# Patient Record
Sex: MALE | Race: BLACK OR AFRICAN AMERICAN | NOT HISPANIC OR LATINO | Employment: UNEMPLOYED | URBAN - METROPOLITAN AREA
[De-identification: names, ages, dates, MRNs, and addresses within clinical notes are randomized per-mention and may not be internally consistent; named-entity substitution may affect disease eponyms.]

---

## 2021-05-28 ENCOUNTER — TRANSFERRED RECORDS (OUTPATIENT)
Dept: HEALTH INFORMATION MANAGEMENT | Facility: CLINIC | Age: 56
End: 2021-05-28

## 2021-06-07 ENCOUNTER — TRANSFERRED RECORDS (OUTPATIENT)
Dept: HEALTH INFORMATION MANAGEMENT | Facility: CLINIC | Age: 56
End: 2021-06-07

## 2021-07-07 ENCOUNTER — TRANSFERRED RECORDS (OUTPATIENT)
Dept: HEALTH INFORMATION MANAGEMENT | Facility: CLINIC | Age: 56
End: 2021-07-07

## 2023-02-11 ENCOUNTER — APPOINTMENT (OUTPATIENT)
Dept: GENERAL RADIOLOGY | Facility: CLINIC | Age: 58
End: 2023-02-11
Attending: STUDENT IN AN ORGANIZED HEALTH CARE EDUCATION/TRAINING PROGRAM
Payer: COMMERCIAL

## 2023-02-11 ENCOUNTER — HOSPITAL ENCOUNTER (EMERGENCY)
Facility: CLINIC | Age: 58
Discharge: HOME | End: 2023-02-11
Attending: EMERGENCY MEDICINE | Admitting: EMERGENCY MEDICINE
Payer: COMMERCIAL

## 2023-02-11 VITALS
OXYGEN SATURATION: 98 % | RESPIRATION RATE: 16 BRPM | HEART RATE: 65 BPM | DIASTOLIC BLOOD PRESSURE: 109 MMHG | TEMPERATURE: 98 F | HEIGHT: 66 IN | SYSTOLIC BLOOD PRESSURE: 138 MMHG | BODY MASS INDEX: 27.32 KG/M2 | WEIGHT: 170 LBS

## 2023-02-11 DIAGNOSIS — S81.801D MULTIPLE OPEN WOUNDS OF RIGHT LOWER EXTREMITY, SUBSEQUENT ENCOUNTER: ICD-10-CM

## 2023-02-11 DIAGNOSIS — L97.919 ULCER OF RIGHT LOWER EXTREMITY, UNSPECIFIED ULCER STAGE (H): ICD-10-CM

## 2023-02-11 DIAGNOSIS — E87.6 HYPOKALEMIA: ICD-10-CM

## 2023-02-11 DIAGNOSIS — D64.9 NORMOCYTIC ANEMIA: ICD-10-CM

## 2023-02-11 DIAGNOSIS — R60.0 BILATERAL LOWER EXTREMITY EDEMA: ICD-10-CM

## 2023-02-11 DIAGNOSIS — D72.820 LYMPHOCYTOSIS: ICD-10-CM

## 2023-02-11 DIAGNOSIS — S90.822A BLISTER OF LEFT FOOT WITHOUT INFECTION, INITIAL ENCOUNTER: ICD-10-CM

## 2023-02-11 DIAGNOSIS — L08.9 LOCAL INFECTION OF SKIN AND SUBCUTANEOUS TISSUE: ICD-10-CM

## 2023-02-11 LAB
ALBUMIN SERPL BCG-MCNC: 3.2 G/DL (ref 3.5–5.2)
ALP SERPL-CCNC: 204 U/L (ref 40–129)
ALT SERPL W P-5'-P-CCNC: 72 U/L (ref 10–50)
ANION GAP SERPL CALCULATED.3IONS-SCNC: 13 MMOL/L (ref 7–15)
AST SERPL W P-5'-P-CCNC: 48 U/L (ref 10–50)
ATRIAL RATE - MUSE: 100 BPM
BASOPHILS # BLD AUTO: 0 10E3/UL (ref 0–0.2)
BASOPHILS NFR BLD AUTO: 0 %
BILIRUB SERPL-MCNC: 0.6 MG/DL
BUN SERPL-MCNC: 10.1 MG/DL (ref 6–20)
CALCIUM SERPL-MCNC: 8.6 MG/DL (ref 8.6–10)
CHLORIDE SERPL-SCNC: 93 MMOL/L (ref 98–107)
CREAT SERPL-MCNC: 0.81 MG/DL (ref 0.67–1.17)
CRP SERPL-MCNC: 31.75 MG/L
DEPRECATED HCO3 PLAS-SCNC: 33 MMOL/L (ref 22–29)
DIASTOLIC BLOOD PRESSURE - MUSE: NORMAL MMHG
EOSINOPHIL # BLD AUTO: 0 10E3/UL (ref 0–0.7)
EOSINOPHIL NFR BLD AUTO: 0 %
ERYTHROCYTE [DISTWIDTH] IN BLOOD BY AUTOMATED COUNT: 21.7 % (ref 10–15)
GFR SERPL CREATININE-BSD FRML MDRD: >90 ML/MIN/1.73M2
GLUCOSE SERPL-MCNC: 215 MG/DL (ref 70–99)
HCT VFR BLD AUTO: 29.4 % (ref 40–53)
HGB BLD-MCNC: 9.2 G/DL (ref 13.3–17.7)
IMM GRANULOCYTES # BLD: 0.3 10E3/UL
IMM GRANULOCYTES NFR BLD: 2 %
INTERPRETATION ECG - MUSE: NORMAL
LYMPHOCYTES # BLD AUTO: 0.7 10E3/UL (ref 0.8–5.3)
LYMPHOCYTES NFR BLD AUTO: 5 %
MCH RBC QN AUTO: 29.9 PG (ref 26.5–33)
MCHC RBC AUTO-ENTMCNC: 31.3 G/DL (ref 31.5–36.5)
MCV RBC AUTO: 96 FL (ref 78–100)
MONOCYTES # BLD AUTO: 0.6 10E3/UL (ref 0–1.3)
MONOCYTES NFR BLD AUTO: 4 %
NEUTROPHILS # BLD AUTO: 13 10E3/UL (ref 1.6–8.3)
NEUTROPHILS NFR BLD AUTO: 89 %
NRBC # BLD AUTO: 0.4 10E3/UL
NRBC BLD AUTO-RTO: 3 /100
NT-PROBNP SERPL-MCNC: 3775 PG/ML (ref 0–900)
P AXIS - MUSE: 30 DEGREES
PLATELET # BLD AUTO: 479 10E3/UL (ref 150–450)
POTASSIUM SERPL-SCNC: 3.1 MMOL/L (ref 3.4–5.3)
PR INTERVAL - MUSE: 146 MS
PROCALCITONIN SERPL IA-MCNC: 0.13 NG/ML
PROT SERPL-MCNC: 6.4 G/DL (ref 6.4–8.3)
QRS DURATION - MUSE: 150 MS
QT - MUSE: 444 MS
QTC - MUSE: 572 MS
R AXIS - MUSE: -26 DEGREES
RBC # BLD AUTO: 3.08 10E6/UL (ref 4.4–5.9)
SODIUM SERPL-SCNC: 139 MMOL/L (ref 136–145)
SYSTOLIC BLOOD PRESSURE - MUSE: NORMAL MMHG
T AXIS - MUSE: -49 DEGREES
VENTRICULAR RATE- MUSE: 100 BPM
WBC # BLD AUTO: 14.7 10E3/UL (ref 4–11)

## 2023-02-11 PROCEDURE — 73610 X-RAY EXAM OF ANKLE: CPT | Mod: RT

## 2023-02-11 PROCEDURE — 80053 COMPREHEN METABOLIC PANEL: CPT | Performed by: STUDENT IN AN ORGANIZED HEALTH CARE EDUCATION/TRAINING PROGRAM

## 2023-02-11 PROCEDURE — 73630 X-RAY EXAM OF FOOT: CPT | Mod: RT

## 2023-02-11 PROCEDURE — 250N000013 HC RX MED GY IP 250 OP 250 PS 637: Performed by: STUDENT IN AN ORGANIZED HEALTH CARE EDUCATION/TRAINING PROGRAM

## 2023-02-11 PROCEDURE — 36415 COLL VENOUS BLD VENIPUNCTURE: CPT | Performed by: STUDENT IN AN ORGANIZED HEALTH CARE EDUCATION/TRAINING PROGRAM

## 2023-02-11 PROCEDURE — 84145 PROCALCITONIN (PCT): CPT | Performed by: STUDENT IN AN ORGANIZED HEALTH CARE EDUCATION/TRAINING PROGRAM

## 2023-02-11 PROCEDURE — 86140 C-REACTIVE PROTEIN: CPT | Performed by: STUDENT IN AN ORGANIZED HEALTH CARE EDUCATION/TRAINING PROGRAM

## 2023-02-11 PROCEDURE — 83880 ASSAY OF NATRIURETIC PEPTIDE: CPT | Performed by: STUDENT IN AN ORGANIZED HEALTH CARE EDUCATION/TRAINING PROGRAM

## 2023-02-11 PROCEDURE — 93005 ELECTROCARDIOGRAM TRACING: CPT

## 2023-02-11 PROCEDURE — 85014 HEMATOCRIT: CPT | Performed by: STUDENT IN AN ORGANIZED HEALTH CARE EDUCATION/TRAINING PROGRAM

## 2023-02-11 PROCEDURE — 99285 EMERGENCY DEPT VISIT HI MDM: CPT

## 2023-02-11 RX ORDER — OXYCODONE AND ACETAMINOPHEN 5; 325 MG/1; MG/1
1 TABLET ORAL ONCE
Status: COMPLETED | OUTPATIENT
Start: 2023-02-11 | End: 2023-02-11

## 2023-02-11 RX ORDER — SPIRONOLACTONE 100 MG/1
100 TABLET, FILM COATED ORAL DAILY
Qty: 30 TABLET | Refills: 0 | Status: ON HOLD | OUTPATIENT
Start: 2023-02-11 | End: 2023-05-01

## 2023-02-11 RX ORDER — LEVOTHYROXINE SODIUM 100 UG/1
100 TABLET ORAL DAILY
Qty: 30 TABLET | Refills: 0 | Status: SHIPPED | OUTPATIENT
Start: 2023-02-11

## 2023-02-11 RX ORDER — IBUPROFEN 600 MG/1
600 TABLET, FILM COATED ORAL ONCE
Status: COMPLETED | OUTPATIENT
Start: 2023-02-11 | End: 2023-02-11

## 2023-02-11 RX ORDER — OXYCODONE HYDROCHLORIDE 5 MG/1
5 TABLET ORAL EVERY 6 HOURS PRN
Qty: 12 TABLET | Refills: 0 | Status: SHIPPED | OUTPATIENT
Start: 2023-02-11 | End: 2023-02-11

## 2023-02-11 RX ORDER — FUROSEMIDE 20 MG
20 TABLET ORAL DAILY
Qty: 7 TABLET | Refills: 0 | Status: ON HOLD | OUTPATIENT
Start: 2023-02-11 | End: 2023-04-04

## 2023-02-11 RX ORDER — CEFDINIR 300 MG/1
300 CAPSULE ORAL 2 TIMES DAILY
Qty: 28 CAPSULE | Refills: 0 | Status: SHIPPED | OUTPATIENT
Start: 2023-02-11 | End: 2023-02-25

## 2023-02-11 RX ORDER — OXYCODONE HYDROCHLORIDE 5 MG/1
5 TABLET ORAL EVERY 6 HOURS PRN
Qty: 12 TABLET | Refills: 0 | Status: ON HOLD | OUTPATIENT
Start: 2023-02-11 | End: 2023-05-01

## 2023-02-11 RX ADMIN — OXYCODONE HYDROCHLORIDE AND ACETAMINOPHEN 1 TABLET: 5; 325 TABLET ORAL at 08:15

## 2023-02-11 RX ADMIN — IBUPROFEN 600 MG: 600 TABLET ORAL at 08:54

## 2023-02-11 ASSESSMENT — ENCOUNTER SYMPTOMS
VOMITING: 0
FEVER: 0
CHILLS: 0
WOUND: 1
NAUSEA: 0
ABDOMINAL PAIN: 0

## 2023-02-11 ASSESSMENT — ACTIVITIES OF DAILY LIVING (ADL)
ADLS_ACUITY_SCORE: 35
ADLS_ACUITY_SCORE: 35

## 2023-02-11 NOTE — ED NOTES
Bed: ED26  Expected date: 2/11/23  Expected time: 7:23 AM  Means of arrival: Ambulance  Comments:  446 57m leg infection ETA 1932

## 2023-02-11 NOTE — ED PROVIDER NOTES
"ED ATTENDING PHYSICIAN NOTE:   I evaluated this patient in conjunction with MARY KAY Mills. I have participated in the care of the patient and personally performed key elements of the history, exam, and medical decision making.      HPI:   Gustavo Faria is a 57 year old male with history of ACTH secreting macroadenoma who presents for evaluation of right leg pain and multiple chronic wounds to the right lower extremity. Patient originally sustained a puncture wound from a car door in August 2022, which he had surgically debrided. Has since developed multiple ulcerated wounds to his right lower leg. He has history of pituitary adenoma s/p multiple resection procedures (upcoming procedure in March 2023) and steroid-induced hyperglycemia (on Metformin and sitagliptin) with resulting neuropathy and constant edema in his bilateral lower legs since last month. Last night he reports a significant increase in pain to his legs, took ibuprofen with minimal relief. With worsening pain today (no oral medications) rated a 9/10 in severity, patient grew concerned and presented to ED today. No fever, chills, nausea, vomiting, or abdominal pain.     EXAM:   BP (!) 138/109   Pulse 65   Temp 98  F (36.7  C) (Oral)   Resp 16   Ht 1.676 m (5' 6\")   Wt 77.1 kg (170 lb)   SpO2 98%   BMI 27.44 kg/m    General: Alert, appears well-developed and well-nourished. Cooperative.     In mild distress  HEENT:  Head:  Atraumatic  Ears:  External ears are normal  Mouth/Throat:  Oropharynx is without erythema or exudate and mucous membranes are moist.   Eyes:   Conjunctivae normal and EOM are normal. No scleral icterus.  CV:  Normal rate, irregular rhythm, normal heart sounds and radial pulses are 2+ and symmetric.  No murmur.  Resp:  Breath sounds are clear bilaterally    Non-labored, no retractions or accessory muscle use  GI:  Abdomen is soft, no distension, no tenderness. No rebound or guarding.  No CVA tenderness " bilaterally  MS:  Normal range of motion. 2+ pitting BLE.    Back atraumatic.    No midline cervical, thoracic, or lumbar tenderness  Skin:  Warm and dry. There are wound to bilateral lower extremities per pictures below.  Neuro: Alert. Normal strength.  GCS: 15  Psych:  Normal mood and affect.            Labs Ordered and Resulted from Time of ED Arrival to Time of ED Departure   CRP INFLAMMATION - Abnormal       Result Value    CRP Inflammation 31.75 (*)    PROCALCITONIN - Abnormal    Procalcitonin 0.13 (*)    COMPREHENSIVE METABOLIC PANEL - Abnormal    Sodium 139      Potassium 3.1 (*)     Chloride 93 (*)     Carbon Dioxide (CO2) 33 (*)     Anion Gap 13      Urea Nitrogen 10.1      Creatinine 0.81      Calcium 8.6      Glucose 215 (*)     Alkaline Phosphatase 204 (*)     AST 48      ALT 72 (*)     Protein Total 6.4      Albumin 3.2 (*)     Bilirubin Total 0.6      GFR Estimate >90     NT PROBNP INPATIENT - Abnormal    N terminal Pro BNP Inpatient 3,775 (*)    CBC WITH PLATELETS AND DIFFERENTIAL - Abnormal    WBC Count 14.7 (*)     RBC Count 3.08 (*)     Hemoglobin 9.2 (*)     Hematocrit 29.4 (*)     MCV 96      MCH 29.9      MCHC 31.3 (*)     RDW 21.7 (*)     Platelet Count 479 (*)     % Neutrophils 89      % Lymphocytes 5      % Monocytes 4      % Eosinophils 0      % Basophils 0      % Immature Granulocytes 2      NRBCs per 100 WBC 3 (*)     Absolute Neutrophils 13.0 (*)     Absolute Lymphocytes 0.7 (*)     Absolute Monocytes 0.6      Absolute Eosinophils 0.0      Absolute Basophils 0.0      Absolute Immature Granulocytes 0.3      Absolute NRBCs 0.4       Foot  XR, G/E 3 views, right   Final Result   IMPRESSION: No radiographic evidence of osteomyelitis. There is no soft tissue gas. Soft tissue swelling along the lateral aspect of the forefoot. No acute fracture.      Ankle XR, G/E 3 views, right   Final Result   IMPRESSION: Marked soft tissue swelling in the lower calf, ankle, and foot. Normal joint alignment.  No erosive change or periostitis identified. No fracture or bone lesion otherwise. Mild degenerative changes in the hindfoot.        MEDICAL DECISION MAKING/ASSESSMENT AND PLAN:   Patient is a 57-year-old male with a complex past medical history pertinent for hypothyroidism, hypertension, and diabetes who presents with chronic wounds to right lower extremity with worsening pain as well as lower extremity swelling bilaterally.  Patient had a broad work-up pursued.  Patient may have a component of acute on chronic cellulitis or infection to the wounds of his right lower extremity.  Ultimately he is having increasing pain to the most distal wound of the right shin.  He did recently have an admission at outside hospital in his home state of [place].  He completed the majority of the antibiotics he was prescribed but did stop the antibiotics as several days before the completion of that oral course.  It was a oral cephalosporin medication with which she was sent home from his prior admission.  Today patient has worsening pain that was controlled with oral pain medications.  Laboratory findings were pertinent for inflammatory marker elevations mild procalcitonin elevation 0.13 and a leukocytosis of 14.7.  Patient is not having fevers or abnormal vital signs.  Given the lack of systemic findings lower concern for sepsis requiring inpatient admission and IV antibiotics or blood cultures at this time.  There may be a component of acute on chronic cellulitis to the right lower extremity and so we could trial oral antibiotics as the patient is out of town and hoping to return back to [place] within the next week and a half.  Given his lower extremity swelling we did evaluate for heart failure versus liver failure versus kidney failure.  I do suspect that he may have some underlying heart failure and given the BNP elevation and bilateral lower extremity swelling we will trial some oral diuretics over the next several days to  see if this improves the swelling in his lower extremities.  He is not having active chest pain or shortness of breath.  He is not hypoxic.  I do not feel that the patient requires admission to the hospital although given the multiple described acute issues above we did offer admission to the hospital for further evaluation which would include echocardiogram, IV antibiotics, and IV diuretics.  The patient does appear to have appropriate medical decision-making capacity and after being offered admission he would much prefer to be discharged home on oral medications particularly as his birthday is tomorrow and that is why he presents to [place] to celebrate with his family and sister.  I think it is reasonable to trial a course of oral antibiotics, oral diuretics, and pain management in the outpatient setting with plans for close outpatient follow-up with his primary care team upon his return to [place].  He may need more current echocardiogram and/or repeat evaluation with his wound care providers upon return to [place].  Certainly he was encouraged to return back to the emergency department should his symptoms worsen within the next 10 days while he is still here in [facility].  After all questions answered return precautions understood, patient discharged home.     DIAGNOSIS:     ICD-10-CM    1. Ulcer of right lower extremity, unspecified ulcer stage (H)  L97.919 STERILE WATER/SALINE, 500 ML      2. Local infection of skin and subcutaneous tissue  L08.9       3. Lymphocytosis  D72.820       4. Hypokalemia  E87.6       5. Normocytic anemia  D64.9       6. Bilateral lower extremity edema  R60.0       7. Blister of left foot without infection, initial encounter  S90.822A       8. Multiple open wounds of right lower extremity, subsequent encounter  S81.801D STERILE WATER/SALINE, 500 ML        DISPOSITION:   Patient discharged.     2/11/2023  [facility] EMERGENCY DEPT       Mart Villa  MD  02/11/23 1519

## 2023-02-11 NOTE — DISCHARGE INSTRUCTIONS
Your exam and lab work today show a potential acute on chronic infection of the wounds of your leg. It is important that you take the full course of antibiotics we prescribed. Additionally, take Lasix daily for one week for fluid retention. Take this in the morning so you are not up urinating over night. It is important that you take your oral potassium pills, since Lasix can continue to decrease your potassium. Oxycodone to be used for severe pain. You can continue Ibuprofen and Tylenol every 6-8 hours for pain.   Lastly, we prescribed 30 days worth of some of your home medications that you do not have with you here: Sitagliptin, Levothyroxine, and Spironolactone. Sterile saline ordered as requested for wound care.  New medications:   Cefdinir/Omnicef BID x14 days  Furosemide/lasix Daily x7 days   OxyCODONE as needed for severe pain  Return to the emergency department if you develop fevers or chills, severe pain, or worsening of your wounds.  Follow up with your doctor in [place] for continued follow up of wound management, your diabetes, your pituitary adenoma, lab work, and general medical care.

## 2023-02-11 NOTE — ED TRIAGE NOTES
Pt here from [place];  Had previous puncture wound from August which got infected.  Pt then had to get it surgically cleaned; pt continues to have issues and pain with R leg.  Per EMS, pt was prescribed abx but did not take them.

## 2023-02-11 NOTE — ED PROVIDER NOTES
History     Chief Complaint:  Leg Pain     PIERRE Chen is a 57-year-old male with a history of ACTH secreting macroadenoma who presents to the emergency department for evaluation of right leg pain and multiple wounds to RLE.  Patient is in town visiting his sister from [place]. Patient reported he originally had a puncture wound from a car door in August 2022, and had it surgically debrided.  He is not exactly sure of when this procedure happened.    Patient reports significant increase in pain last night when the pain woke him from his sleep.  He took ibuprofen yesterday with minimal relief.  No pain medication this morning.  Patient reports the lowest of the 3 ulcerated wounds is what's causing him the most pain.  Rates the pain at a 9 out of 10.  Denies fevers, chills, nausea, vomiting, abdominal pain.  Of note, patient has history of known pituitary adenoma.  He has had multiple procedures for resection; reports another procedure is suppsoed to be mid march.  Patient has diagnosis of steroid-induced hyperglycemia, which has caused him to have reduced sensation in his lower extremities.  He reports constant swelling in his lower legs since his hospital discharge in January. He is currently on metformin and Sitagliptin with a continuous glucose monitor.    Independent Historian:   None - Patient Only and Sibling - They report Multiple components of the history above.    Review of External Notes: I reviewed the patient's discharge summary from [facility] in [place]. Upon chart review in care everywhere, 12/ 23 he was incidentally noted to have a right leg abscess I&D was performed 12/25.  He was discharged home on Omnicef at that time.  He was then readmitted to the hospital on 01/01/23, and discharged 3 days later.  He was again discharged on 14 days of Omnicef, but the patient reports he did not complete this course of antibiotics.    ROS:  Review of Systems   Constitutional: Negative for  "chills and fever.   Gastrointestinal: Negative for abdominal pain, nausea and vomiting.   Skin: Positive for wound.       Allergies:  No Known Drug Allergies     Medications:    Levothyroxine  Metformin  Sitagliptin  Sildenafil  Testosterone  Spironolactone  Oxychlorosene  Carvedilol  Potassium chloride  Nystatin  Hydralazine  Haloperidol  Ergocalciferol           Past Medical History:    Hypothyroidism  Sepsis  Abscess of right lower leg  Metabolic alkalosis  Pituitary adenoma  Metabolic encephalopathy  Hypokalemia  Altered mental status  Hypertension  Diabetes mellitus    Past Surgical History:    Joint repair, left, sepsis in knee  Brain surgery  I&D abscess leg, right    Family History:    Myocardial infarction  Stroke    Social History:  The patient presented alone.  The patient arrived via EMS.  From [place].    Physical Exam     Patient Vitals for the past 24 hrs:   BP Temp Temp src Pulse Resp SpO2 Height Weight   02/11/23 1036 (!) 138/109 -- -- 65 -- 98 % -- --   02/11/23 1021 -- -- -- -- -- 97 % -- --   02/11/23 0737 (!) 158/115 98  F (36.7  C) Oral 88 16 100 % 1.676 m (5' 6\") 77.1 kg (170 lb)      Physical Exam  Vital signs and nursing notes reviewed.    General:  Alert and oriented, no acute distress. Resting on bed with sister at bedside.   Skin: Skin is warm and dry. No rashes, lesions, or erythema. No diaphoresis.  There are 3 wounds to anterior right lower extremity per pictures below. No warmth or significant erythema to area.  HEENT:   Head: Normocephalic, atraumatic. Facial features symmetric.   Eyes: Conjunctiva pink, sclera white. EOMs grossly intact.   Ears: Auricles without lesion, erythema, or edema.   Nose: Symmetric with no discharge.  Mouth and throat: Lips are moist with no lesions or edema, Buccal and oropharyngeal mucosa is pink and moist without lesions or exudate. Uvula is midline.  Neck: Normal range of motion. Neck supple with no lymphadenopathy. No tracheal deviation.   CV:  Normal " heart rate with irregular rhythm. No murmurs or extra heart sounds. 2+ radial and tibialis posterior pulses bilaterally. 2+ pitting edema of bilateral lower extremities from knee down.  Pulm/Chest: Chest wall expansion symmetric with no increased effort of breathing. Lungs clear and equal to auscultation bilaterally.   Abd: Bowel sounds present and physiologic. Abdomen is soft and nontender to palpation in all 4 quadrants with no guarding or rebound. No masses, hernias, or organomegaly.   M/S: Moves all extremities spontaneously.  Psych: Normal mood and affect. Behavior is normal.               Emergency Department Course   ECG  ECG taken at 0818, ECG read at 0824  Sinus rhythm with occasional premature ventricular complexes and premature atrial complexes.  Right bundle branch block.  Minimal voltage criteria for LVH, may be normal variant (R in aVL).  T wave abnormality, consider inferolateral ischemia.  Abnormal ECG.  No old ECG.  Rate 100 bpm. FL interval 146 ms. QRS duration 150 ms. QT/QTc 444/572 ms. P-R-T axes 30 -26 -49.     Imaging:  Ankle XR, G/E 3 views, right   Final Result   IMPRESSION: Marked soft tissue swelling in the lower calf, ankle, and foot. Normal joint alignment. No erosive change or periostitis identified. No fracture or bone lesion otherwise. Mild degenerative changes in the hindfoot.      Foot  XR, G/E 3 views, right    (Results Pending)      Report per radiology    Laboratory:  Labs Ordered and Resulted from Time of ED Arrival to Time of ED Departure   CRP INFLAMMATION - Abnormal       Result Value    CRP Inflammation 31.75 (*)    PROCALCITONIN - Abnormal    Procalcitonin 0.13 (*)    COMPREHENSIVE METABOLIC PANEL - Abnormal    Sodium 139      Potassium 3.1 (*)     Chloride 93 (*)     Carbon Dioxide (CO2) 33 (*)     Anion Gap 13      Urea Nitrogen 10.1      Creatinine 0.81      Calcium 8.6      Glucose 215 (*)     Alkaline Phosphatase 204 (*)     AST 48      ALT 72 (*)     Protein Total 6.4       Albumin 3.2 (*)     Bilirubin Total 0.6      GFR Estimate >90     NT PROBNP INPATIENT - Abnormal    N terminal Pro BNP Inpatient 3,775 (*)    CBC WITH PLATELETS AND DIFFERENTIAL - Abnormal    WBC Count 14.7 (*)     RBC Count 3.08 (*)     Hemoglobin 9.2 (*)     Hematocrit 29.4 (*)     MCV 96      MCH 29.9      MCHC 31.3 (*)     RDW 21.7 (*)     Platelet Count 479 (*)     % Neutrophils 89      % Lymphocytes 5      % Monocytes 4      % Eosinophils 0      % Basophils 0      % Immature Granulocytes 2      NRBCs per 100 WBC 3 (*)     Absolute Neutrophils 13.0 (*)     Absolute Lymphocytes 0.7 (*)     Absolute Monocytes 0.6      Absolute Eosinophils 0.0      Absolute Basophils 0.0      Absolute Immature Granulocytes 0.3      Absolute NRBCs 0.4     ERYTHROCYTE SEDIMENTATION RATE AUTO      Procedures   None    Emergency Department Course & Assessments:     Interventions:  Medications   oxyCODONE-acetaminophen (PERCOCET) 5-325 MG per tablet 1 tablet (1 tablet Oral Given 2/11/23 0815)   ibuprofen (ADVIL/MOTRIN) tablet 600 mg (600 mg Oral Given 2/11/23 0854)      Independent Interpretation (X-rays, CTs, rhythm strip):  I reviewed the patient's foot and ankle x-rays and appreciated no periostitis, fracture, bone lesion.    Consultations/Discussion of Management or Tests:    ED Course as of 02/11/23 1113   Sat Feb 11, 2023   0850 I reassessed the patient. He was still having pain after Percocet. Will order Ibuprofen.     Social Determinants of Health affecting care:   None and Healthcare Access/Compliance    Assessments:  0740 I obtained history and examined the patient as noted above.    Disposition:  The patient was discharged to home.     Impression & Plan      Medical Decision Making:  Gustavo Faria is a 57 year old male who presents for evaluation of pain related to his right lower extremity wounds.  Findings and work-up are consistent with acute on chronic wound infection. Mildly elevated blood pressure here, but  patient reports he did not take his blood pressure medication this morning.  Otherwise patient is afebrile and vital signs are stable.  On exam, wounds to right lower extremity show minimal discharge versus granulation tissue.  Wounds appear chronic. Also noted bilateral lower extremity pitting edema, of which the patient reports has been ongoing since his hospital stay approximately one month ago. Patient was seen in the hospital in [place] approximately 1 week ago and I compared lab work with that visit.  Patient is still hypokalemic. CRP and WBC are elevated consistent with potential acute on chronic wound infection. Minimally elevated pro-calcitonin with low suspicion for systemic infection. Hemoglobin is low, but improved from previous and patient reports this is chronic for him. Also noted elevated alk phos, stable from previous.  Elevated BNP consistent with some potential heart failure. X-ray of the patient's foot and ankle fortunately showed no erosive change or periostitis.  Therefore, low suspicion for osteomyelitis.  Patient is not hypoxic, tachycardic, hypotensive, or febrile without evidence of systemic infection.  Patient's pain is well controlled with oral medications here in the department. Had conversation with patient and sister at bedside for treatment plan. He was offered admission for IV antibiotics, pain control, and wound consultation, although patient was adamant on going home. Patient is in town visiting his sister for his birthday, and through shared decision making, it is decided that patient will discharge home with oral antibiotics and pain medication.  Reviewed risks and benefits. I do feel the patient is safe to go home at this time with close follow-up with his home doctor. We will discharge him on oral cefdinir, furosemide, and pain medication. I emphasized the importance of oral potassium as he is hypokalemic and Lasix will continue to deplete his potassium. Patient reassured me  he has these at home and will take them. Additionally, patient is out of multiple home meds and given he is on vacation visiting his sister, we will prescribe 1 months worth of these medications.  He was instructed to return to the emergency department should he develop fevers, chills, severepain or discharge, or other concerning symptoms.  Also instructed to follow-up with primary care regarding anemia, alk phos, heart failure, wound management, and further treatment of his pituitary macroadenoma.  Patient and sister were agreeable to plan, and had their questions answered.   I staffed this patient with Dr. Villa who is agreeable to the assessment and plan above.    Diagnosis:    ICD-10-CM    1. Ulcer of right lower extremity, unspecified ulcer stage (H)  L97.919 STERILE WATER/SALINE, 500 ML      2. Local infection of skin and subcutaneous tissue  L08.9       3. Lymphocytosis  D72.820       4. Hypokalemia  E87.6       5. Normocytic anemia  D64.9       6. Bilateral lower extremity edema  R60.0       7. Blister of left foot without infection, initial encounter  S90.822A       8. Multiple open wounds of right lower extremity, subsequent encounter  S81.801D STERILE WATER/SALINE, 500 ML         Discharge Medications:  New Prescriptions    CEFDINIR (OMNICEF) 300 MG CAPSULE    Take 1 capsule (300 mg) by mouth 2 times daily for 14 days    FUROSEMIDE (LASIX) 20 MG TABLET    Take 1 tablet (20 mg) by mouth daily for 7 days    LEVOTHYROXINE (SYNTHROID/LEVOTHROID) 100 MCG TABLET    Take 1 tablet (100 mcg) by mouth daily    OXYCODONE (ROXICODONE) 5 MG TABLET    Take 1 tablet (5 mg) by mouth every 6 hours as needed for pain    SITAGLIPTIN (JANUVIA) 50 MG TABLET    Take 2 tablets (100 mg) by mouth daily for 30 days    SPIRONOLACTONE (ALDACTONE) 100 MG TABLET    Take 1 tablet (100 mg) by mouth daily for 30 days      Scribe Disclosure:  Christal STANTON, am serving as a scribe at 7:49 AM on 2/11/2023 to document services personally  performed by Shauna Feldman PA-C based on my observations and the provider's statements to me.   2/11/2023   MERLIN Lopez Victoria J, PA-C  02/11/23 1515

## 2023-02-16 ENCOUNTER — APPOINTMENT (OUTPATIENT)
Dept: ULTRASOUND IMAGING | Facility: CLINIC | Age: 58
DRG: 602 | End: 2023-02-16
Attending: EMERGENCY MEDICINE
Payer: COMMERCIAL

## 2023-02-16 ENCOUNTER — APPOINTMENT (OUTPATIENT)
Dept: GENERAL RADIOLOGY | Facility: CLINIC | Age: 58
DRG: 602 | End: 2023-02-16
Attending: INTERNAL MEDICINE
Payer: COMMERCIAL

## 2023-02-16 ENCOUNTER — APPOINTMENT (OUTPATIENT)
Dept: GENERAL RADIOLOGY | Facility: CLINIC | Age: 58
DRG: 602 | End: 2023-02-16
Attending: EMERGENCY MEDICINE
Payer: COMMERCIAL

## 2023-02-16 ENCOUNTER — APPOINTMENT (OUTPATIENT)
Dept: ULTRASOUND IMAGING | Facility: CLINIC | Age: 58
DRG: 602 | End: 2023-02-16
Payer: COMMERCIAL

## 2023-02-16 ENCOUNTER — HOSPITAL ENCOUNTER (INPATIENT)
Facility: CLINIC | Age: 58
LOS: 2 days | Discharge: LEFT BEFORE DISCHARGE | DRG: 602 | End: 2023-02-18
Attending: EMERGENCY MEDICINE | Admitting: INTERNAL MEDICINE
Payer: COMMERCIAL

## 2023-02-16 DIAGNOSIS — R79.89 ELEVATED TROPONIN: Primary | ICD-10-CM

## 2023-02-16 DIAGNOSIS — Z91.148 POOR COMPLIANCE WITH MEDICATION: ICD-10-CM

## 2023-02-16 DIAGNOSIS — L03.115 CELLULITIS OF RIGHT LOWER LEG: ICD-10-CM

## 2023-02-16 DIAGNOSIS — R60.0 BILATERAL LEG EDEMA: ICD-10-CM

## 2023-02-16 DIAGNOSIS — I50.9 ACUTE ON CHRONIC CONGESTIVE HEART FAILURE, UNSPECIFIED HEART FAILURE TYPE (H): ICD-10-CM

## 2023-02-16 LAB
ALBUMIN SERPL BCG-MCNC: 3.7 G/DL (ref 3.5–5.2)
ALP SERPL-CCNC: 223 U/L (ref 40–129)
ALT SERPL W P-5'-P-CCNC: 81 U/L (ref 10–50)
ANION GAP SERPL CALCULATED.3IONS-SCNC: 10 MMOL/L (ref 7–15)
AST SERPL W P-5'-P-CCNC: 26 U/L (ref 10–50)
ATRIAL RATE - MUSE: 97 BPM
BASOPHILS # BLD AUTO: 0 10E3/UL (ref 0–0.2)
BASOPHILS NFR BLD AUTO: 0 %
BILIRUB SERPL-MCNC: 0.3 MG/DL
BUN SERPL-MCNC: 19.2 MG/DL (ref 6–20)
CALCIUM SERPL-MCNC: 10.6 MG/DL (ref 8.6–10)
CHLORIDE SERPL-SCNC: 96 MMOL/L (ref 98–107)
CREAT SERPL-MCNC: 0.87 MG/DL (ref 0.67–1.17)
CRP SERPL-MCNC: 8.89 MG/L
DEPRECATED HCO3 PLAS-SCNC: 37 MMOL/L (ref 22–29)
DIASTOLIC BLOOD PRESSURE - MUSE: NORMAL MMHG
EOSINOPHIL # BLD AUTO: 0 10E3/UL (ref 0–0.7)
EOSINOPHIL NFR BLD AUTO: 0 %
ERYTHROCYTE [DISTWIDTH] IN BLOOD BY AUTOMATED COUNT: 21.2 % (ref 10–15)
ERYTHROCYTE [SEDIMENTATION RATE] IN BLOOD BY WESTERGREN METHOD: 33 MM/HR (ref 0–20)
GFR SERPL CREATININE-BSD FRML MDRD: >90 ML/MIN/1.73M2
GLUCOSE BLDC GLUCOMTR-MCNC: 229 MG/DL (ref 70–99)
GLUCOSE BLDC GLUCOMTR-MCNC: 280 MG/DL (ref 70–99)
GLUCOSE BLDC GLUCOMTR-MCNC: 391 MG/DL (ref 70–99)
GLUCOSE SERPL-MCNC: 355 MG/DL (ref 70–99)
HBA1C MFR BLD: 7.6 %
HCT VFR BLD AUTO: 30.1 % (ref 40–53)
HGB BLD-MCNC: 9.2 G/DL (ref 13.3–17.7)
IMM GRANULOCYTES # BLD: 0.6 10E3/UL
IMM GRANULOCYTES NFR BLD: 4 %
INTERPRETATION ECG - MUSE: NORMAL
LYMPHOCYTES # BLD AUTO: 0.9 10E3/UL (ref 0.8–5.3)
LYMPHOCYTES NFR BLD AUTO: 6 %
MAGNESIUM SERPL-MCNC: 1.9 MG/DL (ref 1.7–2.3)
MCH RBC QN AUTO: 30.1 PG (ref 26.5–33)
MCHC RBC AUTO-ENTMCNC: 30.6 G/DL (ref 31.5–36.5)
MCV RBC AUTO: 98 FL (ref 78–100)
MONOCYTES # BLD AUTO: 0.6 10E3/UL (ref 0–1.3)
MONOCYTES NFR BLD AUTO: 4 %
NEUTROPHILS # BLD AUTO: 13.3 10E3/UL (ref 1.6–8.3)
NEUTROPHILS NFR BLD AUTO: 86 %
NRBC # BLD AUTO: 0.4 10E3/UL
NRBC BLD AUTO-RTO: 3 /100
NT-PROBNP SERPL-MCNC: 3300 PG/ML (ref 0–900)
P AXIS - MUSE: 27 DEGREES
PLATELET # BLD AUTO: 449 10E3/UL (ref 150–450)
POTASSIUM SERPL-SCNC: 3.1 MMOL/L (ref 3.4–5.3)
POTASSIUM SERPL-SCNC: 3.2 MMOL/L (ref 3.4–5.3)
PR INTERVAL - MUSE: 132 MS
PROCALCITONIN SERPL IA-MCNC: 0.23 NG/ML
PROT SERPL-MCNC: 6.7 G/DL (ref 6.4–8.3)
QRS DURATION - MUSE: 144 MS
QT - MUSE: 424 MS
QTC - MUSE: 538 MS
R AXIS - MUSE: -28 DEGREES
RBC # BLD AUTO: 3.06 10E6/UL (ref 4.4–5.9)
SODIUM SERPL-SCNC: 143 MMOL/L (ref 136–145)
SYSTOLIC BLOOD PRESSURE - MUSE: NORMAL MMHG
T AXIS - MUSE: 37 DEGREES
TROPONIN T SERPL HS-MCNC: 117 NG/L
TROPONIN T SERPL HS-MCNC: 97 NG/L
VENTRICULAR RATE- MUSE: 97 BPM
WBC # BLD AUTO: 15.3 10E3/UL (ref 4–11)

## 2023-02-16 PROCEDURE — 36415 COLL VENOUS BLD VENIPUNCTURE: CPT | Performed by: EMERGENCY MEDICINE

## 2023-02-16 PROCEDURE — 84484 ASSAY OF TROPONIN QUANT: CPT | Performed by: EMERGENCY MEDICINE

## 2023-02-16 PROCEDURE — 250N000013 HC RX MED GY IP 250 OP 250 PS 637: Performed by: INTERNAL MEDICINE

## 2023-02-16 PROCEDURE — 36415 COLL VENOUS BLD VENIPUNCTURE: CPT | Performed by: INTERNAL MEDICINE

## 2023-02-16 PROCEDURE — 99285 EMERGENCY DEPT VISIT HI MDM: CPT | Mod: 25

## 2023-02-16 PROCEDURE — 250N000013 HC RX MED GY IP 250 OP 250 PS 637: Performed by: EMERGENCY MEDICINE

## 2023-02-16 PROCEDURE — 99223 1ST HOSP IP/OBS HIGH 75: CPT | Performed by: SPECIALIST

## 2023-02-16 PROCEDURE — 93926 LOWER EXTREMITY STUDY: CPT | Mod: RT

## 2023-02-16 PROCEDURE — 96375 TX/PRO/DX INJ NEW DRUG ADDON: CPT

## 2023-02-16 PROCEDURE — 250N000011 HC RX IP 250 OP 636: Performed by: EMERGENCY MEDICINE

## 2023-02-16 PROCEDURE — 83735 ASSAY OF MAGNESIUM: CPT | Performed by: INTERNAL MEDICINE

## 2023-02-16 PROCEDURE — 82962 GLUCOSE BLOOD TEST: CPT

## 2023-02-16 PROCEDURE — 93922 UPR/L XTREMITY ART 2 LEVELS: CPT

## 2023-02-16 PROCEDURE — 73590 X-RAY EXAM OF LOWER LEG: CPT | Mod: RT

## 2023-02-16 PROCEDURE — 93970 EXTREMITY STUDY: CPT

## 2023-02-16 PROCEDURE — 87040 BLOOD CULTURE FOR BACTERIA: CPT | Performed by: EMERGENCY MEDICINE

## 2023-02-16 PROCEDURE — 250N000012 HC RX MED GY IP 250 OP 636 PS 637: Performed by: INTERNAL MEDICINE

## 2023-02-16 PROCEDURE — 250N000011 HC RX IP 250 OP 636: Performed by: SPECIALIST

## 2023-02-16 PROCEDURE — 84132 ASSAY OF SERUM POTASSIUM: CPT | Performed by: INTERNAL MEDICINE

## 2023-02-16 PROCEDURE — 71046 X-RAY EXAM CHEST 2 VIEWS: CPT

## 2023-02-16 PROCEDURE — 83880 ASSAY OF NATRIURETIC PEPTIDE: CPT | Performed by: EMERGENCY MEDICINE

## 2023-02-16 PROCEDURE — 99222 1ST HOSP IP/OBS MODERATE 55: CPT | Mod: FS | Performed by: NURSE PRACTITIONER

## 2023-02-16 PROCEDURE — 96365 THER/PROPH/DIAG IV INF INIT: CPT

## 2023-02-16 PROCEDURE — 258N000003 HC RX IP 258 OP 636: Performed by: EMERGENCY MEDICINE

## 2023-02-16 PROCEDURE — 120N000001 HC R&B MED SURG/OB

## 2023-02-16 PROCEDURE — 96366 THER/PROPH/DIAG IV INF ADDON: CPT

## 2023-02-16 PROCEDURE — 84484 ASSAY OF TROPONIN QUANT: CPT | Performed by: INTERNAL MEDICINE

## 2023-02-16 PROCEDURE — 93005 ELECTROCARDIOGRAM TRACING: CPT

## 2023-02-16 PROCEDURE — 84145 PROCALCITONIN (PCT): CPT | Performed by: EMERGENCY MEDICINE

## 2023-02-16 PROCEDURE — 250N000011 HC RX IP 250 OP 636: Performed by: INTERNAL MEDICINE

## 2023-02-16 PROCEDURE — 83036 HEMOGLOBIN GLYCOSYLATED A1C: CPT | Performed by: INTERNAL MEDICINE

## 2023-02-16 PROCEDURE — 85652 RBC SED RATE AUTOMATED: CPT | Performed by: EMERGENCY MEDICINE

## 2023-02-16 PROCEDURE — 85025 COMPLETE CBC W/AUTO DIFF WBC: CPT | Performed by: EMERGENCY MEDICINE

## 2023-02-16 PROCEDURE — 99223 1ST HOSP IP/OBS HIGH 75: CPT | Mod: AI | Performed by: INTERNAL MEDICINE

## 2023-02-16 PROCEDURE — 80053 COMPREHEN METABOLIC PANEL: CPT | Performed by: EMERGENCY MEDICINE

## 2023-02-16 PROCEDURE — 87070 CULTURE OTHR SPECIMN AEROBIC: CPT | Performed by: INTERNAL MEDICINE

## 2023-02-16 PROCEDURE — 86140 C-REACTIVE PROTEIN: CPT | Performed by: EMERGENCY MEDICINE

## 2023-02-16 RX ORDER — LISINOPRIL/HYDROCHLOROTHIAZIDE 10-12.5 MG
1 TABLET ORAL DAILY
Status: ON HOLD | COMMUNITY
End: 2023-04-04

## 2023-02-16 RX ORDER — FUROSEMIDE 10 MG/ML
40 INJECTION INTRAMUSCULAR; INTRAVENOUS ONCE
Status: COMPLETED | OUTPATIENT
Start: 2023-02-16 | End: 2023-02-16

## 2023-02-16 RX ORDER — MEROPENEM 1 G/1
1 INJECTION, POWDER, FOR SOLUTION INTRAVENOUS EVERY 8 HOURS
Status: DISCONTINUED | OUTPATIENT
Start: 2023-02-16 | End: 2023-02-18

## 2023-02-16 RX ORDER — ACETAMINOPHEN 650 MG/1
650 SUPPOSITORY RECTAL EVERY 6 HOURS PRN
Status: DISCONTINUED | OUTPATIENT
Start: 2023-02-16 | End: 2023-02-18 | Stop reason: HOSPADM

## 2023-02-16 RX ORDER — HALOPERIDOL 1 MG/1
2 TABLET ORAL 3 TIMES DAILY
Status: DISCONTINUED | OUTPATIENT
Start: 2023-02-16 | End: 2023-02-17

## 2023-02-16 RX ORDER — NICOTINE POLACRILEX 4 MG
15-30 LOZENGE BUCCAL
Status: DISCONTINUED | OUTPATIENT
Start: 2023-02-16 | End: 2023-02-18 | Stop reason: HOSPADM

## 2023-02-16 RX ORDER — AMOXICILLIN 250 MG
2 CAPSULE ORAL 2 TIMES DAILY PRN
Status: DISCONTINUED | OUTPATIENT
Start: 2023-02-16 | End: 2023-02-18 | Stop reason: HOSPADM

## 2023-02-16 RX ORDER — HYDRALAZINE HYDROCHLORIDE 25 MG/1
25 TABLET, FILM COATED ORAL 3 TIMES DAILY
Status: ON HOLD | COMMUNITY
End: 2023-05-01

## 2023-02-16 RX ORDER — PIPERACILLIN SODIUM, TAZOBACTAM SODIUM 4; .5 G/20ML; G/20ML
4.5 INJECTION, POWDER, LYOPHILIZED, FOR SOLUTION INTRAVENOUS ONCE
Status: COMPLETED | OUTPATIENT
Start: 2023-02-16 | End: 2023-02-16

## 2023-02-16 RX ORDER — ASPIRIN 81 MG/1
81 TABLET ORAL DAILY
Status: DISCONTINUED | OUTPATIENT
Start: 2023-02-17 | End: 2023-02-18 | Stop reason: HOSPADM

## 2023-02-16 RX ORDER — SPIRONOLACTONE 25 MG/1
100 TABLET ORAL DAILY
Status: DISCONTINUED | OUTPATIENT
Start: 2023-02-16 | End: 2023-02-18 | Stop reason: HOSPADM

## 2023-02-16 RX ORDER — ONDANSETRON 2 MG/ML
4 INJECTION INTRAMUSCULAR; INTRAVENOUS EVERY 6 HOURS PRN
Status: DISCONTINUED | OUTPATIENT
Start: 2023-02-16 | End: 2023-02-16

## 2023-02-16 RX ORDER — AMOXICILLIN 250 MG
1 CAPSULE ORAL 2 TIMES DAILY PRN
Status: DISCONTINUED | OUTPATIENT
Start: 2023-02-16 | End: 2023-02-18 | Stop reason: HOSPADM

## 2023-02-16 RX ORDER — HYDRALAZINE HYDROCHLORIDE 25 MG/1
25 TABLET, FILM COATED ORAL 3 TIMES DAILY
Status: DISCONTINUED | OUTPATIENT
Start: 2023-02-16 | End: 2023-02-18 | Stop reason: HOSPADM

## 2023-02-16 RX ORDER — ENOXAPARIN SODIUM 100 MG/ML
40 INJECTION SUBCUTANEOUS EVERY 24 HOURS
Status: DISCONTINUED | OUTPATIENT
Start: 2023-02-16 | End: 2023-02-18 | Stop reason: HOSPADM

## 2023-02-16 RX ORDER — TESTOSTERONE 20.25 MG/1.25G
20.25 GEL TOPICAL DAILY
Status: ON HOLD | COMMUNITY
End: 2023-05-01

## 2023-02-16 RX ORDER — ONDANSETRON 4 MG/1
4 TABLET, ORALLY DISINTEGRATING ORAL EVERY 6 HOURS PRN
Status: DISCONTINUED | OUTPATIENT
Start: 2023-02-16 | End: 2023-02-16

## 2023-02-16 RX ORDER — POTASSIUM CHLORIDE 1.5 G/1.58G
20 POWDER, FOR SOLUTION ORAL DAILY
Status: DISCONTINUED | OUTPATIENT
Start: 2023-02-16 | End: 2023-02-18 | Stop reason: HOSPADM

## 2023-02-16 RX ORDER — PIPERACILLIN SODIUM, TAZOBACTAM SODIUM 3; .375 G/15ML; G/15ML
3.38 INJECTION, POWDER, LYOPHILIZED, FOR SOLUTION INTRAVENOUS EVERY 6 HOURS
Status: DISCONTINUED | OUTPATIENT
Start: 2023-02-16 | End: 2023-02-16

## 2023-02-16 RX ORDER — LISINOPRIL 10 MG/1
10 TABLET ORAL DAILY
Status: DISCONTINUED | OUTPATIENT
Start: 2023-02-16 | End: 2023-02-18 | Stop reason: HOSPADM

## 2023-02-16 RX ORDER — BISACODYL 10 MG
10 SUPPOSITORY, RECTAL RECTAL DAILY PRN
Status: DISCONTINUED | OUTPATIENT
Start: 2023-02-16 | End: 2023-02-18 | Stop reason: HOSPADM

## 2023-02-16 RX ORDER — DEXTROSE MONOHYDRATE 25 G/50ML
25-50 INJECTION, SOLUTION INTRAVENOUS
Status: DISCONTINUED | OUTPATIENT
Start: 2023-02-16 | End: 2023-02-18 | Stop reason: HOSPADM

## 2023-02-16 RX ORDER — ACETAMINOPHEN 325 MG/1
650 TABLET ORAL EVERY 6 HOURS PRN
Status: DISCONTINUED | OUTPATIENT
Start: 2023-02-16 | End: 2023-02-18 | Stop reason: HOSPADM

## 2023-02-16 RX ORDER — ASPIRIN 325 MG
325 TABLET ORAL ONCE
Status: COMPLETED | OUTPATIENT
Start: 2023-02-16 | End: 2023-02-16

## 2023-02-16 RX ORDER — HALOPERIDOL 2 MG/1
2 TABLET ORAL 3 TIMES DAILY
Status: ON HOLD | COMMUNITY
End: 2023-05-01

## 2023-02-16 RX ORDER — CARVEDILOL 12.5 MG/1
12.5 TABLET ORAL 2 TIMES DAILY WITH MEALS
Status: DISCONTINUED | OUTPATIENT
Start: 2023-02-16 | End: 2023-02-18 | Stop reason: HOSPADM

## 2023-02-16 RX ORDER — POTASSIUM CHLORIDE 1500 MG/1
40 TABLET, EXTENDED RELEASE ORAL ONCE
Status: COMPLETED | OUTPATIENT
Start: 2023-02-16 | End: 2023-02-16

## 2023-02-16 RX ORDER — LIDOCAINE 40 MG/G
CREAM TOPICAL
Status: DISCONTINUED | OUTPATIENT
Start: 2023-02-16 | End: 2023-02-18 | Stop reason: HOSPADM

## 2023-02-16 RX ORDER — FUROSEMIDE 10 MG/ML
20 INJECTION INTRAMUSCULAR; INTRAVENOUS
Status: DISCONTINUED | OUTPATIENT
Start: 2023-02-16 | End: 2023-02-18 | Stop reason: HOSPADM

## 2023-02-16 RX ORDER — PIPERACILLIN SODIUM, TAZOBACTAM SODIUM 3; .375 G/15ML; G/15ML
3.38 INJECTION, POWDER, LYOPHILIZED, FOR SOLUTION INTRAVENOUS EVERY 8 HOURS
Status: DISCONTINUED | OUTPATIENT
Start: 2023-02-16 | End: 2023-02-16

## 2023-02-16 RX ORDER — CARVEDILOL 25 MG/1
25 TABLET ORAL 2 TIMES DAILY WITH MEALS
Status: ON HOLD | COMMUNITY
End: 2023-05-01

## 2023-02-16 RX ORDER — LEVOTHYROXINE SODIUM 100 UG/1
100 TABLET ORAL DAILY
Status: DISCONTINUED | OUTPATIENT
Start: 2023-02-16 | End: 2023-02-18 | Stop reason: HOSPADM

## 2023-02-16 RX ADMIN — PIPERACILLIN AND TAZOBACTAM 4.5 G: 4; .5 INJECTION, POWDER, FOR SOLUTION INTRAVENOUS at 10:30

## 2023-02-16 RX ADMIN — ASPIRIN 325 MG ORAL TABLET 325 MG: 325 PILL ORAL at 10:29

## 2023-02-16 RX ADMIN — FUROSEMIDE 20 MG: 10 INJECTION, SOLUTION INTRAMUSCULAR; INTRAVENOUS at 15:48

## 2023-02-16 RX ADMIN — HYDRALAZINE HYDROCHLORIDE 25 MG: 25 TABLET, FILM COATED ORAL at 14:19

## 2023-02-16 RX ADMIN — CARVEDILOL 12.5 MG: 12.5 TABLET, FILM COATED ORAL at 23:13

## 2023-02-16 RX ADMIN — POTASSIUM CHLORIDE 40 MEQ: 1500 TABLET, EXTENDED RELEASE ORAL at 14:47

## 2023-02-16 RX ADMIN — HALOPERIDOL 2 MG: 1 TABLET ORAL at 20:14

## 2023-02-16 RX ADMIN — POTASSIUM CHLORIDE 20 MEQ: 1.5 POWDER, FOR SOLUTION ORAL at 12:04

## 2023-02-16 RX ADMIN — MEROPENEM 1 G: 1 INJECTION, POWDER, FOR SOLUTION INTRAVENOUS at 19:40

## 2023-02-16 RX ADMIN — HALOPERIDOL 2 MG: 1 TABLET ORAL at 14:19

## 2023-02-16 RX ADMIN — PIPERACILLIN AND TAZOBACTAM 3.38 G: 3; .375 INJECTION, POWDER, FOR SOLUTION INTRAVENOUS at 16:16

## 2023-02-16 RX ADMIN — FUROSEMIDE 40 MG: 10 INJECTION, SOLUTION INTRAMUSCULAR; INTRAVENOUS at 10:29

## 2023-02-16 RX ADMIN — INSULIN GLARGINE 10 UNITS: 100 INJECTION, SOLUTION SUBCUTANEOUS at 14:21

## 2023-02-16 RX ADMIN — POTASSIUM CHLORIDE 40 MEQ: 1500 TABLET, EXTENDED RELEASE ORAL at 20:42

## 2023-02-16 RX ADMIN — ACETAMINOPHEN 650 MG: 325 TABLET, FILM COATED ORAL at 15:47

## 2023-02-16 RX ADMIN — ENOXAPARIN SODIUM 40 MG: 40 INJECTION SUBCUTANEOUS at 16:15

## 2023-02-16 RX ADMIN — VANCOMYCIN HYDROCHLORIDE 2000 MG: 10 INJECTION, POWDER, LYOPHILIZED, FOR SOLUTION INTRAVENOUS at 16:45

## 2023-02-16 RX ADMIN — INSULIN ASPART 3 UNITS: 100 INJECTION, SOLUTION INTRAVENOUS; SUBCUTANEOUS at 14:21

## 2023-02-16 RX ADMIN — INSULIN ASPART 5 UNITS: 100 INJECTION, SOLUTION INTRAVENOUS; SUBCUTANEOUS at 17:20

## 2023-02-16 RX ADMIN — CARVEDILOL 12.5 MG: 12.5 TABLET, FILM COATED ORAL at 12:36

## 2023-02-16 RX ADMIN — LISINOPRIL 10 MG: 10 TABLET ORAL at 12:04

## 2023-02-16 RX ADMIN — HYDRALAZINE HYDROCHLORIDE 25 MG: 25 TABLET, FILM COATED ORAL at 20:13

## 2023-02-16 RX ADMIN — SPIRONOLACTONE 100 MG: 25 TABLET ORAL at 12:36

## 2023-02-16 ASSESSMENT — ACTIVITIES OF DAILY LIVING (ADL)
ADLS_ACUITY_SCORE: 35
ADLS_ACUITY_SCORE: 24
DRESSING/BATHING_DIFFICULTY: NO
BATHING: 0-->INDEPENDENT
NUMBER_OF_TIMES_PATIENT_HAS_FALLEN_WITHIN_LAST_SIX_MONTHS: 2
WEAR_GLASSES_OR_BLIND: NO
ADLS_ACUITY_SCORE: 35
CHANGE_IN_FUNCTIONAL_STATUS_SINCE_ONSET_OF_CURRENT_ILLNESS/INJURY: NO
TOILETING_ISSUES: NO
DIFFICULTY_EATING/SWALLOWING: NO
DRESS: 0-->ASSISTANCE NEEDED (DEVELOPMENTALLY APPROPRIATE)
ADLS_ACUITY_SCORE: 35
ADLS_ACUITY_SCORE: 35
TRANSFERRING: 0-->INDEPENDENT
CONCENTRATING,_REMEMBERING_OR_MAKING_DECISIONS_DIFFICULTY: NO
FALL_HISTORY_WITHIN_LAST_SIX_MONTHS: YES
ADLS_ACUITY_SCORE: 35
WALKING_OR_CLIMBING_STAIRS_DIFFICULTY: YES
TRANSFERRING: 0-->INDEPENDENT
ADLS_ACUITY_SCORE: 35
ADLS_ACUITY_SCORE: 35
DOING_ERRANDS_INDEPENDENTLY_DIFFICULTY: NO
WALKING_OR_CLIMBING_STAIRS: OTHER (SEE COMMENTS)

## 2023-02-16 ASSESSMENT — ENCOUNTER SYMPTOMS: WOUND: 1

## 2023-02-16 NOTE — ED NOTES
"[facility]  ED Nurse Handoff Report    ED Chief complaint: Wound Check      ED Diagnosis:   Final diagnoses:   Cellulitis of right lower leg   Bilateral leg edema   Poor compliance with medication   Acute on chronic congestive heart failure, unspecified heart failure type (H)       Code Status: Refer to hospitalist order    Allergies: No Known Allergies    Patient Story: Patient brought in to ED via EMS for R leg wounds with pain. Patient has 4 wounds on R leg from puncture in august, one of the wounds opened last night this with increased drainage and pain prompted patient to call EMS.   Focused Assessment:  Patient hypertensive otherwise vitally stable on RA, A&Ox4, denying SOB and chest pain. Reporting lower back and RLE pain, also reports some baseline numbness in bilateral toes.    Treatments and/or interventions provided: US BLE, X-ray, lasix, aspirin, antibiotics, blood cultures, labs, PIV  Patient's response to treatments and/or interventions: Patient remains stable at this time.    To be done/followed up on inpatient unit:  Continued wound evaluation and care.    Does this patient have any cognitive concerns?: None    Activity level - Baseline/Home:  Independent  Activity Level - Current:   Unknown    Patient's Preferred language: English   Needed?: No    Isolation: None  Infection: Not Applicable  Patient tested for COVID 19 prior to admission: NO  Bariatric?: No    Vital Signs:   Vitals:    02/16/23 0836   BP: (!) 152/110   Pulse: 87   Resp: 16   Temp: 97.5  F (36.4  C)   TempSrc: Temporal   SpO2: 94%   Weight: 77.1 kg (170 lb)   Height: 1.676 m (5' 6\")       Cardiac Rhythm:     Was the PSS-3 completed:   Yes  What interventions are required if any?               Family Comments: None  OBS brochure/video discussed/provided to patient/family: N/A              Name of person given brochure if not patient: NA              Relationship to patient: NA    For the majority of the " shift this patient's behavior was Green.   Behavioral interventions performed were None.    ED NURSE PHONE NUMBER: [phone]

## 2023-02-16 NOTE — CONSULTS
VASCULAR SURGERY INPATIENT CONSULTATION / Initial In-Patient visit    VASCULAR SURGEON: Dr Goodson     LOCATION: [facility]    Gustavo Faria  Medical Record #:  [id number]  YOB: 1965  Age:  58 year old     Date of Service: 2/16/2023    PRIMARY CARE PROVIDER: No Ref-Primary, Physician    Reason for consultation:  Right foot/leg wounds    IMPRESSION: Patient with significant edema and evidence of soft tissue infection with open wounds of distal right leg.  No evidence of arterial insufficiency with palpable strong pulses bilaterally and triphasic waveforms with normal ABIs of 0.9 and toe pressures over 130.  Concern for deep tissue infection with purulent drainage however unlikely due to arterial disease.  Suspect continued wounds and infection a combination of poorly controlled diabetes and venous insufficiency with edema bilaterally.    RECOMMENDATION: No intervention from vascular surgery, no evidence of arterial disease on exam or work-up.  Recommend continued broad-spectrum antibiotics, wound care consult, podiatry consult, and elevation of bilateral lower extremities when able.  Also would recommend CT of right lower extremity to rule out drainable or deeper infection, could also consider ultrasound.  Vascular surgery will sign off, please reach out to team with any further questions or change in exam.    HPI:  Gustavo Faria is a 58 year old male who was seen today in consultation for right leg wounds.  Patient states he has had chronic edema for quite some time.  Earlier this year he sustained injury on his right anterior mid calf region after banging his leg into a door.  This wound did not heal and he was taken to the OR for debridement and drainage.  He reports that at this time the wound was very infected.  The subsequent I&D wounds have also failed to heal and he states the skin breakdown has become worse over time.  All of these interventions were performed in [place]  and he is currently visiting [place].  He has attempted compression socks which do alleviate some edema in his feet and ankles but otherwise denies any wounds.  Denies claudication or rest pain.  Admits to smoking medical marijuana but denies smoking.  Denies antiplatelet or anticoagulation use and notes his blood sugar usually is measured at 300 and only recently started on insulin.    PHH:  No past medical history on file.    No past surgical history on file.     ALLERGIES:   No Known Allergies     MEDS:    Current Facility-Administered Medications:      acetaminophen (TYLENOL) tablet 650 mg, 650 mg, Oral, Q6H PRN, 650 mg at 02/16/23 1547 **OR** acetaminophen (TYLENOL) Suppository 650 mg, 650 mg, Rectal, Q6H PRN, Willy Sam, DO     [START ON 2/17/2023] aspirin EC tablet 81 mg, 81 mg, Oral, Daily, Willy Sam P, DO     bisacodyl (DULCOLAX) suppository 10 mg, 10 mg, Rectal, Daily PRN, Willy Sam P, DO     carvedilol (COREG) tablet 12.5 mg, 12.5 mg, Oral, BID w/meals, Willy Sma P, DO, 12.5 mg at 02/16/23 1236     glucose gel 15-30 g, 15-30 g, Oral, Q15 Min PRN **OR** dextrose 50 % injection 25-50 mL, 25-50 mL, Intravenous, Q15 Min PRN **OR** glucagon injection 1 mg, 1 mg, Subcutaneous, Q15 Min PRN, Willy Sam P, DO     enoxaparin ANTICOAGULANT (LOVENOX) injection 40 mg, 40 mg, Subcutaneous, Q24H, Willy Sam P, DO     furosemide (LASIX) injection 20 mg, 20 mg, Intravenous, BID, Willy Sam P, DO, 20 mg at 02/16/23 1548     haloperidol (HALDOL) tablet 2 mg, 2 mg, Oral, TID, Willy Sam P, DO, 2 mg at 02/16/23 1419     hydrALAZINE (APRESOLINE) tablet 25 mg, 25 mg, Oral, TID, Abner Samn P, DO, 25 mg at 02/16/23 1419     [START ON 2/17/2023] insulin aspart (NovoLOG) injection (RAPID ACTING), , Subcutaneous, Daily with breakfast, Willy Sam,      insulin aspart (NovoLOG) injection (RAPID ACTING), 1-7 Units, Subcutaneous, TID AC, Willy Sam DO, 3 Units at 02/16/23 1421     insulin  aspart (NovoLOG) injection (RAPID ACTING), 1-5 Units, Subcutaneous, At Bedtime, Abner Samn P, DO     insulin glargine (LANTUS PEN) injection 10 Units, 10 Units, Subcutaneous, QAM AC, Abner Samn P, DO, 10 Units at 02/16/23 1421     levothyroxine (SYNTHROID/LEVOTHROID) tablet 100 mcg, 100 mcg, Oral, Daily, Abner Samn P, DO     lidocaine (LMX4) cream, , Topical, Q1H PRN, Willy Sam P, DO     lidocaine 1 % 0.1-1 mL, 0.1-1 mL, Other, Q1H PRN, Abner Samn P, DO     lisinopril (ZESTRIL) tablet 10 mg, 10 mg, Oral, Daily, Willy Sam P, DO, 10 mg at 02/16/23 1204     melatonin tablet 1 mg, 1 mg, Oral, At Bedtime PRN, Abner Samn P, DO     piperacillin-tazobactam (ZOSYN) 3.375 g vial to attach to  mL bag, 3.375 g, Intravenous, Q8H, Abner Samn P, DO     potassium chloride (KLOR-CON) Packet 20 mEq, 20 mEq, Oral, Daily, Willy Sam P, DO, 20 mEq at 02/16/23 1204     senna-docusate (SENOKOT-S/PERICOLACE) 8.6-50 MG per tablet 1 tablet, 1 tablet, Oral, BID PRN **OR** senna-docusate (SENOKOT-S/PERICOLACE) 8.6-50 MG per tablet 2 tablet, 2 tablet, Oral, BID PRN, Cecy Willy P, DO     sodium chloride (PF) 0.9% PF flush 3 mL, 3 mL, Intracatheter, Q8H, Abner Samn P, DO, 3 mL at 02/16/23 1236     sodium chloride (PF) 0.9% PF flush 3 mL, 3 mL, Intracatheter, q1 min prn, Cecy, Willy P, DO     spironolactone (ALDACTONE) tablet 100 mg, 100 mg, Oral, Daily, Abner Samn P, DO, 100 mg at 02/16/23 1236    Current Outpatient Medications:      carvedilol (COREG) 12.5 MG tablet, Take 12.5 mg by mouth 2 times daily (with meals), Disp: , Rfl:      cefdinir (OMNICEF) 300 MG capsule, Take 1 capsule (300 mg) by mouth 2 times daily for 14 days (Patient taking differently: Take 300 mg by mouth 2 times daily 2/11/23 - 2/24/23), Disp: 28 capsule, Rfl: 0     furosemide (LASIX) 20 MG tablet, Take 1 tablet (20 mg) by mouth daily for 7 days, Disp: 7 tablet, Rfl: 0     haloperidol (HALDOL) 2 MG tablet, Take 2 mg by mouth 3  "times daily, Disp: , Rfl:      hydrALAZINE (APRESOLINE) 25 MG tablet, Take 25 mg by mouth 3 times daily, Disp: , Rfl:      levothyroxine (SYNTHROID/LEVOTHROID) 100 MCG tablet, Take 1 tablet (100 mcg) by mouth daily, Disp: 30 tablet, Rfl: 0     lisinopril-hydrochlorothiazide (ZESTORETIC) 10-12.5 MG tablet, Take 1 tablet by mouth daily, Disp: , Rfl:      metFORMIN (GLUCOPHAGE) 500 MG tablet, Take 500 mg by mouth 2 times daily (with meals), Disp: , Rfl:      oxyCODONE (ROXICODONE) 5 MG tablet, Take 1 tablet (5 mg) by mouth every 6 hours as needed for pain, Disp: 12 tablet, Rfl: 0     POTASSIUM CHLORIDE PO, Take 20 mEq by mouth daily, Disp: , Rfl:      sitagliptin (JANUVIA) 50 MG tablet, Take 2 tablets (100 mg) by mouth daily for 30 days, Disp: 60 tablet, Rfl: 0     spironolactone (ALDACTONE) 100 MG tablet, Take 1 tablet (100 mg) by mouth daily for 30 days, Disp: 30 tablet, Rfl: 0     testosterone (ANDROGEL) 20.25 MG/1.25GM (1.62%) topical gel, Place 20.25 mg onto the skin daily, Disp: , Rfl:      SOCIAL HABITS:    Tobacco Use      Smoking status: Not on file      Smokeless tobacco: Not on file     Social History    Substance and Sexual Activity      Alcohol use: Not on file       History   Drug Use Not on file        FAMILY HISTORY:  No family history on file.    REVIEW OF SYSTEMS:    A 12 point ROS was reviewed and except for what is listed in the HPI above, all others are negative    PE:    Vital signs:  Temp: 97.5  F (36.4  C) Temp src: Temporal BP: (!) 146/112 Pulse: 87   Resp: 20 SpO2: 97 % O2 Device: None (Room air)   Height: 167.6 cm (5' 6\") Weight: 77.1 kg (170 lb)  Estimated body mass index is 27.44 kg/m  as calculated from the following:    Height as of this encounter: 1.676 m (5' 6\").    Weight as of this encounter: 77.1 kg (170 lb).       Wt Readings from Last 1 Encounters:   02/16/23 77.1 kg (170 lb)     Body mass index is 27.44 kg/m .    EXAM:  GENERAL: This is a well-developed 58 year old male who " appears his stated age  CARDIAC:  RRR  CHEST/LUNG:  normal work of breathing on RA  GASTROINTESINAL (ABDOMEN):soft, nontender   MUSCULOSKELETAL: Grossly normal strength bilaterally  HEME/LYMPH: Npitting edema bilaterally  NEUROLOGIC: Focally intact, Alert and oriented x 3.   PSYCH: appropriate affect  INTEGUMENT: open wounds with purulent drainage on distal right leg  VASCULAR: 2+ DP and PT bilaterally        DIAGNOSTIC STUDIES:     Images:  XR Chest 2 Views    Result Date: 2/16/2023  CHEST TWO VIEWS February 16, 2023 12:20 PM HISTORY: Shortness of breath. COMPARISON: None.     IMPRESSION: No acute cardiopulmonary disease. FRANCO ESQUIVEL MD   SYSTEM ID:  [id number]    US Lower Extremity Venous Duplex Bilateral    Result Date: 2/16/2023  US LOWER EXTREMITY VENOUS DUPLEX BILATERAL PROCEDURE DATE: 2/16/2023 11:43 AM HISTORY:  Bilateral leg swelling, eval for dvt COMPARISON: None. TECHNIQUE: Grayscale, color-flow, and spectral waveform analysis were performed of the deep veins of the bilateral lower extremities FINDINGS: The right common femoral vein, femoral vein, popliteal vein and tibial veins demonstrate normal compressibility, spectral waveform, color flow and augmentation. Right knee effusion is noted. The left common femoral vein, femoral vein, popliteal vein and tibial veins demonstrate normal compressibility, spectral waveform, color flow and augmentation.     IMPRESSION: 1. No evidence of deep venous thrombosis in the right lower extremity. 2. No evidence of deep venous thrombosis in the left lower extremity. 3. Right knee effusion noted. RAIZA LARSON MD   SYSTEM ID:  [id number]    US HADLEY Doppler No Exercise    Result Date: 2/16/2023  US HADLEY DOPPLER NO EXERCISE, 1-2 LEVELS, BILAT PROCEDURE DATE: 2/16/2023 1:59 PM HISTORY:  Peripheral arterial disease COMPARISON: None. FINDINGS: Pressure measurements in mmHg RIGHT Brachial: 144 Ankle (PT): 189, 1.31 Ankle (DP): 184, 1.28 Digit: 132, 0.92 LEFT Ankle (PT): 167,  1.16 Ankle (DP): 176, 1.22 Digit: 133, 0.92 WAVEFORMS: Multiphasic waveforms bilaterally >?0.7 ????????????????????????????????????????????????????Normal 0.5-0.7 ????????????????????????????????????????????????Mild PAD 0.35-0.5 ??????????????????????????????????????????????Moderate PAD <0.35 &?Toe pressure 40mmHG ?????Moderate to Severe PAD <0.35 &?Toe pressure <30mmHG ???Severe PAD RAIZA LARSON MD   SYSTEM ID:  [id number]    IMPRESSION: 1.  Right leg: Right leg HADLEY is 1.31.  This is normal without evidence of resting arterial insufficiency. 2.  Left leg: Left leg HADLEY is 1.22.  This is normal without evidence of resting arterial insufficiency. HADLEY CRITERIA: >1.4 NC 0.95-1.40 Normal 0.90-0.95 Mild 0.50-0.89 Moderate 0.20-0.49 Severe <0.20 Critical TOE BRACHIAL DIAGNOSTIC CRITERIA    Foot  XR, G/E 3 views, right    Result Date: 2/11/2023  EXAM: XR FOOT RIGHT G/E 3 VIEWS LOCATION: [facility] DATE/TIME: 2/11/2023 8:35 AM INDICATION: infected wounds, ongoing over a month, swelling COMPARISON: None.     IMPRESSION: No radiographic evidence of osteomyelitis. There is no soft tissue gas. Soft tissue swelling along the lateral aspect of the forefoot. No acute fracture.    XR Tibia and Fibula Right 2 Views    Result Date: 2/16/2023  XR TIBIA AND FIBULA RIGHT 2 VIEWS 2/16/2023 9:20 AM HISTORY: Worsening wounds to right lower extremity, concern for cellulitis, osteomyelitis. COMPARISON: None.     IMPRESSION: The right tibia and fibula are negative for fracture. No radiographic evidence for osteomyelitis. There is some soft tissue irregularity along the lateral aspect of the lower leg which may represent surface ulceration or wound. No foreign bodies are identified. BUDDY LEES MD   SYSTEM ID:  [id number]    Ankle XR, G/E 3 views, right    Result Date: 2/11/2023  EXAM: XR ANKLE RIGHT G/E 3 VIEWS LOCATION: [facility] DATE/TIME: 2/11/2023 8:35 AM INDICATION: Right foot and  ankle pain, swelling, and puncture wounds. COMPARISON: None.     IMPRESSION: Marked soft tissue swelling in the lower calf, ankle, and foot. Normal joint alignment. No erosive change or periostitis identified. No fracture or bone lesion otherwise. Mild degenerative changes in the hindfoot.    US Lower Extremity Arterial Duplex Right    Result Date: 2/16/2023  US LOWER EXTREMITY ARTERIAL DUPLEX RIGHT PROCEDURE DATE: 2/16/2023 2:05 PM HISTORY:  Right leg pain and ulcers. Assess for PAD/stenosis COMPARISON: None TECHNIQUE:  Duplex imaging is performed utilizing gray-scale, two-dimensional images and color-flow imaging. Doppler waveform analysis and spectral Doppler imaging is also performed. DUPLEX FINDINGS: RIGHT: Arterial duplex demonstrates mild scattered atheromatous plaque. Brisk and triphasic velocities throughout the femoral popliteal segment without elevated peak systolic velocity or significant shift. Brisk distal ARIANNE triphasic waveform. Monophasic distal PTA and peroneal waveforms without elevated velocity. Brisk triphasic DP waveform.     IMPRESSION: 1.  No significant stenosis or occlusion on right lower extremity arterial duplex. Right peroneal and PTA waveforms are monophasic but remained brisk. RAIZA LARSON MD   SYSTEM ID:  [id number]          LABS:      Sodium   Date Value Ref Range Status   02/16/2023 143 136 - 145 mmol/L Final   02/11/2023 139 136 - 145 mmol/L Final     Urea Nitrogen   Date Value Ref Range Status   02/16/2023 19.2 6.0 - 20.0 mg/dL Final   02/11/2023 10.1 6.0 - 20.0 mg/dL Final     Hemoglobin   Date Value Ref Range Status   02/16/2023 9.2 (L) 13.3 - 17.7 g/dL Final   02/11/2023 9.2 (L) 13.3 - 17.7 g/dL Final     Platelet Count   Date Value Ref Range Status   02/16/2023 449 150 - 450 10e3/uL Final   02/11/2023 479 (H) 150 - 450 10e3/uL Final       Total time spent 20  minutes face to face with patient with more than 50% time spent in counseling and coordination of care.    Jennifer  DO Tiana  [facility] Vascular Surgery

## 2023-02-16 NOTE — PHARMACY-ADMISSION MEDICATION HISTORY
Pharmacy Medication History  Admission medication history interview status for the 2/16/2023  admission is complete. See EPIC admission navigator for prior to admission medications     Location of Interview: Patient room  Medication history sources: Patient, Surescripts, Patient's home med list and Care Everywhere    Significant changes made to the medication list:  Added:  - Carvedilol  - Haloperidol  - Lisinopril-hydrochlorothiazide  - testosterone gel  - Hydralazine  - Potassium  - Metformin    In the past week, patient estimated taking medication this percent of the time: greater than 90%    Medication Affordability: Did not assess       Additional medication history information:   Pt reports that he has been out of lisinopril-hydrochlorothiazide, spironolactone, sitagliptan for about 2 weeks.     Medication reconciliation completed by provider prior to medication history? No    Time spent in this activity: 20 minutes    Prior to Admission medications    Medication Sig Last Dose Taking? Auth Provider Long Term End Date   carvedilol (COREG) 12.5 MG tablet Take 12.5 mg by mouth 2 times daily (with meals) 2/15/2023 Yes Unknown, Entered By History Yes    cefdinir (OMNICEF) 300 MG capsule Take 1 capsule (300 mg) by mouth 2 times daily for 14 days  Patient taking differently: Take 300 mg by mouth 2 times daily 2/11/23 - 2/24/23 2/16/2023 at x1 Yes Shauna Feldman PA-C  2/25/23   furosemide (LASIX) 20 MG tablet Take 1 tablet (20 mg) by mouth daily for 7 days 2/15/2023 Yes Shauna Feldman PA-C Yes 2/18/23   haloperidol (HALDOL) 2 MG tablet Take 2 mg by mouth 3 times daily 2/15/2023 Yes Unknown, Entered By History Yes    hydrALAZINE (APRESOLINE) 25 MG tablet Take 25 mg by mouth 3 times daily 2/15/2023 Yes Unknown, Entered By History Yes    levothyroxine (SYNTHROID/LEVOTHROID) 100 MCG tablet Take 1 tablet (100 mcg) by mouth daily 2/16/2023 at am Yes Shauna Feldman PA-C Yes    lisinopril-hydrochlorothiazide  (ZESTORETIC) 10-12.5 MG tablet Take 1 tablet by mouth daily 2/15/2023 Yes Unknown, Entered By History Yes    metFORMIN (GLUCOPHAGE) 500 MG tablet Take 500 mg by mouth 2 times daily (with meals) 2/15/2023 Yes Unknown, Entered By History Yes    oxyCODONE (ROXICODONE) 5 MG tablet Take 1 tablet (5 mg) by mouth every 6 hours as needed for pain 2/15/2023 at pm Yes Adal Rodriguez MD     POTASSIUM CHLORIDE PO Take 20 mEq by mouth daily 2/15/2023 Yes Unknown, Entered By History     sitagliptin (JANUVIA) 50 MG tablet Take 2 tablets (100 mg) by mouth daily for 30 days Past Month Yes Shauna Feldman PA-C Yes 3/13/23   spironolactone (ALDACTONE) 100 MG tablet Take 1 tablet (100 mg) by mouth daily for 30 days Past Month Yes Shauna Feldman PA-C Yes 3/13/23   testosterone (ANDROGEL) 20.25 MG/1.25GM (1.62%) topical gel Place 20.25 mg onto the skin daily 2/15/2023 Yes Unknown, Entered By History Yes        The information provided in this note is only as accurate as the sources available at the time of update(s)

## 2023-02-16 NOTE — ED NOTES
Bed: ED01  Expected date:   Expected time:   Means of arrival:   Comments:  [facility] – [place] - 426 - 58 M leg wound eta 0808

## 2023-02-16 NOTE — CONSULTS
[facility]    Cardiology Consultation     Date of Admission:  2/16/2023    Assessment & Plan   Gustavo Faria is a 58 year old male who was admitted on 2/16/2023.    1. Elevated troponin - 117 -> 97, EKG with no acute ischemic changes, PVCs on telemetry, no anginal symptoms, no prior CAD history, no family history, no prior cardiac workup that I can find in care everywhere  2. Uncontrolled hypertension - blood pressures 150-160s/110s on admission, did not take antihypertensive agents today. Resumed on admission  3. PVCs - noted on telemetry   4. Elevated NTproBNP - no hypoxia, minimal shortness of breath, recently started on furosemide during ED visit on 2/11, notes he has intermittently been on in the past for his leg swelling.    - PTA furosemide 20mg daily   5. Acute on chronic leg wounds - per hospital medicine   6. Marijuana use   7. Hypokalemia     Patient and sister see at bedside. Recommend resuming home antihypertensive agents, echocardiogram and nuclear stress test for further evaluation.     RECOMMENDATIONS  1. Echocardiogram pending   2. Nuclear stress tomorrow   3. Continue carvedilol 12.5mg BID  4. Continue furosemide 20mg BID  5. Continue hydralazine 25mg TID  5. Continue lisinopril 10mg daily  6. Continue spironolactone 100mg daily   7. Further titrate antihypertensive agents as needed to achieve BP control      High complexity     ARABELLA Nino CNP    Primary Care Physician   Physician No Ref-Primary    Reason for Consult   Reason for consult: I was asked by Dr. Sam to evaluate this patient for elevated troponin.    History of Present Illness   Gustavo Faria is a 58 year old male who presents with worsening leg wounds with accompanying shortness of breath/fatigue.     He has a past medical history of DM, hypothyroidism, hypertension and pituitary tumor/cushings.     He was seen recently in the ED on 2/11/23 for leg wounds, workup was consistent with acute on  "chronic wound infection. He was offered admission, IV antibiotics, pain control and wound consultation but declined. He was discharged on oral antibiotics and pain medications.     He is in town visiting his sister, he is from [place].     He has had multiple hospitalizations this year in [place] 2 in January for sepsis with right lower extremity cellulitis. Most recently on 2/2 for intractable low back pain, a CT scan of the lumbar spine that showed L2 fracture with no other significant stenosis at L4-L5, patient left AMA.     He arrives back to the emergency room today with complaints of worsening right leg wounds, fatigue and shortness of breath.     Upon arrival he is hypertensive, 150-160s/110s. Labs showed stable renal function, creatinine 0.87. Hypokalemic, potassium 3.2. Elevated Alk phos and ALT. CRP 8.89. Procalcitonin 0.23. Glucose 355. HgbA1c 7.6. NTproBNP 3,300. Troponin 117 ->  97. WBCs 15.3. Hemoglobin 9.3. EKG showed sinus rhythm, with RBBB and PACs. No acute ischemic changes. CXR unremarkable.     Patient seen in the ED, his sister at bedside. Denies any history of chest pain, chest tightness, arm pain. Notes an occasional chest \"twinge\" which lasts 1 second. Reports some vague shortness of breath, not with exertion. Denies orthopnea or PND. Chronic LE edema, notes currently a lot better than it was. Denies abdominal bloating. Denies lightheadedness, dizziness or other presyncopal symptoms. Denies palpitations or tachycardia. Has been taking his medications since arriving in [place] except his jardiance.     Denies alcohol use. Denies tobacco use. Using marijuana. Denies drug use.     Past Medical History   DM2 (diabetes mellitus)  Hypertension  History of pituitary tumor/Cushing's  Lymphedema  Chronic leg wounds  Hospitalization recently with altered mentation/agitation improved on haldol  Bacteremia December 2022    Past Surgical History   BRAIN SURGERY   HX JOINT REPAIR Left 2015   sepsis knee   I&D " ABSCESS LEG Right 12/25/2022   INCISION AND DRAINAGE LOWER EXTREMITY RIGHT LOWER LEG ABSCESS performed by Horacio Chavarria MD at [facility]    Prior to Admission Medications   Prior to Admission Medications   Prescriptions Last Dose Informant Patient Reported? Taking?   POTASSIUM CHLORIDE PO 2/15/2023 Self Yes Yes   Sig: Take 20 mEq by mouth daily   carvedilol (COREG) 12.5 MG tablet 2/15/2023 Self Yes Yes   Sig: Take 12.5 mg by mouth 2 times daily (with meals)   cefdinir (OMNICEF) 300 MG capsule 2/16/2023 at x1 Self No Yes   Sig: Take 1 capsule (300 mg) by mouth 2 times daily for 14 days   Patient taking differently: Take 300 mg by mouth 2 times daily 2/11/23 - 2/24/23   furosemide (LASIX) 20 MG tablet 2/15/2023 Self No Yes   Sig: Take 1 tablet (20 mg) by mouth daily for 7 days   haloperidol (HALDOL) 2 MG tablet 2/15/2023 Self Yes Yes   Sig: Take 2 mg by mouth 3 times daily   hydrALAZINE (APRESOLINE) 25 MG tablet 2/15/2023 Self Yes Yes   Sig: Take 25 mg by mouth 3 times daily   levothyroxine (SYNTHROID/LEVOTHROID) 100 MCG tablet 2/16/2023 at am Self No Yes   Sig: Take 1 tablet (100 mcg) by mouth daily   lisinopril-hydrochlorothiazide (ZESTORETIC) 10-12.5 MG tablet 2/15/2023 Self Yes Yes   Sig: Take 1 tablet by mouth daily   metFORMIN (GLUCOPHAGE) 500 MG tablet 2/15/2023 Self Yes Yes   Sig: Take 500 mg by mouth 2 times daily (with meals)   oxyCODONE (ROXICODONE) 5 MG tablet 2/15/2023 at pm Self No Yes   Sig: Take 1 tablet (5 mg) by mouth every 6 hours as needed for pain   sitagliptin (JANUVIA) 50 MG tablet Past Month Self No Yes   Sig: Take 2 tablets (100 mg) by mouth daily for 30 days   spironolactone (ALDACTONE) 100 MG tablet Past Month Self No Yes   Sig: Take 1 tablet (100 mg) by mouth daily for 30 days   testosterone (ANDROGEL) 20.25 MG/1.25GM (1.62%) topical gel 2/15/2023 Self Yes Yes   Sig: Place 20.25 mg onto the skin daily      Facility-Administered Medications: None     Current Facility-Administered  "Medications   Medication Dose Route Frequency     [START ON 2023] aspirin  81 mg Oral Daily     carvedilol  12.5 mg Oral BID w/meals     enoxaparin ANTICOAGULANT  40 mg Subcutaneous Q24H     furosemide  20 mg Intravenous BID     haloperidol  2 mg Oral TID     hydrALAZINE  25 mg Oral TID     [START ON 2023] insulin aspart   Subcutaneous Daily with breakfast     insulin aspart  1-7 Units Subcutaneous TID AC     insulin aspart  1-5 Units Subcutaneous At Bedtime     insulin glargine  10 Units Subcutaneous QAM AC     levothyroxine  100 mcg Oral Daily     lisinopril  10 mg Oral Daily     piperacillin-tazobactam  3.375 g Intravenous Q8H     potassium chloride  20 mEq Oral Daily     sodium chloride (PF)  3 mL Intracatheter Q8H     spironolactone  100 mg Oral Daily     Current Facility-Administered Medications   Medication Last Rate     Allergies   No Known Allergies    Social History      From [place], in town visiting his sister.     Family History     Mother - CVA, hypertension  Father - , unknown cause  Sister - hypertension       Review of Systems   A comprehensive review of system was performed and is negative other than that noted in the HPI or here.     Physical Exam   Vital Signs with Ranges  Temp:  [97.5  F (36.4  C)] 97.5  F (36.4  C)  Pulse:  [87] 87  Resp:  [16] 16  BP: (152-163)/(110-114) 163/114  SpO2:  [94 %-98 %] 98 %  Wt Readings from Last 4 Encounters:   23 77.1 kg (170 lb)   23 77.1 kg (170 lb)     No intake/output data recorded.      Vitals: BP (!) 163/114   Pulse 87   Temp 97.5  F (36.4  C) (Temporal)   Resp 16   Ht 1.676 m (5' 6\")   Wt 77.1 kg (170 lb)   SpO2 98%   BMI 27.44 kg/m      Physical Exam:   General - Alert and oriented to time place and person in no acute distress  Eyes - No scleral icterus  HEENT - Neck supple, moist mucous membranes  Cardiovascular - reulgar   Extremities - +2 bilateral edema, right LE with multiple small wounds, right LE warm to " touch.   Respiratory - clear bilaterally with no wheezing/rhonchi   Skin - No pallor or cyanosis  Gastrointestinal - Non tender and non distended without rebound or guarding  Psych - Appropriate affect   Neurological - No gross motor neurological focal deficits      Recent Labs   Lab 02/16/23  0854 02/11/23  0816   WBC 15.3* 14.7*   HGB 9.2* 9.2*   MCV 98 96    479*    139   POTASSIUM 3.2* 3.1*   CHLORIDE 96* 93*   CO2 37* 33*   BUN 19.2 10.1   CR 0.87 0.81   GFRESTIMATED >90 >90   ANIONGAP 10 13   KORIN 10.6* 8.6   * 215*   ALBUMIN 3.7 3.2*   PROTTOTAL 6.7 6.4   BILITOTAL 0.3 0.6   ALKPHOS 223* 204*   ALT 81* 72*   AST 26 48       Recent Labs   Lab 02/16/23  0854 02/11/23  0816   WBC 15.3* 14.7*   HGB 9.2* 9.2*   HCT 30.1* 29.4*   MCV 98 96    479*     Recent Labs   Lab 02/16/23  0854 02/11/23  0816   NTBNPI 3,300* 3,775*     No results for input(s): DD in the last 168 hours.  Recent Labs   Lab 02/16/23  0854   SED 33*     Recent Labs   Lab 02/16/23  0854 02/11/23  0816    479*     Imaging:  Recent Results (from the past 48 hour(s))   XR Tibia and Fibula Right 2 Views    Narrative    XR TIBIA AND FIBULA RIGHT 2 VIEWS 2/16/2023 9:20 AM     HISTORY: Worsening wounds to right lower extremity, concern for  cellulitis, osteomyelitis.    COMPARISON: None.         Impression    IMPRESSION: The right tibia and fibula are negative for fracture. No  radiographic evidence for osteomyelitis. There is some soft tissue  irregularity along the lateral aspect of the lower leg which may  represent surface ulceration or wound. No foreign bodies are  identified.    BUDDY LEES MD         SYSTEM ID:  [id number]   US Lower Extremity Venous Duplex Bilateral    Narrative    US LOWER EXTREMITY VENOUS DUPLEX BILATERAL  PROCEDURE DATE: 2/16/2023 11:43 AM     HISTORY:  Bilateral leg swelling, eval for dvt    COMPARISON: None.    TECHNIQUE:   Grayscale, color-flow, and spectral waveform analysis were  "performed  of the deep veins of the bilateral lower extremities    FINDINGS:   The right common femoral vein, femoral vein, popliteal vein and tibial  veins demonstrate normal compressibility, spectral waveform, color  flow and augmentation.    Right knee effusion is noted.    The left common femoral vein, femoral vein, popliteal vein and tibial  veins demonstrate normal compressibility, spectral waveform, color  flow and augmentation.      Impression    IMPRESSION:   1. No evidence of deep venous thrombosis in the right lower extremity.  2. No evidence of deep venous thrombosis in the left lower extremity.  3. Right knee effusion noted.    RAIZA LARSON MD         SYSTEM ID:  [id number]   XR Chest 2 Views    Narrative    CHEST TWO VIEWS February 16, 2023 12:20 PM     HISTORY: Shortness of breath.    COMPARISON: None.      Impression    IMPRESSION: No acute cardiopulmonary disease.         Clinically Significant Risk Factors Present on Admission       # Hypokalemia: Lowest K = 3.2 mmol/L in last 2 days, will replace as needed    # Hypercalcemia: Highest Ca = 10.6 mg/dL in last 2 days, will monitor as appropriate         # DMII: A1C = 7.6 % (Ref range: <5.7 %) within past 3 months  # Overweight: Estimated body mass index is 27.44 kg/m  as calculated from the following:    Height as of this encounter: 1.676 m (5' 6\").    Weight as of this encounter: 77.1 kg (170 lb).    Cardiovascular: Cardiac Arrhythmia: Ventricular premature depolarization (PVC)    Fluid & Electrolyte Disorders: Hypokalemia and Fluid overload, unspecified    Limitations of activities/Fatigue (optional): Chronic Fatigue and Other Debilities: Other reduced mobility                  "

## 2023-02-16 NOTE — CONSULTS
[facility]    Infectious Disease Consultation     Date of Admission:  2/16/2023  Date of Consult : 02/16/23    Assessment:  58 YM with diabetes and chronic RLE wounds who has been hospitalized with wound infection with copious purulent drainage which will require surgical debridement.     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 Surgical evaluation for debridement  2. Consider MRI lower tibial for evaluation of abscess/ deep infection  3. Discontinue Zosyn, Vancomycin  4. Treat with Meropenem given prior isolation of Serratia marcescens  ID will continue to follow  Follow clinical status and labs    Cathy Keith MD    Reason for Consult    I was asked to evaluate this patient for evaluation of right leg wpunds    Primary Care Physician   Physician No Ref-Primary    Chief Complaint   Worsening right leg wounds    History is obtained from the patient and medical records    History of Present Illness   Gustavo Faria is a 58 year old male with diabetes, cushing's disease related to pituitary macroadenoma, HTN, hypothyroidism, prolonged Qt syndrome,  hospitalization in December with serratia marcescens bacteremia related to RLE wound/abscess-had an I&D,  who has been admitted with worsening appearance of his wounds, he reports some drainage and increased erythema. Patient receives his care in [place] and is visiting family here in [place]. He has been on an oral antibiotic but does not know which one    He is afebrile, has leukocytosis of 15.3K. Inflammatory markers are improving from prior. US is negative for DVT.  Arterial US is negative for occlusion. ID has been asked to assist with further management    Past Medical History   I have reviewed this patient's medical history and updated it with pertinent information if needed.   No past medical history on file.    Past Surgical History   I have reviewed this patient's surgical history and updated it with pertinent information if needed.  No past surgical history on file.    Prior to Admission Medications   Prior to Admission Medications   Prescriptions Last Dose Informant Patient Reported? Taking?   POTASSIUM CHLORIDE PO 2/15/2023 Self Yes Yes   Sig: Take 20 mEq by mouth daily   carvedilol (COREG) 12.5 MG tablet 2/15/2023 Self Yes Yes   Sig: Take 12.5 mg by mouth 2 times daily (with meals)   cefdinir (OMNICEF) 300 MG capsule 2/16/2023 at x1 Self No Yes   Sig: Take 1 capsule (300 mg) by mouth 2 times daily for 14 days   Patient taking differently: Take 300 mg by mouth 2 times daily 2/11/23 - 2/24/23   furosemide (LASIX) 20 MG tablet 2/15/2023 Self No Yes   Sig: Take 1 tablet (20 mg) by mouth daily for 7 days   haloperidol (HALDOL) 2 MG tablet 2/15/2023 Self Yes Yes   Sig: Take 2 mg by mouth 3 times daily   hydrALAZINE (APRESOLINE) 25 MG tablet 2/15/2023 Self Yes Yes   Sig: Take 25 mg by mouth 3 times daily   levothyroxine (SYNTHROID/LEVOTHROID) 100 MCG tablet 2/16/2023 at am Self No Yes   Sig: Take 1 tablet (100 mcg) by mouth daily   lisinopril-hydrochlorothiazide (ZESTORETIC) 10-12.5 MG tablet 2/15/2023 Self Yes Yes   Sig: Take 1 tablet by mouth daily   metFORMIN (GLUCOPHAGE) 500 MG tablet 2/15/2023 Self Yes Yes   Sig: Take 500 mg by mouth 2 times daily (with meals)   oxyCODONE (ROXICODONE) 5 MG tablet 2/15/2023 at pm Self No Yes   Sig: Take 1 tablet (5 mg) by mouth every 6 hours as needed for pain   sitagliptin (JANUVIA) 50 MG tablet Past Month Self No Yes   Sig: Take 2 tablets (100 mg) by mouth daily for 30 days   spironolactone (ALDACTONE) 100 MG tablet Past Month Self No  Yes   Sig: Take 1 tablet (100 mg) by mouth daily for 30 days   testosterone (ANDROGEL) 20.25 MG/1.25GM (1.62%) topical gel 2/15/2023 Self Yes Yes   Sig: Place 20.25 mg onto the skin daily      Facility-Administered Medications: None     Allergies   No Known Allergies    Immunization History     There is no immunization history on file for this patient.    Social History   I have reviewed this patient's social history and updated it with pertinent information if needed. Gustavo Faria      Family History   I have reviewed this patient's family history and updated it with pertinent information if needed.   No family history on file.    Review of Systems   The 10 point Review of Systems is as per HPI    Physical Exam   Temp: 97.5  F (36.4  C) Temp src: Temporal BP: (!) 146/112 Pulse: 87   Resp: 20 SpO2: 97 % O2 Device: None (Room air)    Vital Signs with Ranges  Temp:  [97.5  F (36.4  C)] 97.5  F (36.4  C)  Pulse:  [87] 87  Resp:  [16-20] 20  BP: (146-163)/(110-114) 146/112  SpO2:  [94 %-98 %] 97 %  170 lbs 0 oz  Body mass index is 27.44 kg/m .    GENERAL APPEARANCE:  awake  EYES: Eyes grossly normal to inspection  NECK: no adenopathy  RESP: lungs clear   CV: regular rates and rhythm  ABDOMEN: soft, nontender  MS: R leg swelling wounds with purulent drainage  SKIN: no rash    Data   All laboratory data reviewed    Component      Latest Ref Rng & Units 2/16/2023   Hemoglobin A1C      <5.7 % 7.6 (H)     Component      Latest Ref Rng & Units 2/16/2023   Troponin T, High Sensitivity      <=22 ng/L 117 (HH)   N-Terminal Pro BNP Inpatient      0 - 900 pg/mL 3,300 (H)   Sed Rate      0 - 20 mm/hr 33 (H)   CRP Inflammation      <5.00 mg/L 8.89 (H)     Component      Latest Ref Rng & Units 2/16/2023   Sodium      136 - 145 mmol/L 143   Potassium      3.4 - 5.3 mmol/L 3.2 (L)   Chloride      98 - 107 mmol/L 96 (L)   Carbon Dioxide (CO2)      22 - 29 mmol/L 37 (H)   Anion Gap      7 - 15 mmol/L 10   Urea Nitrogen      6.0 - 20.0  mg/dL 19.2   Creatinine      0.67 - 1.17 mg/dL 0.87   Calcium      8.6 - 10.0 mg/dL 10.6 (H)   Glucose      70 - 99 mg/dL 355 (H)   Alkaline Phosphatase      40 - 129 U/L 223 (H)   AST      10 - 50 U/L 26   ALT      10 - 50 U/L 81 (H)   Protein Total      6.4 - 8.3 g/dL 6.7   Albumin      3.5 - 5.2 g/dL 3.7   Bilirubin Total      <=1.2 mg/dL 0.3     Component      Latest Ref Rng & Units 2/16/2023   WBC      4.0 - 11.0 10e3/uL 15.3 (H)   RBC Count      4.40 - 5.90 10e6/uL 3.06 (L)   Hemoglobin      13.3 - 17.7 g/dL 9.2 (L)   Hematocrit      40.0 - 53.0 % 30.1 (L)   MCV      78 - 100 fL 98   MCH      26.5 - 33.0 pg 30.1   MCHC      31.5 - 36.5 g/dL 30.6 (L)   RDW      10.0 - 15.0 % 21.2 (H)   Platelet Count      150 - 450 10e3/uL 449   % Neutrophils      % 86   % Lymphocytes      % 6   % Monocytes      % 4   % Eosinophils      % 0   % Basophils      % 0   % Immature Granulocytes      % 4   NRBCs per 100 WBC      <1 /100 3 (H)   Absolute Neutrophils      1.6 - 8.3 10e3/uL 13.3 (H)   Absolute Lymphocytes      0.8 - 5.3 10e3/uL 0.9   Absolute Monocytes      0.0 - 1.3 10e3/uL 0.6   Absolute Eosinophils      0.0 - 0.7 10e3/uL 0.0   Absolute Basophils      0.0 - 0.2 10e3/uL 0.0   Absolute Immature Granulocytes      <=0.4 10e3/uL 0.6 (H)   Absolute NRBCs      10e3/uL 0.4     Microbiology  2/16 blood cx pending  2/16 wound cx pending

## 2023-02-16 NOTE — PROGRESS NOTES
WOC consult noted for RLE, however unable to see pt today d/t staffing and high number new consults.  Chart and photo of leg from 2/11 reviewed briefly.  Noted Vascular Surgery also consulted.  Defer to VS at this time for management.  If need interim wound care recommendations would consider Vashe-moist gauze to wounds BID, cover with ABD and Kerlix.  [facility] will plan to follow-up tomorrow Fri 2/17.

## 2023-02-16 NOTE — PHARMACY-VANCOMYCIN DOSING SERVICE
"Pharmacy Vancomycin Initial Note  Date of Service 2023  Patient's  1965  58 year old, male    Indication: Skin and Soft Tissue Infection    Current estimated CrCl = Estimated Creatinine Clearance: 91.6 mL/min (based on SCr of 0.87 mg/dL).    Creatinine for last 3 days  2023:  8:54 AM Creatinine 0.87 mg/dL    Recent Vancomycin Level(s) for last 3 days  No results found for requested labs within last 72 hours.      Vancomycin IV Administrations (past 72 hours)      No vancomycin orders with administrations in past 72 hours.                Nephrotoxins and other renal medications (From now, onward)    Start     Dose/Rate Route Frequency Ordered Stop    23 2216  piperacillin-tazobactam (ZOSYN) 3.375 g vial to attach to  mL bag        Note to Pharmacy: For [facility], [facility] and [facility]: For Zosyn-naive patients, use the \"Zosyn initial dose + extended infusion\" order panel.    3.375 g  over 30 Minutes Intravenous EVERY 6 HOURS 23 1625      23 1625  vancomycin (VANCOCIN) 2,000 mg in 0.9% NaCl 500 mL intermittent infusion         2,000 mg  over 2 Hours Intravenous ONCE 23 1623      23 1600  furosemide (LASIX) injection 20 mg         20 mg  over 1-3 Minutes Intravenous 2 TIMES DAILY (Diuretics and Nitrates) 23 1256      23 1150  lisinopril (ZESTRIL) tablet 10 mg         10 mg Oral DAILY 23 1147            Contrast Orders - past 72 hours (72h ago, onward)    None          [employer] Prediction of Planned Initial Vancomycin Regimen  Loading dose: 2000 mg x 1  Regimen: 1000 mg IV every 12 hours.  Exposure target: AUC24 (range) 400-600 mg/L.hr   AUC24,ss: 490 mg/L.hr  Probability of AUC24 > 400: 72 %  Ctrough,ss: 15.2 mg/L  Probability of Ctrough,ss > 20: 27 %  Probability of nephrotoxicity (Lodise STANLEY ): 10 %          Plan:  1. Start vancomycin 1000 mg IV q12h.   2. Vancomycin monitoring method: AUC  3. Vancomycin therapeutic monitoring goal: 400-600 " mg*h/L  4. Pharmacy will check vancomycin levels as appropriate in 1-3 Days.    5. Serum creatinine levels will be ordered daily for the first week of therapy and at least twice weekly for subsequent weeks.      Ewelina Smalls RPH

## 2023-02-16 NOTE — ED TRIAGE NOTES
Patient arrived via EMS for wound check on R leg he has 4 open wounds that are draining and causing significant pain. Vitally stable denying Sob and \chest pain, A&Ox4, reporting 7/10 pain in back and R leg.     Triage Assessment     Row Name 02/16/23 0840       Triage Assessment (Adult)    Airway WDL WDL       Respiratory WDL    Respiratory WDL WDL       Cardiac WDL    Cardiac WDL WDL       Peripheral/Neurovascular WDL    Peripheral Neurovascular WDL X;neurovascular assessment lower       LLE Neurovascular Assessment    Temperature LLE warm    Color LLE janel    Sensation LLE numbness present       RLE Neurovascular Assessment    Temperature RLE warm    Color RLE janel    Sensation RLE numbness present       Cognitive/Neuro/Behavioral WDL    Cognitive/Neuro/Behavioral WDL WDL

## 2023-02-16 NOTE — H&P
[facility]    History and Physical - Hospitalist Service       Date of Admission:  2/16/2023    Assessment & Plan      Gustavo Faria is a 58 year old medically complex male (visiting area from [place]) admitted on 2/16/2023. He presents back to the ER with worsening right leg wound discomfort/drainage.      Right leg wounds, concern for infection/worsening cellulitis  Bacteremia hx - December 2022 Serratia bacteremia at OSH, tx with ceftriaxone  Lymphedema:  -  Wound consult  -  Culture discharge  -  BC's pending with bacteremia hx  -  Empiric zosyn as had been on oral cephalosporin and had perceived worsening of wounds.  Of note he was also on ceftriaxone in January in [place] for wound infection.  -  Check venous US for DVT  -  Surgery consult given depth of wounds  -  Consider further vascular workup, will get initial consults first for further recommendations.  -  Lasix IV for at least a couple doses (hold po lasix), resume spironolactone  -  ID consult    Acute on chronic fatigue/Dyspnea  Elevated Troponin:  -  Elevated troponin of unclear significance.  He has multiple cardiac risk factors but denies prior MI/ACS.  He denies chest pain but has had more fatigue/SOB with activity lately.  Continue ASA, dose given on presentation.  Will have him on cardiac monitoring and check follow-up troponin's + echocardiogram.  Given multiple risk factors and elevated trop will also ask cardiology to see him.    Prolonged QT  History of Encephalopathy/Agitation with hospital stay 1/23 in [place]:  -  Patient moved to scheduled haldol in January (outside records in care everywhere reviewed).  Of note at that point seroquel stopped and QT was around 500. QT more recently was near 580 but now better around 530.  I'm not sure haldol is an ideal medication for this gentleman.  I will ask psychiatry to see him for recommendations.  As he has found the haldol helpful and mentation stable now.  His QT  is lower now than recent EKG with prior ED visit will continue cautiously.  I did stop the initial zofran I planned given its QT prolongation risk.  Would avoid adding additional QT prolonging meds.    Pituitary macroadenoma; cushing's disease:  Patient has known history of pituitary macroadenoma, ACTH secreting lesion. Patient has previously been treated with 2 surgical procedures in May and July 2021 at [facility] with neurosurgical team - Dr. Lynn.  In Jan at OSH stay they did not recommend urgent surgical intervention based on the size of the lesion and lack of any hydrocephalus on admission head CT scan.  He is planned for neurosurgery appt at [facility] in [place] March 2023.  He is felt possibly to still need more elective surgery this year.    DM2, exacerbated by Cushing's:  -  Glucoses quite uncontrolled, 200-400 range most the time by his report.  This is likely substantially exacerbating his wound/infectious issues.  I discussed the need for better control.  He had wanted to avoid insulin but given issues is open to it.  I will start some lantus now + novolog carb count with meals + novolog sliding scale insulin.  Hold metformin for now.  Of note his sitagliptin he had forgotten when he visited [place] and insurance wouldn't cover an early refill so he hasn't had for several days.  He also reports even when on it glu frequently 300.   Consider resuming soon but as starting insulin and he has been off this for awhile, will initially hold.    Hypokalemia:  -  Resume PTA scheduled, add RN replacement protocol  -  Resume spironolactone    Hypertension, exacerbated by Cushing's/Pit issues:  -  Resume PTA BP meds.  He has not yet had today so will give now.  Hold hydrochlorothiazide for moment while dosing more lasix IV.    Hypothyroidism:  -  Levothyroxine    Marijuana use:  -  Uses daily medical marijuana (smokes it).  While he I explained he cannot smoke in his room.         Medical  "Decision Making       75 MINUTES SPENT BY ME on the date of service doing chart review, history, exam, documentation & further activities per the note.          Diet: Moderate Consistent Carb (60 g CHO per Meal) Diet    DVT Prophylaxis: Enoxaparin (Lovenox) SQ  Roland Catheter: Not present  Lines: None     Cardiac Monitoring: ACTIVE order. Indication: Chest pain/ ACS rule out (24 hours)  Code Status: Full Code      Clinically Significant Risk Factors Present on Admission        # Hypokalemia: Lowest K = 3.2 mmol/L in last 2 days, will replace as needed    # Hypercalcemia: Highest Ca = 10.6 mg/dL in last 2 days, will monitor as appropriate        # Hypertension: home medication list includes antihypertensive(s)      # Overweight: Estimated body mass index is 27.44 kg/m  as calculated from the following:    Height as of this encounter: 1.676 m (5' 6\").    Weight as of this encounter: 77.1 kg (170 lb).           Disposition Plan      Expected Discharge Date: 02/18/2023                  Willy Sam DO  Hospitalist Service  [facility]  Securely message with pbsi (more info)  Text page via [facility] Paging/Directory   ____________________________________________________________________    Chief Complaint   Fatigue, worsening wounds    History is obtained from the patient    History of Present Illness   Gustavo Faria is a 58 year old male who presented back to the ED after being recently seen regarding his leg wounds.  He has noticed over the past couple days worsening R leg wounds with a new one forming and increasing drainage from a couple of them.  He also has been more tired and somewhat SOB with activity.  He denies chest pain.  He has occasional leg pain.  No fevers.  He has been taking recently prescribed abx.  He has been taking his PTA meds except sitagliptin as he forgot to bring them from [place] and his insurance company wouldn't allow refill yet.  He thinks his LE edema also " might be a little worse since coming to visit the area/family.    Past Medical History    DM2 (diabetes mellitus)  Hypertension  History of pituitary tumor/Cushing's  Lymphedema  Chronic leg wounds  Hospitalization recently with altered mentation/agitation improved on haldol  Bacteremia December 2022    Past Surgical History   BRAIN SURGERY   HX JOINT REPAIR Left 2015   sepsis knee   I&D ABSCESS LEG Right 12/25/2022   INCISION AND DRAINAGE LOWER EXTREMITY RIGHT LOWER LEG ABSCESS performed by Horacio Chavarria MD at [facility] MAIN      Prior to Admission Medications   Prior to Admission Medications   Prescriptions Last Dose Informant Patient Reported? Taking?   POTASSIUM CHLORIDE PO 2/15/2023 Self Yes Yes   Sig: Take 20 mEq by mouth daily   carvedilol (COREG) 12.5 MG tablet 2/15/2023 Self Yes Yes   Sig: Take 12.5 mg by mouth 2 times daily (with meals)   cefdinir (OMNICEF) 300 MG capsule 2/16/2023 at x1 Self No Yes   Sig: Take 1 capsule (300 mg) by mouth 2 times daily for 14 days   Patient taking differently: Take 300 mg by mouth 2 times daily 2/11/23 - 2/24/23   furosemide (LASIX) 20 MG tablet 2/15/2023 Self No Yes   Sig: Take 1 tablet (20 mg) by mouth daily for 7 days   haloperidol (HALDOL) 2 MG tablet 2/15/2023 Self Yes Yes   Sig: Take 2 mg by mouth 3 times daily   hydrALAZINE (APRESOLINE) 25 MG tablet 2/15/2023 Self Yes Yes   Sig: Take 25 mg by mouth 3 times daily   levothyroxine (SYNTHROID/LEVOTHROID) 100 MCG tablet 2/16/2023 at am Self No Yes   Sig: Take 1 tablet (100 mcg) by mouth daily   lisinopril-hydrochlorothiazide (ZESTORETIC) 10-12.5 MG tablet 2/15/2023 Self Yes Yes   Sig: Take 1 tablet by mouth daily   metFORMIN (GLUCOPHAGE) 500 MG tablet 2/15/2023 Self Yes Yes   Sig: Take 500 mg by mouth 2 times daily (with meals)   oxyCODONE (ROXICODONE) 5 MG tablet 2/15/2023 at pm Self No Yes   Sig: Take 1 tablet (5 mg) by mouth every 6 hours as needed for pain   sitagliptin (JANUVIA) 50 MG tablet Past Month Self No  Yes   Sig: Take 2 tablets (100 mg) by mouth daily for 30 days   spironolactone (ALDACTONE) 100 MG tablet Past Month Self No Yes   Sig: Take 1 tablet (100 mg) by mouth daily for 30 days   testosterone (ANDROGEL) 20.25 MG/1.25GM (1.62%) topical gel 2/15/2023 Self Yes Yes   Sig: Place 20.25 mg onto the skin daily      Facility-Administered Medications: None      Allergies   No Known Allergies    SH:  Smokes medical marijuana.  Denies tobacco use.  No longer working.       Physical Exam   Vital Signs: Temp: 97.5  F (36.4  C) Temp src: Temporal BP: (!) 152/110 Pulse: 87   Resp: 16 SpO2: 94 %      Weight: 170 lbs 0 oz    GEN:  Alert, oriented x 3, appears ill but comfortable, no overt distress.  HEENT:  Normocephalic/atraumatic, no scleral icterus, no nasal discharge, mouth moist.  CV:  Regular rate and rhythm, distant.  No loud murmur/rub.  LUNGS:  Clear to auscultation upper with decreased breat sounds bases.  No wheezes/retractions.  Symmetric chest rise on inhalation noted.  ABD:  Active bowel sounds, soft, non-tender/non-distended.  No guarding/rigidity.  EXT:  +2-3 LE edema.  No cyanosis.  SKIN:  Dry to touch, multiple RLE wounds with lowest two having some purulent drainage and more warmth near them.  NEURO:  Symmetric muscle strength, sensation to touch grossly intact.  No new focal deficits appreciated.    Data     I have personally reviewed the following data over the past 24 hrs:    15.3 (H)  \   9.2 (L)   / 449     143 96 (L) 19.2 /  355 (H)   3.2 (L) 37 (H) 0.87 \       ALT: 81 (H) AST: 26 AP: 223 (H) TBILI: 0.3   ALB: 3.7 TOT PROTEIN: 6.7 LIPASE: N/A       Trop: 97 (H) BNP: 3,300 (H)       TSH: N/A T4: N/A A1C: 7.6 (H)       Procal: 0.23 (H) CRP: 8.89 (H) Lactic Acid: N/A         Recent Labs   Lab 02/16/23  0854 02/11/23  0816   WBC 15.3* 14.7*   HGB 9.2* 9.2*   HCT 30.1* 29.4*   MCV 98 96    479*     7-Day Micro Results     Collected Updated Procedure Result Status      02/16/2023 1209 02/16/2023  1214 Wound Aerobic Bacterial Culture Routine [[id number]]   Wound from Leg, Right    In process Component Value   No component results               02/16/2023 0925 02/16/2023 0932 Blood Culture Peripheral Blood [[id number]]   Peripheral Blood    In process Component Value   No component results               02/16/2023 0854 02/16/2023 0900 Blood Culture Peripheral Blood [[id number]]   Peripheral Blood    In process Component Value   No component results                   Recent Labs   Lab 02/16/23  0854 02/11/23  0816    139   POTASSIUM 3.2* 3.1*   CHLORIDE 96* 93*   CO2 37* 33*   ANIONGAP 10 13   * 215*   BUN 19.2 10.1   CR 0.87 0.81   GFRESTIMATED >90 >90   KORIN 10.6* 8.6   MAG 1.9  --    PROTTOTAL 6.7 6.4   ALBUMIN 3.7 3.2*   BILITOTAL 0.3 0.6   ALKPHOS 223* 204*   AST 26 48   ALT 81* 72*     Recent Results (from the past 24 hour(s))   XR Tibia and Fibula Right 2 Views    Narrative    XR TIBIA AND FIBULA RIGHT 2 VIEWS 2/16/2023 9:20 AM     HISTORY: Worsening wounds to right lower extremity, concern for  cellulitis, osteomyelitis.    COMPARISON: None.         Impression    IMPRESSION: The right tibia and fibula are negative for fracture. No  radiographic evidence for osteomyelitis. There is some soft tissue  irregularity along the lateral aspect of the lower leg which may  represent surface ulceration or wound. No foreign bodies are  identified.    BUDDY LEES MD         SYSTEM ID:  [id number]

## 2023-02-16 NOTE — ED PROVIDER NOTES
History   Chief Complaint:  Wound Check     HPI   Gustavo Faria is a 58 year old male with a history of diabetes and HTN who presents with a leg wound. The patient states that he has been getting wounds on his legs for the past few months. He is traveling from [place] and staying with his sister. He plans on being here a few more weeks. He comes in today because on of his four wounds has opened since his last ED visit. The pain in his right leg is still bad and he is still having leg swelling. He did get some of his medications that were prescribed last time he was in the ED but still doesn't have them all because his insurance wont cover them. He is taking the antibiotics that he was prescribed, he is taking a water pill to urinate.    Independent Historian:   None - Patient Only    Review of External Notes: Reviewed patients admission at [facility] for chronic wounds of his right leg, diabetes, metabolic alkalosis, and a pituitary tumor on 12/08/2022.    ROS:  Review of Systems   Cardiovascular: Positive for leg swelling.   Skin: Positive for wound.   All other systems reviewed and are negative.      Allergies:  No Known Allergies     Medications:    cefdinir  furosemide   levothyroxine  oxyCODONE  sitagliptin   spironolactone  Levothyroxine  Metformin  Sitagliptin  Sildenafil  Testosterone  Spironolactone  Oxychlorosene  Carvedilol  Potassium chloride  Nystatin  Hydralazine  Haloperidol  Ergocalciferol     Past Medical History:    Hypothyroidism  Sepsis  Abscess of right lower leg  Metabolic alkalosis  Pituitary adenoma  Metabolic encephalopathy  Hypokalemia  Altered mental status  Hypertension  Diabetes mellitus    Past Surgical History:    Joint repair, left, sepsis in knee  Brain surgery  I&D abscess leg, right     Family History:    Stroke  MI    Social History:  The patient presents to the ED with his sister.  The patient is from [place].    Physical Exam     Patient Vitals for the  "past 24 hrs:   BP Temp Temp src Pulse Resp SpO2 Height Weight   02/16/23 0836 (!) 152/110 97.5  F (36.4  C) Temporal 87 16 94 % 1.676 m (5' 6\") 77.1 kg (170 lb)       Physical Exam  General:          Alert, appears well-developed and well-nourished. Cooperative.                           In mild distress  HEENT:  Head:               Atraumatic  Ears:                External ears are normal  Mouth/Throat:  Oropharynx is without erythema or exudate and mucous membranes are moist.   Eyes:               Conjunctivae normal and EOM are normal. No scleral icterus.  CV:                  Normal rate, irregular rhythm, normal heart sounds and radial pulses are 2+ and symmetric.  No murmur.  Resp:              Breath sounds are clear bilaterally                          Non-labored, no retractions or accessory muscle use  GI:                   Abdomen is soft, no distension, no tenderness. No rebound or guarding.  No CVA tenderness bilaterally  MS:                 Normal range of motion. 3+ pitting BLE.                          Back atraumatic.                          No midline cervical, thoracic, or lumbar tenderness  Skin:               Warm and dry. There are wounds to right lower extremity with a lateral wound to the distal right lower leg with oozing and fluctuance.   Neuro:  Alert. Normal strength.  GCS: 15  Psych:            Normal mood and affect.    Emergency Department Course     ECG results from 02/16/23 signed @ 0846   EKG 12-lead, tracing only     Value    Systolic Blood Pressure     Diastolic Blood Pressure     Ventricular Rate 97    Atrial Rate 97    FL Interval 132    QRS Duration 144        QTc 538    P Axis 27    R AXIS -28    T Axis 37    Interpretation ECG      Sinus rhythm with Premature supraventricular complexes and with occasional Premature ventricular complexes  Right bundle branch block  Minimal voltage criteria for LVH, may be normal variant ( R in aVL )  Abnormal ECG  When compared with ECG " of 11-FEB-2023 08:18, no changes       Imaging:  XR Tibia and Fibula Right 2 Views   Final Result   IMPRESSION: The right tibia and fibula are negative for fracture. No   radiographic evidence for osteomyelitis. There is some soft tissue   irregularity along the lateral aspect of the lower leg which may   represent surface ulceration or wound. No foreign bodies are   identified.      BUDDY LEES MD            SYSTEM ID:  [id number]         Report per radiology    Laboratory:  Labs Ordered and Resulted from Time of ED Arrival to Time of ED Departure   COMPREHENSIVE METABOLIC PANEL - Abnormal       Result Value    Sodium 143      Potassium 3.2 (*)     Chloride 96 (*)     Carbon Dioxide (CO2) 37 (*)     Anion Gap 10      Urea Nitrogen 19.2      Creatinine 0.87      Calcium 10.6 (*)     Glucose 355 (*)     Alkaline Phosphatase 223 (*)     AST 26      ALT 81 (*)     Protein Total 6.7      Albumin 3.7      Bilirubin Total 0.3      GFR Estimate >90     TROPONIN T, HIGH SENSITIVITY - Abnormal    Troponin T, High Sensitivity 117 (*)    NT PROBNP INPATIENT - Abnormal    N terminal Pro BNP Inpatient 3,300 (*)    ERYTHROCYTE SEDIMENTATION RATE AUTO - Abnormal    Erythrocyte Sedimentation Rate 33 (*)    CRP INFLAMMATION - Abnormal    CRP Inflammation 8.89 (*)    PROCALCITONIN - Abnormal    Procalcitonin 0.23 (*)    CBC WITH PLATELETS AND DIFFERENTIAL - Abnormal    WBC Count 15.3 (*)     RBC Count 3.06 (*)     Hemoglobin 9.2 (*)     Hematocrit 30.1 (*)     MCV 98      MCH 30.1      MCHC 30.6 (*)     RDW 21.2 (*)     Platelet Count 449      % Neutrophils 86      % Lymphocytes 6      % Monocytes 4      % Eosinophils 0      % Basophils 0      % Immature Granulocytes 4      NRBCs per 100 WBC 3 (*)     Absolute Neutrophils 13.3 (*)     Absolute Lymphocytes 0.9      Absolute Monocytes 0.6      Absolute Eosinophils 0.0      Absolute Basophils 0.0      Absolute Immature Granulocytes 0.6 (*)     Absolute NRBCs 0.4     BLOOD CULTURE    BLOOD CULTURE      Emergency Department Course & Assessments:    Interventions:  Medications   piperacillin-tazobactam (ZOSYN) 4.5 g vial to attach to  mL bag (has no administration in time range)   furosemide (LASIX) injection 40 mg (has no administration in time range)   aspirin (ASA) tablet 325 mg (325 mg Oral Given 2/16/23 1029)      Independent Interpretation (X-rays, CTs, rhythm strip):  Reviewed xray and there is no evidence of osteomyelitis.    Consultations/Discussion of Management or Tests:  ED Course as of 02/16/23 1032   Thu Feb 16, 2023   1031 Talked to Dr. Sam about the patients findings and possible admittance         Social Determinants of Health affecting care:   Healthcare Access/Compliance and Education/Literacy    Assessments:  0825 Obtained the patients history and performed initial exam    Disposition:  The patient was admitted to the hospital under the care of Dr. Sam.     Impression & Plan    CMS Diagnoses: None    Medical Decision Making:  Patient is a 58-year-old male with a history of hypertension, hyperlipidemia, diabetes, and chronic right lower extremity wounds in the setting of peripheral neuropathy and pituitary tumor who presents with worsening wounds to the right lower extremity.  Patient has copious edema to bilateral lower extremities.  I did see the patient approximately 5 days ago and his swelling has worsened in that timeframe.  We will plan to diurese the patient with IV diuretics today given the 3+ pitting edema to lower extremities.  I suspect the edema is complicating appropriate wound healing of the right lower extremity.  He also appears to have a new wound to the lateral aspect of the right lower extremity concerning for potential abscess.  There is some drainage from this wound.  There also are chronic ulcerating wounds to the anterior shin of the right lower extremity.  I suspect there is poor medication compliance in the outpatient setting as the  patient did not fill all the medications that were prescribed for him 5 days ago.  Additionally patient has no clear timing for return back to his home state of [place].  Ultimately, I think the patient is decompensated from a diabetes perspective as well as an acute on chronic cellulitis presentation to the right lower extremity.  He may need debridement during this hospitalization given new wound to the right lateral lower leg.  Patient was started on Zosyn and vancomycin given ongoing/worsening soft tissue infection.  Additionally, procalcitonin is elevated.  Blood cultures obtained.  Patient's troponin is also elevated highly suspicious for CHF exacerbation.  I have lower concern for ACS as the patient is not having active chest pain or shortness of breath but in the setting of significant lower extremity swelling, elevated BNP, and elevated troponin suspect likely demand ischemia with CHF.  Given this I will not start heparin at this time.  He did receive a full dose aspirin.  Patient will be admitted to the hospitalist service for ongoing diuresis, treatment of chronic wounds and acute on chronic cellulitis.  Spoke with Dr. Sam of the hospitalist service who agreed to admission    Diagnosis:    ICD-10-CM    1. Cellulitis of right lower leg  L03.115       2. Bilateral leg edema  R60.0       3. Poor compliance with medication  Z91.14       4. Acute on chronic congestive heart failure, unspecified heart failure type (H)  I50.9           Scribe Disclosure:  Ernie STANTON, am serving as a scribe at 8:31 AM on 2/16/2023 to document services personally performed by Mart Villa MD based on my observations and the provider's statements to me.     2/16/2023   Mart Villa MD White, Scott, MD  02/16/23 2667

## 2023-02-16 NOTE — ED TRIAGE NOTES
Pt is here from [place], visiting his sister, chronic wounds to Rt LE since august, one of the wounds opened up

## 2023-02-17 ENCOUNTER — APPOINTMENT (OUTPATIENT)
Dept: CARDIOLOGY | Facility: CLINIC | Age: 58
DRG: 602 | End: 2023-02-17
Attending: INTERNAL MEDICINE
Payer: COMMERCIAL

## 2023-02-17 LAB
ANION GAP SERPL CALCULATED.3IONS-SCNC: 10 MMOL/L (ref 7–15)
BUN SERPL-MCNC: 18.5 MG/DL (ref 6–20)
CALCIUM SERPL-MCNC: 9.3 MG/DL (ref 8.6–10)
CHLORIDE SERPL-SCNC: 97 MMOL/L (ref 98–107)
CREAT SERPL-MCNC: 0.84 MG/DL (ref 0.67–1.17)
DEPRECATED HCO3 PLAS-SCNC: 35 MMOL/L (ref 22–29)
ERYTHROCYTE [DISTWIDTH] IN BLOOD BY AUTOMATED COUNT: 21.5 % (ref 10–15)
GFR SERPL CREATININE-BSD FRML MDRD: >90 ML/MIN/1.73M2
GLUCOSE BLDC GLUCOMTR-MCNC: 200 MG/DL (ref 70–99)
GLUCOSE BLDC GLUCOMTR-MCNC: 264 MG/DL (ref 70–99)
GLUCOSE BLDC GLUCOMTR-MCNC: 265 MG/DL (ref 70–99)
GLUCOSE BLDC GLUCOMTR-MCNC: 276 MG/DL (ref 70–99)
GLUCOSE BLDC GLUCOMTR-MCNC: 292 MG/DL (ref 70–99)
GLUCOSE BLDC GLUCOMTR-MCNC: 304 MG/DL (ref 70–99)
GLUCOSE BLDC GLUCOMTR-MCNC: 334 MG/DL (ref 70–99)
GLUCOSE SERPL-MCNC: 259 MG/DL (ref 70–99)
HCT VFR BLD AUTO: 30.1 % (ref 40–53)
HGB BLD-MCNC: 9.2 G/DL (ref 13.3–17.7)
LVEF ECHO: NORMAL
MAGNESIUM SERPL-MCNC: 2 MG/DL (ref 1.7–2.3)
MCH RBC QN AUTO: 29.5 PG (ref 26.5–33)
MCHC RBC AUTO-ENTMCNC: 30.6 G/DL (ref 31.5–36.5)
MCV RBC AUTO: 97 FL (ref 78–100)
PLATELET # BLD AUTO: 360 10E3/UL (ref 150–450)
POTASSIUM SERPL-SCNC: 3.4 MMOL/L (ref 3.4–5.3)
POTASSIUM SERPL-SCNC: 3.5 MMOL/L (ref 3.4–5.3)
RBC # BLD AUTO: 3.12 10E6/UL (ref 4.4–5.9)
SODIUM SERPL-SCNC: 142 MMOL/L (ref 136–145)
WBC # BLD AUTO: 17.4 10E3/UL (ref 4–11)

## 2023-02-17 PROCEDURE — 99232 SBSQ HOSP IP/OBS MODERATE 35: CPT | Mod: 25 | Performed by: NURSE PRACTITIONER

## 2023-02-17 PROCEDURE — 36415 COLL VENOUS BLD VENIPUNCTURE: CPT | Performed by: EMERGENCY MEDICINE

## 2023-02-17 PROCEDURE — F08G5BZ WOUND MANAGEMENT TREATMENT OF INTEGUMENTARY SYSTEM - LOWER BACK / LOWER EXTREMITY USING PHYSICAL AGENTS: ICD-10-PCS | Performed by: HOSPITALIST

## 2023-02-17 PROCEDURE — 99221 1ST HOSP IP/OBS SF/LOW 40: CPT | Performed by: NURSE PRACTITIONER

## 2023-02-17 PROCEDURE — G0463 HOSPITAL OUTPT CLINIC VISIT: HCPCS | Mod: 25

## 2023-02-17 PROCEDURE — 84132 ASSAY OF SERUM POTASSIUM: CPT | Performed by: EMERGENCY MEDICINE

## 2023-02-17 PROCEDURE — 250N000011 HC RX IP 250 OP 636: Performed by: INTERNAL MEDICINE

## 2023-02-17 PROCEDURE — 93005 ELECTROCARDIOGRAM TRACING: CPT

## 2023-02-17 PROCEDURE — 83735 ASSAY OF MAGNESIUM: CPT | Performed by: INTERNAL MEDICINE

## 2023-02-17 PROCEDURE — 93306 TTE W/DOPPLER COMPLETE: CPT | Mod: 26 | Performed by: INTERNAL MEDICINE

## 2023-02-17 PROCEDURE — 250N000013 HC RX MED GY IP 250 OP 250 PS 637: Performed by: INTERNAL MEDICINE

## 2023-02-17 PROCEDURE — 99232 SBSQ HOSP IP/OBS MODERATE 35: CPT | Performed by: HOSPITALIST

## 2023-02-17 PROCEDURE — 85027 COMPLETE CBC AUTOMATED: CPT | Performed by: INTERNAL MEDICINE

## 2023-02-17 PROCEDURE — 82310 ASSAY OF CALCIUM: CPT | Performed by: INTERNAL MEDICINE

## 2023-02-17 PROCEDURE — 120N000001 HC R&B MED SURG/OB

## 2023-02-17 PROCEDURE — 250N000012 HC RX MED GY IP 250 OP 636 PS 637: Performed by: INTERNAL MEDICINE

## 2023-02-17 PROCEDURE — 36415 COLL VENOUS BLD VENIPUNCTURE: CPT | Performed by: INTERNAL MEDICINE

## 2023-02-17 PROCEDURE — 250N000013 HC RX MED GY IP 250 OP 250 PS 637: Performed by: NURSE PRACTITIONER

## 2023-02-17 PROCEDURE — 99233 SBSQ HOSP IP/OBS HIGH 50: CPT | Performed by: SPECIALIST

## 2023-02-17 PROCEDURE — 97602 WOUND(S) CARE NON-SELECTIVE: CPT

## 2023-02-17 PROCEDURE — 250N000011 HC RX IP 250 OP 636: Performed by: SPECIALIST

## 2023-02-17 PROCEDURE — 93306 TTE W/DOPPLER COMPLETE: CPT

## 2023-02-17 PROCEDURE — 250N000013 HC RX MED GY IP 250 OP 250 PS 637: Performed by: HOSPITALIST

## 2023-02-17 RX ORDER — HYDROCODONE BITARTRATE AND ACETAMINOPHEN 5; 325 MG/1; MG/1
1 TABLET ORAL EVERY 6 HOURS PRN
Status: DISCONTINUED | OUTPATIENT
Start: 2023-02-17 | End: 2023-02-17

## 2023-02-17 RX ORDER — NALOXONE HYDROCHLORIDE 0.4 MG/ML
0.2 INJECTION, SOLUTION INTRAMUSCULAR; INTRAVENOUS; SUBCUTANEOUS
Status: DISCONTINUED | OUTPATIENT
Start: 2023-02-17 | End: 2023-02-18 | Stop reason: HOSPADM

## 2023-02-17 RX ORDER — OXYCODONE HYDROCHLORIDE 5 MG/1
5 TABLET ORAL EVERY 6 HOURS PRN
Status: DISCONTINUED | OUTPATIENT
Start: 2023-02-17 | End: 2023-02-18 | Stop reason: HOSPADM

## 2023-02-17 RX ORDER — NALOXONE HYDROCHLORIDE 0.4 MG/ML
0.4 INJECTION, SOLUTION INTRAMUSCULAR; INTRAVENOUS; SUBCUTANEOUS
Status: DISCONTINUED | OUTPATIENT
Start: 2023-02-17 | End: 2023-02-18 | Stop reason: HOSPADM

## 2023-02-17 RX ORDER — HALOPERIDOL 1 MG/1
2 TABLET ORAL 2 TIMES DAILY
Status: DISCONTINUED | OUTPATIENT
Start: 2023-02-17 | End: 2023-02-18 | Stop reason: HOSPADM

## 2023-02-17 RX ORDER — MULTIPLE VITAMINS W/ MINERALS TAB 9MG-400MCG
1 TAB ORAL DAILY
Status: DISCONTINUED | OUTPATIENT
Start: 2023-02-17 | End: 2023-02-18 | Stop reason: HOSPADM

## 2023-02-17 RX ADMIN — HYDRALAZINE HYDROCHLORIDE 25 MG: 25 TABLET, FILM COATED ORAL at 22:08

## 2023-02-17 RX ADMIN — CARVEDILOL 12.5 MG: 12.5 TABLET, FILM COATED ORAL at 08:43

## 2023-02-17 RX ADMIN — HYDRALAZINE HYDROCHLORIDE 25 MG: 25 TABLET, FILM COATED ORAL at 08:43

## 2023-02-17 RX ADMIN — SPIRONOLACTONE 100 MG: 25 TABLET ORAL at 08:42

## 2023-02-17 RX ADMIN — ACETAMINOPHEN 650 MG: 325 TABLET, FILM COATED ORAL at 01:07

## 2023-02-17 RX ADMIN — INSULIN ASPART 2 UNITS: 100 INJECTION, SOLUTION INTRAVENOUS; SUBCUTANEOUS at 18:13

## 2023-02-17 RX ADMIN — MEROPENEM 1 G: 1 INJECTION, POWDER, FOR SOLUTION INTRAVENOUS at 03:04

## 2023-02-17 RX ADMIN — LEVOTHYROXINE SODIUM 100 MCG: 100 TABLET ORAL at 08:43

## 2023-02-17 RX ADMIN — POTASSIUM CHLORIDE 20 MEQ: 1.5 POWDER, FOR SOLUTION ORAL at 08:43

## 2023-02-17 RX ADMIN — MEROPENEM 1 G: 1 INJECTION, POWDER, FOR SOLUTION INTRAVENOUS at 18:17

## 2023-02-17 RX ADMIN — CARVEDILOL 12.5 MG: 12.5 TABLET, FILM COATED ORAL at 18:14

## 2023-02-17 RX ADMIN — HYDROCODONE BITARTRATE AND ACETAMINOPHEN 1 TABLET: 5; 325 TABLET ORAL at 08:45

## 2023-02-17 RX ADMIN — ASPIRIN 81 MG: 81 TABLET, COATED ORAL at 08:43

## 2023-02-17 RX ADMIN — FUROSEMIDE 20 MG: 10 INJECTION, SOLUTION INTRAMUSCULAR; INTRAVENOUS at 16:33

## 2023-02-17 RX ADMIN — HYDRALAZINE HYDROCHLORIDE 25 MG: 25 TABLET, FILM COATED ORAL at 13:52

## 2023-02-17 RX ADMIN — MULTIPLE VITAMINS W/ MINERALS TAB 1 TABLET: TAB at 18:14

## 2023-02-17 RX ADMIN — ENOXAPARIN SODIUM 40 MG: 40 INJECTION SUBCUTANEOUS at 16:33

## 2023-02-17 RX ADMIN — LISINOPRIL 10 MG: 10 TABLET ORAL at 08:43

## 2023-02-17 RX ADMIN — HALOPERIDOL 2 MG: 1 TABLET ORAL at 08:43

## 2023-02-17 RX ADMIN — HYDROCODONE BITARTRATE AND ACETAMINOPHEN 1 TABLET: 5; 325 TABLET ORAL at 20:44

## 2023-02-17 RX ADMIN — MEROPENEM 1 G: 1 INJECTION, POWDER, FOR SOLUTION INTRAVENOUS at 11:22

## 2023-02-17 RX ADMIN — INSULIN ASPART 3 UNITS: 100 INJECTION, SOLUTION INTRAVENOUS; SUBCUTANEOUS at 11:59

## 2023-02-17 RX ADMIN — OXYCODONE HYDROCHLORIDE 5 MG: 5 TABLET ORAL at 22:08

## 2023-02-17 RX ADMIN — HALOPERIDOL 2 MG: 1 TABLET ORAL at 22:08

## 2023-02-17 RX ADMIN — INSULIN ASPART 4 UNITS: 100 INJECTION, SOLUTION INTRAVENOUS; SUBCUTANEOUS at 07:41

## 2023-02-17 RX ADMIN — FUROSEMIDE 20 MG: 10 INJECTION, SOLUTION INTRAMUSCULAR; INTRAVENOUS at 08:43

## 2023-02-17 ASSESSMENT — ACTIVITIES OF DAILY LIVING (ADL)
ADLS_ACUITY_SCORE: 18
ADLS_ACUITY_SCORE: 19
ADLS_ACUITY_SCORE: 18
ADLS_ACUITY_SCORE: 19
ADLS_ACUITY_SCORE: 18
ADLS_ACUITY_SCORE: 19
ADLS_ACUITY_SCORE: 18
ADLS_ACUITY_SCORE: 19
ADLS_ACUITY_SCORE: 19
ADLS_ACUITY_SCORE: 18

## 2023-02-17 NOTE — PROGRESS NOTES
MD Notification    Notified Person: MD    Notified Person Name:Alfred Banda    Notification Date/Time:3/21 @ 0321    Notification InteractionFR * 6592  Pt has new onset of A-fib, denies having fatigue, SOB or chest pain. K =3.5, taken at 230 this morning. Luca Sinha RN [id number]:    Purpose of Notification:Amicom    Orders Received:    Comments:

## 2023-02-17 NOTE — CONSULTS
[facility] Nurse Inpatient Assessment     Consulted for: RLE    Patient History (according to provider note(s):      Gustavo Faria is a 58 year old medically complex male (visiting area from [place]) admitted on 2/16/2023. He presents back to the ER with worsening right leg wound discomfort/drainage.       Right leg wounds, concern for infection/worsening cellulitis  Bacteremia hx - December 2022 Serratia bacteremia at OSH, tx with ceftriaxone  Lymphedema:  -  Wound consult  -  Culture discharge  -  BC's pending with bacteremia hx  -  Empiric zosyn as had been on oral cephalosporin and had perceived worsening of wounds.  Of note he was also on ceftriaxone in January in [place] for wound infection.  -  Check venous US for DVT  -  Surgery consult given depth of wounds  -  Consider further vascular workup, will get initial consults first for further recommendations.  -  Lasix IV for at least a couple doses (hold po lasix), resume spironolactone  -  ID consult    Areas Assessed:      Areas visualized during today's visit: Focused: and Lower extremities     Wound Location:  RLE - 4 wounds         Date of last photo 2/17  Wound History: chronic wounds open has had some surgical interventions      Palpation of the wound bed: boggy      Drainage: moderate     Description of drainage: cloudy and tan   Right lateral lower leg 4  x 2  x  0.5 cm   Tunneling up to 2.5 @ 12 o'clock & again at 6 o'clock to 1 cm red subcutaneous tissue   Right upper shin 2.5  x 1.3  x  0.4 cm 50/50% yellow slough and pink granulation tissue   Right lateral shin 1  x 0.6  x  0.4 cm pink granulation tissue   Right lower distal anterior leg  2.5  x 0.9  x  1.1 cm 50/50% slough and granulation tissue        Undermining N/A  Periwound skin: 4+ pitting edema       Color: normal and consistent with surrounding tissue      Temperature: warm  Odor: none  Pain: facial expression of distress and during dressing change,  stabbing and sharp  Pain intervention prior to dressing change: soaking    Treatment Plan:     RLE wounds - daily   Apply vashe #[id number] moistened kerlix to all wounds and surrounding skin x 15 minutes  Irrigate right lateral wound tunnels at 12 & 6 o'clock with vashe using a 10cc syringe 12 and 6 oclock have tunnels   Fill tunnels at 12 & 6 right lateral leg wound with a strip of aquacel ag (use 2 thin strips) using a qtip  Apply pieces of aquacel ag to wounds  Cover with mepilex light   Elevate    Orders: Written    RECOMMEND PRIMARY TEAM ORDER: vascular already involved, recommend ID if not already involved   Education provided: plan of care, Infection prevention  and Moisture management  Discussed plan of care with: Patient and Nurse  WOC nurse follow-up plan: weekly  Notify WOC if wound(s) deteriorate.  Nursing to notify the Provider(s) and re-consult the WOC Nurse if new skin concern.    DATA:     Current support surface: Standard  Standard gel/foam mattress (IsoFlex, Atmos air, etc)  Containment of urine/stool: Continent of bladder and Continent of bowel  BMI: Body mass index is 27.44 kg/m .   Active diet order: Orders Placed This Encounter      Combination Diet Moderate Consistent Carb (60 g CHO per Meal) Diet; Low Saturated Fat Na <2400mg Diet     Output: I/O last 3 completed shifts:  In: 600 [P.O.:600]  Out: 2750 [Urine:2750]     Labs: Recent Labs   Lab 02/16/23  0854   ALBUMIN 3.7   HGB 9.2*   WBC 15.3*   A1C 7.6*                        Goldie Allison RN BS CWOCN  Pager no longer is use, please contact through [facility] group: WOC Nurse

## 2023-02-17 NOTE — PROGRESS NOTES
A/O x4. VSS. On RA. Tele. PIV SL. WOC consult done. R leg wound care done. Urinal @ bedside. Pain managed with prn Norco x1. , 276 and 200, covered with sliding scale and carb count insulin. Echo test done. Stress test cancelled d/t pt already had coffee. Pt states that his home med (Testosterone) is with pharmacy. Called pharmacy to clarify.

## 2023-02-17 NOTE — PROGRESS NOTES
[facility]  Cardiology Progress Note  Date of Service: 02/17/2023    Assessment & Plan   Gustavo Faria is a 58 year old male admitted on 2/16/2023 with worsening right leg wounds.     Cardiology consulted for elevated troponin.     Assessment:  1. Elevated troponin - 117 -> 97, likely secondary to demand ischemia in setting of uncontrolled hypertension. EKG with no acute ischemic changes, PVCs on telemetry, no anginal symptoms, no prior CAD history, no family history of premature CAD, no prior cardiac workup that I can find in care everywhere  2. Uncontrolled hypertension - blood pressures 150-160s/110s on admission, did not take antihypertensive agents on admission, one documented BP reading this morning controlled.   3. PACs/PVCs - noted on telemetry, no atrial fibrillation on telemetry   4. Elevated NTproBNP - no hypoxia, minimal shortness of breath, recently started on furosemide during ED visit on 2/11, notes he has intermittently been on in the past for his leg swelling.               - PTA furosemide 20mg daily   5. Acute on chronic leg wounds - per hospital medicine   6. Hyperlipidemia  7. Bilateral lower extremity edema - likely secondary to lymphedema  8. DM type 2  - HgbA1c 7.6  9. Pituitary tumor with history of Cushing Syndrome  10. Marijuana use   11. AMELIA - non compliant with home CPAP  12. Hypokalemia     Patient feeling well this morning with no anginal symptoms. Unfortunately he had breakfast and caffeine this morning limiting ability to proceed with nuclear stress test today. Echocardiogram pending for further structural evaluation of heart. Recommend proceeding with nuclear stress test on Monday for further risk stratification. NPO and no caffeine for at minimum 12hrs prior.      Nursing mentioned concerns for atrial fibrillation overnight, telemetry reviewed, no atrial fibrillation noted, heart rhythm sinus rhythm with PACs/PVCs.     Plan:   1. Nuclear stress test on  Monday, NPO and no caffeine for at minimum 12hrs prior  2. Echocardiogram pending  3. Continue carvedilol 12.5mg BID  4. Continue furosemide 20mg BID  5. Continue hydralazine 25mg TID  6. Continue aspirin 81mg   7. Continue lisinopril 10mg daily  8. Continue spironolactone 100mg daily     ARABELLA Nino CNP  [facility]  Pager: [phone]          Interval History   Denies chest pain, denies shortness of breath, denies palpitations.     Physical Exam   Temp: 97.7  F (36.5  C) Temp src: Oral BP: 129/89 Pulse: 90   Resp: 16 SpO2: 99 % O2 Device: None (Room air)    Vitals:    02/16/23 0836   Weight: 77.1 kg (170 lb)     GEN: well nourished, in no acute distress.  HEENT:  Pupils equal, round. Sclerae nonicteric.   NECK: Supple, no masses appreciated.   C/V:  Regular rate and rhythm, no murmur, rub or gallop.   RESP: Respirations are unlabored. Clear to auscultation bilaterally without wheezing, rales, or rhonchi.  GI: Abdomen soft, nontender.  EXTREM: +2-3 bilateral LE edema, right LE with multiple small wounds, right LE warm to touch.  NEURO: Alert and oriented, cooperative.  SKIN: Warm and dry    Medications       aspirin  81 mg Oral Daily     carvedilol  12.5 mg Oral BID w/meals     enoxaparin ANTICOAGULANT  40 mg Subcutaneous Q24H     furosemide  20 mg Intravenous BID     haloperidol  2 mg Oral TID     hydrALAZINE  25 mg Oral TID     insulin aspart   Subcutaneous Daily with breakfast     insulin aspart  1-7 Units Subcutaneous TID AC     insulin aspart  1-5 Units Subcutaneous At Bedtime     [START ON 2/18/2023] insulin glargine  15 Units Subcutaneous QAM AC     levothyroxine  100 mcg Oral Daily     lisinopril  10 mg Oral Daily     meropenem  1 g Intravenous Q8H     potassium chloride  20 mEq Oral Daily     sodium chloride (PF)  3 mL Intracatheter Q8H     spironolactone  100 mg Oral Daily       Data   Last 24 hours labs reviewed     Imaging: CXR - No acute cardiopulmonary disease.    Tele: sinus rhythm with  PACs and PVCs

## 2023-02-17 NOTE — CONSULTS
[facility]  Initial Psychiatric Consult   Consult date: February 17, 2023         Reason for Consult, requesting source:    Placed on haloperidol to replace quetiapine at OSH [facility], QT prolongation, ? Rec options for change  Requesting source: Willy Sam        HPI:   Gustavo Faria is a 58 year old male who was admitted to [facility] on 2/16/23 with worsening right leg wound discomfort/drainage. PMH significant for cushing's disease, pituitary macroadenoma, diabetes mellitus type II, HTN, hypothyroidism. Psychiatry is consulted for review of his scheduled haloperidol in the setting of QTc prolongation.     Per review of the chart, patient was hospitalized in January in [place] with altered mental status. He was initially given quetiapine which was ineffective. He was switched to haloperidol which was scheduled 2 mg TID. This led to improvement in mentation and agitation. His QTc on 1/8 noted to be 502. Patient was ultimately discharged on haloperidol 2 mg TID with instructions to follow-up outpatient. Patient took a trip to visit sister here in [place] and now returns to the hospital for evaluation and treatment of his leg wound.     On interview today, patient recalls that he was placed on haloperidol during his hospitalization in January. He does not have full recollection of the reasons why he was prescribed this medication. He recalls taking quetiapine at some point and required restraints during his stay in a rehab facility. His sister recalls that he did have periods of confusion, agitation, and hallucinations during his hospitalization in January. These symptoms have since resolved. Patient denies any history of mental health treatment. No history of psychosis. He denies feeling down, sad, depressed, or hopeless. Denies any suicidal or homicidal ideation. He hopes to be able to get well enough to return to work at some point. Discuss some of the reasons why  he may have become delirious during his previous hospital stay. He is not currently demonstrating any evidence of delirium. Denies hallucinations. No paranoia or delusions noted. He notes some difficulty getting to sleep, normally uses cannabis to help with this at home. We discuss a plan to begin to taper haloperidol, to which he is agreeable.         Past Psychiatric History:   Pt with no formal psychiatric history. No psychiatric hospitalizations or suicide attempts. Had recently been treated with quetiapine and haloperidol for deliriums symptoms. Remains on haloperidol 2 mg TID at this time.         Substance Use and History:   Pt uses cannabis daily, reports he has a medical card in [place]. No known hx of alcohol abuse or dependence. No known hx of CD tx.         Past Medical History:   PAST MEDICAL HISTORY:   DM II  Cushing's disease  HTN  Hypothyroidism  Pituitary macroadenoma    PAST SURGICAL HISTORY:  BRAIN SURGERY   HX JOINT REPAIR Left 2015   sepsis knee   I&D ABSCESS LEG Right 12/25/2022   INCISION AND DRAINAGE LOWER EXTREMITY RIGHT LOWER LEG ABSCESS performed by Horacio Chavarria MD at [facility]        Family History:   No known fam hx of mental illness or suicide attempts        Social History:   Patient from [place]. He is , has children, youngest is 16. They are also caring for a 1 year old. Patient reports he has not been able to work for 2 years. Previously worked as a  and a cook.          Physical ROS:   The patient endorsed leg pain. The remainder of 10-point review of systems was negative except as noted in HPI.         PTA Medications:     Medications Prior to Admission   Medication Sig Dispense Refill Last Dose     carvedilol (COREG) 12.5 MG tablet Take 12.5 mg by mouth 2 times daily (with meals)   2/15/2023     cefdinir (OMNICEF) 300 MG capsule Take 1 capsule (300 mg) by mouth 2 times daily for 14 days (Patient taking differently: Take 300 mg by mouth 2 times  daily 2/11/23 - 2/24/23) 28 capsule 0 2/16/2023 at x1     furosemide (LASIX) 20 MG tablet Take 1 tablet (20 mg) by mouth daily for 7 days 7 tablet 0 2/15/2023     haloperidol (HALDOL) 2 MG tablet Take 2 mg by mouth 3 times daily   2/15/2023     hydrALAZINE (APRESOLINE) 25 MG tablet Take 25 mg by mouth 3 times daily   2/15/2023     levothyroxine (SYNTHROID/LEVOTHROID) 100 MCG tablet Take 1 tablet (100 mcg) by mouth daily 30 tablet 0 2/16/2023 at am     lisinopril-hydrochlorothiazide (ZESTORETIC) 10-12.5 MG tablet Take 1 tablet by mouth daily   2/15/2023     metFORMIN (GLUCOPHAGE) 500 MG tablet Take 500 mg by mouth 2 times daily (with meals)   2/15/2023     oxyCODONE (ROXICODONE) 5 MG tablet Take 1 tablet (5 mg) by mouth every 6 hours as needed for pain 12 tablet 0 2/15/2023 at pm     POTASSIUM CHLORIDE PO Take 20 mEq by mouth daily   2/15/2023     sitagliptin (JANUVIA) 50 MG tablet Take 2 tablets (100 mg) by mouth daily for 30 days 60 tablet 0 Past Month     spironolactone (ALDACTONE) 100 MG tablet Take 1 tablet (100 mg) by mouth daily for 30 days 30 tablet 0 Past Month     testosterone (ANDROGEL) 20.25 MG/1.25GM (1.62%) topical gel Place 20.25 mg onto the skin daily   2/15/2023          Allergies:   No Known Allergies       Labs:     Recent Results (from the past 48 hour(s))   EKG 12-lead, tracing only    Collection Time: 02/16/23  8:38 AM   Result Value Ref Range    Systolic Blood Pressure  mmHg    Diastolic Blood Pressure  mmHg    Ventricular Rate 97 BPM    Atrial Rate 97 BPM    OK Interval 132 ms    QRS Duration 144 ms     ms    QTc 538 ms    P Axis 27 degrees    R AXIS -28 degrees    T Axis 37 degrees    Interpretation ECG       Sinus rhythm with Premature supraventricular complexes and with occasional Premature ventricular complexes  Right bundle branch block  Minimal voltage criteria for LVH, may be normal variant ( R in aVL )  Abnormal ECG  When compared with ECG of 11-FEB-2023 08:18,  T wave inversion  less evident in Inferior leads  Confirmed by GENERATED REPORT, COMPUTER ([id number]),  Shima Escamilla ([id number]) on 2/16/2023 1:42:20 PM     Comprehensive metabolic panel    Collection Time: 02/16/23  8:54 AM   Result Value Ref Range    Sodium 143 136 - 145 mmol/L    Potassium 3.2 (L) 3.4 - 5.3 mmol/L    Chloride 96 (L) 98 - 107 mmol/L    Carbon Dioxide (CO2) 37 (H) 22 - 29 mmol/L    Anion Gap 10 7 - 15 mmol/L    Urea Nitrogen 19.2 6.0 - 20.0 mg/dL    Creatinine 0.87 0.67 - 1.17 mg/dL    Calcium 10.6 (H) 8.6 - 10.0 mg/dL    Glucose 355 (H) 70 - 99 mg/dL    Alkaline Phosphatase 223 (H) 40 - 129 U/L    AST 26 10 - 50 U/L    ALT 81 (H) 10 - 50 U/L    Protein Total 6.7 6.4 - 8.3 g/dL    Albumin 3.7 3.5 - 5.2 g/dL    Bilirubin Total 0.3 <=1.2 mg/dL    GFR Estimate >90 >60 mL/min/1.73m2   Troponin T, High Sensitivity    Collection Time: 02/16/23  8:54 AM   Result Value Ref Range    Troponin T, High Sensitivity 117 (HH) <=22 ng/L   Nt probnp inpatient (BNP)    Collection Time: 02/16/23  8:54 AM   Result Value Ref Range    N terminal Pro BNP Inpatient 3,300 (H) 0 - 900 pg/mL   Erythrocyte sedimentation rate auto    Collection Time: 02/16/23  8:54 AM   Result Value Ref Range    Erythrocyte Sedimentation Rate 33 (H) 0 - 20 mm/hr   CRP inflammation    Collection Time: 02/16/23  8:54 AM   Result Value Ref Range    CRP Inflammation 8.89 (H) <5.00 mg/L   Procalcitonin    Collection Time: 02/16/23  8:54 AM   Result Value Ref Range    Procalcitonin 0.23 (H) <0.05 ng/mL   Blood Culture Peripheral Blood    Collection Time: 02/16/23  8:54 AM    Specimen: Peripheral Blood   Result Value Ref Range    Culture No growth after 12 hours    CBC with platelets and differential    Collection Time: 02/16/23  8:54 AM   Result Value Ref Range    WBC Count 15.3 (H) 4.0 - 11.0 10e3/uL    RBC Count 3.06 (L) 4.40 - 5.90 10e6/uL    Hemoglobin 9.2 (L) 13.3 - 17.7 g/dL    Hematocrit 30.1 (L) 40.0 - 53.0 %    MCV 98 78 - 100 fL    MCH 30.1 26.5 - 33.0 pg     MCHC 30.6 (L) 31.5 - 36.5 g/dL    RDW 21.2 (H) 10.0 - 15.0 %    Platelet Count 449 150 - 450 10e3/uL    % Neutrophils 86 %    % Lymphocytes 6 %    % Monocytes 4 %    % Eosinophils 0 %    % Basophils 0 %    % Immature Granulocytes 4 %    NRBCs per 100 WBC 3 (H) <1 /100    Absolute Neutrophils 13.3 (H) 1.6 - 8.3 10e3/uL    Absolute Lymphocytes 0.9 0.8 - 5.3 10e3/uL    Absolute Monocytes 0.6 0.0 - 1.3 10e3/uL    Absolute Eosinophils 0.0 0.0 - 0.7 10e3/uL    Absolute Basophils 0.0 0.0 - 0.2 10e3/uL    Absolute Immature Granulocytes 0.6 (H) <=0.4 10e3/uL    Absolute NRBCs 0.4 10e3/uL   Magnesium Lab    Collection Time: 02/16/23  8:54 AM   Result Value Ref Range    Magnesium 1.9 1.7 - 2.3 mg/dL   Hemoglobin A1c    Collection Time: 02/16/23  8:54 AM   Result Value Ref Range    Hemoglobin A1C 7.6 (H) <5.7 %   Blood Culture Peripheral Blood    Collection Time: 02/16/23  9:25 AM    Specimen: Peripheral Blood   Result Value Ref Range    Culture No growth after 12 hours    Troponin T, High Sensitivity    Collection Time: 02/16/23 12:06 PM   Result Value Ref Range    Troponin T, High Sensitivity 97 (H) <=22 ng/L   Glucose by meter    Collection Time: 02/16/23  2:12 PM   Result Value Ref Range    GLUCOSE BY METER POCT 280 (H) 70 - 99 mg/dL   Glucose by meter    Collection Time: 02/16/23  5:19 PM   Result Value Ref Range    GLUCOSE BY METER POCT 391 (H) 70 - 99 mg/dL   Potassium    Collection Time: 02/16/23  7:23 PM   Result Value Ref Range    Potassium 3.1 (L) 3.4 - 5.3 mmol/L   Glucose by meter    Collection Time: 02/16/23 11:47 PM   Result Value Ref Range    GLUCOSE BY METER POCT 229 (H) 70 - 99 mg/dL   Glucose by meter    Collection Time: 02/17/23  2:01 AM   Result Value Ref Range    GLUCOSE BY METER POCT 264 (H) 70 - 99 mg/dL   Potassium    Collection Time: 02/17/23  2:34 AM   Result Value Ref Range    Potassium 3.5 3.4 - 5.3 mmol/L   EKG 12-lead, tracing only    Collection Time: 02/17/23  5:38 AM   Result Value Ref Range     "Systolic Blood Pressure  mmHg    Diastolic Blood Pressure  mmHg    Ventricular Rate 73 BPM    Atrial Rate 73 BPM    MD Interval 126 ms    QRS Duration 144 ms     ms    QTc 517 ms    P Axis 50 degrees    R AXIS -27 degrees    T Axis 16 degrees    Interpretation ECG       Sinus rhythm with Premature atrial complexes  Right bundle branch block  Voltage criteria for left ventricular hypertrophy  Abnormal ECG  When compared with ECG of 16-FEB-2023 08:38,  Premature ventricular complexes are no longer Present     Glucose by meter    Collection Time: 02/17/23  5:55 AM   Result Value Ref Range    GLUCOSE BY METER POCT 334 (H) 70 - 99 mg/dL   Glucose by meter    Collection Time: 02/17/23  7:33 AM   Result Value Ref Range    GLUCOSE BY METER POCT 304 (H) 70 - 99 mg/dL   Glucose by meter    Collection Time: 02/17/23 10:48 AM   Result Value Ref Range    GLUCOSE BY METER POCT 276 (H) 70 - 99 mg/dL   Echocardiogram Complete    Collection Time: 02/17/23 11:56 AM   Result Value Ref Range    LVEF  40-45%           Physical and Psychiatric Examination:     /89 (BP Location: Right arm)   Pulse 90   Temp 97.7  F (36.5  C) (Oral)   Resp 16   Ht 1.676 m (5' 6\")   Wt 77.1 kg (170 lb)   SpO2 99%   BMI 27.44 kg/m    Weight is 170 lbs 0 oz  Body mass index is 27.44 kg/m .    Physical Exam:  I have reviewed the physical exam as documented by by the medical team and agree with findings and assessment and have no additional findings to add at this time.    Mental Status Exam:  Appearance: age-appearing, dressed in hospital gown, adequate hygiene, in no acute distress  Behavior: cooperative and calm  Eye contact: appropriate  Orientation: alert and oriented to time, place and person  Movements: no tics, tremors, or dystonia observed  Mood: \"good\"  Affect: Appropriate/mood-congruent  Speech: Normal rate, rhythm, tone  Language: fluent in English, naming objects appropriately  Memory: no impairment in immediate, recent, or " remote memory  Fund of knowledge: average for development based on word choice  Concentration: attentive  Thought process and content: linear, organized; no delusions or paranoia elicited; denies auditory or visual hallucinations.   Associations: no loosening  Insight: intact  Judgement: intact  Safety: denies suicidal or homicidal ideation           DSM-5 Diagnosis:   #1 Delirium, resolved  #2 QTc prolongation          Assessment:   Gustavo Faria is a 58 year old male who was admitted to [facility] on 2/16/23 with worsening right leg wound discomfort/drainage. PMH significant for cushing's disease, pituitary macroadenoma, diabetes mellitus type II, HTN, hypothyroidism. Psychiatry is consulted for review of his scheduled haloperidol in the setting of QTc prolongation.     Spoke with the cardiology CNP - they are currently not concerned about his QT interval. Correcting for patient's right bundle branch block QTC - (QRS - 100), his true QTc is closer to 473.     It appears he was started on haloperidol 2 mg TID during his hospitalization in January for symptoms of delirium and agitation. These symptoms are currently not present and we would not generally give someone haloperidol chronically for an acute issue. Discussed with patient and sister that we can begin to taper the dose and monitor for any reemergence of his delirium symptoms. Discussed discontinuing the afternoon dose first while monitoring for any changes. After a week or so, he could come off this entirely if he remains stable.           Summary of Recommendations:   1) Will decrease haloperidol to 2 mg BID. After a few days, could remove the morning dose. Monitor for any changes. Discontinue the bedtime dose a few days later if he remains stable.     2) QTc not currently a concern when corrected for RBBB - closer to 473. Discussed with cardiology.     3) Reconsult psychiatry as needed, thank you        Manny Jaramillo, PMHNP-BC, APRN,  CNP  Consult/Liaison Psychiatry  [facility]  Provider can be paged via [facility] Paging/Directory  If I am unavailable, please contact [facility] at [phone] to reach the on-call provider.

## 2023-02-17 NOTE — PROGRESS NOTES
"CLINICAL NUTRITION SERVICES  -  ASSESSMENT NOTE      Recommendations Ordered by Registered Dietitian (RD):   - ordered 10 am strawberry Ensure Enlive  - ordered 2 pm chocolate Glucerna (pending diet adv, currently on caffeine restriction)  - ordered thera-vit-m for wound healing  - ordered BID Raul for wound healing  - RD encouraged protein intake for wound healing. Discussed sources of protein. Discussed CHO counting and heart healthy fats. Encouraged pt to have well balanced meals- protein, fat, leafy green vegetables, with carb source.    Malnutrition:   % Weight Loss:  Difficult to assess  % Intake:  Decreased intake does not meet criteria for malnutrition   Subcutaneous Fat Loss:  None observed  Muscle Loss:  None observed  Fluid Retention:  None noted    Malnutrition Diagnosis: Patient does not meet two of the established criteria necessary for diagnosing malnutrition       REASON FOR ASSESSMENT  Gustavo Faria is a 58 year old male seen by Registered Dietitian for Admission Nutrition Risk Screen for positive (wt loss of 24-33#, yes decreased appetite)      NUTRITION HISTORY  - Information obtained from chart review and discussion with pt and family   - PMH of HTN, HLD, T2DM, Hypothyroidism, cushing's disease, pituitary macroadenoma, and chronic right lower extremity wounds in the setting of peripheral neuropathy   - presented to ED with chronic wounds to Rt LE since august, one of the wounds opened up   - per chart review, pt is here visiting sister. Lives in [place]   - visited with pt this afternoon. Pt reported he has been eating well at home. Has a good appetite typically though per his sister, it is reduced than what it used to be. Likes to have 4 meals/day. Cereal typically makes up 2-3 of those meals. Otherwise likes to eat seafood, pasta, green vegetables, turkey. Has been cutting down on drinking fruit juices and gatorade. Eats small portions. He \"picks\" at his food.     CURRENT NUTRITION " "ORDERS  Diet Order: Moderate Consistent Carbohydrate and Low Saturated Fat/2400 mg Sodium. No caffeine    Current Intake/Tolerance:  - per discussion with pt this afternoon, pt reported his appetite is decreased as he is eating hospital food. RD encouraged protein intake for wound healing. Pt would like to have strawberry or chocolate protein supplement shakes. Also would like orange estuardo. Discussed sources of protein with pt. Encouraged pt to have some protein at each meal. Discussed using skim or 2% milk with cereal as it is lower in saturated fat and provides protein. Pt questioning if he can have bananas- discussed CHO counting briefly. Encouraged pt to be intentional with his carb intake and aim for a consistent intake for each meal. Discussed adding green vegetables, protein and healthy fats to meals to help stabilize BG. Provided handouts   - per nursing flow sheet, % intakes documented   - per health touch, received 2 meals yesterday, has 3 ordered for today      NUTRITION FOCUSED PHYSICAL ASSESSMENT FOR DIAGNOSING MALNUTRITION)  Yes         Observed:    No nutrition-related physical findings observed    Obtained from Chart/Interdisciplinary Team:  Non-healing wound see below    ANTHROPOMETRICS  Height: 5' 6\"  Weight: 170 lbs 0 oz  Body mass index is 27.44 kg/m .  Weight Status:  Overweight BMI 25-29.9  IBW: 64.5 kg  % IBW: 120%  Weight History: difficult to assess recent wt trends without recent wts  02/16/23 : 77.1 kg (170 lb)   02/11/23 : 77.1 kg (170 lb)  01/01/23 : 75.8 kg (167 lb 1.7 oz) - per care everywhere  12/08/22 : 80.6 kg (177 lb 12.8 oz) - per care everywhere  06/05/21 : 87.7 kg (193 lb 5.5 oz) - per care everywhere  05/20/21 : 99.7 kg (219 lb 12.8 oz) - per care everywhere  07/02/20 : 92.1 kg (203 lb 0.7 oz) - per care everywhere      LABS  Labs reviewed: -391  - 2/16: A1c 7.6%    MEDICATIONS  Medications reviewed: lasix, insulin aspart (MSSI), insulin lantus (15 units), " levothyroxine, potassium chloride, spironolactone     PER CHART REVIEW:  Skin: WOC following, last assessed 2/17  Wound Location:  RLE - 4 wounds   Wound History: chronic wounds open has had some surgical interventions     ASSESSED NUTRITION NEEDS PER APPROVED PRACTICE GUIDELINES:  Dosing Weight: 67.7 kg (adjusted, based on 77.1 kg)  Estimated Energy Needs: 4045-7833 kcals (25-30 Kcal/Kg)  Justification: maintenance  Estimated Protein Needs:  grams protein (1.2-1.5 g pro/Kg)  Justification: wound healing and preservation of lean body mass  Estimated Fluid Needs: 1 mL/Kcal  Justification: maintenance OR per provider pending fluid status    MALNUTRITION:  % Weight Loss:  Difficult to assess  % Intake:  Decreased intake does not meet criteria for malnutrition   Subcutaneous Fat Loss:  None observed  Muscle Loss:  None observed  Fluid Retention:  None noted    Malnutrition Diagnosis: Patient does not meet two of the established criteria necessary for diagnosing malnutrition    NUTRITION DIAGNOSIS:  Increased nutrient needs (protein) related to wound healing as evidenced by 4x wounds on BLE, min protein needs of 1.2 g/kg, need for ONS      NUTRITION INTERVENTIONS  Recommendations / Nutrition Prescription  - ordered 10 am strawberry Ensure Enlive  - ordered 2 pm chocolate Glucerna (pending diet adv, currently on caffeine restriction)  - ordered thera-vit-m for wound healing  - ordered BID Raul for wound healing  - RD encouraged protein intake for wound healing. Discussed sources of protein. Discussed CHO counting and heart healthy fats. Encouraged pt to have well balanced meals- protein, fat, leafy green vegetables, with carb source.     See Education Flowsheet  Assessed learning needs, learning preferences, and willingness to learn    Nutrition Education (Content):  a) Provided handout: Plate Method for Diabetes, Living healthy with Diabetes, Tips to increase the protein in your diet, CHO counting for people with  Diabetes, Helping your wounds heal  b) Discussed as above    Nutrition Education (Application):  a) Discussed eating habits and recommended alternative food choices    Patient verbalizes understanding of diet by re-stating 3 changes he plans to make with diet     Anticipate good compliance    Diet Education - refer to Education Flowsheet      Implementation  Nutrition education   Medical Food Supplement  Multivitamin/Mineral      Nutrition Goals  Avg nutritional intake to meet a minimum of 1.2 g PRO/kg daily (per dosing wt 67.7 kg).      MONITORING AND EVALUATION:  Progress towards goals will be monitored and evaluated per protocol and Practice Guidelines      Letitia Young RD, LD

## 2023-02-17 NOTE — PLAN OF CARE
Goal Outcome Evaluation:      Plan of Care Reviewed With: patient, family    Overall Patient Progress: improvingOverall Patient Progress: improving    Outcome Evaluation: encouraged protein intake for wound healing. provided nutrition education    Letitia Young RD, LD

## 2023-02-17 NOTE — PROGRESS NOTES
Nuclear Medicine asked patient about caffeine consumption and he states he just had some coffee. Coffee was on his meal tray @0800 2/17/23 and is a contraindication for the stress medicine Lexiscan. The stress test can not be started today. NPO and no caffeine for a minimum of 12hrs prior to the start of this test.

## 2023-02-17 NOTE — PROGRESS NOTES
RECEIVING UNIT ED HANDOFF REVIEW    ED Nurse Handoff Report was reviewed by: Bharath Castanon RN on February 16, 2023 at 9:56 PM

## 2023-02-17 NOTE — PROGRESS NOTES
MD Notification    Notified Person: MD    Notified Person Name:Alfred Valerio    Notification Date/Time:2/17 @0656    Notification Interaction:Amicom    Purpose of Notification:FR * [id number]  Pt is has constant pain on his legs he rates it 8, received Tylenol but thinks it's not helping, do you want him to get stronger pain med? Thanks   Bharath CH [id number]    Orders Received:    Comments:

## 2023-02-17 NOTE — PROGRESS NOTES
[facility]    Medicine Progress Note - Hospitalist Service    Date of Admission:  2/16/2023    Assessment & Plan      Gustavo Faria is a 58 year old medically complex male (visiting area from [place]) admitted on 2/16/2023. He presents back to the ER with worsening right leg wound discomfort/drainage.      Right leg wounds, concern for infection/worsening cellulitis  Bacteremia hx - December 2022 Serratia bacteremia at OSH, tx with ceftriaxone  Lymphedema:  -  Wound consult  -  Blood and wound cultures are pending,  -  Hx of bacteremia.  -  ID following, Abx pr ID, currently on Merapenem   -  LE venous US  Negative for DVT  -  Surgery consulted- input is pending at th time of this note   - Continue 20 mg Lasix IV  BID,  for at least a couple doses (hold po lasix), resume spironolactone  -  ID following    Acute on chronic fatigue/Dyspnea  Elevated Troponin:  -  Elevated troponin of unclear  Etiology, no  hemodynamics instability to be attributed to the mild troponin leak. No acute ischemic changes on ECG. No chest pain or  Acute SOB. He admitted ongoing BERNAL.  - he has multiple cardiac risk factors but denies prior MI/ACS.    - continue ASA,   - continue cardiac monitoring and   - chemical stress testing  Is pending, ( postponed due to patient having coffee this morning)    Prolonged QT  History of Encephalopathy/Agitation with hospital stay 1/23 in [place]:  -  Patient moved to scheduled haldol in January (outside records in care everywhere reviewed).  Of note at that point seroquel stopped and QT was around 500. QT more recently was near 580 but now better around 530.  Consult psychiatry for recommendations      As he has found the haldol helpful and mentation stable now.   His QT is lower now than recent EKG with prior ED visit will continue cautiously.     Would avoid adding additional QT prolonging meds.    Pituitary macroadenoma; cushing's disease:  Patient has known history of  pituitary macroadenoma, ACTH secreting lesion.   s/p 2 surgical procedures in May and July 2021 at [facility] with neurosurgical team - Dr. Lynn.     In Jan at OSH stay they did not recommend urgent surgical intervention based on the size of the lesion and lack of any hydrocephalus on admission head CT scan.  He is planned for neurosurgery appt at [facility] in [place] March 2023.    He is felt possibly to still need more elective surgery this year.    DM2, exacerbated by Cushing's:  -  Glucoses quite uncontrolled, in lower 300s   likely  Exacerbating by his wound/infectious issues.   - continue Lantus Increase from 10 to 15 units  + sliding scale insulin     Hold metformin for now.  Of note his sitagliptin he had forgotten when he visited [place] and insurance wouldn't cover an early refill so he hasn't had for several days.  He also reports even when on it glu frequently 300.   Consider resuming soon but as starting insulin and he has been off this for awhile, will initially hold.    Hypokalemia:  -  Resume PTA scheduled, add RN replacement protocol  -  Resume spironolactone    Hypertension, exacerbated by Cushing's/Pit issues:  -  Resume PTA BP meds.  He has not yet had today so will give now.  Hold hydrochlorothiazide for moment while dosing more lasix IV.    Hypothyroidism:  -  Levothyroxine    Marijuana use:  -  Uses daily medical marijuana (smokes it).  While he I explained he cannot smoke in his room.         Diet: Combination Diet Moderate Consistent Carb (60 g CHO per Meal) Diet; Low Saturated Fat Na <2400mg Diet    DVT Prophylaxis: Heparin SQ  Roland Catheter: Not present  Lines: None     Cardiac Monitoring: ACTIVE order. Indication: Chest pain/ ACS rule out (24 hours)  Code Status: Full Code      Clinically Significant Risk Factors Present on Admission        # Hypokalemia: Lowest K = 3.1 mmol/L in last 2 days, will replace as needed    # Hypercalcemia: Highest Ca = 10.6 mg/dL in last 2  "days, will monitor as appropriate        # Hypertension: home medication list includes antihypertensive(s)     # DMII: A1C = 7.6 % (Ref range: <5.7 %) within past 3 months    # Overweight: Estimated body mass index is 27.44 kg/m  as calculated from the following:    Height as of this encounter: 1.676 m (5' 6\").    Weight as of this encounter: 77.1 kg (170 lb).           Disposition Plan     Expected Discharge Date: 02/18/2023                  Max Stein MD  Hospitalist Service  [facility]  Securely message with [employer] (more info)  Text page via [facility] Paging/Directory   ______________________________________________________________________    Interval History  he is complaining in intense pan ans tells me that the current pain medications regimen is not enough.        Physical Exam   Vital Signs: Temp: 98.4  F (36.9  C) Temp src: Temporal BP: (!) 146/94 Pulse: 92   Resp: 18 SpO2: 100 % O2 Device: None (Room air)    Weight: 170 lbs 0 oz    General Appearance: Alert in  Mild discomfort from the pain  Respiratory:  CTA no wheezing or rackles  Cardiovascular:  RRR no murmurs  GI: Soft NT/ND + BS   Skin: Rt LE dressed and the dressing is dry  Other:     Medical Decision Making       45 MINUTES SPENT BY ME on the date of service doing chart review, history, exam, documentation & further activities per the note.  ------------------ MEDICAL DECISION MAKING ------------------------------------------------------------------------------------------------------      Data   ------------------------- PAST 24 HR DATA REVIEWED -----------------------------------------------  "

## 2023-02-18 VITALS
HEART RATE: 81 BPM | RESPIRATION RATE: 16 BRPM | SYSTOLIC BLOOD PRESSURE: 132 MMHG | BODY MASS INDEX: 27.49 KG/M2 | TEMPERATURE: 97.7 F | WEIGHT: 171.08 LBS | DIASTOLIC BLOOD PRESSURE: 93 MMHG | HEIGHT: 66 IN | OXYGEN SATURATION: 100 %

## 2023-02-18 LAB
BACTERIA WND CULT: ABNORMAL
ERYTHROCYTE [DISTWIDTH] IN BLOOD BY AUTOMATED COUNT: 20.9 % (ref 10–15)
GLUCOSE BLDC GLUCOMTR-MCNC: 258 MG/DL (ref 70–99)
GLUCOSE BLDC GLUCOMTR-MCNC: 262 MG/DL (ref 70–99)
GLUCOSE BLDC GLUCOMTR-MCNC: 288 MG/DL (ref 70–99)
GLUCOSE BLDC GLUCOMTR-MCNC: 311 MG/DL (ref 70–99)
HCT VFR BLD AUTO: 29.3 % (ref 40–53)
HGB BLD-MCNC: 8.9 G/DL (ref 13.3–17.7)
MAGNESIUM SERPL-MCNC: 2 MG/DL (ref 1.7–2.3)
MCH RBC QN AUTO: 29.5 PG (ref 26.5–33)
MCHC RBC AUTO-ENTMCNC: 30.4 G/DL (ref 31.5–36.5)
MCV RBC AUTO: 97 FL (ref 78–100)
PLATELET # BLD AUTO: 388 10E3/UL (ref 150–450)
POTASSIUM SERPL-SCNC: 2.9 MMOL/L (ref 3.4–5.3)
POTASSIUM SERPL-SCNC: 3.6 MMOL/L (ref 3.4–5.3)
RBC # BLD AUTO: 3.02 10E6/UL (ref 4.4–5.9)
WBC # BLD AUTO: 15.6 10E3/UL (ref 4–11)

## 2023-02-18 PROCEDURE — 99238 HOSP IP/OBS DSCHRG MGMT 30/<: CPT | Performed by: HOSPITALIST

## 2023-02-18 PROCEDURE — 250N000011 HC RX IP 250 OP 636: Performed by: INTERNAL MEDICINE

## 2023-02-18 PROCEDURE — 84132 ASSAY OF SERUM POTASSIUM: CPT | Performed by: SPECIALIST

## 2023-02-18 PROCEDURE — 83735 ASSAY OF MAGNESIUM: CPT | Performed by: SPECIALIST

## 2023-02-18 PROCEDURE — 250N000011 HC RX IP 250 OP 636: Performed by: SPECIALIST

## 2023-02-18 PROCEDURE — 250N000013 HC RX MED GY IP 250 OP 250 PS 637: Performed by: INTERNAL MEDICINE

## 2023-02-18 PROCEDURE — 36415 COLL VENOUS BLD VENIPUNCTURE: CPT | Performed by: SPECIALIST

## 2023-02-18 PROCEDURE — 84132 ASSAY OF SERUM POTASSIUM: CPT | Performed by: HOSPITALIST

## 2023-02-18 PROCEDURE — 85027 COMPLETE CBC AUTOMATED: CPT | Performed by: HOSPITALIST

## 2023-02-18 PROCEDURE — 36415 COLL VENOUS BLD VENIPUNCTURE: CPT | Performed by: HOSPITALIST

## 2023-02-18 PROCEDURE — 250N000013 HC RX MED GY IP 250 OP 250 PS 637: Performed by: HOSPITALIST

## 2023-02-18 PROCEDURE — 250N000013 HC RX MED GY IP 250 OP 250 PS 637: Performed by: NURSE PRACTITIONER

## 2023-02-18 RX ORDER — POTASSIUM CHLORIDE 1500 MG/1
40 TABLET, EXTENDED RELEASE ORAL ONCE
Status: COMPLETED | OUTPATIENT
Start: 2023-02-18 | End: 2023-02-18

## 2023-02-18 RX ORDER — TESTOSTERONE 20.25 MG/1.25G
20.25 GEL TOPICAL DAILY
Status: CANCELLED | OUTPATIENT
Start: 2023-02-18

## 2023-02-18 RX ORDER — POTASSIUM CHLORIDE 1500 MG/1
20 TABLET, EXTENDED RELEASE ORAL ONCE
Status: COMPLETED | OUTPATIENT
Start: 2023-02-18 | End: 2023-02-18

## 2023-02-18 RX ORDER — ACETAMINOPHEN 325 MG/1
650 TABLET ORAL EVERY 6 HOURS PRN
Qty: 30 TABLET | Refills: 0 | Status: ON HOLD | OUTPATIENT
Start: 2023-02-18 | End: 2023-05-01

## 2023-02-18 RX ADMIN — FUROSEMIDE 20 MG: 10 INJECTION, SOLUTION INTRAMUSCULAR; INTRAVENOUS at 15:51

## 2023-02-18 RX ADMIN — LISINOPRIL 10 MG: 10 TABLET ORAL at 08:46

## 2023-02-18 RX ADMIN — Medication 1 MG: at 00:20

## 2023-02-18 RX ADMIN — SPIRONOLACTONE 100 MG: 25 TABLET ORAL at 08:45

## 2023-02-18 RX ADMIN — POTASSIUM CHLORIDE 20 MEQ: 1.5 POWDER, FOR SOLUTION ORAL at 08:45

## 2023-02-18 RX ADMIN — ASPIRIN 81 MG: 81 TABLET, COATED ORAL at 08:46

## 2023-02-18 RX ADMIN — MEROPENEM 1 G: 1 INJECTION, POWDER, FOR SOLUTION INTRAVENOUS at 03:50

## 2023-02-18 RX ADMIN — INSULIN ASPART 4 UNITS: 100 INJECTION, SOLUTION INTRAVENOUS; SUBCUTANEOUS at 11:18

## 2023-02-18 RX ADMIN — OXYCODONE HYDROCHLORIDE 5 MG: 5 TABLET ORAL at 03:46

## 2023-02-18 RX ADMIN — HYDRALAZINE HYDROCHLORIDE 25 MG: 25 TABLET, FILM COATED ORAL at 08:46

## 2023-02-18 RX ADMIN — OXYCODONE HYDROCHLORIDE 5 MG: 5 TABLET ORAL at 12:27

## 2023-02-18 RX ADMIN — INSULIN ASPART 3 UNITS: 100 INJECTION, SOLUTION INTRAVENOUS; SUBCUTANEOUS at 07:42

## 2023-02-18 RX ADMIN — CEFEPIME HYDROCHLORIDE 2 G: 2 INJECTION, POWDER, FOR SOLUTION INTRAVENOUS at 11:11

## 2023-02-18 RX ADMIN — CARVEDILOL 12.5 MG: 12.5 TABLET, FILM COATED ORAL at 08:46

## 2023-02-18 RX ADMIN — POTASSIUM CHLORIDE 40 MEQ: 1500 TABLET, EXTENDED RELEASE ORAL at 08:46

## 2023-02-18 RX ADMIN — LEVOTHYROXINE SODIUM 100 MCG: 100 TABLET ORAL at 08:46

## 2023-02-18 RX ADMIN — HYDRALAZINE HYDROCHLORIDE 25 MG: 25 TABLET, FILM COATED ORAL at 13:15

## 2023-02-18 RX ADMIN — FUROSEMIDE 20 MG: 10 INJECTION, SOLUTION INTRAMUSCULAR; INTRAVENOUS at 08:46

## 2023-02-18 RX ADMIN — HALOPERIDOL 2 MG: 1 TABLET ORAL at 08:46

## 2023-02-18 RX ADMIN — ACETAMINOPHEN 650 MG: 325 TABLET, FILM COATED ORAL at 00:20

## 2023-02-18 RX ADMIN — POTASSIUM CHLORIDE 20 MEQ: 1500 TABLET, EXTENDED RELEASE ORAL at 10:45

## 2023-02-18 ASSESSMENT — ACTIVITIES OF DAILY LIVING (ADL)
ADLS_ACUITY_SCORE: 19
ADLS_ACUITY_SCORE: 20
ADLS_ACUITY_SCORE: 20
ADLS_ACUITY_SCORE: 19
ADLS_ACUITY_SCORE: 20
ADLS_ACUITY_SCORE: 19
ADLS_ACUITY_SCORE: 19
ADLS_ACUITY_SCORE: 20

## 2023-02-18 NOTE — PROVIDER NOTIFICATION
While on cross cover this evening I was paged by RN regarding patient's uncontrolled pain, requesting stronger narcotic. Currently getting Norco 5mg q6hr. Will change to oxycodone 5mg q6hr.    [place] MD, MPH  Internal Medicine

## 2023-02-18 NOTE — PLAN OF CARE
Goal Outcome Evaluation:    Patient discharged against medical advice.  The risks of discharging early were discussed with the patient and the legal document was signed and filed with the patient's paper chart.      Patient received ride from his wife.     All patient belongings and pharmacy medications were sent home with the patient.

## 2023-02-18 NOTE — PROGRESS NOTES
[facility]    Medicine Progress Note - Hospitalist Service    Date of Admission:  2/16/2023    Assessment & Plan      Gustavo Faria is a 58 year old medically complex male (visiting area from [place]) admitted on 2/16/2023. He presents back to the ER with worsening right leg wound discomfort/drainage.      Right leg wounds, concern for infection/worsening cellulitis  Bacteremia hx - December 2022 Serratia bacteremia at OSH, tx with ceftriaxone  Lymphedema:  -  Wound consult  Team is following  -  Blood and wound cultures are pending, no growth todate  -  Hx of bacteremia.  -  ID following, Abx pr ID, currently on Merapenem   -  LE venous US  Negative for DVT  -  - Continue 20 mg Lasix IV  BID,  for at least a couple doses (hold po lasix), resume spironolactone  -  ID following  - Ordered a right lower leg MRI per ID reccs    Acute on chronic fatigue/Dyspnea  Elevated Troponin:  Cardiology evaluated, likely Demand ischemia in setting possibly stable CAD  - TTE showed mild depressed EF, and Inferior and inferolateral WMA, thought to be chorionic per Cardiolog      - continue guideline directed cardiac medication regimen    carvedilol 12.5mg BID. lisinopril 10mg daily, aspirin 81mg, spironolactone 100mg daily,and hydralazine 25mg TID.     - continue furosemide 20mg BID  - continue cardiac monitoring and   - outpatient stress testing     Prolonged QT  History of Encephalopathy/Agitation with hospital stay 1/23 in [place]:  Psych evaluated , thank you  -  decreased haloperidol to 2 mg BID. After a few days, could remove the morning dose.  -  Monitor for any changes.   - Discontinue the bedtime dose a few days later if he remains stable.   -  QTc not currently a concern when corrected for RBBB - closer to 473. Discussed with cardiology.     Pituitary macroadenoma; cushing's disease:  Patient has known history of pituitary macroadenoma, ACTH secreting lesion.   s/p 2 surgical procedures in May  and July 2021 at [facility] with neurosurgical team - Dr. Lynn.  And planing to have another surgery in [place].    He is planned for neurosurgery appt at [facility] in [place] March 2023.     DM2, exacerbated by Cushing's:  -  Glucoses quite uncontrolled, in lower 300s   likely  Exacerbating by his wound/infectious issues.   - continue Lantus Increase from  15 units to 20 units + sliding scale insulin     Hold metformin for now.  Of note his sitagliptin he had forgotten when he visited [place] and insurance wouldn't cover an early refill so he hasn't had for several days.  He also reports even when on it glu frequently 300.   Consider resuming soon but as starting insulin and he has been off this for awhile, will initially hold.    Hypokalemia:  -  Resume PTA scheduled, add RN replacement protocol  -  Resume spironolactone    Hypertension, exacerbated by Cushing's/Pit issues:  -  Resume PTA BP meds.  He has not yet had today so will give now.  Hold hydrochlorothiazide for moment while dosing more lasix IV.    Hypothyroidism:  -  Levothyroxine    Marijuana use:  -  Uses daily medical marijuana (smokes it).  While he I explained he cannot smoke in his room.         Diet: Combination Diet Moderate Consistent Carb (60 g CHO per Meal) Diet; Low Saturated Fat Na <2400mg Diet  Snacks/Supplements Adult: Ensure Enlive; Between Meals  Snacks/Supplements Adult: Glucerna; Between Meals  Snacks/Supplements Adult: Raul; With Meals    DVT Prophylaxis: Heparin SQ  Roland Catheter: Not present  Lines: None     Cardiac Monitoring: ACTIVE order. Indication: Chest pain/ ACS rule out (24 hours)  Code Status: Full Code      Clinically Significant Risk Factors        # Hypokalemia: Lowest K = 2.9 mmol/L in last 2 days, will replace as needed                 # DMII: A1C = 7.6 % (Ref range: <5.7 %) within past 3 months, PRESENT ON ADMISSION  # Overweight: Estimated body mass index is 27.61 kg/m  as calculated from  "the following:    Height as of this encounter: 1.676 m (5' 6\").    Weight as of this encounter: 77.6 kg (171 lb 1.2 oz)., PRESENT ON ADMISSION         Disposition Plan     Expected Discharge Date: 02/18/2023                  Max Stein MD  Hospitalist Service  [facility]  Securely message with [employer] (more info)  Text page via [employer] Paging/Directory   ______________________________________________________________________    Interval History    patient seen and examined. Had  episode of bleeding form the right leg while gong to the bathroom last night. No other issue.   he asked when he is being discontinue-ed so that h could go to his appointment for pituitary adenoma in th first week of March  .     Physical Exam   Vital Signs: Temp: 97.7  F (36.5  C) Temp src: Oral BP: (!) 145/102 Pulse: 94   Resp: 16 SpO2: 100 % O2 Device: None (Room air)    Weight: 171 lbs 1.23 oz    General Appearance: Alert in  Mild discomfort from the pain  Respiratory:  CTA no wheezing or rackles  Cardiovascular:  RRR no murmurs  GI: Soft NT/ND + BS   Skin: Rt LE dressed and the dressing is dry      Medical Decision Making       45 MINUTES SPENT BY ME on the date of service doing chart review, history, exam, documentation & further activities per the note.  ------------------ MEDICAL DECISION MAKING ------------------------------------------------------------------------------------------------------      Data   ------------------------- PAST 24 HR DATA REVIEWED -----------------------------------------------  "

## 2023-02-18 NOTE — PROGRESS NOTES
Alert and oriented X4. VSS on RA. Not OOB this shift. Wound dressing change done this shift. PIV SL with intermittent abx. Pain managed with PRN oxy and tylenol voiding well per urinal, no BM this shift.

## 2023-02-18 NOTE — DISCHARGE SUMMARY
[facility]  Hospitalist Discharge Summary      Date of Admission:  2/16/2023  Date of Discharge:  2/18/2023  Discharging Provider: Max Stein MD  Discharge Service: Hospitalist Service     AGAINST MEDICAL ADVICE     Discharge Diagnoses   Patient Active Problem List   Diagnosis     Bilateral leg edema     Poor compliance with medication     Cellulitis of right lower leg     Acute on chronic congestive heart failure, unspecified heart failure type (H)       Follow-ups Needed After Discharge   Follow-up Appointments     Follow-up and recommended labs and tests       Follow up with primary care provider, Physician No Ref-Primary, within 7   days for hospital follow- up.  No follow up labs or test are needed.         Additional follow-up instructions/to-do's for PCP  For routine cares    Unresulted Labs Ordered in the Past 30 Days of this Admission     Date and Time Order Name Status Description    2/16/2023  8:32 AM Blood Culture Peripheral Blood Preliminary     2/16/2023  8:32 AM Blood Culture Peripheral Blood Preliminary       These results will be followed up by PCP    Discharge Disposition   Discharged against medical advice    Condition at discharge: Stable      Hospital Course   Please refer to the H&P and Infectious disease consultations notes for the details of this presentations, in brief, Gustavo Faria is a 58 year old medically complex male (visiting area from [place]) admitted on 2/16/2023. He presents back to the ER with worsening right leg wound discomfort/drainage.       Right leg wounds, concern for infection/worsening cellulitis  Bacteremia hx - December 2022 Serratia bacteremia at OSH, tx with ceftriaxone  Lymphedema:  -  Wound consult  Team is followed during his stay  -  Blood and wound cultures are pending, no growth todate  -  Hx of bacteremia.  -  Infectious disease team Aidan araujo pr ID, was on Meropenem until he left   -  LE venous US  Negative for  DVT  -  Continue 20 mg Lasix IV  BID, for at least a couple doses (hold po lasix), resume spironolactone  - Ordered a right lower leg MRI per ID reccs     Acute on chronic systolic heart failure  fatigue/Dyspnea  Elevated Troponin:  Cardiology evaluated, likely Demand ischemia in setting possibly stable CAD  - TTE showed mild depressed EF, and Inferior and inferolateral WMA, thought to be chorionic per Cardiolog      - continued on guideline directed cardiac medication regimen    carvedilol 12.5mg BID. lisinopril 10mg daily, aspirin 81mg, spironolactone 100mg daily,and hydralazine 25mg TID.     - continued on furosemide 20mg BID  - continued on cardiac monitoring and   - outpatient stress testing      Prolonged QT  History of Encephalopathy/Agitation with hospital stay 1/23 in [place]:  Psych evaluated , thank you  -  decreased haloperidol to 2 mg BID. After a few days, could remove the morning dose.  -  Monitor for any changes.   - Discontinue the bedtime dose a few days later if he remains stable.   -  QTc not currently a concern when corrected for RBBB - closer to 473. Discussed with cardiology.      Pituitary macroadenoma; cushing's disease:  Patient has known history of pituitary macroadenoma, ACTH secreting lesion.   s/p 2 surgical procedures in May and July 2021 at [facility] with neurosurgical team - Dr. Lynn.  And planing to have another surgery in [place].    He is planned for neurosurgery appt at [facility] in [place] March 2023.      DM2, exacerbated by Cushing's:  -  Glucoses quite uncontrolled, in lower 300s   likely  Exacerbating by his wound/infectious issues.   - continue Lantus Increase from  15 units to 20 units + sliding scale insulin     Held metformin during the stay.  Of note his sitagliptin he had forgotten when he visited [place] and insurance wouldn't cover an early refill so he hasn't had for several days.  He also reports even when on it glu frequently 300.         Hypokalemia: Resolved  -  Resume PTA scheduled, add RN replacement protocol  -  Resume spironolactone     Hypertension, exacerbated by Cushing's/Pit issues:  -  Resume PTA BP meds.  He has not yet had today so will give now.  Hold hydrochlorothiazide for moment while dosing more lasix IV.     Hypothyroidism:  -  Levothyroxine     Marijuana use:  -  Uses daily medical marijuana (smokes it).  While he I explained he cannot smoke in his room.    Consultations This Hospital Stay   PHARMACY TO DOSE VANCO  CARE MANAGEMENT / SOCIAL WORK IP CONSULT  CARDIOLOGY IP CONSULT  WOUND OSTOMY CONTINENCE NURSE  IP CONSULT  SURGERY GENERAL IP CONSULT  VASCULAR SURGERY IP CONSULT  PSYCHIATRY IP CONSULT  INFECTIOUS DISEASES IP CONSULT    Code Status   Full Code    Time Spent on this Encounter   I, Max Stein MD, personally saw the patient today and spent greater than 30 minutes discharging this patient.       Max Stein MD  [facility] ORTHOPEDICS [facility]INE  [address] [address]  Phone: [phone]  Fax: [phone]  ______________________________________________________________________    Physical Exam   Vital Signs: Temp: 97.7  F (36.5  C) Temp src: Oral BP: (!) 132/93 Pulse: 81   Resp: 16 SpO2: 100 % O2 Device: None (Room air)    Weight: 171 lbs 1.23 oz  Please refer to the  Exam from the same day.       Primary Care Physician   Physician No Ref-Primary    Discharge Orders      Follow-Up with Cardiology JUSTINE      Reason for your hospital stay    Right lower leg     Follow-up and recommended labs and tests     Follow up with primary care provider, Physician No Ref-Primary, within 7 days for hospital follow- up.  No follow up labs or test are needed.     Activity    Your activity upon discharge: activity as tolerated     Diet    Follow this diet upon discharge: Orders Placed This Encounter      Snacks/Supplements Adult: Ensure Enlive; Between Meals       Snacks/Supplements Adult: Glucerna; Between Meals      Snacks/Supplements Adult: Raul; With Meals      Combination Diet Moderate Consistent Carb (60 g CHO per Meal) Diet; Low Saturated Fat Na <2400mg Diet     NM Lexiscan stress test (nuc card)       Significant Results and Procedures   Most Recent 3 CBC's:Recent Labs   Lab Test 02/18/23  1108 02/17/23  1251 02/16/23  0854   WBC 15.6* 17.4* 15.3*   HGB 8.9* 9.2* 9.2*   MCV 97 97 98    360 449       Discharge Medications   Current Discharge Medication List      START taking these medications    Details   acetaminophen (TYLENOL) 325 MG tablet Take 2 tablets (650 mg) by mouth every 6 hours as needed for mild pain or other (and adjunct with moderate or severe pain or per patient request)  Qty: 30 tablet, Refills: 0    Associated Diagnoses: Cellulitis of right lower leg      aspirin (ASA) 81 MG EC tablet Take 1 tablet (81 mg) by mouth daily  Qty: 30 tablet, Refills: 1    Associated Diagnoses: Cellulitis of right lower leg         CONTINUE these medications which have NOT CHANGED    Details   carvedilol (COREG) 12.5 MG tablet Take 12.5 mg by mouth 2 times daily (with meals)      cefdinir (OMNICEF) 300 MG capsule Take 1 capsule (300 mg) by mouth 2 times daily for 14 days  Qty: 28 capsule, Refills: 0      furosemide (LASIX) 20 MG tablet Take 1 tablet (20 mg) by mouth daily for 7 days  Qty: 7 tablet, Refills: 0      haloperidol (HALDOL) 2 MG tablet Take 2 mg by mouth 3 times daily      hydrALAZINE (APRESOLINE) 25 MG tablet Take 25 mg by mouth 3 times daily      levothyroxine (SYNTHROID/LEVOTHROID) 100 MCG tablet Take 1 tablet (100 mcg) by mouth daily  Qty: 30 tablet, Refills: 0      lisinopril-hydrochlorothiazide (ZESTORETIC) 10-12.5 MG tablet Take 1 tablet by mouth daily      metFORMIN (GLUCOPHAGE) 500 MG tablet Take 500 mg by mouth 2 times daily (with meals)      oxyCODONE (ROXICODONE) 5 MG tablet Take 1 tablet (5 mg) by mouth every 6 hours as needed for pain  Qty:  12 tablet, Refills: 0      POTASSIUM CHLORIDE PO Take 20 mEq by mouth daily      sitagliptin (JANUVIA) 50 MG tablet Take 2 tablets (100 mg) by mouth daily for 30 days  Qty: 60 tablet, Refills: 0      spironolactone (ALDACTONE) 100 MG tablet Take 1 tablet (100 mg) by mouth daily for 30 days  Qty: 30 tablet, Refills: 0      testosterone (ANDROGEL) 20.25 MG/1.25GM (1.62%) topical gel Place 20.25 mg onto the skin daily           Allergies   No Known Allergies

## 2023-02-18 NOTE — PROVIDER NOTIFICATION
Patient wanting to get Discharge as soon as possible. If he does not get discharge, he is going to leave for AMA. He said he had emergency and needs to go back to home ([place]).   MD notified via page     I have called Dr. Stein through his cell phone and he stated that patient can be discharge w/ AMA.

## 2023-02-18 NOTE — PROGRESS NOTES
A/O x4. VSS. On RA. Tele- SR with PVC. PIV SL. R leg wound dressing changed. Urinal @ bedside with good output. Pain managed with prn Oxy x1.  and 311, covered with sliding scale and carb count insulin. Called MRI several time, but they are too busy to take pt. Pt is aware. Paged hospitalist to put an order for pt Testosterone per pharmcist, awaiting order.

## 2023-02-18 NOTE — PROVIDER NOTIFICATION
MD Notification    Notified Person: MD    Notified Person Name: Sarita Chairez     Notification Date/Time: 2/18/23 @ 1018    Notification Interaction: [employer] messaging    Purpose of Notification: Pharmacist requesting an order for Testosterone topical gel  Orders Received:Awaiting order.     Comments:

## 2023-02-18 NOTE — PROVIDER NOTIFICATION
"Paged on call MD: [id number] R.F  Can patient get a stronger narcotic? pain \"14/10\"  Thank you  Jane CH *[phone]  "

## 2023-02-19 NOTE — PROGRESS NOTES
Patient was discharge around 1630. I have read to the patient loudly the policy regarding his request to leave against medical assistance.  Patient signed AMA with the witness of my colleague and his sister in the room.

## 2023-02-20 LAB
ATRIAL RATE - MUSE: 73 BPM
DIASTOLIC BLOOD PRESSURE - MUSE: NORMAL MMHG
INTERPRETATION ECG - MUSE: NORMAL
P AXIS - MUSE: 50 DEGREES
PR INTERVAL - MUSE: 126 MS
QRS DURATION - MUSE: 144 MS
QT - MUSE: 470 MS
QTC - MUSE: 517 MS
R AXIS - MUSE: -27 DEGREES
SYSTOLIC BLOOD PRESSURE - MUSE: NORMAL MMHG
T AXIS - MUSE: 16 DEGREES
VENTRICULAR RATE- MUSE: 73 BPM

## 2023-02-21 ENCOUNTER — PATIENT OUTREACH (OUTPATIENT)
Dept: CARE COORDINATION | Facility: CLINIC | Age: 58
End: 2023-02-21
Payer: COMMERCIAL

## 2023-02-21 ENCOUNTER — TELEPHONE (OUTPATIENT)
Dept: CARDIOLOGY | Facility: CLINIC | Age: 58
End: 2023-02-21
Payer: COMMERCIAL

## 2023-02-21 LAB
BACTERIA BLD CULT: NO GROWTH
BACTERIA BLD CULT: NO GROWTH

## 2023-02-21 NOTE — PROGRESS NOTES
[facility]    Background: Transitional Care Management program identified per system criteria and reviewed by [facility] team for possible outreach.    Assessment: Upon chart review, [facility] Team member will not proceed with patient outreach related to this episode of Transitional Care Management program due to reason below:    Patient has active communication with a nurse, provider or care team for reason of post-hospital follow up plan.  Outreach call by [facility] team not indicated to minimize duplicative efforts.      [facility] made 1st attempt on 2/20/2023 to reach patient for Hospital Follow-up and left message at that time.      Telephone message from Cardiology today for Post Hospital- Post Discharge Phone Call in system.  RN will close do avoid duplication.     Plan: Transitional Care Management episode addressed appropriately per reason noted above.      Aura Reese RN  [facility] Resource [place], [facility]    *[facility] Resource Team does NOT follow patient ongoing. Referrals are identified based on internal discharge reports and the outreach is to ensure patient has an understanding of their discharge instructions.

## 2023-02-21 NOTE — TELEPHONE ENCOUNTER
Patient was admitted to [facility] on 2/16/23 with worsening right leg wounds. Elevated troponin, HTN-likely secondary to demand ischemia in setting of uncontrolled hypertension.    PMH: uncontrolled hypertension, PAC's/PVC's, chronic leg wounds, HLD, DM2, lymphedema, pituitary tumor with history of Cushing Syndrome, marijuana use AMELIA - non compliant with home CPAP.    Echo showed EF of 45%. Mild to moderate regurgitation of the aortic and mitral valves.    A Lexiscan infusion nuclear stress test was ordered for today but could not be done because the patient had caffeine.    Pt was started on ASA at time of discharge.    Pt is scheduled for a NM Lexiscan on 2/27/23 at 0845 in [place]. Cardiology JUSTINE OV needs to be scheduled.    Writer attempted to call pt for a cardiology post discharge phone call, but his phone was answered by his niece, whom states pt is currently admitted to [facility] in [place]. Will close this encounter. FANTA Macedo RN.

## 2023-03-21 ENCOUNTER — TRANSFERRED RECORDS (OUTPATIENT)
Dept: HEALTH INFORMATION MANAGEMENT | Facility: CLINIC | Age: 58
End: 2023-03-21

## 2023-03-27 NOTE — CARE PLAN
Transfer Type: [facility]  Transfer Triage Note    Date of call: 03/27/23  Time of call: 8:34 AM    Current Patient Location:  [facility]   Current Level of Care: Med Surg    Vitals: BP:159/116 HR: 49-66 O2 Sats: 95% on RA  T - 99  Diagnosis: Pituitary adenoma  Is COVID-19 a concern? No  Reason for requested transfer: Further diagnostic work up, management, and consultation for specialized care   Isolation Needs: None    Outside Records: Available  Additional records may be faxed to [phone].     Transfer accepted: Accepted    Recommendations for Management and Stabilization: Feedback was provided to improve patient care, however transfer was not felt to be warranted at this time.    Additional Comments:     57 yo M recent Serratia bacteremia, HFrEF, prolonged QT, known ACTH secreting pituitary adenoma with resulting cushings disease s/p 2 resections in 2021 in [place] with plans for repeat operation in March 2023 who is requesting transfer to [facility]   Dr. Salazar from [facility] is requesting transfer due to new onset double vision and worsening headache. The patient had been admitted on March 21st. He has known wounds with spontaneous draining and was being treated for a Serratia infection under direction of ID, orthpaedics, and wound care team. He had a repeat CT demonstrating worsening pituitary macroadenoma [facility] for specialized adenoma care with opthmaology, neurosurgery. Patient was unable to tolerate an MRI and was recommended for MRi under general anesthesia and surgical evaluation. Dr. Kenyon from neurosurgery was paged from [facility] and recommended that patient undergo MRI under general anesthesia as well as further discussion with [facility]surgery and [facility] neurosurgery team including Dr. Jeong    [facility] discussed case with Dr. Jeong and agreed with transfer for evaluation by neurosurgery and neuroopthamology. We discussed  patient may not need surgical treatment and could [facility] for [facility] issues if it was determined he did not need intervention.    Plan to accepted to med-surg medicine service with neurosurgery and neuroopthamology consultation. If patient is not requiring urgent surgical treatment then plan to return to the [facility] for continued medical treatment of wounds, bacteremia, and diabetes.     Update from [facility] at 1430 - CN VI deficit and palsy noted on exam by neurology and neurosurgery which is new from admission.  ACTH level drawn and is 409 and was previously in low 200s after previous resection.       Talon Olson MD

## 2023-03-30 ENCOUNTER — TRANSCRIBE ORDERS (OUTPATIENT)
Dept: OTHER | Age: 58
End: 2023-03-30

## 2023-03-30 DIAGNOSIS — D44.3 NEOPLASM OF UNCERTAIN BEHAVIOR OF PITUITARY GLAND (H): Primary | ICD-10-CM

## 2023-03-30 DIAGNOSIS — D35.2 PITUITARY ADENOMA (H): Primary | ICD-10-CM

## 2023-03-30 DIAGNOSIS — D35.2 BENIGN NEOPLASM OF PITUITARY GLAND (H): Primary | ICD-10-CM

## 2023-03-31 ENCOUNTER — PATIENT OUTREACH (OUTPATIENT)
Dept: ONCOLOGY | Facility: CLINIC | Age: 58
End: 2023-03-31
Payer: COMMERCIAL

## 2023-03-31 ENCOUNTER — PRE VISIT (OUTPATIENT)
Dept: RADIATION ONCOLOGY | Facility: CLINIC | Age: 58
End: 2023-03-31
Payer: COMMERCIAL

## 2023-03-31 NOTE — PROGRESS NOTES
New Patient Radiation Oncology Nurse Navigator Note     Referring provider:   [facility]    Referred to (specialty): Radiation Oncology    Date Referral Received:   3/31/23     Evaluation for :   Radiation oncology and multidisciplinary care coordination at the [facility] (potential gamma knife or SRS brain     Recurrent pituitary adenoma (secretory, Cushing's) s/p trassphenoidal debulking 2 2 with bulky residual disease      Clinical History (per Nurse review of records provided):    History of ACTH-secreting macroadenoma s/p transphenoidal surgery at [facility] with Dr. Lynn in May 2021 and July 2021 with central hypothyroidism, steroid-induced hyperglycemia, and central hypogonadism.    [facility] with [facility] cellulitis and soft tissue infection.  Neurosurgery consult due to retro-orbital headaches and diplopia.  CTA head 3/26/23, MRI refused due to claustrophobia.       Transfer to [facility] decided for further eval and multidisciplinary input for neurosurgery, endocrinology, ophthalmology and radiation oncology.    It appears per chart note (care plan) on 3/27, transfer was not warranted at that time.    Order placed by Dr. Cole on 3/30, to discuss case at neuro tumor board.  In basket message sent to Hyacinth Menjivar, Dr. Cole, and Dr. Ashraf's team as patient not on list at this time.        Records Location (Care Everywhere, Media, etc.):   [facility]     Planned discussion for neuro tumor board on Monday, 4/3/23.

## 2023-03-31 NOTE — TELEPHONE ENCOUNTER
RECORDS STATUS - ALL OTHER DIAGNOSIS      Action    Action Taken 3/31/23  [facility] from [facility] received, sent to HIM - emailed to Isabell OLIVAS     Spoke w/ [facility]  Medical Records, they advised that operative/bx reports were for 2021 & 2021. I advised we needed these ASAP. Per them, they require a faxed request w/ cover sheet. I asked how long typical turn around time was, and was advised typical turn around time can take up to 5 business days. I advised we needed these records prior to Tumor Conferences on 4/3 & that the most important information at this exact moment would be the pathology reports & operative reports. [facility] MR advised that I at the moment send a fax specifically for Bx/Operative reports as that was a lower page count than including imaging reports, ED/Admissions as well.  MR then advised if other records were needed to request on Monday to ensure the most vital information stood the best chance of being sent out today, but advised no guarantees that it would. Faxed request for records.     Spoke w/ [facility] Radiology - they advised they could fax imaging report as long as this was stated on request for imaging disc.    [facility] (Radiology) [employer]    Spoke w/ [facility] - no imaging for pt, just outside reads.    Spoke w/ [facility] Medical Records - provided me w/ fax: [phone] to request imaging disc.   [facility] [employer]    Imaging from [facility] previously resolved to PACS  1:14 PM       RECORDS RECEIVED FROM: Marc PEARSON, [facility]   DATE RECEIVED:    NOTES STATUS DETAILS   OFFICE NOTE from referring provider [facility] - Received 3/31    OFFICE NOTE from medical oncologist     DISCHARGE SUMMARY from hospital     DISCHARGE REPORT from the ER     OPERATIVE REPORT     MEDICATION LIST     CLINICAL TRIAL TREATMENTS TO DATE     LABS     PATHOLOGY REPORTS     ANYTHING  RELATED TO DIAGNOSIS Epic/CE 2/28/23   GENONOMIC TESTING     TYPE:     IMAGING (NEED IMAGES & REPORT)     CT SCANS     MRI     MAMMO     ULTRASOUND     PET

## 2023-04-03 ENCOUNTER — MEDICAL CORRESPONDENCE (OUTPATIENT)
Dept: HEALTH INFORMATION MANAGEMENT | Facility: CLINIC | Age: 58
End: 2023-04-03

## 2023-04-03 ENCOUNTER — HOSPITAL ENCOUNTER (INPATIENT)
Facility: CLINIC | Age: 58
LOS: 46 days | Discharge: ANOTHER HOSPITAL | DRG: 614 | End: 2023-05-19
Attending: STUDENT IN AN ORGANIZED HEALTH CARE EDUCATION/TRAINING PROGRAM | Admitting: INTERNAL MEDICINE
Payer: COMMERCIAL

## 2023-04-03 ENCOUNTER — TUMOR CONFERENCE (OUTPATIENT)
Dept: ONCOLOGY | Facility: CLINIC | Age: 58
End: 2023-04-03
Attending: RADIOLOGY
Payer: COMMERCIAL

## 2023-04-03 DIAGNOSIS — Z79.4 TYPE 2 DIABETES MELLITUS WITH OTHER SPECIFIED COMPLICATION, WITH LONG-TERM CURRENT USE OF INSULIN (H): ICD-10-CM

## 2023-04-03 DIAGNOSIS — I50.9 ACUTE ON CHRONIC CONGESTIVE HEART FAILURE, UNSPECIFIED HEART FAILURE TYPE (H): ICD-10-CM

## 2023-04-03 DIAGNOSIS — E11.69 TYPE 2 DIABETES MELLITUS WITH OTHER SPECIFIED COMPLICATION, WITH LONG-TERM CURRENT USE OF INSULIN (H): ICD-10-CM

## 2023-04-03 DIAGNOSIS — D35.2 PITUITARY ADENOMA (H): Primary | ICD-10-CM

## 2023-04-03 DIAGNOSIS — L02.415 ABSCESS OF RIGHT LEG: ICD-10-CM

## 2023-04-03 DIAGNOSIS — G47.00 INSOMNIA, UNSPECIFIED TYPE: ICD-10-CM

## 2023-04-03 DIAGNOSIS — I10 BENIGN ESSENTIAL HYPERTENSION: ICD-10-CM

## 2023-04-03 DIAGNOSIS — H02.401: ICD-10-CM

## 2023-04-03 DIAGNOSIS — Z79.4 TYPE 2 DIABETES MELLITUS WITH OTHER SKIN ULCER, WITH LONG-TERM CURRENT USE OF INSULIN (H): ICD-10-CM

## 2023-04-03 DIAGNOSIS — K02.9 DENTAL CARIES: ICD-10-CM

## 2023-04-03 DIAGNOSIS — D35.2 PITUITARY ADENOMA (H): ICD-10-CM

## 2023-04-03 DIAGNOSIS — K59.00 CONSTIPATION, UNSPECIFIED CONSTIPATION TYPE: ICD-10-CM

## 2023-04-03 DIAGNOSIS — H59.89: ICD-10-CM

## 2023-04-03 DIAGNOSIS — E11.622 TYPE 2 DIABETES MELLITUS WITH OTHER SKIN ULCER, WITH LONG-TERM CURRENT USE OF INSULIN (H): ICD-10-CM

## 2023-04-03 LAB — GLUCOSE BLDC GLUCOMTR-MCNC: 102 MG/DL (ref 70–99)

## 2023-04-03 PROCEDURE — 250N000013 HC RX MED GY IP 250 OP 250 PS 637

## 2023-04-03 PROCEDURE — 250N000012 HC RX MED GY IP 250 OP 636 PS 637

## 2023-04-03 PROCEDURE — 99223 1ST HOSP IP/OBS HIGH 75: CPT | Mod: GC | Performed by: INTERNAL MEDICINE

## 2023-04-03 PROCEDURE — 120N000002 HC R&B MED SURG/OB UMMC

## 2023-04-03 RX ORDER — LISINOPRIL/HYDROCHLOROTHIAZIDE 10-12.5 MG
1 TABLET ORAL DAILY
Status: DISCONTINUED | OUTPATIENT
Start: 2023-04-04 | End: 2023-04-03

## 2023-04-03 RX ORDER — CEFTAZIDIME 2 G/1
2 INJECTION, POWDER, FOR SOLUTION INTRAVENOUS EVERY 8 HOURS
Status: DISCONTINUED | OUTPATIENT
Start: 2023-04-03 | End: 2023-04-04

## 2023-04-03 RX ORDER — NALOXONE HYDROCHLORIDE 0.4 MG/ML
0.2 INJECTION, SOLUTION INTRAMUSCULAR; INTRAVENOUS; SUBCUTANEOUS
Status: DISCONTINUED | OUTPATIENT
Start: 2023-04-03 | End: 2023-04-27

## 2023-04-03 RX ORDER — OXYCODONE HYDROCHLORIDE 5 MG/1
5 TABLET ORAL EVERY 4 HOURS PRN
Status: DISCONTINUED | OUTPATIENT
Start: 2023-04-03 | End: 2023-04-10

## 2023-04-03 RX ORDER — LEVOTHYROXINE SODIUM 100 UG/1
100 TABLET ORAL DAILY
Status: DISCONTINUED | OUTPATIENT
Start: 2023-04-04 | End: 2023-04-03

## 2023-04-03 RX ORDER — NICOTINE POLACRILEX 4 MG
15-30 LOZENGE BUCCAL
Status: DISCONTINUED | OUTPATIENT
Start: 2023-04-03 | End: 2023-04-28

## 2023-04-03 RX ORDER — TESTOSTERONE 20.25 MG/1.25G
20.25 GEL TOPICAL DAILY
Status: DISCONTINUED | OUTPATIENT
Start: 2023-04-04 | End: 2023-04-05

## 2023-04-03 RX ORDER — LIDOCAINE 40 MG/G
CREAM TOPICAL
Status: DISCONTINUED | OUTPATIENT
Start: 2023-04-03 | End: 2023-04-06

## 2023-04-03 RX ORDER — DEXTROSE MONOHYDRATE 25 G/50ML
25-50 INJECTION, SOLUTION INTRAVENOUS
Status: DISCONTINUED | OUTPATIENT
Start: 2023-04-03 | End: 2023-04-28

## 2023-04-03 RX ORDER — HYDRALAZINE HYDROCHLORIDE 25 MG/1
25 TABLET, FILM COATED ORAL 3 TIMES DAILY
Status: DISCONTINUED | OUTPATIENT
Start: 2023-04-04 | End: 2023-04-03

## 2023-04-03 RX ORDER — ACETAMINOPHEN 325 MG/1
650 TABLET ORAL EVERY 4 HOURS PRN
Status: DISCONTINUED | OUTPATIENT
Start: 2023-04-03 | End: 2023-04-27

## 2023-04-03 RX ORDER — CALCIUM CARBONATE 500 MG/1
500 TABLET, CHEWABLE ORAL DAILY PRN
Status: DISCONTINUED | OUTPATIENT
Start: 2023-04-03 | End: 2023-04-27

## 2023-04-03 RX ORDER — HALOPERIDOL 2 MG/1
2 TABLET ORAL 3 TIMES DAILY
Status: DISCONTINUED | OUTPATIENT
Start: 2023-04-04 | End: 2023-04-03

## 2023-04-03 RX ORDER — DEXAMETHASONE 1 MG
2 TABLET ORAL 2 TIMES DAILY
Status: DISCONTINUED | OUTPATIENT
Start: 2023-04-03 | End: 2023-04-05

## 2023-04-03 RX ORDER — POLYETHYLENE GLYCOL 3350 17 G/17G
17 POWDER, FOR SOLUTION ORAL DAILY
Status: DISCONTINUED | OUTPATIENT
Start: 2023-04-04 | End: 2023-04-27

## 2023-04-03 RX ORDER — CARVEDILOL 12.5 MG/1
12.5 TABLET ORAL 2 TIMES DAILY WITH MEALS
Status: DISCONTINUED | OUTPATIENT
Start: 2023-04-04 | End: 2023-04-27

## 2023-04-03 RX ORDER — LACTULOSE 10 G/15ML
20 SOLUTION ORAL DAILY PRN
Status: DISCONTINUED | OUTPATIENT
Start: 2023-04-03 | End: 2023-05-19 | Stop reason: HOSPADM

## 2023-04-03 RX ORDER — POLYETHYLENE GLYCOL 3350 17 G/17G
17 POWDER, FOR SOLUTION ORAL DAILY PRN
Status: DISCONTINUED | OUTPATIENT
Start: 2023-04-03 | End: 2023-04-03

## 2023-04-03 RX ORDER — LISINOPRIL 10 MG/1
10 TABLET ORAL DAILY
Status: DISCONTINUED | OUTPATIENT
Start: 2023-04-04 | End: 2023-04-11

## 2023-04-03 RX ORDER — IBUPROFEN 200 MG
400 TABLET ORAL EVERY 6 HOURS PRN
Status: DISCONTINUED | OUTPATIENT
Start: 2023-04-03 | End: 2023-04-04

## 2023-04-03 RX ORDER — ONDANSETRON 4 MG/1
4 TABLET, ORALLY DISINTEGRATING ORAL EVERY 6 HOURS PRN
Status: DISCONTINUED | OUTPATIENT
Start: 2023-04-03 | End: 2023-04-03

## 2023-04-03 RX ORDER — HYDROMORPHONE HYDROCHLORIDE 1 MG/ML
0.5 INJECTION, SOLUTION INTRAMUSCULAR; INTRAVENOUS; SUBCUTANEOUS DAILY PRN
Status: DISCONTINUED | OUTPATIENT
Start: 2023-04-03 | End: 2023-04-27

## 2023-04-03 RX ORDER — NALOXONE HYDROCHLORIDE 0.4 MG/ML
0.4 INJECTION, SOLUTION INTRAMUSCULAR; INTRAVENOUS; SUBCUTANEOUS
Status: DISCONTINUED | OUTPATIENT
Start: 2023-04-03 | End: 2023-04-27

## 2023-04-03 RX ORDER — ASPIRIN 81 MG/1
81 TABLET ORAL DAILY
Status: DISCONTINUED | OUTPATIENT
Start: 2023-04-04 | End: 2023-05-19 | Stop reason: HOSPADM

## 2023-04-03 RX ADMIN — DEXAMETHASONE 2 MG: 1 TABLET ORAL at 23:29

## 2023-04-03 RX ADMIN — OXYCODONE HYDROCHLORIDE 5 MG: 5 TABLET ORAL at 23:58

## 2023-04-03 RX ADMIN — ACETAMINOPHEN 650 MG: 325 TABLET, FILM COATED ORAL at 23:01

## 2023-04-03 ASSESSMENT — ACTIVITIES OF DAILY LIVING (ADL)
ADLS_ACUITY_SCORE: 49
ADLS_ACUITY_SCORE: 35

## 2023-04-03 NOTE — LETTER
Recipient: [facility]          Sender: Abrahan Anders [phone]              Date: April 14, 2023  Patient Name:  Gustavo Faria      The documents accompanying this notice contain confidential information belonging to the sender.  This information is intended only for the use of the individual or entity named above.  The authorized recipient of this information is prohibited from disclosing this information to any other party and is required to destroy the information after its stated need has been fulfilled, unless otherwise required by state law.    If you are not the intended recipient, you are hereby notified that any disclosure, copy, distribution or action taken in reliance on the contents of these documents is strictly prohibited.  If you have received this document in error, please return it by fax to [phone] with a note on the cover sheet explaining why you are returning it (e.g. not your patient, not your provider, etc.).  Documents may also be returned by mail to Health Information Management, , [facility] [address], [place], [place] [address].

## 2023-04-03 NOTE — LETTER
Recipient: [facility]          Sender: Abrahan Anders [phone]  Please call with any questions or when finished reviewing the referral. Thank you!              Date: April 17, 2023  Patient Name:  Gustavo Faria      The documents accompanying this notice contain confidential information belonging to the sender.  This information is intended only for the use of the individual or entity named above.  The authorized recipient of this information is prohibited from disclosing this information to any other party and is required to destroy the information after its stated need has been fulfilled, unless otherwise required by state law.    If you are not the intended recipient, you are hereby notified that any disclosure, copy, distribution or action taken in reliance on the contents of these documents is strictly prohibited.  If you have received this document in error, please return it by fax to [phone] with a note on the cover sheet explaining why you are returning it (e.g. not your patient, not your provider, etc.).  Documents may also be returned by mail to Health Information Management, , [address], [place], [place] [address].

## 2023-04-03 NOTE — TELEPHONE ENCOUNTER
Records received    April 3, 2023 10:55 AM  [facility]   Facility  [facility] Radiology    Outcome Received 4 imaging discs, will upload to PACS this afternoon

## 2023-04-03 NOTE — LETTER
[facility] [address] [address]  [phone]    FACSIMILE TRANSMITTAL SHEET    TO: Yee  FAX NUMBER: [phone]  PHONE NUMBER:[phone]     FROM: Gage  PHONE: [phone]  DATE: 05/18/23    IF YOU DID NOT RECEIVE THE CORRECT NUMBER OF PAGES OR THE FAX DID NOT COME THROUGH CLEARLY, PLEASE CALL THE SENDER     CONFIDENTIALITY STATEMENT: Confidential information that may accompany this transmission contains protected health information under state and federal law and is legally privileged. This information is intended only for the use of the individual or entity named above and may be used only for carrying out treatment, payment or other healthcare operations. The recipient or person responsible for delivering this information is prohibited by law from disclosing this information without proper authorization to any other party, unless required to do so by law or regulation. If you are not the intended recipient, you are hereby notified that any review, dissemination, distribution, or copying of this message is strictly prohibited. If you have received this communication in error, please destroy the materials and contact us immediately by calling the number listed above. No response indicates that the information was received by the appropriate authorized party

## 2023-04-03 NOTE — LETTER
Recipient: Britta Valle          Sender: Abrahan Anders [phone]  Please call with any questions or when finished reviewing the referral. Thank you!              Date: April 17, 2023  Patient Name:  Gustavo Faria      The documents accompanying this notice contain confidential information belonging to the sender.  This information is intended only for the use of the individual or entity named above.  The authorized recipient of this information is prohibited from disclosing this information to any other party and is required to destroy the information after its stated need has been fulfilled, unless otherwise required by state law.    If you are not the intended recipient, you are hereby notified that any disclosure, copy, distribution or action taken in reliance on the contents of these documents is strictly prohibited.  If you have received this document in error, please return it by fax to [phone] with a note on the cover sheet explaining why you are returning it (e.g. not your patient, not your provider, etc.).  Documents may also be returned by mail to [employer], , [facility] [address], [place], [place] [address].

## 2023-04-04 ENCOUNTER — APPOINTMENT (OUTPATIENT)
Dept: GENERAL RADIOLOGY | Facility: CLINIC | Age: 58
DRG: 614 | End: 2023-04-04
Attending: STUDENT IN AN ORGANIZED HEALTH CARE EDUCATION/TRAINING PROGRAM
Payer: COMMERCIAL

## 2023-04-04 ENCOUNTER — APPOINTMENT (OUTPATIENT)
Dept: NEUROLOGY | Facility: CLINIC | Age: 58
DRG: 614 | End: 2023-04-04
Attending: STUDENT IN AN ORGANIZED HEALTH CARE EDUCATION/TRAINING PROGRAM
Payer: COMMERCIAL

## 2023-04-04 ENCOUNTER — APPOINTMENT (OUTPATIENT)
Dept: PHYSICAL THERAPY | Facility: CLINIC | Age: 58
DRG: 614 | End: 2023-04-04
Attending: STUDENT IN AN ORGANIZED HEALTH CARE EDUCATION/TRAINING PROGRAM
Payer: COMMERCIAL

## 2023-04-04 LAB
ALBUMIN SERPL BCG-MCNC: 3.1 G/DL (ref 3.5–5.2)
ALBUMIN UR-MCNC: 50 MG/DL
ALP SERPL-CCNC: 345 U/L (ref 40–129)
ALT SERPL W P-5'-P-CCNC: 89 U/L (ref 10–50)
ANION GAP SERPL CALCULATED.3IONS-SCNC: 14 MMOL/L (ref 7–15)
APPEARANCE UR: ABNORMAL
AST SERPL W P-5'-P-CCNC: 64 U/L (ref 10–50)
BASOPHILS # BLD AUTO: 0 10E3/UL (ref 0–0.2)
BASOPHILS NFR BLD AUTO: 0 %
BILIRUB DIRECT SERPL-MCNC: <0.2 MG/DL (ref 0–0.3)
BILIRUB SERPL-MCNC: 0.4 MG/DL
BILIRUB UR QL STRIP: NEGATIVE
BUN SERPL-MCNC: 15.7 MG/DL (ref 6–20)
CALCIUM SERPL-MCNC: 9.1 MG/DL (ref 8.6–10)
CHLORIDE SERPL-SCNC: 110 MMOL/L (ref 98–107)
COLOR UR AUTO: ABNORMAL
CREAT SERPL-MCNC: 0.83 MG/DL (ref 0.67–1.17)
CRP SERPL-MCNC: <3 MG/L
DEPRECATED HCO3 PLAS-SCNC: 19 MMOL/L (ref 22–29)
EOSINOPHIL # BLD AUTO: 0 10E3/UL (ref 0–0.7)
EOSINOPHIL NFR BLD AUTO: 0 %
ERYTHROCYTE [DISTWIDTH] IN BLOOD BY AUTOMATED COUNT: 22 % (ref 10–15)
GFR SERPL CREATININE-BSD FRML MDRD: >90 ML/MIN/1.73M2
GLUCOSE BLDC GLUCOMTR-MCNC: 100 MG/DL (ref 70–99)
GLUCOSE BLDC GLUCOMTR-MCNC: 109 MG/DL (ref 70–99)
GLUCOSE BLDC GLUCOMTR-MCNC: 112 MG/DL (ref 70–99)
GLUCOSE BLDC GLUCOMTR-MCNC: 115 MG/DL (ref 70–99)
GLUCOSE BLDC GLUCOMTR-MCNC: 128 MG/DL (ref 70–99)
GLUCOSE SERPL-MCNC: 105 MG/DL (ref 70–99)
GLUCOSE UR STRIP-MCNC: >=1000 MG/DL
HAV IGM SERPL QL IA: NONREACTIVE
HBV CORE IGM SERPL QL IA: NONREACTIVE
HBV SURFACE AG SERPL QL IA: NONREACTIVE
HCT VFR BLD AUTO: 30.1 % (ref 40–53)
HCV AB SERPL QL IA: NONREACTIVE
HGB BLD-MCNC: 8.9 G/DL (ref 13.3–17.7)
HGB UR QL STRIP: ABNORMAL
IMM GRANULOCYTES # BLD: 0.3 10E3/UL
IMM GRANULOCYTES NFR BLD: 2 %
INR PPP: 1.02 (ref 0.85–1.15)
KETONES UR STRIP-MCNC: ABNORMAL MG/DL
LACTATE SERPL-SCNC: 1.5 MMOL/L (ref 0.7–2)
LEUKOCYTE ESTERASE UR QL STRIP: NEGATIVE
LYMPHOCYTES # BLD AUTO: 0.4 10E3/UL (ref 0.8–5.3)
LYMPHOCYTES NFR BLD AUTO: 2 %
MCH RBC QN AUTO: 25.6 PG (ref 26.5–33)
MCHC RBC AUTO-ENTMCNC: 29.6 G/DL (ref 31.5–36.5)
MCV RBC AUTO: 87 FL (ref 78–100)
MONOCYTES # BLD AUTO: 0.2 10E3/UL (ref 0–1.3)
MONOCYTES NFR BLD AUTO: 1 %
MRSA DNA SPEC QL NAA+PROBE: NEGATIVE
MUCOUS THREADS #/AREA URNS LPF: PRESENT /LPF
NEUTROPHILS # BLD AUTO: 15.1 10E3/UL (ref 1.6–8.3)
NEUTROPHILS NFR BLD AUTO: 95 %
NITRATE UR QL: NEGATIVE
NRBC # BLD AUTO: 0.1 10E3/UL
NRBC BLD AUTO-RTO: 1 /100
PH UR STRIP: 5 [PH] (ref 5–7)
PLATELET # BLD AUTO: 314 10E3/UL (ref 150–450)
POTASSIUM SERPL-SCNC: 4.3 MMOL/L (ref 3.4–5.3)
PROT SERPL-MCNC: 5.1 G/DL (ref 6.4–8.3)
RBC # BLD AUTO: 3.48 10E6/UL (ref 4.4–5.9)
RBC URINE: 2 /HPF
SA TARGET DNA: NEGATIVE
SODIUM SERPL-SCNC: 143 MMOL/L (ref 136–145)
SP GR UR STRIP: 1.01 (ref 1–1.03)
SQUAMOUS EPITHELIAL: <1 /HPF
UROBILINOGEN UR STRIP-MCNC: NORMAL MG/DL
WBC # BLD AUTO: 16 10E3/UL (ref 4–11)
WBC URINE: 2 /HPF

## 2023-04-04 PROCEDURE — 87641 MR-STAPH DNA AMP PROBE: CPT | Performed by: INTERNAL MEDICINE

## 2023-04-04 PROCEDURE — 87040 BLOOD CULTURE FOR BACTERIA: CPT

## 2023-04-04 PROCEDURE — 71045 X-RAY EXAM CHEST 1 VIEW: CPT | Mod: 26 | Performed by: RADIOLOGY

## 2023-04-04 PROCEDURE — 99207 PR SERVICE NOT STAFFED W/SUPERV PROV: CPT | Performed by: OPHTHALMOLOGY

## 2023-04-04 PROCEDURE — 95718 EEG PHYS/QHP 2-12 HR W/VEEG: CPT | Performed by: PSYCHIATRY & NEUROLOGY

## 2023-04-04 PROCEDURE — 250N000013 HC RX MED GY IP 250 OP 250 PS 637

## 2023-04-04 PROCEDURE — 86140 C-REACTIVE PROTEIN: CPT | Performed by: INTERNAL MEDICINE

## 2023-04-04 PROCEDURE — 80074 ACUTE HEPATITIS PANEL: CPT

## 2023-04-04 PROCEDURE — 36415 COLL VENOUS BLD VENIPUNCTURE: CPT

## 2023-04-04 PROCEDURE — 97530 THERAPEUTIC ACTIVITIES: CPT | Mod: GP

## 2023-04-04 PROCEDURE — 250N000011 HC RX IP 250 OP 636

## 2023-04-04 PROCEDURE — 99222 1ST HOSP IP/OBS MODERATE 55: CPT | Mod: GC | Performed by: NEUROLOGICAL SURGERY

## 2023-04-04 PROCEDURE — 250N000012 HC RX MED GY IP 250 OP 636 PS 637

## 2023-04-04 PROCEDURE — 83605 ASSAY OF LACTIC ACID: CPT

## 2023-04-04 PROCEDURE — 97163 PT EVAL HIGH COMPLEX 45 MIN: CPT | Mod: GP

## 2023-04-04 PROCEDURE — 82248 BILIRUBIN DIRECT: CPT

## 2023-04-04 PROCEDURE — 80053 COMPREHEN METABOLIC PANEL: CPT

## 2023-04-04 PROCEDURE — 250N000011 HC RX IP 250 OP 636: Performed by: INTERNAL MEDICINE

## 2023-04-04 PROCEDURE — 71045 X-RAY EXAM CHEST 1 VIEW: CPT

## 2023-04-04 PROCEDURE — 99233 SBSQ HOSP IP/OBS HIGH 50: CPT | Mod: GC | Performed by: INTERNAL MEDICINE

## 2023-04-04 PROCEDURE — 120N000002 HC R&B MED SURG/OB UMMC

## 2023-04-04 PROCEDURE — G0463 HOSPITAL OUTPT CLINIC VISIT: HCPCS

## 2023-04-04 PROCEDURE — 99222 1ST HOSP IP/OBS MODERATE 55: CPT | Mod: GC | Performed by: STUDENT IN AN ORGANIZED HEALTH CARE EDUCATION/TRAINING PROGRAM

## 2023-04-04 PROCEDURE — 95714 VEEG EA 12-26 HR UNMNTR: CPT

## 2023-04-04 PROCEDURE — 85610 PROTHROMBIN TIME: CPT

## 2023-04-04 PROCEDURE — 81001 URINALYSIS AUTO W/SCOPE: CPT

## 2023-04-04 PROCEDURE — 85025 COMPLETE CBC W/AUTO DIFF WBC: CPT

## 2023-04-04 PROCEDURE — 99207 EEG VIDEO 12-26 HR UNMONITORED: CPT | Performed by: PSYCHIATRY & NEUROLOGY

## 2023-04-04 RX ORDER — HALOPERIDOL 2 MG/1
2 TABLET ORAL
Status: DISCONTINUED | OUTPATIENT
Start: 2023-04-04 | End: 2023-04-10

## 2023-04-04 RX ORDER — SILDENAFIL CITRATE 20 MG/1
20 TABLET ORAL DAILY PRN
Status: ON HOLD | COMMUNITY
End: 2023-05-01

## 2023-04-04 RX ORDER — SPIRONOLACTONE 100 MG/1
100 TABLET, FILM COATED ORAL DAILY
Status: ON HOLD | COMMUNITY
End: 2023-05-01

## 2023-04-04 RX ORDER — MICONAZOLE NITRATE 20 MG/G
CREAM TOPICAL 2 TIMES DAILY
Status: DISCONTINUED | OUTPATIENT
Start: 2023-04-04 | End: 2023-05-13

## 2023-04-04 RX ORDER — VANCOMYCIN HYDROCHLORIDE 1 G/200ML
1000 INJECTION, SOLUTION INTRAVENOUS EVERY 12 HOURS
Status: DISCONTINUED | OUTPATIENT
Start: 2023-04-04 | End: 2023-04-05

## 2023-04-04 RX ORDER — TESTOSTERONE GEL, 1% 10 MG/G
25 GEL TRANSDERMAL DAILY
Status: DISCONTINUED | OUTPATIENT
Start: 2023-04-04 | End: 2023-04-04

## 2023-04-04 RX ORDER — LISINOPRIL 10 MG/1
10 TABLET ORAL DAILY
Status: ON HOLD | COMMUNITY
End: 2023-05-01

## 2023-04-04 RX ORDER — POTASSIUM CHLORIDE 1500 MG/1
20 TABLET, EXTENDED RELEASE ORAL DAILY
Status: ON HOLD | COMMUNITY
End: 2023-05-01

## 2023-04-04 RX ORDER — DEXAMETHASONE SODIUM PHOSPHATE 4 MG/ML
2 INJECTION, SOLUTION INTRA-ARTICULAR; INTRALESIONAL; INTRAMUSCULAR; INTRAVENOUS; SOFT TISSUE 2 TIMES DAILY
Status: DISCONTINUED | OUTPATIENT
Start: 2023-04-04 | End: 2023-04-06

## 2023-04-04 RX ORDER — MULTIPLE VITAMINS W/ MINERALS TAB 9MG-400MCG
1 TAB ORAL DAILY
Status: DISCONTINUED | OUTPATIENT
Start: 2023-04-04 | End: 2023-05-19 | Stop reason: HOSPADM

## 2023-04-04 RX ORDER — ERGOCALCIFEROL 1.25 MG/1
50000 CAPSULE, LIQUID FILLED ORAL
Status: ON HOLD | COMMUNITY
End: 2023-05-01

## 2023-04-04 RX ORDER — FUROSEMIDE 40 MG
40 TABLET ORAL DAILY
COMMUNITY

## 2023-04-04 RX ORDER — CEFTRIAXONE 2 G/1
2 INJECTION, POWDER, FOR SOLUTION INTRAMUSCULAR; INTRAVENOUS EVERY 24 HOURS
Status: DISCONTINUED | OUTPATIENT
Start: 2023-04-04 | End: 2023-04-07

## 2023-04-04 RX ORDER — SULFAMETHOXAZOLE/TRIMETHOPRIM 800-160 MG
1 TABLET ORAL 2 TIMES DAILY
Status: ON HOLD | COMMUNITY
Start: 2023-03-26 | End: 2023-05-01

## 2023-04-04 RX ADMIN — OXYCODONE HYDROCHLORIDE 5 MG: 5 TABLET ORAL at 08:41

## 2023-04-04 RX ADMIN — CEFTAZIDIME 2 G: 2 INJECTION, POWDER, FOR SOLUTION INTRAVENOUS at 08:26

## 2023-04-04 RX ADMIN — MICONAZOLE NITRATE: 20 CREAM TOPICAL at 21:56

## 2023-04-04 RX ADMIN — CARVEDILOL 12.5 MG: 12.5 TABLET, FILM COATED ORAL at 08:42

## 2023-04-04 RX ADMIN — LISINOPRIL 10 MG: 10 TABLET ORAL at 08:41

## 2023-04-04 RX ADMIN — SITAGLIPTIN 50 MG: 50 TABLET, FILM COATED ORAL at 08:43

## 2023-04-04 RX ADMIN — HYDROMORPHONE HYDROCHLORIDE 0.5 MG: 1 INJECTION, SOLUTION INTRAMUSCULAR; INTRAVENOUS; SUBCUTANEOUS at 10:45

## 2023-04-04 RX ADMIN — DEXAMETHASONE SODIUM PHOSPHATE 2 MG: 4 INJECTION, SOLUTION INTRA-ARTICULAR; INTRALESIONAL; INTRAMUSCULAR; INTRAVENOUS; SOFT TISSUE at 22:00

## 2023-04-04 RX ADMIN — MICONAZOLE NITRATE: 20 POWDER TOPICAL at 21:55

## 2023-04-04 RX ADMIN — ASPIRIN 81 MG: 81 TABLET ORAL at 08:42

## 2023-04-04 RX ADMIN — CEFTAZIDIME 2 G: 2 INJECTION, POWDER, FOR SOLUTION INTRAVENOUS at 00:00

## 2023-04-04 RX ADMIN — VANCOMYCIN HYDROCHLORIDE 1000 MG: 1 INJECTION, SOLUTION INTRAVENOUS at 12:23

## 2023-04-04 RX ADMIN — CEFTRIAXONE SODIUM 2 G: 2 INJECTION, POWDER, FOR SOLUTION INTRAMUSCULAR; INTRAVENOUS at 15:14

## 2023-04-04 RX ADMIN — DEXAMETHASONE 2 MG: 1 TABLET ORAL at 08:41

## 2023-04-04 RX ADMIN — LEVOTHYROXINE SODIUM 125 MCG: 25 TABLET ORAL at 08:31

## 2023-04-04 RX ADMIN — VANCOMYCIN HYDROCHLORIDE 1000 MG: 1 INJECTION, SOLUTION INTRAVENOUS at 22:04

## 2023-04-04 RX ADMIN — POLYETHYLENE GLYCOL 3350 17 G: 17 POWDER, FOR SOLUTION ORAL at 08:35

## 2023-04-04 ASSESSMENT — ACTIVITIES OF DAILY LIVING (ADL)
ADLS_ACUITY_SCORE: 47
ADLS_ACUITY_SCORE: 49
ADLS_ACUITY_SCORE: 47
ADLS_ACUITY_SCORE: 47
ADLS_ACUITY_SCORE: 49
ADLS_ACUITY_SCORE: 47
ADLS_ACUITY_SCORE: 47
ADLS_ACUITY_SCORE: 49
ADLS_ACUITY_SCORE: 47
ADLS_ACUITY_SCORE: 49

## 2023-04-04 NOTE — PROGRESS NOTES
[facility]  [facility] RN skin assessment completed. Yes with Trevor, RN  Significant findings include: Wound to top of L foot, RLE wounds- covered.  Breakdown/open areas in groin folds, julian area, and sacrum/coccyx area.   [facility] Nurse Consult Ordered? Team paged to order consult.  Low air loss mattress also ordered.

## 2023-04-04 NOTE — CONSULTS
"  [facility]  Neurology Consultation    Patient Name:  Gustavo Faria  MRN:  [id number]    :  1965  Date of Service:  2023  Primary care provider:  No Ref-Primary, Physician      Neurology consultation service was asked to see Gustavo Faria by Dr. Chris Burnett to evaluate Encephalopathy.    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 [facility] and LE wounds. On 3/25 he was noted to have new onset diplopia and severe headache. Code stroke was called and CT head showed expanding pituitary adenoma. MRI on 3/28 showed similar findings. [facility] and started on dexamethasone 2mg BID prior to transfer to [facility]. History obtained from sister and wife (seperated) as unable to get consistent history from patient. Sister reports pt was able to converse yesterday. He was tired and went to sleep, but was able to respond to her questions appropriately around 4-5pm on 4/3. Later that night he started having vocal repetition and was not responding to questions appropriately. Per nursing notes and our evaluation of patient, he intermittently responds to questions and commands. During encounter he continued to repeat \"Help me... I am hurting... That is yellow\", but did respond to questions intermittently. He c/o severe headache and auditory hallucinations, though was not able " "to elaborate. Denied visual hallucinations at this time.     Pt has had significant decline in cognitive and motor function in last 2 years. He previously lived in [place] for last 16 years. He moved to [place] after  from his wife and has been living with his sister for last 2 weeks. 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 He was able to walk around without assistance. Around November 2021 his vision started going in and out. He was evaluated at the time and found to be hypertensive per wife. Overtime he started having more difficulty with memory, started dropping objects and worsening motor function. Wife reports he occasionally will have episodes of paranoia and times when \"he acts like a 3 year old.\" During these episodes he plays with his clothes and is agitated. These episodes last 2-3 days, which are then followed by 2-3 days of clarity and normal mentation. He often does not remember what happened during the episodes. Denies trigger. His sister notes that he moved to [place] and was wheelchair bound and needed assistance with sitting up and changing clothes. He was able to eat food that was brought to him and use upper extremities. He was incontinent of bowel and bladder.    Pt has had multiple admissions since 12/2022. He was hospitalized from 12/9-12/28 and 1/1-1/4 for sepsis 2/2 RLE cellulitis. Most recently 1/8-1/12 for altered mental status thought to be d/t delirium. He was evaluated by psychiatry and started on haldol 2mg TID and discharged. Wife states he was at his baseline mentation for 3 days and then returned back to his delirious state. EEG done on 12/21/2022 showed mild continuous intermittent background slowing, but no findings to suggest seizures. Pt was told to follow up with neurosurgery at [facility] on 3/8/2023, but he did not go to the appointment. He told his sister that he wanted " "all his care in [place]. Past psychiatric history only notable for MDD and MATHEUS. Pt reported on visit to ED on 2/2 that he did not take his antibiotics after discharge from hospital on 1/12. On presentation to ED at [facility] on 2/2/2023 for acute on chronic low back pain. Pt had CT scan of lumbar spine that showed \"no evidence of acute osseous injury. Chronic appearing L2 burst fracture with less than 50% loss of vertebral height without any focal spinal canal stenosis noted. Patient does have mild spinal canal stenosis with moderate bilateral neuroforaminal stenosis at L4-L5 level. Moderate bilateral neuroforaminal stenosis at L5-S1 level as well.\"      ROS  A comprehensive ROS was performed and pertinent findings were included in HPI.     PMH  ACTH-secreting macroadenoma c/b central hypothyroidism, steroid-induced hyperglycemia, and central hypogonadism,  s/p transphenoidal surgery 5/2021 and 7/2021  ongoing serratia RLE cellulitis c/b recent serratia bacteremia  HFrEF  T2DM  HTN  L2 compression fracture: has not had surgical intervention    Medications   I have personally reviewed the patient's medication list.     Allergies  I have personally reviewed the patient's allergy list.     Social History  Unemployed, lives with sister, needs assistance at home     Family History    Mother - CVA, heart attack age 60      Physical Examination   Vitals: BP (!) 149/87 (BP Location: Right arm)   Pulse 77   Temp 98.1  F (36.7  C) (Axillary)   Resp 18   Wt 68.7 kg (151 lb 7.3 oz)   SpO2 100%   BMI 24.45 kg/m    General: Lying in bed, NAD  Head: NC/AT  Eyes: no icterus, op pink and moist  Cardiac: RRR. Extremities warm, no edema.   Respiratory: non-labored on RA  GI: S/NT/ND  Skin: No rash or lesion on exposed skin  Psych: Mood pleasant, affect congruent  Neuro:  Mental status: Intermittently responding to questions and following commands, A&Ox0, Repeating same phrases over and over. "   Cranial nerves:  PERRL, lateral gaze intact, did not follow other EOM commands, facial sensation intact, face symmetric, shoulder shrug -unable to assess, tongue midline  Motor: Normal bulk. No rigidity. Does not move extremities spontaneously. 5/5 strength bilaterally hand . Did not respond to other strength assessment/  Reflexes: Normo-reflexic and symmetric biceps, brachioradialis, triceps, patellae, and achilles. Negative Baltazar, no clonus, toes down-going.  Sensory: Intact light touch on b/l UE and LE.   Coordination: FNF, HS, and Rapid alternating movements unable to assess.   Gait: bed bound    Investigations   I have personally reviewed pertinent labs, tests, and radiological imaging. Discussion of notable findings is included under Impression.     Was patient transferred from outside hospital?   Yes - I have personally reviewed pertinent notes, labs, and images (if available) from outside hospital.    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 Neurology was consulted to evaluate encephalopathy. Differential includes acute metabolic encephalopathy, seizures, delirium, enlarging tumor, endocrine, or worsening of baseline psychiatric disorder. History, exam and imaging inconsistent with acute stroke. Pt with ongoing waxing and waning mentation that has been worsening since 12/2022 in setting of sepsis 2/2 RLE cellulitis. Findings most consistent with acute metabolic encephalopathy 2/2 infection, multiple endocrine abnormalities, delirium, and enlarging pituitary  adenoma. Will order overnight EEG to rule out seizures though less likely. Recommend Neurosurgery and endocrinology evaluation. Agree with ongoing treatment of infection.     Recommendations  -Overnight EEG consult placed   -Recommend consulting neurosurgery and endocrinology   -Agree with ongoing RLE cellulitis treatment   -ACTH level  -continue dexamethasone 2mg BID       Thank you for involving Neurology in the care of Gustavo Faria.  Please do not hesitate to call with questions/concerns (consult pager [phone]).      Patient was seen and discussed with Dr. Llanes.    Leigh Jackson MD  Internal Medicine, PGY-1

## 2023-04-04 NOTE — PROGRESS NOTES
CLINICAL NUTRITION SERVICES - ASSESSMENT NOTE     Nutrition Prescription    RECOMMENDATIONS FOR MDs/PROVIDERS TO ORDER:  - Obtain daily scaled weights  - Recommend pt on average meets at least 75% minimum assessed needs (1285 kcal, 77 g pro daily) via kcal cts to avoid additional nutrition interventions such as additional scheduled snacks/supplements vs EN support.     Malnutrition Status:    Severe malnutrition in the context of acute on chronic disease    Recommendations already ordered by Registered Dietitian (RD):  - Ensure Max BID at 10 and HS, chocolate flavor  - Calorie Count 4/5-4/7  - Ordered thera-vit-m for wound healing    Future/Additional Recommendations:  - Encourage PO intake and monitor ONS acceptance  - Continue to monitor need for advanced nutrition support       REASON FOR ASSESSMENT  Gustavo Faria is a/an 58 year old male assessed by the dietitian for Provider Order - concern for malnutrition.    Chart reviewed. 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 showed a large mass invading the cavernous sinuses and impinging on the cranial nerves. He is transferred to [facility] for this pituitary adenoma with plans for possible surgical intervention. Patient is currently A&O x 1 - disoriented to place, time and situation.     NUTRITION HISTORY  Per care everywhere, patient has a history of poor oral intake d/t altered mental status and forgetfulness. Patient and his sister Betty present in room. Obtained all information from sister. She stated PTA, patient eating bites of food. Unable to provide timeframe. She believes this is because patient often complains of a lack in taste and only enjoys foods that are very salty or spicy. She  "noted patient has received oral nutrition supplements in the past, Glucerna and Ensure Enlive. She stated patient did not drink Glucerna, recommended Ensure Max instead of Ensure Enlive to optimize protein intake and she agreed. States patient likes chocolate flavor.    CURRENT NUTRITION ORDERS  Diet: Regular  Intake/Tolerance: no documentation - Breakfast tray in room untouched    LABS  Labs reviewed  Alk phos 345 (H), ALT 89 (H), AST 64 (H)    MEDICATIONS  Medications reviewed  Januvia daily    ANTHROPOMETRICS  Height: 167.6 cm (5' 6\")  Most Recent Weight: 68.7 kg (151 lb 7.3 oz)  IBW: 64.5 kg   BMI: Normal BMI  Weight History: 68.7 kg (4/4/23), 71.2 kg (2/24/23), 75.7 kg (1/8/23), 80.6 kg (12/26/22), 84.4 kg (11/28/22). 18.6% weight loss x 5 months.    Dosing Weight: 68.7 kg (actual body weight)    ASSESSED NUTRITION NEEDS  Estimated Energy Needs: 4974-7601 kcals/day (25 - 30 kcals/kg)  Justification: Maintenance  Estimated Protein Needs: 103+ grams protein/day (1.5+ grams of pro/kg)  Justification: Increased needs  Estimated Fluid Needs: (1 mL/kcal)   Justification: Maintenance    PHYSICAL FINDINGS  See malnutrition section below.    GI: last BM 4/2, on bowel regimen. Per pt sister, denies N/V.    Skin: WOCN consulted. RLE wound covered in abd/kerlix. L foot ulcer, mepilex in place. Sacral wound. Raw, reddened groin    MALNUTRITION  % Intake: </= 50% for >/= 5 days (severe)  % Weight Loss: > 7.5% in 3 months (severe)  Subcutaneous Fat Loss: Unable to assess - not appropriate  Muscle Loss: Unable to assess - not appropriate  Fluid Accumulation/Edema: Does not meet criteria (+1 per nursing)  Malnutrition Diagnosis: Severe malnutrition in the context of acute on chronic disease    NUTRITION DIAGNOSIS  Inadequate oral intake related to altered mental status and taste as evidenced by severe weight loss and patient meeting < 50% of nutrient needs > 5 days.    INTERVENTIONS  Implementation  Nutrition Education: " Discussed ONS to maximize po intake adequacy and increased need for protein for wound healing. Noted patient received CHO counting and heart healthy diet education on 2/17/23.  Medical food supplement therapy     Goals  Patient to consume % of nutritionally adequate meal trays TID, or the equivalent with supplements/snacks.  Weight to remain > 68 kg     Monitoring/Evaluation  Progress toward goals will be monitored and evaluated per protocol.    Fadia Hines Dietitian Eligible  7C/5A (beds 5203 - 0452) RD pager: [phone]  Weekend/Holiday RD pager: [phone]

## 2023-04-04 NOTE — CONSULTS
[facility]  WO Nurse Inpatient Assessment     Consulted for: Buttocks, sacrum, bilateral groin, Right Leg    Patient History (according to provider note(s):      Gustavo Faria is a 58 year old male with recent Serratia bacteremia, HFrEF, prolonged QT, lower extremity wounds, DMII, hypothyroidism, low testosterone and known ACTH secreting pituitary adenoma w/resulting Cushing's disease s/p 2 partial resections in 2021 who [facility] wounds. [facility], he developed new onset double vision and severe headache which prompted imaging that showed a large mass invading the cavernous sinuses and impinging on the cranial nerves. He is transferred to [facility] for this pituitary adenoma with plans for possible surgical intervention.     Areas Assessed:      Areas visualized during today's visit: Sacrum/coccyx and Lower extremities     Wound location: Buttocks, Sacrum and Groin          Last photo: 4/4/23  Wound due to: Incontinence Associated Dermatitis (IAD)  Wound history/plan of care: incontinent of stool and urine  Wound base: Groin 100 % blanchable  and epidermis, buttocks mostly intact with scattered open areas 100 % dermis     Palpation of the wound bed: normal      Drainage: scant     Description of drainage: serous     Measurements (length x width x depth, in cm): scattered areas none measureing larger than 0.3  x 0.3  x  0.1 cm      Tunneling: N/A     Undermining: N/A  Periwound skin: Intact      Color:   groin has areas of hypopigmentation, buttock has areas of hyperpigmenting.       Temperature: normal   Odor: none  Pain: mild and moderate, burning  Pain interventions prior to dressing change: slow and gentle cares   Treatment goal: Heal  and Protection  STATUS: initial assessment  Supplies ordered: at bedside and supplies stored on unit     Wound location: BLE    Left foot    Right  lateral leg    Right anterior leg    Last photo: 4/4/23  Wound due to: Arterial Ulcer and Unknown Etiology  Wound history/plan of care: Presented to the hospital earlier this year for lower leg wound infection.  Wound base: Right leg wounds 5 % granulation tissue, 95 % non-granular tissue Left foot 100 % yellow fibrin     Palpation of the wound bed: normal      Drainage: moderate     Description of drainage: cloudy     Measurements (length x width x depth, in cm):Right anterior leg 3.5  x 2  x  0.3 cm Right Lateral 7 x 3.5 x 0.3 and 2 x 0.4 x 0.3 Left foot 2 x 2 x 0.3     Tunneling: N/A     Undermining: N/A  Periwound skin: Intact and Dry/scaly      Color: normal and consistent with surrounding tissue      Temperature: normal   Odor: mild  Pain: Irritable or crying out at intervals, sharp  Pain interventions prior to dressing change: IV dilaudid  Treatment goal: Heal  and Infection control/prevention  STATUS: initial assessment  Supplies ordered: ordered aquacel    Treatment Plan:     Buttock and perineal area wound(s): BID and PRN    Cleanse the area with Merary cleanse and protect, very gently with soft cloth.    Apply ostomy powder (#8439) on all open and denuded skin.    Apply thin layer of critic aid paste on top of it.    With repeat application, do not scrub the paste, only remove soiled paste and reapply.    If complete removal of paste is necessary use baby oil/mineral oil (#775445) and soft wash cloth.    Ensure pt has Jono-cushion (#857440) while sitting up in the chair.    Use only one Covidien pad in between mattress and pt. No brief while in bed.    Bilateral leg wounds Change dressing every other day.  Cleanse wound bed with Microklenz and pat dry.  Cut a piece of Aquacel (#681912) to fit into the wound bed and place it. Ensure to cover entire wound bed.  Cover with Mepilex border.    Orders: Written    RECOMMEND PRIMARY TEAM ORDER: Merary antifungal to Groin  Education provided: plan of care and Infection  prevention   Discussed plan of care with: Nurse  [facility] nurse follow-up plan: weekly  Notify WO if wound(s) deteriorate.  Nursing to notify the Provider(s) and re-consult the [facility] Nurse if new skin concern.    DATA:     Current support surface: Standard  Standard gel/foam mattress (IsoFlex, Atmos air, etc)  Containment of urine/stool: Incontinence Protocol, Incontinent pad in bed and Primofit external catheter  BMI: Body mass index is 24.45 kg/m .   Active diet order: Orders Placed This Encounter      Combination Diet Regular Diet Adult     Output: No intake/output data recorded.     Labs: Recent Labs   Lab 04/04/23  0010   ALBUMIN 3.1*   HGB 8.9*   INR 1.02   WBC 16.0*     Pressure injury risk assessment:   Sensory Perception: 3-->slightly limited  Moisture: 2-->very moist  Activity: 2-->chairfast  Mobility: 2-->very limited  Nutrition: 2-->probably inadequate  Friction and Shear: 1-->problem  Manan Score: 12    Veronica Gunter RN CWOCN  Pager no longer is use, please contact through [place] [facility] group: [facility] Nurse  Dept. Office Number: [phone]

## 2023-04-04 NOTE — PROGRESS NOTES
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 Who presented as a transfer from the [facility]. Originally admitted for wound care but then developed encephalopathy with headache, and inability to move his eyes. Repeat MRI showing enlarging soft tissue mass invading bilateral cavernous sinuses; from possible recurrent adenoma.     # Pituitary adenoma, recurrence with necrosis  # Compression of cavernous sinus  -Per note review, patient had normal mentation while at [facility], then subsequently developed rapid encephalopathy.   -Patient unable to participate in exam. Encephalopathic and perseverating.   -Bilateral ophthalmoplegia. Unable to track finger or face.   -Personal review of images, agree with compression of cavernous sinus with likely involvement of many cranial nerves, however unable to elucidate true deficits until encephalopathy resolves.   -Bilateral optic discs with diffuse pallor; likely secondary to prior optic chiasm/tract injury from previous macroadenoma    PLAN:  -Recommend adding MRI orbit w/ & w/o contrast to current MRI brain    -Deficits from enlarging recurrent adenoma with necrosis and compression of cavernous sinus. Would like additional imaging to rule out superimposed inflammatory process, hemorrhage; less likely infectious process.  -Defer to neurosurgery regarding continuing dexamethasone.   -Ophthalmology will continue to follow and review MRI orbit results. Appreciate primary team notifying resident on call when encephalopathy improves and patient able to participate in bedside/clinic exam.      It is our pleasure to participate in this patient's care and treatment. Please contact us with any further questions or  "concerns.    Discussed with Dr. Kate, PGY-5, neuro-ophthalmology fellow who agreed with this assessment and plan.     Thank you for entrusting us with your care  Alberto Carl MD, PGY2  Ophthalmology Resident  [facility]  Pager: [phone]    HISTORY OF PRESENTING ILLNESS:     Gustavo Faria is a 58 year old male with recent Serratia bacteremia, HFrEF, prolonged QT, lower extremity wounds, DMII, hypothyroidism, low testosterone and known ACTH secreting pituitary adenoma with resulting Cushing's disease s/p 2 partial resections in 2021.      He was hospitalized at the [facility] for not being able to care for is lower extremity wounds. While he was hospitalized, he developed new onset double vision and severe headache and was found to have large mass invading the cavernous sinuses. He was transferred to [facility] for evaluation by neurosurgery service and ophthalmology service. Plans are to surgically treat pituitary adenoma by neurosurgery this admission.    [facility] as a [facility]izing measure before transferring to the [facility]. Currently on 2mg dexamethasone BID.     Of note, patient has acute metabolic encephalopathy. Patient is perseverated on the phrase \"it hurts\". He will answer questions but ends every sentence with \"it hurts\" and won't open his eyes.      10+ review of systems were otherwise negative except for that which has been stated above.      OCULAR/MEDICAL/SURGICAL HISTORIES:     Past Ocular History:   Unable to attain. Patient encephalopathic.    Eye Drops:   None at this time    Pertinent Systemic Medications:   Dexamethasone, 2mg BID    Past Medical History:  No past medical history on file.  Type 2 diabetes mellitus, A1c 7.6%  Chronic microcytic anemia  HFrEF  HTN  RLE wound and Left foot wound; wound culture growing serratia marcescens.    Past Surgical History:   No past surgical history on file.    Family " History:  Unable to attain. Patient encephalopathic.    Social History:  Unable to attain. Patient encephalopathic.    EXAMINATION:     Base Eye Exam     Visual Acuity    Poor participation. Patient encephalopathic. Able to tell examiner the number of fingers when held in front of his face with significant encouragement.            Tonometry (Tonopen, 6:28 PM)       Right Left    Pressure 30 22   Squeezing and resisting exam           Pupils       Dark Light Shape React APD    Right 4 3 Round Brisk None    Left 4 3 Round Brisk None          Visual Fields    Unable to attain. Patient encephalopathic.           Extraocular Movement       Right Left     -- -.35 --   -3  --   -- -3.5 --    -- -4 --   -3  -4   -- -3.5 --             Dilation     Both eyes: 1.0% Mydriacyl, 2.5% Carter Synephrine @ 5:00PM            Slit Lamp and Fundus Exam     Slit Lamp Exam       Right Left    Lids/Lashes Normal Normal    Conjunctiva/Sclera White and quiet White and quiet    Cornea 360 arcus 360 arcus    Anterior Chamber Deep and quiet Deep and quiet    Iris Round and reactive Round and reactive    Lens Clear Clear    Anterior Vitreous Normal Normal          Fundus Exam       Right Left    Disc diffuse pallor diffuse pallor    C/D Ratio 0.7 0.7    Macula Normal Normal    Vessels Normal Normal    Periphery Normal Normal    Poor participation with exam                Labs/Studies/Imaging Performed  MRI Brain 3/28/23:  Predominantly T1 Isib intense, T2 isointense, heterogeneously enhancing soft tissue occupies the majority of the postsurgical sella, invades the bilateral cavernous sinuses, extends to the adjacent portions of bilateral Meckel's cave's, invades the superior portion of the clivus, erodes the dorsum sella, and projects focally into the posterior portion of the postsurgical right sphenoid sinus.  The soft tissue measures approximately 2.7 cm craniocaudal by 2.6 cm anterior-posterior by 4.1 cm transverse.  This is consistent with  residual or recurrent adenoma in this patient with a reported history of prior surgical debulking of known pituitary adenoma.  Although there is circumferential encasement of the bilateral cavernous internal carotid arteries, the corresponding arterial flow voids are preserved, without appreciable narrowing.  There is slight rightward deviation of the pituitary infundibulum.  Residual pituitary parenchyma is not well delineated, although a small component is likely present along the right superior paramedian margin of the presumed adenoma.  There is no mass effect on the optic chiasm, which demonstrates normal T2 weighted signal intensity.  An osseous defect in the right anterior floor of the sella, in keeping with the patient's previous transplant surgery, has demonstrated.  Mild focal FLAIR hyperintensities present in the posterior subcortical white matter of the right inferior frontal gyrus.  These imaging findings are most suggestive of localized benign vascular enhancement such as an incidental capillary damage ectasia.  No intracranial mass or abnormal enhancement is identified elsewhere.  MRI brain w/ and w/o contrast, 3/28/23:         Alberto Carl MD  Resident Physician, PGY2  Department of Ophthalmology  04/04/23 3:11 PM

## 2023-04-04 NOTE — CONSULTS
Brief Ophthalmology Note    Gustavo Faria is a 58 year old male with recent Serratia bacteremia, HFrEF, prolonged QT, lower extremity wounds, DMII, hypothyroidism, low testosterone and known ACTH secreting pituitary adenoma w/resulting Cushing's disease s/p 2 partial resections in 2021.     While hospitalized at the [facility] he developed new onset double vision and severe headache and was found to have large mass invading the cavernous sinuses. He was transferred to [facility] for evaluation by neurosurgery service and ophthalmology service.     PLAN:  -Appointment has been made in the eye clinic, [facility] building 9th floor, 8:30am, 4/5/23, to see Dr. Carl for complete eye exam with OCT RNFL and Visual field imaging. Formal imaging needed in clinic to completely elucidate the degree vision deficit. Consult will be completed at that time.  -Appreciate assistance of nursing staff arranging transport.    Alberto Carl MD  PGY-2 Ophthalmology Resident

## 2023-04-04 NOTE — PHARMACY-VANCOMYCIN DOSING SERVICE
Pharmacy Vancomycin Initial Note  Date of Service 2023  Patient's  1965  58 year old, male    Indication: Skin and Soft Tissue Infection    Current estimated CrCl = Estimated Creatinine Clearance: 95.4 mL/min (based on SCr of 0.83 mg/dL).    Creatinine for last 3 days  2023: 12:10 AM Creatinine 0.83 mg/dL    Recent Vancomycin Level(s) for last 3 days  No results found for requested labs within last 3 days.      Vancomycin IV Administrations (past 72 hours)                   vancomycin (VANCOCIN) 1000 mg in dextrose 5% 200 mL PREMIX (mg) 1,000 mg New Bag 23 1223                Nephrotoxins and other renal medications (From now, onward)    Start     Dose/Rate Route Frequency Ordered Stop    23 1030  vancomycin (VANCOCIN) 1000 mg in dextrose 5% 200 mL PREMIX         1,000 mg  200 mL/hr over 1 Hours Intravenous EVERY 12 HOURS 23 1029      23 0800  [Held by provider]  empagliflozin (JARDIANCE) tablet 10 mg        (Held by provider since 2023 at 0818 by Hand, MD Linda.Hold Reason: OtherHold Comments: Pending possible surgery in upcoming days)    10 mg Oral DAILY 23 2308      23 0800  lisinopril (ZESTRIL) tablet 10 mg         10 mg Oral DAILY 23 2313            Contrast Orders - past 72 hours (72h ago, onward)    None        InsightRX Prediction of Planned Initial Vancomycin Regimen  Regimen: 1000 mg IV every 12 hours.  Start time: 00:23 on 2023  Exposure target: AUC24 (range)400-600 mg/L.hr   AUC24,ss: 524 mg/L.hr  Probability of AUC24 > 400: 77 %  Ctrough,ss: 16.1 mg/L  Probability of Ctrough,ss > 20: 32 %  Probability of nephrotoxicity (Lodise STANLEY ): 11 %          Plan:  1. Start vancomycin  1000 mg IV q12h.   2. Vancomycin monitoring method: AUC  3. Vancomycin therapeutic monitoring goal: 400-600 mg*h/L  4. Pharmacy will check vancomycin levels as appropriate in 1-3 Days.    5. Serum creatinine levels will be ordered daily for the first  week of therapy and at least twice weekly for subsequent weeks.      Aura Jade, PharmD Candidate  [facility]ie Herrera RP

## 2023-04-04 NOTE — H&P
[facility]    History and Physical - Medicine Service, MAROON TEAM        Date of Admission:  4/3/2023    Assessment & Plan      Gustavo Faria is a 58 year old male with recent Serratia bacteremia, HFrEF, prolonged QT, lower extremity wounds, DMII, hypothyroidism, low testosterone and known ACTH secreting pituitary adenoma w/resulting Cushing's disease s/p 2 partial resections in 2021 who [facility]. [facility], he developed new onset double vision and severe headache which prompted imaging that showed a large mass invading the cavernous sinuses and impinging on the cranial nerves. He is transferred to [facility] for this pituitary adenoma with plans for possible surgical intervention.     # ACTH secreting pituitary adenoma c/b type II DM, hypothyroidism and low testosterone s/p two partial resections in 2021   # Anisocoria w/left pupil (L is larger than R)  Patient noted double vision and severe headache beginning the evening of 3/25. CT imaging on 3/25 revealed a large sellar/suprasellar mass extending into the cavernous sinuses with no evidence of acute stroke. On 3/28, he underwent an MRI which confirmed the CT findings of a large mass invading the cavernous sinuses and impinging on the cranial nerves. [facility] recommended starting patient on dexamethasone as a temporizing measure and then transferring to [facility] for surgical consideration.   - Neurosurgery consult, appreciate recs   - Ophthalmology consult, appreciate recs   - Continue dexamethasone 2 mg BID     # Buttocks, sacrum and bilateral groin wounds   # RLE wound from puncture injury   # L dorsal foot wound from presumed trauma   # Soft tissue infection/abscess    # Hx serratia bacteremia in December 2022   For patient's lower extremity wounds and hx of Serratia bacteremia in December, he was initially  started on [facility]efazolin at the [facility]. His antibiotics were broadened to Vanc and Zosyn and ultimately he was transitioned to ceftazidime on 3/23. BCx negative and wound cx grew serratia marcescens at the [facility]. He has been on ceftazidime since. MRI of the right tib/fib on 3/23 was negative for osteomyelitis but did show two fluid collections draining sponateneously. Ortho was [facility] at the [facility] and determined no intervention was needed as wounds were draining on their own.  - Continue ceftazidime (3/23 - current). Consider treating for total of 14 days, ending on 4/5/23.   - WOC consult  - PT/OT   - Pain: tylenol PRN, dilaudid 0.5 mg PRN for dressing changes and oxy 5 mg PRN     # Leukocytosis   On admission, leukocytosis of 16.0. Patient's WBC count at the [facility] was 14.8. Leukocytosis is likely in the setting of taking dexamethasone daily BID. He's afebrile and being treated currently for soft tissue infection with ceftazadime. Low suspicion for other infection at this time, but patient is a poor historian. Blood cx negative at the [facility] but unsure when these were collected.   - CTM    - UA ordered   - BCx ordered       # HFrEF   # HTN   # Prolonged QTc   Latest TTE on 2/17 shows EF of 40%. Coronary angiogram on 2/27 shows normal coronaries. No concern for heart failure at this time.  - Continue PTA coreg  - Continue PTA lisinopril  - Continue PTA empagliflozin   - Continue PTA aspirin      # Hypokalemia, resolved   Has required potassium replacement at the [facility].     # Type II DM, exacerbated by Cushing's   A1c of 7.6% on 2/2023. Patient was [facility] the [facility].    - Continue PTA lantus 12 units   - Continue PTA metformin   - Continue PTA sitagliptin  - Continue PTA emagliflozin      # Central Hypothyroidism  - Continue PTA levothyroxine    # Hypogonadism   - Continue PTA androgel     # Chronic microcytic anemia   Hgb of 8.4 on discharge at the [facility]. Peripheral smear on 3/30 showed poikilocytosis with  "occasional red blood cell fragments but no other evidence of hemolysis.  Haptoglobin was normal.  Ferritin was 91, transferrin saturation was low, B12 was 495 and folate was 8.7. Likely combination of iron deficiency as well as inflammation/chronic disease.   - CTM     # Liver enzyme abnormalities  Elevated AST, ALT and alkaline phosphatase on admission. Patient's abdomen is non-tender on exam. Appears these were previously elevated at the [facility] as well.   - Hepatitis labs pending     # Behavioral issues   # Perseveration   Patient has repeatedly threatened to leave AMA at the [facility] and had a 72-hour hold placed temporarily which was quickly removed. He was evaluated by mental health at the [facility] who deemed patient had capacity. Per chart review, it does mention psychosis diagnosis in 2021, but [place] so no further information. On exam, patient is perseverated on the phrase \"it hurts\". He will answer questions but ends every sentence with \"it hurts\" and won't open his eyes. Patient's PTA medications show haldol, but per [facility] records, patient was not taking this at the [facility] hospital.    - CTM   - Could consider psych consult in the AM     # Marijuana use   Uses daily medical marijuana at home.      # Concern for malnutrition   - Nutrition consult      Diet: Regular diet   DVT Prophylaxis: Pneumatic Compression Devices  Roland Catheter: Not present  Fluids: None   Lines: None     Cardiac Monitoring: None  Code Status: Full     Clinically Significant Risk Factors Present on Admission                  # Hypertension: home medication list includes antihypertensive(s)     # DMII: A1C = 7.6 % (Ref range: <5.7 %) within past 6 months         Disposition Plan   Pending surgical evaluation for ACTH-secreting pituitary adenoma     Imaging results reviewed over the past 24 hrs:   No results found for this or any previous visit (from the past 24 hour(s)).     The patient's care was discussed with " the Attending Physician, Dr. Quinteros.    Marilynn Bronson MD  Medicine Service, [facility]  Securely message with stickK (more info)  Text page via [facility] Paging/Directory   See signed in provider for up to date coverage information  ______________________________________________________________________    Chief Complaint   Transfer for worsening pituitary adenoma     History is obtained from the patient    History of Present Illness   Gustavo Faria is a 58 year old male with recent Serratia bacteremia and soft tissue infection, HFrEF, prolonged QT, bilateral leg wounds and known ACTH secreting pituitary adenoma w/resulting Cushing's disease s/p 2 partial resections in 2021 who is transferred to [facility] for worsening pituitary adenoma with plans for possible surgical intervention.     Per chart review:   In Feb 2023, patient presented to [facility] for CHF exacerbation and lower extremity wounds. Subsequently transferred to [facility] for further cardiac work-up.  At [facility], lower extremity wound/infection (cellulitis versus abscess) treated with vancomycin and cefepime and was discharged on 5 days of cefdinir. Patient was unable to fill this prescription and family attempted wound care at home but his right lower extremity became progressively worse and [facility].      Patient noted double vision and severe headache beginning the evening of 3/25.  Stroke code was called and imaging revealed a large sellar/suprasellar mass extending into the cavernous sinuses with no evidence of acute stroke.  On 3/28 he underwent an MRI which confirmed the CT findings of a large mass invading the cavernous sinuses and impinging on the cranial nerves.    Neurology and neurosurgery were consulted and noticed that patient appeared to be having more dilation of the left pupil compared to the right. Also had impaired lateral movement of the left eye and diplopia raising  concern for cavernous sinus invasion of the pituitary adenoma and abducens nerve palsy.  Neurosurgery recommended starting patient on 2 mg of dexamethasone twice daily as a temporizing measure.  Neurosurgery then recommended transfer to [facility].     For patient's lower extremity wounds and hx of Serratia bacteremia in December, he was initially started [facility]olin [facility] the [facility]. Wound care, infectious disease and orthopedic surgery were consulted.  His antibiotics were broadened to Vanco and Zosyn and ultimately he was transitioned to ceftazidime on March 23.  He has been on ceftazidime since. MRI of the right lower extremity was noted to have fluid collections, both of which appear to be draining spontaneously from current wounds.  Therefore, orthopedic surgery recommended against further incisions as they were draining on their own.    Patient is not having any fevers or chills currently. No shortness of breath or chest pain. No abdominal pain. He is endorsing left lower extremity pain and requesting tylenol. No pain with urination and patient is having normal bowel movements he states.     Past Medical History    No past medical history on file.    Past Surgical History   No past surgical history on file.    Prior to Admission Medications   Prior to Admission Medications   Prescriptions Last Dose Informant Patient Reported? Taking?   POTASSIUM CHLORIDE PO  Self Yes No   Sig: Take 20 mEq by mouth daily   acetaminophen (TYLENOL) 325 MG tablet   No No   Sig: Take 2 tablets (650 mg) by mouth every 6 hours as needed for mild pain or other (and adjunct with moderate or severe pain or per patient request)   aspirin (ASA) 81 MG EC tablet   No No   Sig: Take 1 tablet (81 mg) by mouth daily   carvedilol (COREG) 12.5 MG tablet  Self Yes No   Sig: Take 12.5 mg by mouth 2 times daily (with meals)   furosemide (LASIX) 20 MG tablet  Self No No   Sig: Take 1 tablet (20 mg) by mouth daily for 7 days   haloperidol (HALDOL) 2 MG tablet   "Self Yes No   Sig: Take 2 mg by mouth 3 times daily   hydrALAZINE (APRESOLINE) 25 MG tablet  Self Yes No   Sig: Take 25 mg by mouth 3 times daily   levothyroxine (SYNTHROID/LEVOTHROID) 100 MCG tablet  Self No No   Sig: Take 1 tablet (100 mcg) by mouth daily   lisinopril-hydrochlorothiazide (ZESTORETIC) 10-12.5 MG tablet  Self Yes No   Sig: Take 1 tablet by mouth daily   metFORMIN (GLUCOPHAGE) 500 MG tablet  Self Yes No   Sig: Take 500 mg by mouth 2 times daily (with meals)   oxyCODONE (ROXICODONE) 5 MG tablet  Self No No   Sig: Take 1 tablet (5 mg) by mouth every 6 hours as needed for pain   sitagliptin (JANUVIA) 50 MG tablet  Self No No   Sig: Take 2 tablets (100 mg) by mouth daily for 30 days   spironolactone (ALDACTONE) 100 MG tablet  Self No No   Sig: Take 1 tablet (100 mg) by mouth daily for 30 days   testosterone (ANDROGEL) 20.25 MG/1.25GM (1.62%) topical gel  Self Yes No   Sig: Place 20.25 mg onto the skin daily      Facility-Administered Medications: None      Physical Exam   Vital Signs: Temp: 96.9  F (36.1  C) Temp src: Oral BP: (!) 138/93 Pulse: 59   Resp: 18 SpO2: 100 % O2 Device: None (Room air)    Weight: 0 lbs 0 oz    Exam:  Constitutional: loudly repeating \"it hurts\" throughout the whole history taking   Head: normocephalic, atraumatic   ENT: patient refusing to open his eyes, unable to assess   Cardiovascular: RRR, no m/r/g, trace LE edema   Respiratory: CTAB, breathing comfortably on room air, no wheezes   Gastrointestinal: soft, non-tender, non-distended, normal bowel sounds  Musculoskeletal: no gross deformities, normal muscle tone   Skin: warm and dry, chronic venous stasis changes bilateral LE, right lower extremity is wrapped, L dorsal foot wound   Neurologic: alert and oriented x3, wiggling his toes and moving all extremities  Psychiatric: repeating \"it hurts\" after every answer to the question, refusing to make eye contact or open eyes     Data   Imaging results reviewed over the past 24 " hrs:   No results found for this or any previous visit (from the past 24 hour(s)).

## 2023-04-04 NOTE — CONSULTS
Care Management Initial Consult    General Information  Assessment completed with: Family, VM-chart review, Patient's sister Pilar Rhodes  Type of CM/SW Visit: Initial Assessment    Primary Care Provider verified and updated as needed: Yes (Patient recently moved here from [place] and his sister is asissting with getting patient a PCP. Unsure [facility] or [facility])   Readmission within the last 30 days: no previous admission in last 30 days      Reason for Consult: discharge planning  Advance Care Planning: Advance Care Planning Reviewed: no concerns identified          Communication Assessment  Patient's communication style: spoken language (English or Bilingual)    Hearing Difficulty or Deaf: no   Wear Glasses or Blind: yes (per pt's sister)    Cognitive  Cognitive/Neuro/Behavioral: .WDL except  Level of Consciousness: alert  Arousal Level: arouses to voice  Orientation: other (see comments) (PARTHA- refuses/unable to answer)  Mood/Behavior: restless  Best Language: 1 - Mild to moderate  Speech: illogical, inappropriate words    Living Environment:   People in home: sibling(s)     Current living Arrangements: house      Able to return to prior arrangements: yes       Family/Social Support:  Care provided by: self, other (see comments) (Sister)  Provides care for: no one  Marital Status: Single  Sibling(s)          Description of Support System: Supportive, Involved    Support Assessment: Adequate family and caregiver support    Current Resources:   Patient receiving home care services: No     Community Resources: None  Equipment currently used at home: walker, rolling, commode chair  Supplies currently used at home: None (Patient's sister reported she feels he should be using a walker)    Employment/Financial:  Employment Status: unemployed, , previous service     Employment/ Comments: Patient previously served in the [employer] ([facility]  Financial Concerns: No concerns reported at this time            Lifestyle & Psychosocial Needs:  Social Determinants of Health     Tobacco Use: Not on file   Alcohol Use: Not on file   Financial Resource Strain: Not on file   Food Insecurity: Not on file   Transportation Needs: Not on file   Physical Activity: Not on file   Stress: Not on file   Social Connections: Not on file   Intimate Partner Violence: Not on file   Depression: Not on file   Housing Stability: Not on file       Functional Status:  Prior to admission patient needed assistance:  Patient was reportedly independent prior to admission              Mental Health Status:  Mental Health Status: Past Concern (Patient's sister reported he expereinces anxiety and anxiety surrounding receiving cares in hospital setting)       Chemical Dependency Status:  Chemical Dependency Status: No Current Concerns             Values/Beliefs:  Spiritual, Cultural Beliefs, Mu-ism Practices, Values that affect care: Not discussed at this time                 Additional Information:  Gustavo Faria is a 58 year old male with recent Serratia bacteremia, HFrEF, prolonged QT, lower extremity wounds, DMII, hypothyroidism, low testosterone and known ACTH secreting pituitary adenoma w/resulting Cushing's disease s/p 2 partial resections in 2021 who [facility]. [facility], he developed new onset double vision and severe headache which prompted imaging that showed a large mass invading the cavernous sinuses and impinging on the cranial nerves. He is transferred to [facility] for this pituitary adenoma with plans for possible surgical intervention.     DARLING met with the patient and the patient's sister Pilar at bedside to complete CMA. CMA was completed with the patient's sister due to the patient repeating \"Help me. Help me. Please Help me. No I don's need tylenol.\" Patient's sister Pilar " reported that the patient recently moved to [place] from [place]. The patient does not have a primary care provider yet. The patient's sister Pilar reported she is trying to get him a primary care physician. DARLING asked Pilar if she would like assistance getting the patient a primary care physician, she reported she was unsure she did not know if the patient wants primary care physician through the [facility]. The patient was previously in the [employer]. The patient currently lives in a house with his sister Pilar. Pilar reported the patient was previously independent, however she stated he will likely need more assistance once he discharged. Pilar reported she does not have concerns about the patient discharging home and her caring for him, however she stated if the patient were to discharge home she would like to receive resources about home health care, PCA, and home wound care. Pilar feels the patient should be using a walker, however she reported he currently does not use one. Pilar reported for transportation they will likely have to use an agency since she does not have a car. DARLING reported to Pilar that as of right now PT/OT is recommending the patient go to a TCU prior to going home. SW explained to Pilar what the purpose of a TCU was. Pilar stated understanding. SW provided Pilar with a list of TCU options near their home in [place]. SW reported they will give Pilar the evening to look it over and SW will follow up with her tomorrow 4/5. Pilar denied having any questions or concerns at this time.    DARLING will continue to follow for discharge needs, resources, and placement.    SMAIRA Morrison  [address]   Office: [phone]  Pager: [phone]  [email or web address]

## 2023-04-04 NOTE — PROGRESS NOTES
[facility]    Progress Note - Medicine Service, MAROON TEAM 2       Date of Admission:  4/3/2023    Assessment & Plan   Gustavo Faria is a 58 year old male with recent Serratia bacteremia, HFrEF, prolonged QT, lower extremity wounds, DMII, hypothyroidism, low testosterone and known ACTH secreting pituitary adenoma w/resulting Cushing's disease s/p 2 partial resections in 2021 who [facility]. [facility], he developed new onset double vision and severe headache which prompted imaging that showed a large mass invading the cavernous sinuses and impinging on the cranial nerves. He is transferred to [facility] for this pituitary adenoma with plans for possible surgical intervention.     Updates today:  - Patient is quite encephalopathic this morning, minimally following directions, word repetition  - Pending Neurosurg evaluation  - Continue steroids, likely switch to IV given not taking much by mouth  - Infectious work up as stated below, as patient appears more encephalopathic  - Ceftriaxone & Vanc for empiric coverage  - Neurology consult to assist for evaluation for seizures in the setting of brain mass  - C consult     # ACTH secreting pituitary adenoma c/b type II DM, hypothyroidism and low testosterone s/p two partial resections in 2021   # Anisocoria w/left pupil (L is larger than R)  Patient noted double vision and severe headache beginning the evening of 3/25. CT imaging on 3/25 revealed a large sellar/suprasellar mass extending into the cavernous sinuses with no evidence of acute stroke. On 3/28, he underwent an MRI which confirmed the CT findings of a large mass invading the cavernous sinuses and impinging on the cranial nerves. [facility] recommended starting patient on dexamethasone as a temporizing measure and then transferring to [facility] for surgical  consideration.   - Pending Neurosurg evaluation  - Ophthalmology consult, appreciate recs   - Continue dexamethasone 2 mg BID    # Acute metabolic encephalopathy  # Leukocytosis   Disoriented, intermittently following directions. Repetitive words - perseverating on particular phrases  Patient's sister at bedside noted this has happened in the past a few times associated with electrolyte abnormalities. Suspect multifactorial - vulnerable brain (multiple brain surgeries), enlarging pituitary mass?, possible infectious etiology (buttock wounds/soft tissue infection), vs seizures.   - Neurology consult to evaluate for seizures  - Infectious work up: CXR, blood cultures, lactate, UA  - Empiric ceftriaxone and vancomycin  - MRSA nares     # Buttocks, sacrum and bilateral groin wounds   # RLE wound from puncture injury   # L dorsal foot wound from presumed trauma   # Soft tissue infection/abscess    # Hx serratia bacteremia in December 2022   For patient's lower extremity wounds and hx of Serratia bacteremia in December, he was initially started on [facility]azolin at the [facility]. His antibiotics were broadened to Vanc and Zosyn and ultimately he was transitioned to ceftazidime on 3/23. BCx negative and wound cx grew serratia marcescens at the [facility]. He has been on ceftazidime since. MRI of the right tib/fib on 3/23 was negative for osteomyelitis but did show two fluid collections draining sponateneously. Ortho was consulted at the [facility] and determined no intervention was needed as wounds were draining on their own.  - Stop ceftazidime (3/23 - 4/5)  - Empiric Abx as stated above  - WOC consult  - PT/OT when able to follow commands  - Pain: tylenol PRN, dilaudid 0.5 mg PRN for dressing changes and oxy 5 mg PRN      # HFrEF   # HTN   # Prolonged QTc   Latest TTE on 2/17 shows EF of 40%. Coronary angiogram on 2/27 shows normal coronaries. No concern for heart failure at this time.  - Continue PTA coreg  - Continue PTA lisinopril  - Continue  "PTA empagliflozin   - Continue PTA aspirin       # Hypokalemia, resolved   Has required [facility] replacement at the [facility].      # Type II DM, exacerbated by Cushing's   A1c of 7.6% on 2/2023. [facility] the [facility].    - Continue PTA lantus 12 units   - Hold PTA metformin   - Continue PTA sitagliptin  - Hold PTA emagliflozin    - Can start sliding scale insulin if becomes elevated, especially with steroids     # Central Hypothyroidism  - Continue PTA levothyroxine     # Hypogonadism   - Continue PTA androgel (will need to bring in home medication)     # Chronic microcytic anemia   Hgb of 8.4 on [facility] at the [facility]. Peripheral smear on 3/30 showed poikilocytosis with occasional red blood cell fragments but no other evidence of hemolysis.  Haptoglobin was normal.  Ferritin was 91, transferrin saturation was low, B12 was 495 and folate was 8.7. Likely combination of iron deficiency as well as inflammation/chronic disease.   - CTM       # Liver enzyme abnormalities  Elevated AST, ALT and alkaline phosphatase on admission. Patient's abdomen is non-tender on exam. Appears these were previously elevated at the [facility] as well.   - Hepatitis labs pending      # Behavioral issues   # Perseveration   Patient has repeatedly threatened to leave AMA at the [facility] and had a 72-hour hold placed temporarily which was quickly removed. He was evaluated by mental health at the [facility] who deemed patient had capacity. Per chart review, it does mention psychosis diagnosis in 2021, [place] so no further information. On exam, patient is perseverated on the phrase \"it hurts\". He will answer questions but ends every sentence with \"it hurts\" and won't open his eyes. Patient's PTA medications show haldol, but per [facility] records, patient was not taking this at the [facility] hospital.    - CTM   - Currently does not have capacity given encephalopathy (see above)     # Marijuana use   Uses " "daily medical marijuana at home.       # Concern for malnutrition   - Nutrition consult     Clinically Significant Risk Factors Present on Admission              # Hypoalbuminemia: Lowest albumin = 3.1 g/dL at 4/4/2023 12:10 AM, will monitor as appropriate     # Hypertension: home medication list includes antihypertensive(s)     # DMII: A1C = 7.6 % (Ref range: <5.7 %) within past 6 months     # Severe Malnutrition: based on nutrition assessment          1. Medically ready for discharge?    [] Yes   [x] No, estimated date: unclear, pending surgcial      The patient's care was discussed with the Attending Physician, Dr. Chris Burnett.    Linda Azul MD  Medicine Service, [address]  Securely message with [employer] (more info)  Text page via [employer] Paging/Directory   See signed in provider for up to date coverage information  ______________________________________________________________________    Interval History   Admitted overnight (see H&P for more detailed history)  When I spoke with the patient this morning, he was repeating multiple phrases, such as \"help me\", over and over. Occasionally will follow commands, but then goes back to repeating phrases. Voice normal. Moving extremities organically. Denies pain.     Spoke with sister at bedside who says this is far from his baseline. He has had episodes in which he repeats in the past, which she says was associated with low potassium levels.    Physical Exam   Vital Signs: Temp: 97.5  F (36.4  C) Temp src: Oral BP: (!) 160/102 Pulse: 85   Resp: 18 SpO2: 100 % O2 Device: None (Room air)    Weight: 151 lbs 7.3 oz    General Appearance: Eyes closed, repeating same phrases over and over, occasionally following commands, denies pain  Eyes: Unable to complete eye exam, pupils briefly visualized, no nystagmus  HEENT: Normal sclera, able to move neck FROM  Respiratory: Breathing comfortably on RA, " CTAB  Cardiovascular: RRR, no murmurs  GI: BS+. Soft, nontender, nondistended. No guarding or rebound tenderness.   Skin: No rash. No ecchymoses or petechiae.  Musculoskeletal: No muscle wasting  Psychiatric: Perseverating/repeting words      Data     I have personally reviewed the following data over the past 24 hrs:    16.0 (H)  \   8.9 (L)   / 314     143 110 (H) 15.7 /  100 (H)   4.3 19 (L) 0.83 \       ALT: 89 (H) AST: 64 (H) AP: 345 (H) TBILI: 0.4   ALB: 3.1 (L) TOT PROTEIN: 5.1 (L) LIPASE: N/A       Procal: N/A CRP: N/A Lactic Acid: 1.5       INR:  1.02 PTT:  N/A   D-dimer:  N/A Fibrinogen:  N/A

## 2023-04-04 NOTE — PHARMACY-ADMISSION MEDICATION HISTORY
Pharmacy Intern Admission Medication History    Admission medication history is complete. The information provided in this note is only as accurate as the sources available at the time of the update.    Medication reconciliation/reorder completed by provider prior to medication history? Yes    Information Source(s): [facility] via N/A    Pertinent Information: Reviewed dispense report, VA medication summary, and Care Everywhere. Unable to confirm last doses taken since unable to speak with patient. Not listed on [facility] Summary: Aspirin, last filled 2/18/23 for 30 d.s. and  Haloperidol, last filled 1/12/23 for a 30 d.s., Oxycodone, last filled 2/11/23 for a 3 d.s., unable to confirm if still taking these medications. These two medications were listed in Care Everywhere (Marc, last encounter 2/24/23) but did not have a date associated with them.    Changes made to PTA medication list:    Added: per Dispense report, Care Everywhere, [facility] summary  o Furosemide 40 mg tablet  o Sitagliptin 100 mg tablet  o Spironolactone 100 mg tablet  o Sildenafil 20 mg tablet  o Empagliflozin 25 mg tablet  o Ergocalciferol 1,250 mcg (50,000 units) capsule  o Lisinopril 10 mg tablet  o Bactrim 800/160 mg tablet    Deleted: per Dispense report, Care Everywhere, [facility] summary  o Lisinopril-Hydrochlorothiazide 10-12.5 mg tablet: Take 1 tablet by mouth daily    Changed: per Dispense report, Care Everywhere, [facility] summary  o Acetaminophen (TYLENOL) 325 MG tablet: Take 2 tablets (650 mg) by mouth every 6 hours as needed for mild pain or other (and adjunct with moderate or severe pain or per patient request)--> Take 650 mg by mouth every 8 hours as needed for mild pain or other (and adjunct with moderate or severe pain or per patient request))  o Carvedilol 12.5 mg tablet: Take 12.5 mg by mouth 2 times daily with meals --> Carvedilol 25 mg tablet: Take 25 mg by mouth 2 times daily with meals  o Potassium chloride PO -->  Potassium chloride ER (K-TAB) 20 MEQ CR tablet: [same directions]    Medication Affordability: not assessed       Allergies reviewed with patient and updates made in EHR: unable to assess    Medication History Completed By: Marilee Soto 4/4/2023 6:43 PM    PTA Med List   Medication Sig Last Dose     acetaminophen (TYLENOL) 325 MG tablet Take 2 tablets (650 mg) by mouth every 6 hours as needed for mild pain or other (and adjunct with moderate or severe pain or per patient request) (Patient taking differently: Take 650 mg by mouth every 8 hours as needed for mild pain or other (and adjunct with moderate or severe pain or per patient request)) Unknown     aspirin (ASA) 81 MG EC tablet Take 1 tablet (81 mg) by mouth daily Unknown     carvedilol (COREG) 25 MG tablet Take 25 mg by mouth 2 times daily (with meals) Unknown     empagliflozin (JARDIANCE) 25 MG TABS tablet Take 12.5 mg by mouth daily Unknown     furosemide (LASIX) 40 MG tablet Take 40 mg by mouth daily Unknown     haloperidol (HALDOL) 2 MG tablet Take 2 mg by mouth 3 times daily Unknown     hydrALAZINE (APRESOLINE) 25 MG tablet Take 25 mg by mouth 3 times daily Unknown     levothyroxine (SYNTHROID/LEVOTHROID) 100 MCG tablet Take 1 tablet (100 mcg) by mouth daily Unknown     lisinopril (ZESTRIL) 10 MG tablet Take 10 mg by mouth daily Unknown     metFORMIN (GLUCOPHAGE) 500 MG tablet Take 500 mg by mouth 2 times daily (with meals) Unknown     oxyCODONE (ROXICODONE) 5 MG tablet Take 1 tablet (5 mg) by mouth every 6 hours as needed for pain Unknown     potassium chloride ER (K-TAB) 20 MEQ CR tablet Take 20 mEq by mouth daily Unknown     sildenafil (REVATIO) 20 MG tablet Take 20 mg by mouth daily as needed Unknown     sitagliptin (JANUVIA) 100 MG tablet Take 100 mg by mouth daily Unknown     spironolactone (ALDACTONE) 100 MG tablet Take 100 mg by mouth daily Unknown     sulfamethoxazole-trimethoprim (BACTRIM DS) 800-160 MG tablet Take 1 tablet by mouth 2 times  daily Unknown     testosterone (ANDROGEL) 20.25 MG/1.25GM (1.62%) topical gel Place 20.25 mg onto the skin daily Unknown     vitamin D2 (ERGOCALCIFEROL) 67673 units (1250 mcg) capsule Take 50,000 Units by mouth 1 capsule by mouth on Monday and Friday Unknown

## 2023-04-04 NOTE — PROGRESS NOTES
"   04/04/23 1004   Appointment Info   Signing Clinician's Name / Credentials (PT) Jamison Vital, SPT   Student Supervision Direct supervision provided;Direct Patient Contact Provided   Living Environment   People in Home sibling(s)   Current Living Arrangements condominium   Home Accessibility stairs to enter home   Number of Stairs, Main Entrance greater than 10 stairs   Stair Railings, Main Entrance railing on right side (ascending)   Transportation Anticipated agency   Living Environment Comments Pt poor historian, not oriented. Sister provides information. Per sister, pt recently moved in with her approximately 2 weeks ago.   Self-Care   Usual Activity Tolerance fair   Current Activity Tolerance poor   Regular Exercise No   Equipment Currently Used at Home none   Fall history within last six months no  (No reports of falls)   Activity/Exercise/Self-Care Comment Per sister, in February pt was mostly IND, however over the last few weeks has been dependent. Sister reports signficant assist to sit up and remain seated, unable to stand. Most recently dependent for ADLs.   General Information   Onset of Illness/Injury or Date of Surgery 04/03/23   Referring Physician Miguel Salazar MD   Patient/Family Therapy Goals Statement (PT) Pt unable to state goals at this time.   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 [facility], he developed new onset double vision and severe headache which prompted imaging that showed a large mass invading the cavernous sinuses and impinging on the cranial nerves. He is transferred to [facility] " "for this pituitary adenoma with plans for possible surgical intervention\".   Existing Precautions/Restrictions fall   Cognition   Affect/Mental Status (Cognition) confused;unable/difficult to assess   Orientation Status (Cognition) disoriented to;person;place;situation;time   Follows Commands (Cognition) unable/difficult to assess   Pain Assessment   Patient Currently in Pain Yes, see Vital Sign flowsheet  (Unable to qualify/quantify current pain.)   Integumentary/Edema   Integumentary/Edema Comments R LE bandage, sacral wound. B LE edema.   Posture    Posture Comments Unable to assess posture due to command following.   Range of Motion (ROM)   ROM Comment Unable to assess AROM due to command following. PROM within expected limits.   Strength (Manual Muscle Testing)   Strength Comments Unable to assess due to command and cog follow. Demonstrates 0/5   Bed Mobility   Comment, (Bed Mobility) Dependent rolling.   Transfers   Comment, (Transfers) Dependent lift transfer.   Gait/Stairs (Locomotion)   Comment, (Gait/Stairs) Dependent.   Balance   Balance Comments Unable to assess due to command follow and cognition status. Anticipates Max Ax2 to total assist to edge of bed.   Sensory Examination   Sensory Perception Comments Unable assess due to pt command following and cognition.   Coordination   Coordination Comments Unable to assess due to pt command following and cognition.   Muscle Tone   Muscle Tone Comments Flacid muscle tone at rest.   Clinical Impression   Criteria for Skilled Therapeutic Intervention Yes, treatment indicated   PT Diagnosis (PT) Impaired mobility.   Influenced by the following impairments Cognition and command following, pt reports of pain. Anticipated weakness, balance deficits, activity tolerance.   Functional limitations due to impairments Seated EOB, standing, gait, stairs.   Clinical Presentation (PT Evaluation Complexity) Evolving/Changing   Clinical Presentation Rationale Medical status " and clinical reasoning.   Clinical Decision Making (Complexity) high complexity   Planned Therapy Interventions (PT) balance training;bed mobility training;gait training;home exercise program;ROM (range of motion);stair training;strengthening;home program guidelines;risk factor education;progressive activity/exercise   Anticipated Equipment Needs at Discharge (PT) walker, standard   Risk & Benefits of therapy have been explained evaluation/treatment results reviewed;care plan/treatment goals reviewed;risks/benefits reviewed;current/potential barriers reviewed;participants voiced agreement with care plan;participants included;patient;sibling   Clinical Impression Comments Pt presents dependent for all mobility tasks at this date secondary to cognition and command following. Pt unable to make needs known during PT. Per sister pts baseline is dependent for ADLs and mobility tasks.   PT Total Evaluation Time   PT Damián High Complexity Minutes (16874) 8   Physical Therapy Goals   PT Frequency 4x/week   PT Predicted Duration/Target Date for Goal Attainment 04/21/23   PT Goals Bed Mobility;Transfers;Gait   PT: Bed Mobility Minimal assist;Supine to/from sit   PT: Transfers Moderate assist;Sit to/from stand   PT: Gait Moderate assist;Standard walker;50 feet   Interventions   Interventions Quick Adds Therapeutic Activity   Therapeutic Activity   Therapeutic Activities: dynamic activities to improve functional performance Minutes (68889) 12   Treatment Detail/Skilled Intervention Dependent roll and lift performed to assist with wound offloading. Pt requires Max Ax2 to roll, reposition sheet placed and pt lifted from bed in supine position. Air matress placed with nursing assistant. Pt requires total assist to lift extremities and allow for placement of pillows to assist with offloading and wound prevention. 2 Pillows paced under B LEs, 1 pillow under B UEs and pillows placed on pts R side to assist with sacral wound  offloading. Pt requires Max A for all activities with impaired ability to follow commands.   PT Discharge Planning   PT Plan Bed mobility, supine strengthening exercises as able due to pt congition.   PT Discharge Recommendation (DC Rec) Long term care facility;Transitional Care Facility   PT Rationale for DC Rec Pt is completely dependent for all mobility tasks at this time. Pt is significantly below baseline level. Impaired ability to follow commands and answer questions. PT will require improvement in medical status and significant therapy to reach previous level of function, and may require needs greater than available in current living situation. With improvement in cognition may benefit from ongoing therapy in TCU setting to return to previous level of function.   PT Brief overview of current status Dependent assist for all mobility tasks. Lift for transfers. Max A for bed mobility rolling.   Total Session Time   Timed Code Treatment Minutes 12   Total Session Time (sum of timed and untimed services) 20

## 2023-04-04 NOTE — CONSULTS
[facility]       NEUROSURGERY CONSULTATION NOTE    This consultation was requested by Dr. Burnett from the Medicine service.    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 He was admitted due to deconditioning, generalized weakness and open wounds in BLE. He was found to have soft tissue abscess and multiple open wounds and started on antibiotic treatment.   During his hospital stay on 3/25, he developed sudden severe headaches and double vision, for which he ultimately got an MRI brain on 3/28, which demonstrated enlargement of known pituitary adenoma, with necrosis extending laterally beyond the left cavernous sinus, concerning for compression of cranial nerves at cavernous sinus. 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   He was transferred to [facility] yesterday for multidisciplinary assessment.   He is currently encephalopathic and cannot describe symptoms.  Per discussion with patient's sister, prior to him developing symptoms during [facility], his mentation was normal, and he endorsed mild blurry vision in short distances, but was able to read on his phone and did not endorse issues with peripheral vision or double vision. He was able to have meaningful conversations yesterday with her [facility], prior to [facility].       PAST MEDICAL HISTORY: No past medical history on file.    PAST SURGICAL HISTORY: No past surgical history on file.    FAMILY HISTORY: No family history on file.    SOCIAL HISTORY:   Social  History     Tobacco Use     Smoking status: Not on file     Smokeless tobacco: Not on file   Substance Use Topics     Alcohol use: Not on file       MEDICATIONS:  Medications Prior to Admission   Medication Sig Dispense Refill Last Dose     acetaminophen (TYLENOL) 325 MG tablet Take 2 tablets (650 mg) by mouth every 6 hours as needed for mild pain or other (and adjunct with moderate or severe pain or per patient request) 30 tablet 0      aspirin (ASA) 81 MG EC tablet Take 1 tablet (81 mg) by mouth daily 30 tablet 1      carvedilol (COREG) 12.5 MG tablet Take 12.5 mg by mouth 2 times daily (with meals)        furosemide (LASIX) 20 MG tablet Take 1 tablet (20 mg) by mouth daily for 7 days 7 tablet 0      haloperidol (HALDOL) 2 MG tablet Take 2 mg by mouth 3 times daily        hydrALAZINE (APRESOLINE) 25 MG tablet Take 25 mg by mouth 3 times daily        levothyroxine (SYNTHROID/LEVOTHROID) 100 MCG tablet Take 1 tablet (100 mcg) by mouth daily 30 tablet 0      lisinopril-hydrochlorothiazide (ZESTORETIC) 10-12.5 MG tablet Take 1 tablet by mouth daily        metFORMIN (GLUCOPHAGE) 500 MG tablet Take 500 mg by mouth 2 times daily (with meals)        oxyCODONE (ROXICODONE) 5 MG tablet Take 1 tablet (5 mg) by mouth every 6 hours as needed for pain 12 tablet 0      POTASSIUM CHLORIDE PO Take 20 mEq by mouth daily        sitagliptin (JANUVIA) 50 MG tablet Take 2 tablets (100 mg) by mouth daily for 30 days 60 tablet 0      spironolactone (ALDACTONE) 100 MG tablet Take 1 tablet (100 mg) by mouth daily for 30 days 30 tablet 0      testosterone (ANDROGEL) 20.25 MG/1.25GM (1.62%) topical gel Place 20.25 mg onto the skin daily          Allergies:  No Known Allergies    ROS: Unable to obtain    Physical exam:   Blood pressure (!) 149/87, pulse 77, temperature 98.1  F (36.7  C), temperature source Axillary, resp. rate 18, weight 68.7 kg (151 lb 7.3 oz), SpO2 100 %.  CV: BP and HR as noted above  PULM: breathing comfortably on room  air  ABD: soft, non-distended   NEUROLOGIC:  -- Opens eyes to voice, following commands, oriented x2 (name, city)  -- Able to name objects  -- Speech limited, perseverating speech, minimal spontaneous speech  Cranial Nerves:  -- visual fields full to confrontation although with limited participation, PERRL anisocoric L>R and sluggish, left CN III paresis and CN VI palsy, restricted ROM in right EOM with no apparent asymmetry   -- face symmetrical, tongue midline  -- sensory V1-V3 intact bilaterally  -- palate elevates symmetrically, uvula midline  -- hearing grossly intact bilat    Motor:  Limited participation - antigravity x4 extremities    Sensory:  intact to LT x 4 extremities     Reflexes:     Bi Tri BR Omi Pat Ach Bab    C5-6 C7-8 C6 UMN L2-4 S1 UMN   R 2+ 2+ 2+ Norm 2+ 2+ Norm   L 2+ 2+ 2+ Norm 2+ 2+ Norm     Gait: Deferred.       LABS:  Last Comprehensive Metabolic Panel:  Sodium   Date Value Ref Range Status   04/04/2023 143 136 - 145 mmol/L Final     Potassium   Date Value Ref Range Status   04/04/2023 4.3 3.4 - 5.3 mmol/L Final     Chloride   Date Value Ref Range Status   04/04/2023 110 (H) 98 - 107 mmol/L Final     Carbon Dioxide (CO2)   Date Value Ref Range Status   04/04/2023 19 (L) 22 - 29 mmol/L Final     Anion Gap   Date Value Ref Range Status   04/04/2023 14 7 - 15 mmol/L Final     GLUCOSE BY METER POCT   Date Value Ref Range Status   04/04/2023 100 (H) 70 - 99 mg/dL Final     Urea Nitrogen   Date Value Ref Range Status   04/04/2023 15.7 6.0 - 20.0 mg/dL Final     Creatinine   Date Value Ref Range Status   04/04/2023 0.83 0.67 - 1.17 mg/dL Final     GFR Estimate   Date Value Ref Range Status   04/04/2023 >90 >60 mL/min/1.73m2 Final     Comment:     eGFR calculated using 2021 CKD-EPI equation.     Calcium   Date Value Ref Range Status   04/04/2023 9.1 8.6 - 10.0 mg/dL Final     Lab Results   Component Value Date    WBC 16.0 04/04/2023     Lab Results   Component Value Date    RBC 3.48 04/04/2023      Lab Results   Component Value Date    HGB 8.9 04/04/2023     Lab Results   Component Value Date    HCT 30.1 04/04/2023     Lab Results   Component Value Date    MCV 87 04/04/2023     Lab Results   Component Value Date    MCH 25.6 04/04/2023     Lab Results   Component Value Date    MCHC 29.6 04/04/2023     Lab Results   Component Value Date    RDW 22.0 04/04/2023     Lab Results   Component Value Date     04/04/2023           IMAGING:  MRI brain on 3/28: demonstrates enlargement of known pituitary adenoma, with necrosis extending laterally beyond the left cavernous sinus, concerning for compression of cranial nerves at cavernous sinus. 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 When compared to prior MRI on 5/2021, there is increase in sellar component of adenoma, laterally beyond cavernous carotid artery, with smaller suprasellar component.     ASSESSMENT:Gustavo Faria is a 58 year old male with a PMH of DMII, HFrEF, BLE open wounds, history of serratia bacteremia in December 2022,  pituitary ACTH secreting macroadenoma s/p EEA for resection on 5/2021 and 7/2021 in [place], with baseline right CN 3 palsy, who was admitted at the [facility] on 3/23. He developed sudden headache and vision changes on 3/25, with MRI brain on 3/28, which demonstrated enlargement of known pituitary adenoma, with necrosis extending laterally beyond the left cavernous sinus, concerning for compression of cranial nerves at cavernous sinus. 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     Given that he is past the acute period of apoplexy, there is no indication for emergent decompression. He would benefit for redo EEA for resection of adenoma given its symptomatic nature.  However, he would need to be medically optimized from the metabolic and infectious standpoint prior to surgery. He would also benefit from a multidisciplinary approach with recommendations from ophthalmology, endocrinology and radiation oncology regarding options for treatment.     RECOMMENDATIONS:  No emergent neurosurgical intervention indicated at this time   Consult ophthalmology, endocrinology and radiation oncology  Repeat MRI brain pituitary protocol with thin cuts (ordered for you) for better characterization of lesion  Neurosurgery will continue to follow       The patient was discussed with Dr. White, neurosurgery chief resident, and Dr. Jeong, neurosurgery staff, and they agree with the above.    Yaneth Ugalde MD  Neurosurgery Resident, PGY-3          Clinically Significant Risk Factors Present on Admission              # Hypoalbuminemia: Lowest albumin = 3.1 g/dL at 4/4/2023 12:10 AM, will monitor as appropriate     # Hypertension: home medication list includes antihypertensive(s)     # DMII: A1C = 7.6 % (Ref range: <5.7 %) within past 6 months     # Severe Malnutrition: based on nutrition assessment

## 2023-04-04 NOTE — PLAN OF CARE
"Goal Outcome Evaluation:      Plan of Care Reviewed With: patient    Overall Patient Progress: no changeOverall Patient Progress: no change    Time: 2419-4541     Reason for admission: Pituitary adenoma (H) [D35.2]    Vitals: BP (!) 150/99 (BP Location: Right arm, Patient Position: Semi-Mayer's)   Pulse 60   Temp 97.8  F (36.6  C) (Oral)   Resp 18   SpO2 100%      Activity: Not oob since admission. A2/lift.    Pain: BLE. Tylenol and oxycodone given overnight.   Neuro: Waxes, wanemma. Had a brief moment of orientation where patient knew he was at the hospital for \"tumor surgery\" stated that he was out of it and could tell me the date and year. Only lasted about 10 minutes before becoming confused again and fixed on word phrase \"can you help me out\" after everything the patient says.   Cardiac: HTN. 150s/90s overnight.   Respiratory: WNL  GI/: External cath in place for incontinence. L[facility].   Skin/Wounds: RLE wound covered in abd/kerlix. L foot ulcer, mepilex in place. Sacral wound. Raw, reddened groin, WOC consult ordered for AM.   Diet: Orders Placed This Encounter      Combination Diet Regular Diet Adult    Lines:   BG:   Glucose Values Glucose   Latest Ref Rng & Units 70 - 99 mg/dL   4/4/2023  12:10         New changes this shift: Patient fixates on word phrases when disoriented. Started with \"this is amazing\" after every response from patient. Then went to \"this hurts\" now, \"can you help me out\" despite all interventions to help patient. Patient had a brief moment of Orientation, speaking clearly. Patient knew he was at the hospital for surgery for his tumor. Correctly stated todays date. Patient called his sister but as he was on the phone he became more confused and then lost orientation resorting to fixating on the repeated phrase \"can you help me out\". Seemingly behavioral. Bgs stable 102 and 109. Lantus not given d/t patient not eating, BG close to 100. VSS on RA.      Plan: Team " consults. Neuro, opthalmology, WOC. Blood cx ordered, Needs UA

## 2023-04-04 NOTE — PLAN OF CARE
Goal Outcome Evaluation:      Plan of Care Reviewed With: patient    Overall Patient Progress: no change    Outcome Evaluation: Admitted from [facility] to  around 2045 this evening.  A&O x 1- disoriented to place, time and situation.  Per [facility] from [facility] mentation waxes and wanes.  Repeats phrases that are out of context or illogical.  Does not follow commands.  VSS on room air.  c/o pain.  Used PAIND scoring and pt scored 8/10.  Yelling out in pain when moved.  Given tylenol at end of shift.  Regular diet.   R PIV saline locked- was present on admission.  Incontinent of bowel and bladder.  Primofit applied to avoid urine on the skin.  No BM.  Groin folds and julian area are red, raw and painful.  Sacrum/coccyx/buttocks have open areas  and excoriation.  Wound to top of L foot and wounds on RLE- wrapped with kerlix.  Pulsate mattress ordered and WOC consult placed.  PT/OT orders placed as well.  Per report, pt needs A-2 for transfers and has not been out of bed in a couple days.  Bed alarm on for safety.  Plan is for neuro workup and neurosurgery to assess.

## 2023-04-05 ENCOUNTER — ANESTHESIA EVENT (OUTPATIENT)
Dept: SURGERY | Facility: CLINIC | Age: 58
End: 2023-04-05

## 2023-04-05 ENCOUNTER — HOSPITAL ENCOUNTER (INPATIENT)
Dept: NEUROLOGY | Facility: CLINIC | Age: 58
Discharge: HOME | DRG: 614 | End: 2023-04-05
Attending: STUDENT IN AN ORGANIZED HEALTH CARE EDUCATION/TRAINING PROGRAM | Admitting: INTERNAL MEDICINE
Payer: COMMERCIAL

## 2023-04-05 ENCOUNTER — ANESTHESIA (OUTPATIENT)
Dept: SURGERY | Facility: CLINIC | Age: 58
End: 2023-04-05

## 2023-04-05 ENCOUNTER — ANESTHESIA EVENT (OUTPATIENT)
Dept: SURGERY | Facility: CLINIC | Age: 58
DRG: 614 | End: 2023-04-05
Payer: COMMERCIAL

## 2023-04-05 LAB
ABO/RH(D): NORMAL
ACTH PLAS-MCNC: 321 PG/ML
ANION GAP SERPL CALCULATED.3IONS-SCNC: 18 MMOL/L (ref 7–15)
ANTIBODY SCREEN: NEGATIVE
BASE EXCESS BLDV CALC-SCNC: -0.6 MMOL/L (ref -7.7–1.9)
BUN SERPL-MCNC: 17.4 MG/DL (ref 6–20)
CALCIUM SERPL-MCNC: 9.1 MG/DL (ref 8.6–10)
CHLORIDE SERPL-SCNC: 108 MMOL/L (ref 98–107)
CORTIS SERPL-MCNC: 46.3 UG/DL
CREAT SERPL-MCNC: 1.01 MG/DL (ref 0.67–1.17)
DEPRECATED HCO3 PLAS-SCNC: 20 MMOL/L (ref 22–29)
ERYTHROCYTE [DISTWIDTH] IN BLOOD BY AUTOMATED COUNT: 22.4 % (ref 10–15)
GFR SERPL CREATININE-BSD FRML MDRD: 87 ML/MIN/1.73M2
GLUCOSE BLDC GLUCOMTR-MCNC: 111 MG/DL (ref 70–99)
GLUCOSE BLDC GLUCOMTR-MCNC: 136 MG/DL (ref 70–99)
GLUCOSE BLDC GLUCOMTR-MCNC: 153 MG/DL (ref 70–99)
GLUCOSE BLDC GLUCOMTR-MCNC: 196 MG/DL (ref 70–99)
GLUCOSE BLDC GLUCOMTR-MCNC: 99 MG/DL (ref 70–99)
GLUCOSE SERPL-MCNC: 125 MG/DL (ref 70–99)
HBA1C MFR BLD: 9.5 %
HCO3 BLDV-SCNC: 24 MMOL/L (ref 21–28)
HCT VFR BLD AUTO: 30.5 % (ref 40–53)
HGB BLD-MCNC: 8.9 G/DL (ref 13.3–17.7)
LACTATE SERPL-SCNC: 1.9 MMOL/L (ref 0.7–2)
MAGNESIUM SERPL-MCNC: 2.1 MG/DL (ref 1.7–2.3)
MAGNESIUM SERPL-MCNC: 2.2 MG/DL (ref 1.7–2.3)
MCH RBC QN AUTO: 25.3 PG (ref 26.5–33)
MCHC RBC AUTO-ENTMCNC: 29.2 G/DL (ref 31.5–36.5)
MCV RBC AUTO: 87 FL (ref 78–100)
O2/TOTAL GAS SETTING VFR VENT: 20 %
OSMOLALITY SERPL: 310 MMOL/KG (ref 275–295)
OSMOLALITY UR: 337 MMOL/KG (ref 100–1200)
PCO2 BLDV: 35 MM HG (ref 40–50)
PH BLDV: 7.43 [PH] (ref 7.32–7.43)
PLATELET # BLD AUTO: 296 10E3/UL (ref 150–450)
PO2 BLDV: 47 MM HG (ref 25–47)
POTASSIUM SERPL-SCNC: 2.5 MMOL/L (ref 3.4–5.3)
POTASSIUM SERPL-SCNC: 2.8 MMOL/L (ref 3.4–5.3)
PROLACTIN SERPL 3RD IS-MCNC: 0 NG/ML (ref 4–15)
RBC # BLD AUTO: 3.52 10E6/UL (ref 4.4–5.9)
SARS-COV-2 RNA RESP QL NAA+PROBE: NEGATIVE
SODIUM SERPL-SCNC: 146 MMOL/L (ref 136–145)
SP GR UR STRIP: 1.01 (ref 1–1.03)
SPECIMEN EXPIRATION DATE: NORMAL
T4 FREE SERPL-MCNC: 1.3 NG/DL (ref 0.9–1.7)
TSH SERPL DL<=0.005 MIU/L-ACNC: <0.01 UIU/ML (ref 0.3–4.2)
VANCOMYCIN SERPL-MCNC: 32.3 UG/ML
WBC # BLD AUTO: 15.5 10E3/UL (ref 4–11)

## 2023-04-05 PROCEDURE — 83930 ASSAY OF BLOOD OSMOLALITY: CPT | Performed by: STUDENT IN AN ORGANIZED HEALTH CARE EDUCATION/TRAINING PROGRAM

## 2023-04-05 PROCEDURE — 80202 ASSAY OF VANCOMYCIN: CPT | Performed by: INTERNAL MEDICINE

## 2023-04-05 PROCEDURE — 84443 ASSAY THYROID STIM HORMONE: CPT | Performed by: STUDENT IN AN ORGANIZED HEALTH CARE EDUCATION/TRAINING PROGRAM

## 2023-04-05 PROCEDURE — 83735 ASSAY OF MAGNESIUM: CPT

## 2023-04-05 PROCEDURE — 250N000011 HC RX IP 250 OP 636

## 2023-04-05 PROCEDURE — 250N000013 HC RX MED GY IP 250 OP 250 PS 637

## 2023-04-05 PROCEDURE — 83735 ASSAY OF MAGNESIUM: CPT | Performed by: INTERNAL MEDICINE

## 2023-04-05 PROCEDURE — 99233 SBSQ HOSP IP/OBS HIGH 50: CPT | Mod: GC | Performed by: INTERNAL MEDICINE

## 2023-04-05 PROCEDURE — 82803 BLOOD GASES ANY COMBINATION: CPT | Performed by: INTERNAL MEDICINE

## 2023-04-05 PROCEDURE — 86850 RBC ANTIBODY SCREEN: CPT | Performed by: INTERNAL MEDICINE

## 2023-04-05 PROCEDURE — 84146 ASSAY OF PROLACTIN: CPT

## 2023-04-05 PROCEDURE — 80048 BASIC METABOLIC PNL TOTAL CA: CPT

## 2023-04-05 PROCEDURE — 83935 ASSAY OF URINE OSMOLALITY: CPT | Performed by: STUDENT IN AN ORGANIZED HEALTH CARE EDUCATION/TRAINING PROGRAM

## 2023-04-05 PROCEDURE — 250N000013 HC RX MED GY IP 250 OP 250 PS 637: Performed by: STUDENT IN AN ORGANIZED HEALTH CARE EDUCATION/TRAINING PROGRAM

## 2023-04-05 PROCEDURE — 84305 ASSAY OF SOMATOMEDIN: CPT | Performed by: STUDENT IN AN ORGANIZED HEALTH CARE EDUCATION/TRAINING PROGRAM

## 2023-04-05 PROCEDURE — 85014 HEMATOCRIT: CPT

## 2023-04-05 PROCEDURE — 36415 COLL VENOUS BLD VENIPUNCTURE: CPT | Performed by: INTERNAL MEDICINE

## 2023-04-05 PROCEDURE — 250N000011 HC RX IP 250 OP 636: Performed by: INTERNAL MEDICINE

## 2023-04-05 PROCEDURE — 99221 1ST HOSP IP/OBS SF/LOW 40: CPT | Mod: GC | Performed by: INTERNAL MEDICINE

## 2023-04-05 PROCEDURE — 83036 HEMOGLOBIN GLYCOSYLATED A1C: CPT | Performed by: STUDENT IN AN ORGANIZED HEALTH CARE EDUCATION/TRAINING PROGRAM

## 2023-04-05 PROCEDURE — 82533 TOTAL CORTISOL: CPT | Performed by: INTERNAL MEDICINE

## 2023-04-05 PROCEDURE — U0003 INFECTIOUS AGENT DETECTION BY NUCLEIC ACID (DNA OR RNA); SEVERE ACUTE RESPIRATORY SYNDROME CORONAVIRUS 2 (SARS-COV-2) (CORONAVIRUS DISEASE [COVID-19]), AMPLIFIED PROBE TECHNIQUE, MAKING USE OF HIGH THROUGHPUT TECHNOLOGIES AS DESCRIBED BY CMS-2020-01-R: HCPCS | Performed by: INTERNAL MEDICINE

## 2023-04-05 PROCEDURE — 258N000003 HC RX IP 258 OP 636

## 2023-04-05 PROCEDURE — 95718 EEG PHYS/QHP 2-12 HR W/VEEG: CPT | Performed by: PSYCHIATRY & NEUROLOGY

## 2023-04-05 PROCEDURE — 36415 COLL VENOUS BLD VENIPUNCTURE: CPT | Performed by: STUDENT IN AN ORGANIZED HEALTH CARE EDUCATION/TRAINING PROGRAM

## 2023-04-05 PROCEDURE — 93010 ELECTROCARDIOGRAM REPORT: CPT | Performed by: INTERNAL MEDICINE

## 2023-04-05 PROCEDURE — 99232 SBSQ HOSP IP/OBS MODERATE 35: CPT | Mod: GC | Performed by: INTERNAL MEDICINE

## 2023-04-05 PROCEDURE — 250N000013 HC RX MED GY IP 250 OP 250 PS 637: Performed by: INTERNAL MEDICINE

## 2023-04-05 PROCEDURE — 83605 ASSAY OF LACTIC ACID: CPT | Performed by: INTERNAL MEDICINE

## 2023-04-05 PROCEDURE — 36415 COLL VENOUS BLD VENIPUNCTURE: CPT

## 2023-04-05 PROCEDURE — 84132 ASSAY OF SERUM POTASSIUM: CPT

## 2023-04-05 PROCEDURE — 81003 URINALYSIS AUTO W/O SCOPE: CPT | Performed by: STUDENT IN AN ORGANIZED HEALTH CARE EDUCATION/TRAINING PROGRAM

## 2023-04-05 PROCEDURE — 86901 BLOOD TYPING SEROLOGIC RH(D): CPT | Performed by: INTERNAL MEDICINE

## 2023-04-05 PROCEDURE — 82024 ASSAY OF ACTH: CPT | Performed by: INTERNAL MEDICINE

## 2023-04-05 PROCEDURE — 99222 1ST HOSP IP/OBS MODERATE 55: CPT | Mod: GC | Performed by: RADIOLOGY

## 2023-04-05 PROCEDURE — 95711 VEEG 2-12 HR UNMONITORED: CPT

## 2023-04-05 PROCEDURE — 84439 ASSAY OF FREE THYROXINE: CPT | Performed by: STUDENT IN AN ORGANIZED HEALTH CARE EDUCATION/TRAINING PROGRAM

## 2023-04-05 PROCEDURE — 93005 ELECTROCARDIOGRAM TRACING: CPT

## 2023-04-05 PROCEDURE — 120N000002 HC R&B MED SURG/OB UMMC

## 2023-04-05 RX ORDER — SODIUM CHLORIDE, SODIUM LACTATE, POTASSIUM CHLORIDE, CALCIUM CHLORIDE 600; 310; 30; 20 MG/100ML; MG/100ML; MG/100ML; MG/100ML
INJECTION, SOLUTION INTRAVENOUS CONTINUOUS
Status: DISCONTINUED | OUTPATIENT
Start: 2023-04-05 | End: 2023-04-06

## 2023-04-05 RX ORDER — LEVOTHYROXINE SODIUM 100 UG/1
100 TABLET ORAL DAILY
Status: DISCONTINUED | OUTPATIENT
Start: 2023-04-05 | End: 2023-05-03

## 2023-04-05 RX ORDER — TESTOSTERONE GEL, 1% 10 MG/G
25 GEL TRANSDERMAL DAILY
Status: DISCONTINUED | OUTPATIENT
Start: 2023-04-05 | End: 2023-04-27

## 2023-04-05 RX ORDER — POTASSIUM CHLORIDE 750 MG/1
40 TABLET, EXTENDED RELEASE ORAL ONCE
Status: COMPLETED | OUTPATIENT
Start: 2023-04-05 | End: 2023-04-05

## 2023-04-05 RX ORDER — SPIRONOLACTONE 25 MG/1
100 TABLET ORAL DAILY
Status: DISCONTINUED | OUTPATIENT
Start: 2023-04-05 | End: 2023-04-09

## 2023-04-05 RX ORDER — DEXTROSE MONOHYDRATE 25 G/50ML
25-50 INJECTION, SOLUTION INTRAVENOUS
Status: DISCONTINUED | OUTPATIENT
Start: 2023-04-05 | End: 2023-04-05

## 2023-04-05 RX ORDER — POTASSIUM CHLORIDE 7.45 MG/ML
10 INJECTION INTRAVENOUS ONCE
Status: COMPLETED | OUTPATIENT
Start: 2023-04-05 | End: 2023-04-05

## 2023-04-05 RX ORDER — ERGOCALCIFEROL 1.25 MG/1
50000 CAPSULE, LIQUID FILLED ORAL
Status: DISCONTINUED | OUTPATIENT
Start: 2023-04-06 | End: 2023-05-19 | Stop reason: HOSPADM

## 2023-04-05 RX ORDER — FUROSEMIDE 40 MG
40 TABLET ORAL DAILY
Status: DISCONTINUED | OUTPATIENT
Start: 2023-04-05 | End: 2023-04-14

## 2023-04-05 RX ORDER — LEVOTHYROXINE SODIUM 100 UG/1
100 TABLET ORAL DAILY
Status: DISCONTINUED | OUTPATIENT
Start: 2023-04-06 | End: 2023-04-05

## 2023-04-05 RX ORDER — LISINOPRIL 10 MG/1
10 TABLET ORAL DAILY
Status: DISCONTINUED | OUTPATIENT
Start: 2023-04-05 | End: 2023-04-05

## 2023-04-05 RX ORDER — VANCOMYCIN HYDROCHLORIDE 1 G/200ML
1000 INJECTION, SOLUTION INTRAVENOUS EVERY 24 HOURS
Status: DISCONTINUED | OUTPATIENT
Start: 2023-04-06 | End: 2023-04-07

## 2023-04-05 RX ORDER — NICOTINE POLACRILEX 4 MG
15-30 LOZENGE BUCCAL
Status: DISCONTINUED | OUTPATIENT
Start: 2023-04-05 | End: 2023-04-05

## 2023-04-05 RX ORDER — POTASSIUM CHLORIDE 750 MG/1
20 TABLET, EXTENDED RELEASE ORAL ONCE
Status: COMPLETED | OUTPATIENT
Start: 2023-04-05 | End: 2023-04-05

## 2023-04-05 RX ADMIN — POTASSIUM CHLORIDE 20 MEQ: 750 TABLET, EXTENDED RELEASE ORAL at 20:20

## 2023-04-05 RX ADMIN — CARVEDILOL 12.5 MG: 12.5 TABLET, FILM COATED ORAL at 17:46

## 2023-04-05 RX ADMIN — VANCOMYCIN HYDROCHLORIDE 1000 MG: 1 INJECTION, SOLUTION INTRAVENOUS at 10:41

## 2023-04-05 RX ADMIN — POTASSIUM CHLORIDE 10 MEQ: 7.46 INJECTION, SOLUTION INTRAVENOUS at 15:04

## 2023-04-05 RX ADMIN — SODIUM CHLORIDE, POTASSIUM CHLORIDE, SODIUM LACTATE AND CALCIUM CHLORIDE: 600; 310; 30; 20 INJECTION, SOLUTION INTRAVENOUS at 17:46

## 2023-04-05 RX ADMIN — CEFTRIAXONE SODIUM 2 G: 2 INJECTION, POWDER, FOR SOLUTION INTRAMUSCULAR; INTRAVENOUS at 16:42

## 2023-04-05 RX ADMIN — DEXAMETHASONE SODIUM PHOSPHATE 2 MG: 4 INJECTION, SOLUTION INTRA-ARTICULAR; INTRALESIONAL; INTRAMUSCULAR; INTRAVENOUS; SOFT TISSUE at 20:21

## 2023-04-05 RX ADMIN — MICONAZOLE NITRATE: 20 CREAM TOPICAL at 09:30

## 2023-04-05 RX ADMIN — MICONAZOLE NITRATE: 20 POWDER TOPICAL at 18:56

## 2023-04-05 RX ADMIN — DEXAMETHASONE SODIUM PHOSPHATE 2 MG: 4 INJECTION, SOLUTION INTRA-ARTICULAR; INTRALESIONAL; INTRAMUSCULAR; INTRAVENOUS; SOFT TISSUE at 09:10

## 2023-04-05 RX ADMIN — MICONAZOLE NITRATE: 20 POWDER TOPICAL at 09:30

## 2023-04-05 RX ADMIN — POTASSIUM CHLORIDE 40 MEQ: 750 TABLET, EXTENDED RELEASE ORAL at 18:56

## 2023-04-05 RX ADMIN — MICONAZOLE NITRATE: 20 CREAM TOPICAL at 20:21

## 2023-04-05 RX ADMIN — TESTOSTERONE 25 MG OF TESTOSTERONE: 50 GEL TOPICAL at 13:36

## 2023-04-05 RX ADMIN — LEVOTHYROXINE SODIUM 100 MCG: 100 TABLET ORAL at 17:50

## 2023-04-05 RX ADMIN — POTASSIUM CHLORIDE 10 MEQ: 7.46 INJECTION, SOLUTION INTRAVENOUS at 13:36

## 2023-04-05 RX ADMIN — ACETAMINOPHEN 650 MG: 325 TABLET, FILM COATED ORAL at 17:51

## 2023-04-05 ASSESSMENT — ACTIVITIES OF DAILY LIVING (ADL)
ADLS_ACUITY_SCORE: 48
ADLS_ACUITY_SCORE: 47
ADLS_ACUITY_SCORE: 48
ADLS_ACUITY_SCORE: 47
ADLS_ACUITY_SCORE: 48
ADLS_ACUITY_SCORE: 47

## 2023-04-05 NOTE — PROGRESS NOTES
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 Who presented as a transfer from the [facility]. Originally admitted for wound care but then developed encephalopathy with headache, and inability to move his eyes. Repeat MRI showing enlarging soft tissue mass invading bilateral cavernous sinuses; from possible recurrent adenoma.     # Pituitary adenoma, recurrence with necrosis  # Compression of cavernous sinus  # CN 6 palsy, left eye  -Per note review, patient had normal mentation while at [facility], then subsequently developed rapid encephalopathy.   -Patient unable to participate in exam. Encephalopathic and perseverating.   -Bilateral ophthalmoplegia. Unable to track finger or face.   -Personal review of images, agree with compression of cavernous sinus with likely involvement of many cranial nerves, however unable to elucidate true deficits until encephalopathy resolves.   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  -Extraocular motility deficits localize to the cavernous sinuses at this time, but repeat imaging would be helpful in the context of encephalopathy and limited physical exam.    4/5/23: patient more interactive today, but continues to be altered. Tracking face and able to identify left 6th nerve palsy. Remainder of exam is stable.    PLAN:  -Sedated MRI brain and orbit, scheduled for tomorrow   -imperative to rule out other invasive processes and orbital involvement.  -Lack of APD, no disc edema. Left disc pallor  "related to prior optic atrophy.  -Defer to neurosurgery regarding continuing dexamethasone.   -Ophthalmology will continue to follow daily and review MRI orbit results when completed.     It is our pleasure to participate in this patient's care and treatment. Please contact us with any further questions or concerns.    Discussed with Dr. Kate, PGY-5, neuro-ophthalmology fellow who agreed with this assessment and plan.     Thank you for entrusting us with your care  Alberto Carl MD, PGY2  Ophthalmology Resident  [facility]  Pager: [phone]    HISTORY OF PRESENTING ILLNESS:     Gustavo Faria is a 58 year old male with recent Serratia bacteremia, HFrEF, prolonged QT, lower extremity wounds, DMII, hypothyroidism, low testosterone and known ACTH secreting pituitary adenoma with resulting Cushing's disease s/p 2 partial resections in 2021.      He was hospitalized at the [facility] for not being able to care for is lower extremity wounds. While he was hospitalized, he developed new onset double vision and severe headache and was found to have large mass invading the cavernous sinuses. He was transferred to [facility] for evaluation by neurosurgery service and ophthalmology service. Plans are to surgically treat pituitary adenoma by neurosurgery this admission.    [facility] as a temporizing measure before transferring to the [facility]. Currently on 2mg dexamethasone BID.     Of note, patient has acute metabolic encephalopathy. Patient is perseverated on the phrase \"it hurts\". He will answer questions but ends every sentence with \"it hurts\" and won't open his eyes.      10+ review of systems were otherwise negative except for that which has been stated above.      OCULAR/MEDICAL/SURGICAL HISTORIES:     Past Ocular History:   Unable to attain. Patient encephalopathic.    Eye Drops:   None at this time    Pertinent Systemic Medications: "   Dexamethasone, 2mg BID    Past Medical History:  No past medical history on file.  Type 2 diabetes mellitus, A1c 7.6%  Chronic microcytic anemia  HFrEF  HTN  RLE wound and Left foot wound; wound culture growing serratia marcescens.    Past Surgical History:   No past surgical history on file.    Family History:  Unable to attain. Patient encephalopathic.    Social History:  Unable to attain. Patient encephalopathic.    EXAMINATION:     Base Eye Exam       Visual Acuity (Snellen - Linear)         Right Left    Near sc 20/200 20/200   Poor participation. Encephalopathic with altered mental status             Pupils         Dark Light Shape React APD    Right 3 2 Round Brisk None    Left 3 2 Round Brisk None              Extraocular Movement         Right Left     -- -3 --   -1  -1   -- -3 --    -- -4 --   0  -3.5   -- -3 --                     Slit Lamp and Fundus Exam       Slit Lamp Exam         Right Left    Lids/Lashes Normal Normal    Conjunctiva/Sclera White and quiet White and quiet    Cornea 360 arcus 360 arcus    Anterior Chamber Deep and quiet Deep and quiet    Iris Round and reactive Round and reactive    Lens Clear Clear    Anterior Vitreous Normal Normal              Fundus Exam         Right Left    Disc pink rim diffuse pallor    C/D Ratio 0.7 0.7    Macula Normal Normal    Vessels Normal Normal    Periphery Normal Normal                    Labs/Studies/Imaging Performed  MRI Brain 3/28/23:  Predominantly T1 Isib intense, T2 isointense, heterogeneously enhancing soft tissue occupies the majority of the postsurgical sella, invades the bilateral cavernous sinuses, extends to the adjacent portions of bilateral Meckel's cave's, invades the superior portion of the clivus, erodes the dorsum sella, and projects focally into the posterior portion of the postsurgical right sphenoid sinus.  The soft tissue measures approximately 2.7 cm craniocaudal by 2.6 cm anterior-posterior by 4.1 cm transverse.  This is  consistent with residual or recurrent adenoma in this patient with a reported history of prior surgical debulking of known pituitary adenoma.  Although there is circumferential encasement of the bilateral cavernous internal carotid arteries, the corresponding arterial flow voids are preserved, without appreciable narrowing.  There is slight rightward deviation of the pituitary infundibulum.  Residual pituitary parenchyma is not well delineated, although a small component is likely present along the right superior paramedian margin of the presumed adenoma.  There is no mass effect on the optic chiasm, which demonstrates normal T2 weighted signal intensity.  An osseous defect in the right anterior floor of the sella, in keeping with the patient's previous transplant surgery, has demonstrated.  Mild focal FLAIR hyperintensities present in the posterior subcortical white matter of the right inferior frontal gyrus.  These imaging findings are most suggestive of localized benign vascular enhancement such as an incidental capillary damage ectasia.  No intracranial mass or abnormal enhancement is identified elsewhere.  MRI brain w/ and w/o contrast, 3/28/23:         Alberto Carl MD  Resident Physician, PGY2  Department of Ophthalmology  04/04/23 3:11 PM

## 2023-04-05 NOTE — PROGRESS NOTES
INITIAL REPORT of Video-EEG Monitoring              DATE OF RECORDIN2023         I reviewed the first 1 hour of video-EEG monitoring of Gustavo Faria.         The EEG during waking was abnormal due to nearly continuous, variably irregular or rhythmic, generalized delta slowing, with intermixture of faster theta-alpha activities bilaterally.    No interictal epileptiform abnormalities and no electrographic seizures were observed.         These abnormalities indicate moderate electrographic encephalopathy.    This recording excludes non-convulsive status epilepticus as a possible cause of this encephalopathy.    Robb Erwin M.D.

## 2023-04-05 NOTE — ANESTHESIA PREPROCEDURE EVALUATION
Anesthesia Pre-Procedure Evaluation    Patient: Gustavo Faria   MRN: [id number] : 1965        Procedure : Procedure(s):  Anesthesia out of OR MRI          No past medical history on file.   No past surgical history on file.   No Known Allergies   Social History     Tobacco Use     Smoking status: Not on file     Smokeless tobacco: Not on file   Substance Use Topics     Alcohol use: Not on file      Wt Readings from Last 1 Encounters:   23 68.6 kg (151 lb 3.8 oz)        Anesthesia Evaluation            ROS/MED HX  ENT/Pulmonary:  - neg pulmonary ROS     Neurologic:  - neg neurologic ROS     Cardiovascular: Comment: # HFrEF   # HTN   # Prolonged QTc   Latest TTE on  shows EF of 40%. Coronary angiogram on  shows normal coronaries.     (+) hypertension-----CHF Previous cardiac testing   Echo: Date: 23 Results:  Interpretation Summary     There is mild concentric left ventricular hypertrophy.  Left ventricular systolic function is mildly reduced.  The visual ejection fraction is 40-45%.  Base-mid inferior and inferolateral hypokinesis.  No significant valve dysfunction.  The inferior vena cava was normal in size with preserved respiratory  variability.  Mild aortic root dilatation.  There is no pericardial effusion.     No prior for comparison.  Stress Test: Date: Results:    ECG Reviewed: Date: Results:    Cath: Date: Results:      METS/Exercise Tolerance:     Hematologic:     (+) anemia,     Musculoskeletal: Comment: # Buttocks, sacrum and bilateral groin wounds   # RLE wound from puncture injury   # L dorsal foot wound from presumed trauma   # Soft tissue infection/abscess    # Hx serratia bacteremia in 2022       GI/Hepatic:  - neg GI/hepatic ROS     Renal/Genitourinary: Comment: Na 146.  Hypokalemia k 2.8      Endo: Comment: # ACTH secreting pituitary adenoma c/b type II DM, hypothyroidism and low testosterone s/p two partial resections in    # Anisocoria w/left pupil (L is  "larger than R)      Psychiatric/Substance Use: Comment: # Marijuana use   Uses daily medical marijuana at home.    # Behavioral issues   # Perseveration   [facility] and had a 72-hour hold placed temporarily which was quickly removed. He was evaluated by [facility] health at the [facility] who deemed patient had capacity. Per chart review, it does mention psychosis diagnosis in 2021, but [place], so no further information. On exam, patient is perseverated on the phrase \"it hurts\". He will answer questions but ends every sentence with \"it hurts\" and won't open his eyes. Patient's PTA medications show haldol, but per [facility] records, [facility] not [facility].       Infectious Disease:       Malignancy:  - neg malignancy ROS     Other:          Gustavo Faria is a 58 year old male with recent Serratia bacteremia, HFrEF, prolonged QT, lower extremity wounds, DMII, hypothyroidism, low testosterone and known ACTH secreting pituitary adenoma w/resulting Cushing's disease s/p 2 partial resections in 2021 who [facility]. [facility], he developed new onset double vision and severe headache which prompted imaging that showed a large mass invading the cavernous sinuses and impinging on the cranial nerves. He is transferred to [facility] for this pituitary adenoma with plans for possible surgical intervention.      OUTSIDE LABS:  CBC:   Lab Results   Component Value Date    WBC 15.5 (H) 04/05/2023    WBC 16.0 (H) 04/04/2023    HGB 8.9 (L) 04/05/2023    HGB 8.9 (L) 04/04/2023    HCT 30.5 (L) 04/05/2023    HCT 30.1 (L) 04/04/2023     04/05/2023     04/04/2023     BMP:   Lab Results   Component Value Date     (H) 04/05/2023     04/04/2023    POTASSIUM 2.8 (L) 04/05/2023    POTASSIUM 4.3 04/04/2023    CHLORIDE 108 (H) 04/05/2023 "    CHLORIDE 110 (H) 04/04/2023    CO2 20 (L) 04/05/2023    CO2 19 (L) 04/04/2023    BUN 17.4 04/05/2023    BUN 15.7 04/04/2023    CR 1.01 04/05/2023    CR 0.83 04/04/2023     (H) 04/05/2023     (H) 04/05/2023     COAGS:   Lab Results   Component Value Date    INR 1.02 04/04/2023     POC: No results found for: BGM, HCG, HCGS  HEPATIC:   Lab Results   Component Value Date    ALBUMIN 3.1 (L) 04/04/2023    PROTTOTAL 5.1 (L) 04/04/2023    ALT 89 (H) 04/04/2023    AST 64 (H) 04/04/2023    ALKPHOS 345 (H) 04/04/2023    BILITOTAL 0.4 04/04/2023     OTHER:   Lab Results   Component Value Date    LACT 1.5 04/04/2023    A1C 7.6 (H) 02/16/2023    KORIN 9.1 04/05/2023    MAG 2.2 04/05/2023    SED 33 (H) 02/16/2023               Edison Rodriguez Junior, MD

## 2023-04-05 NOTE — PLAN OF CARE
"Goal Outcome Evaluation:      Plan of Care Reviewed With: patient, sibling    Overall Patient Progress: no change    Outcome Evaluation: [id number]. Pt only oriented to self. Difficult to assess orientation and neuro d/t pt not following commands and repeating words and phrases. Pt mostly counting or repeating a single word. Pt awake alert entire shift. Mitts on for pulling EEG and PIV lines. Bed alarm on for safety, pt room by nurses station with curtain open. EEG complete. Pt was reportedly \"mildly\" combative with EEG staff, however, redirectable w/ all other staff interactions on shift. HTN, all other VSS on RA. K+ 2.8, 2 doses of IV replacement administered, recheck ordered for 1800 and RN protocol added, plan to follow RN protocol going forward. EKG, stat lactic, VBGs, UA ordered. [facility] report, pt has not had BM since 4/2, no BM on shift, passing gas, unable to give bowel meds d/t pt not cooperating with med administration. Primofit in place, UA collected. Pt was NPO for majority of shift awaiting MRI under anesthesia. Pt was placed on add-on list but unlikely that pt would be seen today per anesthesia. MRI then changed to STAT. However, Anesthesia and MRI could not align schedules today to accommodate scan. Now plan is MRI at 0730 tomorrow, 4/6, with pt going to OR at approximately 0530. Pt NPO at midnight, order to start LR when pt NPO. MRI check list was completed yesterday and sent down to MRI, MRI confirmed they have check list. Per anesthesia, no other consent will be needed for the MRI. Pt has 20 gauge PIV in place for contrast. IV vanco given. Writer contacted team to confirm holding PO am meds okay d/t pt not following commands. Team okay with pt using apple sauce for future PO meds if able. BLE wound dressing CDI, due to be changed tomorrow. Karen/buttock/ sacral wounds treated per plan of care.     "

## 2023-04-05 NOTE — PLAN OF CARE
Goal Outcome Evaluation:      Plan of Care Reviewed With: patient    Overall Patient Progress: no changeOverall Patient Progress: no change    Outcome Evaluation: Patient orientation difficult to assess. Seemingly oriented to self only for most of shift. Had a moment overnight where he stated name and was at the hospital in [place]. Otherwise patient continued to say numbers and illogical phrases, not responding to assessment questions. Denied pain overnight. Patient NPO since midnight for MRI today. EEG monitoring overnight, Patient pulled off 3 EEG leads at 0430, mitts applied for the duration of the monitoring. Dressings cdi, No BM overnight. Primofit in place with good output.

## 2023-04-05 NOTE — PROGRESS NOTES
BP (!) 143/92   Pulse 71   Temp 97.8  F (36.6  C) (Oral)   Resp 20   Wt 68.6 kg (151 lb 3.8 oz)   SpO2 100%   BMI 24.41 kg/m    Neuro: Intermittently confused  Cardiac: Afebrile, VSS.   Respiratory: RA   GI/: Voiding spontaneously. BM this shift.   Diet/appetite: Tolerating diet. Poor appetite.Denies nausea   Activity: Turn and repo  Pain: requested tylenol  Skin: No new deficits noted. Cares provided for coccyx and groin, scrotum.  Lines:PIV  Drains: Primo fit  No new complaints.Will continue to monitor and follow plan of care.

## 2023-04-05 NOTE — CONSULTS
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    HPI:  Gustavo Faria is a 58 year old male with PMHx of ACTH-secreting macroadenoma c/b central hypothyroidism, and central hypogonadism,  s/p transphenoidal surgery 5/2021 and 7/2021 in [place] has baseline records 3rd nerve palsy, ongoing serratia RLE cellulitis c/b recent serratia bacteremia, HFrEF, T2DM, and HTN who [facility] of known expanding large sellar/suprasellar mass extending into cavernous sinuses and subsequent cranial nerve impingement.    He was admitted to [facility] on 3/23 for physical deconditioning generalized weakness and bilateral lower limb open wounds he was found to have soft tissue abscess multiple open wounds started on antibiotic treatment.  [facility] developed sudden severe headaches and double vision.  Had an MRI done on 3/25 which demonstrates enlargement of known pituitary adenoma with necrosis extending laterally beyond the left cavernous sinus concerning for compression of the cranial nerves at the cavernous sinus.  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    Prior to the transfer to [facility] he was started on dexamethasone 2 mg twice daily as per [facility] the [facility] continued following the admission to [facility].    The [facility] records of the hospitalization  are not available in his  chart.    According to the limited available records [facility], [facility] and [facility] it does not seem he was on any medical treatment for Cushing disease he was taking levothyroxine 100 mcg daily for central hypothyroidism was on Sitagliptin 100 mg daily testosterone gel 1 pump daily, metformin 500 mg twice daily.    Following admission to the hospital he was found to have AMS continued on dexamethasone 2 mg twice daily.  Neurosurgery consulted (no plan for immediate intervention), neurology consulted EEG done for the patient, seen by ophthalmology.  Plan for MRI brain and orbit tomorrow morning.    Not able to obtain any history from the patient: confused during the assessment.        ROS:  Was not able to obtain due to confusion    Past Medical/Surgical History:  No past medical history on file.  No past surgical history on file.    Allergies:  No Known Allergies    PTA Meds:  Prior to Admission medications    Medication Sig Last Dose Taking? Auth Provider Long Term End Date   acetaminophen (TYLENOL) 325 MG tablet Take 2 tablets (650 mg) by mouth every 6 hours as needed for mild pain or other (and adjunct with moderate or severe pain or per patient request)  Patient taking differently: Take 650 mg by mouth every 8 hours as needed for mild pain or other (and adjunct with moderate or severe pain or per patient request) Unknown Yes Max Stein MD     aspirin (ASA) 81 MG EC tablet Take 1 tablet (81 mg) by mouth daily Unknown Yes Max Stein MD     carvedilol (COREG) 25 MG tablet Take 25 mg by mouth 2 times daily (with meals) Unknown Yes Unknown, Entered By History Yes    empagliflozin (JARDIANCE) 25 MG TABS tablet Take 12.5 mg by mouth daily Unknown Yes Unknown, Entered By History     furosemide (LASIX) 40 MG tablet Take 40 mg by mouth daily Unknown Yes Unknown, Entered By History Yes    haloperidol (HALDOL) 2 MG tablet Take 2 mg by mouth 3  times daily Unknown Yes Unknown, Entered By History Yes    hydrALAZINE (APRESOLINE) 25 MG tablet Take 25 mg by mouth 3 times daily Unknown Yes Unknown, Entered By History Yes    levothyroxine (SYNTHROID/LEVOTHROID) 100 MCG tablet Take 1 tablet (100 mcg) by mouth daily Unknown Yes Shauna Feldman PA-C Yes    lisinopril (ZESTRIL) 10 MG tablet Take 10 mg by mouth daily Unknown Yes Unknown, Entered By History     metFORMIN (GLUCOPHAGE) 500 MG tablet Take 500 mg by mouth 2 times daily (with meals) Unknown Yes Unknown, Entered By History Yes    oxyCODONE (ROXICODONE) 5 MG tablet Take 1 tablet (5 mg) by mouth every 6 hours as needed for pain Unknown Yes Adal Rodriguez MD     potassium chloride ER (K-TAB) 20 MEQ CR tablet Take 20 mEq by mouth daily Unknown Yes Unknown, Entered By History     sildenafil (REVATIO) 20 MG tablet Take 20 mg by mouth daily as needed Unknown Yes Unknown, Entered By History Yes    sitagliptin (JANUVIA) 100 MG tablet Take 100 mg by mouth daily Unknown Yes Unknown, Entered By History Yes    spironolactone (ALDACTONE) 100 MG tablet Take 100 mg by mouth daily Unknown Yes Unknown, Entered By History Yes    sulfamethoxazole-trimethoprim (BACTRIM DS) 800-160 MG tablet Take 1 tablet by mouth 2 times daily Unknown Yes Unknown, Entered By History  4/25/23   testosterone (ANDROGEL) 20.25 MG/1.25GM (1.62%) topical gel Place 20.25 mg onto the skin daily Unknown Yes Unknown, Entered By History Yes    vitamin D2 (ERGOCALCIFEROL) 24596 units (1250 mcg) capsule Take 50,000 Units by mouth 1 capsule by mouth on Monday and Friday Unknown Yes Unknown, Entered By History     sitagliptin (JANUVIA) 50 MG tablet Take 2 tablets (100 mg) by mouth daily for 30 days   Shauna Feldman PA-C Yes 3/13/23   spironolactone (ALDACTONE) 100 MG tablet Take 1 tablet (100 mg) by mouth daily for 30 days   Shauna Feldman PA-C Yes 3/13/23        Current Medications:   Current Facility-Administered Medications   Medication      acetaminophen (TYLENOL) tablet 650 mg     aspirin EC tablet 81 mg     calcium carbonate (TUMS) chewable tablet 500 mg     carvedilol (COREG) tablet 12.5 mg     cefTRIAXone (ROCEPHIN) 2 g vial to attach to  ml bag for ADULTS or NS 50 ml bag for PEDS     dexamethasone (DECADRON) injection 2 mg     [Held by provider] dexamethasone (DECADRON) tablet 2 mg     glucose gel 15-30 g    Or     dextrose 50 % injection 25-50 mL    Or     glucagon injection 1 mg     [Held by provider] empagliflozin (JARDIANCE) tablet 10 mg     [Held by provider] furosemide (LASIX) tablet 40 mg     haloperidol (HALDOL) tablet 2 mg     HYDROmorphone (PF) (DILAUDID) injection 0.5 mg     insulin glargine (LANTUS PEN) injection 12 Units     lactulose (CHRONULAC) solution 20 g     levothyroxine (SYNTHROID/LEVOTHROID) tablet 125 mcg     lidocaine (LMX4) cream     lidocaine 1 % 0.1-1 mL     [Held by provider] lisinopril (ZESTRIL) tablet 10 mg     melatonin tablet 1 mg     [Held by provider] metFORMIN (GLUCOPHAGE) tablet 1,000 mg     miconazole (MICATIN) 2 % cream     miconazole (MICATIN) 2 % powder     multivitamin w/minerals (THERA-VIT-M) tablet 1 tablet     naloxone (NARCAN) injection 0.2 mg    Or     naloxone (NARCAN) injection 0.4 mg    Or     naloxone (NARCAN) injection 0.2 mg    Or     naloxone (NARCAN) injection 0.4 mg     oxyCODONE (ROXICODONE) tablet 5 mg     polyethylene glycol (MIRALAX) Packet 17 g     sitagliptin (JANUVIA) tablet 100 mg     sodium chloride (PF) 0.9% PF flush 3 mL     sodium chloride (PF) 0.9% PF flush 3 mL     [Held by provider] spironolactone (ALDACTONE) tablet 100 mg     testosterone (ANDROGEL/TESTIM) 50 MG/5GM (1%) topical gel 25 mg of testosterone     vancomycin (VANCOCIN) 1000 mg in dextrose 5% 200 mL PREMIX     [START ON 4/6/2023] vitamin D2 (ERGOCALCIFEROL) 65709 units (1250 mcg) capsule 50,000 Units       Family History:  No family history on file.    Social History:  Social History     Tobacco Use     Smoking  status: Not on file     Smokeless tobacco: Not on file   Substance Use Topics     Alcohol use: Not on file         Physical Examination:  Exam:  Constitutional: Intermittent agitation, alert however confused.  Head: No morning of the face normocephalic.  Neck: No thyromegaly.  Cardiovascular: S1, S2 normal no murmur no added sounds.  Respiratory: On room air normal effort of breathing normal air entry bilaterally no wheezes or crackles.  Gastrointestinal: Diffuse wide darkening color striae.  Musculoskeletal: He has multiple dressing over bilateral legs, darkening discoloration of the right leg.  Neurologic: Alert and agitated, confused not oriented to time place or person, he has apparent ptosis over the left side with impaired lateral and medial gaze over the left side.(Left oculomotor abducens nerve palsy)  Psychiatric: Not able to assess confused and agitated.      Endocrine Labs:   Latest Reference Range & Units 02/16/23 08:54 04/05/23 02:35 04/05/23 06:11   Cortisol Serum ug/dL  46.3    Hemoglobin A1C <5.7 % 7.6 (H)     Prolactin 4 - 15 ng/mL   0 (L)      Latest Reference Range & Units 02/16/23 08:54 04/05/23 02:35 04/05/23 06:11   Adrenal Corticotropin <47 pg/mL  321 (H)    Hemoglobin A1C <5.7 % 7.6 (H)  9.5 (H)   T4 Free 0.90 - 1.70 ng/dL   1.30   TSH 0.30 - 4.20 uIU/mL   <0.01 (L)             CT head with and without IV contrast on 1/1/2023 from outside records:  FINDINGS:         Again seen is a broad hyperdense mass at the base of the brain in the midline centered on the sella turcica but extending into both cavernous sinus regions. This is not changed in overall size.     No acute hemorrhage. No midline shift seen.     There are no extra-axial fluid collections     The ventricles are not enlarged.           IMPRESSION:   Stable appearance of a midline skull base mass.     MRI brain with and without contrast on 12/10/2022 from outside records:  FINDINGS:   There is no focus of restricted diffusion to  suggest an acute process.     The fourth ventricle is in the midline without mass impression.     The supratentorial brain, the ventricles and cortical sulci appear normal in size and appearance. There is a right frontal 1.4 cm faint area of T2 and FLAIR signal hyperintensity with faint enhancement on postcontrast imaging, demonstrating low signal on the GRE sequence. No overlying gyral expansion or perilesional edema.     An acute hemorrhage or major vascular territorial infarct is not seen. There is no midline shift. No abnormal extra-axial fluid collection identified.     There is a 4.2 x 2.4 x 1.7 cm (TV aches, measured series 13, image 10, series 11 image nine. Heterogenous enhancing mass centered in the second metatarsal. There is bilateral cavernous sinus encasement of the internal carotid arteries. The infundibulum is in the midline. No optic chiasm compression identified.     Right maxillary, ethmoid and frontal sinus opacification. Calvarium appears unremarkable.       IMPRESSION:     1. Heterogenous enhancing 4.2 x 2.4 x 1.7 cm mass centered in the sella, with bilateral cavernous sinus extension and encasement of the internal carotid arteries, could represent a microadenoma. Infundibulum is in the midline. No optic chiasm compression identified at this time.     2. A 1.4 cm faint area of subcortical enhancement in the right frontal lobe with susceptibility. No gyral expansion or surrounding edema like signal. Findings are nonspecific and could represent an area of capillary telangiectasias or vascular malformation, however a mass lesion cannot be excluded. Advise short-term follow-up with contrast-enhanced MRI of the brain in 3 months.     3. Right maxillary, ethmoid and frontal sinus mucosal disease.     4. No acute intracranial abnormality.           Assessment and Plan:   Gustavo Faria is a 58 year old male with PMHx of  ACTH-secreting macroadenoma c/b central hypothyroidism, , and central  hypogonadism,  s/p transphenoidal surgery 5/2021 and 7/2021 in [place] has baseline records 3rd nerve palsy, ongoing serratia RLE cellulitis c/b recent serratia bacteremia, HFrEF, T2DM, and HTN who [facility] of known expanding large sellar/suprasellar mass extending into cavernous sinuses and subsequent cranial nerve impingement.    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  No acute hemorrhage, small area of diffusion restriction at the left side of pituitary possibly consistent with ischemic pituitary adenoma  Started on dexamethasone 2 mg twice daily; currently on the same.  Altered mental status prior to the transfer and continues to have altered mental status after the transfer.  Random serum cortisol level at 2:30 AM was 46.3, ACTH 321.  From the limited records available he was not in any medical therapy for Cushing's disease.    Recommendations:  -We will follow MRI pituitary tomorrow.  -From endocrinology point of view no indication for placing him on steroids, we will defer the decision for discontinuing dexamethasone to the neurosurgery team.  -Following discontinuing the steroids will get morning cortisol and ACTH level.      #Central hypothyroidism:  Prior to admission was on levothyroxine 100 mcg daily.  Free T4 today 1.3 TSH not detectable (picture of central hypothyroidism).    Recommendations:  -To correct the dose of levothyroxine to 100 mcg daily instead of 125 mcg daily.    #Hypogonadotrophic hypogonadism:  Was on AndroGel gel 1 pump daily.    Recommendations:  -If his family  brings it to the hospital it is okay to restart it during the hospitalization; otherwise, hold for now    #Assessment for DI:  No known history of DI.  Sodium level on the admission was normal however sodium level today morning was 146, serum osmolarity 310/urine osmolarity 337, urine specific gravity 1.008.  No evidence of DI.    Recommendations:  -Continue to monitor the sodium level.  -Continue to monitor I's and O's.      #DM type II: Hemoglobin A1c on the admission 9.5%.  Prior to admission was on Sitagliptin 100 mg daily/metformin 500 mg twice daily, Jardiance 12.5 mg.    Recommendations:  -To start on Lantus 10 units daily.  -Low-dose SSI 3 times daily before meals and at the bedtime.  -If he starts to eat to receive NovoLog carb coverage of 1 unit: 25 g CHO with meals and snacks.  -Hypoglycemia rescue protocol.  -POC BG checks 3 times daily AC at the bedtime at 2 AM.  -Carb counting protocol.  -Agree with holding Jardiance (prescribed for heart failure also) while hospitalized        Jeanne Chase     Endocrine fellow   Pager number: [phone]           I have seen and examined the patient, reviewed records, reviewed and edited the fellow's note. I agree with the plan of care.    Addendum: Given blood glucose of 99 at 18:00, we will discontinue Lantus and only use correction scale as needed until we further assess insulin needs.    Evelin Yeboah MD 4/5/2023 9:29 PM   Division of Diabetes, Endocrinology and Metabolism  Department of Medicine

## 2023-04-05 NOTE — PROGRESS NOTES
[facility], [place]   04/05/2023  Neurosurgery Progress Note:    Assessment:  Gustavo Faria is a 58 year old male with a PMH of DMII, HFrEF, BLE open wounds, history of serratia bacteremia in December 2022,  pituitary ACTH secreting macroadenoma s/p EEA for resection on 5/2021 and 7/2021 in [place], with baseline right CN 3 palsy, who was admitted at the [facility] on 3/23. He developed sudden headache and vision changes on 3/25, with MRI brain on 3/28, which demonstrated enlargement of known pituitary adenoma, with necrosis extending laterally beyond the left cavernous sinus, concerning for compression of cranial nerves at cavernous sinus. 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      Given that he is past the acute period of apoplexy, there is no indication for emergent decompression. He would benefit for redo EEA for resection of adenoma given its symptomatic nature. However, he would need to be medically optimized from the metabolic and infectious standpoint prior to surgery. He would also benefit from a multidisciplinary approach with recommendations from ophthalmology, endocrinology and radiation oncology regarding options for treatment.     Plan:  Consult ophthalmology, endocrinology and radiation oncology  Repeat MRI brain pituitary protocol with thin cuts (ordered for you) for better characterization of lesion  Encephalopathy workup per primary team (on EEG)  Neurosurgery will continue to follow   -----------------------------------  Oliver Duong  Neurosurgery Resident PGY2    Interval History: No acute events overnight that were brought to neurosurgery's attention.    Objective:   Temp:  [97.6  F (36.4  C)-98.1  F (36.7  C)] 97.9  F (36.6  C)  Pulse:  [77-95] 80  Resp:  [18-20] 20  BP: (130-154)/(87-96) 154/96  SpO2:  [98 %-100 %] 98 %  I/O  last 3 completed shifts:  In: -   Out: 750 [Urine:750]      NEUROLOGIC:  -- Opens eyes to voice, following commands, oriented x2 (name, city)  -- Able to name objects  -- Speech limited, perseverating speech, minimal spontaneous speech  Cranial Nerves:  -- visual fields full to confrontation although with limited participation, PERRL anisocoric L>R and sluggish, left CN III paresis and CN VI palsy, restricted ROM in right EOM with no apparent asymmetry   -- face symmetrical, tongue midline  -- sensory V1-V3 intact bilaterally  -- palate elevates symmetrically, uvula midline  -- hearing grossly intact bilat     Motor:  Limited participation - antigravity x4 extremities     Sensory:  intact to LT x 4 extremities      Reflexes:       Bi Tri BR Omi Pat Ach Bab     C5-6 C7-8 C6 UMN L2-4 S1 UMN   R 2+ 2+ 2+ Norm 2+ 2+ Norm   L 2+ 2+ 2+ Norm 2+ 2+ Norm      Gait: Deferred.     LABS:  Recent Labs   Lab 04/05/23  0756 04/05/23  0611 04/05/23  0152 04/04/23  0150 04/04/23  0010   NA  --  146*  --   --  143   POTASSIUM  --  2.8*  --   --  4.3   CHLORIDE  --  108*  --   --  110*   CO2  --  20*  --   --  19*   ANIONGAP  --  18*  --   --  14   * 125* 111*   < > 105*   BUN  --  17.4  --   --  15.7   CR  --  1.01  --   --  0.83   KORIN  --  9.1  --   --  9.1    < > = values in this interval not displayed.       Recent Labs   Lab 04/05/23  0611   WBC 15.5*   RBC 3.52*   HGB 8.9*   HCT 30.5*   MCV 87   MCH 25.3*   MCHC 29.2*   RDW 22.4*          IMAGING:  Recent Results (from the past 24 hour(s))   XR Chest Port 1 View    Narrative    Portable chest    INDICATION: Infectious workup    COMPARISON: 2/16/2023    FINDINGS: Heart is mildly enlarged for portable technique. Lungs and  pulmonary vasculature show mild central bronchial wall thickening. No  interstitial edema or costophrenic angle blunting.      Impression    IMPRESSION: Mild bronchial wall thickening centrally which could  indicate acute airway inflammation or  other reactive airways process.  Lungs do not appear significantly hyperinflated.    MANJU BEY MD         SYSTEM ID:  [id number]

## 2023-04-05 NOTE — PROVIDER NOTIFICATION
Critical lab potassium 2.3, however potassium infusion replacement is not yet complete. Med team Intern paged.

## 2023-04-05 NOTE — PROGRESS NOTES
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 [facility], he developed new onset double vision and severe headache which prompted imaging that showed a large mass invading the cavernous sinuses and impinging on the cranial nerves. He is transferred to [facility] for this pituitary adenoma, concern for progression vs hemorrhage/apoplexy. Complicated by psychosis/metabolic encephalopathy.    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 CT imaging on 3/25 revealed a large sellar/suprasellar mass extending into the cavernous sinuses with no evidence of acute stroke. On 3/28, he underwent an MRI which confirmed the CT findings of a large mass invading the cavernous sinuses and impinging on the cranial nerves. Necrosis extending beyond left cavernous sinus. [facility]   - AM cortisol: 46  - Pending ACTH  - TSH <0.01, T4 1.3  - Prolactin 0   - Neurosurgery involved   - MRI brain  ordered, scheduled 4/6 with anesthesia   - No emergent decompression, pending surgical plans after MRI   - Metabolic and infectious optimization prior to surgery  - Ophthalmology consult, appreciate recs    - MRI orbits ordered, scheduled 4/6 with anesthesia  - Continue dexamethasone 2 mg BID  - Radiation Oncology consulted  - Endocrine consulted   - Ordered CT head w/wo and CT orbits to evaluate prior to MRI scan     # Acute metabolic encephalopathy  # Concern for acute psychosis, possibly steroid-related  # Leukocytosis   Disoriented, intermittently following directions. Repetitive words - perseverating on particular phrases  Patient's sister at bedside noted this has happened in the past a few times associated with electrolyte abnormalities. Suspect multifactorial - vulnerable brain (multiple brain surgeries), enlarging pituitary mass?, possible infectious etiology (buttock wounds/soft tissue infection). EEG without seizure activity.  - Neurology consulted   - EEG: prelim no evidence of seizures, pending final read   - Infectious work up:    - CXR unremarkable   - Blood cultures pending, NGTD, MRSA negative, UA normal, lactate nml, hep panel negative   - CRP negative  - Empiric ceftriaxone and vancomycin     # Buttocks, sacrum and bilateral groin wounds   # RLE wound from puncture injury   # L dorsal foot wound from presumed trauma   # Soft tissue infection/abscess    # Hx serratia bacteremia in December 2022   For patient's lower extremity wounds and hx of Serratia bacteremia in December, [facility]. His antibiotics were broadened to Vanc and Zosyn and ultimately he was transitioned to ceftazidime on 3/23. BCx negative and wound cx grew serratia marcescens at the [facility]. He has been on ceftazidime since. MRI of the right tib/fib on 3/23 was negative for osteomyelitis but did show two fluid collections draining sponateneously. [facility] and determined no  intervention was needed as wounds were draining on their own.  - Stop ceftazidime (3/23 - 4/5)  - Empiric Abx as stated above  - WOC consult  - PT/OT when able to follow commands  - Pain: tylenol PRN, dilaudid 0.5 mg PRN for dressing changes and oxy 5 mg PRN - however given encephalopathy, use sparingly     # HFrEF   # HTN   # Prolonged QTc   Latest TTE on 2/17 shows EF of 40%. Coronary angiogram on 2/27 shows normal coronaries. No concern for heart failure at this time.  - Continue PTA coreg  - Hold PTA lisinopril (sepsis)  - Hold PTA empagliflozin (possible surgery)  - Hold PTA aspirin (possible surgery)     # Hypokalemia  - 2 doses IV potassium   - BMP daily      # Type II DM, exacerbated by Cushing's   A1c of 7.6% on 2/2023. [facility] the [facility].    - Continue PTA lantus 12 units   - Hold PTA metformin   - Continue PTA sitagliptin  - Hold PTA emagliflozin    - Can start sliding scale insulin if becomes elevated, especially with steroids     # Central Hypothyroidism  - Continue PTA levothyroxine     # Hypogonadism   - Continue PTA androgel (will need to bring in home medication)     # Chronic microcytic anemia   Hgb of 8.4 on [facility]. Peripheral smear on 3/30 showed poikilocytosis with occasional red blood cell fragments but no other evidence of hemolysis.  Haptoglobin was normal.  Ferritin was 91, transferrin saturation was low, B12 was 495 and folate was 8.7. Likely combination of iron deficiency as well as inflammation/chronic disease.   - CTM       # Behavioral issues   # Perseveration   Patient has repeatedly threatened to [facility] at the [facility] and had a 72-hour hold placed temporarily which was quickly removed. He was evaluated by [facility] health at the [facility] who deemed patient had capacity. Per chart review, it does mention psychosis diagnosis in 2021, but [place] so no further information. On exam, patient is perseverated  "on the phrase \"it hurts\". He will answer questions but ends every sentence with \"it hurts\" and won't open his eyes. Patient's PTA medications show haldol, but per [facility] records, [facility] not [facility].    - CTM   - Currently does not have capacity given encephalopathy (see above)     # Marijuana use   Uses daily medical marijuana at home.       # Concern for malnutrition   - Nutrition consult     Clinically Significant Risk Factors        # Hypokalemia: Lowest K = 2.8 mmol/L in last 2 days, will replace as needed  # Hypernatremia: Highest Na = 146 mmol/L in last 2 days, will monitor as appropriate      # Hypoalbuminemia: Lowest albumin = 3.1 g/dL at 4/4/2023 12:10 AM, will monitor as appropriate           # DMII: A1C = 9.5 % (Ref range: <5.7 %) within past 6 months, PRESENT ON ADMISSION   # Severe Malnutrition: based on nutrition assessment, PRESENT ON ADMISSION        1. Medically ready for discharge?    [] Yes   [x] No, estimated date: unclear, pending surgcial      The patient's care was discussed with the Attending Physician, Dr. Chris Burnett.    Linda Azul MD  Medicine Service, [facility] TEAM 2  [facility]  Securely message with [employer] (more info)  Text page via [employer] Paging/Directory   See signed in provider for up to date coverage information  ______________________________________________________________________    Interval History   - Intermittently following commands  - Continues to repeat words/phrases  - Denies pain  - Minimally answering questions  - Eye open, tracking, speaking short phrases  - Some mild improvement in cognition compared to yesterday morning, however, this does appear to fluctuate    Physical Exam   Vital Signs: Temp: 97.9  F (36.6  C) Temp src: Oral BP: (!) 154/96 Pulse: 80   Resp: 20 SpO2: 98 % O2 Device: None (Room air)    Weight: 151 lbs 3.77 oz    General Appearance: Eyes open, tracking repeating same phrases over " and over, occasionally following commands, denies pain  Eyes: Pupils moderate size, no nystagmus  HEENT: Normal sclera, able to move neck FROM  Respiratory: Breathing comfortably on RA, CTAB  Cardiovascular: RRR, no murmurs  GI: BS+. Soft, nontender, nondistended. No guarding or rebound tenderness.   Skin: No rash. No ecchymoses or petechiae.  Musculoskeletal: No muscle wasting  Psychiatric: Perseverating/repeating words      Data     I have personally reviewed the following data over the past 24 hrs:    15.5 (H)  \   8.9 (L)   / 296     146 (H) 108 (H) 17.4 /  153 (H)   2.8 (L) 20 (L) 1.01 \       TSH: <0.01 (L) T4: 1.30 A1C: 9.5 (H)       Procal: N/A CRP: N/A Lactic Acid: N/A

## 2023-04-05 NOTE — PLAN OF CARE
Goal Outcome Evaluation:      Plan of Care Reviewed With: patient    Overall Patient Progress: no changeOverall Patient Progress: no change    Outcome Evaluation: Pt unable to answer orientation questions this morning but by afternoon gave name and hospital. Repeats certain phrases over and over and frequently answers questions with that phrase. Yells out with position changes. Prn dialudid given for dressing changes performed by WOCN. EEG monitoring in progress. MRI's ordered, checklist sent. Unable to safely swallow this afternoon. Evening PO carvedilol held. MD notified. Bed alarm is on.

## 2023-04-05 NOTE — ANESTHESIA PREPROCEDURE EVALUATION
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 [facility], he developed new onset double vision and severe headache which prompted imaging that showed a large mass invading the cavernous sinuses and impinging on the cranial nerves. He is transferred to [facility] for this pituitary adenoma with plans for possible surgical intervention.     No past medical history on file.   No past surgical history on file.   No Known Allergies   Social History     Tobacco Use     Smoking status: Not on file     Smokeless tobacco: Not on file   Substance Use Topics     Alcohol use: Not on file      Wt Readings from Last 1 Encounters:   23 68.6 kg (151 lb 3.8 oz)        Anesthesia Evaluation   Pt has had prior anesthetic.         ROS/MED HX  ENT/Pulmonary:    (-) recent URI   Neurologic: Comment: Anisocoria, CN 3 palsy  Encephalopathy  Pituitary adenoma      Cardiovascular: Comment: Patient presented to [facility] ED on 2023: Acute on chronic fatigue/Dyspnea  Elevated Troponin:  -  Elevated troponin of unclear  Etiology, no  hemodynamics instability to be attributed to the mild troponin leak. No acute ischemic changes on ECG. No chest pain or  Acute SOB. He admitted ongoing BERNAL.  - he has multiple cardiac risk factors but denies prior MI/ACS.    - continue ASA,   - continue cardiac monitoring and   - chemical stress testing  Is  pending, ( postponed due to patient having coffee this morning)    (+) -----CHF Previous cardiac testing   Echo: Date: Results:  Interpretation Summary     There is mild concentric left ventricular hypertrophy.  Left ventricular systolic function is mildly reduced.  The visual ejection fraction is 40-45%.  Base-mid inferior and inferolateral hypokinesis.  No significant valve dysfunction.  The inferior vena cava was normal in size with preserved respiratory  variability.  Mild aortic root dilatation.  There is no pericardial effusion.  Stress Test: Date: Results:    ECG Reviewed: Date: Results:  Read as afib, but he appears to be in SR with premature ectopic atrial contraction. RBBB.   Cath: Date: 2/27/2023 Results:  DIAGNOSTIC - CORONARY   * Right dominant coronary artery system   * The left main artery was normal in appearance and free of obstructive disease.   * The LAD has no significant obstruction.   * The circumflex artery has no significant obstruction.   * The RCA has no significant obstruction.   HEMODYNAMICS   * The LVEDP is within normal limits   PRESENTATION / INDICATIONS   * Congestive Heart Failure - Highest NYHA Class w/in 2  - NYHA Class III       METS/Exercise Tolerance:     Hematologic:     (+) anemia,     Musculoskeletal: Comment: Open sacral and LE wounds  L2 compression fracture      GI/Hepatic: Comment: Malnutrition, transaminitis    (+) liver disease,     Renal/Genitourinary:       Endo: Comment: # ACTH secreting pituitary adenoma c/b type II DM, hypothyroidism and low testosterone s/p two partial resections in 2021   # Anisocoria w/left pupil (L is larger than R)  Patient noted double vision and severe headache beginning the evening of 3/25. CT imaging on 3/25 revealed a large sellar/suprasellar mass extending into the cavernous sinuses with no evidence of acute stroke. On 3/28, he underwent an MRI which confirmed the CT findings of a large mass invading the cavernous sinuses and impinging  "on the cranial nerves. [facility] starting patient on dexamethasone as a temporizing measure and then transferring to [facility] for surgical consideration.   - Neurosurgery consult, appreciate recs   - Ophthalmology consult, appreciate recs   - Continue dexamethasone 2 mg BID     (+) type II DM, thyroid problem, Chronic steroid usage for     Psychiatric/Substance Use: Comment: Patient has repeatedly threatened to [facility] at the [facility] and had a 72-hour hold placed temporarily which was quickly removed. He was evaluated by mental health at the [facility] who deemed patient had capacity. Per chart review, it does mention psychosis diagnosis in 2021, but [place] so no further information. On exam, patient is perseverated on the phrase \"it hurts\". He will answer questions but ends every sentence with \"it hurts\" and won't open his eyes. Patient's PTA medications show haldol, but per [facility] records, [facility] not [facility] the [facility].     (+) psychiatric history Recreational drug usage: Cannabis.    Infectious Disease: Comment: Blood cx drawn on abx. Seratia bacteremia 12/2022.    On admission, leukocytosis of 16.0. Patient's WBC count at the [facility] was 14.8. Leukocytosis is likely in the setting of taking dexamethasone daily BID. He's afebrile and being treated currently for soft tissue infection with ceftazadime. Low suspicion for other infection at this time, but patient is a poor historian. Blood cx negative at the [facility] but unsure when these were collected.       Malignancy:   (+) Malignancy,     Other: Comment: Hypokalemia 2.8           Physical Exam    Airway        Mallampati: II   TM distance: > 3 FB   Neck ROM: full   Mouth opening: > 3 cm    Respiratory Devices and Support         Dental     Comment: Nothing loose or removable per patient    (+) Modest Abnormalities - crowns, retainers, 1 or 2 missing teeth      Cardiovascular          Rhythm and rate: " irregular and normal     Pulmonary           (+) decreased breath sounds           OUTSIDE LABS:  CBC:   Lab Results   Component Value Date    WBC 15.5 (H) 04/05/2023    WBC 16.0 (H) 04/04/2023    HGB 8.9 (L) 04/05/2023    HGB 8.9 (L) 04/04/2023    HCT 30.5 (L) 04/05/2023    HCT 30.1 (L) 04/04/2023     04/05/2023     04/04/2023     BMP:   Lab Results   Component Value Date     (H) 04/05/2023     04/04/2023    POTASSIUM 2.8 (L) 04/05/2023    POTASSIUM 4.3 04/04/2023    CHLORIDE 108 (H) 04/05/2023    CHLORIDE 110 (H) 04/04/2023    CO2 20 (L) 04/05/2023    CO2 19 (L) 04/04/2023    BUN 17.4 04/05/2023    BUN 15.7 04/04/2023    CR 1.01 04/05/2023    CR 0.83 04/04/2023     (H) 04/05/2023     (H) 04/05/2023     COAGS:   Lab Results   Component Value Date    INR 1.02 04/04/2023     POC: No results found for: BGM, HCG, HCGS  HEPATIC:   Lab Results   Component Value Date    ALBUMIN 3.1 (L) 04/04/2023    PROTTOTAL 5.1 (L) 04/04/2023    ALT 89 (H) 04/04/2023    AST 64 (H) 04/04/2023    ALKPHOS 345 (H) 04/04/2023    BILITOTAL 0.4 04/04/2023     OTHER:   Lab Results   Component Value Date    LACT 1.5 04/04/2023    A1C 9.5 (H) 04/05/2023    KORIN 9.1 04/05/2023    MAG 2.2 04/05/2023    TSH <0.01 (L) 04/05/2023    T4 1.30 04/05/2023    SED 33 (H) 02/16/2023       Anesthesia Plan    ASA Status:  3      Anesthesia Type: General.     - Airway: ETT      Maintenance: Balanced.        Consents    Anesthesia Plan(s) and associated risks, benefits, and realistic alternatives discussed. Questions answered and patient/representative(s) expressed understanding.    - Discussed:     - Discussed with:  Patient    Use of blood products discussed: No .     Postoperative Care            Comments:    Other Comments: K critically low. Repeat potassium sent. Repleting IV this AM. Will proceed if potassium is improving.             Kenneth Browning MD

## 2023-04-05 NOTE — PLAN OF CARE
Goal Outcome Evaluation:      Plan of Care Reviewed With: patient    Overall Patient Progress: no changeOverall Patient Progress: no change    Outcome Evaluation: [id number] Alert to self. Non verbal majority of the shift. Wouls recite 1-2-3-60 when asked questions. On continuous EEG monitoring. Primofit in place. IV antibiotic given. BG is 112 at HS. Slightly hypertensive. Otherwise, vital signs stable on room air. No non verbal signs of pain noted this shift. For brain and orbit MRI

## 2023-04-06 ENCOUNTER — ANESTHESIA (OUTPATIENT)
Dept: SURGERY | Facility: CLINIC | Age: 58
DRG: 614 | End: 2023-04-06
Payer: COMMERCIAL

## 2023-04-06 ENCOUNTER — APPOINTMENT (OUTPATIENT)
Dept: MRI IMAGING | Facility: CLINIC | Age: 58
DRG: 614 | End: 2023-04-06
Attending: INTERNAL MEDICINE
Payer: COMMERCIAL

## 2023-04-06 ENCOUNTER — APPOINTMENT (OUTPATIENT)
Dept: PHYSICAL THERAPY | Facility: CLINIC | Age: 58
DRG: 614 | End: 2023-04-06
Attending: STUDENT IN AN ORGANIZED HEALTH CARE EDUCATION/TRAINING PROGRAM
Payer: COMMERCIAL

## 2023-04-06 ENCOUNTER — APPOINTMENT (OUTPATIENT)
Dept: CARDIOLOGY | Facility: CLINIC | Age: 58
DRG: 614 | End: 2023-04-06
Attending: STUDENT IN AN ORGANIZED HEALTH CARE EDUCATION/TRAINING PROGRAM
Payer: COMMERCIAL

## 2023-04-06 LAB
ANION GAP SERPL CALCULATED.3IONS-SCNC: 15 MMOL/L (ref 7–15)
ANION GAP SERPL CALCULATED.3IONS-SCNC: 16 MMOL/L (ref 7–15)
ANION GAP SERPL CALCULATED.3IONS-SCNC: 17 MMOL/L (ref 7–15)
ATRIAL RATE - MUSE: 68 BPM
ATRIAL RATE - MUSE: NORMAL BPM
BUN SERPL-MCNC: 14.7 MG/DL (ref 6–20)
BUN SERPL-MCNC: 15.6 MG/DL (ref 6–20)
BUN SERPL-MCNC: 16.6 MG/DL (ref 6–20)
BURR CELLS BLD QL SMEAR: ABNORMAL
CALCIUM SERPL-MCNC: 8.4 MG/DL (ref 8.6–10)
CALCIUM SERPL-MCNC: 8.5 MG/DL (ref 8.6–10)
CALCIUM SERPL-MCNC: 8.9 MG/DL (ref 8.6–10)
CHLORIDE SERPL-SCNC: 110 MMOL/L (ref 98–107)
CHLORIDE SERPL-SCNC: 111 MMOL/L (ref 98–107)
CHLORIDE SERPL-SCNC: 111 MMOL/L (ref 98–107)
CORTIS SERPL-MCNC: 48.7 UG/DL
CREAT SERPL-MCNC: 0.96 MG/DL (ref 0.67–1.17)
CREAT SERPL-MCNC: 1.01 MG/DL (ref 0.67–1.17)
CREAT SERPL-MCNC: 1.06 MG/DL (ref 0.67–1.17)
DEPRECATED HCO3 PLAS-SCNC: 20 MMOL/L (ref 22–29)
DEPRECATED HCO3 PLAS-SCNC: 22 MMOL/L (ref 22–29)
DEPRECATED HCO3 PLAS-SCNC: 22 MMOL/L (ref 22–29)
DIASTOLIC BLOOD PRESSURE - MUSE: NORMAL MMHG
DIASTOLIC BLOOD PRESSURE - MUSE: NORMAL MMHG
ERYTHROCYTE [DISTWIDTH] IN BLOOD BY AUTOMATED COUNT: 22.5 % (ref 10–15)
GFR SERPL CREATININE-BSD FRML MDRD: 82 ML/MIN/1.73M2
GFR SERPL CREATININE-BSD FRML MDRD: 87 ML/MIN/1.73M2
GFR SERPL CREATININE-BSD FRML MDRD: >90 ML/MIN/1.73M2
GLUCOSE BLDC GLUCOMTR-MCNC: 125 MG/DL (ref 70–99)
GLUCOSE BLDC GLUCOMTR-MCNC: 137 MG/DL (ref 70–99)
GLUCOSE BLDC GLUCOMTR-MCNC: 146 MG/DL (ref 70–99)
GLUCOSE BLDC GLUCOMTR-MCNC: 154 MG/DL (ref 70–99)
GLUCOSE BLDC GLUCOMTR-MCNC: 178 MG/DL (ref 70–99)
GLUCOSE BLDC GLUCOMTR-MCNC: 224 MG/DL (ref 70–99)
GLUCOSE SERPL-MCNC: 131 MG/DL (ref 70–99)
GLUCOSE SERPL-MCNC: 136 MG/DL (ref 70–99)
GLUCOSE SERPL-MCNC: 160 MG/DL (ref 70–99)
HCT VFR BLD AUTO: 28.2 % (ref 40–53)
HGB BLD-MCNC: 8.4 G/DL (ref 13.3–17.7)
HOWELL-JOLLY BOD BLD QL SMEAR: PRESENT
INTERPRETATION ECG - MUSE: NORMAL
INTERPRETATION ECG - MUSE: NORMAL
LVEF ECHO: NORMAL
MAGNESIUM SERPL-MCNC: 2 MG/DL (ref 1.7–2.3)
MAGNESIUM SERPL-MCNC: 2 MG/DL (ref 1.7–2.3)
MCH RBC QN AUTO: 25.7 PG (ref 26.5–33)
MCHC RBC AUTO-ENTMCNC: 29.8 G/DL (ref 31.5–36.5)
MCV RBC AUTO: 86 FL (ref 78–100)
OSMOLALITY SERPL: 305 MMOL/KG (ref 275–295)
P AXIS - MUSE: 27 DEGREES
P AXIS - MUSE: NORMAL DEGREES
PLAT MORPH BLD: ABNORMAL
PLATELET # BLD AUTO: ABNORMAL 10*3/UL
POLYCHROMASIA BLD QL SMEAR: SLIGHT
POTASSIUM SERPL-SCNC: 2.5 MMOL/L (ref 3.4–5.3)
POTASSIUM SERPL-SCNC: 2.5 MMOL/L (ref 3.4–5.3)
POTASSIUM SERPL-SCNC: 3.4 MMOL/L (ref 3.4–5.3)
POTASSIUM SERPL-SCNC: 3.4 MMOL/L (ref 3.4–5.3)
POTASSIUM SERPL-SCNC: 3.6 MMOL/L (ref 3.4–5.3)
PR INTERVAL - MUSE: 124 MS
PR INTERVAL - MUSE: NORMAL MS
QRS DURATION - MUSE: 152 MS
QRS DURATION - MUSE: 164 MS
QT - MUSE: 438 MS
QT - MUSE: 504 MS
QTC - MUSE: 508 MS
QTC - MUSE: 535 MS
R AXIS - MUSE: -33 DEGREES
R AXIS - MUSE: -41 DEGREES
RBC # BLD AUTO: 3.27 10E6/UL (ref 4.4–5.9)
RBC MORPH BLD: ABNORMAL
SODIUM SERPL-SCNC: 146 MMOL/L (ref 136–145)
SODIUM SERPL-SCNC: 148 MMOL/L (ref 136–145)
SODIUM SERPL-SCNC: 150 MMOL/L (ref 136–145)
SYSTOLIC BLOOD PRESSURE - MUSE: NORMAL MMHG
SYSTOLIC BLOOD PRESSURE - MUSE: NORMAL MMHG
T AXIS - MUSE: -8 DEGREES
T AXIS - MUSE: 47 DEGREES
TARGETS BLD QL SMEAR: ABNORMAL
TROPONIN T SERPL HS-MCNC: 77 NG/L
TROPONIN T SERPL HS-MCNC: 88 NG/L
VENTRICULAR RATE- MUSE: 68 BPM
VENTRICULAR RATE- MUSE: 81 BPM
WBC # BLD AUTO: 14.8 10E3/UL (ref 4–11)

## 2023-04-06 PROCEDURE — 70543 MRI ORBT/FAC/NCK W/O &W/DYE: CPT | Mod: 26 | Performed by: STUDENT IN AN ORGANIZED HEALTH CARE EDUCATION/TRAINING PROGRAM

## 2023-04-06 PROCEDURE — 250N000011 HC RX IP 250 OP 636: Performed by: INTERNAL MEDICINE

## 2023-04-06 PROCEDURE — 97530 THERAPEUTIC ACTIVITIES: CPT | Mod: GP

## 2023-04-06 PROCEDURE — 258N000003 HC RX IP 258 OP 636: Performed by: INTERNAL MEDICINE

## 2023-04-06 PROCEDURE — 250N000013 HC RX MED GY IP 250 OP 250 PS 637

## 2023-04-06 PROCEDURE — 70553 MRI BRAIN STEM W/O & W/DYE: CPT

## 2023-04-06 PROCEDURE — 70553 MRI BRAIN STEM W/O & W/DYE: CPT | Mod: 26 | Performed by: STUDENT IN AN ORGANIZED HEALTH CARE EDUCATION/TRAINING PROGRAM

## 2023-04-06 PROCEDURE — 84484 ASSAY OF TROPONIN QUANT: CPT

## 2023-04-06 PROCEDURE — 255N000002 HC RX 255 OP 636: Performed by: INTERNAL MEDICINE

## 2023-04-06 PROCEDURE — 82947 ASSAY GLUCOSE BLOOD QUANT: CPT

## 2023-04-06 PROCEDURE — 70544 MR ANGIOGRAPHY HEAD W/O DYE: CPT

## 2023-04-06 PROCEDURE — 82533 TOTAL CORTISOL: CPT | Performed by: INTERNAL MEDICINE

## 2023-04-06 PROCEDURE — 93306 TTE W/DOPPLER COMPLETE: CPT

## 2023-04-06 PROCEDURE — 250N000011 HC RX IP 250 OP 636

## 2023-04-06 PROCEDURE — 250N000012 HC RX MED GY IP 250 OP 636 PS 637: Performed by: STUDENT IN AN ORGANIZED HEALTH CARE EDUCATION/TRAINING PROGRAM

## 2023-04-06 PROCEDURE — 120N000002 HC R&B MED SURG/OB UMMC

## 2023-04-06 PROCEDURE — 250N000013 HC RX MED GY IP 250 OP 250 PS 637: Performed by: INTERNAL MEDICINE

## 2023-04-06 PROCEDURE — 83735 ASSAY OF MAGNESIUM: CPT

## 2023-04-06 PROCEDURE — 70543 MRI ORBT/FAC/NCK W/O &W/DYE: CPT

## 2023-04-06 PROCEDURE — 250N000009 HC RX 250

## 2023-04-06 PROCEDURE — 80048 BASIC METABOLIC PNL TOTAL CA: CPT | Performed by: INTERNAL MEDICINE

## 2023-04-06 PROCEDURE — 999N000127 HC STATISTIC PERIPHERAL IV START W US GUIDANCE

## 2023-04-06 PROCEDURE — 85014 HEMATOCRIT: CPT | Performed by: INTERNAL MEDICINE

## 2023-04-06 PROCEDURE — 250N000011 HC RX IP 250 OP 636: Performed by: ANESTHESIOLOGY

## 2023-04-06 PROCEDURE — 93010 ELECTROCARDIOGRAM REPORT: CPT | Mod: 76 | Performed by: INTERNAL MEDICINE

## 2023-04-06 PROCEDURE — 999N000141 HC STATISTIC PRE-PROCEDURE NURSING ASSESSMENT

## 2023-04-06 PROCEDURE — 83930 ASSAY OF BLOOD OSMOLALITY: CPT

## 2023-04-06 PROCEDURE — 93306 TTE W/DOPPLER COMPLETE: CPT | Mod: 26 | Performed by: STUDENT IN AN ORGANIZED HEALTH CARE EDUCATION/TRAINING PROGRAM

## 2023-04-06 PROCEDURE — 999N000001 HC CANCELLED SURGERY UP TO 15 MINS

## 2023-04-06 PROCEDURE — 99231 SBSQ HOSP IP/OBS SF/LOW 25: CPT | Performed by: INTERNAL MEDICINE

## 2023-04-06 PROCEDURE — 999N000044 HC STATISTIC CVC DRESSING CHANGE

## 2023-04-06 PROCEDURE — 36569 INSJ PICC 5 YR+ W/O IMAGING: CPT

## 2023-04-06 PROCEDURE — 83735 ASSAY OF MAGNESIUM: CPT | Performed by: INTERNAL MEDICINE

## 2023-04-06 PROCEDURE — 250N000013 HC RX MED GY IP 250 OP 250 PS 637: Performed by: STUDENT IN AN ORGANIZED HEALTH CARE EDUCATION/TRAINING PROGRAM

## 2023-04-06 PROCEDURE — 99233 SBSQ HOSP IP/OBS HIGH 50: CPT | Mod: GC | Performed by: INTERNAL MEDICINE

## 2023-04-06 PROCEDURE — 258N000003 HC RX IP 258 OP 636

## 2023-04-06 PROCEDURE — A9585 GADOBUTROL INJECTION: HCPCS | Performed by: INTERNAL MEDICINE

## 2023-04-06 PROCEDURE — 93005 ELECTROCARDIOGRAM TRACING: CPT

## 2023-04-06 PROCEDURE — 272N000451 HC KIT SHRLOCK 5FR POWER PICC DOUBLE LUMEN

## 2023-04-06 PROCEDURE — 36415 COLL VENOUS BLD VENIPUNCTURE: CPT | Performed by: INTERNAL MEDICINE

## 2023-04-06 PROCEDURE — 36415 COLL VENOUS BLD VENIPUNCTURE: CPT

## 2023-04-06 RX ORDER — ONDANSETRON 2 MG/ML
4 INJECTION INTRAMUSCULAR; INTRAVENOUS EVERY 30 MIN PRN
Status: CANCELLED | OUTPATIENT
Start: 2023-04-06

## 2023-04-06 RX ORDER — FENTANYL CITRATE 50 UG/ML
25 INJECTION, SOLUTION INTRAMUSCULAR; INTRAVENOUS EVERY 5 MIN PRN
Status: CANCELLED | OUTPATIENT
Start: 2023-04-06

## 2023-04-06 RX ORDER — LIDOCAINE 40 MG/G
CREAM TOPICAL
Status: DISCONTINUED | OUTPATIENT
Start: 2023-04-06 | End: 2023-04-24

## 2023-04-06 RX ORDER — POTASSIUM CHLORIDE 7.45 MG/ML
10 INJECTION INTRAVENOUS
Status: COMPLETED | OUTPATIENT
Start: 2023-04-06 | End: 2023-04-06

## 2023-04-06 RX ORDER — HYDROMORPHONE HCL IN WATER/PF 6 MG/30 ML
0.4 PATIENT CONTROLLED ANALGESIA SYRINGE INTRAVENOUS EVERY 5 MIN PRN
Status: CANCELLED | OUTPATIENT
Start: 2023-04-06

## 2023-04-06 RX ORDER — GADOBUTROL 604.72 MG/ML
7.5 INJECTION INTRAVENOUS ONCE
Status: COMPLETED | OUTPATIENT
Start: 2023-04-06 | End: 2023-04-06

## 2023-04-06 RX ORDER — LIDOCAINE 40 MG/G
CREAM TOPICAL
Status: ACTIVE | OUTPATIENT
Start: 2023-04-06 | End: 2023-04-09

## 2023-04-06 RX ORDER — POTASSIUM CHLORIDE 750 MG/1
20 TABLET, EXTENDED RELEASE ORAL ONCE
Status: COMPLETED | OUTPATIENT
Start: 2023-04-06 | End: 2023-04-06

## 2023-04-06 RX ORDER — FENTANYL CITRATE 50 UG/ML
50 INJECTION, SOLUTION INTRAMUSCULAR; INTRAVENOUS EVERY 5 MIN PRN
Status: CANCELLED | OUTPATIENT
Start: 2023-04-06

## 2023-04-06 RX ORDER — HEPARIN SODIUM,PORCINE 10 UNIT/ML
5-20 VIAL (ML) INTRAVENOUS EVERY 24 HOURS
Status: DISCONTINUED | OUTPATIENT
Start: 2023-04-06 | End: 2023-05-19 | Stop reason: HOSPADM

## 2023-04-06 RX ORDER — HYDROMORPHONE HCL IN WATER/PF 6 MG/30 ML
0.2 PATIENT CONTROLLED ANALGESIA SYRINGE INTRAVENOUS EVERY 5 MIN PRN
Status: CANCELLED | OUTPATIENT
Start: 2023-04-06

## 2023-04-06 RX ORDER — SODIUM CHLORIDE, SODIUM LACTATE, POTASSIUM CHLORIDE, CALCIUM CHLORIDE 600; 310; 30; 20 MG/100ML; MG/100ML; MG/100ML; MG/100ML
INJECTION, SOLUTION INTRAVENOUS CONTINUOUS
Status: CANCELLED | OUTPATIENT
Start: 2023-04-06

## 2023-04-06 RX ORDER — HEPARIN SODIUM,PORCINE 10 UNIT/ML
5-20 VIAL (ML) INTRAVENOUS
Status: DISCONTINUED | OUTPATIENT
Start: 2023-04-06 | End: 2023-05-19 | Stop reason: HOSPADM

## 2023-04-06 RX ORDER — LORAZEPAM 2 MG/ML
1 INJECTION INTRAMUSCULAR
Status: COMPLETED | OUTPATIENT
Start: 2023-04-06 | End: 2023-04-06

## 2023-04-06 RX ORDER — ONDANSETRON 4 MG/1
4 TABLET, ORALLY DISINTEGRATING ORAL EVERY 30 MIN PRN
Status: CANCELLED | OUTPATIENT
Start: 2023-04-06

## 2023-04-06 RX ORDER — DEXTROSE MONOHYDRATE 50 MG/ML
INJECTION, SOLUTION INTRAVENOUS CONTINUOUS
Status: DISCONTINUED | OUTPATIENT
Start: 2023-04-06 | End: 2023-04-07

## 2023-04-06 RX ORDER — POTASSIUM CHLORIDE 7.45 MG/ML
10 INJECTION INTRAVENOUS ONCE
Status: COMPLETED | OUTPATIENT
Start: 2023-04-06 | End: 2023-04-06

## 2023-04-06 RX ORDER — POTASSIUM CHLORIDE 20MEQ/15ML
40 LIQUID (ML) ORAL ONCE
Status: COMPLETED | OUTPATIENT
Start: 2023-04-06 | End: 2023-04-06

## 2023-04-06 RX ORDER — SODIUM CHLORIDE, SODIUM LACTATE, POTASSIUM CHLORIDE, CALCIUM CHLORIDE 600; 310; 30; 20 MG/100ML; MG/100ML; MG/100ML; MG/100ML
INJECTION, SOLUTION INTRAVENOUS CONTINUOUS
Status: DISCONTINUED | OUTPATIENT
Start: 2023-04-06 | End: 2023-04-06

## 2023-04-06 RX ADMIN — CARVEDILOL 12.5 MG: 12.5 TABLET, FILM COATED ORAL at 08:37

## 2023-04-06 RX ADMIN — VANCOMYCIN HYDROCHLORIDE 1000 MG: 1 INJECTION, SOLUTION INTRAVENOUS at 12:45

## 2023-04-06 RX ADMIN — POTASSIUM CHLORIDE: 2 INJECTION, SOLUTION, CONCENTRATE INTRAVENOUS at 11:29

## 2023-04-06 RX ADMIN — MICONAZOLE NITRATE: 20 CREAM TOPICAL at 20:59

## 2023-04-06 RX ADMIN — POTASSIUM CHLORIDE 20 MEQ: 750 TABLET, EXTENDED RELEASE ORAL at 18:15

## 2023-04-06 RX ADMIN — MICONAZOLE NITRATE: 20 POWDER TOPICAL at 20:59

## 2023-04-06 RX ADMIN — LIDOCAINE HYDROCHLORIDE ANHYDROUS 3 ML: 10 INJECTION, SOLUTION INFILTRATION at 11:37

## 2023-04-06 RX ADMIN — CEFTRIAXONE SODIUM 2 G: 2 INJECTION, POWDER, FOR SOLUTION INTRAMUSCULAR; INTRAVENOUS at 15:07

## 2023-04-06 RX ADMIN — SODIUM CHLORIDE, POTASSIUM CHLORIDE, SODIUM LACTATE AND CALCIUM CHLORIDE: 600; 310; 30; 20 INJECTION, SOLUTION INTRAVENOUS at 02:00

## 2023-04-06 RX ADMIN — ERGOCALCIFEROL 50000 UNITS: 1.25 CAPSULE, LIQUID FILLED ORAL at 08:37

## 2023-04-06 RX ADMIN — POTASSIUM CHLORIDE 10 MEQ: 7.46 INJECTION, SOLUTION INTRAVENOUS at 06:51

## 2023-04-06 RX ADMIN — DEXTROSE MONOHYDRATE: 50 INJECTION, SOLUTION INTRAVENOUS at 18:15

## 2023-04-06 RX ADMIN — POTASSIUM CHLORIDE 10 MEQ: 7.46 INJECTION, SOLUTION INTRAVENOUS at 12:46

## 2023-04-06 RX ADMIN — LORAZEPAM 1 MG: 2 INJECTION INTRAMUSCULAR; INTRAVENOUS at 18:55

## 2023-04-06 RX ADMIN — POTASSIUM CHLORIDE 10 MEQ: 7.46 INJECTION, SOLUTION INTRAVENOUS at 13:35

## 2023-04-06 RX ADMIN — ACETAMINOPHEN 650 MG: 325 TABLET, FILM COATED ORAL at 11:51

## 2023-04-06 RX ADMIN — POTASSIUM CHLORIDE 10 MEQ: 7.46 INJECTION, SOLUTION INTRAVENOUS at 11:30

## 2023-04-06 RX ADMIN — POTASSIUM CHLORIDE 10 MEQ: 7.46 INJECTION, SOLUTION INTRAVENOUS at 14:07

## 2023-04-06 RX ADMIN — POTASSIUM CHLORIDE 10 MEQ: 7.46 INJECTION, SOLUTION INTRAVENOUS at 09:57

## 2023-04-06 RX ADMIN — TESTOSTERONE 25 MG OF TESTOSTERONE: 50 GEL TOPICAL at 09:00

## 2023-04-06 RX ADMIN — POTASSIUM CHLORIDE 10 MEQ: 7.46 INJECTION, SOLUTION INTRAVENOUS at 08:37

## 2023-04-06 RX ADMIN — INSULIN ASPART 1 UNITS: 100 INJECTION, SOLUTION INTRAVENOUS; SUBCUTANEOUS at 13:37

## 2023-04-06 RX ADMIN — POTASSIUM CHLORIDE 40 MEQ: 40 SOLUTION ORAL at 11:45

## 2023-04-06 RX ADMIN — LEVOTHYROXINE SODIUM 100 MCG: 100 TABLET ORAL at 08:37

## 2023-04-06 RX ADMIN — Medication 1 TABLET: at 08:37

## 2023-04-06 RX ADMIN — CARVEDILOL 12.5 MG: 12.5 TABLET, FILM COATED ORAL at 18:15

## 2023-04-06 RX ADMIN — GADOBUTROL 7.5 ML: 604.72 INJECTION INTRAVENOUS at 20:42

## 2023-04-06 RX ADMIN — DEXTROSE MONOHYDRATE: 50 INJECTION, SOLUTION INTRAVENOUS at 21:19

## 2023-04-06 RX ADMIN — ASPIRIN 81 MG: 81 TABLET ORAL at 08:37

## 2023-04-06 ASSESSMENT — ACTIVITIES OF DAILY LIVING (ADL)
ADLS_ACUITY_SCORE: 48
ADLS_ACUITY_SCORE: 43
ADLS_ACUITY_SCORE: 48
ADLS_ACUITY_SCORE: 48
ADLS_ACUITY_SCORE: 44
ADLS_ACUITY_SCORE: 48
ADLS_ACUITY_SCORE: 48
ADLS_ACUITY_SCORE: 49
ADLS_ACUITY_SCORE: 43
ADLS_ACUITY_SCORE: 49

## 2023-04-06 NOTE — PLAN OF CARE
"Goal Outcome Evaluation:     Plan of Care Reviewed With: patient    Overall Patient Progress: no change    Patient alert, opens eyes spontaneously. Answered all orientation questions correctly; however, is very repetitive and needs frequent redirection. Patient verbalizes he knows he is going to have an MRI tomorrow. Illogical at times. Mitts are off the patient and he has been behaving and not pulling at lines. Needed new IV placed as old IV was leaking a little bit at site. K+ replaced orally and waiting on lab recheck. LR at 125ml/hr. Patient has been NPO since midnight. Sliding scale not given at bedtime as patient is NPO. Patient repeatedly verbalizes feeling dizzy and lightheaded like \"I'm about to fall\" and requesting for BG check. BG was 178 during this episode and vitals were stable. Team paged regarding these symptoms and they stated they were aware and to update if anything changes, no new interventions. Patient is difficult to assess at times but patient did answer most questions. Denies pain and shortness of breath. Incontinent of 1 large BM. Primafit in place with adequate output. Waiting on MRI 4/6 at 730am. MRI checklist complete. Patient transferred to MRI at 6am, report given.              "

## 2023-04-06 NOTE — OR NURSING
Pt procedure cancelled by Dr Browning(Anesthesiologist)d/t K+levels.  Pt report to floor nurse and transport ordered.

## 2023-04-06 NOTE — OR NURSING
Noted drooped left eyelid with confusion to procedure,location, and month this year. K+ 2.5 redrawn this am.   SR no  Ectopy.  Dr Browning aware, orders to infuse K+ IV doing so at this time.

## 2023-04-06 NOTE — PLAN OF CARE
Goal Outcome Evaluation:      Plan of Care Reviewed With: patient    Overall Patient Progress: no changeOverall Patient Progress: no change    Outcome Evaluation: A&Ox4 but forgetful, some trouble following directions. able to take meds PO. IV potassium for replacement, potassium 2.5. recheck this afternoon. plan for PICC placement today. MRI delayed due to potassium, plan to reschedule. IV fluids running.

## 2023-04-06 NOTE — PROGRESS NOTES
[facility], [place]   04/06/2023  Neurosurgery Progress Note:    Assessment:  Gustavo Faria is a 58 year old male with a PMH of DMII, HFrEF, BLE open wounds, history of serratia bacteremia in December 2022,  pituitary ACTH secreting macroadenoma s/p EEA for resection on 5/2021 and 7/2021 in [place], with baseline right CN 3 palsy, who was admitted at the [facility] on 3/23. He developed sudden headache and vision changes on 3/25, with MRI brain on 3/28, which demonstrated enlargement of known pituitary adenoma, with necrosis extending laterally beyond the left cavernous sinus, concerning for compression of cranial nerves at cavernous sinus. 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      Given that he is past the acute period of apoplexy, there is no indication for emergent decompression. He would benefit for redo EEA for resection of adenoma given its symptomatic nature. However, he would need to be medically optimized from the metabolic and infectious standpoint prior to surgery. He would also benefit from a multidisciplinary approach with recommendations from ophthalmology, endocrinology and radiation oncology regarding options for treatment.     Plan:  Appreciate ophthalmology and endocrinology recommendations  Repeat MRI brain pituitary protocol with thin cuts (ordered for you) for better characterization of lesion- will be performed today under anesthesia  Encephalopathy workup per primary team (on EEG)  Okay to stop steroids  Hold ASA for now  Neurosurgery will continue to follow   -----------------------------------  Oliver Duong  Neurosurgery Resident PGY2    Interval History: No acute events overnight that were brought to neurosurgery's attention.    Objective:   Temp:  [97.8  F (36.6  C)-98.1  F (36.7  C)] 97.9  F (36.6  C)  Pulse:  [62-80]  62  Resp:  [16-20] 18  BP: (131-164)/(83-96) 151/93  SpO2:  [97 %-100 %] 100 %  I/O last 3 completed shifts:  In: -   Out: 1200 [Urine:1200]      NEUROLOGIC:  -- Opens eyes to voice, following commands, oriented x2-3 (name, city, month- on/off)  -- Able to name objects  -- Speech limited, perseverating speech, minimal spontaneous speech  Cranial Nerves:  -- visual fields full to confrontation although with limited participation, PERRL anisocoric L>R and sluggish, left CN III paresis and CN VI palsy, restricted ROM in right EOM with no apparent asymmetry   -- face symmetrical, tongue midline  -- sensory V1-V3 intact bilaterally  -- palate elevates symmetrically, uvula midline  -- hearing grossly intact bilat     Motor:  Limited participation - antigravity x4 extremities     Sensory:  intact to LT x 4 extremities      Reflexes:       Bi Tri BR Omi Pat Ach Bab     C5-6 C7-8 C6 UMN L2-4 S1 UMN   R 2+ 2+ 2+ Norm 2+ 2+ Norm   L 2+ 2+ 2+ Norm 2+ 2+ Norm      Gait: Deferred.     LABS:  Recent Labs   Lab 04/06/23  0238 04/05/23  2215 04/05/23  1806 04/05/23  1711 04/05/23  0756 04/05/23  0611 04/04/23  0150 04/04/23  0010   NA  --   --   --   --   --  146*  --  143   POTASSIUM  --   --   --  2.5*  --  2.8*  --  4.3   CHLORIDE  --   --   --   --   --  108*  --  110*   CO2  --   --   --   --   --  20*  --  19*   ANIONGAP  --   --   --   --   --  18*  --  14   * 196* 99  --    < > 125*   < > 105*   BUN  --   --   --   --   --  17.4  --  15.7   CR  --   --   --   --   --  1.01  --  0.83   KORIN  --   --   --   --   --  9.1  --  9.1    < > = values in this interval not displayed.       Recent Labs   Lab 04/05/23  0611   WBC 15.5*   RBC 3.52*   HGB 8.9*   HCT 30.5*   MCV 87   MCH 25.3*   MCHC 29.2*   RDW 22.4*          IMAGING:  No results found for this or any previous visit (from the past 24 hour(s)).

## 2023-04-06 NOTE — PROGRESS NOTES
I cancelled the patients MRI today. He is s/p resection for pituitary adenoma, with recurrence. Endocrine was consulted. Hypokalemia 2.8 yesterday. I did call the floor to ensure this was being addressed yesterday. It appears despite some repletion, his potassium is 2.5 on recheck this AM. He has prolonged QTc. He has newly diagnosed heart failure with 3+ swelling in the lower extremities. No urgent plan for reoperation at this time given concerns for infection and metabolic derangements. Optimization of fluid status and electrolytes given risk of arrhythmia and or cardiorespiratory complications is advised prior to non-urgent MRI if general anesthesia is required.

## 2023-04-06 NOTE — PROGRESS NOTES
Calorie Count  Intake recorded for: 4/5  Total Kcals: 0 Total Protein: 0g  Kcals from Hospital Food: 0   Protein: 0g  Kcals from Outside Food (average):0 Protein: 0g  # Meals Ordered from Kitchen: no meals ordered from kitchen.   # Meals Recorded: no intake recorded.   # Supplements Recorded: no intake recorded.

## 2023-04-06 NOTE — PROGRESS NOTES
Antimicrobial Stewardship Team Note    Antimicrobial Stewardship Program - A joint venture between [facility] and  Physicians to optimize antibiotic management.  NOT a formal consult - Restricted Antimicrobial Review     Patient: Gustavo Faria  MRN: [id number]  Allergies: Patient has no known allergies.    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 He was transferred to [facility] on 4/3  for a newly diagnosed pituitary adenoma with plans for possible surgical intervention.     History of Present Illness:   Patient has had multiple recent hospitalization within the past 5 months including 3 in [place] and 2 in [place]. In 2022 he sustained a fall that resulted in his lower leg wounds. In December 2022, he presented with a Serratia bacteremia (possibly from the wounds) and was treated with ceftriaxone from 12/9 to 12/28 when he left [place]. Most recently, he was treated for his lower leg wounds with a course of vancomycin and cefepime which was later deescalated to ceftriaxone and then oral cefdinir from 2/21 to 3/5. For this admission, patient initially presented to an OSH due to his inability to care for his chronic lower extremity wounds. He was initially started on cefazolin, which was then broadened to vancomycin and Zosyn and then ultimately transitioned to ceftazidime on 3/23. Blood cultures from the OSH were negative and wound culture grew Serratia marcescens. Patient received an MRI of the right tib/fib on 3/23 at the OSH which was negative for osteomyelitis but did show two fluid collections draining spontaneously. Ortho was consulted at OSH and determined no intervention was needed as wounds were draining on their own. Plans were made to treat patient  with ceftazidime for 14 days from 3/23-4/5.     Patient was transferred and admitted on 4/3. Upon admission it was noted that patient is very encephalopathic and his mentation waxes and wanes. Per patient's family, this has occurred in the past and has rafa associated with electrolyte abnormalities. Patients' labs include electrolyte abnormalities (from 4/6: Na 150, K 2.5), mildly elevated LFTs (from 4/4: ALT 89, AST 64, Alk phos 345) and an elevated but stable WBC (4/6: 14.8). Blood cultures were collected in the morning and evening of 4/4 and remain NGTD (however patient was receiving antibiotics for 11 days prior). MRSA nasal PCR was collected on 4/4 and came back negative. It has been observed that patient has a new wound on his buttocks. Because of this and his mental status, antibiotics were broadened to vancomycin and ceftriaxone on 4/4.          Active Anti-infective Medications   (From admission, onward)                 Start     Stop    04/03/23 2330  cefTAZidime  2 g,   Intravenous,   EVERY 8 HOURS        Skin and Soft Tissue Infection        --                  Assessment: Treated Serratia marcescens SSTI  Patient is currently on day 3 of vancomycin/ceftriaxone and at least day 15 of IV antibiotics. At this point patients Serratia SSTI has been appropriately treated for over 14 days. Patient has not been febrile during his admission. Patients' mentation can be explained non-infectious causes including electrolyte imbalances and having multiple brain surgeries. MRSA nasal swab and blood cultures remain negative. Because of this, recommend discontinuing ceftriaxone and vancomycin and monitoring the patient closely.      Recommendations:  Discontinue vancomycin and ceftriaxone     Pharmacy took the following actions: called/paged provider, electronic note created     Discussed with ID Staff: Shana De Luna MD, Olivia Allan PharmD, BCIDP and eBnnie Abreu PharmD, MPH     Veronica Scales, PharmD  [id number]  Pharmacy Resident  Phone: [phone]

## 2023-04-06 NOTE — PROVIDER NOTIFICATION
Vascular Access Services Notes:    RUE PICC dressing changed due to bleeding. Applied Stat Seal Disc on the insertion site. PICC was placed early today.        TAMIR GuzmanN, RN [facility]

## 2023-04-06 NOTE — PROGRESS NOTES
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    He was admitted to [facility] on 3/23 for physical deconditioning generalized weakness and bilateral lower limb open wounds he was found to have soft tissue abscess multiple open wounds started on antibiotic treatment.  On [facility] he developed sudden severe headaches and double vision.  Had an MRI done on 3/25 which demonstrates enlargement of known pituitary adenoma with necrosis extending laterally beyond the left cavernous sinus concerning for compression of the cranial nerves at the cavernous sinus.  No acute hemorrhage but was a small area of diffusion restriction at the adenoma site on the left possibly consistent with ischemic pituitary adenoma (pituitary apoplexy) was transferred to [facility] on 4/3/2023 for multidisciplinary assessment.    Prior to the transfer to [facility] he was started on dexamethasone 2 mg twice daily as per the [facility]surgery [facility] at the [facility] continued following the admission to [facility].    The [facility] of [facility]  are not available in his chart.    According to the limited available records [facility], [facility] and [facility] it does not seem he was on any medical treatment for Cushing  disease he was taking levothyroxine 100 mcg daily for central hypothyroidism was on Sitagliptin 100 mg daily testosterone gel 1 pump daily, metformin 500 mg twice daily.    Following admission to the hospital he was found to have AMS continued on dexamethasone 2 mg twice daily.  Neurosurgery consulted (no plan for immediate intervention), neurology consulted EEG done for the patient, seen by ophthalmology.  Plan for MRI brain and orbit tomorrow morning.    Not able to obtain any history from the patient: confused during the assessment.    Interval history:   Patient's sister, Betty, is visiting this morning: She notes that his confusion began during the [facility].  She also notes that he seems less confused this morning.    Indeed, the patient is able to respond to questions appropriately, seems less perseverative.    MRI cancelled due to hypokalemia.  Dexamethasone held as of this morning.      We held Lantus last night due to borderline glucose values.        ROS:  Was not able to obtain due to confusion      Current Medications:   Current Facility-Administered Medications   Medication     acetaminophen (TYLENOL) tablet 650 mg     aspirin EC tablet 81 mg     calcium carbonate (TUMS) chewable tablet 500 mg     carvedilol (COREG) tablet 12.5 mg     cefTRIAXone (ROCEPHIN) 2 g vial to attach to  ml bag for ADULTS or NS 50 ml bag for PEDS     [Held by provider] dexamethasone (DECADRON) injection 2 mg     glucose gel 15-30 g    Or     dextrose 50 % injection 25-50 mL    Or     glucagon injection 1 mg     [Held by provider] empagliflozin (JARDIANCE) tablet 10 mg     [Held by provider] furosemide (LASIX) tablet 40 mg     haloperidol (HALDOL) tablet 2 mg     HYDROmorphone (PF) (DILAUDID) injection 0.5 mg     insulin aspart (NovoLOG) injection (RAPID ACTING)     insulin aspart (NovoLOG) injection (RAPID ACTING)     lactated ringers 1,000 mL with potassium chloride 20 mEq/L infusion      lactated ringers infusion     lactulose (CHRONULAC) solution 20 g     levothyroxine (SYNTHROID/LEVOTHROID) tablet 100 mcg     lidocaine (LMX4) cream     lidocaine (LMX4) cream     lidocaine 1 % 0.1-1 mL     lidocaine 1 % 0.1-5 mL     [Held by provider] lisinopril (ZESTRIL) tablet 10 mg     melatonin tablet 1 mg     miconazole (MICATIN) 2 % cream     miconazole (MICATIN) 2 % powder     multivitamin w/minerals (THERA-VIT-M) tablet 1 tablet     naloxone (NARCAN) injection 0.2 mg    Or     naloxone (NARCAN) injection 0.4 mg    Or     naloxone (NARCAN) injection 0.2 mg    Or     naloxone (NARCAN) injection 0.4 mg     oxyCODONE (ROXICODONE) tablet 5 mg     polyethylene glycol (MIRALAX) Packet 17 g     potassium chloride (KAYCIEL) solution 40 mEq     potassium chloride 10 mEq in 100 mL sterile water infusion     sodium chloride (PF) 0.9% PF flush 10-40 mL     sodium chloride (PF) 0.9% PF flush 3 mL     sodium chloride (PF) 0.9% PF flush 3 mL     sodium chloride (PF) 0.9% PF flush 3 mL     sodium chloride (PF) 0.9% PF flush 3 mL     [Held by provider] spironolactone (ALDACTONE) tablet 100 mg     testosterone (ANDROGEL/TESTIM) 50 MG/5GM (1%) topical gel 25 mg of testosterone     vancomycin (VANCOCIN) 1000 mg in dextrose 5% 200 mL PREMIX     vitamin D2 (ERGOCALCIFEROL) 27035 units (1250 mcg) capsule 50,000 Units     Physical Examination:  Exam:  Constitutional: Less agitation today, alert and responsive.  Respiratory: On room air normal effort of breathing.  Skin: Diffuse wide darkening color striae.  Musculoskeletal: He has multiple dressing over bilateral legs, darkening discoloration of the right leg.  Neurologic: Alert, less agitated.  He has apparent ptosis over the left side with impaired lateral and medial gaze over the left side.(Left oculomotor abducens nerve palsy)      Endocrine Labs:    Component      Latest Ref Rng 4/6/2023   Sodium      136 - 145 mmol/L 150 (H)    Potassium      3.4 - 5.3 mmol/L 2.5 (LL)     Chloride      98 - 107 mmol/L 111 (H)    Carbon Dioxide (CO2)      22 - 29 mmol/L 22    Anion Gap      7 - 15 mmol/L 17 (H)    Urea Nitrogen      6.0 - 20.0 mg/dL 16.6    Creatinine      0.67 - 1.17 mg/dL 1.06    Calcium      8.6 - 10.0 mg/dL 8.9    Glucose      70 - 99 mg/dL 160 (H)    GFR Estimate      >60 mL/min/1.73m2 82    WBC      4.0 - 11.0 10e3/uL 14.8 (H)    RBC Count      4.40 - 5.90 10e6/uL 3.27 (L)    Hemoglobin      13.3 - 17.7 g/dL 8.4 (L)    Hematocrit      40.0 - 53.0 % 28.2 (L)    MCV      78 - 100 fL 86    MCH      26.5 - 33.0 pg 25.7 (L)    MCHC      31.5 - 36.5 g/dL 29.8 (L)    RDW      10.0 - 15.0 % 22.5 (H)    Platelet Count --       Legend:  (H) High  (LL) Low Panic  (L) Low       Latest Reference Range & Units 02/16/23 08:54 04/05/23 02:35 04/05/23 06:11   Cortisol Serum ug/dL  46.3    Hemoglobin A1C <5.7 % 7.6 (H)     Prolactin 4 - 15 ng/mL   0 (L)      Latest Reference Range & Units 02/16/23 08:54 04/05/23 02:35 04/05/23 06:11   Adrenal Corticotropin <47 pg/mL  321 (H)    Hemoglobin A1C <5.7 % 7.6 (H)  9.5 (H)   T4 Free 0.90 - 1.70 ng/dL   1.30   TSH 0.30 - 4.20 uIU/mL   <0.01 (L)             CT head with and without IV contrast on 1/1/2023 from outside records:  FINDINGS:         Again seen is a broad hyperdense mass at the base of the brain in the midline centered on the sella turcica but extending into both cavernous sinus regions. This is not changed in overall size.     No acute hemorrhage. No midline shift seen.     There are no extra-axial fluid collections     The ventricles are not enlarged.           IMPRESSION:   Stable appearance of a midline skull base mass.     MRI brain with and without contrast on 12/10/2022 from outside records:  FINDINGS:   There is no focus of restricted diffusion to suggest an acute process.     The fourth ventricle is in the midline without mass impression.     The supratentorial brain, the ventricles and cortical sulci appear normal in size and  appearance. There is a right frontal 1.4 cm faint area of T2 and FLAIR signal hyperintensity with faint enhancement on postcontrast imaging, demonstrating low signal on the GRE sequence. No overlying gyral expansion or perilesional edema.     An acute hemorrhage or major vascular territorial infarct is not seen. There is no midline shift. No abnormal extra-axial fluid collection identified.     There is a 4.2 x 2.4 x 1.7 cm (TV aches, measured series 13, image 10, series 11 image nine. Heterogenous enhancing mass centered in the second metatarsal. There is bilateral cavernous sinus encasement of the internal carotid arteries. The infundibulum is in the midline. No optic chiasm compression identified.     Right maxillary, ethmoid and frontal sinus opacification. Calvarium appears unremarkable.       IMPRESSION:     1. Heterogenous enhancing 4.2 x 2.4 x 1.7 cm mass centered in the sella, with bilateral cavernous sinus extension and encasement of the internal carotid arteries, could represent a microadenoma. Infundibulum is in the midline. No optic chiasm compression identified at this time.     2. A 1.4 cm faint area of subcortical enhancement in the right frontal lobe with susceptibility. No gyral expansion or surrounding edema like signal. Findings are nonspecific and could represent an area of capillary telangiectasias or vascular malformation, however a mass lesion cannot be excluded. Advise short-term follow-up with contrast-enhanced MRI of the brain in 3 months.     3. Right maxillary, ethmoid and frontal sinus mucosal disease.     4. No acute intracranial abnormality.           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  No acute hemorrhage, small area of diffusion restriction at the left side of pituitary possibly consistent with ischemic pituitary adenoma.  Random serum cortisol level at 2:30 AM was 46.3, ACTH 321.   From the limited records available he was not in any medical therapy for Cushing's disease.  Awaiting follow-up MRI.  Now off dexamethasone, with improvement in mental status: Question if he was experiencing some degree of steroid-induced psychosis given both exogenous and endogenous glucocorticoid excess.      Recommendations:  -Plan for MRI pituitary when medically stable to proceed.  -Agree with holding dexamethasone  -Add-on cortisol this AM to trend while dexamethasone is held  -Anticipate ACTH and cortisol tomorrow AM (orders placed)    #Central hypothyroidism:  Prior to admission was on levothyroxine 100 mcg daily.  Free T4 today 1.3 TSH not detectable (picture of central hypothyroidism).    Recommendations:  -Continue levothyroxine 100 mcg daily     #Hypogonadotrophic hypogonadism:  Was on AndroGel gel 1 pump daily.    Recommendations:  -If his family brings it to the hospital it is okay to restart it during the hospitalization; otherwise, hold for now    #Assessment for DI:  No known history of DI.  However, has developed progressing hyponatremia; elevated serum osmolality and lower urine osmolality yesterday  (although not completely dilute).  He may have partial DI which is being unmasked by poorer oral intake due to recent alteration in mental status and also his n.p.o. status while he was awaiting MRI today.    Recommendations:  -Continue to monitor serial sodium levels: Team is planning on a check of serum osmolality later today, would also check urine osmolality  -Continue to monitor I's and O's.  -Patient can drink to thirst  -Administer DDAVP 1 mcg IV or subcutaneously once if serum sodium rises over 150 and/or urine output increases to greater than 300 mL/hr for 2 hours or more  -If diabetes insipidus is indeed suspected, would need careful monitoring of serum sodium at any time that he needs to remain n.p.o.      #DM type II: Hemoglobin A1c on admission 9.5%.  Prior to admission was on Sitagliptin 100 mg daily/metformin 500 mg twice daily, Jardiance 12.5 mg.    Recommendations:  -Lantus on hold due to borderline glucose values--monitor glucose and use correction scale for now as dexamethasone is discontinued (we can start basal insulin if glucose trending up)  -Low-dose correction scale 3 times daily before meals and at the bedtime.  -Hypoglycemia rescue protocol.  -POC BG checks 3 times daily AC at the bedtime at 2 AM.  -Carb counting protocol.  -Agree with holding Jardiance (prescribed for heart failure also) while hospitalized        Evelin Ybeoah MD 4/5/2023 9:29 PM   Division of Diabetes, Endocrinology and Metabolism  Department of Medicine

## 2023-04-06 NOTE — PROGRESS NOTES
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 [facility], he developed new onset double vision and severe headache which prompted imaging that showed a large mass invading the cavernous sinuses and impinging on the cranial nerves. He is transferred to [facility] for this pituitary adenoma, concern for progression vs hemorrhage/apoplexy. Complicated by psychosis/metabolic encephalopathy.    Updates today:  - Stopped Dexamethasone  - Significantly hypokalemic, likely secondary to cushing's disease  - Aggressive potassium repletion  - Hypernatremic treatment with D5W  - Unable to get MRI done do to electrolyte abnormalities  - ECHO given frequent PVCs - same EF as February 2023, but overall worsened global hypokinesis  - Troponin checks: down-trending  - Tele monitoring  - PICC placement for electrolyte treatments  - Resumed ASA, okay per Neurosurgery     # ACTH secreting pituitary adenoma c/b type II DM, hypothyroidism and low testosterone s/p two partial resections in 2021   # Concern for tumor progress, possible apoplexy  # Cushings Syndrome  # Electrolyte derangements (hypokalemia, hypernatremia)  Patient noted double vision and severe headache beginning the evening of 3/25. CT imaging on 3/25 revealed a large sellar/suprasellar mass extending into the cavernous sinuses with no evidence of acute stroke. On 3/28, he underwent an MRI which confirmed the CT findings of a large mass invading the cavernous sinuses and impinging on  the cranial nerves. Necrosis extending beyond left cavernous sinus. [facility]. [facility] at [facility] - however, hypernatremia and hyponatremia concerning for cushing syndrome (especially if patient has high cortisol levels from tumor and getting exogenous steroids).  - Discontinued dexamethasone  - AM cortisol: 46 -> 48  - ACTH high  - TSH low, normal T4  - Prolactin 0   - Neurosurgery involved   - MRI brain ordered, pending plans with anesthesia   - No emergent decompression, pending surgical plans after MRI   - Metabolic and infectious optimization prior to surgery   - Okay to resume ASA for now  - Ophthalmology consult, appreciate recs    - MRI orbits ordered   - Radiation Oncology consulted  - Endocrine consulted, appreciate recs    # Hyperkalemia  # Hypernatremia, possible DI?   # Cushings Syndrome  Random  - Aggressive K repletion: IV and PO  - PICC placed for easier and faster repletion  - Stop steroids as stated above  - Free water deficit: 2L, infusing D5W with KCl      # Acute metabolic encephalopathy  # Concern for acute psychosis, possibly steroid-related  # Leukocytosis   Disoriented, intermittently following directions. Repetitive words - perseverating on particular phrases  Patient's sister at bedside noted this has happened in the past a few times associated with electrolyte abnormalities. Suspect multifactorial - vulnerable brain (multiple brain surgeries), enlarging pituitary mass?, possible infectious etiology (buttock wounds/soft tissue infection). EEG without seizure activity.  - Neurology consulted   - EEG: prelim no evidence of seizures, pending final read   - Infectious work up:    - CXR unremarkable   - Blood cultures pending, NGTD, MRSA negative, UA normal, lactate nml, hep panel negative   - CRP negative  - Empiric ceftriaxone and vancomycin, can likely discontinue in 1-2 days     # Elevated troponin, down-trending  # HFmrEF, with global hypokinesis  # High burden of  PVCs  # Prolonged QTc   Coronary angiogram on 2/27 shows normal coronaries. Troponin down-trending (88 -> 77). No chest pain. High burden of PVCs. Follow up ECHO with global hypokinesis, which is worse compared to February 2023 ECHO. EF is 45% (same compared to prior). Likely small vessel disease vs demand vs cardiomyopathy 2/2 ceftazidime. Low concern for acute ACS.   - Tele  - Electrolyte mgmt as above  - No need for diuresis at this time  - Continue PTA coreg  - Hold PTA lisinopril   - Hold PTA empagliflozin (possible surgery)  - Hold ASA  - Avoid QTc prolonging medications    # Buttocks, sacrum and bilateral groin wounds   # RLE wound from puncture injury   # L dorsal foot wound from presumed trauma   # Soft tissue infection/abscess    # Hx serratia bacteremia in December 2022   For patient's lower extremity wounds and hx of Serratia bacteremia in December, he was [facility]. His antibiotics were broadened to Vanc and Zosyn and ultimately he was transitioned to ceftazidime on 3/23. BCx negative and wound cx grew serratia marcescens at the [facility]. He has been on ceftazidime since. MRI of the right tib/fib on 3/23 was negative for osteomyelitis but did show two fluid collections draining sponateneously. [facility]o was [facility] at the [facility] and determined no intervention was needed as wounds were draining on their own.  - Stop ceftazidime (3/23 - 4/5)  - Empiric Abx as stated above  - WOC following  - PT/OT when able to follow commands  - Pain: tylenol PRN, dilaudid 0.5 mg PRN for dressing changes and oxy 5 mg PRN - however given encephalopathy, use sparingly     # HFrEF   # HTN   # Prolonged QTc   Latest TTE on 2/17 shows EF of 40%. Coronary angiogram on 2/27 shows normal coronaries. No concern for heart failure at this time.  - Continue PTA coreg  - Hold PTA lisinopril (sepsis)  - Hold PTA empagliflozin (possible surgery)  - Okay to resume ASA per Neurosurergy     # Type II DM, exacerbated by  "Cushing's   A1c of 7.6% on 2/2023. [facility].    - Lantus 10 units  - Sliding scale insulin  - Continue PTA sitagliptin  - Hold PTA emagliflozin    - Hold PTA metformin      # Central Hypothyroidism  - Continue PTA levothyroxine     # Hypogonadism   - Continue PTA androgel (will need to bring in home medication)     # Chronic microcytic anemia   Hgb of 8.4 on [facility] at the [facility]. Peripheral smear on 3/30 showed poikilocytosis with occasional red blood cell fragments but no other evidence of hemolysis.  Haptoglobin was normal.  Ferritin was 91, transferrin saturation was low, B12 was 495 and folate was 8.7. Likely combination of iron deficiency as well as inflammation/chronic disease.   - CTM       # Behavioral issues   # Perseveration   Patient has repeatedly threatened to leave AMA at the [facility] and had a 72-hour hold placed temporarily which was quickly removed. He was evaluated by mental health at the [facility] who deemed patient had capacity. Per chart review, it does mention psychosis diagnosis in 2021, but [place] so no further information. On exam, patient is perseverated on the phrase \"it hurts\". He will answer questions but ends every sentence with \"it hurts\" and won't open his eyes. Patient's PTA medications show haldol, but per [facility] records, patient was not [facility] this [facility] the [facility].    - CTM   - Currently does not have capacity given encephalopathy (see above)     # Marijuana use   Uses daily medical marijuana at home.       # Concern for malnutrition   - Nutrition consult     Clinically Significant Risk Factors        # Hypokalemia: Lowest K = 2.5 mmol/L in last 2 days, will replace as needed  # Hypernatremia: Highest Na = 150 mmol/L in last 2 days, will monitor as appropriate      # Hypoalbuminemia: Lowest albumin = 3.1 g/dL at 4/4/2023 12:10 AM, will monitor as appropriate           # DMII: A1C = 9.5 % (Ref range: <5.7 %) " within past 6 months, PRESENT ON ADMISSION   # Severe Malnutrition: based on nutrition assessment, PRESENT ON ADMISSION        1. Medically ready for discharge?    [] Yes   [x] No, estimated date: unclear, pending surgcial      The patient's care was discussed with the Attending Physician, Dr. Chris Burnett.    Linda Azul MD  Medicine Service, [address]  Securely message with Vocera (more info)  Text page via [employer] Paging/Directory   See signed in provider for up to date coverage information  ______________________________________________________________________    Interval History   - Improved mental status this AM  - Following commands  - Denies pain, denies discomfort  - Poor short term memory - asks the same questions regularly  - Still some repetition of phrases  - Severely low potassium overnight, resistant to repletion    Physical Exam   Vital Signs: Temp: 98.1  F (36.7  C) Temp src: Oral BP: 133/80 Pulse: 110   Resp: 16 SpO2: 100 % O2 Device: None (Room air)    Weight: 151 lbs 3.77 oz    General Appearance: Eyes open, tracking, following commands, interactive, repeating phrases, no acute distress  Eyes: Pupils moderate size, no nystagmus, EOMI  HEENT: Normal sclera, able to move neck FROM  Respiratory: Breathing comfortably on RA, CTAB  Cardiovascular: RRR, no murmurs  GI: BS+. Soft, nontender, nondistended. No guarding or rebound tenderness.   Skin: No rash. No ecchymoses or petechiae.  Musculoskeletal: No muscle wasting  Neuro:  strength intact, ankle strength intact, and sensation intact, EOMI  Psychiatric: Still some perseverating/repeating words    Data     I have personally reviewed the following data over the past 24 hrs:    14.8 (H)  \   8.4 (L)   / N/A     148 (H) 111 (H) 15.6 /  154 (H)   3.4; 3.4 22 1.01 \       Trop: 77 (H) BNP: N/A     AND PEDS PROGRESS NOTE:[id number]}

## 2023-04-06 NOTE — PROVIDER NOTIFICATION
Team paged about potassium of 2.5. MRI cancelled, to be rescheduled. Let team know about PACs and PVCs at MRI

## 2023-04-06 NOTE — PROVIDER NOTIFICATION
04/06/23 0703   Significant Event   Significant Event Critical result/value   Critical Test Results/Notification   Critical Lab Result (Lab Name and Value) Potassium 2.5   What Time Did The Lab Notify You? 0703   What Provider Did You Notify? Dieter Jett MD   Was the Provider Notified Within 30 Min?  Yes

## 2023-04-06 NOTE — CONSULTS
Department of Radiation Oncology                   [address]  [address] [place]  [address]  Office:  [phone]  Fax:  [phone]   Radiation Oncology Clinic  [address] [place] [address]  Phone:  [phone]  Fax:  [phone]     PATIENT NAME Gustavo Faria : 1965   MRN: [id number] JUAN LUIS: 3/27/2023     NEW PATIENT VISIT NOTE       REFERRING PROVIDER: No referring provider defined for this encounter.    CANCER TYPE: ACTH secreting pituitary adenoma  STAGE: n/a  ECOG PS: ECOG SCALE: 4 - Completely disabled, dependent in all/most ADLs, totally confined to bed/chair, nonambulatory     HISTORY OF PRESENT ILLNESS    was seen for initial consultation in the Dept of Radiation Oncology on 2023    He has a known diagnosis of an ACTH secreting pituitary macroadenoma first diagnosed when he he presented to outside hospital on [facility] in May 2021 with a several weeks history of a drooping right eyelid.  He was found to have a right gaze palsy and right eye, with a 30 pound weight gain over previous year, bilateral leg swelling, and low sex drive. Work-up with CT on 2021 showed a sellar/suprasellar mass measuring 3.6 cm x 2.8 x 3.3 cm compressing the optic chiasm and bilateral cavernous sinus.  Ultimately he was also diagnosed with central hypothyroidism requiring Synthroid, and ACTH dependent Cushing's syndrome.    Patient underwent transsphenoidal craniotomy and resection of the suprasellar mass in 2021 with partial resection.  He returned for another craniotomy and resection on 2021.      MR brain from [facility] obtained on 2022 showed soft tissue mass comprising residual macroadenoma involving cavernous sinuses and invading clivus had increased in size to 4.1 cm x 2.3 x 2.7 cm.  This was compared to previous 3.4 x 1.7 x 2.5 cm from 2022.    He presented to the [facility] for wound care and was  found to have cellulitis on both lower extremities.  On intake he was  found to be encephalopathic and unable to follow commands and repeating the same sentence over and over again.     MR brain from 4/3/2023 showed residual neoplasm redemonstrated and suprasellar cistern adjacent to the infundibulum extending caudally in the location of the dorsum sella to the superior aspect of the dorsal clivus with extension into the prepontine cistern (image 7 series 1201).  There is redemonstration residual neoplasm invading the left greater then right cavernous sinus (images 12 through 17 of series 1101).  The mass was measured at 2.1 x 1.9 x 4.3 cm.  Infundibulum appeared thickened compared to prior.  There is no definite chiasm compression.     On exam today the patient is perseverating numbers, and the leg insists on an open MRI.  He is not redirectable.       PAST MEDICAL HISTORY   Secreting pituitary adenoma.  Cellulitis, chronic wound in bilateral legs.    History reviewed. No pertinent past medical history.    CHEMOTHERAPY HISTORY:   NONE: none    PAST RADIATION THERAPY HISTORY:    NONE: none    Electronic Implants: No    Pregnancy Status: Not Applicable     SURGICAL HISTORY   Transsphenoidal resection of pituitary adenoma on 5/28/2021 and 7/7/2021      CURRENT OUTPATIENT MEDICATIONS     No current outpatient medications on file.        ALLERGIES   No Known Allergies     SOCIAL HISTORY     Social History     Tobacco Use    Smoking status: Former     Types: Cigarettes   Substance Use Topics    Alcohol use: Not Currently    Drug use: Yes     Types: Marijuana     Longtime resident of [place].  2 years ago he was working.  He has since  from his wife and moved to [place].     FAMILY HISTORY   History reviewed. No pertinent family history.     REVIEW OF SYSTEMS   As above in the HPI     PHYSICAL EXAM   B/P: 130/89, T: 98.2, P: 60, R: 16  Wt Readings from Last 3 Encounters:   04/05/23 68.6 kg (151 lb 3.8 oz)    02/18/23 77.6 kg (171 lb 1.2 oz)   02/11/23 77.1 kg (170 lb)     GEN: NAD   HEENT: PERRL, EOMI, no icterus, injection or pallor. Oropharynx is clear.  NECK: no cervical or supraclavicular lymphadenopathy  LUNGS: no respiratory distress  ABDOMEN: soft, non-tender, non-distended  EXT: warm, well perfused, no edema  NEURO: Alert, not oriented.  Uncooperative with mental status or neurological exam.  Perseverative.    SKIN: no rashes       LABORATORY, PATHOLOGY AND IMAGING STUDIES   LABORATORY:   All laboratory data reviewed    PATHOLOGY: reviewed see HPI     IMAGING: reviewed see HPI     ASSESSMENT AND PLAN   Patient is a 58-year-old male with prior diagnosis of pituitary adenoma status post surgical resection in 2021 in 2022 who was [facility].      Radiation oncology has been consulted for consideration of radiotherapy to the suprasellar tumor.  At this time the patient is being considered for further neurosurgical intervention.  He is scheduled to have an MRI of the brain under sedation.      Radiation oncology agrees, as this will help with radiation planning in addition to diagnostic value.      We will coordinate with neurosurgery and primary team concerning the logistics and timing of radiation.  We believe that if further surgery is possible, rajat the radiotherapy will be most helpful if given  postoperatively.  I    If there is no further surgery planned, radiotherapy alone would be reasonable.        He may need daily sedation for the 5 weeks of external beam therapy.   He is not a candidate for GK radiosurgery due to the  size and extent of this large tumor invading into the cavernous sinus.    f there are changes to the plan please do not hesitate to reach out to the on-call radiation oncology team or contact Dr. Aminah Cole for further questions.  Thank you for allowing us to participate in this patient's care.       Juan Luis Yoder MD  PGY-2 Radiation  Oncology  Department of Radiation Oncology  [facility]  Phone: [phone]     I was personally present with the resident during the history and exam.  I   discussed the case with the resident and agree with the findings and plan   of care as documented in the resident's note.  I reviewed his scans, discussed with the [facility] and Discussed his care with DR Jean-Paul Cole  [phone] pager      CC  Patient Care Team:  No Ref-Primary, Physician as PCP - General     Aminah Cole   [phone]

## 2023-04-06 NOTE — PHARMACY-VANCOMYCIN DOSING SERVICE
Pharmacy Vancomycin Note  Date of Service 2023  Patient's  1965   58 year old, male    Indication: Sepsis  Day of Therapy: 3  Current vancomycin regimen: 1000 mg IV q12h  Current vancomycin monitoring method: AUC  Current vancomycin therapeutic monitoring goal: 400-600 mg*h/L    Insight[employer] Prediction of Current Vancomycin Regimen    Loading dose: N/A  Regimen: 1000 mg IV every 12 hours.  Start time: 22:41 on 2023  Exposure target: AUC24 (range)400-600 mg/L.hr   AUC24,ss: 913 mg/L.hr  Probability of AUC24 > 400: 100 %  Ctrough,ss: 29.6 mg/L  Probability of Ctrough,ss > 20: 94 %  Probability of nephrotoxicity (Lodise STANLEY ): 40 %      Current estimated CrCl = Estimated Creatinine Clearance: 78.3 mL/min (based on SCr of 1.01 mg/dL).    Creatinine for last 3 days  2023: 12:10 AM Creatinine 0.83 mg/dL  2023:  6:11 AM Creatinine 1.01 mg/dL    Recent Vancomycin Levels (past 3 days)  2023:  5:11 PM Vancomycin 32.3 ug/mL    Vancomycin IV Administrations (past 72 hours)                   vancomycin (VANCOCIN) 1000 mg in dextrose 5% 200 mL PREMIX (mg) 1,000 mg New Bag 23 1041     1,000 mg New Bag 23 2204     1,000 mg New Bag  1223                Nephrotoxins and other renal medications (From now, onward)    Start     Dose/Rate Route Frequency Ordered Stop    23 1041  vancomycin (VANCOCIN) 1000 mg in dextrose 5% 200 mL PREMIX         1,000 mg  200 mL/hr over 1 Hours Intravenous EVERY 24 HOURS 23 1949      23 0800  [Held by provider]  furosemide (LASIX) tablet 40 mg        (Held by provider since 2023 at 0658 by Linda Azul MD.Hold Reason: Other)   Note to Pharmacy: PTA Sig:Take 40 mg by mouth daily      40 mg Oral DAILY 23 0658      23 0800  [Held by provider]  empagliflozin (JARDIANCE) tablet 10 mg        (Held by provider since 2023 at 0818 by Linda Azul MD.Hold Reason: OtherHold Comments: Pending possible surgery in upcoming  days)    10 mg Oral DAILY 04/03/23 2308      04/04/23 0800  [Held by provider]  lisinopril (ZESTRIL) tablet 10 mg        (Held by provider since Wed 4/5/2023 at 0752 by Dieter Jett MD.Hold Reason: Other)    10 mg Oral DAILY 04/03/23 2313               Contrast Orders - past 72 hours (72h ago, onward)    None          Interpretation of levels and current regimen:  Vancomycin level is reflective of AUC greater than 600    Has serum creatinine changed greater than 50% in last 72 hours: No    Urine output:  unable to determine    Renal Function: Worsening    InsightRX Prediction of Planned New Vancomycin Regimen    Loading dose: N/A  Regimen: 1000 mg IV every 24 hours.  Start time: 22:41 on 04/05/2023  Exposure target: AUC24 (range)400-600 mg/L.hr   AUC24,ss: 476 mg/L.hr  Probability of AUC24 > 400: 82 %  Ctrough,ss: 12.6 mg/L  Probability of Ctrough,ss > 20: 6 %  Probability of nephrotoxicity (Lodise STANLEY 2009): 8 %      Plan:  1. Change vancomycin to 1000 mg IV Q24hr.  2. Vancomycin monitoring method: AUC  3. Vancomycin therapeutic monitoring goal: 400-600 mg*h/L  4. Pharmacy will check vancomycin levels as appropriate in 1-3 Days.  5. Serum creatinine levels will be ordered daily for the first week of therapy and at least twice weekly for subsequent weeks.    Natalie Guerra, PharmD

## 2023-04-06 NOTE — PROCEDURES
[facility]    Double Lumen PICC Placement    Date/Time: 4/6/2023 11:29 AM    Performed by: Marlena Villa RN  Authorized by: Linda Azul MD  Indications: vascular access      UNIVERSAL PROTOCOL   Site Marked: NA  Prior Images Obtained and Reviewed:  NA  Required items: Required blood products, implants, devices and special equipment available    Patient identity confirmed:  Verbally with patient, arm band and hospital-assigned identification number  NA - No sedation, light sedation, or local anesthesia  Confirmation Checklist:  Patient's identity using two indicators, relevant allergies, procedure was appropriate and matched the consent or emergent situation and correct equipment/implants were available  Time out: Immediately prior to the procedure a time out was called    Universal Protocol: the Joint Commission Universal Protocol was followed    Preparation: Patient was prepped and draped in usual sterile fashion       ANESTHESIA    Anesthesia: See MAR for details  Local Anesthetic:  Lidocaine 1% without epinephrine  Anesthetic Total (mL):  3      SEDATION    Patient Sedated: No        Preparation: skin prepped with ChloraPrep  Skin prep agent: skin prep agent completely dried prior to procedure  Sterile barriers: maximum sterile barriers were used: cap, mask, sterile gown, sterile gloves, and large sterile sheet  Hand hygiene: hand hygiene performed prior to central venous catheter insertion  Type of line used: PICC  Catheter type: double lumen  Lumen type: non-valved and power PICC  Lumen Identification: Red and Purple  Catheter size: 5 Fr  Brand: Bard  Lot number: [id number]  Placement method: venipuncture, MST, ultrasound and tip navigation system  Number of attempts: 1  Difficulty threading catheter: no  Successful placement: yes  Orientation: right  Catheter to Vein (%): 9  Location: basilic vein  Tip Location: SVC/RA Junction  Arm circumference: adults 15  cm  Extremity circumference: 32  Visible catheter length: 1  Total catheter length: 40  Dressing and securement: adhesive securement device, blood cleaned with CHG, blood removed, chlorhexidine disc applied, dressing applied, gloves changed prior to final dressing, glue, securement device, site cleansed, statlock, sterile dressing applied, tissue adhesive and transparent dressing  Post procedure assessment: placement verified by 3CG technology and blood return through all ports  PROCEDURE Describe Procedure: OK to use picc line  Disposal: sharps and needle count correct at the end of procedure, needles and guidewire disposed in sharps container      A

## 2023-04-06 NOTE — PROGRESS NOTES
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 Who presented as a transfer from the [facility]. Originally admitted for wound care but then developed encephalopathy with headache, and inability to move his eyes. Repeat MRI showing enlarging soft tissue mass invading bilateral cavernous sinuses; from possible recurrent adenoma.     # Pituitary adenoma, recurrence with necrosis  # Compression of cavernous sinus  # CN 6 palsy, left eye  -Per note review, patient had normal mentation while at [facility], then subsequently developed rapid encephalopathy.   -Patient unable to participate in exam. Encephalopathic and perseverating.   -Bilateral ophthalmoplegia. Unable to track finger or face.   -Personal review of images, agree with compression of cavernous sinus with likely involvement of many cranial nerves, however unable to elucidate true deficits until encephalopathy resolves.   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  -Extraocular motility deficits localize to the cavernous sinuses at this time, but repeat imaging would be helpful in the context of encephalopathy and limited physical exam.    4/5/23: patient more interactive today, but continues to be altered. Tracking face and able to identify left 6th nerve palsy. Remainder of exam is stable.  4/6/23: improve mental status. Able to follow-up commands, and track finger today. Exam is stable.     PLAN:  -Sedated MRI brain and orbit, scheduled for today vs tomorrow, pending  "clearance by anesthesia and resolution of potassium.    -imperative to rule out other invasive processes and orbital involvement.  -Lack of APD, no disc edema. Left disc pallor related to prior optic atrophy.  -Defer to neurosurgery regarding continuing dexamethasone.   -Ophthalmology will continue to follow daily and review MRI orbit results when completed.     It is our pleasure to participate in this patient's care and treatment. Please contact us with any further questions or concerns.    Discussed with Dr. Kate, PGY-5, neuro-ophthalmology fellow who agreed with this assessment and plan.     Thank you for entrusting us with your care  Alberto Carl MD, PGY2  Ophthalmology Resident  [facility]  Pager: [phone]    HISTORY OF PRESENTING ILLNESS:     Gustavo Faria is a 58 year old male with recent Serratia bacteremia, HFrEF, prolonged QT, lower extremity wounds, DMII, hypothyroidism, low testosterone and known ACTH secreting pituitary adenoma with resulting Cushing's disease s/p 2 partial resections in 2021.      He was hospitalized at the [facility] for not being able to care for is lower extremity wounds. While he was hospitalized, he developed new onset double vision and severe headache and was found to have large mass invading the cavernous sinuses. He was transferred to [facility] for evaluation by neurosurgery service and ophthalmology service. Plans are to surgically treat pituitary adenoma by neurosurgery this admission.    [facility] as a temporizing measure before transferring to the [facility]. Currently on 2mg dexamethasone BID.     Of note, patient has acute metabolic encephalopathy. Patient is perseverated on the phrase \"it hurts\". He will answer questions but ends every sentence with \"it hurts\" and won't open his eyes.      10+ review of systems were otherwise negative except for that which has been stated " above.      OCULAR/MEDICAL/SURGICAL HISTORIES:     Past Ocular History:   Unable to attain. Patient encephalopathic.    Eye Drops:   None at this time    Pertinent Systemic Medications:   Dexamethasone, 2mg BID    Past Medical History:  History reviewed. No pertinent past medical history.  Type 2 diabetes mellitus, A1c 7.6%  Chronic microcytic anemia  HFrEF  HTN  RLE wound and Left foot wound; wound culture growing serratia marcescens.    Past Surgical History:   Past Surgical History:   Procedure Laterality Date     PICC DOUBLE LUMEN PLACEMENT Right 04/06/2023    40 cm total basilic       Family History:  Unable to attain. Patient encephalopathic.    Social History:  Unable to attain. Patient encephalopathic.    EXAMINATION:     Base Eye Exam     Visual Acuity (Snellen - Linear)       Right Left    Near sc 20/200 20/200   Poor participation. Encephalopathic with altered mental status           Pupils       Dark Light Shape React APD    Right 3 2 Round Brisk None    Left 3 2 Round Brisk None          Extraocular Movement       Right Left     -- -3 --   -1  -1   -- -3 --    -- -4 --   0  -3.5   -- -3 --               Slit Lamp and Fundus Exam     Slit Lamp Exam       Right Left    Lids/Lashes Normal Normal    Conjunctiva/Sclera White and quiet White and quiet    Cornea 360 arcus 360 arcus    Anterior Chamber Deep and quiet Deep and quiet    Iris Round and reactive Round and reactive    Lens Clear Clear    Anterior Vitreous Normal Normal          Fundus Exam       Right Left    Disc pink rim diffuse pallor    C/D Ratio 0.7 0.7    Macula Normal Normal    Vessels Normal Normal    Periphery Normal Normal                Labs/Studies/Imaging Performed  MRI Brain 3/28/23:  Predominantly T1 Isib intense, T2 isointense, heterogeneously enhancing soft tissue occupies the majority of the postsurgical sella, invades the bilateral cavernous sinuses, extends to the adjacent portions of bilateral Meckel's cave's, invades the  superior portion of the clivus, erodes the dorsum sella, and projects focally into the posterior portion of the postsurgical right sphenoid sinus.  The soft tissue measures approximately 2.7 cm craniocaudal by 2.6 cm anterior-posterior by 4.1 cm transverse.  This is consistent with residual or recurrent adenoma in this patient with a reported history of prior surgical debulking of known pituitary adenoma.  Although there is circumferential encasement of the bilateral cavernous internal carotid arteries, the corresponding arterial flow voids are preserved, without appreciable narrowing.  There is slight rightward deviation of the pituitary infundibulum.  Residual pituitary parenchyma is not well delineated, although a small component is likely present along the right superior paramedian margin of the presumed adenoma.  There is no mass effect on the optic chiasm, which demonstrates normal T2 weighted signal intensity.  An osseous defect in the right anterior floor of the sella, in keeping with the patient's previous transplant surgery, has demonstrated.  Mild focal FLAIR hyperintensities present in the posterior subcortical white matter of the right inferior frontal gyrus.  These imaging findings are most suggestive of localized benign vascular enhancement such as an incidental capillary damage ectasia.  No intracranial mass or abnormal enhancement is identified elsewhere.  MRI brain w/ and w/o contrast, 3/28/23:         Alberto Carl MD  Resident Physician, PGY2  Department of Ophthalmology  04/04/23 3:11 PM

## 2023-04-07 ENCOUNTER — APPOINTMENT (OUTPATIENT)
Dept: OCCUPATIONAL THERAPY | Facility: CLINIC | Age: 58
DRG: 614 | End: 2023-04-07
Attending: STUDENT IN AN ORGANIZED HEALTH CARE EDUCATION/TRAINING PROGRAM
Payer: COMMERCIAL

## 2023-04-07 ENCOUNTER — APPOINTMENT (OUTPATIENT)
Dept: PHYSICAL THERAPY | Facility: CLINIC | Age: 58
DRG: 614 | End: 2023-04-07
Attending: STUDENT IN AN ORGANIZED HEALTH CARE EDUCATION/TRAINING PROGRAM
Payer: COMMERCIAL

## 2023-04-07 LAB
ACTH PLAS-MCNC: 273 PG/ML
ANION GAP SERPL CALCULATED.3IONS-SCNC: 15 MMOL/L (ref 7–15)
ATRIAL RATE - MUSE: 73 BPM
BUN SERPL-MCNC: 15.5 MG/DL (ref 6–20)
CALCIUM SERPL-MCNC: 8.5 MG/DL (ref 8.6–10)
CHLORIDE SERPL-SCNC: 104 MMOL/L (ref 98–107)
CORTIS SERPL-MCNC: 41.1 UG/DL
CREAT SERPL-MCNC: 1.06 MG/DL (ref 0.67–1.17)
CRP SERPL-MCNC: <3 MG/L
DEPRECATED HCO3 PLAS-SCNC: 20 MMOL/L (ref 22–29)
DIASTOLIC BLOOD PRESSURE - MUSE: NORMAL MMHG
ERYTHROCYTE [SEDIMENTATION RATE] IN BLOOD BY WESTERGREN METHOD: 10 MM/HR (ref 0–20)
GFR SERPL CREATININE-BSD FRML MDRD: 82 ML/MIN/1.73M2
GLUCOSE BLDC GLUCOMTR-MCNC: 229 MG/DL (ref 70–99)
GLUCOSE BLDC GLUCOMTR-MCNC: 238 MG/DL (ref 70–99)
GLUCOSE BLDC GLUCOMTR-MCNC: 311 MG/DL (ref 70–99)
GLUCOSE BLDC GLUCOMTR-MCNC: 320 MG/DL (ref 70–99)
GLUCOSE BLDC GLUCOMTR-MCNC: 365 MG/DL (ref 70–99)
GLUCOSE SERPL-MCNC: 389 MG/DL (ref 70–99)
INTERPRETATION ECG - MUSE: NORMAL
OSMOLALITY UR: 415 MMOL/KG (ref 100–1200)
P AXIS - MUSE: 11 DEGREES
POTASSIUM SERPL-SCNC: 3.6 MMOL/L (ref 3.4–5.3)
PR INTERVAL - MUSE: 124 MS
QRS DURATION - MUSE: 168 MS
QT - MUSE: 454 MS
QTC - MUSE: 530 MS
R AXIS - MUSE: -33 DEGREES
SODIUM SERPL-SCNC: 139 MMOL/L (ref 136–145)
SYSTOLIC BLOOD PRESSURE - MUSE: NORMAL MMHG
T AXIS - MUSE: -11 DEGREES
VENTRICULAR RATE- MUSE: 82 BPM

## 2023-04-07 PROCEDURE — 99233 SBSQ HOSP IP/OBS HIGH 50: CPT | Mod: GC | Performed by: INTERNAL MEDICINE

## 2023-04-07 PROCEDURE — 99231 SBSQ HOSP IP/OBS SF/LOW 25: CPT | Mod: GC | Performed by: INTERNAL MEDICINE

## 2023-04-07 PROCEDURE — 250N000013 HC RX MED GY IP 250 OP 250 PS 637: Performed by: STUDENT IN AN ORGANIZED HEALTH CARE EDUCATION/TRAINING PROGRAM

## 2023-04-07 PROCEDURE — 250N000011 HC RX IP 250 OP 636

## 2023-04-07 PROCEDURE — 250N000012 HC RX MED GY IP 250 OP 636 PS 637: Performed by: STUDENT IN AN ORGANIZED HEALTH CARE EDUCATION/TRAINING PROGRAM

## 2023-04-07 PROCEDURE — 80048 BASIC METABOLIC PNL TOTAL CA: CPT | Performed by: INTERNAL MEDICINE

## 2023-04-07 PROCEDURE — 82024 ASSAY OF ACTH: CPT | Performed by: INTERNAL MEDICINE

## 2023-04-07 PROCEDURE — 85652 RBC SED RATE AUTOMATED: CPT | Performed by: INTERNAL MEDICINE

## 2023-04-07 PROCEDURE — 97530 THERAPEUTIC ACTIVITIES: CPT | Mod: GP

## 2023-04-07 PROCEDURE — 86140 C-REACTIVE PROTEIN: CPT | Performed by: INTERNAL MEDICINE

## 2023-04-07 PROCEDURE — 83935 ASSAY OF URINE OSMOLALITY: CPT | Performed by: INTERNAL MEDICINE

## 2023-04-07 PROCEDURE — 250N000011 HC RX IP 250 OP 636: Performed by: INTERNAL MEDICINE

## 2023-04-07 PROCEDURE — 93005 ELECTROCARDIOGRAM TRACING: CPT

## 2023-04-07 PROCEDURE — 258N000003 HC RX IP 258 OP 636: Performed by: INTERNAL MEDICINE

## 2023-04-07 PROCEDURE — 120N000002 HC R&B MED SURG/OB UMMC

## 2023-04-07 PROCEDURE — 250N000013 HC RX MED GY IP 250 OP 250 PS 637

## 2023-04-07 PROCEDURE — 82533 TOTAL CORTISOL: CPT | Performed by: INTERNAL MEDICINE

## 2023-04-07 PROCEDURE — 36592 COLLECT BLOOD FROM PICC: CPT | Performed by: INTERNAL MEDICINE

## 2023-04-07 PROCEDURE — 250N000013 HC RX MED GY IP 250 OP 250 PS 637: Performed by: INTERNAL MEDICINE

## 2023-04-07 PROCEDURE — 97535 SELF CARE MNGMENT TRAINING: CPT | Mod: GO

## 2023-04-07 PROCEDURE — 93010 ELECTROCARDIOGRAM REPORT: CPT | Performed by: INTERNAL MEDICINE

## 2023-04-07 PROCEDURE — 97166 OT EVAL MOD COMPLEX 45 MIN: CPT | Mod: GO

## 2023-04-07 RX ORDER — NICOTINE POLACRILEX 4 MG
15-30 LOZENGE BUCCAL
Status: DISCONTINUED | OUTPATIENT
Start: 2023-04-07 | End: 2023-04-07

## 2023-04-07 RX ORDER — DEXTROSE MONOHYDRATE 25 G/50ML
25-50 INJECTION, SOLUTION INTRAVENOUS
Status: DISCONTINUED | OUTPATIENT
Start: 2023-04-07 | End: 2023-04-07

## 2023-04-07 RX ADMIN — ACETAMINOPHEN 650 MG: 325 TABLET, FILM COATED ORAL at 08:21

## 2023-04-07 RX ADMIN — Medication 5 ML: at 14:39

## 2023-04-07 RX ADMIN — MICONAZOLE NITRATE: 20 CREAM TOPICAL at 19:27

## 2023-04-07 RX ADMIN — Medication 5 ML: at 16:05

## 2023-04-07 RX ADMIN — LEVOTHYROXINE SODIUM 100 MCG: 100 TABLET ORAL at 08:21

## 2023-04-07 RX ADMIN — ASPIRIN 81 MG: 81 TABLET ORAL at 08:20

## 2023-04-07 RX ADMIN — DEXTROSE MONOHYDRATE: 50 INJECTION, SOLUTION INTRAVENOUS at 05:53

## 2023-04-07 RX ADMIN — MICONAZOLE NITRATE: 20 POWDER TOPICAL at 19:27

## 2023-04-07 RX ADMIN — INSULIN GLARGINE 10 UNITS: 100 INJECTION, SOLUTION SUBCUTANEOUS at 08:27

## 2023-04-07 RX ADMIN — VANCOMYCIN HYDROCHLORIDE 1000 MG: 1 INJECTION, SOLUTION INTRAVENOUS at 11:12

## 2023-04-07 RX ADMIN — MICONAZOLE NITRATE: 20 CREAM TOPICAL at 08:23

## 2023-04-07 RX ADMIN — Medication 1 TABLET: at 08:21

## 2023-04-07 RX ADMIN — MICONAZOLE NITRATE: 20 POWDER TOPICAL at 08:23

## 2023-04-07 RX ADMIN — INSULIN ASPART 2 UNITS: 100 INJECTION, SOLUTION INTRAVENOUS; SUBCUTANEOUS at 12:36

## 2023-04-07 RX ADMIN — Medication 5 ML: at 08:33

## 2023-04-07 RX ADMIN — CARVEDILOL 12.5 MG: 12.5 TABLET, FILM COATED ORAL at 17:27

## 2023-04-07 RX ADMIN — CARVEDILOL 12.5 MG: 12.5 TABLET, FILM COATED ORAL at 08:21

## 2023-04-07 RX ADMIN — TESTOSTERONE 25 MG OF TESTOSTERONE: 50 GEL TOPICAL at 08:21

## 2023-04-07 RX ADMIN — INSULIN ASPART 3 UNITS: 100 INJECTION, SOLUTION INTRAVENOUS; SUBCUTANEOUS at 08:00

## 2023-04-07 RX ADMIN — INSULIN ASPART 2 UNITS: 100 INJECTION, SOLUTION INTRAVENOUS; SUBCUTANEOUS at 17:31

## 2023-04-07 ASSESSMENT — ACTIVITIES OF DAILY LIVING (ADL)
ADLS_ACUITY_SCORE: 45
ADLS_ACUITY_SCORE: 45
ADLS_ACUITY_SCORE: 39
ADLS_ACUITY_SCORE: 49
ADLS_ACUITY_SCORE: 39
ADLS_ACUITY_SCORE: 45
ADLS_ACUITY_SCORE: 39
ADLS_ACUITY_SCORE: 39
ADLS_ACUITY_SCORE: 49

## 2023-04-07 NOTE — PROGRESS NOTES
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 [facility] wound care but then developed encephalopathy with headache, and inability to move his eyes. Repeat MRI showing enlarging soft tissue mass invading bilateral cavernous sinuses; from possible recurrent adenoma.     # Pituitary adenoma, recurrence with necrosis  # Compression of cavernous sinus  # CN 6 palsy, left eye  # CN 4 palsy, left eye  -Per note review, patient had normal mentation while at [facility], then subsequently developed rapid encephalopathy.   -Patient unable to participate in exam. Encephalopathic and perseverating.   -Bilateral ophthalmoplegia. Unable to track finger or face.   -Personal review of images, agree with compression of cavernous sinus with likely involvement of many cranial nerves, however unable to elucidate true deficits until encephalopathy resolves.   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  -Extraocular motility deficits localize to the cavernous sinuses at this time, but repeat imaging would be helpful in the context of encephalopathy and limited physical exam.    4/5/23: patient more interactive today, but continues to be altered. Tracking face and able to identify left 6th nerve palsy. Remainder of exam is stable.  4/6/23: improve mental status. Able to follow-up commands, and track finger today. Exam is stable.   4/7/23: improved mental status. Able to participate in  exam. [facility] 20[facility] right eye and 20/20 left eye. No APD. Improved participation and now able to identify left hypertropia from likely 4th nerve palsy. MRI brain and orbit completed 4/6/23 evening. Consistent with bilateral compression of cavernous sinuses from macroadenoma.     PLAN:  -No acute intervention from ophthalmology perspective.   -Lack of APD, no disc edema. Left disc pallor related to prior optic atrophy.  -Defer to neurosurgery regarding continuing dexamethasone.   -Ophthalmology will continue to follow extraocular muscle exam.  -When medically stable, will plan on seeing him in clinic for baseline imaging (OCT RNFL, visual field, strabismus exam)    It is our pleasure to participate in this patient's care and treatment. Please contact us with any further questions or concerns.    Discussed with Dr. Kate, PGY-5, neuro-ophthalmology fellow who agreed with this assessment and plan.     Thank you for entrusting us with your care  Alberto Carl MD, PGY2  Ophthalmology Resident  [facility]  Pager: [phone]    HISTORY OF PRESENTING ILLNESS:     Gustavo Faria is a 58 year old male with recent Serratia bacteremia, HFrEF, prolonged QT, lower extremity wounds, DMII, hypothyroidism, low testosterone and known ACTH secreting pituitary adenoma with resulting Cushing's disease s/p 2 partial resections in 2021.      He was hospitalized at the [facility] for not being able to care for is lower extremity wounds. While he was hospitalized, he developed new onset double vision and severe headache and was found to have large mass invading the cavernous sinuses. He was transferred to [facility] for evaluation by neurosurgery service and ophthalmology service. Plans are to surgically treat pituitary adenoma by neurosurgery this admission.    [facility] as a temporizing measure before transferring to the [facility]. Currently on 2mg dexamethasone  "BID.     Of note, patient has acute metabolic encephalopathy. Patient is perseverated on the phrase \"it hurts\". He will answer questions but ends every sentence with \"it hurts\" and won't open his eyes.      10+ review of systems were otherwise negative except for that which has been stated above.      OCULAR/MEDICAL/SURGICAL HISTORIES:     Past Ocular History:   Unable to attain. Patient encephalopathic.    Eye Drops:   None at this time    Pertinent Systemic Medications:   Dexamethasone, 2mg BID    Past Medical History:  History reviewed. No pertinent past medical history.  Type 2 diabetes mellitus, A1c 7.6%  Chronic microcytic anemia  HFrEF  HTN  RLE wound and Left foot wound; wound culture growing serratia marcescens.    Past Surgical History:   Past Surgical History:   Procedure Laterality Date     PICC DOUBLE LUMEN PLACEMENT Right 04/06/2023    40 cm total basilic       Family History:  Unable to attain. Patient encephalopathic.    Social History:  Unable to attain. Patient encephalopathic.    EXAMINATION:     Base Eye Exam     Visual Acuity (Snellen - Linear)       Right Left    Near cc 20/30 20/20          Pupils       Dark Light Shape React APD    Right 4 3.5 Round Brisk None    Left 4 3.5 Round Brisk None          Extraocular Movement       Right Left     -- 0 --   0  0   -- 0 --    -- -1 -4   --  -4   -1 -1 -4               Slit Lamp and Fundus Exam     Slit Lamp Exam       Right Left    Lids/Lashes Normal ptosis    Conjunctiva/Sclera White and quiet White and quiet    Cornea 360 arcus 360 arcus    Anterior Chamber Deep and quiet Deep and quiet    Iris Round and reactive Round and reactive    Lens Clear Clear    Anterior Vitreous Normal Normal          Fundus Exam       Right Left    Disc pink rim diffuse pallor    C/D Ratio 0.7 0.7                Labs/Studies/Imaging Performed  MRI Brain 3/28/23:  Predominantly T1 Isib intense, T2 isointense, heterogeneously enhancing soft tissue occupies the majority of " the postsurgical sella, invades the bilateral cavernous sinuses, extends to the adjacent portions of bilateral Meckel's cave's, invades the superior portion of the clivus, erodes the dorsum sella, and projects focally into the posterior portion of the postsurgical right sphenoid sinus.  The soft tissue measures approximately 2.7 cm craniocaudal by 2.6 cm anterior-posterior by 4.1 cm transverse.  This is consistent with residual or recurrent adenoma in this patient with a reported history of prior surgical debulking of known pituitary adenoma.  Although there is circumferential encasement of the bilateral cavernous internal carotid arteries, the corresponding arterial flow voids are preserved, without appreciable narrowing.  There is slight rightward deviation of the pituitary infundibulum.  Residual pituitary parenchyma is not well delineated, although a small component is likely present along the right superior paramedian margin of the presumed adenoma.  There is no mass effect on the optic chiasm, which demonstrates normal T2 weighted signal intensity.  An osseous defect in the right anterior floor of the sella, in keeping with the patient's previous transplant surgery, has demonstrated.  Mild focal FLAIR hyperintensities present in the posterior subcortical white matter of the right inferior frontal gyrus.  These imaging findings are most suggestive of localized benign vascular enhancement such as an incidental capillary damage ectasia.  No intracranial mass or abnormal enhancement is identified elsewhere.    MRI brain and orbit, 4/6/23:   Impression:   1.  4.3 cm heterogeneously enhancing pituitary mass with infiltration  of bilateral cavernous sinuses and the clivus. Findings are similar  when compared compared to prior exam 3/28/2023 suggestive for a  macroadenoma. Compared with an older MRI from 2021, the lesion appears  more heterogeneous and smaller. Previous compression of the optic  chiasm is no longer  present.  2.  Patchy T2 hyperintense enhancing lesion with scattered  susceptibility artifacts in the right frontal subcortical white  matter. A similar tiny smaller lesion is also present in the left  putamen. Retrospectively this lesion was present back in 2021.  Constellation of findings are suggestive of a benign lesion such as  cavernoma or telangiectasia.  3.   MRA demonstrates no aneurysm or stenosis of the major  intracranial arteries. Bilateral cavernous internal carotid arteries  are patent despite encasement by the above-mentioned lesion.  4.  No abnormal optic nerve enhancement or optic nerve atrophy.         Alberto Carl MD  Resident Physician, PGY2  Department of Ophthalmology  04/04/23 3:11 PM

## 2023-04-07 NOTE — PLAN OF CARE
Goal Outcome Evaluation:      Plan of Care Reviewed With: patient    Overall Patient Progress: improvingOverall Patient Progress: improving    Outcome Evaluation: Patient returned to 5a from MRI around 9pm. Alert and orientedx4, slow to respond with some word finding difficulties at times. Mentation improving and patient is using call light. Continuous D5 @100ml/hr. Provider discontinued NPO order for midnight. Patient ate 2 bowls of cereal and 1 popsicle. Regular diet order in place with carb counts but no carb coverage insulin ordered at this time. Calorie counts continue. No BM this shift. Patient using urinal at bedside. Urine sample collected and sent to lab. Patient on tele with irregular rhythm. Scheduled EKG completed this morning.  Denies chest pain or SOB. K+ recheck scheduled for AM. Dressings to bilateral legs changed per plan of care to be changed next 4/8. Denies pain. Bedalarm for safety. Continue with POC.

## 2023-04-07 NOTE — PROGRESS NOTES
Calorie Count  Intake recorded for: 4/6  Total Kcals: 0 Total Protein: 0g  Kcals from Hospital Food: 0   Protein: 0g  Kcals from Outside Food (average):0 Protein: 0g  # Meals Ordered from Kitchen: 1 meal   # Meals Recorded: No food intake recorded  # Supplements Recorded: No food intake recorded

## 2023-04-07 NOTE — PROGRESS NOTES
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 [facility], he developed new onset double vision and severe headache which prompted imaging that showed a large mass invading the cavernous sinuses and impinging on the cranial nerves. He is transferred to [facility] for this pituitary adenoma, concern for progression vs hemorrhage/apoplexy. Complicated by psychosis/metabolic encephalopathy secondary to Cushing's syndrome.    Updates today:  - MRI lumbar spine to assess for sacral osteomyelitis  - Discontinue empiric antibiotics  - Resume Lisinopril  - Resume Empagliflozin   - Neurosurgery: plans for surgery on 427.23     # Acute metabolic encephalopathy, improving  # Concern for acute psychosis, possibly steroid-related  # Cushings Syndrome  Admission symptoms included disorientation, intermittently following directions. Repetitive words - perseverating on particular phrases. Sx started the 2-3 days prior to admission (which [facility] was [facility]). Vulnerable brain (multiple brain surgeries), enlarging pituitary mass. Worked up for infections, which was largely negative. Neurology evaluated: no seizure activity. Sx improved after discontinuation of exogenous steroids, so high suspicion this was 2/2 cushing's syndrome/steroid-induced psychosis.  - Discontinue empiric antibiotics  - No steroids  - Surgical plans for pituitary tumor as stated below  - Delirium precautions    # ACTH secreting pituitary adenoma c/b type II DM, hypothyroidism  and low testosterone s/p two partial resections in 2021   Patient noted double vision and severe headache beginning the evening of 3/25. CT imaging on 3/25 revealed a large sellar/suprasellar mass extending into the cavernous sinuses with no evidence of acute stroke. On 3/28, he underwent an MRI which confirmed the CT findings of a large mass invading the cavernous sinuses and impinging on the cranial nerves. Necrosis extending beyond left cavernous sinus. [facility]. Repeat MRI performed on 4/6/23: compared to 2021 MRI, lesions are smaller. Orbit MRI without optic nerve compression and no evidence of orbital infection.  - Neurosurgery following  - Plans for Neurosurgery: surgery 4/27/23  - Optimize medically prior to surgery  - Hold ASA 1 week prior to surgery  - Okay to discontinue Dexamethasone  - Ophthalmology following  - Endocrine following    # Hyperkalemia, resolved  # Cushings Syndrome  High K in the setting of exogenous steroids. Suspect related to Cushing's syndrome, notably with the hypernatremia (see below). Resolved with aggressive repletion of potassium and discontinuation of steroids.   - RN-managed K  - Discontinue Tele    # Hypernatremia, DI vs cushing's  Slightly hypernatremia this admission. High risk for central DI. Also cushing's contributing.   - Daily BMP  - Strict I&Os  - Trial 1 mg DDAVP if Na >150 or if UOP > 300 mL/hr  - Endocrine following     # Buttocks, sacrum and bilateral groin wounds   # RLE wound from puncture injury   # L dorsal foot wound from presumed trauma   # Soft tissue infection/abscess    # Hx serratia bacteremia in December 2022   For patient's lower extremity wounds and hx of Serratia bacteremia in December, he was [facility]. His antibiotics were broadened to Vanc and Zosyn and ultimately he was transitioned to ceftazidime on 3/23 with plan to complete course on 4/4/23. BCx negative and wound cx grew serratia marcescens at  the [facility]. He has been on ceftazidime since. MRI of the right tib/fib on 3/23 was negative for osteomyelitis but did show two fluid collections draining sponateneously. [facility] and determined no intervention was needed as wounds were draining on their own.  - Stop ceftazidime (3/23 - 4/5)  - Empiric Abx as stated above  - WOC following  - PT/OT when able to follow commands  - Pain: tylenol PRN, dilaudid 0.5 mg PRN for dressing changes and oxy 5 mg PRN - however given encephalopathy, use sparingly    # Elevated troponin, down-trended  # HFmrEF, with global hypokinesis  # High burden of PVCs  # Prolonged QTc   Coronary angiogram on 2/27 shows normal coronaries. Troponin down-trending (88 -> 77). No chest pain. High burden of PVCs. Follow up ECHO with global hypokinesis, which is worse compared to February 2023 ECHO. EF is 45% (same compared to prior). Likely small vessel disease vs demand vs cardiomyopathy 2/2 ceftazidime. Low concern for acute ACS.   - Discontinued tele  - Electrolyte mgmt as above  - No need for diuresis at this time  - Continue PTA coreg  - PTA lisinopril   - PTA empagliflozin   - Resume ASA (hold 1 week prior to surgery)  - Avoid QTc prolonging medications     # Type II DM, exacerbated by Cushing's   A1c of 7.6% on 2/2023. Patient was [facility].    - Lantus 10 units  - Sliding scale insulin  - Continue PTA sitagliptin  - Hold PTA emagliflozin    - Hold PTA metformin      # Central Hypothyroidism  - Continue PTA levothyroxine     # Hypogonadism   - Continue PTA androgel (will need to bring in home medication)     # Chronic microcytic anemia   Hgb of 8.4 on [facility]. Peripheral smear on 3/30 showed poikilocytosis with occasional red blood cell fragments but no other evidence of hemolysis.  Haptoglobin was normal.  Ferritin was 91, transferrin saturation was low, B12 was 495 and folate was 8.7. Likely combination of iron deficiency as  well as inflammation/chronic disease.   - CTM       # Marijuana use   Uses daily medical marijuana at home.       # Concern for malnutrition   - Nutrition consult     Clinically Significant Risk Factors        # Hypokalemia: Lowest K = 2.5 mmol/L in last 2 days, will replace as needed  # Hypernatremia: Highest Na = 150 mmol/L in last 2 days, will monitor as appropriate      # Hypoalbuminemia: Lowest albumin = 3.1 g/dL at 4/4/2023 12:10 AM, will monitor as appropriate           # DMII: A1C = 9.5 % (Ref range: <5.7 %) within past 6 months, PRESENT ON ADMISSION   # Severe Malnutrition: based on nutrition assessment, PRESENT ON ADMISSION       The patient's care was discussed with the Attending Physician, Dr. Chris Burnett.    Linda Azul MD  Medicine Service, [address]  Securely message with Vocera (more info)  Text page via "[employer] Paging/Directory   See signed in provider for up to date coverage information  ______________________________________________________________________    Interval History    - No major concerns overnight  - RN notes reviewed  - Complains of back pain today  - Mental status continues to improve  - Assess the sacral area today - patient has some open wounds    Physical Exam   Vital Signs: Temp: 98.8  F (37.1  C) Temp src: Oral BP: 118/79 Pulse: 50   Resp: 18 SpO2: 100 % O2 Device: None (Room air)    Weight: 151 lbs 3.77 oz    General Appearance: Eyes open, tracking, following commands, interactive  Eyes: Pupils moderate size, no nystagmus, EOMI  HEENT: Normal sclera, able to move neck FROM  Respiratory: Breathing comfortably on RA, CTAB  Cardiovascular: RRR, no murmurs  GI: BS+. Soft, nontender, nondistended. No guarding or rebound tenderness.   Skin: Superficial, open wounds on the posterior buttocks midline  Musculoskeletal: No muscle wasting  Neuro:  strength intact, ankle strength intact, and sensation intact,  EOMI  Psychiatric: Calm, alert, oriented to place, person, time    Data     I have personally reviewed the following data over the past 24 hrs:    N/A  \   N/A   / N/A     139 104 15.5 /  311 (H)   3.6 20 (L) 1.06 \       Procal: N/A CRP: <3.00 Lactic Acid: N/A       AND PEDS PROGRESS NOTE:[id number]}D PEDS PROGRESS NOTE:[id number]}

## 2023-04-07 NOTE — PROGRESS NOTES
Endocrine Progress Note  Patient: Gustavo Faria   MRN: [id number]  Date of Service: 04/07/2023    HPI summary and hospitalization course:  Gustavo Faria is a 58 year old male with PMHx of ACTH-secreting macroadenoma c/b central hypothyroidism, and central hypogonadism,  s/p transphenoidal surgery 5/2021 and 7/2021 in [place] has baseline records 3rd nerve palsy, ongoing serratia RLE cellulitis c/b recent serratia bacteremia, HFrEF, T2DM, and HTN who [facility] of known expanding large sellar/suprasellar mass extending into cavernous sinuses and subsequent cranial nerve impingement.    [facility] and bilateral lower limb cellulitis/abscess developed sudden onset of headaches and double vision transferred to [facility] for multidisciplinary assessment.  Had an MRI done on 3/25 which demonstrates enlargement of known pituitary adenoma with necrosis extending laterally beyond the left cavernous sinus concerning for compression of the cranial nerves at the cavernous sinus.  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  On the arrival he was encephalopathic.  Taken off dexamethasone on 04/06/2023 with improvement in the mental status following that.    Repeated MRI following transfer to [facility] showed new enhancing to 1.4 cm lesion in the right frontal lobe with susceptibility of artifact.  4.3 cm heterogeneously enhancing pituitary mass with encasement and peripheral displacement of the cavernous portions of both internal carotid arteries and infiltration of the clivus.  Previous optic chiasm compression is resolved.  Pituitary stalk is midline.  Lesion is infiltrating both cavernous sinuses cranial nerves in the cavernous sinus  cannot be differentiated from the underlying mass.    Subjective:  Confusion resolved completely, he was interactive during the assessment.  He stated he does not have any headaches, blurring of vision improved.  No nausea or vomiting.  Still has poor appetite.  No excessive thirstiness.  No polyuria.  Was complaining of back pain stated he had a fall prior to the admission.  Primary team planning to get MRI to assess for any vertebral fractures.    Physical Examination:  /82 (BP Location: Left arm)   Pulse 67   Temp 98.2  F (36.8  C) (Oral)   Resp 17   Wt 68.6 kg (151 lb 3.8 oz)   SpO2 99%   BMI 24.41 kg/m    Physical Exam  Vitals reviewed.   Constitutional:       General: He is not in acute distress.  Eyes:      Comments: Left eye ptosis, and ophthalmoplegia   Cardiovascular:      Rate and Rhythm: Normal rate.   Pulmonary:      Effort: Pulmonary effort is normal.   Abdominal:      General: There is no distension.   Musculoskeletal:      Right lower leg: No edema.      Left lower leg: No edema.   Neurological:      Mental Status: He is alert and oriented to person, place, and time.   Psychiatric:         Mood and Affect: Mood normal.         Behavior: Behavior normal.         Medications:  Reviewed    Endocrine Labs:      Images:  MRI brain with and without contrast on 4/6/2023:  Comparison:  Outside brain MR 3/28/2023 and 5/25/2021.      Technique:   1. Brain MRI: Axial diffusion and FLAIR images of the whole brain  obtained without intravenous contrast. Sagittal T1 and T2-weighted,  coronal T2-weighted, coronal T1-weighted images with focus on the  sella were obtained without intravenous contrast. Post intravenous  contrast using gadolinium coronal and sagittal T1-weighted images were  obtained focused on the sella. Dynamic postcontrast coronal  T1-weighted images were also obtained.     2. MRI of the Orbits focused on the orbits/visual pathways:  Axial  T2-weighted with inversion recovery and coronal  T1-weighted images  were obtained without intravenous contrast. Axial and coronal  T1-weighted images with fat saturation were obtained after intravenous  gadolinium administration.     3. MRI Brain COW: Sagittal T1-weighted, axial T2-weighted with fat  saturation, turboFLAIR and diffusion-weighted with ADC map and coronal  T1-weighted and T2-weighted images of the brain were obtained without  intravenous contrast.       Contrast: 7.5mL Gadavist     Findings:    Brain MRI:  Enhancing 1.4 x 1.0 cm lesion with associated susceptibility artifact  in the right frontal lobe (series 16, image 17), stable since  5/25/2021. A similar punctate lesion in the left putamen also  unchanged.m     4.3 x 1.9 x 1.3 cm (right to left, AP, craniocaudal) heterogeneously  enhancing pituitary mass with encasement and peripheral displacement  of the cavernous portions of both internal carotid arteries (series  13, image 28 and series 14, image 11). The lesion infiltrates the  clivus. Compared with MRI from 2021 the craniocaudal dimension of the  mass is decreased. The lesion is now more heterogeneous, previously  more homogeneous. Previous optic chiasm compression is resolved. No  abnormal enhancement of the optic nerves within the intraorbital  portion. The pituitary stalk is midline.     The lesion infiltrates both cavernous sinuses. The traversing cranial  nerves within the cavernous sinus cannot be differentiated from the  underlying mass.     FLAIR images through the whole brain shows nonspecific posterior  periventricular white matter hyperintensities. Otherwise no mass  effect, midline shift, or significant enlargement of the ventricles.  Scattered mucosal thickening of the paranasal sinuses. Mastoid air  cells are clear.     MRA Head: No aneurysm or stenosis of the major intracranial arteries  at the base of the brain.                                                                      Impression:   1.  4.3 cm heterogeneously  enhancing pituitary mass with infiltration  of bilateral cavernous sinuses and the clivus. Findings are similar  when compared compared to prior exam 3/28/2023 suggestive for a  macroadenoma. Compared with an older MRI from 2021, the lesion appears  more heterogeneous and smaller. Previous compression of the optic  chiasm is no longer present.  2.  Patchy T2 hyperintense enhancing lesion with scattered  susceptibility artifacts in the right frontal subcortical white  matter. A similar tiny smaller lesion is also present in the left  putamen. Retrospectively this lesion was present back in 2021.  Constellation of findings are suggestive of a benign lesion such as  cavernoma or telangiectasia.  3.   MRA demonstrates no aneurysm or stenosis of the major  intracranial arteries. Bilateral cavernous internal carotid arteries  are patent despite encasement by the above-mentioned lesion.  4.  No abnormal optic nerve enhancement or optic nerve atrophy.        Assessment and plan:    Gustavo Faria is a 58 year old male with PMHx of  ACTH-secreting macroadenoma c/b central hypothyroidism, , and central hypogonadism,  s/p transphenoidal surgery 5/2021 and 7/2021 in [place] has baseline records 3rd nerve palsy, ongoing serratia RLE cellulitis c/b recent serratia bacteremia, HFrEF, T2DM, and HTN who [facility] of known expanding large sellar/suprasellar mass extending into cavernous sinuses and subsequent cranial nerve impingement.     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  No acute hemorrhage, small area of  diffusion restriction at the left side of pituitary possibly consistent with ischemic pituitary adenoma  Started on dexamethasone 2 mg twice daily, was continued on/6/23  Random serum cortisol level at 2:30 AM was 46.3, ACTH 321.  A.m. cortisol level of 41.1 on 4/7/2023.  Repeated MRI on 4/6 showed a 4.3 cm ureteral diffusely enhancing pituitary mass with infiltration of bilateral cavernous sinus and clivus.     Recommendations:  -Resection/debulking will be arranged by neurosurgery will be followed by radiotherapy.  -We will repeat a.m. cortisol/ACTH levels after 2 days to establish a new baseline.       #Central hypothyroidism:  Prior to admission was on levothyroxine 100 mcg daily.  Free T4 on admission 1.3 TSH not detectable (picture of central hypothyroidism).     Recommendations:  -Continue with PTA levothyroxine 100 mcg daily.     #Hypogonadotrophic hypogonadism:  Was on AndroGel gel 1 pump daily.     Recommendations:  -If his family brings it to the hospital it is okay to restart it during the hospitalization; otherwise, hold for now     #Assessment for DI:  No known history of DI.  Developed hyponatremia in the settings of poor oral intake responded well to D5 infusion.  Sodium today morning 139.  Urine osmolarity 415/serum osmolarity 305.  Urine output of 1.4 L yesterday.  On D5 infusion with a rate of 100 mill per hour. no evidence of DI these values were checked before starting D5 infusion..     Recommendations:  -Discontinue D5 infusion and continue to close monitor the sodium level.  -Continue to monitor I's and O's.  -Allowed to drink to thirst.        #DM type II: Hemoglobin A1c on the admission 9.5%.  Prior to admission was on Sitagliptin 100 mg daily/metformin 500 mg twice daily, Jardiance 12.5 mg.  Due to altered mental status no oral intake following admission was placed on a sliding scale only with improvement in the mental status he started to eat with BG trending  up.       Recommendations:  -To start on Lantus 10 units daily.  -Low-dose SSI 3 times daily before meals and at the bedtime.  -If he starts to eat to receive NovoLog carb coverage of 1 unit: 25 g CHO with meals and snacks.  -Hypoglycemia rescue protocol.  -POC BG checks 3 times daily AC at the bedtime at 2 AM.  -Carb counting protocol.  -Continue holding PTA oral medications.    Addendum:  Received 1 unit with the breakfast for the carb coverage of 43 g CHO and 3 units for BG of 365, BG at the lunchtime continued to be 311.    We will increase the sliding scale from low-dose SSI to medium dose SSI and to increase the carb coverage to 1: 20 g CHO            Jeanne Chase      Endocrine fellow   Pager number: [phone]          I have seen and examined the patient, reviewed records, reviewed and edited the fellow's note.  I agree with the plan of care.    Evelin Yeboah MD   Division of Diabetes, Endocrinology and Metabolism  Department of Medicine

## 2023-04-07 NOTE — PROGRESS NOTES
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 He developed sudden headache and vision changes on 3/25, with MRI brain on 3/28, which demonstrated enlargement of known pituitary adenoma, with necrosis extending laterally beyond the left cavernous sinus, concerning for compression of cranial nerves at cavernous sinus. 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      Given that he is past the acute period of apoplexy, there is no indication for emergent decompression. He would benefit for redo EEA for resection of adenoma given its symptomatic nature. However, he would need to be medically optimized from the metabolic and infectious standpoint prior to surgery. He would also benefit from a multidisciplinary approach with recommendations from ophthalmology, endocrinology and radiation oncology regarding options for treatment.     Plan:  Appreciate ophthalmology and endocrinology recommendations  MRI brain pituitary protocol with thin cuts (ordered for you), orbit and face- completed  Encephalopathy workup per primary team (on EEG)  Now off steroids  Hold ASA for now  Neurosurgery will continue to follow   -----------------------------------  Oliver Duong  Neurosurgery Resident PGY2    Interval History: No acute events overnight that were brought to neurosurgery's attention.    Objective:   Temp:  [98.1  F (36.7  C)-98.4  F (36.9  C)] 98.2  F (36.8  C)  Pulse:  [] 67  Resp:  [16-18] 17  BP: (128-153)/(80-95) 132/82  SpO2:  [97  %-100 %] 99 %  I/O last 3 completed shifts:  In: 400 [P.O.:400]  Out: 600 [Urine:600]      NEUROLOGIC:  -- Opens eyes to voice, following commands, oriented x2-3 (name, city, month- on/off)  -- Able to name objects  -- Speech limited, perseverating speech, minimal spontaneous speech  Cranial Nerves:  -- visual fields full to confrontation although with limited participation, PERRL anisocoric L>R and sluggish, left CN III paresis and CN VI palsy, restricted ROM in right EOM with no apparent asymmetry   -- face symmetrical, tongue midline  -- sensory V1-V3 intact bilaterally  -- palate elevates symmetrically, uvula midline  -- hearing grossly intact bilat     Motor:  Limited participation - antigravity x4 extremities     Sensory:  intact to LT x 4 extremities      Reflexes:       Bi Tri BR Omi Pat Ach Bab     C5-6 C7-8 C6 UMN L2-4 S1 UMN   R 2+ 2+ 2+ Norm 2+ 2+ Norm   L 2+ 2+ 2+ Norm 2+ 2+ Norm      Gait: Deferred.     LABS:  Recent Labs   Lab 04/07/23  0842 04/07/23  0740 04/07/23  0110 04/06/23  1721 04/06/23  1608 04/06/23  1337 04/06/23  1307     --   --   --  146*  --  148*   POTASSIUM 3.6  --   --   --  3.6  --  3.4  3.4   CHLORIDE 104  --   --   --  110*  --  111*   CO2 20*  --   --   --  20*  --  22   ANIONGAP 15  --   --   --  16*  --  15   * 365* 229*   < > 131*   < > 136*   BUN 15.5  --   --   --  14.7  --  15.6   CR 1.06  --   --   --  0.96  --  1.01   KORIN 8.5*  --   --   --  8.5*  --  8.4*    < > = values in this interval not displayed.       Recent Labs   Lab 04/06/23  0626 04/05/23  0611   WBC 14.8* 15.5*   RBC 3.27* 3.52*   HGB 8.4* 8.9*   HCT 28.2* 30.5*   MCV 86 87   MCH 25.7* 25.3*   MCHC 29.8* 29.2*   RDW 22.5* 22.4*   PLT  --  296       IMAGING:  Recent Results (from the past 24 hour(s))   MR Orbit w/o & w Contrast    Narrative    Brain/ Pituitary MRI without and with contrast  MRI of the Orbits   MRI Brain COW    History: pituitary mass.  ICD-10: Pituitary mass    Comparison:   Outside brain MR 3/28/2023 and 5/25/2021.     Technique:   1. Brain MRI: Axial diffusion and FLAIR images of the whole brain  obtained without intravenous contrast. Sagittal T1 and T2-weighted,  coronal T2-weighted, coronal T1-weighted images with focus on the  sella were obtained without intravenous contrast. Post intravenous  contrast using gadolinium coronal and sagittal T1-weighted images were  obtained focused on the sella. Dynamic postcontrast coronal  T1-weighted images were also obtained.    2. MRI of the Orbits focused on the orbits/visual pathways:  Axial  T2-weighted with inversion recovery and coronal T1-weighted images  were obtained without intravenous contrast. Axial and coronal  T1-weighted images with fat saturation were obtained after intravenous  gadolinium administration.    3. MRI Brain COW: Sagittal T1-weighted, axial T2-weighted with fat  saturation, turboFLAIR and diffusion-weighted with ADC map and coronal  T1-weighted and T2-weighted images of the brain were obtained without  intravenous contrast.      Contrast: 7.5mL Gadavist    Findings:    Brain MRI:  Enhancing 1.4 x 1.0 cm lesion with associated susceptibility artifact  in the right frontal lobe (series 16, image 17), stable since  5/25/2021. A similar punctate lesion in the left putamen also  unchanged.m    4.3 x 1.9 x 1.3 cm (right to left, AP, craniocaudal) heterogeneously  enhancing pituitary mass with encasement and peripheral displacement  of the cavernous portions of both internal carotid arteries (series  13, image 28 and series 14, image 11). The lesion infiltrates the  clivus. Compared with MRI from 2021 the craniocaudal dimension of the  mass is decreased. The lesion is now more heterogeneous, previously  more homogeneous. Previous optic chiasm compression is resolved. No  abnormal enhancement of the optic nerves within the intraorbital  portion. The pituitary stalk is midline.    The lesion infiltrates both cavernous  sinuses. The traversing cranial  nerves within the cavernous sinus cannot be differentiated from the  underlying mass.    FLAIR images through the whole brain shows nonspecific posterior  periventricular white matter hyperintensities. Otherwise no mass  effect, midline shift, or significant enlargement of the ventricles.  Scattered mucosal thickening of the paranasal sinuses. Mastoid air  cells are clear.    MRA Head: No aneurysm or stenosis of the major intracranial arteries  at the base of the brain.      Impression    Impression:   1.  4.3 cm heterogeneously enhancing pituitary mass with infiltration  of bilateral cavernous sinuses and the clivus. Findings are similar  when compared compared to prior exam 3/28/2023 suggestive for a  macroadenoma. Compared with an older MRI from 2021, the lesion appears  more heterogeneous and smaller. Previous compression of the optic  chiasm is no longer present.  2.  Patchy T2 hyperintense enhancing lesion with scattered  susceptibility artifacts in the right frontal subcortical white  matter. A similar tiny smaller lesion is also present in the left  putamen. Retrospectively this lesion was present back in 2021.  Constellation of findings are suggestive of a benign lesion such as  cavernoma or telangiectasia.  3.   MRA demonstrates no aneurysm or stenosis of the major  intracranial arteries. Bilateral cavernous internal carotid arteries  are patent despite encasement by the above-mentioned lesion.  4.  No abnormal optic nerve enhancement or optic nerve atrophy.    I have personally reviewed the examination and initial interpretation  and I agree with the findings.    EVA DAMON MD         SYSTEM ID:  [id number]   MR Brain w/o & w Contrast    Narrative    Brain/ Pituitary MRI without and with contrast  MRI of the Orbits   MRI Brain COW    History: pituitary mass.  ICD-10: Pituitary mass    Comparison:  Outside brain MR 3/28/2023 and 5/25/2021.     Technique:   1. Brain MRI:  Axial diffusion and FLAIR images of the whole brain  obtained without intravenous contrast. Sagittal T1 and T2-weighted,  coronal T2-weighted, coronal T1-weighted images with focus on the  sella were obtained without intravenous contrast. Post intravenous  contrast using gadolinium coronal and sagittal T1-weighted images were  obtained focused on the sella. Dynamic postcontrast coronal  T1-weighted images were also obtained.    2. MRI of the Orbits focused on the orbits/visual pathways:  Axial  T2-weighted with inversion recovery and coronal T1-weighted images  were obtained without intravenous contrast. Axial and coronal  T1-weighted images with fat saturation were obtained after intravenous  gadolinium administration.    3. MRI Brain COW: Sagittal T1-weighted, axial T2-weighted with fat  saturation, turboFLAIR and diffusion-weighted with ADC map and coronal  T1-weighted and T2-weighted images of the brain were obtained without  intravenous contrast.      Contrast: 7.5mL Gadavist    Findings:    Brain MRI:  Enhancing 1.4 x 1.0 cm lesion with associated susceptibility artifact  in the right frontal lobe (series 16, image 17), stable since  5/25/2021. A similar punctate lesion in the left putamen also  unchanged.m    4.3 x 1.9 x 1.3 cm (right to left, AP, craniocaudal) heterogeneously  enhancing pituitary mass with encasement and peripheral displacement  of the cavernous portions of both internal carotid arteries (series  13, image 28 and series 14, image 11). The lesion infiltrates the  clivus. Compared with MRI from 2021 the craniocaudal dimension of the  mass is decreased. The lesion is now more heterogeneous, previously  more homogeneous. Previous optic chiasm compression is resolved. No  abnormal enhancement of the optic nerves within the intraorbital  portion. The pituitary stalk is midline.    The lesion infiltrates both cavernous sinuses. The traversing cranial  nerves within the cavernous sinus cannot be  differentiated from the  underlying mass.    FLAIR images through the whole brain shows nonspecific posterior  periventricular white matter hyperintensities. Otherwise no mass  effect, midline shift, or significant enlargement of the ventricles.  Scattered mucosal thickening of the paranasal sinuses. Mastoid air  cells are clear.    MRA Head: No aneurysm or stenosis of the major intracranial arteries  at the base of the brain.      Impression    Impression:   1.  4.3 cm heterogeneously enhancing pituitary mass with infiltration  of bilateral cavernous sinuses and the clivus. Findings are similar  when compared compared to prior exam 3/28/2023 suggestive for a  macroadenoma. Compared with an older MRI from 2021, the lesion appears  more heterogeneous and smaller. Previous compression of the optic  chiasm is no longer present.  2.  Patchy T2 hyperintense enhancing lesion with scattered  susceptibility artifacts in the right frontal subcortical white  matter. A similar tiny smaller lesion is also present in the left  putamen. Retrospectively this lesion was present back in 2021.  Constellation of findings are suggestive of a benign lesion such as  cavernoma or telangiectasia.  3.   MRA demonstrates no aneurysm or stenosis of the major  intracranial arteries. Bilateral cavernous internal carotid arteries  are patent despite encasement by the above-mentioned lesion.  4.  No abnormal optic nerve enhancement or optic nerve atrophy.    I have personally reviewed the examination and initial interpretation  and I agree with the findings.    EVA DAMON MD         SYSTEM ID:  [id number]   MRA Brain ([place]) wo Contrast    Narrative    Brain/ Pituitary MRI without and with contrast  MRI of the Orbits   MRI Brain COW    History: pituitary mass.  ICD-10: Pituitary mass    Comparison:  Outside brain MR 3/28/2023 and 5/25/2021.     Technique:   1. Brain MRI: Axial diffusion and FLAIR images of the whole brain  obtained  without intravenous contrast. Sagittal T1 and T2-weighted,  coronal T2-weighted, coronal T1-weighted images with focus on the  sella were obtained without intravenous contrast. Post intravenous  contrast using gadolinium coronal and sagittal T1-weighted images were  obtained focused on the sella. Dynamic postcontrast coronal  T1-weighted images were also obtained.    2. MRI of the Orbits focused on the orbits/visual pathways:  Axial  T2-weighted with inversion recovery and coronal T1-weighted images  were obtained without intravenous contrast. Axial and coronal  T1-weighted images with fat saturation were obtained after intravenous  gadolinium administration.    3. MRI Brain COW: Sagittal T1-weighted, axial T2-weighted with fat  saturation, turboFLAIR and diffusion-weighted with ADC map and coronal  T1-weighted and T2-weighted images of the brain were obtained without  intravenous contrast.      Contrast: 7.5mL Gadavist    Findings:    Brain MRI:  Enhancing 1.4 x 1.0 cm lesion with associated susceptibility artifact  in the right frontal lobe (series 16, image 17), stable since  5/25/2021. A similar punctate lesion in the left putamen also  unchanged.m    4.3 x 1.9 x 1.3 cm (right to left, AP, craniocaudal) heterogeneously  enhancing pituitary mass with encasement and peripheral displacement  of the cavernous portions of both internal carotid arteries (series  13, image 28 and series 14, image 11). The lesion infiltrates the  clivus. Compared with MRI from 2021 the craniocaudal dimension of the  mass is decreased. The lesion is now more heterogeneous, previously  more homogeneous. Previous optic chiasm compression is resolved. No  abnormal enhancement of the optic nerves within the intraorbital  portion. The pituitary stalk is midline.    The lesion infiltrates both cavernous sinuses. The traversing cranial  nerves within the cavernous sinus cannot be differentiated from the  underlying mass.    FLAIR images through  the whole brain shows nonspecific posterior  periventricular white matter hyperintensities. Otherwise no mass  effect, midline shift, or significant enlargement of the ventricles.  Scattered mucosal thickening of the paranasal sinuses. Mastoid air  cells are clear.    MRA Head: No aneurysm or stenosis of the major intracranial arteries  at the base of the brain.      Impression    Impression:   1.  4.3 cm heterogeneously enhancing pituitary mass with infiltration  of bilateral cavernous sinuses and the clivus. Findings are similar  when compared compared to prior exam 3/28/2023 suggestive for a  macroadenoma. Compared with an older MRI from 2021, the lesion appears  more heterogeneous and smaller. Previous compression of the optic  chiasm is no longer present.  2.  Patchy T2 hyperintense enhancing lesion with scattered  susceptibility artifacts in the right frontal subcortical white  matter. A similar tiny smaller lesion is also present in the left  putamen. Retrospectively this lesion was present back in 2021.  Constellation of findings are suggestive of a benign lesion such as  cavernoma or telangiectasia.  3.   MRA demonstrates no aneurysm or stenosis of the major  intracranial arteries. Bilateral cavernous internal carotid arteries  are patent despite encasement by the above-mentioned lesion.  4.  No abnormal optic nerve enhancement or optic nerve atrophy.    I have personally reviewed the examination and initial interpretation  and I agree with the findings.    EVA DAMON MD         SYSTEM ID:  [id number]

## 2023-04-07 NOTE — PLAN OF CARE
Goal Outcome Evaluation:      Plan of Care Reviewed With: patient    Overall Patient Progress: improving    Outcome Evaluation: A&O x 4. More tired today but still alert.  VSS on room air.  On tele reading sinus rhythym with PACs.  c/o back pain 5-8/10- declines PRN oxycodone and dilaudid because of fear of addiction.  Team updated.  IV abx and D5 were discontinued this shift.  BG checks AC/HS/0200.  Carb coverage and Lantus ordered and administered per orders.  Reggie counts.  Tolerates CHO diet with poor appeite but no n/v.  BG remains high.  Potassium 3.6 this morning- recheck scheduled for tomorrow.  Sacral mepilex changed and barrier cream applied to bottom.  BLE dressings changed earlier on NOC shift.  Double lumen PICC infusing TKO and saline locked.  Will pass on to heparin lock PICC as he is no longer getting any scheduled meds IV.  Up in chair for a short time this morning but did not tolerate well.  Assisted back to bed.  Plan is for surgery consult.

## 2023-04-07 NOTE — PROGRESS NOTES
04/07/23 1300   Appointment Info   Signing Clinician's Name / Credentials (OT) Linda Solorzano OTR/L   Living Environment   People in Home sibling(s)   Current Living Arrangements condominium   Home Accessibility stairs to enter home   Number of Stairs, Main Entrance greater than 10 stairs   Stair Railings, Main Entrance railing on right side (ascending)   Transportation Anticipated agency   Living Environment Comments Per more alert this date. Reports living in condo with sister with tub shower no DME.   Self-Care   Usual Activity Tolerance fair   Current Activity Tolerance poor   Regular Exercise No   Equipment Currently Used at Home walker, rolling;commode chair   Fall history within last six months yes   Number of times patient has fallen within last six months 1   Activity/Exercise/Self-Care Comment Pt reports PLOF at IND but over the last couple weeks has become dependent with cares. Unable to stand and sister assisting with meds and IADLs.   General Information   Onset of Illness/Injury or Date of Surgery 04/07/23   Referring Physician Zita Christensen MD   Patient/Family Therapy Goal Statement (OT) Improve strength and IND with ADLs   Left Upper Extremity (Weight-bearing Status) full weight-bearing (FWB)   Right Upper Extremity (Weight-bearing Status) full weight-bearing (FWB)   Left Lower Extremity (Weight-bearing Status) full weight-bearing (FWB)   Right Lower Extremity (Weight-bearing Status) full weight-bearing (FWB)   Cognitive Status Examination   Orientation Status orientation to person, place and time   Cognitive Status Comments Pts cognition improved from PT note. Will need to be assessed in future session.   Visual Perception   Visual Impairment/Limitations WFL   Sensory   Sensory Quick Adds sensation intact   Range of Motion Comprehensive   General Range of Motion no range of motion deficits identified   Strength Comprehensive (MMT)   Comment, General Manual Muscle Testing (MMT) Assessment  Grossly 4/5 UE   Coordination   Upper Extremity Coordination No deficits were identified   Bed Mobility   Bed Mobility supine-sit   Supine-Sit [place] (Bed Mobility) moderate assist (50% patient effort);maximum assist (25% patient effort)   Transfers   Transfers sit-stand transfer   Transfer Comments mod-max Ax1   Balance   Balance Assessment standing balance: static   Balance Comments mod A   Bathing Assessment/Intervention   [place] Level (Bathing) maximum assist (25% patient effort)   Comment, (Bathing) Per clinical judgement   Lower Body Dressing Assessment/Training   [place] Level (Lower Body Dressing) maximum assist (25% patient effort)   Comment, (Lower Body Dressing) Per clinical judgement   Toileting   [place] Level (Toileting) maximum assist (25% patient effort)   Comment, (Toileting) Per clinical judgement   Clinical Impression   Criteria for Skilled Therapeutic Interventions Met (OT) Yes, treatment indicated   OT Diagnosis Decreased ADL function   OT Problem List-Impairments impacting ADL problems related to;activity tolerance impaired;balance;cognition;strength;mobility   Assessment of Occupational Performance 3-5 Performance Deficits   Planned Therapy Interventions (OT) ADL retraining;cognition;strengthening;transfer training;home program guidelines;progressive activity/exercise   Clinical Decision Making Complexity (OT) moderate complexity   Anticipated Equipment Needs Upon Discharge (OT)   (TBD)   Risk & Benefits of therapy have been explained evaluation/treatment results reviewed;care plan/treatment goals reviewed;risks/benefits reviewed;current/potential barriers reviewed;participants voiced agreement with care plan;participants included;patient   Clinical Impression Comments Pt below baseline and will benefit from skilled OT throughout hospital stay to promote return to functional baseline and assist with d/c planning.   OT Total Evaluation Time   OT Eval, Moderate Complexity  Minutes [id number]) [id number]   OT Goals   Therapy Frequency (OT) 5 times/wk   OT Predicted Duration/Target Date for Goal Attainment 04/21/23   OT: Lower Body Dressing Modified independent;using adaptive equipment   OT: Lower Body Bathing Supervision/stand-by assist;using adaptive equipment   OT: Toilet Transfer/Toileting Modified independent;using adaptive equipment   OT: Cognitive Patient/caregiver will verbalize understanding of cognitive assessment results/recommendations as needed for safe discharge planning   OT Discharge Planning   OT Plan EOB ADLs, transfers, cog screening   OT Discharge Recommendation (DC Rec) Transitional Care Facility   OT Rationale for DC Rec Pt significantly below baseline currently needing mod-max A to complete bed mobility and transfers. Pt's d/c location is with his sister who may not be able to provided a high level of assist. TCU is recommended at this time to provide increased time to allow for strengthening and increased ADL IND.   OT Brief overview of current status mod-max Ax1

## 2023-04-08 ENCOUNTER — APPOINTMENT (OUTPATIENT)
Dept: GENERAL RADIOLOGY | Facility: CLINIC | Age: 58
DRG: 614 | End: 2023-04-08
Attending: INTERNAL MEDICINE
Payer: COMMERCIAL

## 2023-04-08 LAB
ALBUMIN UR-MCNC: 100 MG/DL
ANION GAP SERPL CALCULATED.3IONS-SCNC: 12 MMOL/L (ref 7–15)
ANION GAP SERPL CALCULATED.3IONS-SCNC: 14 MMOL/L (ref 7–15)
APPEARANCE UR: ABNORMAL
BILIRUB UR QL STRIP: NEGATIVE
BUN SERPL-MCNC: 13.1 MG/DL (ref 6–20)
BUN SERPL-MCNC: 13.7 MG/DL (ref 6–20)
CALCIUM SERPL-MCNC: 8.8 MG/DL (ref 8.6–10)
CALCIUM SERPL-MCNC: 9.2 MG/DL (ref 8.6–10)
CHLORIDE SERPL-SCNC: 107 MMOL/L (ref 98–107)
CHLORIDE SERPL-SCNC: 112 MMOL/L (ref 98–107)
COLOR UR AUTO: ABNORMAL
CREAT SERPL-MCNC: 0.91 MG/DL (ref 0.67–1.17)
CREAT SERPL-MCNC: 0.94 MG/DL (ref 0.67–1.17)
CRP SERPL-MCNC: <3 MG/L
DEPRECATED HCO3 PLAS-SCNC: 23 MMOL/L (ref 22–29)
DEPRECATED HCO3 PLAS-SCNC: 25 MMOL/L (ref 22–29)
ERYTHROCYTE [SEDIMENTATION RATE] IN BLOOD BY WESTERGREN METHOD: 13 MM/HR (ref 0–20)
GFR SERPL CREATININE-BSD FRML MDRD: >90 ML/MIN/1.73M2
GFR SERPL CREATININE-BSD FRML MDRD: >90 ML/MIN/1.73M2
GLUCOSE BLDC GLUCOMTR-MCNC: 127 MG/DL (ref 70–99)
GLUCOSE BLDC GLUCOMTR-MCNC: 147 MG/DL (ref 70–99)
GLUCOSE BLDC GLUCOMTR-MCNC: 180 MG/DL (ref 70–99)
GLUCOSE BLDC GLUCOMTR-MCNC: 203 MG/DL (ref 70–99)
GLUCOSE BLDC GLUCOMTR-MCNC: 210 MG/DL (ref 70–99)
GLUCOSE SERPL-MCNC: 131 MG/DL (ref 70–99)
GLUCOSE SERPL-MCNC: 195 MG/DL (ref 70–99)
GLUCOSE UR STRIP-MCNC: >=1000 MG/DL
HEMOLYSIS: ABNORMAL
HEMOLYSIS: NORMAL
HGB UR QL STRIP: ABNORMAL
ICTERUS: ABNORMAL
ICTERUS: NORMAL
KETONES UR STRIP-MCNC: NEGATIVE MG/DL
LACTATE SERPL-SCNC: 1.3 MMOL/L (ref 0.7–2)
LACTATE SERPL-SCNC: 2.4 MMOL/L (ref 0.7–2)
LEUKOCYTE ESTERASE UR QL STRIP: ABNORMAL
LIPEMIA: ABNORMAL
LIPEMIA: NORMAL
MAGNESIUM SERPL-MCNC: 2 MG/DL (ref 1.7–2.3)
MAGNESIUM SERPL-MCNC: 2.2 MG/DL (ref 1.7–2.3)
MUCOUS THREADS #/AREA URNS LPF: PRESENT /LPF
NITRATE UR QL: NEGATIVE
PH UR STRIP: 5.5 [PH] (ref 5–7)
PHOSPHATE SERPL-MCNC: 1 MG/DL (ref 2.5–4.5)
PHOSPHATE SERPL-MCNC: 1.5 MG/DL (ref 2.5–4.5)
POTASSIUM SERPL-SCNC: 2.8 MMOL/L (ref 3.4–5.3)
POTASSIUM SERPL-SCNC: 4.1 MMOL/L (ref 3.4–5.3)
RBC URINE: 4 /HPF
SODIUM SERPL-SCNC: 144 MMOL/L (ref 136–145)
SODIUM SERPL-SCNC: 148 MMOL/L (ref 136–145)
SODIUM SERPL-SCNC: 149 MMOL/L (ref 136–145)
SP GR UR STRIP: 1.02 (ref 1–1.03)
SQUAMOUS EPITHELIAL: <1 /HPF
UROBILINOGEN UR STRIP-MCNC: NORMAL MG/DL
WBC URINE: 18 /HPF

## 2023-04-08 PROCEDURE — 71045 X-RAY EXAM CHEST 1 VIEW: CPT | Mod: 26 | Performed by: RADIOLOGY

## 2023-04-08 PROCEDURE — 71045 X-RAY EXAM CHEST 1 VIEW: CPT

## 2023-04-08 PROCEDURE — 83605 ASSAY OF LACTIC ACID: CPT | Performed by: INTERNAL MEDICINE

## 2023-04-08 PROCEDURE — 86140 C-REACTIVE PROTEIN: CPT

## 2023-04-08 PROCEDURE — 99232 SBSQ HOSP IP/OBS MODERATE 35: CPT | Mod: GC | Performed by: NEUROLOGICAL SURGERY

## 2023-04-08 PROCEDURE — 85652 RBC SED RATE AUTOMATED: CPT

## 2023-04-08 PROCEDURE — 250N000011 HC RX IP 250 OP 636

## 2023-04-08 PROCEDURE — 250N000011 HC RX IP 250 OP 636: Performed by: RADIOLOGY

## 2023-04-08 PROCEDURE — 258N000003 HC RX IP 258 OP 636

## 2023-04-08 PROCEDURE — 36592 COLLECT BLOOD FROM PICC: CPT | Performed by: INTERNAL MEDICINE

## 2023-04-08 PROCEDURE — 80048 BASIC METABOLIC PNL TOTAL CA: CPT

## 2023-04-08 PROCEDURE — 83735 ASSAY OF MAGNESIUM: CPT

## 2023-04-08 PROCEDURE — 250N000013 HC RX MED GY IP 250 OP 250 PS 637: Performed by: INTERNAL MEDICINE

## 2023-04-08 PROCEDURE — 36415 COLL VENOUS BLD VENIPUNCTURE: CPT | Performed by: INTERNAL MEDICINE

## 2023-04-08 PROCEDURE — 93010 ELECTROCARDIOGRAM REPORT: CPT | Performed by: INTERNAL MEDICINE

## 2023-04-08 PROCEDURE — 83735 ASSAY OF MAGNESIUM: CPT | Performed by: INTERNAL MEDICINE

## 2023-04-08 PROCEDURE — 83605 ASSAY OF LACTIC ACID: CPT

## 2023-04-08 PROCEDURE — 36592 COLLECT BLOOD FROM PICC: CPT

## 2023-04-08 PROCEDURE — 87186 SC STD MICRODIL/AGAR DIL: CPT | Performed by: INTERNAL MEDICINE

## 2023-04-08 PROCEDURE — 87040 BLOOD CULTURE FOR BACTERIA: CPT | Performed by: INTERNAL MEDICINE

## 2023-04-08 PROCEDURE — 82310 ASSAY OF CALCIUM: CPT

## 2023-04-08 PROCEDURE — 84100 ASSAY OF PHOSPHORUS: CPT | Performed by: INTERNAL MEDICINE

## 2023-04-08 PROCEDURE — 999N000007 HC SITE CHECK

## 2023-04-08 PROCEDURE — 87106 FUNGI IDENTIFICATION YEAST: CPT | Performed by: INTERNAL MEDICINE

## 2023-04-08 PROCEDURE — 250N000013 HC RX MED GY IP 250 OP 250 PS 637: Performed by: STUDENT IN AN ORGANIZED HEALTH CARE EDUCATION/TRAINING PROGRAM

## 2023-04-08 PROCEDURE — 81001 URINALYSIS AUTO W/SCOPE: CPT | Performed by: INTERNAL MEDICINE

## 2023-04-08 PROCEDURE — 93005 ELECTROCARDIOGRAM TRACING: CPT

## 2023-04-08 PROCEDURE — 99233 SBSQ HOSP IP/OBS HIGH 50: CPT | Mod: GC | Performed by: INTERNAL MEDICINE

## 2023-04-08 PROCEDURE — 120N000002 HC R&B MED SURG/OB UMMC

## 2023-04-08 PROCEDURE — 85027 COMPLETE CBC AUTOMATED: CPT

## 2023-04-08 PROCEDURE — 250N000013 HC RX MED GY IP 250 OP 250 PS 637

## 2023-04-08 PROCEDURE — 99231 SBSQ HOSP IP/OBS SF/LOW 25: CPT | Mod: GC | Performed by: INTERNAL MEDICINE

## 2023-04-08 PROCEDURE — 84295 ASSAY OF SERUM SODIUM: CPT

## 2023-04-08 RX ORDER — DEXTROSE, SODIUM CHLORIDE, AND POTASSIUM CHLORIDE 5; .2; .15 G/100ML; G/100ML; G/100ML
INJECTION INTRAVENOUS CONTINUOUS
Status: DISCONTINUED | OUTPATIENT
Start: 2023-04-08 | End: 2023-04-08

## 2023-04-08 RX ORDER — POTASSIUM CHLORIDE 29.8 MG/ML
20 INJECTION INTRAVENOUS
Status: COMPLETED | OUTPATIENT
Start: 2023-04-08 | End: 2023-04-08

## 2023-04-08 RX ORDER — CEFTRIAXONE 2 G/1
2 INJECTION, POWDER, FOR SOLUTION INTRAMUSCULAR; INTRAVENOUS EVERY 24 HOURS
Status: DISCONTINUED | OUTPATIENT
Start: 2023-04-08 | End: 2023-04-10

## 2023-04-08 RX ADMIN — MICONAZOLE NITRATE: 20 CREAM TOPICAL at 19:18

## 2023-04-08 RX ADMIN — MICONAZOLE NITRATE: 20 POWDER TOPICAL at 09:34

## 2023-04-08 RX ADMIN — INSULIN GLARGINE 10 UNITS: 100 INJECTION, SOLUTION SUBCUTANEOUS at 09:31

## 2023-04-08 RX ADMIN — LISINOPRIL 10 MG: 10 TABLET ORAL at 10:35

## 2023-04-08 RX ADMIN — SODIUM CHLORIDE, POTASSIUM CHLORIDE, SODIUM LACTATE AND CALCIUM CHLORIDE 500 ML: 600; 310; 30; 20 INJECTION, SOLUTION INTRAVENOUS at 18:42

## 2023-04-08 RX ADMIN — POTASSIUM CHLORIDE: 2 INJECTION, SOLUTION, CONCENTRATE INTRAVENOUS at 21:57

## 2023-04-08 RX ADMIN — CARVEDILOL 12.5 MG: 12.5 TABLET, FILM COATED ORAL at 17:39

## 2023-04-08 RX ADMIN — CARVEDILOL 12.5 MG: 12.5 TABLET, FILM COATED ORAL at 09:24

## 2023-04-08 RX ADMIN — POTASSIUM CHLORIDE 20 MEQ: 400 INJECTION, SOLUTION INTRAVENOUS at 11:58

## 2023-04-08 RX ADMIN — EMPAGLIFLOZIN 10 MG: 10 TABLET, FILM COATED ORAL at 09:24

## 2023-04-08 RX ADMIN — CEFTRIAXONE SODIUM 2 G: 2 INJECTION, POWDER, FOR SOLUTION INTRAMUSCULAR; INTRAVENOUS at 19:18

## 2023-04-08 RX ADMIN — POTASSIUM CHLORIDE 20 MEQ: 400 INJECTION, SOLUTION INTRAVENOUS at 14:11

## 2023-04-08 RX ADMIN — POLYETHYLENE GLYCOL 3350 17 G: 17 POWDER, FOR SOLUTION ORAL at 09:24

## 2023-04-08 RX ADMIN — Medication 1 TABLET: at 09:24

## 2023-04-08 RX ADMIN — ASPIRIN 81 MG: 81 TABLET ORAL at 09:24

## 2023-04-08 RX ADMIN — TESTOSTERONE 25 MG OF TESTOSTERONE: 50 GEL TOPICAL at 09:24

## 2023-04-08 RX ADMIN — MICONAZOLE NITRATE: 20 CREAM TOPICAL at 09:34

## 2023-04-08 RX ADMIN — MICONAZOLE NITRATE: 20 POWDER TOPICAL at 19:18

## 2023-04-08 RX ADMIN — Medication 5 ML: at 18:51

## 2023-04-08 RX ADMIN — POTASSIUM CHLORIDE 20 MEQ: 400 INJECTION, SOLUTION INTRAVENOUS at 13:04

## 2023-04-08 RX ADMIN — LEVOTHYROXINE SODIUM 100 MCG: 100 TABLET ORAL at 09:24

## 2023-04-08 ASSESSMENT — ACTIVITIES OF DAILY LIVING (ADL)
ADLS_ACUITY_SCORE: 39
ADLS_ACUITY_SCORE: 38
ADLS_ACUITY_SCORE: 39
ADLS_ACUITY_SCORE: 38
ADLS_ACUITY_SCORE: 38
ADLS_ACUITY_SCORE: 39
ADLS_ACUITY_SCORE: 38
ADLS_ACUITY_SCORE: 39
ADLS_ACUITY_SCORE: 39

## 2023-04-08 NOTE — PLAN OF CARE
Goal Outcome Evaluation:       A&O VSS on RA. Right PICC heparin locked. Removed PIV. Good UOP BSU. No BM this shift. Not ambulating this shift, repositioned. Karen cares done. Wound dressings CDI. Reg diet, poor appetite. Not calling to make needs known. Frequent rounding.

## 2023-04-08 NOTE — PLAN OF CARE
"Goal Outcome Evaluation:      Plan of Care Reviewed With: patient    Overall Patient Progress: no changeOverall Patient Progress: no change    Outcome Evaluation: A/Ox1, pt has trouble answering questions d/t word finding difficulities, repeatedly saying \"huh\" and that he was more \"out of it\" tonight. Mentation waxes and wanes. VSS on RA. BG in the 200s. Denies pain. Urinal at bedside. No other changes.      "

## 2023-04-08 NOTE — PROGRESS NOTES
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 He developed sudden headache and vision changes on 3/25, with MRI brain on 3/28, which demonstrated enlargement of known pituitary adenoma, with necrosis extending laterally beyond the left cavernous sinus, concerning for compression of cranial nerves at cavernous sinus. 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      Given that he is past the acute period of apoplexy, there is no indication for emergent decompression. He would benefit for redo EEA for resection of adenoma given its symptomatic nature. However, he would need to be medically optimized from the metabolic and infectious standpoint prior to surgery. He would also benefit from a multidisciplinary approach with recommendations from ophthalmology, endocrinology and radiation oncology regarding options for treatment.     Plan:  Appreciate ophthalmology and endocrinology recommendations  MRI brain pituitary protocol with thin cuts (ordered for you), orbit and face- completed  Encephalopathy workup per primary team (on EEG)  Now off steroids    Planning for surgery for resection of the pituitary adenoma with ENT colleagues on 4/27/2023.    Okay to continue aspirin for now but please stop aspirin 7 days before the planned procedure.    Neurosurgery will continue to follow   -----------------------------------  Oliver Duong  Neurosurgery Resident PGY2    Interval History: No acute events overnight that were brought to  neurosurgery's attention.    Objective:   Temp:  [98.1  F (36.7  C)-98.8  F (37.1  C)] 98.1  F (36.7  C)  Pulse:  [50-79] 79  Resp:  [15-18] 15  BP: (114-137)/(78-97) 123/78  SpO2:  [96 %-100 %] 99 %  I/O last 3 completed shifts:  In: 490 [P.O.:480; I.V.:10]  Out: 1550 [Urine:1550]      NEUROLOGIC:  -- Opens eyes to voice, following commands, oriented x2-3 (name, city, month- on/off)  -- Able to name objects  -- Speech limited, perseverating speech, minimal spontaneous speech  Cranial Nerves:  -- visual fields full to confrontation although with limited participation, PERRL anisocoric L>R and sluggish, left CN III paresis and CN VI palsy, restricted ROM in right EOM with no apparent asymmetry   -- face symmetrical, tongue midline  -- sensory V1-V3 intact bilaterally  -- palate elevates symmetrically, uvula midline  -- hearing grossly intact bilat     Motor:  Limited participation - antigravity x4 extremities     Sensory:  intact to LT x 4 extremities      Reflexes:       Bi Tri BR Omi Pat Ach Bab     C5-6 C7-8 C6 UMN L2-4 S1 UMN   R 2+ 2+ 2+ Norm 2+ 2+ Norm   L 2+ 2+ 2+ Norm 2+ 2+ Norm      Gait: Deferred.     LABS:  Recent Labs   Lab 04/08/23  0825 04/08/23  0759 04/08/23  0138 04/07/23  1209 04/07/23  0842 04/06/23  1721 04/06/23  1608   NA  --  144  --   --  139  --  146*   POTASSIUM  --  2.8*  --   --  3.6  --  3.6   CHLORIDE  --  107  --   --  104  --  110*   CO2  --  25  --   --  20*  --  20*   ANIONGAP  --  12  --   --  15  --  16*   * 195* 210*   < > 389*   < > 131*   BUN  --  13.7  --   --  15.5  --  14.7   CR  --  0.94  --   --  1.06  --  0.96   KORIN  --  8.8  --   --  8.5*  --  8.5*    < > = values in this interval not displayed.       Recent Labs   Lab 04/06/23  0626 04/05/23  0611   WBC 14.8* 15.5*   RBC 3.27* 3.52*   HGB 8.4* 8.9*   HCT 28.2* 30.5*   MCV 86 87   MCH 25.7* 25.3*   MCHC 29.8* 29.2*   RDW 22.5* 22.4*   PLT  --  296       IMAGING:  No results found for this or any previous visit  (from the past 24 hour(s)).

## 2023-04-08 NOTE — PROGRESS NOTES
Calorie Count  Intake recorded for: 4/7  Total Kcals: 617 Total Protein: 27g  Kcals from Hospital Food: 617   Protein: 27g  Kcals from Outside Food (average):0 Protein: 0g  # Meals Ordered from Kitchen: 3 meals ordered   # Meals Recorded: 3 meals   1 - 100% popsicle, fresh fruit plate, 8 oz skim milk, 75% cheerio cup, corn flakes cup   2 - 25% pizza   3 - 50% pollock  # Supplements Recorded: 0

## 2023-04-08 NOTE — PROGRESS NOTES
[facility]    Progress Note - Medicine Service, [facility] TEAM 2       Date of Admission:  4/3/2023    Assessment & Plan   Gustavo Faria is a 58 year old male with recent Serratia bacteremia, HFrEF, prolonged QT, lower extremity wounds, DMII, hypothyroidism, low testosterone and known ACTH secreting pituitary adenoma w/resulting Cushing's disease s/p 2 partial resections in 2021 [facility]. [facility] he developed new onset double vision and severe headache which prompted imaging that showed a large mass invading the cavernous sinuses and impinging on the cranial nerves. He is transferred to [facility] for this pituitary adenoma, concern for progression vs hemorrhage/apoplexy. Complicated by psychosis/metabolic encephalopathy secondary to Cushing's syndrome.    Updates today:  - MRI lumbar spine to assess for sacral osteomyelitis when able  - Check BMP, UA (obtained and not obviously infectious), blood cultures, mag, phos  - Restart antibiotics (ceftriaxone, ordered) after micro data collected as above with worsening mental status  - Resume Lisinopril  - Resume Empagliflozin   - Neurosurgery: plans for surgery on 4/27/23  - Goal Na 140-145, if elevated above 145 restart D5W +20 meq K at 75 ml/hour, recheck Na Q4H     # Acute metabolic encephalopathy, improving  # Concern for acute psychosis, possibly steroid-related  # Cushings Syndrome  Admission symptoms included disorientation, intermittently following directions. Repetitive words - perseverating on particular phrases. Sx started the 2-3 days prior to admission ([facility] Vulnerable brain (multiple brain surgeries), enlarging pituitary mass. Worked up for infections, which was largely negative. Neurology evaluated: no seizure activity. Sx improved after discontinuation of exogenous steroids, so high  suspicion this was 2/2 cushing's syndrome/steroid-induced psychosis. However, mental status with worsening 04/08 compared to 04/07  - MRI lumbar spine to assess for sacral osteomyelitis when able  - Check BMP, UA (obtained and not obviously infectious), blood cultures, mag, phos  - Restart antibiotics after micro data as above with worsening mental status  - No steroids  - Surgical plans for pituitary tumor as stated below  - Delirium precautions    # ACTH secreting pituitary adenoma c/b type II DM, hypothyroidism and low testosterone s/p two partial resections in 2021   Patient noted double vision and severe headache beginning the evening of 3/25. CT imaging on 3/25 revealed a large sellar/suprasellar mass extending into the cavernous sinuses with no evidence of acute stroke. On 3/28, he underwent an MRI which confirmed the CT findings of a large mass invading the cavernous sinuses and impinging on the cranial nerves. Necrosis extending beyond left cavernous sinus. [facility]. Repeat MRI performed on 4/6/23: compared to 2021 MRI, lesions are smaller. Orbit MRI without optic nerve compression and no evidence of orbital infection.  - Neurosurgery following  - Plans for Neurosurgery: surgery 4/27/23  - Optimize medically prior to surgery  - Hold ASA 1 week prior to surgery  - Okay to discontinue Dexamethasone  - Ophthalmology following  - Endocrine following    # Hyperkalemia, resolved  # Cushings Syndrome  High K in the setting of exogenous steroids. Suspect related to Cushing's syndrome, notably with the hypernatremia (see below). Resolved with aggressive repletion of potassium and discontinuation of steroids.   - RN-managed K  - Discontinue Tele    # Hypernatremia, DI vs cushing's  Slightly hypernatremia this admission. High risk for central DI. Also cushing's contributing.   - Daily BMP  - Strict I&Os  - Goal Na 140-145, if elevated above 145 restart D5W +20 meq K at 75 ml/hour, recheck Na Q4H  -  Endocrine following     # Buttocks, sacrum and bilateral groin wounds   # RLE wound from puncture injury   # L dorsal foot wound from presumed trauma   # Soft tissue infection/abscess    # Hx serratia bacteremia in December 2022   For patient's lower extremity wounds and hx of Serratia bacteremia in December, [facility]. His antibiotics were broadened to Vanc and Zosyn and ultimately he was transitioned to ceftazidime on 3/23 with plan to complete course on 4/4/23. BCx negative and wound cx grew serratia marcescens at the [facility]. He has been on ceftazidime since. MRI of the right tib/fib on 3/23 was negative for osteomyelitis but did show two fluid collections draining sponateneously. [facility] and determined no intervention was needed as wounds were draining on their own.  - Stop ceftazidime (3/23 - 4/5)  - Empiric Abx as stated above  - WOC following  - PT/OT when able to follow commands  - Pain: tylenol PRN, dilaudid 0.5 mg PRN for dressing changes and oxy 5 mg PRN - however given encephalopathy, use sparingly    # Elevated troponin, down-trended  # HFmrEF, with global hypokinesis  # High burden of PVCs  # Prolonged QTc   Coronary angiogram on 2/27 shows normal coronaries. Troponin down-trending (88 -> 77). No chest pain. High burden of PVCs. Follow up ECHO with global hypokinesis, which is worse compared to February 2023 ECHO. EF is 45% (same compared to prior). Likely small vessel disease vs demand vs cardiomyopathy 2/2 ceftazidime. Low concern for acute ACS.   - Discontinued tele  - Electrolyte mgmt as above  - No need for diuresis at this time  - Continue PTA coreg  - PTA lisinopril   - PTA empagliflozin   - Resume ASA (hold 1 week prior to surgery)  - Avoid QTc prolonging medications     # Type II DM, exacerbated by Cushing's   A1c of 7.6% on 2/2023. [facility].    - Lantus 10 units  - MD Sliding scale  insulin  - Continue PTA sitagliptin  - Restart PTA emagliflozin    - Hold PTA metformin      # Central Hypothyroidism  - Continue PTA levothyroxine     # Hypogonadism   - Continue PTA androgel (will need to bring in home medication)     # Chronic microcytic anemia   Hgb of 8.4 on [facility]. Peripheral smear on 3/30 showed poikilocytosis with occasional red blood cell fragments but no other evidence of hemolysis.  Haptoglobin was normal.  Ferritin was 91, transferrin saturation was low, B12 was 495 and folate was 8.7. Likely combination of iron deficiency as well as inflammation/chronic disease.   - CTM       # Marijuana use   Uses daily medical marijuana at home.       # Concern for malnutrition   - Nutrition consult     Clinically Significant Risk Factors        # Hypokalemia: Lowest K = 2.8 mmol/L in last 2 days, will replace as needed       # Hypoalbuminemia: Lowest albumin = 3.1 g/dL at 4/4/2023 12:10 AM, will monitor as appropriate           # DMII: A1C = 9.5 % (Ref range: <5.7 %) within past 6 months    # Severe Malnutrition: based on nutrition assessment        The patient's care was discussed with the Attending Physician, Dr. Chris Burnett.    Dieter Jett MD  Medicine Service, [facility] TEAM 2  [facility]  Securely message with [employer] (more info)  Text page via [employer] Paging/Directory   See signed in provider for up to date coverage information  ______________________________________________________________________    Interval History    - Mental status worse today  - Unable to provide more than 1 word answers  - Plan as above     Physical Exam   Vital Signs: Temp: 98.3  F (36.8  C) Temp src: Oral BP: 117/89 Pulse: 56   Resp: 16 SpO2: 100 % O2 Device: None (Room air)    Weight: 151 lbs 3.77 oz    General Appearance: Eyes open, tracking, but unable to respond beyond one word  Eyes: Pupils moderate size, no nystagmus, EOMI  HEENT: Normal sclera, able to  move neck FROM  Respiratory: Breathing comfortably on RA, CTAB  Cardiovascular: RRR, no murmurs  GI: BS+. Soft, nontender, nondistended. No guarding or rebound tenderness.   Skin: Superficial, open wounds on the posterior buttocks midline  Musculoskeletal: No muscle wasting  Neuro:  strength intact, ankle strength intact, and sensation intact, EOMI  Psychiatric: Calm, not agitated like previous but unable to express more than one to two word answers     Data     I have personally reviewed the following data over the past 24 hrs:    N/A  \   N/A   / N/A     144 107 13.7 /  203 (H)   2.8 (L) 25 0.94 \       Procal: N/A CRP: <3.00 Lactic Acid: N/A

## 2023-04-08 NOTE — PLAN OF CARE
Goal Outcome Evaluation:      Plan of Care Reviewed With: patient    Overall Patient Progress: no change    Outcome Evaluation: Alert but disoriented x 4. Mentation waxes and wanes.  Unable to answer questions appropriately and slow to respond to commands.  Needing a lot of cues to eat, go to the bathroom, brush teeth ect.  Trouble with word finding.  VSS on room air.  BG checks AC/HS/0200 and are improved from yesterday.  No appetite- 25% of breakfast and refused lunch.  s/s aspart still administered.  Denies pain.  Voids into urinal at bedside but needs reminders.  No BM.  Double lumen PICC infusing potassium replacement.  Potassium 2.8 this morning.  Plan for another BMP draw this afternoon.  Urine sample sent for UA/UC- results pending.  CXR complete.  Still needing MRI done.  Dressings changed to BLE today.  Sacral mepilex CDI.  CHG wipes done.  Miconazole to julian area/groin folds and areas are improving.  Starting IV ceftriaxone this afternoon.  Per team- all labs must be drawn prior to start of ceftriaxone.  Plan is for surgery, possibly on 4/27.

## 2023-04-09 LAB
ALBUMIN SERPL BCG-MCNC: 3.1 G/DL (ref 3.5–5.2)
ALP SERPL-CCNC: 323 U/L (ref 40–129)
ALT SERPL W P-5'-P-CCNC: 177 U/L (ref 10–50)
ANION GAP SERPL CALCULATED.3IONS-SCNC: 11 MMOL/L (ref 7–15)
ANION GAP SERPL CALCULATED.3IONS-SCNC: 12 MMOL/L (ref 7–15)
ANION GAP SERPL CALCULATED.3IONS-SCNC: 12 MMOL/L (ref 7–15)
AST SERPL W P-5'-P-CCNC: 106 U/L (ref 10–50)
BACTERIA BLD CULT: NO GROWTH
BILIRUB SERPL-MCNC: 0.6 MG/DL
BUN SERPL-MCNC: 10.3 MG/DL (ref 6–20)
BUN SERPL-MCNC: 11.9 MG/DL (ref 6–20)
BUN SERPL-MCNC: 9.8 MG/DL (ref 6–20)
CALCIUM SERPL-MCNC: 8.4 MG/DL (ref 8.6–10)
CALCIUM SERPL-MCNC: 8.5 MG/DL (ref 8.6–10)
CALCIUM SERPL-MCNC: 8.5 MG/DL (ref 8.6–10)
CHLORIDE SERPL-SCNC: 107 MMOL/L (ref 98–107)
CHLORIDE SERPL-SCNC: 108 MMOL/L (ref 98–107)
CHLORIDE SERPL-SCNC: 108 MMOL/L (ref 98–107)
CORTIS SERPL-MCNC: 37.8 UG/DL
CREAT SERPL-MCNC: 0.76 MG/DL (ref 0.67–1.17)
CREAT SERPL-MCNC: 0.77 MG/DL (ref 0.67–1.17)
CREAT SERPL-MCNC: 0.82 MG/DL (ref 0.67–1.17)
DEPRECATED HCO3 PLAS-SCNC: 23 MMOL/L (ref 22–29)
DEPRECATED HCO3 PLAS-SCNC: 24 MMOL/L (ref 22–29)
DEPRECATED HCO3 PLAS-SCNC: 25 MMOL/L (ref 22–29)
ERYTHROCYTE [DISTWIDTH] IN BLOOD BY AUTOMATED COUNT: 22.8 % (ref 10–15)
ERYTHROCYTE [DISTWIDTH] IN BLOOD BY AUTOMATED COUNT: 23.5 % (ref 10–15)
GFR SERPL CREATININE-BSD FRML MDRD: >90 ML/MIN/1.73M2
GLUCOSE BLDC GLUCOMTR-MCNC: 193 MG/DL (ref 70–99)
GLUCOSE BLDC GLUCOMTR-MCNC: 194 MG/DL (ref 70–99)
GLUCOSE BLDC GLUCOMTR-MCNC: 196 MG/DL (ref 70–99)
GLUCOSE BLDC GLUCOMTR-MCNC: 205 MG/DL (ref 70–99)
GLUCOSE SERPL-MCNC: 180 MG/DL (ref 70–99)
GLUCOSE SERPL-MCNC: 203 MG/DL (ref 70–99)
GLUCOSE SERPL-MCNC: 206 MG/DL (ref 70–99)
HCT VFR BLD AUTO: 25.8 % (ref 40–53)
HCT VFR BLD AUTO: 26.3 % (ref 40–53)
HGB BLD-MCNC: 7.6 G/DL (ref 13.3–17.7)
HGB BLD-MCNC: 7.7 G/DL (ref 13.3–17.7)
MAGNESIUM SERPL-MCNC: 2 MG/DL (ref 1.7–2.3)
MCH RBC QN AUTO: 25.3 PG (ref 26.5–33)
MCH RBC QN AUTO: 25.4 PG (ref 26.5–33)
MCHC RBC AUTO-ENTMCNC: 29.3 G/DL (ref 31.5–36.5)
MCHC RBC AUTO-ENTMCNC: 29.5 G/DL (ref 31.5–36.5)
MCV RBC AUTO: 86 FL (ref 78–100)
MCV RBC AUTO: 87 FL (ref 78–100)
PHOSPHATE SERPL-MCNC: 2.4 MG/DL (ref 2.5–4.5)
PHOSPHATE SERPL-MCNC: 2.6 MG/DL (ref 2.5–4.5)
PLATELET # BLD AUTO: 129 10E3/UL (ref 150–450)
PLATELET # BLD AUTO: 179 10E3/UL (ref 150–450)
POTASSIUM SERPL-SCNC: 3.4 MMOL/L (ref 3.4–5.3)
POTASSIUM SERPL-SCNC: 3.4 MMOL/L (ref 3.4–5.3)
POTASSIUM SERPL-SCNC: 3.6 MMOL/L (ref 3.4–5.3)
POTASSIUM SERPL-SCNC: 3.6 MMOL/L (ref 3.4–5.3)
POTASSIUM SERPL-SCNC: 4.1 MMOL/L (ref 3.4–5.3)
PROT SERPL-MCNC: 5 G/DL (ref 6.4–8.3)
RBC # BLD AUTO: 3 10E6/UL (ref 4.4–5.9)
RBC # BLD AUTO: 3.03 10E6/UL (ref 4.4–5.9)
SODIUM SERPL-SCNC: 142 MMOL/L (ref 136–145)
SODIUM SERPL-SCNC: 144 MMOL/L (ref 136–145)
SODIUM SERPL-SCNC: 145 MMOL/L (ref 136–145)
WBC # BLD AUTO: 13 10E3/UL (ref 4–11)
WBC # BLD AUTO: 14.2 10E3/UL (ref 4–11)

## 2023-04-09 PROCEDURE — 93005 ELECTROCARDIOGRAM TRACING: CPT

## 2023-04-09 PROCEDURE — A9585 GADOBUTROL INJECTION: HCPCS | Performed by: INTERNAL MEDICINE

## 2023-04-09 PROCEDURE — 250N000011 HC RX IP 250 OP 636

## 2023-04-09 PROCEDURE — 255N000002 HC RX 255 OP 636: Performed by: INTERNAL MEDICINE

## 2023-04-09 PROCEDURE — 250N000013 HC RX MED GY IP 250 OP 250 PS 637

## 2023-04-09 PROCEDURE — 120N000002 HC R&B MED SURG/OB UMMC

## 2023-04-09 PROCEDURE — 85027 COMPLETE CBC AUTOMATED: CPT

## 2023-04-09 PROCEDURE — 84295 ASSAY OF SERUM SODIUM: CPT

## 2023-04-09 PROCEDURE — 36592 COLLECT BLOOD FROM PICC: CPT

## 2023-04-09 PROCEDURE — 83735 ASSAY OF MAGNESIUM: CPT

## 2023-04-09 PROCEDURE — 82533 TOTAL CORTISOL: CPT | Performed by: INTERNAL MEDICINE

## 2023-04-09 PROCEDURE — 250N000011 HC RX IP 250 OP 636: Performed by: STUDENT IN AN ORGANIZED HEALTH CARE EDUCATION/TRAINING PROGRAM

## 2023-04-09 PROCEDURE — 82024 ASSAY OF ACTH: CPT | Performed by: INTERNAL MEDICINE

## 2023-04-09 PROCEDURE — 84132 ASSAY OF SERUM POTASSIUM: CPT

## 2023-04-09 PROCEDURE — 250N000013 HC RX MED GY IP 250 OP 250 PS 637: Performed by: STUDENT IN AN ORGANIZED HEALTH CARE EDUCATION/TRAINING PROGRAM

## 2023-04-09 PROCEDURE — 250N000009 HC RX 250: Performed by: INTERNAL MEDICINE

## 2023-04-09 PROCEDURE — 80053 COMPREHEN METABOLIC PANEL: CPT

## 2023-04-09 PROCEDURE — 36592 COLLECT BLOOD FROM PICC: CPT | Performed by: INTERNAL MEDICINE

## 2023-04-09 PROCEDURE — 84100 ASSAY OF PHOSPHORUS: CPT

## 2023-04-09 PROCEDURE — 250N000013 HC RX MED GY IP 250 OP 250 PS 637: Performed by: INTERNAL MEDICINE

## 2023-04-09 PROCEDURE — 258N000003 HC RX IP 258 OP 636: Performed by: INTERNAL MEDICINE

## 2023-04-09 PROCEDURE — 36415 COLL VENOUS BLD VENIPUNCTURE: CPT

## 2023-04-09 PROCEDURE — 99233 SBSQ HOSP IP/OBS HIGH 50: CPT | Mod: GC | Performed by: INTERNAL MEDICINE

## 2023-04-09 PROCEDURE — 258N000003 HC RX IP 258 OP 636: Performed by: STUDENT IN AN ORGANIZED HEALTH CARE EDUCATION/TRAINING PROGRAM

## 2023-04-09 PROCEDURE — 99231 SBSQ HOSP IP/OBS SF/LOW 25: CPT | Mod: GC | Performed by: INTERNAL MEDICINE

## 2023-04-09 PROCEDURE — 93010 ELECTROCARDIOGRAM REPORT: CPT | Performed by: INTERNAL MEDICINE

## 2023-04-09 PROCEDURE — 258N000003 HC RX IP 258 OP 636

## 2023-04-09 PROCEDURE — 250N000009 HC RX 250: Performed by: STUDENT IN AN ORGANIZED HEALTH CARE EDUCATION/TRAINING PROGRAM

## 2023-04-09 RX ORDER — HALOPERIDOL 5 MG/ML
2 INJECTION INTRAMUSCULAR ONCE
Status: COMPLETED | OUTPATIENT
Start: 2023-04-09 | End: 2023-04-09

## 2023-04-09 RX ORDER — HYDROXYZINE HYDROCHLORIDE 10 MG/1
10 TABLET, FILM COATED ORAL 3 TIMES DAILY PRN
Status: DISCONTINUED | OUTPATIENT
Start: 2023-04-09 | End: 2023-04-27

## 2023-04-09 RX ORDER — SPIRONOLACTONE 25 MG/1
50 TABLET ORAL DAILY
Status: DISCONTINUED | OUTPATIENT
Start: 2023-04-09 | End: 2023-04-11

## 2023-04-09 RX ORDER — POTASSIUM CHLORIDE 29.8 MG/ML
20 INJECTION INTRAVENOUS
Status: COMPLETED | OUTPATIENT
Start: 2023-04-09 | End: 2023-04-09

## 2023-04-09 RX ORDER — DEXTROSE MONOHYDRATE 50 MG/ML
INJECTION, SOLUTION INTRAVENOUS CONTINUOUS
Status: DISCONTINUED | OUTPATIENT
Start: 2023-04-09 | End: 2023-04-11

## 2023-04-09 RX ORDER — LORAZEPAM 2 MG/ML
1 INJECTION INTRAMUSCULAR
Status: DISCONTINUED | OUTPATIENT
Start: 2023-04-09 | End: 2023-04-10

## 2023-04-09 RX ORDER — GADOBUTROL 604.72 MG/ML
0.1 INJECTION INTRAVENOUS ONCE
Status: COMPLETED | OUTPATIENT
Start: 2023-04-09 | End: 2023-04-09

## 2023-04-09 RX ADMIN — TESTOSTERONE 25 MG OF TESTOSTERONE: 50 GEL TOPICAL at 09:07

## 2023-04-09 RX ADMIN — HALOPERIDOL LACTATE 2 MG: 5 INJECTION, SOLUTION INTRAMUSCULAR at 02:27

## 2023-04-09 RX ADMIN — MICONAZOLE NITRATE: 20 CREAM TOPICAL at 19:10

## 2023-04-09 RX ADMIN — Medication 5 ML: at 09:24

## 2023-04-09 RX ADMIN — MICONAZOLE NITRATE: 20 POWDER TOPICAL at 09:08

## 2023-04-09 RX ADMIN — Medication 5 ML: at 07:34

## 2023-04-09 RX ADMIN — CARVEDILOL 12.5 MG: 12.5 TABLET, FILM COATED ORAL at 09:07

## 2023-04-09 RX ADMIN — MICONAZOLE NITRATE: 20 CREAM TOPICAL at 09:08

## 2023-04-09 RX ADMIN — POTASSIUM CHLORIDE 20 MEQ: 400 INJECTION, SOLUTION INTRAVENOUS at 13:02

## 2023-04-09 RX ADMIN — Medication 1 TABLET: at 09:07

## 2023-04-09 RX ADMIN — POTASSIUM PHOSPHATE, MONOBASIC POTASSIUM PHOSPHATE, DIBASIC 15 MMOL: 224; 236 INJECTION, SOLUTION, CONCENTRATE INTRAVENOUS at 03:51

## 2023-04-09 RX ADMIN — CEFTRIAXONE SODIUM 2 G: 2 INJECTION, POWDER, FOR SOLUTION INTRAMUSCULAR; INTRAVENOUS at 15:44

## 2023-04-09 RX ADMIN — POTASSIUM PHOSPHATE, MONOBASIC POTASSIUM PHOSPHATE, DIBASIC 15 MMOL: 224; 236 INJECTION, SOLUTION, CONCENTRATE INTRAVENOUS at 00:47

## 2023-04-09 RX ADMIN — LEVOTHYROXINE SODIUM 100 MCG: 100 TABLET ORAL at 09:07

## 2023-04-09 RX ADMIN — SPIRONOLACTONE 50 MG: 25 TABLET, FILM COATED ORAL at 11:36

## 2023-04-09 RX ADMIN — CARVEDILOL 12.5 MG: 12.5 TABLET, FILM COATED ORAL at 17:27

## 2023-04-09 RX ADMIN — POTASSIUM CHLORIDE 20 MEQ: 400 INJECTION, SOLUTION INTRAVENOUS at 11:57

## 2023-04-09 RX ADMIN — MICONAZOLE NITRATE: 20 POWDER TOPICAL at 19:10

## 2023-04-09 RX ADMIN — POTASSIUM PHOSPHATE, MONOBASIC POTASSIUM PHOSPHATE, DIBASIC 9 MMOL: 224; 236 INJECTION, SOLUTION, CONCENTRATE INTRAVENOUS at 14:05

## 2023-04-09 RX ADMIN — LISINOPRIL 10 MG: 10 TABLET ORAL at 11:36

## 2023-04-09 RX ADMIN — DEXTROSE MONOHYDRATE: 50 INJECTION, SOLUTION INTRAVENOUS at 11:41

## 2023-04-09 RX ADMIN — ASPIRIN 81 MG: 81 TABLET ORAL at 09:07

## 2023-04-09 ASSESSMENT — ACTIVITIES OF DAILY LIVING (ADL)
ADLS_ACUITY_SCORE: 39
ADLS_ACUITY_SCORE: 43
ADLS_ACUITY_SCORE: 39
ADLS_ACUITY_SCORE: 43
ADLS_ACUITY_SCORE: 43
ADLS_ACUITY_SCORE: 39
ADLS_ACUITY_SCORE: 39
ADLS_ACUITY_SCORE: 43
ADLS_ACUITY_SCORE: 43
ADLS_ACUITY_SCORE: 39
ADLS_ACUITY_SCORE: 39
ADLS_ACUITY_SCORE: 43

## 2023-04-09 NOTE — PROGRESS NOTES
[facility]    Progress Note - Medicine Service, [facility] TEAM 2       Date of Admission:  4/3/2023    Assessment & Plan   Gustavo Faria is a 58 year old male with recent Serratia bacteremia, HFrEF, prolonged QT, lower extremity wounds, DMII, hypothyroidism, low testosterone and known ACTH secreting pituitary adenoma w/resulting Cushing's disease s/p 2 partial resections in 2021 who [facility]. [facility], he developed new onset double vision and severe headache which prompted imaging that showed a large mass invading the cavernous sinuses and impinging on the cranial nerves. He is transferred to [facility] for this pituitary adenoma, concern for progression vs hemorrhage/apoplexy. Complicated by psychosis/metabolic encephalopathy secondary to Cushing's syndrome.    Updates today:  - Some delirium overnight, received dose of IV Haldol  - Strict oal Na 135-140  - Resume D5W maintenance infusion at 75 mL/hr  - BMP and Mg q6H checks  - Scheduled MRI lumbar spine to assess for sacral osteomyelitis (has pre-medication ordered)  - Continue Ceftriaxone until urine culture results  - Neurosurgery: plans for surgery on 4/27/23  - Delirium precautions     # Acute metabolic encephalopathy   # Concern for acute psychosis, possibly steroid-related, though now off steroids  # Concern for AMS secondary to hypernatremia  # Pyruria   Admission symptoms included disorientation, intermittently following directions. Repetitive words - perseverating on particular phrases. Sx started the 2-3 days prior to admission ([facility] was [facility] Vulnerable brain (multiple brain surgeries), enlarging pituitary mass. Worked up for infections, which was largely negative. Neurology evaluated: no seizure activity. Sx improved after discontinuation of exogenous steroids, so high suspicion this was 2/2  cushing's syndrome/steroid-induced psychosis. However, mental status with worsening 04/08 compared to 04/07 while off of the steroids and in the setting of hypernatremia.   - MRI lumbar spine to assess for sacral osteomyelitis when able  - Pyuria, concern for UTI  - Ceftriaxone (4/8 - *), pending urinary cultures  - No steroids  - Surgical plans for pituitary tumor as stated below  - Delirium precautions  - Sodium management as stated below    # Hypernatremia, DI vs cushing's  Slightly hypernatremia this admission. High risk for central DI. Also cushing's contributing. It is possible patient is encephalopathy when he becomes hypernatremia  - Goal Na: 135-140  - D5W maintenance for now - will assess neuro status when patient has normal sodium levels  - Q6H BMP + Mg  - Strict I&Os  - Endocrine following  - Suspect component of DI, will trial DDAVP in coming days if persistently hypernatremia    # ACTH secreting pituitary adenoma c/b type II DM, hypothyroidism and low testosterone s/p two partial resections in 2021   Patient noted double vision and severe headache beginning the evening of 3/25. CT imaging on 3/25 revealed a large sellar/suprasellar mass extending into the cavernous sinuses with no evidence of acute stroke. On 3/28, he underwent an MRI which confirmed the CT findings of a large mass invading the cavernous sinuses and impinging on the cranial nerves. Necrosis extending beyond left cavernous sinus. [facility]. Repeat MRI performed on 4/6/23: compared to 2021 MRI, lesions are smaller. Orbit MRI without optic nerve compression and no evidence of orbital infection.  - Neurosurgery following  - Plans for Neurosurgery: surgery 4/27/23  - Optimize medically prior to surgery  - Hold ASA 1 week prior to surgery  - Okay to discontinue Dexamethasone  - Ophthalmology following  - Endocrine following    # Hyperkalemia, resolved  # Cushings Syndrome  High K in the setting of exogenous steroids.  Suspect related to Cushing's syndrome, notably with the hypernatremia (see below). Resolved with aggressive repletion of potassium and discontinuation of steroids.   - RN-managed K  - Discontinue Tele     # Buttocks, sacrum and bilateral groin wounds   # RLE wound from puncture injury   # L dorsal foot wound from presumed trauma   # Soft tissue infection/abscess    # Hx serratia bacteremia in December 2022   For patient's lower extremity wounds and hx of Serratia bacteremia in December, he was [facility]. His antibiotics were broadened to Vanc and Zosyn and ultimately he was transitioned to ceftazidime on 3/23 with plan to complete course on 4/4/23. BCx negative and wound cx grew serratia marcescens at the [facility]. He has been on ceftazidime since. MRI of the right tib/fib on 3/23 was negative for osteomyelitis but did show two fluid collections draining sponateneously. [facility] and determined no intervention was needed as wounds were draining on their own.  - Stop ceftazidime (3/23 - 4/5)  - Empiric Abx as stated above  - WOC following  - PT/OT when able to follow commands  - Pain: tylenol PRN, dilaudid 0.5 mg PRN for dressing changes and oxy 5 mg PRN - however given encephalopathy, use sparingly    # Elevated troponin, down-trended  # HFmrEF, with global hypokinesis  # High burden of PVCs  # Prolonged QTc   Coronary angiogram on 2/27 shows normal coronaries. Troponin down-trending (88 -> 77). No chest pain. High burden of PVCs. Follow up ECHO with global hypokinesis, which is worse compared to February 2023 ECHO. EF is 45% (same compared to prior). Likely small vessel disease vs demand vs cardiomyopathy 2/2 ceftazidime. Low concern for acute ACS.   - Discontinued tele  - Electrolyte mgmt as above  - No need for diuresis at this time  - Continue PTA coreg  - PTA lisinopril   - PTA empagliflozin   - Resume ASA (hold 1 week prior to surgery)  - Avoid QTc prolonging  medications     # Type II DM, exacerbated by Cushing's   A1c of 7.6% on 2/2023. [facility].    - Lantus 10 units  - MD Sliding scale insulin  - Continue PTA sitagliptin  - Restart PTA emagliflozin    - Hold PTA metformin      # Central Hypothyroidism  - Continue PTA levothyroxine     # Hypogonadism   - Continue PTA androgel (will need to bring in home medication)     # Chronic microcytic anemia   Hgb of 8.4 [facility]. Peripheral smear on 3/30 showed poikilocytosis with occasional red blood cell fragments but no other evidence of hemolysis.  Haptoglobin was normal.  Ferritin was 91, transferrin saturation was low, B12 was 495 and folate was 8.7. Likely combination of iron deficiency as well as inflammation/chronic disease.   - CTM       # Marijuana use   Uses daily medical marijuana at home.       # Concern for malnutrition   - Nutrition consult     Clinically Significant Risk Factors        # Hypokalemia: Lowest K = 2.8 mmol/L in last 2 days, will replace as needed  # Hypernatremia: Highest Na = 149 mmol/L in last 2 days, will monitor as appropriate      # Hypoalbuminemia: Lowest albumin = 3.1 g/dL at 4/4/2023 12:10 AM, will monitor as appropriate           # DMII: A1C = 9.5 % (Ref range: <5.7 %) within past 6 months    # Severe Malnutrition: based on nutrition assessment        The patient's care was discussed with the Attending Physician, Dr. Chris Burnett.    Linda Azul MD  Medicine Service, [address]  Securely message with [employer] (more info)  Text page via [facility] Paging/Directory   See signed in provider for up to date coverage information  ______________________________________________________________________    Interval History    - Delirium overnight, worsening agitation, not taking medications, received a dose of IV Haldol  - Sodium improved overnight with D5W, and IV fluids were therefore  stopped  - Wondering If patient is more encephalopathic when he is hypernatremic  - Able to follow simple commands, answer basic yes/no questions, no acute distress, but has to be prompted multiple times in order to do things (such as eating or drinks, otherwise he won't eat or drink)    Physical Exam   Vital Signs: Temp: 98.2  F (36.8  C) Temp src: Oral BP: (!) 132/90 Pulse: 62   Resp: 16 SpO2: 99 % O2 Device: None (Room air)    Weight: 151 lbs 3.77 oz     General Appearance: Eyes open, tracking, answers yes/no questions, no distress, calm and not in pain  Eyes: Pupils moderate size, no nystagmus, EOMI  HEENT: Normal sclera, able to move neck FROM  Respiratory: Breathing comfortably on RA, CTAB  Cardiovascular: RRR, no murmurs  GI: BS+. Soft, nontender, nondistended. No guarding or rebound tenderness.   Skin: Superficial, open wounds on the posterior buttocks midline  Musculoskeletal: No muscle wasting  Neuro:  strength intact, ankle strength intact, and sensation intact, EOMI  Psychiatric: Calm, alert to place, but not to situation or time     Data     I have personally reviewed the following data over the past 24 hrs:    13.0 (H)  \   7.6 (L)   / 179     145 112 (H) 13.1 /  147 (H)   4.1 23 0.91 \       Procal: N/A CRP: <3.00 Lactic Acid: 1.3

## 2023-04-09 NOTE — PLAN OF CARE
Goal Outcome Evaluation:      Plan of Care Reviewed With: patient    Overall Patient Progress: no changeOverall Patient Progress: no change    Outcome Evaluation: Alert but disoriented x4. Pt unable to void during the evening, bladder scanned at 2300 for 460. MD notified for straight cath orders. Pt became very agitated and refusing cares around 0000 starting to try and get out of bed, raising voice at staff, and not being redirectable. MD came to assess, Haldol given. After pt had a large urinary incontinence episode. Haldol helped some but pt remains agitated and refusing cares but starting to sleep around 0400.  Pt's phosphorus 1.0, nurse managed replacement now ordered. Replacements given phos recheck at 0800. Large BM and urinary incontinence in morning, pt still agitated and yelling with incontinence cares. Also not compliant with EKG staff in morning will retry later today.  Unable to make needs known, frequent rounding on pt. Continue with plan of care.

## 2023-04-09 NOTE — PLAN OF CARE
"Goal Outcome Evaluation:       Alert and disoriented x 4. Not able to state name or birthday. Able to answer questions and state \"food is cold\". When asked if he knows where he is he states yes. Reg diet, only eats a few bites of dinner. Moves all extremities, opens muffin packaging and asked to get OOB. Attempted to ambulate to commode, sat at edge of bed and back into bed with lift assist. Blood cultures drawn. IV bolus given and continuous IVF started at 75 mL/hr. K recheck 4.0. Bed alarm on. Not calling to make needs known. Frequent rounding.                  "

## 2023-04-09 NOTE — PROGRESS NOTES
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 He developed sudden headache and vision changes on 3/25, with MRI brain on 3/28, which demonstrated enlargement of known pituitary adenoma, with necrosis extending laterally beyond the left cavernous sinus, concerning for compression of cranial nerves at cavernous sinus. 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      Given that he is past the acute period of apoplexy, there is no indication for emergent decompression. He would benefit for redo EEA for resection of adenoma given its symptomatic nature. However, he would need to be medically optimized from the metabolic and infectious standpoint prior to surgery. He would also benefit from a multidisciplinary approach with recommendations from ophthalmology, endocrinology and radiation oncology regarding options for treatment.     Plan:  - Social work consult - need designated POA as soon as possible, definitively prior to discharge - we will obtain formal procedure consent with POA prior to discharge  - Planning for surgery for resection of the pituitary adenoma with ENT colleagues on 4/27/2023.  - Okay to continue aspirin for now but please stop aspirin 7 days before the planned procedure.  - Neurosurgery will continue to follow     -----------------------------------  Yaneth Ugalde MD  Neurosurgery PGY3    Please contact neurosurgery resident on call with questions.    Dial * * [phone], enter [phone] when prompted.      Interval  History: Agitated overnight, ongoing fluctuating mental status, started on antibiotics for UTI.     Objective:   Temp:  [98.1  F (36.7  C)-98.3  F (36.8  C)] 98.2  F (36.8  C)  Pulse:  [56-79] 62  Resp:  [16] 16  BP: (117-132)/(78-90) 132/90  SpO2:  [98 %-100 %] 99 %  I/O last 3 completed shifts:  In: 630 [P.O.:600; I.V.:30]  Out: 150 [Urine:150]      NEUROLOGIC:  -- Opens eyes to voice, following commands, oriented x1  -- Able to name objects  -- Speech limited, perseverating speech, minimal spontaneous speech  Cranial Nerves:  -- visual fields full to confrontation although with limited participation, PERRL anisocoric L>R and sluggish, left CN III paresis and CN VI palsy, restricted ROM in right EOM with no apparent asymmetry   -- face symmetrical, tongue midline  -- sensory V1-V3 intact bilaterally  -- palate elevates symmetrically, uvula midline  -- hearing grossly intact bilat     Motor:  Limited participation - antigravity x4 extremities     Sensory:  intact to LT x 4 extremities      Reflexes:       Bi Tri BR Omi Pat Ach Bab     C5-6 C7-8 C6 UMN L2-4 S1 UMN   R 2+ 2+ 2+ Norm 2+ 2+ Norm   L 2+ 2+ 2+ Norm 2+ 2+ Norm      Gait: Deferred.     LABS:  Recent Labs   Lab 04/09/23  0301 04/08/23  2202 04/08/23  2141 04/08/23  1711 04/08/23  1651 04/08/23  0825 04/08/23  0759 04/07/23  1209 04/07/23  0842     --  148*  --  149*  --  144  --  139   POTASSIUM 4.1  --   --   --  4.1  --  2.8*  --  3.6   CHLORIDE  --   --   --   --  112*  --  107  --  104   CO2  --   --   --   --  23  --  25  --  20*   ANIONGAP  --   --   --   --  14  --  12  --  15   GLC  --  147*  --  127* 131*   < > 195*   < > 389*   BUN  --   --   --   --  13.1  --  13.7  --  15.5   CR  --   --   --   --  0.91  --  0.94  --  1.06   KORIN  --   --   --   --  9.2  --  8.8  --  8.5*    < > = values in this interval not displayed.       Recent Labs   Lab 04/08/23  0759   WBC 13.0*   RBC 3.00*   HGB 7.6*   HCT 25.8*   MCV 86   MCH 25.3*   MCHC 29.5*    RDW 22.8*          IMAGING:  Recent Results (from the past 24 hour(s))   XR Chest Port 1 View    Narrative    EXAM: XR CHEST PORT 1 VIEW  4/8/2023 11:57 AM     HISTORY:  new metabolic encephelopathy looking for infectious etiology        COMPARISON:  4/4/2023    FINDINGS:   Cardiac silhouette is enlarged. Right upper extremity PICC terminates  over the low right atrium. Low lung volumes. No pneumothorax. Trace  right pleural effusion. Mild pulmonary vascular congestion. Chronic  left second rib fracture.      Impression    IMPRESSION:   1. No focal pneumonia.  2. Cardiomegaly with mild pulmonary vascular congestion.  3. Trace right pleural effusion.    I have personally reviewed the examination and initial interpretation  and I agree with the findings.    THERESA ESCOBEDO MD         SYSTEM ID:  [id number]

## 2023-04-09 NOTE — PROGRESS NOTES
Endocrine Progress Note  Patient: Gustavo Faria   MRN: [id number]  Date of Service: 04/08/2023    HPI summary and hospitalization course:  Gustavo Faria is a 58 year old male with PMHx of ACTH-secreting macroadenoma c/b central hypothyroidism, and central hypogonadism,  s/p transphenoidal surgery 5/2021 and 7/2021 in [place] has baseline records 3rd nerve palsy, ongoing serratia RLE cellulitis c/b recent serratia bacteremia, HFrEF, T2DM, and HTN who [facility] of known expanding large sellar/suprasellar mass extending into cavernous sinuses and subsequent cranial nerve impingement.    [facility] and bilateral lower limb cellulitis/abscess developed sudden onset of headaches and double vision transferred to [facility] for multidisciplinary assessment.  Had an MRI done on 3/25 which demonstrates enlargement of known pituitary adenoma with necrosis extending laterally beyond the left cavernous sinus concerning for compression of the cranial nerves at the cavernous sinus.  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  On the arrival he was encephalopathic.  Taken off dexamethasone on 04/06/2023 with improvement in the mental status following that.    Repeated MRI following transfer to [facility] showed new enhancing to 1.4 cm lesion in the right frontal lobe with susceptibility of artifact.  4.3 cm heterogeneously enhancing pituitary mass with encasement and peripheral displacement of the cavernous portions of both internal carotid arteries and infiltration of the clivus.  Previous optic chiasm compression is resolved.  Pituitary stalk is midline.  Lesion is infiltrating both cavernous sinuses cranial nerves in the cavernous sinus  cannot be differentiated from the underlying mass.    Subjective:  Developed recurrence of the confusion today.  His vitals remained stable.  Antibiotics were held yesterday.  No agitation.      Physical Examination:  /87 (BP Location: Left arm)   Pulse 64   Temp 98.1  F (36.7  C) (Oral)   Resp 16   Wt 68.6 kg (151 lb 3.8 oz)   SpO2 98%   BMI 24.41 kg/m    Physical Exam  Vitals reviewed.   Constitutional:       General: He is not in acute distress.     Appearance: He is ill-appearing.   Eyes:      Comments: Left eye ptosis, and ophthalmoplegia   Cardiovascular:      Rate and Rhythm: Normal rate.   Pulmonary:      Effort: Pulmonary effort is normal.   Abdominal:      General: There is no distension.   Musculoskeletal:      Right lower leg: No edema.      Left lower leg: No edema.   Neurological:      Mental Status: He is alert. He is disoriented.         Medications:  Reviewed    Endocrine Labs:      Images:  MRI brain with and without contrast on 4/6/2023:  Comparison:  Outside brain MR 3/28/2023 and 5/25/2021.      Technique:   1. Brain MRI: Axial diffusion and FLAIR images of the whole brain  obtained without intravenous contrast. Sagittal T1 and T2-weighted,  coronal T2-weighted, coronal T1-weighted images with focus on the  sella were obtained without intravenous contrast. Post intravenous  contrast using gadolinium coronal and sagittal T1-weighted images were  obtained focused on the sella. Dynamic postcontrast coronal  T1-weighted images were also obtained.     2. MRI of the Orbits focused on the orbits/visual pathways:  Axial  T2-weighted with inversion recovery and coronal T1-weighted images  were obtained without intravenous contrast. Axial and coronal  T1-weighted images with fat saturation were obtained after intravenous  gadolinium administration.     3. MRI Brain COW: Sagittal T1-weighted, axial T2-weighted with fat  saturation, turboFLAIR and diffusion-weighted with ADC map and  coronal  T1-weighted and T2-weighted images of the brain were obtained without  intravenous contrast.       Contrast: 7.5mL Gadavist     Findings:    Brain MRI:  Enhancing 1.4 x 1.0 cm lesion with associated susceptibility artifact  in the right frontal lobe (series 16, image 17), stable since  5/25/2021. A similar punctate lesion in the left putamen also  unchanged.m     4.3 x 1.9 x 1.3 cm (right to left, AP, craniocaudal) heterogeneously  enhancing pituitary mass with encasement and peripheral displacement  of the cavernous portions of both internal carotid arteries (series  13, image 28 and series 14, image 11). The lesion infiltrates the  clivus. Compared with MRI from 2021 the craniocaudal dimension of the  mass is decreased. The lesion is now more heterogeneous, previously  more homogeneous. Previous optic chiasm compression is resolved. No  abnormal enhancement of the optic nerves within the intraorbital  portion. The pituitary stalk is midline.     The lesion infiltrates both cavernous sinuses. The traversing cranial  nerves within the cavernous sinus cannot be differentiated from the  underlying mass.     FLAIR images through the whole brain shows nonspecific posterior  periventricular white matter hyperintensities. Otherwise no mass  effect, midline shift, or significant enlargement of the ventricles.  Scattered mucosal thickening of the paranasal sinuses. Mastoid air  cells are clear.     MRA Head: No aneurysm or stenosis of the major intracranial arteries  at the base of the brain.                                                                      Impression:   1.  4.3 cm heterogeneously enhancing pituitary mass with infiltration  of bilateral cavernous sinuses and the clivus. Findings are similar  when compared compared to prior exam 3/28/2023 suggestive for a  macroadenoma. Compared with an older MRI from 2021, the lesion appears  more heterogeneous and smaller. Previous compression of the  optic  chiasm is no longer present.  2.  Patchy T2 hyperintense enhancing lesion with scattered  susceptibility artifacts in the right frontal subcortical white  matter. A similar tiny smaller lesion is also present in the left  putamen. Retrospectively this lesion was present back in 2021.  Constellation of findings are suggestive of a benign lesion such as  cavernoma or telangiectasia.  3.   MRA demonstrates no aneurysm or stenosis of the major  intracranial arteries. Bilateral cavernous internal carotid arteries  are patent despite encasement by the above-mentioned lesion.  4.  No abnormal optic nerve enhancement or optic nerve atrophy.        Assessment and plan:    Gustavo Faria is a 58 year old male with PMHx of  ACTH-secreting macroadenoma c/b central hypothyroidism, , and central hypogonadism,  s/p transphenoidal surgery 5/2021 and 7/2021 in [place] has baseline records 3rd nerve palsy, ongoing serratia RLE cellulitis c/b recent serratia bacteremia, HFrEF, T2DM, and HTN who [facility] of known expanding large sellar/suprasellar mass extending into cavernous sinuses and subsequent cranial nerve impingement.     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  No acute hemorrhage, small area of diffusion restriction at the left side of pituitary possibly consistent with ischemic pituitary adenoma  Started on dexamethasone 2 mg twice daily, was continued on/6/23  Random serum cortisol level at 2:30 AM was 46.3, ACTH 321.  A.m. cortisol level of 41.1 on 4/7/2023.  Repeated MRI on 4/6 showed a 4.3 cm  ureteral diffusely enhancing pituitary mass with infiltration of bilateral cavernous sinus and clivus.     Recommendations:  -Resection/debulking will be arranged by neurosurgery will be followed by radiotherapy.  -We will repeat a.m. cortisol/ACTH levels after 1 day to establish a new baseline.       #Central hypothyroidism:  Prior to admission was on levothyroxine 100 mcg daily.  Free T4 on admission 1.3 TSH not detectable (picture of central hypothyroidism).     Recommendations:  -Continue with PTA levothyroxine 100 mcg daily.     #Hypogonadotrophic hypogonadism:  Was on AndroGel gel 1 pump daily.     Recommendations:  -If his family brings it to the hospital it is okay to restart it during the hospitalization; otherwise, hold for now     #Assessment for DI:  No known history of DI.  Developed hyponatremia in the settings of poor oral intake responded well to D5 infusion.  Sodium today morning 139.  Urine osmolarity 415/serum osmolarity 305.  Urine output of 1.4 L yesterday.  On D5 infusion with a rate of 100 mill per hour. no evidence of DI these values were checked before starting D5 infusion..  On 4/8/2023: Sodium started to increase again to 149 however urine specific gravity was 1.019     Recommendations:  -Continue to monitor I's and O's.  -Allowed to drink to thirst.  -Free water deficit repletion.  Continue with sodium monitoring.     #DM type II: Hemoglobin A1c on the admission 9.5%.  Prior to admission was on Sitagliptin 100 mg daily/metformin 500 mg twice daily, Jardiance 12.5 mg.  Despite change in the mental status today he continues to eat.  Glycemic control is improving     Recommendations:  -Continue on Lantus 10 units daily.  -medium-dose SSI 3 times daily before meals and at the bedtime.  -NovoLog carb coverage of 1 unit: 20 g CHO with meals and snacks.  -Hypoglycemia rescue protocol.  -POC BG checks 3 times daily AC at the bedtime at 2 AM.  -Carb counting protocol.  -Continue holding PTA oral  medications.              Jeanne Chase      Endocrine fellow   Pager number: [phone]          I have seen and examined the patient, reviewed records, reviewed and edited the fellow's note.  I agree with the plan of care.    Evelin Yeboah MD   Division of Diabetes, Endocrinology and Metabolism  Department of Medicine

## 2023-04-09 NOTE — PROVIDER NOTIFICATION
Provider notified at 2330 that pt had not been voiding and was bladder scanned for 460mL to get orders to straight cath. Pt then becoming increasingly agitated, non compliant with cares, and not redirectable trying to get out of bed. Unable to straight cath or take oral meds due to this. Provider came to assess, plan was to give haldol IV and try to straight cath when pt became less agitated. Haldol given, pt became less agitated but still non compliant and not redirectable. Pt during this time also had a large urinary incontinence, unable to perform post void scan do to non compliance with cares.

## 2023-04-09 NOTE — PROGRESS NOTES
Endocrine Progress Note  Patient: Gustavo Faria   MRN: [id number]  Date of Service: 04/09/2023    HPI summary and hospitalization course:  Gustavo Faria is a 58 year old male with PMHx of ACTH-secreting macroadenoma c/b central hypothyroidism, and central hypogonadism,  s/p transphenoidal surgery 5/2021 and 7/2021 in [place] has baseline records 3rd nerve palsy, ongoing serratia RLE cellulitis c/b recent serratia bacteremia, HFrEF, T2DM, and HTN who [facility] of known expanding large sellar/suprasellar mass extending into cavernous sinuses and subsequent cranial nerve impingement.    [facility] and [facility] lower limb cellulitis/abscess developed sudden onset of headaches and double vision transferred to [facility] for multidisciplinary assessment.  Had an MRI done on 3/25 which demonstrates enlargement of known pituitary adenoma with necrosis extending laterally beyond the left cavernous sinus concerning for compression of the cranial nerves at the cavernous sinus.  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  On the arrival he was encephalopathic.  Taken off dexamethasone on 04/06/2023 with improvement in the mental status following that.    Repeated MRI following transfer to [facility] showed new enhancing to 1.4 cm lesion in the right frontal lobe with susceptibility of artifact.  4.3 cm heterogeneously enhancing pituitary mass with encasement and peripheral displacement of the cavernous portions of both internal carotid arteries and infiltration of the clivus.  Previous optic chiasm compression is resolved.  Pituitary stalk is midline.  Lesion is infiltrating both cavernous sinuses cranial nerves in the cavernous sinus  cannot be differentiated from the underlying mass.    Subjective:  Developed recurrence of the confusion yesterday was calm during the day became agitated over the night.  IV antibiotics was restarted yesterday.  On today assessment confusion resolved completely he is back to his baseline.  Denied any headaches denied any diplopia.  No excessive thirstiness.  No nausea or vomiting.      Physical Examination:  BP (!) 132/90 (BP Location: Left arm)   Pulse 62   Temp 98.2  F (36.8  C) (Oral)   Resp 16   Wt 68.6 kg (151 lb 3.8 oz)   SpO2 99%   BMI 24.41 kg/m    Physical Exam  Vitals reviewed.   Constitutional:       General: He is not in acute distress.     Appearance: He is ill-appearing.   Eyes:      Comments: Left eye ptosis, and ophthalmoplegia   Cardiovascular:      Rate and Rhythm: Normal rate.   Pulmonary:      Effort: Pulmonary effort is normal.   Abdominal:      General: There is no distension.   Musculoskeletal:      Right lower leg: No edema.      Left lower leg: No edema.   Neurological:      Mental Status: He is alert and oriented to person, place, and time.         Medications:  Reviewed    Endocrine Labs:         Latest Reference Range & Units 04/06/23 06:26 04/07/23 08:42 04/09/23 09:12   Adrenal Corticotropin <47 pg/mL  273 (H)    Cortisol Serum ug/dL 48.7 41.1 37.8         Images:  MRI brain with and without contrast on 4/6/2023:  Comparison:  Outside brain MR 3/28/2023 and 5/25/2021.      Technique:   1. Brain MRI: Axial diffusion and FLAIR images of the whole brain  obtained without intravenous contrast. Sagittal T1 and T2-weighted,  coronal T2-weighted, coronal T1-weighted images with focus on the  sella were obtained without intravenous contrast. Post intravenous  contrast using gadolinium coronal and sagittal T1-weighted images were  obtained focused on the sella. Dynamic postcontrast coronal  T1-weighted images were also obtained.     2. MRI of the Orbits focused on the orbits/visual  pathways:  Axial  T2-weighted with inversion recovery and coronal T1-weighted images  were obtained without intravenous contrast. Axial and coronal  T1-weighted images with fat saturation were obtained after intravenous  gadolinium administration.     3. MRI Brain COW: Sagittal T1-weighted, axial T2-weighted with fat  saturation, turboFLAIR and diffusion-weighted with ADC map and coronal  T1-weighted and T2-weighted images of the brain were obtained without  intravenous contrast.       Contrast: 7.5mL Gadavist     Findings:    Brain MRI:  Enhancing 1.4 x 1.0 cm lesion with associated susceptibility artifact  in the right frontal lobe (series 16, image 17), stable since  5/25/2021. A similar punctate lesion in the left putamen also  unchanged.m     4.3 x 1.9 x 1.3 cm (right to left, AP, craniocaudal) heterogeneously  enhancing pituitary mass with encasement and peripheral displacement  of the cavernous portions of both internal carotid arteries (series  13, image 28 and series 14, image 11). The lesion infiltrates the  clivus. Compared with MRI from 2021 the craniocaudal dimension of the  mass is decreased. The lesion is now more heterogeneous, previously  more homogeneous. Previous optic chiasm compression is resolved. No  abnormal enhancement of the optic nerves within the intraorbital  portion. The pituitary stalk is midline.     The lesion infiltrates both cavernous sinuses. The traversing cranial  nerves within the cavernous sinus cannot be differentiated from the  underlying mass.     FLAIR images through the whole brain shows nonspecific posterior  periventricular white matter hyperintensities. Otherwise no mass  effect, midline shift, or significant enlargement of the ventricles.  Scattered mucosal thickening of the paranasal sinuses. Mastoid air  cells are clear.     MRA Head: No aneurysm or stenosis of the major intracranial arteries  at the base of the brain.                                                                       Impression:   1.  4.3 cm heterogeneously enhancing pituitary mass with infiltration  of bilateral cavernous sinuses and the clivus. Findings are similar  when compared compared to prior exam 3/28/2023 suggestive for a  macroadenoma. Compared with an older MRI from 2021, the lesion appears  more heterogeneous and smaller. Previous compression of the optic  chiasm is no longer present.  2.  Patchy T2 hyperintense enhancing lesion with scattered  susceptibility artifacts in the right frontal subcortical white  matter. A similar tiny smaller lesion is also present in the left  putamen. Retrospectively this lesion was present back in 2021.  Constellation of findings are suggestive of a benign lesion such as  cavernoma or telangiectasia.  3.   MRA demonstrates no aneurysm or stenosis of the major  intracranial arteries. Bilateral cavernous internal carotid arteries  are patent despite encasement by the above-mentioned lesion.  4.  No abnormal optic nerve enhancement or optic nerve atrophy.        Assessment and plan:    Gustavo Faria is a 58 year old male with PMHx of  ACTH-secreting macroadenoma c/b central hypothyroidism, , and central hypogonadism,  s/p transphenoidal surgery 5/2021 and 7/2021 in [place] has baseline records 3rd nerve palsy, ongoing serratia RLE cellulitis c/b recent serratia bacteremia, HFrEF, T2DM, and HTN who [facility] of known expanding large sellar/suprasellar mass extending into cavernous sinuses and subsequent cranial nerve impingement.     #Pituitary macroadenoma:  #Cushing's disease:  Known history of ACTH secreting macroadenoma , developed sudden onset of double [facility] showed enlargement of known pituitary macroadenoma with necrosis extending laterally beyond the left cavernous sinus concerning for compression of cranial nerves at the cavernous sinuses.  No  acute hemorrhage, small area of diffusion restriction at the left side of pituitary possibly consistent with ischemic pituitary adenoma  Started on dexamethasone 2 mg twice daily, was discontinued on4/6/23  Random serum cortisol level at 2:30 AM was 46.3, ACTH 321.  A.m. cortisol level of 41.1 on 4/7/2023.  Repeated MRI on 4/6 showed a 4.3 cm ureteral diffusely enhancing pituitary mass with infiltration of bilateral cavernous sinus and clivus.     Recommendations:  -Resection/debulking  arranged by neurosurgery will be done on  04/27 will be followed by radiotherapy.  -We will follow ACTH level .       #Central hypothyroidism:  Prior to admission was on levothyroxine 100 mcg daily.  Free T4 on admission 1.3 TSH not detectable (picture of central hypothyroidism).     Recommendations:  -Continue with PTA levothyroxine 100 mcg daily.     #Hypogonadotrophic hypogonadism:  Was on AndroGel gel 1 pump daily.     Recommendations:  -Continue AndroGel.     #Assessment for DI:  No known history of DI.  Developed hypernatremia in the settings of poor oral intake responded well to D5 infusion. .  On 4/7: Urine osmolarity 415/serum osmolarity 305. .  On 4/8/2023: Sodium started to increase again to 149 however urine specific gravity was 1.019.  Total daily urine output continues to be less than 2 L.  Recurrence of hypernatremia with good respone D5 infusion     Recommendations:  -Continue to monitor I's and O's.  -Allowed to drink to thirst.  -Free water deficit repletion.  Continue with sodium monitoring.     #DM type II: Hemoglobin A1c on the admission 9.5%.  Prior to admission was on Sitagliptin 100 mg daily/metformin 500 mg twice daily, Jardiance 12.5 mg.  Received Jardiance 10 mg on 4/8.  Poor oral intake due to recurrence of the AMS.  Good glycemic control however poor oral intake today we will reduce his basal insulin.     Recommendations:  -Reduce Lantus from 10 units daily down to 8 units daily anticipating poor oral  intake today with altered mental status  -medium-dose SSI 3 times daily before meals and at the bedtime.  -NovoLog carb coverage of 1 unit: 20 g CHO with meals and snacks.  -Hypoglycemia rescue protocol.  -POC BG checks 3 times daily AC at the bedtime at 2 AM.  -Carb counting protocol.  -Holding PTA oral medications.              Jeanne Chase      Endocrine fellow   Pager number: [phone]          I have seen and examined the patient, reviewed records, reviewed and edited the fellow's note.  I agree with the plan of care.    Evelin Yeboah MD   Division of Diabetes, Endocrinology and Metabolism  Department of Medicine

## 2023-04-09 NOTE — PLAN OF CARE
Goal Outcome Evaluation:      Plan of Care Reviewed With: patient    Overall Patient Progress: declining    Outcome Evaluation: Alert but disoriented x 4 for most of day.  Pt was able to state location this morning but mentation still waxes and wanes.  Pt is not following commands.  Tired this afternoon but is more oriented- pt was oriented to time but nothing else.  Refuses meals- s/s still administered but no carb coverage.  Will be NPO at midnight.  Will need anesthesia tomorrow for MRI of lumbar spine if surgery is available tomorrow.  Unable to complete today because pt could not follow commands.  Incontinent of bladder x 1- large amount.  No BM this shift.  Restarted on D5 at 75mL/hr.  Potassium and phos replaced IV today.  BMPs ordered Q6hr.   EKG done this morning.  Sacral mepilex changed.  BLE dressings CDI- were changed yesterday.  Sister at bedside.  Bed alarm on for safety.  Continue to monitor and follow POC.

## 2023-04-10 ENCOUNTER — APPOINTMENT (OUTPATIENT)
Dept: CT IMAGING | Facility: CLINIC | Age: 58
DRG: 614 | End: 2023-04-10
Attending: STUDENT IN AN ORGANIZED HEALTH CARE EDUCATION/TRAINING PROGRAM
Payer: COMMERCIAL

## 2023-04-10 LAB
ACTH PLAS-MCNC: 364 PG/ML
ALBUMIN UR-MCNC: 30 MG/DL
ANION GAP SERPL CALCULATED.3IONS-SCNC: 12 MMOL/L (ref 7–15)
ANION GAP SERPL CALCULATED.3IONS-SCNC: 12 MMOL/L (ref 7–15)
ANION GAP SERPL CALCULATED.3IONS-SCNC: 14 MMOL/L (ref 7–15)
ANION GAP SERPL CALCULATED.3IONS-SCNC: 14 MMOL/L (ref 7–15)
APPEARANCE UR: CLEAR
ATRIAL RATE - MUSE: 60 BPM
ATRIAL RATE - MUSE: 69 BPM
ATRIAL RATE - MUSE: 81 BPM
BILIRUB UR QL STRIP: NEGATIVE
BUN SERPL-MCNC: 11.2 MG/DL (ref 6–20)
BUN SERPL-MCNC: 8.7 MG/DL (ref 6–20)
BUN SERPL-MCNC: 9.3 MG/DL (ref 6–20)
BUN SERPL-MCNC: 9.7 MG/DL (ref 6–20)
CALCIUM SERPL-MCNC: 8.4 MG/DL (ref 8.6–10)
CALCIUM SERPL-MCNC: 8.5 MG/DL (ref 8.6–10)
CALCIUM SERPL-MCNC: 8.6 MG/DL (ref 8.6–10)
CALCIUM SERPL-MCNC: 8.7 MG/DL (ref 8.6–10)
CHLORIDE SERPL-SCNC: 105 MMOL/L (ref 98–107)
CHLORIDE SERPL-SCNC: 106 MMOL/L (ref 98–107)
CHLORIDE SERPL-SCNC: 106 MMOL/L (ref 98–107)
CHLORIDE SERPL-SCNC: 107 MMOL/L (ref 98–107)
COLOR UR AUTO: ABNORMAL
CREAT SERPL-MCNC: 0.74 MG/DL (ref 0.67–1.17)
CREAT SERPL-MCNC: 0.77 MG/DL (ref 0.67–1.17)
CREAT SERPL-MCNC: 0.8 MG/DL (ref 0.67–1.17)
CREAT SERPL-MCNC: 0.85 MG/DL (ref 0.67–1.17)
DEPRECATED HCO3 PLAS-SCNC: 22 MMOL/L (ref 22–29)
DEPRECATED HCO3 PLAS-SCNC: 23 MMOL/L (ref 22–29)
DEPRECATED HCO3 PLAS-SCNC: 23 MMOL/L (ref 22–29)
DEPRECATED HCO3 PLAS-SCNC: 25 MMOL/L (ref 22–29)
DIASTOLIC BLOOD PRESSURE - MUSE: NORMAL MMHG
GFR SERPL CREATININE-BSD FRML MDRD: >90 ML/MIN/1.73M2
GLUCOSE BLDC GLUCOMTR-MCNC: 198 MG/DL (ref 70–99)
GLUCOSE BLDC GLUCOMTR-MCNC: 203 MG/DL (ref 70–99)
GLUCOSE BLDC GLUCOMTR-MCNC: 280 MG/DL (ref 70–99)
GLUCOSE BLDC GLUCOMTR-MCNC: 306 MG/DL (ref 70–99)
GLUCOSE BLDC GLUCOMTR-MCNC: 306 MG/DL (ref 70–99)
GLUCOSE SERPL-MCNC: 190 MG/DL (ref 70–99)
GLUCOSE SERPL-MCNC: 196 MG/DL (ref 70–99)
GLUCOSE SERPL-MCNC: 216 MG/DL (ref 70–99)
GLUCOSE SERPL-MCNC: 266 MG/DL (ref 70–99)
GLUCOSE UR STRIP-MCNC: >=1000 MG/DL
HGB UR QL STRIP: ABNORMAL
HOLD SPECIMEN: NORMAL
INTERPRETATION ECG - MUSE: NORMAL
KETONES UR STRIP-MCNC: NEGATIVE MG/DL
LEUKOCYTE ESTERASE UR QL STRIP: ABNORMAL
MAGNESIUM SERPL-MCNC: 1.9 MG/DL (ref 1.7–2.3)
MAGNESIUM SERPL-MCNC: 2 MG/DL (ref 1.7–2.3)
MAGNESIUM SERPL-MCNC: 2.7 MG/DL (ref 1.7–2.3)
MAGNESIUM SERPL-MCNC: 2.8 MG/DL (ref 1.7–2.3)
MUCOUS THREADS #/AREA URNS LPF: PRESENT /LPF
NITRATE UR QL: NEGATIVE
OSMOLALITY SERPL: 301 MMOL/KG (ref 275–295)
OSMOLALITY UR: 383 MMOL/KG (ref 100–1200)
P AXIS - MUSE: 16 DEGREES
P AXIS - MUSE: 26 DEGREES
P AXIS - MUSE: 36 DEGREES
PH UR STRIP: 5.5 [PH] (ref 5–7)
PHOSPHATE SERPL-MCNC: 2.3 MG/DL (ref 2.5–4.5)
POTASSIUM SERPL-SCNC: 2.7 MMOL/L (ref 3.4–5.3)
POTASSIUM SERPL-SCNC: 3.3 MMOL/L (ref 3.4–5.3)
POTASSIUM SERPL-SCNC: 3.4 MMOL/L (ref 3.4–5.3)
POTASSIUM SERPL-SCNC: 3.4 MMOL/L (ref 3.4–5.3)
POTASSIUM SERPL-SCNC: 3.5 MMOL/L (ref 3.4–5.3)
PR INTERVAL - MUSE: 122 MS
PR INTERVAL - MUSE: 126 MS
PR INTERVAL - MUSE: 128 MS
QRS DURATION - MUSE: 134 MS
QRS DURATION - MUSE: 138 MS
QRS DURATION - MUSE: 152 MS
QT - MUSE: 442 MS
QT - MUSE: 460 MS
QT - MUSE: 480 MS
QTC - MUSE: 473 MS
QTC - MUSE: 480 MS
QTC - MUSE: 534 MS
R AXIS - MUSE: -23 DEGREES
R AXIS - MUSE: -28 DEGREES
R AXIS - MUSE: -30 DEGREES
RBC URINE: 3 /HPF
SODIUM SERPL-SCNC: 141 MMOL/L (ref 136–145)
SODIUM SERPL-SCNC: 141 MMOL/L (ref 136–145)
SODIUM SERPL-SCNC: 143 MMOL/L (ref 136–145)
SODIUM SERPL-SCNC: 144 MMOL/L (ref 136–145)
SP GR UR STRIP: 1.02 (ref 1–1.03)
SQUAMOUS EPITHELIAL: <1 /HPF
SYSTOLIC BLOOD PRESSURE - MUSE: NORMAL MMHG
T AXIS - MUSE: -2 DEGREES
T AXIS - MUSE: -3 DEGREES
T AXIS - MUSE: -8 DEGREES
UROBILINOGEN UR STRIP-MCNC: NORMAL MG/DL
VENTRICULAR RATE- MUSE: 60 BPM
VENTRICULAR RATE- MUSE: 69 BPM
VENTRICULAR RATE- MUSE: 81 BPM
WBC URINE: 18 /HPF
YEAST #/AREA URNS HPF: ABNORMAL /HPF

## 2023-04-10 PROCEDURE — 250N000013 HC RX MED GY IP 250 OP 250 PS 637: Performed by: INTERNAL MEDICINE

## 2023-04-10 PROCEDURE — 74177 CT ABD & PELVIS W/CONTRAST: CPT | Mod: 26 | Performed by: RADIOLOGY

## 2023-04-10 PROCEDURE — 74177 CT ABD & PELVIS W/CONTRAST: CPT

## 2023-04-10 PROCEDURE — 99233 SBSQ HOSP IP/OBS HIGH 50: CPT | Mod: GC | Performed by: INTERNAL MEDICINE

## 2023-04-10 PROCEDURE — 83930 ASSAY OF BLOOD OSMOLALITY: CPT

## 2023-04-10 PROCEDURE — 250N000011 HC RX IP 250 OP 636

## 2023-04-10 PROCEDURE — 83935 ASSAY OF URINE OSMOLALITY: CPT

## 2023-04-10 PROCEDURE — 87106 FUNGI IDENTIFICATION YEAST: CPT

## 2023-04-10 PROCEDURE — 999N000007 HC SITE CHECK

## 2023-04-10 PROCEDURE — 250N000013 HC RX MED GY IP 250 OP 250 PS 637

## 2023-04-10 PROCEDURE — 84132 ASSAY OF SERUM POTASSIUM: CPT

## 2023-04-10 PROCEDURE — 258N000003 HC RX IP 258 OP 636: Performed by: INTERNAL MEDICINE

## 2023-04-10 PROCEDURE — 120N000002 HC R&B MED SURG/OB UMMC

## 2023-04-10 PROCEDURE — 258N000003 HC RX IP 258 OP 636

## 2023-04-10 PROCEDURE — 81001 URINALYSIS AUTO W/SCOPE: CPT

## 2023-04-10 PROCEDURE — 250N000013 HC RX MED GY IP 250 OP 250 PS 637: Performed by: STUDENT IN AN ORGANIZED HEALTH CARE EDUCATION/TRAINING PROGRAM

## 2023-04-10 PROCEDURE — 36592 COLLECT BLOOD FROM PICC: CPT | Performed by: INTERNAL MEDICINE

## 2023-04-10 PROCEDURE — 84100 ASSAY OF PHOSPHORUS: CPT | Performed by: STUDENT IN AN ORGANIZED HEALTH CARE EDUCATION/TRAINING PROGRAM

## 2023-04-10 PROCEDURE — 250N000009 HC RX 250: Performed by: INTERNAL MEDICINE

## 2023-04-10 PROCEDURE — 99232 SBSQ HOSP IP/OBS MODERATE 35: CPT | Mod: GC | Performed by: INTERNAL MEDICINE

## 2023-04-10 PROCEDURE — 83735 ASSAY OF MAGNESIUM: CPT

## 2023-04-10 PROCEDURE — 84132 ASSAY OF SERUM POTASSIUM: CPT | Performed by: INTERNAL MEDICINE

## 2023-04-10 PROCEDURE — 250N000011 HC RX IP 250 OP 636: Performed by: INTERNAL MEDICINE

## 2023-04-10 PROCEDURE — 36592 COLLECT BLOOD FROM PICC: CPT

## 2023-04-10 RX ORDER — FLUCONAZOLE 200 MG/1
200 TABLET ORAL ONCE
Status: COMPLETED | OUTPATIENT
Start: 2023-04-11 | End: 2023-04-11

## 2023-04-10 RX ORDER — POTASSIUM CHLORIDE 7.45 MG/ML
10 INJECTION INTRAVENOUS
Status: COMPLETED | OUTPATIENT
Start: 2023-04-10 | End: 2023-04-10

## 2023-04-10 RX ORDER — FLUCONAZOLE 200 MG/1
400 TABLET ORAL DAILY
Status: DISCONTINUED | OUTPATIENT
Start: 2023-04-11 | End: 2023-04-13

## 2023-04-10 RX ORDER — OLANZAPINE 10 MG/2ML
2.5 INJECTION, POWDER, FOR SOLUTION INTRAMUSCULAR DAILY PRN
Status: DISCONTINUED | OUTPATIENT
Start: 2023-04-10 | End: 2023-04-27

## 2023-04-10 RX ORDER — POTASSIUM CHLORIDE 750 MG/1
40 TABLET, EXTENDED RELEASE ORAL ONCE
Status: COMPLETED | OUTPATIENT
Start: 2023-04-10 | End: 2023-04-10

## 2023-04-10 RX ORDER — HYDRALAZINE HYDROCHLORIDE 20 MG/ML
5 INJECTION INTRAMUSCULAR; INTRAVENOUS EVERY 4 HOURS PRN
Status: DISCONTINUED | OUTPATIENT
Start: 2023-04-10 | End: 2023-04-27

## 2023-04-10 RX ORDER — FLUCONAZOLE 200 MG/1
200 TABLET ORAL DAILY
Status: DISCONTINUED | OUTPATIENT
Start: 2023-04-10 | End: 2023-04-10

## 2023-04-10 RX ORDER — POTASSIUM CHLORIDE 750 MG/1
20 TABLET, EXTENDED RELEASE ORAL ONCE
Status: DISCONTINUED | OUTPATIENT
Start: 2023-04-10 | End: 2023-04-10

## 2023-04-10 RX ORDER — IOPAMIDOL 755 MG/ML
85 INJECTION, SOLUTION INTRAVASCULAR ONCE
Status: COMPLETED | OUTPATIENT
Start: 2023-04-10 | End: 2023-04-10

## 2023-04-10 RX ORDER — MAGNESIUM SULFATE HEPTAHYDRATE 40 MG/ML
2 INJECTION, SOLUTION INTRAVENOUS ONCE
Status: COMPLETED | OUTPATIENT
Start: 2023-04-10 | End: 2023-04-10

## 2023-04-10 RX ADMIN — TESTOSTERONE 25 MG OF TESTOSTERONE: 50 GEL TOPICAL at 09:20

## 2023-04-10 RX ADMIN — POTASSIUM CHLORIDE 10 MEQ: 7.46 INJECTION, SOLUTION INTRAVENOUS at 11:53

## 2023-04-10 RX ADMIN — CARVEDILOL 12.5 MG: 12.5 TABLET, FILM COATED ORAL at 18:37

## 2023-04-10 RX ADMIN — DEXTROSE MONOHYDRATE: 50 INJECTION, SOLUTION INTRAVENOUS at 13:38

## 2023-04-10 RX ADMIN — MAGNESIUM SULFATE IN WATER 2 G: 40 INJECTION, SOLUTION INTRAVENOUS at 11:25

## 2023-04-10 RX ADMIN — IOPAMIDOL 85 ML: 755 INJECTION, SOLUTION INTRAVENOUS at 15:29

## 2023-04-10 RX ADMIN — DEXTROSE MONOHYDRATE: 50 INJECTION, SOLUTION INTRAVENOUS at 01:00

## 2023-04-10 RX ADMIN — POTASSIUM PHOSPHATE, MONOBASIC POTASSIUM PHOSPHATE, DIBASIC 9 MMOL: 224; 236 INJECTION, SOLUTION, CONCENTRATE INTRAVENOUS at 13:45

## 2023-04-10 RX ADMIN — FLUCONAZOLE 200 MG: 200 TABLET ORAL at 16:21

## 2023-04-10 RX ADMIN — POTASSIUM CHLORIDE 40 MEQ: 750 TABLET, EXTENDED RELEASE ORAL at 09:04

## 2023-04-10 RX ADMIN — POTASSIUM CHLORIDE 10 MEQ: 7.46 INJECTION, SOLUTION INTRAVENOUS at 10:39

## 2023-04-10 ASSESSMENT — ACTIVITIES OF DAILY LIVING (ADL)
ADLS_ACUITY_SCORE: 43

## 2023-04-10 NOTE — PLAN OF CARE
Goal Outcome Evaluation:       A&O disoriented to time and situation. Able to state name, birthday and place. Angry with staff who helped clean him during delirious state overnight 4/8. Calm with alternative staff. Much more oriented this shift, eating small amounts and talking on cell phone. Confusion may be associated with hypernatremia. D5 infusing at 75 mL/hr. K and phos replaced. Wound dressings CDI. Not using call light. Frequent rounding.

## 2023-04-10 NOTE — PROGRESS NOTES
[facility], [place]   04/10/2023  Neurosurgery Progress Note:    Assessment:  Gustavo Faria is a 58 year old male with a PMH of DMII, HFrEF, BLE open wounds, history of serratia bacteremia in December 2022,  pituitary ACTH secreting macroadenoma s/p EEA for resection on 5/2021 and 7/2021 in [place], with baseline right CN 3 palsy, who was admitted at the [facility] on 3/23. He developed sudden headache and vision changes on 3/25, with MRI brain on 3/28, which demonstrated enlargement of known pituitary adenoma, with necrosis extending laterally beyond the left cavernous sinus, concerning for compression of cranial nerves at cavernous sinus. 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      Given that he is past the acute period of apoplexy, there is no indication for emergent decompression. He would benefit for redo EEA for resection of adenoma given its symptomatic nature. However, he would need to be medically optimized from the metabolic and infectious standpoint prior to surgery. He would also benefit from a multidisciplinary approach with recommendations from ophthalmology, endocrinology and radiation oncology regarding options for treatment.     Plan:  - Social work consult - need designated POA as soon as possible, definitively prior to discharge - we will obtain formal procedure consent with POA prior to discharge  - Planning for surgery for resection of the pituitary adenoma with ENT colleagues on 4/27/2023.  - Okay to continue aspirin for now but please stop aspirin 7 days before the planned procedure.  - Neurosurgery will continue to follow   -----------------------------------  Oliver Duong  Neurosurgery PGY2    Please contact neurosurgery resident on call with questions.    Dial * * [phone], enter [phone] when prompted.      Interval History:  Agitated overnight, ongoing fluctuating mental status, started on antibiotics for UTI.     Objective:   Temp:  [98.1  F (36.7  C)-98.8  F (37.1  C)] 98.1  F (36.7  C)  Pulse:  [57-69] 68  Resp:  [15-22] 22  BP: (130-156)/(88-97) 156/94  SpO2:  [95 %-100 %] 95 %  I/O last 3 completed shifts:  In: 120 [P.O.:100; I.V.:20]  Out: 2050 [Urine:2050]      NEUROLOGIC:  -- Opens eyes to voice, following commands, oriented x1  -- Able to name objects  -- Speech limited, perseverating speech, minimal spontaneous speech  Cranial Nerves:  -- visual fields full to confrontation although with limited participation, PERRL anisocoric L>R and sluggish, left CN III paresis and CN VI palsy, restricted ROM in right EOM with no apparent asymmetry   -- face symmetrical, tongue midline  -- sensory V1-V3 intact bilaterally  -- palate elevates symmetrically, uvula midline  -- hearing grossly intact bilat     Motor:  Limited participation - antigravity x4 extremities     Sensory:  intact to LT x 4 extremities      Reflexes:       Bi Tri BR Omi Pat Ach Bab     C5-6 C7-8 C6 UMN L2-4 S1 UMN   R 2+ 2+ 2+ Norm 2+ 2+ Norm   L 2+ 2+ 2+ Norm 2+ 2+ Norm      Gait: Deferred.     LABS:  Recent Labs   Lab 04/10/23  0629 04/10/23  0108 04/09/23  2345 04/09/23  2122 04/09/23  1754     --  141  --  142   POTASSIUM 2.7*  --  3.4  --  3.4   CHLORIDE 107  --  106  --  107   CO2 25  --  23  --  23   ANIONGAP 12  --  12  --  12   * 198* 190*   < > 203*   BUN 9.7  --  11.2  --  9.8   CR 0.74  --  0.80  --  0.76   KORIN 8.4*  --  8.7  --  8.4*    < > = values in this interval not displayed.       Recent Labs   Lab 04/09/23  0912   WBC 14.2*   RBC 3.03*   HGB 7.7*   HCT 26.3*   MCV 87   MCH 25.4*   MCHC 29.3*   RDW 23.5*   *       IMAGING:  No results found for this or any previous visit (from the past 24 hour(s)).

## 2023-04-10 NOTE — CONSULTS
Care Management Follow Up    Length of Stay (days): 7    Expected Discharge Date: 04/12/2023     Concerns to be Addressed: all concerns addressed in this encounter     Patient plan of care discussed at interdisciplinary rounds: Yes    Anticipated Discharge Disposition: Transitional Care     Anticipated Discharge Services: Transportation Services  Anticipated Discharge DME: None    Patient/family educated on Medicare website which has current facility and service quality ratings: yes  Education Provided on the Discharge Plan:  yes  Patient/Family in Agreement with the Plan:  yes    Referrals Placed by CM/SW:  None yet  Private pay costs discussed: Not applicable    Additional Information:  SW was contacted by Tasha from Neurosurgery inquiring about a health care directive for the patient due to him likely having neurosurgery on 4/27.    SW consulted with a supervisor and it was determined that due to the patient's altered metal status he is unable to fill out a health care directive and staff should go by the next of kin policy which is often  spouse, adult children, parents, siblings, other family members.     Neurosurgery expressed concern regarding not having documentation that appointments someone to make decisions for the patient and to have someone named to consent to surgery.    With further consultation it was determined that the patient is not legally  from his wife, they are currently . That would make the wife the next of kin and the one to make decisions on the patient's behalf. Patient's wife is Eyad Kelsey. Per neurosurgeries note she is agreeable to make legal decisions on the patient's behalf.    Eyad Faria  [phone]      SW will continue to follow for discharge needs    SAMIRA Morrison  [address]   Office: [phone]  Pager: [phone]  [email or web address]

## 2023-04-10 NOTE — PROVIDER NOTIFICATION
[id number], Giana BOSE,[id number], 1965  Patient has been having an increase in BP and no BP lowering medications for PRNs last BP was 156 from 148 a few hours ago and diastolic has risen from 68 to 94. [address] [address]inette Perales      Repeat notification sent out again regarding patient's elevated BP.

## 2023-04-10 NOTE — PROGRESS NOTES
NEUROSURGERY BRIEF NOTE    Regarding patient's next of kin--    Patient currently appears unable to make complex medical decisions due to encephalopathy. Per discussion with Risk Management and Social Work today, hospital policy states that next of kin chain goes from spouse, then adult children, then living parents, then siblings.    At this time, patient is legally  to, but  from, Eyad Faria ([phone]). She is therefore the appropriate next of kin for his medical decisions. His sister, Pilar Rhodes, has been very involved in the patients care and is attempting to establish herself as the patient's HCA/POA but has been unable to to so yet. Pilar stated today that she believes Eyad has Gustavo's best interests in mind and would be open to being contacted regarding Gustavo's upcoming surgery. Eyad was contacted 4/10/2023 and stated that she is agreeable to being Gustavo's legal decision maker while he is hospitalized and unable to consent for himself.    Tasha Gomez MD, PhD on 4/10/2023 at 1:49 PM  PGY-1 Neurosurgery

## 2023-04-10 NOTE — PROGRESS NOTES
Endocrine Progress Note  Patient: Gustavo Faria   MRN: [id number]  Date of Service: 04/10/2023    HPI summary and hospitalization course:  Gustavo Faria is a 58 year old male with PMHx of ACTH-secreting macroadenoma c/b central hypothyroidism, and central hypogonadism,  s/p transphenoidal surgery 5/2021 and 7/2021 in [place] has baseline records 3rd nerve palsy, ongoing serratia RLE cellulitis c/b recent serratia bacteremia, HFrEF, T2DM, and HTN who [facility] of known expanding large sellar/suprasellar mass extending into cavernous sinuses and subsequent cranial nerve impingement.    [facility] and bilateral lower limb cellulitis/abscess developed sudden onset of headaches and double vision transferred to [facility] for multidisciplinary assessment.  Had an MRI done on 3/25 which demonstrates enlargement of known pituitary adenoma with necrosis extending laterally beyond the left cavernous sinus concerning for compression of the cranial nerves at the cavernous sinus.  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  On the arrival he was encephalopathic.  Taken off dexamethasone on 04/06/2023 with improvement in the mental status following that.    Repeated MRI following transfer to [facility] showed new enhancing to 1.4 cm lesion in the right frontal lobe with susceptibility of artifact.  4.3 cm heterogeneously enhancing pituitary mass with encasement and peripheral displacement of the cavernous portions of both internal carotid arteries and infiltration of the clivus.  Previous optic chiasm compression is resolved.  Pituitary stalk is midline.  Lesion is infiltrating both cavernous sinuses cranial nerves in the cavernous sinus  cannot be differentiated from the underlying mass.    Subjective:  Mental status was improved significantly yesterday.  Continued on D5 infusion with a rate of 125 mill per hour.  No recurrence of hyponatremia.  Good glycemic control.  Poor oral intake, total urine output about 2 L yesterday    Physical Examination:  BP (!) 156/94 (BP Location: Left arm)   Pulse 68   Temp 98.1  F (36.7  C) (Oral)   Resp 22   Wt 68.6 kg (151 lb 3.8 oz)   SpO2 95%   BMI 24.41 kg/m    Physical Exam  Vitals reviewed.   Constitutional:       General: He is not in acute distress.  HENT:      Head:      Comments: No cushingoid features  Eyes:      Comments: Left eye ptosis with complete ophthalmoplegia   Cardiovascular:      Rate and Rhythm: Normal rate.   Pulmonary:      Effort: Pulmonary effort is normal.   Musculoskeletal:      Right lower leg: No edema.      Left lower leg: No edema.   Skin:     General: Skin is dry.      Comments: Hyperpigmentation over bilateral legs hands and nails   Neurological:      Mental Status: He is alert and oriented to person, place, and time.   Psychiatric:         Mood and Affect: Mood normal.         Behavior: Behavior normal.         Medications:  Reviewed    Endocrine Labs:         Latest Reference Range & Units 04/06/23 06:26 04/07/23 08:42 04/09/23 09:12   Adrenal Corticotropin <47 pg/mL  273 (H)    Cortisol Serum ug/dL 48.7 41.1 37.8         Images:  MRI brain with and without contrast on 4/6/2023:  Comparison:  Outside brain MR 3/28/2023 and 5/25/2021.      Technique:   1. Brain MRI: Axial diffusion and FLAIR images of the whole brain  obtained without intravenous contrast. Sagittal T1 and T2-weighted,  coronal T2-weighted, coronal T1-weighted images with focus on the  sella were obtained without intravenous contrast. Post intravenous  contrast using gadolinium coronal and sagittal T1-weighted images were  obtained focused on the sella. Dynamic postcontrast coronal  T1-weighted images were  also obtained.     2. MRI of the Orbits focused on the orbits/visual pathways:  Axial  T2-weighted with inversion recovery and coronal T1-weighted images  were obtained without intravenous contrast. Axial and coronal  T1-weighted images with fat saturation were obtained after intravenous  gadolinium administration.     3. MRI Brain COW: Sagittal T1-weighted, axial T2-weighted with fat  saturation, turboFLAIR and diffusion-weighted with ADC map and coronal  T1-weighted and T2-weighted images of the brain were obtained without  intravenous contrast.       Contrast: 7.5mL Gadavist     Findings:    Brain MRI:  Enhancing 1.4 x 1.0 cm lesion with associated susceptibility artifact  in the right frontal lobe (series 16, image 17), stable since  5/25/2021. A similar punctate lesion in the left putamen also  unchanged.m     4.3 x 1.9 x 1.3 cm (right to left, AP, craniocaudal) heterogeneously  enhancing pituitary mass with encasement and peripheral displacement  of the cavernous portions of both internal carotid arteries (series  13, image 28 and series 14, image 11). The lesion infiltrates the  clivus. Compared with MRI from 2021 the craniocaudal dimension of the  mass is decreased. The lesion is now more heterogeneous, previously  more homogeneous. Previous optic chiasm compression is resolved. No  abnormal enhancement of the optic nerves within the intraorbital  portion. The pituitary stalk is midline.     The lesion infiltrates both cavernous sinuses. The traversing cranial  nerves within the cavernous sinus cannot be differentiated from the  underlying mass.     FLAIR images through the whole brain shows nonspecific posterior  periventricular white matter hyperintensities. Otherwise no mass  effect, midline shift, or significant enlargement of the ventricles.  Scattered mucosal thickening of the paranasal sinuses. Mastoid air  cells are clear.     MRA Head: No aneurysm or stenosis of the major intracranial arteries  at  the base of the brain.                                                                      Impression:   1.  4.3 cm heterogeneously enhancing pituitary mass with infiltration  of bilateral cavernous sinuses and the clivus. Findings are similar  when compared compared to prior exam 3/28/2023 suggestive for a  macroadenoma. Compared with an older MRI from 2021, the lesion appears  more heterogeneous and smaller. Previous compression of the optic  chiasm is no longer present.  2.  Patchy T2 hyperintense enhancing lesion with scattered  susceptibility artifacts in the right frontal subcortical white  matter. A similar tiny smaller lesion is also present in the left  putamen. Retrospectively this lesion was present back in 2021.  Constellation of findings are suggestive of a benign lesion such as  cavernoma or telangiectasia.  3.   MRA demonstrates no aneurysm or stenosis of the major  intracranial arteries. Bilateral cavernous internal carotid arteries  are patent despite encasement by the above-mentioned lesion.  4.  No abnormal optic nerve enhancement or optic nerve atrophy.        Assessment and plan:    Gustavo Faria is a 58 year old male with PMHx of  ACTH-secreting macroadenoma c/b central hypothyroidism, , and central hypogonadism,  s/p transphenoidal surgery 5/2021 and 7/2021 in [place] has baseline records 3rd nerve palsy, ongoing serratia RLE cellulitis c/b recent serratia bacteremia, HFrEF, T2DM, and HTN who [facility] of known expanding large sellar/suprasellar mass extending into cavernous sinuses and subsequent cranial nerve impingement.     #Pituitary macroadenoma:  #Cushing's disease:  #Pituitary apoplexy:  Known history of ACTH secreting macroadenoma , developed sudden onset of double [facility] and [facility]28 showed enlargement of known pituitary macroadenoma with necrosis extending laterally beyond the left  cavernous sinus concerning for compression of cranial nerves at the cavernous sinuses.  No acute hemorrhage, small area of diffusion restriction at the left side of pituitary possibly consistent with ischemic pituitary adenoma  Started on dexamethasone 2 mg twice daily, was discontinued on4/6/23  Random serum cortisol level at 2:30 AM was 46.3, ACTH 321.  A.m. cortisol level of 41.1 on 4/7/2023.  Repeated MRI on 4/6 showed a 4.3 cm ureteral diffusely enhancing pituitary mass with infiltration of bilateral cavernous sinus and clivus.     Recommendations:  -Resection/debulking  arranged by neurosurgery will be done on  04/27 will be followed by radiotherapy.  -We will follow ACTH level .       #Central hypothyroidism:  Prior to admission was on levothyroxine 100 mcg daily.  Free T4 on admission 1.3 TSH not detectable (picture of central hypothyroidism).     Recommendations:  -Continue with PTA levothyroxine 100 mcg daily.     #Hypogonadotrophic hypogonadism:  Was on AndroGel gel 1 pump daily.     Recommendations:  -Continue AndroGel.     #Assessment for DI:  No known history of DI.  Developed hypernatremia in the settings of poor oral intake responded well to D5 infusion. .  On 4/7: Urine osmolarity 415/serum osmolarity 305. .  On 4/8/2023: Sodium started to increase again to 149 however urine specific gravity was 1.019.  Total daily urine output continues to be less than 1-2 L.  On D5 infusion with a rate of 125 mill per hour.    Recommendations:  -Continue to monitor I's and O's.  -Allowed to drink to thirst.  -Free water deficit repletion.    Continue with sodium monitoring close monitoring.     #DM type II: Hemoglobin A1c on the admission 9.5%.  Prior to admission was on Sitagliptin 100 mg daily/metformin 500 mg twice daily, Jardiance 12.5 mg.  Received Jardiance 10 mg on 4/8.  On D5 infusion with a rate of 125 mill per hour.  Oral intake slowed due to improved today     Recommendations:  -To increase Lantus from  8 units up to 10 units starting from tomorrow anticipating increase requirement with the improvement in the oral intake.    -medium-dose SSI 3 times daily before meals and at the bedtime.  -NovoLog carb coverage of 1 unit: 20 g CHO with meals and snacks.  -Hypoglycemia rescue protocol.  -POC BG checks 3 times daily AC at the bedtime at 2 AM.  -Carb counting protocol.  -Holding PTA oral medications.              Jeanne Chase      Endocrine fellow   Pager number: [phone]    Attending tie-in note  I saw the patient with endocrine fellow Dr. Chase and directly examined patient and discussed. Agree above note and plan.       Wendy Tran MD  Staff Physician  Endocrinology and Metabolism  [facility]  License: [id number]  Pager: [phone]

## 2023-04-10 NOTE — PLAN OF CARE
Goal Outcome Evaluation:    [id number]  Axo4. Patient has improved in mentattion and has engaged in logical conversations with nurse. Patient is becoming hypertensive. Page sent out to md. Team is aware. Patient denies pain or nausea and has been have good output with bed side urinal. Patient is receiving continuous detrose 5% running at 75ml. 1 episode of bowel incontinence.    Patient has right double lumen PICC red lumen. On room air and is lift x assist of 2. Provider has yet to follow up with the increase in BP. Bilateral edema in both legs. Right leg two mepi -plex on and some noteable peeling /dryness on ankle to anterior of right foot. Denies numbness and tingling in lower and upper extremities. Patient also has right eye droop but reports good vision.

## 2023-04-10 NOTE — PROGRESS NOTES
[facility]    Progress Note - Medicine Service, [facility] TEAM 2       Date of Admission:  4/3/2023    Assessment & Plan   Gustavo Faria is a 58 year old male with recent Serratia bacteremia, HFrEF, prolonged QT, lower extremity wounds, DMII, hypothyroidism, low testosterone and known ACTH secreting pituitary adenoma w/resulting Cushing's disease s/p 2 partial resections in 2021 who [facility]. [facility], he developed new onset double vision and severe headache which prompted imaging that showed a large mass invading the cavernous sinuses and impinging on the cranial nerves. He is transferred to [facility] for this pituitary adenoma, concern for progression vs hemorrhage/apoplexy. Complicated by psychosis/metabolic encephalopathy secondary to Cushing's syndrome.    Updates today:  - Urine culture with yeast - concern for symptomatic candiduria given encephalopathy  - Start Fluconazole 200 mg daily, pending   - Discontinue Ceftriaxone  - Patient was complaining of back pain, but concern for CVA tenderness  - Cancelled MRI and ordered abdominal CT to assess for renal pathology/pyelonephritis  - Continue BMP and Mg q6H checks  - Na goal: 135-140     # Acute metabolic encephalopathy, unclear etiology  # Pyruria  # Candiduria  Admission symptoms included disorientation, intermittently following directions. Repetitive words - perseverating on particular phrases. Sx started the 2-3 days prior to admission (which [facility] was the [facility]). Vulnerable brain (multiple brain surgeries), enlarging pituitary mass. No seizure activity per EEG. Steroids removed and pt still encephalopathy. Sodium has been in normal limits and patient remains altered. Worked up for infections - did reveal candiduria.   - CT abd w/ contrast: to assess for pyelonephritis (CVA tenderness)  - Urine culture:  candida   - Requested sensitivities  - Fluconazole 200 mg daily x14 days (started 4/10) - may need to adjust anti-fungals pending susceptibility  - Discontinued ceftriaxone as patient did not improve clinically with antibiotics  - No steroids  - Surgical plans for pituitary tumor as stated below  - Delirium precautions  - Sodium management as stated below  - PRN quetiapine if agitation that is not responsive to behavioral interventions     # Hypernatremia, DI vs cushing's  Slightly hypernatremia this admission. High risk for central DI. Also cushing's contributing. It is possible patient is encephalopathy when he becomes hypernatremia  - Goal Na: 135-140  - D5W maintenance for now - will assess neuro status when patient has normal sodium levels  - Q6H BMP + Mg  - Strict I&Os  - Endocrine following  - Suspect component of DI, will trial DDAVP in coming days if persistently hypernatremia    # ACTH secreting pituitary adenoma c/b type II DM, hypothyroidism and low testosterone s/p two partial resections in 2021   Patient noted double vision and severe headache beginning the evening of 3/25. CT imaging on 3/25 revealed a large sellar/suprasellar mass extending into the cavernous sinuses with no evidence of acute stroke. On 3/28, he underwent an MRI which confirmed the CT findings of a large mass invading the cavernous sinuses and impinging on the cranial nerves. Necrosis extending beyond left cavernous sinus. [facility]. Repeat MRI performed on 4/6/23: compared to 2021 MRI, lesions are smaller. Orbit MRI without optic nerve compression and no evidence of orbital infection.  - Neurosurgery following  - Plans for Neurosurgery: surgery 4/27/23  - Optimize medically prior to surgery  - Hold ASA 1 week prior to surgery  - Okay to discontinue Dexamethasone  - Ophthalmology following  - Endocrine following    # Hyperkalemia  High K in the setting of exogenous steroids. Suspect related to Cushing's syndrome,  notably with the hypernatremia.  - RN-managed K     # Buttocks, sacrum and bilateral groin wounds   # RLE wound from puncture injury   # L dorsal foot wound from presumed trauma   # Soft tissue infection/abscess    # Hx serratia bacteremia in December 2022   For patient's lower extremity wounds and hx of Serratia bacteremia in December, he was initially [facility]olin [facility]. His antibiotics were broadened to Vanc and Zosyn and ultimately he was transitioned to ceftazidime on 3/23 with plan to complete course on 4/4/23. BCx negative and wound cx grew serratia marcescens at the [facility]. He has been on ceftazidime since. MRI of the right tib/fib on 3/23 was negative for osteomyelitis but did show two fluid collections draining sponateneously. [facility] at the [facility] and determined no intervention was needed as wounds were draining on their own.  - Stop ceftazidime (3/23 - 4/5)  - WOC following  - PT/OT when able to follow commands  - Pain: tylenol PRN, dilaudid 0.5 mg PRN for dressing changes and oxy 5 mg PRN - however given encephalopathy, use sparingly    # Elevated troponin, down-trended  # HFmrEF, with global hypokinesis  # High burden of PVCs  # Prolonged QTc   Coronary angiogram on 2/27 shows normal coronaries. Troponin down-trending (88 -> 77). No chest pain. High burden of PVCs. Follow up ECHO with global hypokinesis, which is worse compared to February 2023 ECHO. EF is 45% (same compared to prior). Likely small vessel disease vs demand vs cardiomyopathy 2/2 ceftazidime. Low concern for acute ACS.   - Discontinued tele  - Electrolyte mgmt as above  - No need for diuresis at this time  - Continue PTA coreg  - PTA lisinopril   - PTA empagliflozin   - Resume ASA (hold 1 week prior to surgery)  - Avoid QTc prolonging medications     # Type II DM, exacerbated by Cushing's   A1c of 7.6% on 2/2023. Patient was [facility] the [facility] regimen [facility].    - Lantus 8 units  - MD Sliding scale insulin  -  Continue PTA sitagliptin  - Restart PTA emagliflozin    - Hold PTA metformin      # Central Hypothyroidism  - Continue PTA levothyroxine     # Hypogonadism   - Continue PTA androgel (will need to bring in home medication)     # Chronic microcytic anemia   Hgb of 8.4 on [facility]. Peripheral smear on 3/30 showed poikilocytosis with occasional red blood cell fragments but no other evidence of hemolysis.  Haptoglobin was normal.  Ferritin was 91, transferrin saturation was low, B12 was 495 and folate was 8.7. Likely combination of iron deficiency as well as inflammation/chronic disease.   - CTM       # Marijuana use   Uses daily medical marijuana at home.       # Concern for malnutrition   - Nutrition consult     Clinically Significant Risk Factors        # Hypokalemia: Lowest K = 2.7 mmol/L in last 2 days, will replace as needed  # Hypernatremia: Highest Na = 149 mmol/L in last 2 days, will monitor as appropriate      # Hypoalbuminemia: Lowest albumin = 3.1 g/dL at 4/9/2023  9:12 AM, will monitor as appropriate   # Thrombocytopenia: Lowest platelets = 129 in last 2 days, will monitor for bleeding         # DMII: A1C = 9.5 % (Ref range: <5.7 %) within past 6 months    # Severe Malnutrition: based on nutrition assessment      The patient's care was discussed with the Attending Physician, Dr. Chris Burnett.    Linda Azul MD  Medicine Service, [address]  Securely message with [employer] (more info)  Text page via [employer] Paging/Directory   See signed in provider for up to date coverage information  ______________________________________________________________________    Interval History    - No agitation overnight  - Delusions this morning: concerned that he needs security officers to protect him from staff members who aren't present. He thinks there are people stalking him while here in the hospital.  - Notes some pain in the back, specifically has CVA  tenderness  - Denies chest pain, shortness of breath, dizziness  - He is oriented to person, place, situation, and time - but does have noted delusions as above    Physical Exam   Vital Signs: Temp: 98.1  F (36.7  C) Temp src: Oral BP: (!) 156/94 Pulse: 68   Resp: 22 SpO2: 95 % O2 Device: None (Room air)    Weight: 151 lbs 3.77 oz     General Appearance: Eyes open, tracking, answers yes/no questions, no distress  Eyes: Pupils moderate size, no nystagmus, EOMI  HEENT: Normal sclera, able to move neck FROM  Respiratory: Breathing comfortably on RA, CTAB  Cardiovascular: RRR, no murmurs  GI: BS+. Soft, nontender, nondistended. No guarding or rebound tenderness.   Skin: Superficial, open wounds on the posterior buttocks midline  Musculoskeletal: No muscle wasting  Neuro:  strength intact, ankle strength intact, and sensation intact, EOMI  Psychiatric: Oriented to time, person, place, and situation - but does have delusions of people stalking him    Data     I have personally reviewed the following data over the past 24 hrs:    14.2 (H)  \   7.7 (L)   / 129 (L)     144 107 9.7 /  203 (H)   2.7 (L) 25 0.74 \       ALT: 177 (H) AST: 106 (H) AP: 323 (H) TBILI: 0.6   ALB: 3.1 (L) TOT PROTEIN: 5.0 (L) LIPASE: N/A

## 2023-04-10 NOTE — PLAN OF CARE
"Goal Outcome Evaluation:      Plan of Care Reviewed With: patient    Overall Patient Progress: no change    Outcome Evaluation: Alert but disoriented to time and situation. When writer asked pt if he felt less confused today he stated, \"I have never been confused! I don't know what you all are talking about. I have never been confused!\" Pt repeatedly stating he has a stalker and requesting security. Writer validated pt's feelings and assured pt he was safe. Provider notified of pt's agitation and delusions, IM zyprexa ordered. However, by the time med came from pharmacy, pt no longer c/o stalker and more agreeable and less distressed. VSS on RA. K 2.7, PO replace ordered via RN protocol, but pt very slow and resistant about taking pills, managed to take 40 mEq PO w/ much encouragement, remaining 20 mEq given IV, recheck ordered for 1500. Phos 2.3, replace IV ordered via protocol, dose compounded by pharmacy and very delayed d/t pharmacy delays, run over 4 hours, recheck ordered for am. Mg 1.9 MD ordered IV replacement. UA shows yeast, fluconazole to start today. Abdominal CT to assess kidneys, pt taken by transport for exam via transport at end of shift. IV D5% increased to 125 mL/hr via PICC, paused for CT as CT needs lumen for contrast. Pt ordering meals, however eating only a few bites over a long period of time, therefore carb coverage not given. Pt stated repeatedly that he would take his PO meds but would not swallow in front of writer, writer gave pt many chanced to do so, but pt stated writer was \"being bossy tying to get me to take all these meds, forget it\" Team aware. Pt urinating at BSU, due to collect UA, pt due to void. No BM on shift. Pt refused BLE wound dressing changes, dressings are CDI but due to be changed today, oncoming RN made aware. Pt refused Q2 repo and full skin assessment.       "

## 2023-04-11 LAB
ALBUMIN SERPL BCG-MCNC: 2.9 G/DL (ref 3.5–5.2)
ALP SERPL-CCNC: 297 U/L (ref 40–129)
ALT SERPL W P-5'-P-CCNC: 136 U/L (ref 10–50)
ANION GAP SERPL CALCULATED.3IONS-SCNC: 12 MMOL/L (ref 7–15)
ANION GAP SERPL CALCULATED.3IONS-SCNC: 13 MMOL/L (ref 7–15)
APTT PPP: 24 SECONDS (ref 22–38)
AST SERPL W P-5'-P-CCNC: 46 U/L (ref 10–50)
BASOPHILS # BLD AUTO: 0 10E3/UL (ref 0–0.2)
BASOPHILS # BLD AUTO: 0 10E3/UL (ref 0–0.2)
BASOPHILS NFR BLD AUTO: 0 %
BASOPHILS NFR BLD AUTO: 0 %
BILIRUB DIRECT SERPL-MCNC: <0.2 MG/DL (ref 0–0.3)
BILIRUB SERPL-MCNC: 0.7 MG/DL
BUN SERPL-MCNC: 6.8 MG/DL (ref 6–20)
BUN SERPL-MCNC: 7 MG/DL (ref 6–20)
BUN SERPL-MCNC: 7.4 MG/DL (ref 6–20)
BUN SERPL-MCNC: 7.8 MG/DL (ref 6–20)
CALCIUM SERPL-MCNC: 8 MG/DL (ref 8.6–10)
CALCIUM SERPL-MCNC: 8.2 MG/DL (ref 8.6–10)
CALCIUM SERPL-MCNC: 8.4 MG/DL (ref 8.6–10)
CALCIUM SERPL-MCNC: 8.7 MG/DL (ref 8.6–10)
CHLORIDE SERPL-SCNC: 106 MMOL/L (ref 98–107)
CHLORIDE SERPL-SCNC: 98 MMOL/L (ref 98–107)
CREAT SERPL-MCNC: 0.73 MG/DL (ref 0.67–1.17)
CREAT SERPL-MCNC: 0.74 MG/DL (ref 0.67–1.17)
CREAT SERPL-MCNC: 0.75 MG/DL (ref 0.67–1.17)
CREAT SERPL-MCNC: 0.76 MG/DL (ref 0.67–1.17)
CRP SERPL-MCNC: <3 MG/L
DEPRECATED HCO3 PLAS-SCNC: 23 MMOL/L (ref 22–29)
DEPRECATED HCO3 PLAS-SCNC: 25 MMOL/L (ref 22–29)
EOSINOPHIL # BLD AUTO: 0 10E3/UL (ref 0–0.7)
EOSINOPHIL # BLD AUTO: 0 10E3/UL (ref 0–0.7)
EOSINOPHIL NFR BLD AUTO: 0 %
EOSINOPHIL NFR BLD AUTO: 0 %
ERYTHROCYTE [DISTWIDTH] IN BLOOD BY AUTOMATED COUNT: 24.8 % (ref 10–15)
ERYTHROCYTE [DISTWIDTH] IN BLOOD BY AUTOMATED COUNT: 25 % (ref 10–15)
ERYTHROCYTE [SEDIMENTATION RATE] IN BLOOD BY WESTERGREN METHOD: 11 MM/HR (ref 0–20)
FIBRINOGEN PPP-MCNC: 176 MG/DL (ref 170–490)
FRAGMENTS BLD QL SMEAR: SLIGHT
GFR SERPL CREATININE-BSD FRML MDRD: >90 ML/MIN/1.73M2
GLUCOSE BLDC GLUCOMTR-MCNC: 153 MG/DL (ref 70–99)
GLUCOSE BLDC GLUCOMTR-MCNC: 178 MG/DL (ref 70–99)
GLUCOSE BLDC GLUCOMTR-MCNC: 211 MG/DL (ref 70–99)
GLUCOSE BLDC GLUCOMTR-MCNC: 234 MG/DL (ref 70–99)
GLUCOSE BLDC GLUCOMTR-MCNC: 259 MG/DL (ref 70–99)
GLUCOSE SERPL-MCNC: 166 MG/DL (ref 70–99)
GLUCOSE SERPL-MCNC: 206 MG/DL (ref 70–99)
GLUCOSE SERPL-MCNC: 224 MG/DL (ref 70–99)
GLUCOSE SERPL-MCNC: 509 MG/DL (ref 70–99)
HAPTOGLOB SERPL-MCNC: 193 MG/DL (ref 32–197)
HCT VFR BLD AUTO: 25.7 % (ref 40–53)
HCT VFR BLD AUTO: 26.3 % (ref 40–53)
HCV AB SERPL QL IA: NONREACTIVE
HGB BLD-MCNC: 7.5 G/DL (ref 13.3–17.7)
HGB BLD-MCNC: 8.1 G/DL (ref 13.3–17.7)
HIV 1+2 AB+HIV1 P24 AG SERPL QL IA: NONREACTIVE
IMM GRANULOCYTES # BLD: 0.2 10E3/UL
IMM GRANULOCYTES # BLD: 0.2 10E3/UL
IMM GRANULOCYTES NFR BLD: 1 %
IMM GRANULOCYTES NFR BLD: 2 %
INR PPP: 0.99 (ref 0.85–1.15)
LACTATE SERPL-SCNC: 2.4 MMOL/L (ref 0.7–2)
LDH SERPL L TO P-CCNC: 449 U/L (ref 0–250)
LYMPHOCYTES # BLD AUTO: 0.3 10E3/UL (ref 0.8–5.3)
LYMPHOCYTES # BLD AUTO: 0.3 10E3/UL (ref 0.8–5.3)
LYMPHOCYTES NFR BLD AUTO: 2 %
LYMPHOCYTES NFR BLD AUTO: 2 %
MAGNESIUM SERPL-MCNC: 2.1 MG/DL (ref 1.7–2.3)
MAGNESIUM SERPL-MCNC: 2.2 MG/DL (ref 1.7–2.3)
MCH RBC QN AUTO: 25.4 PG (ref 26.5–33)
MCH RBC QN AUTO: 26.3 PG (ref 26.5–33)
MCHC RBC AUTO-ENTMCNC: 29.2 G/DL (ref 31.5–36.5)
MCHC RBC AUTO-ENTMCNC: 30.8 G/DL (ref 31.5–36.5)
MCV RBC AUTO: 85 FL (ref 78–100)
MCV RBC AUTO: 87 FL (ref 78–100)
MONOCYTES # BLD AUTO: 0.2 10E3/UL (ref 0–1.3)
MONOCYTES # BLD AUTO: 0.2 10E3/UL (ref 0–1.3)
MONOCYTES NFR BLD AUTO: 1 %
MONOCYTES NFR BLD AUTO: 2 %
NEUTROPHILS # BLD AUTO: 12.1 10E3/UL (ref 1.6–8.3)
NEUTROPHILS # BLD AUTO: 12.4 10E3/UL (ref 1.6–8.3)
NEUTROPHILS NFR BLD AUTO: 94 %
NEUTROPHILS NFR BLD AUTO: 96 %
NRBC # BLD AUTO: 0.1 10E3/UL
NRBC # BLD AUTO: 0.1 10E3/UL
NRBC BLD AUTO-RTO: 1 /100
NRBC BLD AUTO-RTO: 1 /100
PHOSPHATE SERPL-MCNC: 2 MG/DL (ref 2.5–4.5)
PHOSPHATE SERPL-MCNC: 2.1 MG/DL (ref 2.5–4.5)
PLAT MORPH BLD: ABNORMAL
PLATELET # BLD AUTO: 103 10E3/UL (ref 150–450)
PLATELET # BLD AUTO: 97 10E3/UL (ref 150–450)
POLYCHROMASIA BLD QL SMEAR: SLIGHT
POTASSIUM SERPL-SCNC: 2.7 MMOL/L (ref 3.4–5.3)
POTASSIUM SERPL-SCNC: 2.8 MMOL/L (ref 3.4–5.3)
POTASSIUM SERPL-SCNC: 2.8 MMOL/L (ref 3.4–5.3)
POTASSIUM SERPL-SCNC: 3.3 MMOL/L (ref 3.4–5.3)
POTASSIUM SERPL-SCNC: 3.5 MMOL/L (ref 3.4–5.3)
PROT SERPL-MCNC: 4.7 G/DL (ref 6.4–8.3)
RBC # BLD AUTO: 2.95 10E6/UL (ref 4.4–5.9)
RBC # BLD AUTO: 3.08 10E6/UL (ref 4.4–5.9)
RBC MORPH BLD: ABNORMAL
RETICS # AUTO: 0.07 10E6/UL (ref 0.03–0.1)
RETICS/RBC NFR AUTO: 2.3 % (ref 0.5–2)
SODIUM SERPL-SCNC: 133 MMOL/L (ref 136–145)
SODIUM SERPL-SCNC: 143 MMOL/L (ref 136–145)
SODIUM SERPL-SCNC: 143 MMOL/L (ref 136–145)
SODIUM SERPL-SCNC: 144 MMOL/L (ref 136–145)
TARGETS BLD QL SMEAR: ABNORMAL
WBC # BLD AUTO: 12.8 10E3/UL (ref 4–11)
WBC # BLD AUTO: 13 10E3/UL (ref 4–11)

## 2023-04-11 PROCEDURE — 120N000002 HC R&B MED SURG/OB UMMC

## 2023-04-11 PROCEDURE — 84132 ASSAY OF SERUM POTASSIUM: CPT | Performed by: INTERNAL MEDICINE

## 2023-04-11 PROCEDURE — 83615 LACTATE (LD) (LDH) ENZYME: CPT

## 2023-04-11 PROCEDURE — 93010 ELECTROCARDIOGRAM REPORT: CPT | Performed by: INTERNAL MEDICINE

## 2023-04-11 PROCEDURE — 85025 COMPLETE CBC W/AUTO DIFF WBC: CPT

## 2023-04-11 PROCEDURE — 84100 ASSAY OF PHOSPHORUS: CPT | Performed by: INTERNAL MEDICINE

## 2023-04-11 PROCEDURE — 258N000003 HC RX IP 258 OP 636

## 2023-04-11 PROCEDURE — 250N000013 HC RX MED GY IP 250 OP 250 PS 637: Performed by: INTERNAL MEDICINE

## 2023-04-11 PROCEDURE — 85045 AUTOMATED RETICULOCYTE COUNT: CPT

## 2023-04-11 PROCEDURE — 85652 RBC SED RATE AUTOMATED: CPT

## 2023-04-11 PROCEDURE — 36592 COLLECT BLOOD FROM PICC: CPT

## 2023-04-11 PROCEDURE — 83010 ASSAY OF HAPTOGLOBIN QUANT: CPT

## 2023-04-11 PROCEDURE — 83605 ASSAY OF LACTIC ACID: CPT

## 2023-04-11 PROCEDURE — 86140 C-REACTIVE PROTEIN: CPT

## 2023-04-11 PROCEDURE — 82310 ASSAY OF CALCIUM: CPT

## 2023-04-11 PROCEDURE — 99233 SBSQ HOSP IP/OBS HIGH 50: CPT | Mod: GC | Performed by: INTERNAL MEDICINE

## 2023-04-11 PROCEDURE — 85610 PROTHROMBIN TIME: CPT

## 2023-04-11 PROCEDURE — 250N000013 HC RX MED GY IP 250 OP 250 PS 637

## 2023-04-11 PROCEDURE — 36415 COLL VENOUS BLD VENIPUNCTURE: CPT | Mod: TC

## 2023-04-11 PROCEDURE — 86803 HEPATITIS C AB TEST: CPT

## 2023-04-11 PROCEDURE — 36592 COLLECT BLOOD FROM PICC: CPT | Performed by: INTERNAL MEDICINE

## 2023-04-11 PROCEDURE — 82248 BILIRUBIN DIRECT: CPT | Performed by: INTERNAL MEDICINE

## 2023-04-11 PROCEDURE — 85730 THROMBOPLASTIN TIME PARTIAL: CPT

## 2023-04-11 PROCEDURE — G0463 HOSPITAL OUTPT CLINIC VISIT: HCPCS

## 2023-04-11 PROCEDURE — 85060 BLOOD SMEAR INTERPRETATION: CPT | Performed by: PATHOLOGY

## 2023-04-11 PROCEDURE — 250N000011 HC RX IP 250 OP 636: Performed by: INTERNAL MEDICINE

## 2023-04-11 PROCEDURE — 85384 FIBRINOGEN ACTIVITY: CPT

## 2023-04-11 PROCEDURE — 99232 SBSQ HOSP IP/OBS MODERATE 35: CPT | Mod: GC | Performed by: INTERNAL MEDICINE

## 2023-04-11 PROCEDURE — 250N000011 HC RX IP 250 OP 636

## 2023-04-11 PROCEDURE — 80053 COMPREHEN METABOLIC PANEL: CPT

## 2023-04-11 PROCEDURE — 93005 ELECTROCARDIOGRAM TRACING: CPT

## 2023-04-11 PROCEDURE — 250N000009 HC RX 250: Performed by: INTERNAL MEDICINE

## 2023-04-11 PROCEDURE — 999N000007 HC SITE CHECK

## 2023-04-11 PROCEDURE — 85025 COMPLETE CBC W/AUTO DIFF WBC: CPT | Performed by: INTERNAL MEDICINE

## 2023-04-11 PROCEDURE — 83735 ASSAY OF MAGNESIUM: CPT

## 2023-04-11 PROCEDURE — 258N000003 HC RX IP 258 OP 636: Performed by: INTERNAL MEDICINE

## 2023-04-11 PROCEDURE — 87389 HIV-1 AG W/HIV-1&-2 AB AG IA: CPT

## 2023-04-11 RX ORDER — MICONAZOLE NITRATE 20 MG/G
CREAM TOPICAL 2 TIMES DAILY
Status: DISCONTINUED | OUTPATIENT
Start: 2023-04-11 | End: 2023-05-16

## 2023-04-11 RX ORDER — SPIRONOLACTONE 25 MG/1
100 TABLET ORAL DAILY
Status: DISCONTINUED | OUTPATIENT
Start: 2023-04-12 | End: 2023-04-11

## 2023-04-11 RX ORDER — SPIRONOLACTONE 25 MG/1
100 TABLET ORAL DAILY
Status: DISCONTINUED | OUTPATIENT
Start: 2023-04-11 | End: 2023-04-13

## 2023-04-11 RX ORDER — SPIRONOLACTONE 25 MG/1
50 TABLET ORAL DAILY
Status: DISCONTINUED | OUTPATIENT
Start: 2023-04-12 | End: 2023-04-11

## 2023-04-11 RX ORDER — POTASSIUM CHLORIDE 29.8 MG/ML
20 INJECTION INTRAVENOUS
Status: COMPLETED | OUTPATIENT
Start: 2023-04-11 | End: 2023-04-11

## 2023-04-11 RX ORDER — LISINOPRIL 10 MG/1
20 TABLET ORAL DAILY
Status: DISCONTINUED | OUTPATIENT
Start: 2023-04-12 | End: 2023-04-27

## 2023-04-11 RX ORDER — LISINOPRIL 10 MG/1
10 TABLET ORAL ONCE
Status: COMPLETED | OUTPATIENT
Start: 2023-04-11 | End: 2023-04-11

## 2023-04-11 RX ORDER — DEXTROSE MONOHYDRATE 50 MG/ML
INJECTION, SOLUTION INTRAVENOUS CONTINUOUS
Status: DISCONTINUED | OUTPATIENT
Start: 2023-04-11 | End: 2023-04-12

## 2023-04-11 RX ADMIN — DEXTROSE MONOHYDRATE: 50 INJECTION, SOLUTION INTRAVENOUS at 01:32

## 2023-04-11 RX ADMIN — FLUCONAZOLE 200 MG: 200 TABLET ORAL at 00:53

## 2023-04-11 RX ADMIN — Medication 5 ML: at 15:57

## 2023-04-11 RX ADMIN — POTASSIUM CHLORIDE 20 MEQ: 400 INJECTION, SOLUTION INTRAVENOUS at 12:56

## 2023-04-11 RX ADMIN — SPIRONOLACTONE 100 MG: 25 TABLET, FILM COATED ORAL at 13:59

## 2023-04-11 RX ADMIN — CARVEDILOL 12.5 MG: 12.5 TABLET, FILM COATED ORAL at 17:32

## 2023-04-11 RX ADMIN — POTASSIUM CHLORIDE 20 MEQ: 400 INJECTION, SOLUTION INTRAVENOUS at 22:52

## 2023-04-11 RX ADMIN — POTASSIUM CHLORIDE 20 MEQ: 400 INJECTION, SOLUTION INTRAVENOUS at 21:44

## 2023-04-11 RX ADMIN — POTASSIUM CHLORIDE 20 MEQ: 400 INJECTION, SOLUTION INTRAVENOUS at 14:01

## 2023-04-11 RX ADMIN — DEXTROSE MONOHYDRATE: 50 INJECTION, SOLUTION INTRAVENOUS at 15:56

## 2023-04-11 RX ADMIN — FLUCONAZOLE 400 MG: 200 TABLET ORAL at 12:47

## 2023-04-11 RX ADMIN — POTASSIUM CHLORIDE 20 MEQ: 400 INJECTION, SOLUTION INTRAVENOUS at 11:38

## 2023-04-11 RX ADMIN — LISINOPRIL 10 MG: 10 TABLET ORAL at 17:32

## 2023-04-11 RX ADMIN — SODIUM PHOSPHATE, MONOBASIC, MONOHYDRATE AND SODIUM PHOSPHATE, DIBASIC, ANHYDROUS 9 MMOL: 276; 142 INJECTION, SOLUTION INTRAVENOUS at 10:33

## 2023-04-11 RX ADMIN — TESTOSTERONE 25 MG OF TESTOSTERONE: 50 GEL TOPICAL at 10:12

## 2023-04-11 RX ADMIN — MICONAZOLE NITRATE: 20 CREAM TOPICAL at 20:00

## 2023-04-11 RX ADMIN — MICONAZOLE NITRATE: 20 CREAM TOPICAL at 20:01

## 2023-04-11 ASSESSMENT — ACTIVITIES OF DAILY LIVING (ADL)
ADLS_ACUITY_SCORE: 43

## 2023-04-11 NOTE — PROGRESS NOTES
Endocrine Progress Note  Patient: Gustavo Faria   MRN: [id number]  Date of Service: 04/11/2023    HPI summary and hospitalization course:  Gustavo Faria is a 58 year old male with PMHx of ACTH-secreting macroadenoma c/b central hypothyroidism, and central hypogonadism,  s/p transphenoidal surgery 5/2021 and 7/2021 in [place] has baseline records 3rd nerve palsy, ongoing serratia RLE cellulitis c/b recent serratia bacteremia, HFrEF, T2DM, and HTN who [facility] of known expanding large sellar/suprasellar mass extending into cavernous sinuses and subsequent cranial nerve impingement.    [facility] and bilateral lower limb cellulitis/abscess developed sudden onset of headaches and double vision transferred to [facility] for multidisciplinary assessment.  Had an MRI done on 3/25 which demonstrates enlargement of known pituitary adenoma with necrosis extending laterally beyond the left cavernous sinus concerning for compression of the cranial nerves at the cavernous sinus.  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  On the arrival he was encephalopathic.  Taken off dexamethasone on 04/06/2023 with improvement in the mental status following that.    Repeated MRI following transfer to [facility] showed new enhancing to 1.4 cm lesion in the right frontal lobe with susceptibility of artifact.  4.3 cm heterogeneously enhancing pituitary mass with encasement and peripheral displacement of the cavernous portions of both internal carotid arteries and infiltration of the clivus.  Previous optic chiasm compression is resolved.  Pituitary stalk is midline.  Lesion is infiltrating both cavernous sinuses cranial nerves in the cavernous sinus  cannot be differentiated from the underlying mass.    Interval history:  Intermittent confusion over the night.  Urine output both yesterday was 2.8 L.  Urine osmolarity above 300.  Dextrose infusion was discontinued.  Started on treatment for fungal UTI infection.  At the time of the assessment he was oriented he stated he does not have any nausea or vomiting.  No headaches.      Physical Examination:  /82 (BP Location: Left arm)   Pulse 85   Temp 98.1  F (36.7  C) (Oral)   Resp 16   Wt 69.5 kg (153 lb 3.5 oz)   SpO2 99%   BMI 24.73 kg/m    Physical Exam  Vitals reviewed.   Constitutional:       General: He is not in acute distress.  Cardiovascular:      Rate and Rhythm: Normal rate.   Pulmonary:      Effort: Pulmonary effort is normal.   Neurological:      Mental Status: He is alert and oriented to person, place, and time.   Psychiatric:         Mood and Affect: Mood normal.         Behavior: Behavior normal.         Medications:  Reviewed    Endocrine Labs:         Latest Reference Range & Units 04/06/23 06:26 04/07/23 08:42 04/09/23 09:12   Adrenal Corticotropin <47 pg/mL  273 (H)    Cortisol Serum ug/dL 48.7 41.1 37.8         Images:  MRI brain with and without contrast on 4/6/2023:  Comparison:  Outside brain MR 3/28/2023 and 5/25/2021.      Technique:   1. Brain MRI: Axial diffusion and FLAIR images of the whole brain  obtained without intravenous contrast. Sagittal T1 and T2-weighted,  coronal T2-weighted, coronal T1-weighted images with focus on the  sella were obtained without intravenous contrast. Post intravenous  contrast using gadolinium coronal and sagittal T1-weighted images were  obtained focused on the sella. Dynamic postcontrast coronal  T1-weighted images were also obtained.     2. MRI of the Orbits focused on the orbits/visual pathways:  Axial  T2-weighted with inversion recovery and coronal T1-weighted images  were obtained without intravenous contrast. Axial and  coronal  T1-weighted images with fat saturation were obtained after intravenous  gadolinium administration.     3. MRI Brain COW: Sagittal T1-weighted, axial T2-weighted with fat  saturation, turboFLAIR and diffusion-weighted with ADC map and coronal  T1-weighted and T2-weighted images of the brain were obtained without  intravenous contrast.       Contrast: 7.5mL Gadavist     Findings:    Brain MRI:  Enhancing 1.4 x 1.0 cm lesion with associated susceptibility artifact  in the right frontal lobe (series 16, image 17), stable since  5/25/2021. A similar punctate lesion in the left putamen also  unchanged.m     4.3 x 1.9 x 1.3 cm (right to left, AP, craniocaudal) heterogeneously  enhancing pituitary mass with encasement and peripheral displacement  of the cavernous portions of both internal carotid arteries (series  13, image 28 and series 14, image 11). The lesion infiltrates the  clivus. Compared with MRI from 2021 the craniocaudal dimension of the  mass is decreased. The lesion is now more heterogeneous, previously  more homogeneous. Previous optic chiasm compression is resolved. No  abnormal enhancement of the optic nerves within the intraorbital  portion. The pituitary stalk is midline.     The lesion infiltrates both cavernous sinuses. The traversing cranial  nerves within the cavernous sinus cannot be differentiated from the  underlying mass.     FLAIR images through the whole brain shows nonspecific posterior  periventricular white matter hyperintensities. Otherwise no mass  effect, midline shift, or significant enlargement of the ventricles.  Scattered mucosal thickening of the paranasal sinuses. Mastoid air  cells are clear.     MRA Head: No aneurysm or stenosis of the major intracranial arteries  at the base of the brain.                                                                      Impression:   1.  4.3 cm heterogeneously enhancing pituitary mass with infiltration  of bilateral cavernous sinuses  and the clivus. Findings are similar  when compared compared to prior exam 3/28/2023 suggestive for a  macroadenoma. Compared with an older MRI from 2021, the lesion appears  more heterogeneous and smaller. Previous compression of the optic  chiasm is no longer present.  2.  Patchy T2 hyperintense enhancing lesion with scattered  susceptibility artifacts in the right frontal subcortical white  matter. A similar tiny smaller lesion is also present in the left  putamen. Retrospectively this lesion was present back in 2021.  Constellation of findings are suggestive of a benign lesion such as  cavernoma or telangiectasia.  3.   MRA demonstrates no aneurysm or stenosis of the major  intracranial arteries. Bilateral cavernous internal carotid arteries  are patent despite encasement by the above-mentioned lesion.  4.  No abnormal optic nerve enhancement or optic nerve atrophy.        Assessment and plan:    Gustavo Faria is a 58 year old male with PMHx of  ACTH-secreting macroadenoma c/b central hypothyroidism, , and central hypogonadism,  s/p transphenoidal surgery 5/2021 and 7/2021 in [place] has baseline records 3rd nerve palsy, ongoing serratia RLE cellulitis c/b recent serratia bacteremia, HFrEF, T2DM, and HTN who [facility] of known expanding large sellar/suprasellar mass extending into cavernous sinuses and subsequent cranial nerve impingement.     #Pituitary macroadenoma:  #Cushing's disease:  #Pituitary apoplexy:  Known history of ACTH secreting macroadenoma , developed sudden onset of double [facility] and [facility] showed enlargement of known pituitary macroadenoma with necrosis extending laterally beyond the left cavernous sinus concerning for compression of cranial nerves at the cavernous sinuses.  No acute hemorrhage, small area of diffusion restriction at the left side of pituitary possibly consistent with ischemic  pituitary adenoma  Started on dexamethasone 2 mg twice daily, was discontinued on4/6/23  Random serum cortisol level at 2:30 AM was 46.3, ACTH 321.  A.m. cortisol level of 41.1 on 4/7/2023.  Repeated MRI on 4/6 showed a 4.3 cm ureteral diffusely enhancing pituitary mass with infiltration of bilateral cavernous sinus and clivus.  He has been developing recurrent hyponatremia/hypokalemia during the admission, prior to admission was on spironolactone 100 mg daily.     Recommendations:  -Resection/debulking  arranged by neurosurgery will be done on  04/27 will be followed by radiotherapy.  -To resume prior to admission spironolactone 100 mg daily, will continue to assess the response to see if any further treatment is needed to resume the electrolytes balance.   -For perioperative planning, no need to initiate the steroids replacement unless he decompensates during the surgery or postoperatively he can get cortisol level following the surgery followed by a.m. cortisol level checks if there is any suspicion of postoperative AI to be started on steroids.       #Central hypothyroidism:  Prior to admission was on levothyroxine 100 mcg daily.  Free T4 on admission 1.3 TSH not detectable (picture of central hypothyroidism).     Recommendations:  -Continue with PTA levothyroxine 100 mcg daily.     #Hypogonadotrophic hypogonadism:  Was on AndroGel gel 1 pump daily.     Recommendations:  -Continue AndroGel.     #Assessment for DI:  No known history of DI.  Developed hypernatremia in the settings of poor oral intake responded well to D5 infusion. .  On 4/7: Urine osmolarity 415/serum osmolarity 305. .  On 4/8/2023: Sodium started to increase again to 149 however urine specific gravity was 1.019.  Total daily urine output continues to be less than 3 liters per day.  D5 percent infusion discontinued today morning..    Recommendations:  -Continue to monitor I's and O's.  -Allowed to drink to thirst.  -Free water deficit repletion as  needed.    Continue with sodium  close monitoring.  Assess for the response following increasing the dose of spironolactone daily     #DM type II: Hemoglobin A1c on the admission 9.5%.  Prior to admission was on Sitagliptin 100 mg daily/metformin 500 mg twice daily, Jardiance 12.5 mg.  Received Jardiance 10 mg on 4/8.  D5 percent infusion was discontinued at 7 AM on 4/11/2023.  Yesterday he received Lantus 8 units morning BG today was elevated 259.     Recommendations:  -Lantus increased to 10 units daily.  -medium-dose SSI 3 times daily before meals and at the bedtime.  -NovoLog carb coverage of 1 unit: 20 g CHO with meals and snacks.  -Hypoglycemia rescue protocol.  -POC BG checks 3 times daily AC at the bedtime at 2 AM.  -Carb counting protocol.  -Holding PTA oral medications.         Jeanne Chase      Endocrine fellow   Pager number: [phone]    Attending tie-in note  I saw the patient with endocrine fellow Dr. Chase and directly examined patient and discussed. Agree above note and plan.       Wendy Tran MD  Staff Physician  Endocrinology and Metabolism  [facility]  License: [id number]  Pager: [phone]

## 2023-04-11 NOTE — PLAN OF CARE
Goal Outcome Evaluation:    Disoriented to time and situation. Confused. Hypertensive at bedtime. RA. Denies pain. Potassium rechecked at 1738, 3.5. Recheck tomorrow AM. D5 running 125 ml/hr continuously, Red lumen. Purple lumen SL. Urine collected and sent down to lab, view results review tab for details. Pt refused skin assessment and repositioning. Visible skin dry and flaky. Intermittently incontinent of B & B. BM loose/soft brown. , 306.

## 2023-04-11 NOTE — PLAN OF CARE
Goal Outcome Evaluation:      Plan of Care Reviewed With: patient    Overall Patient Progress: no change    Outcome Evaluation: Disoriented to situation. Alert, but confused. Intermittently uncooperative, refusing cares. In early shift pt insistent  that he was going home today. Pt refused PO meds all shift except fluconazole and spironolactone, team aware, team said to prioritize these meds. VSS on RA. Phos 2.0, IV replace, recheck am.   K 2.7, IV replace x3 doses, recheck 1600. IVF discontinued. BLE wounds jesus to be changed, pt refused. Pt ordered meals but only too a few bites, carb coverage not given. 1 small BM on shift. Per team, pt cannot discharge until electrolyes and AMS improves.

## 2023-04-11 NOTE — PROGRESS NOTES
CLINICAL NUTRITION SERVICES - REASSESSMENT NOTE     Nutrition Prescription    RECOMMENDATIONS FOR MDs/PROVIDERS TO ORDER:  Recommend patient be able to consistently consume at least 1200 kcal and 60 g protein daily (~60% estimated  kcal and protein needs) vs need for additional nutrition intervention (TF?)    Malnutrition Status:    Severe malnutrition in the context of acute on chronic illness    Recommendations already ordered by Registered Dietitian (RD):  - Add Ensure Enlive @ 2pm   - Continue Ensure Max Protein @ 10 and HS  - Calorie Counts 4/12-4/14    Future/Additional Recommendations:  Monitor PO intakes, wt trends; if ongoing poor PO intake and plan to start TF, please send Nutrition Services IP consult Registered Dietitian to order TF     EVALUATION OF THE PROGRESS TOWARD GOALS   Diet: Regular  - Supplements: Ensure Max Protein between meals @ 10am and HS    Intake: Overall poor appetite ongoing the past week     Calorie Counts  4/7: 617 kcal and 27 g protein  4/6: no food intake recorded  4/5: no food intake recorded     NEW FINDINGS   Weight: ~68-69 kg since admit    Dosing Weight: 68.7 kg (actual body weight)     ASSESSED NUTRITION NEEDS  Estimated Energy Needs: 4118-6267 kcals/day (25 - 30 kcals/kg)  Justification: Maintenance  Estimated Protein Needs:  grams protein/day (1.2 - 1.5 grams of pro/kg)  Justification: Increased needs  Estimated Fluid Needs: (1 mL/kcal)   Justification: Maintenance, or other per provider pending fluid status    Labs:   - K+ 2.8 (L), replacement orderd  - Phos 2 (L), replacement ordered  - Na+ 133 (L) at 0018, increased to WNL at 0707 (143) and 0954 (144)  - BGs 200s    Meds:  - Medium sliding scale insulin TID before meals + at bedtime  - Novolog CHO counting, 1 unit per 20 g CHO TID before meals  - Lantus  - Thera-Vit-M  - Spironolactone ordered to start tomorrow  - Vitamin D2 (50,000 units every 7 days)  - D5 IV fluids stopped this morning (were ordered 4/9-4/11; at  first @ 75 ml/hr, increased to 125 ml/hr yesterday morning)  - Lasix ON HOLD    Skin: WOC following for wounds (BLE w/ wounds due to arterial ulcer and unknown etiology; and also with incontinence associated dermatitis to buttocks, sacrum, and groin)    Neuro: per RN note this morning, pt A&Ox4 with intermittent confusion    MALNUTRITION  % Intake: </= 50% for >/= 5 days (severe)  % Weight Loss: > 7.5% in 3 months (severe)  Subcutaneous Fat Loss: Unable to assess  Muscle Loss: Unable to assess  Fluid Accumulation/Edema: Trace-mild per flowsheets  Malnutrition Diagnosis: Severe malnutrition in the context of acute on chronic disease    Previous Goals   Patient to consume % of nutritionally adequate meal trays TID, or the equivalent with supplements/snacks.  Evaluation: Not met    Weight to remain > 68 kg  Evaluation: Met    Previous Nutrition Diagnosis  Inadequate oral intake related to altered mental status and taste as evidenced by severe weight loss and patient meeting < 50% of nutrient needs > 5 days.  Evaluation: No change, updated    CURRENT NUTRITION DIAGNOSIS  Inadequate oral intake related to decreased appetite and suspect intermittent confusion hindering PO as evidenced by mostly poor appetite documented the past week     INTERVENTIONS  Implementation  Medical food supplement therapy  Calorie Counts  Pt with other cares during attempts to visit today    Goals  Total avg nutritional intake to meet a minimum of 25 kcal/kg and 1.2 g PRO/kg daily (per dosing wt 68.7 kg).    Monitoring/Evaluation  Progress toward goals will be monitored and evaluated per protocol.     Gabbie Rascon, RD, , LD  Weekday Pager: [phone]  Weekday Units covered: 7C (all beds) and 5A (beds 0192 through 7908)  Weekend/Holiday RD Pager: [phone]

## 2023-04-11 NOTE — PROGRESS NOTES
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 [facility], he developed new onset double vision and severe headache which prompted imaging that showed a large mass invading the cavernous sinuses and impinging on the cranial nerves. He is transferred to [facility] for this pituitary adenoma, concern for progression vs hemorrhage/apoplexy. Complicated by psychosis/metabolic encephalopathy secondary to Cushing's syndrome.    Updates today:  - Still encephalopathic today, but possibly improving  - Progressive thrombocytopenia  - PTT/INR, peripheral smear, fibrinogen, HIV, hep C  - Discontinued D5W infusion, Na in range 135-140  - Discussions with Endocrine regarding electrolyte mgmt and if related to Cushings  - Fluconazole 200 mg daily, pending fungal susceptibilities   - Spironolactone 100 mg daily (home dose)   - Patient refusing some PO medications      # Acute metabolic encephalopathy, unclear etiology  # Pyruria  # Candiduria  # Possibly secondary to cushing's disease  Admission symptoms included disorientation, intermittently following directions. Repetitive words - perseverating on particular phrases. Sx started the 2-3 days prior to admission (which [facility] was [facility]). Vulnerable brain (multiple brain surgeries), enlarging pituitary mass. No seizure activity per EEG. Steroids removed and pt still encephalopathy. Sodium has been in normal limits and patient remains altered. Worked up for infections - did reveal candiduria.  Biggest concern for infectious vs cushing's.   - CT abd w/ contrast: no evidence of pyelonephritis  - Urine culture: candida   - Requested sensitivities  - Fluconazole 200 mg daily x14 days (started 4/10) - may need to adjust anti-fungals pending susceptibility  - Discontinued ceftriaxone as patient did not improve clinically with antibiotics  - No steroids  - Surgical plans for pituitary tumor as stated below  - Delirium precautions  - Sodium management as stated below   - PRN quetiapine if agitation that is not responsive to behavioral interventions     # Hypernatremia, DI vs cushing's  Slightly hypernatremia this admission. High risk for central DI. Also cushing's contributing?    - Goal Na: 135-140  - Stop D5W on 4/11 given concern for volume overloading   - Q6H BMP + Mg  - Strict I&Os  - Endocrine following    # ACTH secreting pituitary adenoma c/b type II DM, hypothyroidism and low testosterone s/p two partial resections in 2021   Patient noted double vision and severe headache beginning the evening of 3/25. CT imaging on 3/25 revealed a large sellar/suprasellar mass extending into the cavernous sinuses with no evidence of acute stroke. On 3/28, he underwent an MRI which confirmed the CT findings of a large mass invading the cavernous sinuses and impinging on the cranial nerves. Necrosis extending beyond left cavernous sinus. [facility]. Repeat MRI performed on 4/6/23: compared to 2021 MRI, lesions are smaller. Orbit MRI without optic nerve compression and no evidence of orbital infection.  - Neurosurgery following  - Plans for Neurosurgery: surgery 4/27/23  - Optimize medically prior to surgery  - Hold ASA 1 week prior to surgery  - Okay to discontinue Dexamethasone  - Ophthalmology following  - Endocrine following    # Hyperkalemia   High K in the setting of exogenous steroids. Suspect related to Cushing's syndrome, notably with the hypernatremia.  - Will need to discuss with Endocrine  -  RN-managed K  - Trying to resume Spironolactone at home dosing but refusing PO meds     # Buttocks, sacrum and bilateral groin wounds   # RLE wound from puncture injury   # L dorsal foot wound from presumed trauma   # Soft tissue infection/abscess    # Hx serratia bacteremia in December 2022   For patient's lower extremity wounds and hx of Serratia bacteremia in December, he was [facility]. His antibiotics were broadened to Vanc and Zosyn and ultimately he was transitioned to ceftazidime on 3/23 with plan to complete course on 4/4/23. BCx negative and wound cx grew serratia marcescens at the [facility]. He has been on ceftazidime since. MRI of the right tib/fib on 3/23 was negative for osteomyelitis but did show two fluid collections draining sponateneously. [facility] and determined no intervention was needed as wounds were draining on their own.  - Stop ceftazidime (3/23 - 4/5)  - WOC following  - PT/OT when able to follow commands  - Pain: tylenol PRN, dilaudid 0.5 mg PRN for dressing changes and oxy 5 mg PRN - however given encephalopathy, use sparingly  - Re-image? MRI?     # HFmrEF, with global hypokinesis  # High burden of PVCs  # Prolonged QTc   # Elevated troponin, down-trended  Coronary angiogram on 2/27 shows normal coronaries. Troponin down-trending (88 -> 77). No chest pain. High burden of PVCs. Follow up ECHO with global hypokinesis, which is worse compared to February 2023 ECHO. EF is 45% (same compared to prior). Likely small vessel disease vs demand vs cardiomyopathy 2/2 ceftazidime. Low concern for acute ACS.   - Discontinued tele  - Electrolyte mgmt as above  - No need for diuresis at this time - given hyper-K  - Continue PTA coreg  - Lisinopril 10  - PTA empagliflozin   - Resume ASA (hold 1 week prior to surgery)  - Avoid QTc prolonging medications     # Type II DM, exacerbated by Cushing's   A1c of 7.6% on 2/2023. [facility] the [facility].    - Lantus 10 units  - MD Sliding scale insulin  - Continue PTA sitagliptin  - Restart PTA emagliflozin    - Hold PTA metformin      # Central Hypothyroidism  - Continue PTA levothyroxine     # Hypogonadism   - Continue PTA androgel (will need to bring in home medication)     # Chronic microcytic anemia   Hgb of 8.4 on [facility]. Peripheral smear on 3/30 showed poikilocytosis with occasional red blood cell fragments but no other evidence of hemolysis.  Haptoglobin was normal.  Ferritin was 91, transferrin saturation was low, B12 was 495 and folate was 8.7. Likely combination of iron deficiency as well as inflammation/chronic disease.   - CTM       # Marijuana use   Uses daily medical marijuana at home.       # Concern for malnutrition   - Nutrition consult     Clinically Significant Risk Factors        # Hypokalemia: Lowest K = 2.7 mmol/L in last 2 days, will replace as needed       # Hypoalbuminemia: Lowest albumin = 2.9 g/dL at 4/11/2023  7:07 AM, will monitor as appropriate   # Thrombocytopenia: Lowest platelets = 97 in last 2 days, will monitor for bleeding         # DMII: A1C = 9.5 % (Ref range: <5.7 %) within past 6 months    # Severe Malnutrition: based on nutrition assessment      The patient's care was discussed with the Attending Physician, Dr. Chris Burnett.    Linda Azul MD  Medicine Service, [facility] TEAM [address] [place] [address]  Securely message with [employer] (more info)  Text page via [employer] Paging/Directory   See signed in provider for up to date coverage information  ______________________________________________________________________    Interval History    - RN notes reviewed  - Patient refusing PO medications  - Oriented to person, place, appears slightly improved with mental status today compared to yesterday  - No acute distress  - Denies pain     Physical Exam   Vital Signs: Temp: 98.1  F (36.7  C) Temp src: Oral BP: (!) 148/96  Pulse: 67   Resp: 16 SpO2: 100 % O2 Device: None (Room air)    Weight: 153 lbs 3.52 oz     General Appearance: Eyes open, tracking, answers yes/no questions, no distress  Eyes: Pupils moderate size, no nystagmus, EOMI  HEENT: Normal sclera, able to move neck FROM  Respiratory: Breathing comfortably on RA, CTAB  Cardiovascular: RRR, no murmurs  GI: BS+. Soft, nontender, nondistended. No guarding or rebound tenderness.   Skin: Superficial, open wounds on the posterior buttocks midline  Musculoskeletal: No muscle wasting  Neuro:  strength intact, ankle strength intact, and sensation intact, EOMI  Psychiatric: Oriented to time, person, place, and situation     Data     I have personally reviewed the following data over the past 24 hrs:    12.8 (H)  \   7.5 (L)   / 97 (L)     133 (L) 98 7.8 /  211 (H)   2.8 (L) 23 0.76 \       ALT: 136 (H) AST: 46 AP: 297 (H) TBILI: 0.7   ALB: 2.9 (L) TOT PROTEIN: 4.7 (L) LIPASE: N/A

## 2023-04-11 NOTE — PROGRESS NOTES
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 [facility], he developed new onset double vision and severe headache which prompted imaging that showed a large mass invading the cavernous sinuses and impinging on the cranial nerves. He is transferred to [facility] for this pituitary adenoma with plans for possible surgical intervention.     Areas Assessed:      Areas visualized during today's visit: Sacrum/coccyx and Lower extremities     Wound location: Buttocks, Sacrum and Groin    4/4    Last photo: 4/11/23  Wound due to: Incontinence Associated Dermatitis (IAD)  Wound history/plan of care: incontinent of stool and urine  Wound base: Groin 100 % blanchable  and epidermis, buttocks mostly intact with scattered open areas 100 % dermis     Palpation of the wound bed: normal      Drainage: scant     Description of drainage: serous     Measurements (length x width x depth, in cm): scattered areas none measureing larger than 0.3  x 0.3  x  0.1 cm      Tunneling: N/A     Undermining: N/A  Periwound skin: Intact      Color:   groin has areas of hypopigmentation, buttock has areas of hyperpigmenting.       Temperature: normal   Odor: none  Pain: mild, tender  Pain interventions prior to dressing change: slow and gentle cares   Treatment goal: Heal  and Protection  STATUS: unchanged  Supplies ordered: at bedside and supplies stored on unit     Wound location: BLE    Left foot 4/4/23    Right lateral leg  4/4/    Right Lateral Leg 4/11    Right anterior leg 4/4    Last photo: 4/11/23  Wound due to: Arterial Ulcer and Unknown Etiology  Wound history/plan of care: Presented to the hospital earlier this year for lower leg wound infection. Per nursing patient has been intermittently refusing dressing changes.   Wound base: Right leg wounds 5 % granulation tissue, 95 % non-granular tissue Left foot 100 % yellow fibrin     Palpation of the wound bed: normal      Drainage: moderate     Description of drainage: cloudy     Measurements (length x width x depth, in cm):Right anterior leg 3  x 2  x  0.3 cm Right Lateral 7 x 3 x 0.3 and 2 x 0.4 x 0.3 Left foot 2 x 2 x 0.3     Tunneling: N/A     Undermining: Left foot 1 cm 2 - 6 o'clock  Periwound skin: Intact and Dry/scaly      Color: normal and consistent with surrounding tissue      Temperature: normal   Odor: mild  Pain: denies  and facial expression of distress, sharp  Pain interventions prior to dressing change: patient tolerated well  Treatment goal: Heal  and Infection control/prevention  STATUS: stable  Supplies ordered: ordered aquacel    Treatment Plan:     Buttock and perineal area wound(s): BID and PRN    Cleanse the area with Merary cleanse and protect, very gently with soft cloth.    Apply ostomy powder (#0539) on all open and denuded skin.    Apply thin layer of critic aid paste on top of it.    With repeat application, do not scrub the paste, only remove soiled paste and reapply.    If complete removal of paste is necessary use baby oil/mineral oil (#301142) and soft wash cloth.    Ensure pt has Jono-cushion (#050121) while sitting up in the chair.    Use only one Covidien pad in between mattress and pt. No brief while in bed.    Bilateral leg wounds Change dressing every other day.  Cleanse wound bed with normal saline and pat dry.  Cut a piece of PolyMem roll (#009351) to fit into the wound bed and place it with the lettering facing up. Ensure to cover entire wound  bed.  Cover with Mepilex border.    Orders: Written    RECOMMEND PRIMARY TEAM ORDER: Merary antifungal to Groin  Education provided: plan of care and Infection prevention   Discussed plan of care with: Nurse  [facility] nurse follow-up plan: weekly  Notify WO if wound(s) deteriorate.  Nursing to notify the Provider(s) and re-consult the [facility] Nurse if new skin concern.    DATA:     Current support surface: Standard  Standard gel/foam mattress (IsoFlex, Atmos air, etc)  Containment of urine/stool: Incontinence Protocol, Incontinent pad in bed and Primofit external catheter  BMI: Body mass index is 23.48 kg/m .   Active diet order: Orders Placed This Encounter      Regular Diet Adult     Output: I/O last 3 completed shifts:  In: 250 [P.O.:240; I.V.:10]  Out: 1700 [Urine:1700]     Labs:   Recent Labs   Lab 04/11/23  0954 04/11/23  0707 04/06/23  0626 04/05/23  0611   ALBUMIN  --  2.9*   < >  --    HGB 8.1* 7.5*   < > 8.9*   INR 0.99  --   --   --    WBC 13.0* 12.8*   < > 15.5*   A1C  --   --   --  9.5*    < > = values in this interval not displayed.     Pressure injury risk assessment:   Sensory Perception: 3-->slightly limited  Moisture: 3-->occasionally moist  Activity: 2-->chairfast  Mobility: 3-->slightly limited  Nutrition: 3-->adequate  Friction and Shear: 2-->potential problem  Manan Score: 16    Veronica Gunter RN CWOCN  Pager no longer is use, please contact through [employer] group: [facility] Nurse  Dept. Office Number: [phone]

## 2023-04-11 NOTE — PROGRESS NOTES
FERNANDAW spoke with pt's sister, Pilar Rhodes, about the potential list of TCU's. MATTHEW and Pilar worked through the Medicare list together and Pilar decided to start off with 5 options. After rounds, it was decided by Pt's team that referrals will be postponed.     Options listed below:    [place] [address] [address] [phone]    [facility]  [address] [place] [address]  [id number]  P: [phone]  F: [phone]    [address] [address] [phone]    [facility]  [address] [place] [phone]    [address] [address] [phone]  F:  [phone]      Celina CASTRO

## 2023-04-11 NOTE — PROGRESS NOTES
[facility] [place]   04/11/2023  Neurosurgery Progress Note:    Assessment:  Gustavo Faria is a 58 year old male with a PMH of DMII, HFrEF, BLE open wounds, history of serratia bacteremia in December 2022,  pituitary ACTH secreting macroadenoma s/p EEA for resection on 5/2021 and 7/2021 in [place], with baseline right CN 3 palsy, who was admitted at the [facility] on 3/23. He developed sudden headache and vision changes on 3/25, with MRI brain on 3/28, which demonstrated enlargement of known pituitary adenoma, with necrosis extending laterally beyond the left cavernous sinus, concerning for compression of cranial nerves at cavernous sinus. 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      Given that he is past the acute period of apoplexy, there is no indication for emergent decompression. He would benefit for redo EEA for resection of adenoma given its symptomatic nature. However, he would need to be medically optimized from the metabolic and infectious standpoint prior to surgery. He would also benefit from a multidisciplinary approach with recommendations from ophthalmology, endocrinology and radiation oncology regarding options for treatment.     Plan:  -Patient's wife is the legal decision maker-consent obtained for the surgery-uploaded in chart  - Okay to discharge from neurosurgery perspective-if deemed medically stable by the primary team  - Please stop aspirin 7 days before surgery-surgery planned for 4/27/2023 with ENT colleagues for resection of pituitary adenoma  - Neurosurgery will continue to follow   -----------------------------------  Oliver Duong  Neurosurgery PGY2    Please contact neurosurgery resident on call with questions.    Dial * * *[phone], enter [phone] when prompted.    Interval History: No acute events overnight that were brought  to neurosurgery's attention.    Objective:   Temp:  [98.1  F (36.7  C)-98.2  F (36.8  C)] 98.1  F (36.7  C)  Pulse:  [62-91] 67  Resp:  [16-18] 16  BP: (119-153)/() 148/96  SpO2:  [98 %-100 %] 100 %  I/O last 3 completed shifts:  In: 480 [P.O.:480]  Out: 1600 [Urine:1600]      NEUROLOGIC:  -- Opens eyes to voice, following commands, oriented x1  -- Able to name objects  -- Speech limited, perseverating speech, minimal spontaneous speech  Cranial Nerves:  -- visual fields full to confrontation although with limited participation, PERRL anisocoric L>R and sluggish, left CN III paresis and CN VI palsy, restricted ROM in right EOM with no apparent asymmetry   -- face symmetrical, tongue midline  -- sensory V1-V3 intact bilaterally  -- palate elevates symmetrically, uvula midline  -- hearing grossly intact bilat     Motor:  Limited participation - antigravity x4 extremities     Sensory:  intact to LT x 4 extremities      Reflexes:       Bi Tri BR Omi Pat Ach Bab     C5-6 C7-8 C6 UMN L2-4 S1 UMN   R 2+ 2+ 2+ Norm 2+ 2+ Norm   L 2+ 2+ 2+ Norm 2+ 2+ Norm      Gait: Deferred.     LABS:  Recent Labs   Lab 04/11/23  0911 04/11/23  0707 04/11/23  0123 04/11/23  0018 04/10/23  1842 04/10/23  1738   NA  --  143  --  133*  --  143   POTASSIUM  --  2.7*  --  2.8*  --  3.5   CHLORIDE  --  106  --  98  --  106   CO2  --  25  --  23  --  23   ANIONGAP  --  12  --  12  --  14   * 224* 211* 509*   < > 216*   BUN  --  7.4  --  7.8  --  9.3   CR  --  0.74  --  0.76  --  0.85   KORIN  --  8.2*  --  8.0*  --  8.5*    < > = values in this interval not displayed.       Recent Labs   Lab 04/11/23  0707   WBC 12.8*   RBC 2.95*   HGB 7.5*   HCT 25.7*   MCV 87   MCH 25.4*   MCHC 29.2*   RDW 25.0*   PLT 97*       IMAGING:  Recent Results (from the past 24 hour(s))   CT Abdomen Pelvis w Contrast    Narrative    Examination: CT ABDOMEN PELVIS W CONTRAST, 4/10/2023 3:38 PM    Technique:  Helical CT images from the lung bases through  the  symphysis pubis were obtained with contrast.  Coronal reformatted  images were generated at a workstation for further assessment.    Contrast:  85 mL Isovue-370 IV    Comparison: None.    History: assess for pyelonephritis    Findings:    Abdomen and pelvis:   Liver: Diffuse hypoattenuation of the liver relative to the spleen  consistent with hepatic steatosis. Indeterminant hypoattenuating  lesion in the anterior right hepatic lobe measuring 9 x 11 mm (series  4, image 101).  Gallbladder: Partially decompressed. No gallstones. No evidence of  acute cholecystitis. No intrahepatic or extra hepatic bowel duct  dilation.  Spleen: Diminutive in size.  Pancreas: No suspicious pancreatic lesions. The pancreatic duct is not  dilated.  Adrenal glands: No adrenal nodules.  Urinary system: Mild nonspecific perinephric stranding. Normal and  symmetric nephrographic enhancement. Simple cyst in the inferior pole  of the left kidney and additional subcentimeter hypodensities in both  kidneys, likely representing benign renal cysts. No hydronephrosis,  hydroureter, or obstructing renal stones. Urinary bladder is  unremarkable.  Reproductive organs: Unremarkable.  Bowel: No abnormally dilated loops of large or small bowel. No  abnormal bowel wall thickening or enhancement. The appendix is  unremarkable.  Lymph nodes: No retroperitoneal, mesenteric, or pelvic  lymphadenopathy.  Fluid: No free fluid within the abdomen. Mild diffuse mesenteric  haziness.  Vessels: Incidental dissection flap in the dilated celiac trunk  measuring up to 16 mm in diameter. Focal dissection flap ion the  superior mesenteric artery (series 4, image [id number]) and extending  into a jejunal branch with small pseudoaneurysm measuring 7 mm in  diameter.     17 mm pseudoaneurysm involving the left gastric artery (series 4,  image 13) and additional pseudoaneurysm involving the GDA measuring up  to 22 mm at the level of the pancreatic head and  additional  pseudoaneurysm involving a branch of the GDA measuring 13 mm.  Pseudoaneurysm at the level of the uncinate process likely arising  from the posterior pancreaticoduodenal artery measuring 15 mm.     Ectatic dilation of the left common iliac artery measuring 18 mm in  diameter.     Lung bases: Small left and trace right pleural effusions. Smooth  interlobular septal thickening with mosaic attenuation and groundglass  opacities at the lung bases likely representing pulmonary edema.    Bones and soft tissues: No suspicious osseous lesions. Age  indeterminate likely chronic appearing superior end plate compression  fracture deformities at T9 and T11-L2. Chronic anterior left-sided rib  fractures.    No subcutaneous abscess. Anasarca. Small fat-containing right inguinal  hernia and umbilical hernia.      Impression    Impression:   1.  No CT evidence for pyelonephritis. No perirenal or intrarenal  abscess.  2.  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 No evidence for bowel ischemia.  3.  Evidence for volume overload with mild pulmonary edema, small  left, and trace right pleural effusions, mesenteric edema, and  anasarca.  4. Indeterminant lesion in the right hepatic lobe measuring 11 mm.  This is a typical location for focal fatty infiltration, but other  etiologies for focal mass are in the differential. Correlation with  any outside abdominal imaging if available would be helpful, otherwise  consider further evaluation with nonemergent liver protocol MRI.   5. Ectatic dilation of the left common iliac artery measuring 18 mm.  6. Age indeterminate superior endplate compression fractures at T9 and  T11-L2.    I have personally reviewed the examination and initial interpretation  and I agree with the findings.    CASH MICHAUD MD         SYSTEM ID:  [id number]

## 2023-04-11 NOTE — PLAN OF CARE
Goal Outcome Evaluation:           Overall Patient Progress: no changeOverall Patient Progress: no change    Outcome Evaluation: see RD progress note

## 2023-04-11 NOTE — PLAN OF CARE
BP (!) 148/96 (BP Location: Left arm)   Pulse 67   Temp 98.1  F (36.7  C) (Oral)   Resp 16   Wt 69.5 kg (153 lb 3.5 oz)   SpO2 100%   BMI 24.73 kg/m        Assumed care from 2300 to 0730  Pt a&ox4 with intermittent confusion. Rated pain 0/10 with no s/sx of pain/discomfort noted. VSS on RA. Lungs clear in all lobes with SOB/CP noted. Apical pulse regular. though SBP elevated and order for prn hydralazine if >150. Bowel sounds present x4 but no BM this shift. Voiding adequately to urinal. K+ was replaced and due for recheck this AM. [facility]

## 2023-04-12 ENCOUNTER — APPOINTMENT (OUTPATIENT)
Dept: PHYSICAL THERAPY | Facility: CLINIC | Age: 58
DRG: 614 | End: 2023-04-12
Attending: STUDENT IN AN ORGANIZED HEALTH CARE EDUCATION/TRAINING PROGRAM
Payer: COMMERCIAL

## 2023-04-12 LAB
ACANTHOCYTES BLD QL SMEAR: ABNORMAL
ALBUMIN SERPL BCG-MCNC: 3 G/DL (ref 3.5–5.2)
ALP SERPL-CCNC: 305 U/L (ref 40–129)
ALT SERPL W P-5'-P-CCNC: 133 U/L (ref 10–50)
ANION GAP SERPL CALCULATED.3IONS-SCNC: 10 MMOL/L (ref 7–15)
ANION GAP SERPL CALCULATED.3IONS-SCNC: 11 MMOL/L (ref 7–15)
ANION GAP SERPL CALCULATED.3IONS-SCNC: 14 MMOL/L (ref 7–15)
ANION GAP SERPL CALCULATED.3IONS-SCNC: 8 MMOL/L (ref 7–15)
AST SERPL W P-5'-P-CCNC: 56 U/L (ref 10–50)
ATRIAL RATE - MUSE: 100 BPM
BACTERIA UR CULT: ABNORMAL
BACTERIA UR CULT: ABNORMAL
BASOPHILS # BLD AUTO: 0 10E3/UL (ref 0–0.2)
BASOPHILS NFR BLD AUTO: 0 %
BILIRUB DIRECT SERPL-MCNC: <0.2 MG/DL (ref 0–0.3)
BILIRUB SERPL-MCNC: 0.5 MG/DL
BUN SERPL-MCNC: 6.7 MG/DL (ref 6–20)
BUN SERPL-MCNC: 6.7 MG/DL (ref 6–20)
BUN SERPL-MCNC: 7 MG/DL (ref 6–20)
BUN SERPL-MCNC: 7.9 MG/DL (ref 6–20)
CALCIUM SERPL-MCNC: 8.6 MG/DL (ref 8.6–10)
CALCIUM SERPL-MCNC: 8.7 MG/DL (ref 8.6–10)
CALCIUM SERPL-MCNC: 8.8 MG/DL (ref 8.6–10)
CALCIUM SERPL-MCNC: 9.1 MG/DL (ref 8.6–10)
CHLORIDE SERPL-SCNC: 104 MMOL/L (ref 98–107)
CHLORIDE SERPL-SCNC: 104 MMOL/L (ref 98–107)
CHLORIDE SERPL-SCNC: 105 MMOL/L (ref 98–107)
CHLORIDE SERPL-SCNC: 105 MMOL/L (ref 98–107)
CREAT SERPL-MCNC: 0.74 MG/DL (ref 0.67–1.17)
CREAT SERPL-MCNC: 0.74 MG/DL (ref 0.67–1.17)
CREAT SERPL-MCNC: 0.76 MG/DL (ref 0.67–1.17)
CREAT SERPL-MCNC: 0.77 MG/DL (ref 0.67–1.17)
DEPRECATED HCO3 PLAS-SCNC: 24 MMOL/L (ref 22–29)
DEPRECATED HCO3 PLAS-SCNC: 24 MMOL/L (ref 22–29)
DEPRECATED HCO3 PLAS-SCNC: 25 MMOL/L (ref 22–29)
DEPRECATED HCO3 PLAS-SCNC: 26 MMOL/L (ref 22–29)
DIASTOLIC BLOOD PRESSURE - MUSE: NORMAL MMHG
EOSINOPHIL # BLD AUTO: 0 10E3/UL (ref 0–0.7)
EOSINOPHIL NFR BLD AUTO: 0 %
ERYTHROCYTE [DISTWIDTH] IN BLOOD BY AUTOMATED COUNT: 25 % (ref 10–15)
FRAGMENTS BLD QL SMEAR: SLIGHT
GFR SERPL CREATININE-BSD FRML MDRD: >90 ML/MIN/1.73M2
GLUCOSE BLDC GLUCOMTR-MCNC: 141 MG/DL (ref 70–99)
GLUCOSE BLDC GLUCOMTR-MCNC: 153 MG/DL (ref 70–99)
GLUCOSE BLDC GLUCOMTR-MCNC: 212 MG/DL (ref 70–99)
GLUCOSE BLDC GLUCOMTR-MCNC: 227 MG/DL (ref 70–99)
GLUCOSE BLDC GLUCOMTR-MCNC: 257 MG/DL (ref 70–99)
GLUCOSE BLDC GLUCOMTR-MCNC: 327 MG/DL (ref 70–99)
GLUCOSE SERPL-MCNC: 134 MG/DL (ref 70–99)
GLUCOSE SERPL-MCNC: 212 MG/DL (ref 70–99)
GLUCOSE SERPL-MCNC: 223 MG/DL (ref 70–99)
GLUCOSE SERPL-MCNC: 272 MG/DL (ref 70–99)
HCT VFR BLD AUTO: 24.6 % (ref 40–53)
HGB BLD-MCNC: 7.5 G/DL (ref 13.3–17.7)
IMM GRANULOCYTES # BLD: 0.2 10E3/UL
IMM GRANULOCYTES NFR BLD: 2 %
INTERPRETATION ECG - MUSE: NORMAL
LACTATE SERPL-SCNC: 2.4 MMOL/L (ref 0.7–2)
LYMPHOCYTES # BLD AUTO: 0.3 10E3/UL (ref 0.8–5.3)
LYMPHOCYTES NFR BLD AUTO: 2 %
MAGNESIUM SERPL-MCNC: 2.1 MG/DL (ref 1.7–2.3)
MAGNESIUM SERPL-MCNC: 2.2 MG/DL (ref 1.7–2.3)
MCH RBC QN AUTO: 26.3 PG (ref 26.5–33)
MCHC RBC AUTO-ENTMCNC: 30.5 G/DL (ref 31.5–36.5)
MCV RBC AUTO: 86 FL (ref 78–100)
MONOCYTES # BLD AUTO: 0.2 10E3/UL (ref 0–1.3)
MONOCYTES NFR BLD AUTO: 1 %
NEUTROPHILS # BLD AUTO: 11.8 10E3/UL (ref 1.6–8.3)
NEUTROPHILS NFR BLD AUTO: 95 %
NRBC # BLD AUTO: 0.2 10E3/UL
NRBC BLD AUTO-RTO: 1 /100
P AXIS - MUSE: 19 DEGREES
PATH REPORT.COMMENTS IMP SPEC: NORMAL
PATH REPORT.COMMENTS IMP SPEC: NORMAL
PATH REPORT.FINAL DX SPEC: NORMAL
PATH REPORT.MICROSCOPIC SPEC OTHER STN: NORMAL
PATH REPORT.MICROSCOPIC SPEC OTHER STN: NORMAL
PATH REPORT.RELEVANT HX SPEC: NORMAL
PHOSPHATE SERPL-MCNC: 2 MG/DL (ref 2.5–4.5)
PLAT MORPH BLD: ABNORMAL
PLATELET # BLD AUTO: 95 10E3/UL (ref 150–450)
POLYCHROMASIA BLD QL SMEAR: SLIGHT
POTASSIUM SERPL-SCNC: 3.5 MMOL/L (ref 3.4–5.3)
POTASSIUM SERPL-SCNC: 3.6 MMOL/L (ref 3.4–5.3)
POTASSIUM SERPL-SCNC: 3.7 MMOL/L (ref 3.4–5.3)
POTASSIUM SERPL-SCNC: 4.2 MMOL/L (ref 3.4–5.3)
POTASSIUM SERPL-SCNC: 4.3 MMOL/L (ref 3.4–5.3)
PR INTERVAL - MUSE: 146 MS
PROT SERPL-MCNC: 4.6 G/DL (ref 6.4–8.3)
QRS DURATION - MUSE: 150 MS
QT - MUSE: 420 MS
QTC - MUSE: 541 MS
R AXIS - MUSE: -30 DEGREES
RBC # BLD AUTO: 2.85 10E6/UL (ref 4.4–5.9)
RBC MORPH BLD: ABNORMAL
SODIUM SERPL-SCNC: 139 MMOL/L (ref 136–145)
SODIUM SERPL-SCNC: 139 MMOL/L (ref 136–145)
SODIUM SERPL-SCNC: 140 MMOL/L (ref 136–145)
SODIUM SERPL-SCNC: 142 MMOL/L (ref 136–145)
SYSTOLIC BLOOD PRESSURE - MUSE: NORMAL MMHG
T AXIS - MUSE: -14 DEGREES
TARGETS BLD QL SMEAR: SLIGHT
VENTRICULAR RATE- MUSE: 100 BPM
WBC # BLD AUTO: 12.4 10E3/UL (ref 4–11)

## 2023-04-12 PROCEDURE — 250N000011 HC RX IP 250 OP 636: Performed by: INTERNAL MEDICINE

## 2023-04-12 PROCEDURE — 99233 SBSQ HOSP IP/OBS HIGH 50: CPT | Mod: GC | Performed by: STUDENT IN AN ORGANIZED HEALTH CARE EDUCATION/TRAINING PROGRAM

## 2023-04-12 PROCEDURE — 36415 COLL VENOUS BLD VENIPUNCTURE: CPT

## 2023-04-12 PROCEDURE — 258N000003 HC RX IP 258 OP 636

## 2023-04-12 PROCEDURE — 250N000013 HC RX MED GY IP 250 OP 250 PS 637

## 2023-04-12 PROCEDURE — 250N000011 HC RX IP 250 OP 636

## 2023-04-12 PROCEDURE — 97116 GAIT TRAINING THERAPY: CPT | Mod: GP

## 2023-04-12 PROCEDURE — 85025 COMPLETE CBC W/AUTO DIFF WBC: CPT | Performed by: INTERNAL MEDICINE

## 2023-04-12 PROCEDURE — 97530 THERAPEUTIC ACTIVITIES: CPT | Mod: GP

## 2023-04-12 PROCEDURE — 84100 ASSAY OF PHOSPHORUS: CPT | Performed by: INTERNAL MEDICINE

## 2023-04-12 PROCEDURE — 99233 SBSQ HOSP IP/OBS HIGH 50: CPT | Performed by: PSYCHIATRY & NEUROLOGY

## 2023-04-12 PROCEDURE — 83605 ASSAY OF LACTIC ACID: CPT

## 2023-04-12 PROCEDURE — 250N000013 HC RX MED GY IP 250 OP 250 PS 637: Performed by: STUDENT IN AN ORGANIZED HEALTH CARE EDUCATION/TRAINING PROGRAM

## 2023-04-12 PROCEDURE — 93010 ELECTROCARDIOGRAM REPORT: CPT | Performed by: INTERNAL MEDICINE

## 2023-04-12 PROCEDURE — 250N000009 HC RX 250

## 2023-04-12 PROCEDURE — 82248 BILIRUBIN DIRECT: CPT | Performed by: INTERNAL MEDICINE

## 2023-04-12 PROCEDURE — 93005 ELECTROCARDIOGRAM TRACING: CPT

## 2023-04-12 PROCEDURE — 250N000013 HC RX MED GY IP 250 OP 250 PS 637: Performed by: INTERNAL MEDICINE

## 2023-04-12 PROCEDURE — 80053 COMPREHEN METABOLIC PANEL: CPT

## 2023-04-12 PROCEDURE — 99232 SBSQ HOSP IP/OBS MODERATE 35: CPT | Mod: GC | Performed by: INTERNAL MEDICINE

## 2023-04-12 PROCEDURE — 84132 ASSAY OF SERUM POTASSIUM: CPT

## 2023-04-12 PROCEDURE — 84132 ASSAY OF SERUM POTASSIUM: CPT | Performed by: INTERNAL MEDICINE

## 2023-04-12 PROCEDURE — 83735 ASSAY OF MAGNESIUM: CPT

## 2023-04-12 PROCEDURE — 120N000002 HC R&B MED SURG/OB UMMC

## 2023-04-12 PROCEDURE — 36592 COLLECT BLOOD FROM PICC: CPT

## 2023-04-12 PROCEDURE — 36592 COLLECT BLOOD FROM PICC: CPT | Performed by: INTERNAL MEDICINE

## 2023-04-12 RX ADMIN — MICONAZOLE NITRATE: 20 CREAM TOPICAL at 22:42

## 2023-04-12 RX ADMIN — Medication 5 ML: at 02:15

## 2023-04-12 RX ADMIN — SPIRONOLACTONE 100 MG: 25 TABLET, FILM COATED ORAL at 08:52

## 2023-04-12 RX ADMIN — CARVEDILOL 12.5 MG: 12.5 TABLET, FILM COATED ORAL at 08:47

## 2023-04-12 RX ADMIN — ASPIRIN 81 MG: 81 TABLET ORAL at 08:45

## 2023-04-12 RX ADMIN — FLUCONAZOLE 400 MG: 200 TABLET ORAL at 08:42

## 2023-04-12 RX ADMIN — CARVEDILOL 12.5 MG: 12.5 TABLET, FILM COATED ORAL at 19:32

## 2023-04-12 RX ADMIN — SODIUM PHOSPHATE, MONOBASIC, MONOHYDRATE AND SODIUM PHOSPHATE, DIBASIC, ANHYDROUS 9 MMOL: 276; 142 INJECTION, SOLUTION INTRAVENOUS at 10:30

## 2023-04-12 RX ADMIN — Medication 10 ML: at 15:43

## 2023-04-12 RX ADMIN — DEXTROSE MONOHYDRATE: 50 INJECTION, SOLUTION INTRAVENOUS at 05:37

## 2023-04-12 RX ADMIN — LISINOPRIL 20 MG: 10 TABLET ORAL at 10:30

## 2023-04-12 RX ADMIN — TESTOSTERONE 25 MG OF TESTOSTERONE: 50 GEL TOPICAL at 09:55

## 2023-04-12 RX ADMIN — LEVOTHYROXINE SODIUM 100 MCG: 100 TABLET ORAL at 08:50

## 2023-04-12 RX ADMIN — MICONAZOLE NITRATE: 20 CREAM TOPICAL at 22:39

## 2023-04-12 RX ADMIN — HYDRALAZINE HYDROCHLORIDE 5 MG: 20 INJECTION INTRAMUSCULAR; INTRAVENOUS at 02:09

## 2023-04-12 ASSESSMENT — ACTIVITIES OF DAILY LIVING (ADL)
ADLS_ACUITY_SCORE: 45
ADLS_ACUITY_SCORE: 43
ADLS_ACUITY_SCORE: 45
ADLS_ACUITY_SCORE: 45
ADLS_ACUITY_SCORE: 43
ADLS_ACUITY_SCORE: 45
ADLS_ACUITY_SCORE: 45
ADLS_ACUITY_SCORE: 43
ADLS_ACUITY_SCORE: 45

## 2023-04-12 NOTE — CONSULTS
"SPIRITUAL HEALTH SERVICES  [facility] ([place]) 5A  ON-CALL VISIT     REFERRAL SOURCE: Consult      Introduced myself as  and attempted to engage in conversation with pt.      Pt has difficulty tracking a conversation and appears to be confused and unable to         access logic (i.e. steps one needs to take in order to achieve something.)      Pt stated these phrases \"I'm in prison. They won't let me out.         They denied me physical therapy. They say I can't walk.\"      Pt needed help to use his nurse button to call for help when needed.         I showed him how to use the call light, but he could not repeat the action.      Pt is in emotional distress, staff are having difficulties communicating with pt and         there isn't a clear path forward for what pt might be able to achieve,         or if pt can even understand the part he plays in his possible healing.    PLAN:  remains available per pt/family/staff request.     Rev. Ginny Gonzalez MDiv, [facility]  Staff    Pager [phone]  * [facility] remains available 24/7 for emergent requests/referrals, either by having the switchboard page the on-call  or by entering an ASAP/STAT consult in Epic (this will also page the on-call ).*        "

## 2023-04-12 NOTE — CONSULTS
"          Psychiatry Consultation; Follow up              Reason for Consult, requesting source:    Difficulty in coping with hospitalization. Initially seen by Michael Jaramillo CNP while a [facility] 2/17/23  Requesting source: Chris Burnett    Labs and imaging reviewed, discussed with nursing and PT     Total time spent in chart review, patient interview and coordination of care; 55 min                 Interim history:    Gustavo Faria is a 58 year old male with type 2 diabetes, lower extremity wounds, and known ACTH secreting pituitary adenoma with Cushing's disease s/p partial resection 2021, and baseline right CN3 palsy. Who presented as a transfer from the [facility]. Originally admitted for wound care but then developed encephalopathy with headache, and inability to move his eyes. Repeat MRI showing enlarging soft tissue mass invading bilateral cavernous sinuses; from possible recurrent adenoma.     He is seen again today in psychiatric consultation in regards to difficulty coping with his hospitalization.  He started our conversation by saying \" I am a prisoner here, they won't let me out\".   He was complaining that the nursing staff with physical therapy would not get him out of bed to get a wheelchair to get out of the room.  However, I was informed that he is quite variable in terms of his interaction with nursing or physical therapy.  At this point he is an assist of two for standing but would like to be walking.  He mentioned that he was having some anxiety problems, occasionally feeling down and with some sleep difficulties but he made it fairly clear that he did not want any strong psychiatric medications.  During our conversation he at times seemed a bit off and his thought process would become tangential.  Due to some irritability I could not pin him down to better check his cognitive status.  He has had some prolonged QTcs and 3 days ago he was started on fluconazole. Use of haldol is being " "avoided. I don't see any other drugs that would be a problem with QTc.         Current Medications:       aspirin  81 mg Oral Daily     carvedilol  12.5 mg Oral BID w/meals     [Held by provider] empagliflozin  10 mg Oral Daily     fluconazole  400 mg Oral Daily     [Held by provider] furosemide  40 mg Oral Daily     heparin lock flush  5-20 mL Intracatheter Q24H     insulin aspart   Subcutaneous TID AC     insulin aspart  1-7 Units Subcutaneous TID AC     insulin aspart  1-5 Units Subcutaneous At Bedtime     insulin glargine  10 Units Subcutaneous QAM AC     levothyroxine  100 mcg Oral Daily     lisinopril  20 mg Oral Daily     miconazole   Topical BID     miconazole with skin protectant   Topical BID     multivitamin w/minerals  1 tablet Oral Daily     polyethylene glycol  17 g Oral Daily     sodium chloride (PF)  10-40 mL Intracatheter Q8H     sodium chloride (PF)  3 mL Intracatheter Q8H     sodium phosphate  9 mmol Intravenous Once     spironolactone  100 mg Oral Daily     testosterone  25 mg of testosterone Transdermal Daily     vitamin D2  50,000 Units Oral Q7 Days            MSE:   Appearance: awake, alert and adequately groomed, eye problem noted  Attitude:  mostly cooperative  Eye Contact:  fair  Mood:  \"fair\"  Affect:  : irritable   Speech:  clear, coherent  Psychomotor Behavior:  no evidence of tardive dyskinesia, dystonia, or tics  Muscle strength and tone: atrophy   Thought Process:  circumstantial  Associations:  no loose associations  Thought Content:  no evidence of suicidal ideation or homicidal ideation and no evidence of psychotic thought, but he may be a bit paranoid   Insight:  fair  Judgement:  limited  Oriented to: not formally assessed.   Attention Span and Concentration:  fair  Recent and Remote Memory:  not formally assessed (I didn't want to push him)     Vital signs:  Temp: 98.5  F (36.9  C) Temp src: Oral BP: 132/79 Pulse: 72   Resp: 17 SpO2: 97 % O2 Device: None (Room air)     Weight: " "66 kg (145 lb 8.1 oz)  Estimated body mass index is 23.48 kg/m  as calculated from the following:    Height as of 2/16/23: 1.676 m (5' 6\").    Weight as of this encounter: 66 kg (145 lb 8.1 oz).    Qtc: 482 ms this morning vs 541 ms 4/11          DSM-5 Diagnosis:   Recent delirium   Unspecified anxiety disorder   unspecified neurocognitive disorder           Assessment:   There is a significant mismatch between what he is saying about his care vs what is actual happening. He does seem a bit paranoid, anxious and may have some cognitive impairment. However, today he was a bit too irritable to get much of a cognitive assessment.   He would like avoid psychiatric medications.   We discussed working with a psychologist, but he decided that a cal visit may be better.           Summary of Recommendations:   You should make every effort tomorrow to get him out of his room.   A cognitive assessment may be helpful, but also a bit difficult to do   I placed a spiritual health consult   If his behavior becomes more of a problem I would try Abilify 5 mg per day (may even improve QTc).     .Page me or re-consult psychiatry as needed (psychiatry is signing off).     Minor Brown M.D.   Consult liaison psychiatry   [facility]   Contact information available via [facility] Paging/Directory.  If I am not available, then [facility] intake ([phone]) should know who   Is on call          \"Much or all of the text in this note was generated through the use of Dragon Dictate voice to text software. Errors in spelling or words which appear to be out of contact are unintentional, may be present due having escaped editing\"           "

## 2023-04-12 NOTE — PLAN OF CARE
"Goal Outcome Evaluation:      Plan of Care Reviewed With: patient    Overall Patient Progress: no changeOverall Patient Progress: no change    Outcome Evaluation: Disoriented to time and situation. Vitals are stable on room air except HTN. Denied pain mist of shift until pt was moved back into bed from chair. Pt then complained of some back pain that resolved within a few minutes on its own. Denies nausea, vomiting and shortness of breath. No BM this shift. Using urinal at bedside. Phosphrous was low and replaced, recheck in the AM. Bilateral dressings CDI. Pt frusterated this shift stating \" he feels like he is in snf or in alf\" \" we dont let him get up and walk.\" writer explained its for safety. He is not strong enough to walk by himself and needs to be evaluated by PT. Pt continues to refuse PT. Blood sugars have been elevated, sliding scale and carb coverage administered per MAR. Regular diet with fair appetite today.      "

## 2023-04-12 NOTE — PLAN OF CARE
Goal Outcome Evaluation:      Plan of Care Reviewed With: patient    Overall Patient Progress: no change    Outcome Evaluation: Disoriented to time and situation. VSS on room air ex htn. Hydralazine given 1x for SBP >150. Patient denies SOB or chest pain. Denies pain. Reggie counts started at midnight. K recheck at 2am was 4.2 next recheck is scheduled for 6am along with phos recheck. Dressings to BLE CDI. Patient is using urinal. Incontinent of BM 1x this AM. Cont. D5 @75ml/hr. Bed alarm for safety. Patient is using call light and being rounded on frequently. Continue with POC.

## 2023-04-12 NOTE — PROGRESS NOTES
[facility]    Progress Note - Medicine Service, MAROON TEAM 2       Date of Admission:  4/3/2023    Assessment & Plan   Gustavo Faria is a 58 year old male with recent Serratia bacteremia, HFrEF, prolonged QT, lower extremity wounds, DMII, hypothyroidism, low testosterone and known ACTH secreting pituitary adenoma w/resulting Cushing's disease s/p 2 partial resections in 2021 who [facility]. [facility], he developed new onset double vision and severe headache which prompted imaging that showed a large mass invading the cavernous sinuses and impinging on the cranial nerves. He is transferred to [facility] for this pituitary adenoma, concern for progression vs hemorrhage/apoplexy. Complicated by psychosis/metabolic encephalopathy secondary to Cushing's syndrome.    Updates today:  - Continue spironolactone 100 daily   - Stop the D5W  - Continue q6H BMP and Mag today   - Increase carb coverage  - Work with PT, appreciate their treatment  - Discussions with Endocrinology   - /Health Psychology consult   - Consider resuming PTA Lasix tomorrow AM     # Hypernatremia, DI vs cushing's, improving   Hypernatremia, likely secondary to Cushing's.     - Goal Na: 135-140  - Resume spironolactone, can go up to 400 mg daily (currently at 100 mg daily)    - Q6H BMP + Mg  - Strict I&Os  - Endocrine following    # Acute metabolic encephalopathy   # Pyruria  # Candiduria  # Possibly secondary to cushing's disease  Admission symptoms included disorientation, intermittently following directions. Repetitive words - perseverating on particular phrases. Sx started the 2-3 days prior to admission (which [facility] was [facility]). Vulnerable brain (multiple brain surgeries), enlarging pituitary mass. No seizure activity per EEG. Steroids removed and pt still encephalopathy. Sodium has been in normal  limits and patient remains altered. Worked up for infections - did reveal candiduria. Biggest concern for infectious vs cushing's.   - CT abd w/ contrast: no evidence of pyelonephritis  - Urine culture: candida   - Requested sensitivities  - Fluconazole 200 mg daily x14 days (started 4/10) - may need to adjust anti-fungals pending susceptibility   - Discontinued ceftriaxone as patient did not improve clinically with antibiotics  - No steroids  - Surgical plans for pituitary tumor as stated below  - Delirium precautions  - Sodium management as stated below   - PRN quetiapine if agitation that is not responsive to behavioral interventions     # ACTH secreting pituitary adenoma c/b type II DM, hypothyroidism and low testosterone s/p two partial resections in 2021   Patient noted double vision and severe headache beginning the evening of 3/25. CT imaging on 3/25 revealed a large sellar/suprasellar mass extending into the cavernous sinuses with no evidence of acute stroke. On 3/28, he underwent an MRI which confirmed the CT findings of a large mass invading the cavernous sinuses and impinging on the cranial nerves. Necrosis extending beyond left cavernous sinus. [facility]. Repeat MRI performed on 4/6/23: compared to 2021 MRI, lesions are smaller. Orbit MRI without optic nerve compression and no evidence of orbital infection.  - Neurosurgery following  - Plans for Neurosurgery: surgery 4/27/23  - Optimize medically prior to surgery  - Hold ASA 1 week prior to surgery  - Okay to discontinue Dexamethasone  - Ophthalmology following  - Endocrine following    # Hyperkalemia, resolved  High K in the setting of exogenous steroids. Suspect related to Cushing's syndrome, notably with the hypernatremia.  - Spironolactone as stated above   - RN-managed K     # Type II DM, exacerbated by Cushing's   A1c of 7.6% on 2/2023. Patient was on the following regimen at the [facility].    - Lantus 10 units  - MD Varela  scale insulin  - Carb coverage  - Continue PTA sitagliptin  - Restart PTA emagliflozin    - Hold PTA metformin     # Buttocks, sacrum and bilateral groin wounds   # RLE wound from puncture injury   # L dorsal foot wound from presumed trauma   # Soft tissue infection/abscess    # Hx serratia bacteremia in December 2022   For patient's lower extremity wounds and hx of Serratia bacteremia in December, he was [facility]. His antibiotics were broadened to Vanc and Zosyn and ultimately he was transitioned to ceftazidime on 3/23 with plan to complete course on 4/4/23. BCx negative and wound cx grew serratia marcescens at the [facility]. He has been on ceftazidime since. MRI of the right tib/fib on 3/23 was negative for osteomyelitis but did show two fluid collections draining sponateneously. [facility] and determined no intervention was needed as wounds were draining on their own.  - Stop ceftazidime (3/23 - 4/5)  - WOC following  - PT/OT when able to follow commands  - Pain: tylenol PRN, dilaudid 0.5 mg PRN for dressing changes and oxy 5 mg PRN - however given encephalopathy, use sparingly  - Re-image? MRI?     # HFmrEF, with global hypokinesis  # High burden of PVCs  # Prolonged QTc   # Elevated troponin, down-trended  Coronary angiogram on 2/27 shows normal coronaries. Troponin down-trending (88 -> 77). No chest pain. High burden of PVCs. Follow up ECHO with global hypokinesis, which is worse compared to February 2023 ECHO. EF is 45% (same compared to prior). Likely small vessel disease vs demand vs cardiomyopathy 2/2 ceftazidime. Low concern for acute ACS.   - Discontinued tele  - Electrolyte mgmt as above  - No need for diuresis at this time - given hyper-K  - Continue PTA coreg  - Lisinopril 10  - PTA empagliflozin   - Resume ASA (hold 1 week prior to surgery)  - Avoid QTc prolonging medications     # Central Hypothyroidism  - Continue PTA levothyroxine     # Hypogonadism   -  Continue PTA androgel (will need to bring in home medication)     # Chronic microcytic anemia   Hgb of 8.4 [facility]. Peripheral smear on 3/30 showed poikilocytosis with occasional red blood cell fragments but no other evidence of hemolysis.  Haptoglobin was normal.  Ferritin was 91, transferrin saturation was low, B12 was 495 and folate was 8.7. Likely combination of iron deficiency as well as inflammation/chronic disease.   - CTM        # Concern for malnutrition   - Nutrition following    Clinically Significant Risk Factors        # Hypokalemia: Lowest K = 2.7 mmol/L in last 2 days, will replace as needed       # Hypoalbuminemia: Lowest albumin = 2.9 g/dL at 4/11/2023  7:07 AM, will monitor as appropriate   # Thrombocytopenia: Lowest platelets = 95 in last 2 days, will monitor for bleeding         # DMII: A1C = 9.5 % (Ref range: <5.7 %) within past 6 months    # Severe Malnutrition: based on nutrition assessment      The patient's care was discussed with the Attending Physician, Dr. Chris Burnett.    Linda Azul MD  Medicine Service, [address]  Securely message with Vocera (more info)  Text page via [employer] Paging/Directory   See signed in provider for up to date coverage information  ______________________________________________________________________    Interval History    - RN notes reviewed  - Patient's mood is low today, requesting things to do because he is bored   - Repetitive phrases this morning   - Denies pain  - He was up in the chair eating breakfast    Physical Exam   Vital Signs: Temp: 98.9  F (37.2  C) Temp src: Oral BP: (!) 147/95 Pulse: 71   Resp: 18 SpO2: 100 % O2 Device: None (Room air)    Weight: 145 lbs 8.06 oz     General Appearance: Eyes open, tracking, answers yes/no questions, no distress, up in chair  Eyes: Pupils moderate size, no nystagmus, EOMI  HEENT: Normal sclera, able to move neck FROM  Respiratory:  Breathing comfortably on RA, CTAB  Cardiovascular: RRR, no murmurs  GI: BS+. Soft, nontender, nondistended. No guarding or rebound tenderness.   Skin: Superficial, open wounds on the posterior buttocks midline  Musculoskeletal: No muscle wasting  Neuro:  strength intact, ankle strength intact, and sensation intact, EOMI  Psychiatric: Oriented to time, person, place, and situation     Data     I have personally reviewed the following data over the past 24 hrs:    12.4 (H)  \   7.5 (L)   / 95 (L)     139 104 6.7 /  327 (H)   3.5 25 0.74 \       ALT: 133 (H) AST: 56 (H) AP: 305 (H) TBILI: 0.5   ALB: 3.0 (L) TOT PROTEIN: 4.6 (L) LIPASE: N/A       Procal: N/A CRP: N/A Lactic Acid: 2.4 (H)

## 2023-04-12 NOTE — PROGRESS NOTES
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 Who presented as a transfer from the [facility]. Originally admitted for wound care but then developed encephalopathy with headache, and inability to move his eyes. Repeat MRI showing enlarging soft tissue mass invading bilateral cavernous sinuses; from possible recurrent adenoma.     # Pituitary adenoma, recurrence with necrosis  # Compression of cavernous sinus  # CN 6 palsy, left eye  # CN 4 palsy, left eye  # Complete ptosis, left eye  -Per note review, patient had normal mentation while at [facility], then subsequently developed rapid encephalopathy.   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  -MRI brain & orbit (4/3/23): 4.3cm macroadenoma with infiltration of bilateral cavernous sinuses and the clivus. Bilateral cavernous internal carotids are patent. No optic nerve enhancement. No optic nerve atrophy.     4/5/23: patient more interactive today, but continues to be altered. Tracking face and able to identify left 6th nerve palsy. Remainder of exam is stable.  4/6/23: improve mental status. Able to follow-up commands, and track finger today. Exam is stable.   4/7/23: improved mental status. Able to participate in exam. VA 20/30 right eye and 20/20 left eye. No APD. Improved participation and now able to identify left hypertropia from likely 4th nerve palsy. MRI brain and orbit completed 4/6/23 evening. Consistent with bilateral compression of cavernous sinuses from  macroadenoma.   4/11/23: oriented to person, time, unable to state what city he was in. [facility] 20/25 in both eyes. No APD. Stable EOM exam.     PLAN:  -No acute intervention from ophthalmology perspective.   -Lack of APD, no disc edema. Left disc pallor related to prior optic atrophy.  -EOM limitations correlate with cavernous sinus infiltration of macroadenoma.   -Defer to neurosurgery regarding continuing dexamethasone.   -Planned surgery for 4/27/23, NSGY+ENT for pituitary adenoma.   -Ophthalmology will sign-off.  -Follow-up in clinic on 5/11/23 with Dr. Henry (OCT RNFL, visual field, strabismus exam)    It is our pleasure to participate in this patient's care and treatment. Please contact us with any further questions or concerns.    Discussed with Dr. Kate, PGY-5, neuro-ophthalmology fellow who agreed with this assessment and plan.     Thank you for entrusting us with your care  Alberto Carl MD, PGY2  Ophthalmology Resident  [facility]  Pager: [phone]    HISTORY OF PRESENTING ILLNESS:     Gustavo Faria is a 58 year old male with recent Serratia bacteremia, HFrEF, prolonged QT, lower extremity wounds, DMII, hypothyroidism, low testosterone and known ACTH secreting pituitary adenoma with resulting Cushing's disease s/p 2 partial resections in 2021.      He was hospitalized at the [facility] for not being able to care for is lower extremity wounds. While he was hospitalized, he developed new onset double vision and severe headache and was found to have large mass invading the cavernous sinuses. He was transferred to [facility] for evaluation by neurosurgery service and ophthalmology service. Plans are to surgically treat pituitary adenoma by neurosurgery this admission.    [facility] as a temporizing measure before transferring to the [facility]. Currently on 2mg dexamethasone BID.     Of note, patient has acute metabolic  "encephalopathy. Patient is perseverated on the phrase \"it hurts\". He will answer questions but ends every sentence with \"it hurts\" and won't open his eyes.      10+ review of systems were otherwise negative except for that which has been stated above.      OCULAR/MEDICAL/SURGICAL HISTORIES:     Past Ocular History:   Unable to attain. Patient encephalopathic.    Eye Drops:   None at this time    Pertinent Systemic Medications:   Dexamethasone, 2mg BID    Past Medical History:  History reviewed. No pertinent past medical history.  Type 2 diabetes mellitus, A1c 7.6%  Chronic microcytic anemia  HFrEF  HTN  RLE wound and Left foot wound; wound culture growing serratia marcescens.    Past Surgical History:   Past Surgical History:   Procedure Laterality Date     PICC DOUBLE LUMEN PLACEMENT Right 04/06/2023    40 cm total basilic       Family History:  Unable to attain. Patient encephalopathic.    Social History:  Unable to attain. Patient encephalopathic.    EXAMINATION:     Base Eye Exam     Visual Acuity (Snellen - Linear)       Right Left    Near cc 20/25 20/25          Pupils       Dark Light Shape React APD    Right 4 3.5 Round Brisk None    Left 4 3.5 Round Brisk None          Extraocular Movement       Right Left     -- 0 --   0  0   -- 0 --    -- -1 -4   --  -4   -1 -1 -4               Slit Lamp and Fundus Exam     Slit Lamp Exam       Right Left    Lids/Lashes Normal ptosis    Conjunctiva/Sclera White and quiet White and quiet    Cornea 360 arcus 360 arcus    Anterior Chamber Deep and quiet Deep and quiet    Iris Round and reactive Round and reactive    Lens Clear Clear    Anterior Vitreous Normal Normal          Fundus Exam       Right Left    Disc pink rim diffuse pallor    C/D Ratio 0.7 0.7                Labs/Studies/Imaging Performed  MRI Brain 3/28/23:  Predominantly T1 Isib intense, T2 isointense, heterogeneously enhancing soft tissue occupies the majority of the postsurgical sella, invades the bilateral " cavernous sinuses, extends to the adjacent portions of bilateral Meckel's cave's, invades the superior portion of the clivus, erodes the dorsum sella, and projects focally into the posterior portion of the postsurgical right sphenoid sinus.  The soft tissue measures approximately 2.7 cm craniocaudal by 2.6 cm anterior-posterior by 4.1 cm transverse.  This is consistent with residual or recurrent adenoma in this patient with a reported history of prior surgical debulking of known pituitary adenoma.  Although there is circumferential encasement of the bilateral cavernous internal carotid arteries, the corresponding arterial flow voids are preserved, without appreciable narrowing.  There is slight rightward deviation of the pituitary infundibulum.  Residual pituitary parenchyma is not well delineated, although a small component is likely present along the right superior paramedian margin of the presumed adenoma.  There is no mass effect on the optic chiasm, which demonstrates normal T2 weighted signal intensity.  An osseous defect in the right anterior floor of the sella, in keeping with the patient's previous transplant surgery, has demonstrated.  Mild focal FLAIR hyperintensities present in the posterior subcortical white matter of the right inferior frontal gyrus.  These imaging findings are most suggestive of localized benign vascular enhancement such as an incidental capillary damage ectasia.  No intracranial mass or abnormal enhancement is identified elsewhere.    MRI brain and orbit, 4/6/23:   Impression:   1.  4.3 cm heterogeneously enhancing pituitary mass with infiltration  of bilateral cavernous sinuses and the clivus. Findings are similar  when compared compared to prior exam 3/28/2023 suggestive for a  macroadenoma. Compared with an older MRI from 2021, the lesion appears  more heterogeneous and smaller. Previous compression of the optic  chiasm is no longer present.  2.  Patchy T2 hyperintense enhancing  lesion with scattered  susceptibility artifacts in the right frontal subcortical white  matter. A similar tiny smaller lesion is also present in the left  putamen. Retrospectively this lesion was present back in 2021.  Constellation of findings are suggestive of a benign lesion such as  cavernoma or telangiectasia.  3.   MRA demonstrates no aneurysm or stenosis of the major  intracranial arteries. Bilateral cavernous internal carotid arteries  are patent despite encasement by the above-mentioned lesion.  4.  No abnormal optic nerve enhancement or optic nerve atrophy.         Alberto Carl MD  Resident Physician, PGY2  Department of Ophthalmology  04/04/23 3:11 PM

## 2023-04-12 NOTE — PROGRESS NOTES
[facility], [place]   04/12/2023  Neurosurgery Progress Note:    Assessment:  Gustavo Faria is a 58 year old male with a PMH of DMII, HFrEF, BLE open wounds, history of serratia bacteremia in December 2022,  pituitary ACTH secreting macroadenoma s/p EEA for resection on 5/2021 and 7/2021 in [place], with baseline right CN 3 palsy, who was admitted at the [facility] on 3/23. He developed sudden headache and vision changes on 3/25, with MRI brain on 3/28, which demonstrated enlargement of known pituitary adenoma, with necrosis extending laterally beyond the left cavernous sinus, concerning for compression of cranial nerves at cavernous sinus. 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      Given that he is past the acute period of apoplexy, there is no indication for emergent decompression. He would benefit for redo EEA for resection of adenoma given its symptomatic nature. However, he would need to be medically optimized from the metabolic and infectious standpoint prior to surgery. He would also benefit from a multidisciplinary approach with recommendations from ophthalmology, endocrinology and radiation oncology regarding options for treatment.     Plan:  -Patient's wife is the legal decision maker-consent obtained for the surgery-uploaded in chart  - Okay to discharge from neurosurgery perspective-if deemed medically stable by the primary team  - Please stop aspirin 7 days before surgery-surgery planned for 4/27/2023 with ENT colleagues for resection of pituitary adenoma  - Neurosurgery will continue to follow   -----------------------------------  Oliver Duong  Neurosurgery PGY2    Please contact neurosurgery resident on call with questions.    Dial * * [phone], enter [phone] when prompted.    Interval History: No acute events overnight that were brought  to neurosurgery's attention.    Objective:   Temp:  [97.4  F (36.3  C)-98.6  F (37  C)] 98.1  F (36.7  C)  Pulse:  [58-85] 58  Resp:  [16-20] 18  BP: (138-166)/() 166/109  SpO2:  [98 %-100 %] 100 %  I/O last 3 completed shifts:  In: 120 [P.O.:100; I.V.:20]  Out: 2075 [Urine:2075]      NEUROLOGIC:  -- Opens eyes to voice, following commands, oriented x1  -- Able to name objects  -- Speech limited, perseverating speech, minimal spontaneous speech  Cranial Nerves:  -- visual fields full to confrontation although with limited participation, PERRL anisocoric L>R and sluggish, left CN III paresis and CN VI palsy, restricted ROM in right EOM with no apparent asymmetry   -- face symmetrical, tongue midline  -- sensory V1-V3 intact bilaterally  -- palate elevates symmetrically, uvula midline  -- hearing grossly intact bilat     Motor:  Limited participation - antigravity x4 extremities     Sensory:  intact to LT x 4 extremities      Reflexes:       Bi Tri BR Omi Pat Ach Bab     C5-6 C7-8 C6 UMN L2-4 S1 UMN   R 2+ 2+ 2+ Norm 2+ 2+ Norm   L 2+ 2+ 2+ Norm 2+ 2+ Norm      Gait: Deferred.     LABS:  Recent Labs   Lab 04/12/23  1032 04/12/23  0954 04/12/23  0708 04/12/23  0206 04/12/23  0157 04/12/23  0019     --  139  --   --  140   POTASSIUM 3.5  --  3.7 4.2  --  4.3   CHLORIDE 104  --  105  --   --  105   CO2 25  --  26  --   --  24   ANIONGAP 10  --  8  --   --  11   * 227* 212*  --    < > 272*   BUN 6.7  --  7.0  --   --  6.7   CR 0.74  --  0.74  --   --  0.77   KORIN 8.7  --  8.6  --   --  8.8    < > = values in this interval not displayed.       Recent Labs   Lab 04/12/23  0708   WBC 12.4*   RBC 2.85*   HGB 7.5*   HCT 24.6*   MCV 86   MCH 26.3*   MCHC 30.5*   RDW 25.0*   PLT 95*       IMAGING:  No results found for this or any previous visit (from the past 24 hour(s)).

## 2023-04-12 NOTE — PLAN OF CARE
Goal Outcome Evaluation:      Plan of Care Reviewed With: patient    Overall Patient Progress: no change    Outcome Evaluation: A&O x 2 today- disoriented to time and situation.  Anxious and perseverating on home meds and discharge.  VSS ex HTN- lisinopril and carvedilol given.  Denies pain.  Tolerates regular diet but has poor appetite.  Reggie counts to start at midnight tonight.  Minimal fluids consumed.  Double lumen PICC running D5 at 75mL/hr and potassium replacements.  K+ recheck at 0200.  BMPs scheduled Q6hr.  Replacing K+ and phos as needed.  BG checks AC/HS/0200- covered with s/s insulin.  Dressings to BLE changed by WOC today.  AF barrier cream and miconazole cream applied to buttocks and groin folds per orders.  Pt has not gotten OOB.  Bed alarm on for safety.  Plan for surgery for pituitary adenoma.

## 2023-04-13 ENCOUNTER — APPOINTMENT (OUTPATIENT)
Dept: PHYSICAL THERAPY | Facility: CLINIC | Age: 58
DRG: 614 | End: 2023-04-13
Attending: STUDENT IN AN ORGANIZED HEALTH CARE EDUCATION/TRAINING PROGRAM
Payer: COMMERCIAL

## 2023-04-13 LAB
ACANTHOCYTES BLD QL SMEAR: ABNORMAL
ALBUMIN SERPL BCG-MCNC: 3 G/DL (ref 3.5–5.2)
ALP SERPL-CCNC: 383 U/L (ref 40–129)
ALT SERPL W P-5'-P-CCNC: 224 U/L (ref 10–50)
ANION GAP SERPL CALCULATED.3IONS-SCNC: 13 MMOL/L (ref 7–15)
ANION GAP SERPL CALCULATED.3IONS-SCNC: 13 MMOL/L (ref 7–15)
ANION GAP SERPL CALCULATED.3IONS-SCNC: 14 MMOL/L (ref 7–15)
AST SERPL W P-5'-P-CCNC: 125 U/L (ref 10–50)
ATRIAL RATE - MUSE: 68 BPM
BACTERIA BLD CULT: NO GROWTH
BACTERIA BLD CULT: NO GROWTH
BASOPHILS # BLD AUTO: 0 10E3/UL (ref 0–0.2)
BASOPHILS NFR BLD AUTO: 0 %
BILIRUB DIRECT SERPL-MCNC: <0.2 MG/DL (ref 0–0.3)
BILIRUB SERPL-MCNC: 0.5 MG/DL
BUN SERPL-MCNC: 10.1 MG/DL (ref 6–20)
BUN SERPL-MCNC: 10.5 MG/DL (ref 6–20)
BUN SERPL-MCNC: 9.4 MG/DL (ref 6–20)
CALCIUM SERPL-MCNC: 9.2 MG/DL (ref 8.6–10)
CALCIUM SERPL-MCNC: 9.3 MG/DL (ref 8.6–10)
CALCIUM SERPL-MCNC: 9.4 MG/DL (ref 8.6–10)
CHLORIDE SERPL-SCNC: 104 MMOL/L (ref 98–107)
CHLORIDE SERPL-SCNC: 104 MMOL/L (ref 98–107)
CHLORIDE SERPL-SCNC: 106 MMOL/L (ref 98–107)
CREAT SERPL-MCNC: 0.79 MG/DL (ref 0.67–1.17)
CREAT SERPL-MCNC: 0.85 MG/DL (ref 0.67–1.17)
CREAT SERPL-MCNC: 0.88 MG/DL (ref 0.67–1.17)
DEPRECATED HCO3 PLAS-SCNC: 25 MMOL/L (ref 22–29)
DEPRECATED HCO3 PLAS-SCNC: 25 MMOL/L (ref 22–29)
DEPRECATED HCO3 PLAS-SCNC: 26 MMOL/L (ref 22–29)
DIASTOLIC BLOOD PRESSURE - MUSE: NORMAL MMHG
EOSINOPHIL # BLD AUTO: 0 10E3/UL (ref 0–0.7)
EOSINOPHIL NFR BLD AUTO: 0 %
ERYTHROCYTE [DISTWIDTH] IN BLOOD BY AUTOMATED COUNT: 25.9 % (ref 10–15)
FRAGMENTS BLD QL SMEAR: SLIGHT
GFR SERPL CREATININE-BSD FRML MDRD: >90 ML/MIN/1.73M2
GLUCOSE BLDC GLUCOMTR-MCNC: 167 MG/DL (ref 70–99)
GLUCOSE BLDC GLUCOMTR-MCNC: 170 MG/DL (ref 70–99)
GLUCOSE BLDC GLUCOMTR-MCNC: 221 MG/DL (ref 70–99)
GLUCOSE BLDC GLUCOMTR-MCNC: 260 MG/DL (ref 70–99)
GLUCOSE BLDC GLUCOMTR-MCNC: 304 MG/DL (ref 70–99)
GLUCOSE SERPL-MCNC: 191 MG/DL (ref 70–99)
GLUCOSE SERPL-MCNC: 197 MG/DL (ref 70–99)
GLUCOSE SERPL-MCNC: 314 MG/DL (ref 70–99)
HCT VFR BLD AUTO: 24.5 % (ref 40–53)
HGB BLD-MCNC: 7.2 G/DL (ref 13.3–17.7)
IMM GRANULOCYTES # BLD: 0.2 10E3/UL
IMM GRANULOCYTES NFR BLD: 2 %
INTERPRETATION ECG - MUSE: NORMAL
LYMPHOCYTES # BLD AUTO: 0.2 10E3/UL (ref 0.8–5.3)
LYMPHOCYTES NFR BLD AUTO: 2 %
MAGNESIUM SERPL-MCNC: 2.1 MG/DL (ref 1.7–2.3)
MCH RBC QN AUTO: 25.8 PG (ref 26.5–33)
MCHC RBC AUTO-ENTMCNC: 29.4 G/DL (ref 31.5–36.5)
MCV RBC AUTO: 88 FL (ref 78–100)
MONOCYTES # BLD AUTO: 0.1 10E3/UL (ref 0–1.3)
MONOCYTES NFR BLD AUTO: 1 %
NEUTROPHILS # BLD AUTO: 10.5 10E3/UL (ref 1.6–8.3)
NEUTROPHILS NFR BLD AUTO: 95 %
NRBC # BLD AUTO: 0.2 10E3/UL
NRBC BLD AUTO-RTO: 2 /100
P AXIS - MUSE: 44 DEGREES
PHOSPHATE SERPL-MCNC: 2.4 MG/DL (ref 2.5–4.5)
PLAT MORPH BLD: ABNORMAL
PLATELET # BLD AUTO: 97 10E3/UL (ref 150–450)
PLATELETS.RETICULATED NFR BLD AUTO: 5.9 % (ref 1–7)
POLYCHROMASIA BLD QL SMEAR: SLIGHT
POTASSIUM SERPL-SCNC: 3.2 MMOL/L (ref 3.4–5.3)
POTASSIUM SERPL-SCNC: 3.7 MMOL/L (ref 3.4–5.3)
POTASSIUM SERPL-SCNC: 4.1 MMOL/L (ref 3.4–5.3)
POTASSIUM SERPL-SCNC: 4.1 MMOL/L (ref 3.4–5.3)
PR INTERVAL - MUSE: 128 MS
PROT SERPL-MCNC: 4.8 G/DL (ref 6.4–8.3)
QRS DURATION - MUSE: 138 MS
QT - MUSE: 450 MS
QTC - MUSE: 482 MS
R AXIS - MUSE: -26 DEGREES
RBC # BLD AUTO: 2.79 10E6/UL (ref 4.4–5.9)
RBC MORPH BLD: ABNORMAL
SODIUM SERPL-SCNC: 142 MMOL/L (ref 136–145)
SODIUM SERPL-SCNC: 144 MMOL/L (ref 136–145)
SODIUM SERPL-SCNC: 144 MMOL/L (ref 136–145)
SYSTOLIC BLOOD PRESSURE - MUSE: NORMAL MMHG
T AXIS - MUSE: -10 DEGREES
TARGETS BLD QL SMEAR: SLIGHT
VENTRICULAR RATE- MUSE: 69 BPM
WBC # BLD AUTO: 11.1 10E3/UL (ref 4–11)

## 2023-04-13 PROCEDURE — 250N000013 HC RX MED GY IP 250 OP 250 PS 637: Performed by: STUDENT IN AN ORGANIZED HEALTH CARE EDUCATION/TRAINING PROGRAM

## 2023-04-13 PROCEDURE — 250N000009 HC RX 250: Performed by: INTERNAL MEDICINE

## 2023-04-13 PROCEDURE — 97530 THERAPEUTIC ACTIVITIES: CPT | Mod: GP

## 2023-04-13 PROCEDURE — 85055 RETICULATED PLATELET ASSAY: CPT

## 2023-04-13 PROCEDURE — 250N000013 HC RX MED GY IP 250 OP 250 PS 637

## 2023-04-13 PROCEDURE — 82248 BILIRUBIN DIRECT: CPT | Performed by: INTERNAL MEDICINE

## 2023-04-13 PROCEDURE — 83735 ASSAY OF MAGNESIUM: CPT

## 2023-04-13 PROCEDURE — 84100 ASSAY OF PHOSPHORUS: CPT

## 2023-04-13 PROCEDURE — 36592 COLLECT BLOOD FROM PICC: CPT

## 2023-04-13 PROCEDURE — 85014 HEMATOCRIT: CPT | Performed by: INTERNAL MEDICINE

## 2023-04-13 PROCEDURE — 99233 SBSQ HOSP IP/OBS HIGH 50: CPT | Mod: GC | Performed by: STUDENT IN AN ORGANIZED HEALTH CARE EDUCATION/TRAINING PROGRAM

## 2023-04-13 PROCEDURE — 80053 COMPREHEN METABOLIC PANEL: CPT

## 2023-04-13 PROCEDURE — 999N000044 HC STATISTIC CVC DRESSING CHANGE

## 2023-04-13 PROCEDURE — 82310 ASSAY OF CALCIUM: CPT

## 2023-04-13 PROCEDURE — 99232 SBSQ HOSP IP/OBS MODERATE 35: CPT | Performed by: INTERNAL MEDICINE

## 2023-04-13 PROCEDURE — 250N000013 HC RX MED GY IP 250 OP 250 PS 637: Performed by: INTERNAL MEDICINE

## 2023-04-13 PROCEDURE — 250N000011 HC RX IP 250 OP 636

## 2023-04-13 PROCEDURE — 120N000002 HC R&B MED SURG/OB UMMC

## 2023-04-13 PROCEDURE — 80048 BASIC METABOLIC PNL TOTAL CA: CPT | Performed by: STUDENT IN AN ORGANIZED HEALTH CARE EDUCATION/TRAINING PROGRAM

## 2023-04-13 PROCEDURE — 97116 GAIT TRAINING THERAPY: CPT | Mod: GP

## 2023-04-13 PROCEDURE — 258N000003 HC RX IP 258 OP 636: Performed by: INTERNAL MEDICINE

## 2023-04-13 RX ORDER — POTASSIUM CHLORIDE 1.5 G/1.58G
40 POWDER, FOR SOLUTION ORAL ONCE
Status: COMPLETED | OUTPATIENT
Start: 2023-04-13 | End: 2023-04-13

## 2023-04-13 RX ORDER — SPIRONOLACTONE 100 MG/1
200 TABLET, FILM COATED ORAL DAILY
Status: DISCONTINUED | OUTPATIENT
Start: 2023-04-13 | End: 2023-04-18

## 2023-04-13 RX ORDER — SPIRONOLACTONE 100 MG/1
200 TABLET, FILM COATED ORAL DAILY
Status: DISCONTINUED | OUTPATIENT
Start: 2023-04-13 | End: 2023-04-13

## 2023-04-13 RX ORDER — FLUCONAZOLE 200 MG/1
200 TABLET ORAL DAILY
Status: DISCONTINUED | OUTPATIENT
Start: 2023-04-14 | End: 2023-04-21

## 2023-04-13 RX ADMIN — MICONAZOLE NITRATE: 20 CREAM TOPICAL at 20:37

## 2023-04-13 RX ADMIN — CARVEDILOL 12.5 MG: 12.5 TABLET, FILM COATED ORAL at 17:46

## 2023-04-13 RX ADMIN — ASPIRIN 81 MG: 81 TABLET ORAL at 09:02

## 2023-04-13 RX ADMIN — TESTOSTERONE 25 MG OF TESTOSTERONE: 50 GEL TOPICAL at 09:02

## 2023-04-13 RX ADMIN — POTASSIUM CHLORIDE 40 MEQ: 1.5 POWDER, FOR SOLUTION ORAL at 02:10

## 2023-04-13 RX ADMIN — DICLOFENAC SODIUM 2 G: 10 GEL TOPICAL at 23:51

## 2023-04-13 RX ADMIN — ACETAMINOPHEN 650 MG: 325 TABLET, FILM COATED ORAL at 11:11

## 2023-04-13 RX ADMIN — LEVOTHYROXINE SODIUM 100 MCG: 100 TABLET ORAL at 09:02

## 2023-04-13 RX ADMIN — CARVEDILOL 12.5 MG: 12.5 TABLET, FILM COATED ORAL at 09:02

## 2023-04-13 RX ADMIN — DICLOFENAC SODIUM 2 G: 10 GEL TOPICAL at 16:26

## 2023-04-13 RX ADMIN — MICONAZOLE NITRATE: 20 CREAM TOPICAL at 09:39

## 2023-04-13 RX ADMIN — ERGOCALCIFEROL 50000 UNITS: 1.25 CAPSULE, LIQUID FILLED ORAL at 09:02

## 2023-04-13 RX ADMIN — Medication 1 TABLET: at 09:02

## 2023-04-13 RX ADMIN — LISINOPRIL 20 MG: 10 TABLET ORAL at 11:11

## 2023-04-13 RX ADMIN — FLUCONAZOLE 400 MG: 200 TABLET ORAL at 09:03

## 2023-04-13 RX ADMIN — SPIRONOLACTONE 200 MG: 100 TABLET ORAL at 11:01

## 2023-04-13 RX ADMIN — POTASSIUM PHOSPHATE, MONOBASIC POTASSIUM PHOSPHATE, DIBASIC 9 MMOL: 224; 236 INJECTION, SOLUTION, CONCENTRATE INTRAVENOUS at 12:00

## 2023-04-13 RX ADMIN — Medication 10 ML: at 16:32

## 2023-04-13 ASSESSMENT — ACTIVITIES OF DAILY LIVING (ADL)
ADLS_ACUITY_SCORE: 45
ADLS_ACUITY_SCORE: 41
ADLS_ACUITY_SCORE: 45
ADLS_ACUITY_SCORE: 41
ADLS_ACUITY_SCORE: 41
ADLS_ACUITY_SCORE: 45

## 2023-04-13 NOTE — PLAN OF CARE
Goal Outcome Evaluation:      Plan of Care Reviewed With: patient    Overall Patient Progress: no change    Outcome Evaluation: A&O x 4 today.  Mentation waxes and wanes.  Pt was very logical and redirectable today as well.  Wanted to go for a walk but is ok with waiting for PT to assess tomorrow.  Agreeable to working with PT.  VSS on room air ex HTN.  Denies pain.  Tolerates regular diet with no n/v.  Good appetite this shift.  100% of dinner.  BG checks AC/HS/0200- s/s insulin and carb coverage.  Reggie counts.  Smear of BM this shift.  Voiding into bedside urinal.  Double lumen PICC heparin locked.  WOC to change dressings to BLE tomorrow.  AF barrier cream and miconazole applied per orders.  Up in recliner for most of shift today.  Wheeled around unit in recliner.  Pt seems to be in better spirits.  Encourage to get up in chair during the day.  Electrolytes WNL.  Continues with Q6hr BMPs- next due at midnight.  Continue to monitor and follow POC.

## 2023-04-13 NOTE — PLAN OF CARE
Goal Outcome Evaluation: [id number]      Plan of Care Reviewed With: patient    Overall Patient Progress: no change    Outcome Evaluation: A&O x 4 today although forgetful and confused about some topics.  Word finding difficulty.  VSS on room air.  c/o back pain 5/10 and received voltaren gel.  Was also assisted back to bed and the repositioning helped.  Declines narcotic pain meds.  Double lumen PICC heparin locked with good blood return in both lumens.  Caps changed today.  Dressing also changed by vascular.  Tolerates regular diet.  Poor appetite this evening.  Refused dinner.  On hai counts.  Dressings changed to BLE.  K+ came back WNL- recheck scheduled for tomorrow morning.  Phos replaced on day shift and recheck tomorrow AM as well.  Continue to monitor and follow POC.

## 2023-04-13 NOTE — PROGRESS NOTES
Care Management Follow Up    Length of Stay (days): 10    Expected Discharge Date: 04/14/2023     Concerns to be Addressed: all concerns addressed in this encounter     Patient plan of care discussed at interdisciplinary rounds: Yes    Anticipated Discharge Disposition: Transitional Care     Anticipated Discharge Services: Transportation Services  Anticipated Discharge DME: None    Patient/family educated on Medicare website which has current facility and service quality ratings: yes  Education Provided on the Discharge Plan:  yes  Patient/Family in Agreement with the Plan:  yes    Referrals Placed by CM/SW:  None   Private pay costs discussed: Not applicable    Additional Information:  The patient's sister Pilar had questions regarding a HCD. DARLING reported that right now a HCD cannot be done due to the patient's altered mental status and in order to complete a HCD the patient's mental status would have to improve. Pilar reported the patient's wife has deferred decision making to Pilar. Pilar asked if the patient will go to TCU. DARLING reported the plan is to try and get patient to a TCU however he is currently not medically stable. DARLING reported patient may still need to go to a TCU after neurosurgery but DARLING is unable to say for sure if he will or wont. Pilar denied having any additional questions or concerns at this time.     SAMIRA Morrison  [address]   Office: [phone]  Pager: [phone]  [email or web address]

## 2023-04-13 NOTE — PLAN OF CARE
Goal Outcome Evaluation:      Plan of Care Reviewed With: patient    Overall Patient Progress: no changeOverall Patient Progress: no change    Outcome Evaluation: A&ox4. Disoriented to situation. Calm and cooperative overnight with disorganized thoughts. Snacks intermittently overnight. Carb coverage given for more than 15gmx1 PRN. Reggie counts. Potassium replaced recheck this AM.  WOC to change BLE wounds today, CDI overnight. Using bedside urinal. No BM overnight.  Calls appropriately, denies pain. Bed alarm on for safety. Continue POC.

## 2023-04-13 NOTE — PLAN OF CARE
Goal Outcome Evaluation:      Plan of Care Reviewed With: patient    Overall Patient Progress: improvingOverall Patient Progress: improving    Outcome Evaluation: Patient flat and somewhat withdrawn, but answers questions.  At times he has word finding difficulties and uses incorrect words.  Sensorium appears slow, and he doesn't always follow thru with things he says he will do.  Reminded him that he should call us to cover carbs if he eats a snack or gets a new try, but patient does not.  Appetite fair today.Up in chair with PT this morning and wishes to stay there this afternoon.  Offered to get back to bed with OT but patient declined.  Still needs to have leg wounds changed today, WOC possibly coming.

## 2023-04-13 NOTE — PROGRESS NOTES
Calorie Count  Intake recorded for: 4/12  Total Kcals: 1133 Total Protein: 50g  Kcals from Hospital Food: 1133  Protein: 50g  Kcals from Outside Food (average):0 Protein: 0g  # Meals Ordered from Kitchen: 3 meals   # Meals Recorded: 2 meals (First - 100% side mac & cheese, cheeseburger w/ ketchup, collazo & mustard)       (Second -  100% Froot Loops w/ 1% milk, 50% pizza w/ spinach)   # Supplements Recorded: 0

## 2023-04-13 NOTE — PROGRESS NOTES
[facility], [place]   04/13/2023  Neurosurgery Progress Note:    Assessment:  Gustavo Faria is a 58 year old male with a PMH of DMII, HFrEF, BLE open wounds, history of serratia bacteremia in December 2022,  pituitary ACTH secreting macroadenoma s/p EEA for resection on 5/2021 and 7/2021 in [place], with baseline right CN 3 palsy, who was admitted at the [facility] on 3/23. He developed sudden headache and vision changes on 3/25, with MRI brain on 3/28, which demonstrated enlargement of known pituitary adenoma, with necrosis extending laterally beyond the left cavernous sinus, concerning for compression of cranial nerves at cavernous sinus. 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      Given that he is past the acute period of apoplexy, there is no indication for emergent decompression. He would benefit for redo EEA for resection of adenoma given its symptomatic nature. However, he would need to be medically optimized from the metabolic and infectious standpoint prior to surgery. He would also benefit from a multidisciplinary approach with recommendations from ophthalmology, endocrinology and radiation oncology regarding options for treatment.     Plan:  -Patient's wife is the legal decision maker-consent obtained for the surgery-uploaded in chart  - Okay to discharge from neurosurgery perspective-if deemed medically stable by the primary team  - Please stop aspirin 7 days before surgery-surgery planned for 4/27/2023 with ENT colleagues for resection of pituitary adenoma  - Neurosurgery will continue to follow   -----------------------------------  Oliver Duong  Neurosurgery PGY2    Please contact neurosurgery resident on call with questions.    Dial * * [phone], enter [phone] when prompted.    Interval History: No acute events overnight that were brought  to neurosurgery's attention.    Objective:   Temp:  [98  F (36.7  C)-98.9  F (37.2  C)] 98  F (36.7  C)  Pulse:  [58-90] 67  Resp:  [16-20] 16  BP: (132-166)/() 140/89  SpO2:  [97 %-100 %] 100 %  I/O last 3 completed shifts:  In: 594 [P.O.:594]  Out: 1775 [Urine:1775]      NEUROLOGIC:  -- Opens eyes to voice, following commands, oriented x1  -- Able to name objects  -- Speech limited, perseverating speech, minimal spontaneous speech  Cranial Nerves:  -- visual fields full to confrontation although with limited participation, PERRL anisocoric L>R and sluggish, left CN III paresis and CN VI palsy, restricted ROM in right EOM with no apparent asymmetry   -- face symmetrical, tongue midline  -- sensory V1-V3 intact bilaterally  -- palate elevates symmetrically, uvula midline  -- hearing grossly intact bilat     Motor:  Limited participation - antigravity x4 extremities     Sensory:  intact to LT x 4 extremities      Reflexes:       Bi Tri BR Omi Pat Ach Bab     C5-6 C7-8 C6 UMN L2-4 S1 UMN   R 2+ 2+ 2+ Norm 2+ 2+ Norm   L 2+ 2+ 2+ Norm 2+ 2+ Norm      Gait: Deferred.     LABS:  Recent Labs   Lab 04/13/23  0859 04/13/23  0634 04/13/23  0155 04/13/23  0045 04/12/23  1902 04/12/23  1752   NA  --  144  --  144  --  142   POTASSIUM  --  4.1  4.1  --  3.2*  --  3.6   CHLORIDE  --  106  --  104  --  104   CO2  --  25  --  26  --  24   ANIONGAP  --  13  --  14  --  14   * 314* 221* 191*   < > 134*   BUN  --  10.5  --  10.1  --  7.9   CR  --  0.85  --  0.88  --  0.76   KORIN  --  9.2  --  9.3  --  9.1    < > = values in this interval not displayed.       Recent Labs   Lab 04/13/23  0634   WBC 11.1*   RBC 2.79*   HGB 7.2*   HCT 24.5*   MCV 88   MCH 25.8*   MCHC 29.4*   RDW 25.9*   PLT 97*       IMAGING:  No results found for this or any previous visit (from the past 24 hour(s)).

## 2023-04-13 NOTE — PROGRESS NOTES
Endocrine Progress Note  Patient: Gustavo Faria   MRN: [id number]  Date of Service: 04/13/2023    HPI summary and hospitalization course:  Gustavo Faria is a 58 year old male with PMHx of ACTH-secreting macroadenoma c/b central hypothyroidism, and central hypogonadism,  s/p transphenoidal surgery 5/2021 and 7/2021 in [place] has baseline records 3rd nerve palsy, ongoing serratia RLE cellulitis c/b recent serratia bacteremia, HFrEF, T2DM, and HTN who [facility] of known expanding large sellar/suprasellar mass extending into cavernous sinuses and subsequent cranial nerve impingement.    [facility] and bilateral lower limb cellulitis/abscess developed sudden onset of headaches and double vision transferred to [facility] for multidisciplinary assessment.  Had an MRI done on 3/25 which demonstrates enlargement of known pituitary adenoma with necrosis extending laterally beyond the left cavernous sinus concerning for compression of the cranial nerves at the cavernous sinus.  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  On the arrival he was encephalopathic.  Taken off dexamethasone on 04/06/2023 with improvement in the mental status following that.    Repeated MRI following transfer to [facility] showed new enhancing to 1.4 cm lesion in the right frontal lobe with susceptibility of artifact.  4.3 cm heterogeneously enhancing pituitary mass with encasement and peripheral displacement of the cavernous portions of both internal carotid arteries and infiltration of the clivus.  Previous optic chiasm compression is resolved.  Pituitary stalk is midline.  Lesion is infiltrating both cavernous sinuses cranial nerves in the cavernous sinus  cannot be differentiated from the underlying mass.    Interval history:  Feeling better and more energy today, sitting on the chair, trying to be active and feeling positive. Ate lunch half.   No headaches.      Physical Examination:  BP (!) (P) 151/102 (BP Location: Left arm)   Pulse (P) 75   Temp 98  F (36.7  C) (Oral)   Resp (P) 16   Wt 66 kg (145 lb 8.1 oz)   SpO2 (P) 100%   BMI 23.48 kg/m    Physical Exam  Vitals reviewed.   Constitutional:       General: He is not in acute distress.  Musculoskeletal:      Right lower leg: Edema present.      Left lower leg: Edema present.   Neurological:      Mental Status: He is alert and oriented to person, place, and time.   Psychiatric:      Comments: He was frustrated at the time of the assessment he stated he was not allowed to get out of the bed by himself.         Medications:  Reviewed    Endocrine Labs:   Latest Reference Range & Units 04/12/23 07:08   Sodium 136 - 145 mmol/L 139   Potassium 3.4 - 5.3 mmol/L 3.7   Chloride 98 - 107 mmol/L 105   Carbon Dioxide (CO2) 22 - 29 mmol/L 26   Urea Nitrogen 6.0 - 20.0 mg/dL 7.0   Creatinine 0.67 - 1.17 mg/dL 0.74   GFR Estimate >60 mL/min/1.73m2 >90   Calcium 8.6 - 10.0 mg/dL 8.6   Anion Gap 7 - 15 mmol/L 8   Magnesium 1.7 - 2.3 mg/dL 2.1   Phosphorus 2.5 - 4.5 mg/dL 2.0 (L)   Albumin 3.5 - 5.2 g/dL 3.0 (L)   Protein Total 6.4 - 8.3 g/dL 4.6 (L)   Alkaline Phosphatase 40 - 129 U/L 305 (H)   ALT 10 - 50 U/L 133 (H)   AST 10 - 50 U/L 56 (H)   Bilirubin Direct 0.00 - 0.30 mg/dL <0.20   Bilirubin Total <=1.2 mg/dL 0.5   Glucose 70 - 99 mg/dL 212 (H)          Latest Reference Range & Units 04/06/23 06:26 04/07/23 08:42 04/09/23 09:12   Adrenal Corticotropin <47 pg/mL  273 (H)    Cortisol Serum ug/dL 48.7 41.1 37.8         Images:  MRI brain with and without contrast on 4/6/2023:  Comparison:  Outside brain MR 3/28/2023 and 5/25/2021.      Technique:   1. Brain MRI: Axial diffusion and FLAIR images of the whole  brain  obtained without intravenous contrast. Sagittal T1 and T2-weighted,  coronal T2-weighted, coronal T1-weighted images with focus on the  sella were obtained without intravenous contrast. Post intravenous  contrast using gadolinium coronal and sagittal T1-weighted images were  obtained focused on the sella. Dynamic postcontrast coronal  T1-weighted images were also obtained.     2. MRI of the Orbits focused on the orbits/visual pathways:  Axial  T2-weighted with inversion recovery and coronal T1-weighted images  were obtained without intravenous contrast. Axial and coronal  T1-weighted images with fat saturation were obtained after intravenous  gadolinium administration.     3. MRI Brain COW: Sagittal T1-weighted, axial T2-weighted with fat  saturation, turboFLAIR and diffusion-weighted with ADC map and coronal  T1-weighted and T2-weighted images of the brain were obtained without  intravenous contrast.       Contrast: 7.5mL Gadavist     Findings:    Brain MRI:  Enhancing 1.4 x 1.0 cm lesion with associated susceptibility artifact  in the right frontal lobe (series 16, image 17), stable since  5/25/2021. A similar punctate lesion in the left putamen also  unchanged.m     4.3 x 1.9 x 1.3 cm (right to left, AP, craniocaudal) heterogeneously  enhancing pituitary mass with encasement and peripheral displacement  of the cavernous portions of both internal carotid arteries (series  13, image 28 and series 14, image 11). The lesion infiltrates the  clivus. Compared with MRI from 2021 the craniocaudal dimension of the  mass is decreased. The lesion is now more heterogeneous, previously  more homogeneous. Previous optic chiasm compression is resolved. No  abnormal enhancement of the optic nerves within the intraorbital  portion. The pituitary stalk is midline.     The lesion infiltrates both cavernous sinuses. The traversing cranial  nerves within the cavernous sinus cannot be differentiated from the  underlying mass.      FLAIR images through the whole brain shows nonspecific posterior  periventricular white matter hyperintensities. Otherwise no mass  effect, midline shift, or significant enlargement of the ventricles.  Scattered mucosal thickening of the paranasal sinuses. Mastoid air  cells are clear.     MRA Head: No aneurysm or stenosis of the major intracranial arteries  at the base of the brain.                                                                      Impression:   1.  4.3 cm heterogeneously enhancing pituitary mass with infiltration  of bilateral cavernous sinuses and the clivus. Findings are similar  when compared compared to prior exam 3/28/2023 suggestive for a  macroadenoma. Compared with an older MRI from 2021, the lesion appears  more heterogeneous and smaller. Previous compression of the optic  chiasm is no longer present.  2.  Patchy T2 hyperintense enhancing lesion with scattered  susceptibility artifacts in the right frontal subcortical white  matter. A similar tiny smaller lesion is also present in the left  putamen. Retrospectively this lesion was present back in 2021.  Constellation of findings are suggestive of a benign lesion such as  cavernoma or telangiectasia.  3.   MRA demonstrates no aneurysm or stenosis of the major  intracranial arteries. Bilateral cavernous internal carotid arteries  are patent despite encasement by the above-mentioned lesion.  4.  No abnormal optic nerve enhancement or optic nerve atrophy.        Assessment and plan:    Gustavo Faria is a 58 year old male with PMHx of  ACTH-secreting macroadenoma c/b central hypothyroidism, , and central hypogonadism,  s/p transphenoidal surgery 5/2021 and 7/2021 in [place] has baseline records 3rd nerve palsy, ongoing serratia RLE cellulitis c/b recent serratia bacteremia, HFrEF, T2DM, and HTN who was transferred from [facility] for possible surgical intervention of known expanding large sellar/suprasellar mass extending into  cavernous sinuses and subsequent cranial nerve impingement.     #Pituitary macroadenoma:  #Cushing's disease:  #Pituitary apoplexy:  Known history of ACTH secreting macroadenoma , developed [facility] showed enlargement of known pituitary macroadenoma with necrosis extending laterally beyond the left cavernous sinus concerning for compression of cranial nerves at the cavernous sinuses.  No acute hemorrhage, small area of diffusion restriction at the left side of pituitary possibly consistent with ischemic pituitary adenoma  Started on dexamethasone 2 mg twice daily, was discontinued on4/6/23  Random serum cortisol level at 2:30 AM was 46.3, ACTH 321.  A.m. cortisol level of 41.1 on 4/7/2023.  Repeated MRI on 4/6 showed a 4.3 cm ureteral diffusely enhancing pituitary mass with infiltration of bilateral cavernous sinus and clivus.  He has been developing recurrent hyponatremia/hypokalemia during the admission, prior to admission was on spironolactone 100 mg daily.  Spironolactone 100 mg was restarted on 4/11/2023.     Recommendations:  -Resection/debulking  arranged by neurosurgery will be done on  04/27 will be followed by radiotherapy.  -To resume prior to admission spironolactone 100 mg daily, with titrating it up as tolerated maximum dose 400 mg to counteract the mineralocorticoid effect.  Will continue to assess the response to see if any further treatment is needed to resume the electrolytes balance.  If the current measures fails, will consider restarting on medication for Cushing's disease will consider ketoconazole if the ALT is normalized by the time.  -For perioperative planning, no need to initiate the steroids replacement unless he decompensates during the surgery or postoperatively he can get cortisol level following the surgery followed by a.m. cortisol level checks if there is any suspicion of postoperative AI to be started on  steroids.       #Central hypothyroidism:  Prior to admission was on levothyroxine 100 mcg daily.  Free T4 on admission 1.3 TSH not detectable (picture of central hypothyroidism).     Recommendations:  -Continue with PTA levothyroxine 100 mcg daily.     #Hypogonadotrophic hypogonadism:  Was on AndroGel gel 1 pump daily.     Recommendations:  -Continue AndroGel.     #Assessment for DI:  No known history of DI.  Developed hypernatremia in the settings of poor oral intake responded well to D5 infusion. .  On 4/7: Urine osmolarity 415/serum osmolarity 305. .  On 4/8/2023: Sodium started to increase again to 149 however urine specific gravity was 1.019.  Total daily urine output continues to be less than 3 liters per day.  D5 percent infusion restarted for rapid increase in the sodium level .  he has poor oral intake spironolactone was resumed.    Recommendations:  -Continue to monitor I's and O's.  -Allowed to drink to thirst.  -Free water deficit repletion as needed with close monitoring of the fluid status..    -Consider holding D5 infusion today and continue to assess increase in the sodium.(The sample which was taken at the midnight on 4/11/2023 was taken from the site of the D5 infusion given discrepancy in the BG in the sample it was above 500 while POC BG in the same time was 211  Continue with sodium  close monitoring.  Assess for the response following increasing the dose of spironolactone daily  -Consider resuming PTA Lasix when the K is stable.     #DM type II: Hemoglobin A1c on the admission 9.5%.  Prior to admission was on Sitagliptin 100 mg daily/metformin 500 mg twice daily, Jardiance 12.5 mg.  Received Jardiance 10 mg on 4/8.  D5 percent infusion will be started on 4/11/2023 at 4 PM with a rate of 75 mill per hour  He had some increase in the BG following eating small meals 17 g of carb not covered he is on 1: 20 g CHO.     Recommendations:  -Continue with Lantus 10 units daily.  -medium-dose SSI 3 times  daily before meals and at the bedtime.  -Increase NovoLog carb coverage from 1 unit: 20 g CHO up to 1 unit: 15 g CHO with meals and snacks.  -Hypoglycemia rescue protocol.  -POC BG checks 3 times daily AC at the bedtime at 2 AM.  -Carb counting protocol.  -Holding PTA oral medications.        Wendy Tran MD  Staff Physician  Endocrinology and Metabolism  [facility]  License: [id number]  Pager: [phone]

## 2023-04-13 NOTE — PROGRESS NOTES
[facility]    Progress Note - Medicine Service, MAROON TEAM 2       Date of Admission:  4/3/2023    Assessment & Plan   Gustavo Faria is a 58 year old male with recent Serratia bacteremia, HFrEF, prolonged QT, lower extremity wounds, DMII, hypothyroidism, low testosterone and known ACTH secreting pituitary adenoma w/resulting Cushing's disease s/p 2 partial resections in 2021 who [facility]. [facility], he developed new onset double vision and severe headache which prompted imaging that showed a large mass invading the cavernous sinuses and impinging on the cranial nerves. He is transferred to [facility] for this pituitary adenoma, concern for progression vs hemorrhage/apoplexy. Complicated by psychosis/metabolic encephalopathy and electrolyte abnormalities secondary to Cushing's syndrome.    Updates today:  - Increase to spironolactone 200 daily   - BMP check in the afternoon  - Work with PT, appreciate their treatment  - Diabetes recs per Endo  - Some lower back pain - requesting Tylenol       # Hypernatremia, DI vs cushing's, improving  # Hypokalemia   Hypernatremia and hypokalemia, likely secondary to Cushing's.     - Endocrine following  - Goal Na: 135-140  - Resume spironolactone, can go up to 400 mg daily depending on Na and K  - Q6H BMP + Mg  - Strict I&Os  - RN managed K    # Acute metabolic encephalopathy, improving  # Pyruria  # Candiduria  # Possibly secondary to cushing's disease  Admission symptoms included disorientation, intermittently following directions. Repetitive words - perseverating on particular phrases. Sx started the 2-3 days prior to admission (which [facility] was the [facility]). Vulnerable brain (multiple brain surgeries), enlarging pituitary mass. No seizure activity per EEG. Steroids removed and pt still encephalopathy. Sodium has been in normal limits  and patient remains altered. Worked up for infections - did reveal candiduria. Biggest concern for infectious vs cushing's.   - CT abd w/ contrast: no evidence of pyelonephritis  - Urine culture: candida, sensitive to Fluconazole  - Fluconazole 200 mg daily x14 days (started 4/10)   - Discontinued ceftriaxone as patient did not improve clinically with antibiotics  - No steroids  - Surgical plans for pituitary tumor as stated below  - Delirium precautions  - Sodium management as stated below   - PRN quetiapine if agitation that is not responsive to behavioral interventions     # ACTH secreting pituitary adenoma c/b type II DM, hypothyroidism and low testosterone s/p two partial resections in 2021   Patient noted double vision and severe headache beginning the evening of 3/25. CT imaging on 3/25 revealed a large sellar/suprasellar mass extending into the cavernous sinuses with no evidence of acute stroke. On 3/28, he underwent an MRI which confirmed the CT findings of a large mass invading the cavernous sinuses and impinging on the cranial nerves. Necrosis extending beyond left cavernous sinus. [facility]. Repeat MRI performed on 4/6/23: compared to 2021 MRI, lesions are smaller. Orbit MRI without optic nerve compression and no evidence of orbital infection.  - Neurosurgery following  - Plans for Neurosurgery: surgery 4/27/23  - Optimize medically prior to surgery  - Hold ASA 1 week prior to surgery  - Okay to discontinue Dexamethasone  - Ophthalmology following, no major changes, agree with neurosurgery plans  - Endocrine following     # Type II DM, exacerbated by Cushing's   A1c of 7.6% on 2/2023. Patient was on the following regimen at the [facility].    - Lantus 10 units  - MD Sliding scale insulin  - Carb coverage  - Continue PTA sitagliptin  - Restart PTA emagliflozin    - Hold PTA metformin     # Buttocks, sacrum and bilateral groin wounds   # RLE wound from puncture injury   # L dorsal foot  wound from presumed trauma   # Soft tissue infection/abscess    # Hx serratia bacteremia in December 2022   For patient's lower extremity wounds and hx of Serratia bacteremia in December, he was [facility]olin [facility]. His antibiotics were broadened to Vanc and Zosyn and ultimately he was transitioned to ceftazidime on 3/23 with plan to complete course on 4/4/23. BCx negative and wound cx grew serratia marcescens at the [facility]. He has been on ceftazidime since. MRI of the right tib/fib on 3/23 was negative for osteomyelitis but did show two fluid collections draining sponateneously. [facility] the [facility] and determined no intervention was needed as wounds were draining on their own.  - Stop ceftazidime (3/23 - 4/5)  - WOC following  - PT/OT when able to follow commands  - Pain: tylenol PRN, dilaudid 0.5 mg PRN for dressing changes and oxy 5 mg PRN - however given encephalopathy, use sparingly  - Re-image? MRI?     # HFmrEF, with global hypokinesis  # High burden of PVCs  # Prolonged QTc   # Elevated troponin, down-trended  Coronary angiogram on 2/27 shows normal coronaries. Troponin down-trending (88 -> 77). No chest pain. High burden of PVCs. Follow up ECHO with global hypokinesis, which is worse compared to February 2023 ECHO. EF is 45% (same compared to prior). Likely small vessel disease vs demand vs cardiomyopathy 2/2 ceftazidime. Low concern for acute ACS.   - Discontinued tele  - Electrolyte mgmt as above  - No need for diuresis at this time - given hyper-K  - Continue PTA coreg  - Lisinopril 10  - PTA empagliflozin   - Resume ASA (hold 1 week prior to surgery)  - Avoid QTc prolonging medications     # Central Hypothyroidism  - Continue PTA levothyroxine     # Hypogonadism   - Continue PTA androgel (will need to bring in home medication)     # Chronic microcytic anemia   Hgb of 8.4 [facility]. Peripheral smear on 3/30 showed poikilocytosis with occasional red blood cell  fragments but no other evidence of hemolysis.  Haptoglobin was normal.  Ferritin was 91, transferrin saturation was low, B12 was 495 and folate was 8.7. Likely combination of iron deficiency as well as inflammation/chronic disease.   - CTM        # Concern for malnutrition   - Nutrition following    Clinically Significant Risk Factors        # Hypokalemia: Lowest K = 3.2 mmol/L in last 2 days, will replace as needed       # Hypoalbuminemia: Lowest albumin = 2.9 g/dL at 4/11/2023  7:07 AM, will monitor as appropriate   # Thrombocytopenia: Lowest platelets = 95 in last 2 days, will monitor for bleeding         # DMII: A1C = 9.5 % (Ref range: <5.7 %) within past 6 months    # Severe Malnutrition: based on nutrition assessment      The patient's care was discussed with the Attending Physician, Dr. Chris Burnett.    Linda Azul MD  Medicine Service, [address]  Securely message with [employer] (more info)  Text page via [employer] Paging/Directory   See signed in provider for up to date coverage information  ______________________________________________________________________    Interval History    - RN notes reviewed  - Doing well this morning  - Worked with physical therapy  - Up in the chair this AM   - Reports some lower back pain    Physical Exam   Vital Signs: Temp: 98  F (36.7  C) Temp src: (P) Oral BP: (!) (P) 151/102 Pulse: (P) 75   Resp: (P) 16 SpO2: (P) 100 % O2 Device: (P) None (Room air)    Weight: 145 lbs 8.06 oz     General Appearance: Eyes open, tracking, answers yes/no questions, no distress, up in chair  Eyes: Pupils moderate size, no nystagmus, EOMI  HEENT: Normal sclera, able to move neck FROM  Respiratory: Breathing comfortably on RA, CTAB  Cardiovascular: RRR, no murmurs  GI: BS+. Soft, nontender, nondistended. No guarding or rebound tenderness.   Skin: Superficial, open wounds on the posterior buttocks midline  Musculoskeletal: No muscle  wasting  Neuro:  strength intact, ankle strength intact, and sensation intact, EOMI  Psychiatric: Oriented to time, person, place, and situation     Data     I have personally reviewed the following data over the past 24 hrs:    11.1 (H)  \   7.2 (L)   / 97 (L)     144 106 10.5 /  304 (H)   4.1; 4.1 25 0.85 \       ALT: 224 (H) AST: 125 (H) AP: 383 (H) TBILI: 0.5   ALB: 3.0 (L) TOT PROTEIN: 4.8 (L) LIPASE: N/A

## 2023-04-14 ENCOUNTER — APPOINTMENT (OUTPATIENT)
Dept: PHYSICAL THERAPY | Facility: CLINIC | Age: 58
DRG: 614 | End: 2023-04-14
Attending: STUDENT IN AN ORGANIZED HEALTH CARE EDUCATION/TRAINING PROGRAM
Payer: COMMERCIAL

## 2023-04-14 ENCOUNTER — APPOINTMENT (OUTPATIENT)
Dept: OCCUPATIONAL THERAPY | Facility: CLINIC | Age: 58
DRG: 614 | End: 2023-04-14
Attending: STUDENT IN AN ORGANIZED HEALTH CARE EDUCATION/TRAINING PROGRAM
Payer: COMMERCIAL

## 2023-04-14 LAB
ALBUMIN SERPL BCG-MCNC: 3 G/DL (ref 3.5–5.2)
ALP SERPL-CCNC: 336 U/L (ref 40–129)
ALT SERPL W P-5'-P-CCNC: 188 U/L (ref 10–50)
ANION GAP SERPL CALCULATED.3IONS-SCNC: 12 MMOL/L (ref 7–15)
AST SERPL W P-5'-P-CCNC: 44 U/L (ref 10–50)
BILIRUB DIRECT SERPL-MCNC: <0.2 MG/DL (ref 0–0.3)
BILIRUB SERPL-MCNC: 0.5 MG/DL
BUN SERPL-MCNC: 9.2 MG/DL (ref 6–20)
CALCIUM SERPL-MCNC: 9.1 MG/DL (ref 8.6–10)
CHLORIDE SERPL-SCNC: 104 MMOL/L (ref 98–107)
CREAT SERPL-MCNC: 0.77 MG/DL (ref 0.67–1.17)
DEPRECATED HCO3 PLAS-SCNC: 27 MMOL/L (ref 22–29)
ERYTHROCYTE [DISTWIDTH] IN BLOOD BY AUTOMATED COUNT: 26.5 % (ref 10–15)
FOLATE SERPL-MCNC: 16.4 NG/ML (ref 4.6–34.8)
GFR SERPL CREATININE-BSD FRML MDRD: >90 ML/MIN/1.73M2
GLUCOSE BLDC GLUCOMTR-MCNC: 157 MG/DL (ref 70–99)
GLUCOSE BLDC GLUCOMTR-MCNC: 167 MG/DL (ref 70–99)
GLUCOSE BLDC GLUCOMTR-MCNC: 174 MG/DL (ref 70–99)
GLUCOSE BLDC GLUCOMTR-MCNC: 352 MG/DL (ref 70–99)
GLUCOSE BLDC GLUCOMTR-MCNC: 356 MG/DL (ref 70–99)
GLUCOSE SERPL-MCNC: 167 MG/DL (ref 70–99)
HCT VFR BLD AUTO: 25 % (ref 40–53)
HGB BLD-MCNC: 7.4 G/DL (ref 13.3–17.7)
HOLD SPECIMEN: NORMAL
HOLD SPECIMEN: NORMAL
MAGNESIUM SERPL-MCNC: 2.1 MG/DL (ref 1.7–2.3)
MCH RBC QN AUTO: 25.9 PG (ref 26.5–33)
MCHC RBC AUTO-ENTMCNC: 29.6 G/DL (ref 31.5–36.5)
MCV RBC AUTO: 87 FL (ref 78–100)
PHOSPHATE SERPL-MCNC: 2.6 MG/DL (ref 2.5–4.5)
PLATELET # BLD AUTO: 100 10E3/UL (ref 150–450)
POTASSIUM SERPL-SCNC: 3.7 MMOL/L (ref 3.4–5.3)
PROT SERPL-MCNC: 5.1 G/DL (ref 6.4–8.3)
RBC # BLD AUTO: 2.86 10E6/UL (ref 4.4–5.9)
SODIUM SERPL-SCNC: 143 MMOL/L (ref 136–145)
VIT B12 SERPL-MCNC: 579 PG/ML (ref 232–1245)
WBC # BLD AUTO: 11.1 10E3/UL (ref 4–11)

## 2023-04-14 PROCEDURE — 82607 VITAMIN B-12: CPT | Performed by: STUDENT IN AN ORGANIZED HEALTH CARE EDUCATION/TRAINING PROGRAM

## 2023-04-14 PROCEDURE — 250N000011 HC RX IP 250 OP 636

## 2023-04-14 PROCEDURE — 250N000013 HC RX MED GY IP 250 OP 250 PS 637

## 2023-04-14 PROCEDURE — 999N000007 HC SITE CHECK

## 2023-04-14 PROCEDURE — 250N000013 HC RX MED GY IP 250 OP 250 PS 637: Performed by: STUDENT IN AN ORGANIZED HEALTH CARE EDUCATION/TRAINING PROGRAM

## 2023-04-14 PROCEDURE — 85027 COMPLETE CBC AUTOMATED: CPT

## 2023-04-14 PROCEDURE — 36592 COLLECT BLOOD FROM PICC: CPT

## 2023-04-14 PROCEDURE — 99232 SBSQ HOSP IP/OBS MODERATE 35: CPT | Mod: GC | Performed by: INTERNAL MEDICINE

## 2023-04-14 PROCEDURE — 83735 ASSAY OF MAGNESIUM: CPT

## 2023-04-14 PROCEDURE — 84100 ASSAY OF PHOSPHORUS: CPT | Performed by: INTERNAL MEDICINE

## 2023-04-14 PROCEDURE — 97116 GAIT TRAINING THERAPY: CPT | Mod: GP

## 2023-04-14 PROCEDURE — 82746 ASSAY OF FOLIC ACID SERUM: CPT | Performed by: STUDENT IN AN ORGANIZED HEALTH CARE EDUCATION/TRAINING PROGRAM

## 2023-04-14 PROCEDURE — 97110 THERAPEUTIC EXERCISES: CPT | Mod: GO

## 2023-04-14 PROCEDURE — 80053 COMPREHEN METABOLIC PANEL: CPT

## 2023-04-14 PROCEDURE — 36592 COLLECT BLOOD FROM PICC: CPT | Performed by: STUDENT IN AN ORGANIZED HEALTH CARE EDUCATION/TRAINING PROGRAM

## 2023-04-14 PROCEDURE — 97530 THERAPEUTIC ACTIVITIES: CPT | Mod: GP

## 2023-04-14 PROCEDURE — 97535 SELF CARE MNGMENT TRAINING: CPT | Mod: GO

## 2023-04-14 PROCEDURE — 120N000002 HC R&B MED SURG/OB UMMC

## 2023-04-14 PROCEDURE — 82310 ASSAY OF CALCIUM: CPT

## 2023-04-14 PROCEDURE — 82248 BILIRUBIN DIRECT: CPT

## 2023-04-14 PROCEDURE — 250N000013 HC RX MED GY IP 250 OP 250 PS 637: Performed by: INTERNAL MEDICINE

## 2023-04-14 PROCEDURE — 99233 SBSQ HOSP IP/OBS HIGH 50: CPT | Mod: GC | Performed by: STUDENT IN AN ORGANIZED HEALTH CARE EDUCATION/TRAINING PROGRAM

## 2023-04-14 RX ORDER — FUROSEMIDE 20 MG
20 TABLET ORAL DAILY
Status: DISCONTINUED | OUTPATIENT
Start: 2023-04-15 | End: 2023-04-18

## 2023-04-14 RX ORDER — LIDOCAINE 4 G/G
2 PATCH TOPICAL
Status: DISCONTINUED | OUTPATIENT
Start: 2023-04-14 | End: 2023-05-13

## 2023-04-14 RX ORDER — POTASSIUM CHLORIDE 750 MG/1
20 CAPSULE, EXTENDED RELEASE ORAL DAILY
Status: DISCONTINUED | OUTPATIENT
Start: 2023-04-14 | End: 2023-04-15

## 2023-04-14 RX ADMIN — DICLOFENAC SODIUM 2 G: 10 GEL TOPICAL at 11:52

## 2023-04-14 RX ADMIN — Medication 1 TABLET: at 08:41

## 2023-04-14 RX ADMIN — FLUCONAZOLE 200 MG: 200 TABLET ORAL at 08:41

## 2023-04-14 RX ADMIN — LEVOTHYROXINE SODIUM 100 MCG: 100 TABLET ORAL at 08:41

## 2023-04-14 RX ADMIN — MICONAZOLE NITRATE: 20 CREAM TOPICAL at 11:52

## 2023-04-14 RX ADMIN — ASPIRIN 81 MG: 81 TABLET ORAL at 08:41

## 2023-04-14 RX ADMIN — MICONAZOLE NITRATE: 20 CREAM TOPICAL at 11:53

## 2023-04-14 RX ADMIN — Medication 5 ML: at 06:40

## 2023-04-14 RX ADMIN — POTASSIUM CHLORIDE 20 MEQ: 750 CAPSULE, EXTENDED RELEASE ORAL at 14:43

## 2023-04-14 RX ADMIN — SPIRONOLACTONE 200 MG: 100 TABLET ORAL at 08:41

## 2023-04-14 RX ADMIN — DICLOFENAC SODIUM 2 G: 10 GEL TOPICAL at 23:48

## 2023-04-14 RX ADMIN — LIDOCAINE PATCH 4% 2 PATCH: 40 PATCH TOPICAL at 14:43

## 2023-04-14 RX ADMIN — CARVEDILOL 12.5 MG: 12.5 TABLET, FILM COATED ORAL at 18:31

## 2023-04-14 RX ADMIN — LISINOPRIL 20 MG: 10 TABLET ORAL at 11:45

## 2023-04-14 RX ADMIN — CARVEDILOL 12.5 MG: 12.5 TABLET, FILM COATED ORAL at 08:41

## 2023-04-14 RX ADMIN — TESTOSTERONE 25 MG OF TESTOSTERONE: 50 GEL TOPICAL at 08:42

## 2023-04-14 RX ADMIN — Medication 5 ML: at 10:59

## 2023-04-14 RX ADMIN — ACETAMINOPHEN 650 MG: 325 TABLET, FILM COATED ORAL at 11:45

## 2023-04-14 RX ADMIN — POLYETHYLENE GLYCOL 3350 17 G: 17 POWDER, FOR SOLUTION ORAL at 08:42

## 2023-04-14 RX ADMIN — Medication 5 ML: at 18:26

## 2023-04-14 ASSESSMENT — ACTIVITIES OF DAILY LIVING (ADL)
ADLS_ACUITY_SCORE: 41

## 2023-04-14 NOTE — PROGRESS NOTES
Endocrine Progress Note  Patient: Gustavo Faria   MRN: [id number]  Date of Service: 04/14/2023    HPI summary and hospitalization course:  Gustavo Faria is a 58 year old male with PMHx of ACTH-secreting macroadenoma c/b central hypothyroidism, and central hypogonadism,  s/p transphenoidal surgery 5/2021 and 7/2021 in [place] has baseline records 3rd nerve palsy, ongoing serratia RLE cellulitis c/b recent serratia bacteremia, HFrEF, T2DM, and HTN who [facility] of known expanding large sellar/suprasellar mass extending into cavernous sinuses and subsequent cranial nerve impingement.    [facility] and bilateral lower limb cellulitis/abscess developed sudden onset of headaches and double vision transferred to [facility] for multidisciplinary assessment.  Had an MRI done on 3/25 which demonstrates enlargement of known pituitary adenoma with necrosis extending laterally beyond the left cavernous sinus concerning for compression of the cranial nerves at the cavernous sinus.  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  On the arrival he was encephalopathic.  Taken off dexamethasone on 04/06/2023 with improvement in the mental status following that.    Repeated MRI following transfer to [facility] showed new enhancing to 1.4 cm lesion in the right frontal lobe with susceptibility of artifact.  4.3 cm heterogeneously enhancing pituitary mass with encasement and peripheral displacement of the cavernous portions of both internal carotid arteries and infiltration of the clivus.  Previous optic chiasm compression is resolved.  Pituitary stalk is midline.  Lesion is infiltrating both cavernous sinuses cranial nerves in the cavernous sinus  cannot be differentiated from the underlying mass.    Interval history:  Dose of spironolactone was increased yesterday to 200 mg.  BMP today morning is stable sodium and potassium levels.  Blood pressure control improved with increasing lisinopril and spironolactone.  He looks much better today.  Stated he is going to work with the physical therapy today.  Stated his appetite is better but he still not as it was at home before.  No nausea or vomiting.  No headaches.        Physical Examination:  BP (!) 137/92 (BP Location: Left arm)   Pulse 59   Temp 98.1  F (36.7  C) (Oral)   Resp 14   Wt 66 kg (145 lb 8.1 oz)   SpO2 99%   BMI 23.48 kg/m    Physical Exam  Vitals reviewed.   Constitutional:       General: He is not in acute distress.  Eyes:      Comments: Left eye ptosis   Cardiovascular:      Rate and Rhythm: Normal rate.   Pulmonary:      Effort: Pulmonary effort is normal.   Neurological:      Mental Status: He is alert and oriented to person, place, and time.   Psychiatric:         Mood and Affect: Mood normal.         Behavior: Behavior normal.         Medications:  Reviewed    Endocrine Labs:     Latest Reference Range & Units 04/14/23 06:44   Sodium 136 - 145 mmol/L 143   Potassium 3.4 - 5.3 mmol/L 3.7   Chloride 98 - 107 mmol/L 104   Carbon Dioxide (CO2) 22 - 29 mmol/L 27   Urea Nitrogen 6.0 - 20.0 mg/dL 9.2   Creatinine 0.67 - 1.17 mg/dL 0.77   GFR Estimate >60 mL/min/1.73m2 >90   Calcium 8.6 - 10.0 mg/dL 9.1   Anion Gap 7 - 15 mmol/L 12   Magnesium 1.7 - 2.3 mg/dL 2.1   Phosphorus 2.5 - 4.5 mg/dL 2.6   Albumin 3.5 - 5.2 g/dL 3.0 (L)   Protein Total 6.4 - 8.3 g/dL 5.1 (L)   Alkaline Phosphatase 40 - 129 U/L 336 (H)   ALT 10 - 50 U/L 188 (H)   AST 10 - 50 U/L 44           Images:  MRI brain with and without contrast on 4/6/2023:  Comparison:  Outside brain MR 3/28/2023 and 5/25/2021.      Technique:   1. Brain MRI: Axial diffusion and FLAIR images of the whole brain  obtained without intravenous  contrast. Sagittal T1 and T2-weighted,  coronal T2-weighted, coronal T1-weighted images with focus on the  sella were obtained without intravenous contrast. Post intravenous  contrast using gadolinium coronal and sagittal T1-weighted images were  obtained focused on the sella. Dynamic postcontrast coronal  T1-weighted images were also obtained.     2. MRI of the Orbits focused on the orbits/visual pathways:  Axial  T2-weighted with inversion recovery and coronal T1-weighted images  were obtained without intravenous contrast. Axial and coronal  T1-weighted images with fat saturation were obtained after intravenous  gadolinium administration.     3. MRI Brain COW: Sagittal T1-weighted, axial T2-weighted with fat  saturation, turboFLAIR and diffusion-weighted with ADC map and coronal  T1-weighted and T2-weighted images of the brain were obtained without  intravenous contrast.       Contrast: 7.5mL Gadavist     Findings:    Brain MRI:  Enhancing 1.4 x 1.0 cm lesion with associated susceptibility artifact  in the right frontal lobe (series 16, image 17), stable since  5/25/2021. A similar punctate lesion in the left putamen also  unchanged.m     4.3 x 1.9 x 1.3 cm (right to left, AP, craniocaudal) heterogeneously  enhancing pituitary mass with encasement and peripheral displacement  of the cavernous portions of both internal carotid arteries (series  13, image 28 and series 14, image 11). The lesion infiltrates the  clivus. Compared with MRI from 2021 the craniocaudal dimension of the  mass is decreased. The lesion is now more heterogeneous, previously  more homogeneous. Previous optic chiasm compression is resolved. No  abnormal enhancement of the optic nerves within the intraorbital  portion. The pituitary stalk is midline.     The lesion infiltrates both cavernous sinuses. The traversing cranial  nerves within the cavernous sinus cannot be differentiated from the  underlying mass.     FLAIR images through the whole  brain shows nonspecific posterior  periventricular white matter hyperintensities. Otherwise no mass  effect, midline shift, or significant enlargement of the ventricles.  Scattered mucosal thickening of the paranasal sinuses. Mastoid air  cells are clear.     MRA Head: No aneurysm or stenosis of the major intracranial arteries  at the base of the brain.                                                                      Impression:   1.  4.3 cm heterogeneously enhancing pituitary mass with infiltration  of bilateral cavernous sinuses and the clivus. Findings are similar  when compared compared to prior exam 3/28/2023 suggestive for a  macroadenoma. Compared with an older MRI from 2021, the lesion appears  more heterogeneous and smaller. Previous compression of the optic  chiasm is no longer present.  2.  Patchy T2 hyperintense enhancing lesion with scattered  susceptibility artifacts in the right frontal subcortical white  matter. A similar tiny smaller lesion is also present in the left  putamen. Retrospectively this lesion was present back in 2021.  Constellation of findings are suggestive of a benign lesion such as  cavernoma or telangiectasia.  3.   MRA demonstrates no aneurysm or stenosis of the major  intracranial arteries. Bilateral cavernous internal carotid arteries  are patent despite encasement by the above-mentioned lesion.  4.  No abnormal optic nerve enhancement or optic nerve atrophy.        Assessment and plan:    Gustavo Faria is a 58 year old male with PMHx of  ACTH-secreting macroadenoma c/b central hypothyroidism, , and central hypogonadism,  s/p transphenoidal surgery 5/2021 and 7/2021 in [place] has baseline records 3rd nerve palsy, ongoing serratia RLE cellulitis c/b recent serratia bacteremia, HFrEF, T2DM, and HTN who was transferred from [facility] for possible surgical intervention of known expanding large sellar/suprasellar mass extending into cavernous sinuses and subsequent  cranial nerve impingement.     #Pituitary macroadenoma:  #Cushing's disease:  #Pituitary apoplexy:  Known history of ACTH secreting macroadenoma , [facility] showed enlargement of known pituitary macroadenoma with necrosis extending laterally beyond the left cavernous sinus concerning for compression of cranial nerves at the cavernous sinuses.  No acute hemorrhage, small area of diffusion restriction at the left side of pituitary possibly consistent with ischemic pituitary adenoma  Started on dexamethasone 2 mg twice daily, was discontinued on4/6/23  Random serum cortisol level at 2:30 AM was 46.3, ACTH 321.  A.m. cortisol level of 41.1 on 4/7/2023.  Repeated MRI on 4/6 showed a 4.3 cm ureteral diffusely enhancing pituitary mass with infiltration of bilateral cavernous sinus and clivus.  He has been developing recurrent hypernatremia/hypokalemia during the admission, was intermittently on dextrose 5% infusion last received on 4/12 discontinued at 5:30 AM.  Aggressive potassium repletion.  Prior to admission was on spironolactone 100 mg daily.  Spironolactone 100 mg was restarted on 4/11/2023.  Dose increased to 200 mg daily on 4/13/2023.     Recommendations:  -Resection/debulking  arranged by neurosurgery will be done on  04/27 will be followed by radiotherapy.  -Continue with the spironolactone 200 mg daily with titrating it up as tolerated maximum dose 400 mg to counteract the mineralocorticoid effect as needed.  Will continue to assess the response to see if any further treatment is needed to resume the electrolytes balance.  If the current measures fails, will consider starting on medication for Cushing's disease will consider ketoconazole if the ALT is normalized by that time if not we might consider Osilodrostat.  -For perioperative planning, no need to initiate the steroids replacement unless he decompensates during the surgery or  postoperatively he can get cortisol level following the surgery followed by a.m. cortisol level checks if there is any suspicion of postoperative AI to be started on steroids.       #Central hypothyroidism:  Prior to admission was on levothyroxine 100 mcg daily.  Free T4 on admission 1.3 TSH not detectable (picture of central hypothyroidism).     Recommendations:  -Continue with PTA levothyroxine 100 mcg daily.     #Hypogonadotrophic hypogonadism:  Was on AndroGel gel 1 pump daily.     Recommendations:  -Continue AndroGel.          #DM type II: Hemoglobin A1c on the admission 9.5%.  Prior to admission was on Sitagliptin 100 mg daily/metformin 500 mg twice daily, Jardiance 12.5 mg.  Oral intake improved significantly on 4/13 with total intake of 360 g of CHO.  He had a transient significant hyperglycemia of 304 at the noon time and responded well to the correctional scale.  Following that BG remained within the target.  Today his BG was within the target in the morning however following the breakfast increased to 352.  We will increase his carb coverage.    Please update us if the dextrose infusion is planned to be restarted, to adjust his sliding scale.     Recommendations:  -Continue with Lantus 10 units daily.  -medium-dose SSI 3 times daily before meals and at the bedtime.  -Will increase NovoLog carb coverage  1 unit: 15 g CHO to 1: 10 g CHO with meals and snacks.  -Hypoglycemia rescue protocol.  -POC BG checks 3 times daily AC at the bedtime at 2 AM.  -Carb counting protocol.  -Holding PTA oral medications.      Jeanne Chase     Endocrinology diabetes and metabolism  fellow   Pager number: [phone]    Attending tie-in note  I saw the patient with endocrine fellow Dr. Chase and directly examined patient and discussed. Agree above note and plan.       Wendy Tran MD  Staff Physician  Endocrinology and Metabolism  [facility]  License: [id number]  Pager: [phone]

## 2023-04-14 NOTE — PROGRESS NOTES
"SPIRITUAL HEALTH SERVICES Progress Note  [facility] 5A    Saw pt Gustavo Faria per follow up visit--on call  visited patient on 4/12. Per her notes patient requested spiritual health services but had difficulty tracking the conversation.     Patient/Family Understanding of Illness and Goals of Care - Gustavo shared that he had surgery to remove a tumor from his pituitary gland. He was also able to do physical therapy today which he was appreciative of.     Distress and Loss - difficult to assess. This was my first visit with the patient so I was unsure of mental health status baseline, but patient seemed to have an altered mental state: he requested the police to get a restraining order \"for fourteen days because I got a stalker.\" He also referenced finding a baby on his doorstep and caring for the baby (named Moses).     Strengths, Coping, and Resources - Gustavo named his wife Cece as a source of support.     Meaning, Beliefs, and Spirituality - Not discussed    Plan of Care - When asked what I could do to be helpful, Gustavo said \"get me the police, I need to talk to a , and patient bill of rights.\" I shared this information with unit SW and she went to follow up right away. Currently I have no plans for a follow up visit unless requested.     Dillon Shah MDiv  Chaplain Resident  Pager [phone]    * [facility] remains available 24/7 for emergent requests/referrals, either by having the switchboard page the on-call  or by entering an ASAP/STAT consult in Epic (this will also page the on-call ). Routine Epic consults receive an initial response within 24 hours.*    "

## 2023-04-14 NOTE — PLAN OF CARE
Goal Outcome Evaluation:      Plan of Care Reviewed With: patient    Overall Patient Progress: improvingOverall Patient Progress: improving    Outcome Evaluation: A&ox4. VSS on RA. Intermittently disoriented to situation with disorganized thinking. Calm and cooperative overnight. Snacks intermittently overnight not enough to cover. Carb coverage, and hai counts. BG stable. BLE wounds CDI. Back pain reported Voltaren gel given with relief, refused any oral meds. No BM, refused any medications ATT.  Using bedside urinal. No BM overnight.  Calls appropriately. Bed alarm on for safety. Continue POC    BP (!) 137/92 (BP Location: Left arm)   Pulse 59   Temp 98.1  F (36.7  C) (Oral)   Resp 14   Wt 66 kg (145 lb 8.1 oz)   SpO2 99%   BMI 23.48 kg/m

## 2023-04-14 NOTE — PROGRESS NOTES
Calorie Count  Intake recorded for: 4/13  Total Kcals: 1245 Total Protein: 30g  Kcals from Hospital Food: 1245  Protein: 30g  Kcals from Outside Food (average):0 Protein: 0g  # Meals Ordered from Kitchen: 4 meals   # Meals Recorded: 3 meals (First - 100% raisin bran, chocolate milk, grape juice, 2 apple juices, 25% banana)       (Second - 100% 3 small orange juices, monique cracker, 50% mac & cheese)       (Third - 100% diet Pepsi, 3 popsicles, 3 small orange juices, 50% cheese quesadilla)   # Supplements Recorded: 0

## 2023-04-14 NOTE — PROGRESS NOTES
[facility]    Progress Note - Medicine Service, MAROON TEAM 2       Date of Admission:  4/3/2023    Assessment & Plan   Gustavo Faria is a 58 year old male with recent Serratia bacteremia, HFrEF, prolonged QT, lower extremity wounds, DMII, hypothyroidism, low testosterone and known ACTH secreting pituitary adenoma w/resulting Cushing's disease s/p 2 partial resections in 2021 who [facility]. [facility], he developed new onset double vision and severe headache which prompted imaging that showed a large mass invading the cavernous sinuses and impinging on the cranial nerves. He is transferred to [facility] for this pituitary adenoma, concern for progression vs hemorrhage/apoplexy. Complicated by psychosis/metabolic encephalopathy and electrolyte abnormalities secondary to Cushing's syndrome.    Updates today:  - Electrolytes stable this AM  - Continue spironolactone 200 mg daily  - Resume PTA potassium supplements  - Discontinue RN-managed potassium   - Resume home Lasix at 20 mg daily (half the PTA dose)  - PT continues to recommend TCU, patient likely to be medically ready in coming 1-2 days  - LFTs improving     # Hypernatremia, DI vs cushing's, resolved  # Hypokalemia, resolved  Hypernatremia and hypokalemia, likely secondary to Cushing's.     - Endocrine following  - Goal Na: 135-140  - Resume spironolactone, can go up to 400 mg daily depending on Na and K  - Strict I&Os  - Daily BMP + Mg  - PTA potassium supplement 20 mEq    # Acute metabolic encephalopathy, improving  # Pyruria, Candiduria  # Encephalopathy likely secondary to cushing's disease  Admission symptoms included disorientation, intermittently following directions. Repetitive words - perseverating on particular phrases. Sx started the 2-3 days prior to admission (which [facility] was the [facility]). Vulnerable brain  (multiple brain surgeries), enlarging pituitary mass. No seizure activity per EEG. Steroids removed and pt still encephalopathy. Sodium has been in normal limits and patient remains altered. Worked up for infections - did reveal candiduria. Biggest concern for infectious vs cushing's. Most likely cushing's given largely unremarkable infectious work up.  - CT abd w/ contrast (4/10): no evidence of pyelonephritis  - Urine culture: candida, sensitive to Fluconazole  - Fluconazole 200 mg daily x7 days, completes 4/17  - Discontinued ceftriaxone as patient did not improve clinically with antibiotics  - No steroids  - Surgical plans for pituitary tumor as stated below  - Delirium precautions  - Electrolyte management  - PRN quetiapine if agitation that is not responsive to behavioral interventions     # ACTH secreting pituitary adenoma c/b type II DM, hypothyroidism and low testosterone s/p two partial resections in 2021   Patient noted double vision and severe headache beginning the evening of 3/25. CT imaging on 3/25 revealed a large sellar/suprasellar mass extending into the cavernous sinuses with no evidence of acute stroke. On 3/28, he underwent an MRI which confirmed the CT findings of a large mass invading the cavernous sinuses and impinging on the cranial nerves. Necrosis extending beyond left cavernous sinus. [facility]. Repeat MRI performed on 4/6/23: compared to 2021 MRI, lesions are smaller. Orbit MRI without optic nerve compression and no evidence of orbital infection.  - Neurosurgery following  - Plans for Neurosurgery: surgery 4/27/23  - Optimize medically prior to surgery  - Hold ASA 1 week prior to surgery  - Hold Empagliflozin 3 days prior to surgery  - Okay to discontinue Dexamethasone  - Ophthalmology following, no major changes, agree with neurosurgery plans  - Endocrine following     # Type II DM, exacerbated by Cushing's   A1c of 7.6% on 2/2023. Patient [facility] the following regimen  [facility].    - Lantus 10 units  - MD Sliding scale insulin  - Carb coverage 1:15  - Hold PTA metformin, empa, and sitagliptin     # Buttocks, sacrum and bilateral groin wounds   # RLE wound from puncture injury   # L dorsal foot wound from presumed trauma   # Soft tissue infection/abscess    # Hx serratia bacteremia in December 2022   For patient's lower extremity wounds and hx of Serratia bacteremia in December, he was initially started on [facility]efazolin at the [facility]. His antibiotics were broadened to Vanc and Zosyn and ultimately he was transitioned to ceftazidime on 3/23 with plan to complete course on 4/4/23. BCx negative and wound cx grew serratia marcescens at the [facility]. He has been on ceftazidime since. MRI of the right tib/fib on 3/23 was negative for osteomyelitis but did show two fluid collections draining sponateneously. [facility]o [facility] at the [facility] and determined no intervention was needed as wounds were draining on their own.  - Stop ceftazidime (3/23 - 4/5)  - WOC following  - PT/OT when able to follow commands  - Pain: tylenol PRN, dilaudid 0.5 mg PRN for dressing changes and oxy 5 mg PRN - however given encephalopathy, use sparingly    # HFmrEF, with global hypokinesis  # High burden of PVCs  # Prolonged QTc   # Elevated troponin, down-trended  Coronary angiogram on 2/27 shows normal coronaries. Troponin down-trending (88 -> 77). No chest pain. High burden of PVCs. Follow up ECHO with global hypokinesis, which is worse compared to February 2023 ECHO. EF is 45% (same compared to prior). Likely small vessel disease vs demand vs cardiomyopathy 2/2 ceftazidime. Low concern for acute ACS.   - Discontinued tele  - Electrolyte mgmt as above  - No need for diuresis at this time - given hyper-K  - Continue PTA coreg  - Lisinopril 10  - Empagliflozin (see above)  - Resume ASA (hold 1 week prior to surgery)  - Avoid QTc prolonging medications     # Central Hypothyroidism  - Continue PTA levothyroxine     #  Hypogonadism   - Continue PTA androgel (will need to bring in home medication)     # Chronic microcytic anemia   Hgb of 8.4 [facility]. Peripheral smear on 3/30 showed poikilocytosis with occasional red blood cell fragments but no other evidence of hemolysis.  Haptoglobin was normal.  Ferritin was 91, transferrin saturation was low, B12 was 495 and folate was 8.7. Likely combination of iron deficiency as well as inflammation/chronic disease.   - CTM        # Concern for malnutrition   - Nutrition following    Clinically Significant Risk Factors        # Hypokalemia: Lowest K = 3.2 mmol/L in last 2 days, will replace as needed    # Hypercalcemia: corrected calcium is >10.1, will monitor as appropriate    # Hypoalbuminemia: Lowest albumin = 2.9 g/dL at 4/11/2023  7:07 AM, will monitor as appropriate   # Thrombocytopenia: Lowest platelets = 97 in last 2 days, will monitor for bleeding         # DMII: A1C = 9.5 % (Ref range: <5.7 %) within past 6 months    # Severe Malnutrition: based on nutrition assessment      The patient's care was discussed with the Attending Physician, Dr. Chris Burnett.    Linda Azul MD  Medicine Service, [address]  Securely message with [employer] (more info)  Text page via [employer] Paging/Directory   See signed in provider for up to date coverage information  ______________________________________________________________________    Interval History    - RN notes reviewed  - Doing well this morning  - No major concerns   - Tylenol was helping with his hip pain that he reported yesterday    Physical Exam   Vital Signs: Temp: 98.1  F (36.7  C) Temp src: Oral BP: 129/86 Pulse: 64   Resp: 16 SpO2: 100 % O2 Device: None (Room air)    Weight: 145 lbs 8.06 oz     General Appearance: Eyes open, tracking, answers yes/no questions, no distress, up in chair, conversational   Eyes: Pupils moderate size, no nystagmus, EOMI  HEENT: Normal  sclera, able to move neck FROM  Respiratory: Breathing comfortably on RA, CTAB  Cardiovascular: RRR, no murmurs  GI: BS+. Soft, nontender, nondistended. No guarding or rebound tenderness.   Skin: Superficial, open wounds on the posterior buttocks midline  Musculoskeletal: No muscle wasting  Neuro:  strength intact, ankle strength intact, and sensation intact, EOMI  Psychiatric: Oriented to time, person, place, and situation     Data     I have personally reviewed the following data over the past 24 hrs:    11.1 (H)  \   7.4 (L)   / 100 (L)     143 104 9.2 /  167 (H)   3.7 27 0.77 \       ALT: 188 (H) AST: 44 AP: 336 (H) TBILI: 0.5   ALB: 3.0 (L) TOT PROTEIN: 5.1 (L) LIPASE: N/A

## 2023-04-14 NOTE — PLAN OF CARE
Problem: Plan of Care - These are the overarching goals to be used throughout the patient stay.    Goal: Plan of Care Review  Description: The Plan of Care Review/Shift note should be completed every shift.  The Outcome Evaluation is a brief statement about your assessment that the patient is improving, declining, or no change.  This information will be displayed automatically on your shift note.  4/14/2023 1530 by Isabella Short RN  Outcome: Progressing  Flowsheets (Taken 4/14/2023 1530)  Outcome Evaluation: Disortiented to time and situation.  Disorganized thoughts and word finding difficulty.  Also perseverates on topics at times.  Paranoia noted as well, patient complaining he has a stalker and that we should call the police to report this.  Patient reassured that he is safe here.  Redirected well this afternoon.  Complains of pain in his back.  Voltaren gel applied, tylenol given, and lidoderm patch placed.  Able to get up with sba 2 and walker this morning with PT and took about 4 steps forward and 4 steps back.  Intact fair, calorie counts done.  Glucoses elevated today again and he is not good about letting ancillary staff know he is on carb counts.  Drinks a lot of juice and water ices.

## 2023-04-14 NOTE — PROGRESS NOTES
Care Management Follow Up    Length of Stay (days): 11    Expected Discharge Date: 04/15/2023     Concerns to be Addressed: all concerns addressed in this encounter     Patient plan of care discussed at interdisciplinary rounds: Yes    Anticipated Discharge Disposition: Transitional Care     Anticipated Discharge Services: Transportation Services  Anticipated Discharge DME: None    Patient/family educated on Medicare website which has current facility and service quality ratings: yes  Education Provided on the Discharge Plan:  yes  Patient/Family in Agreement with the Plan:  yes    Referrals Placed by CM/SW:       [facility]  [address] [place]  [address]  P: [phone]  F: [phone]  4/14: Initial SNF referral sent via [facility]  [address] [place] [address]  P: [phone]  F: [phone]  4/14: Initial SNF referral sent via [facility]  [address] [place] [address]  P: [phone]  F: [phone]  4/14: Initial SNF referral sent via [address] [address]  P: [phone]  F: [phone]  4/14: Initial SNF referral sent via Epic    [facility]    [place] [address] [address] [phone]  Declined: Assist of two    Private pay costs discussed: Not applicable    Additional Information:  SW will continue to follow for discharge needs and placement       SAMIRA Morrison  [address]   Office: [phone]  Pager: [phone]  [email or web address]

## 2023-04-15 LAB
ALBUMIN SERPL BCG-MCNC: 3.1 G/DL (ref 3.5–5.2)
ALP SERPL-CCNC: 322 U/L (ref 40–129)
ALT SERPL W P-5'-P-CCNC: 155 U/L (ref 10–50)
ANION GAP SERPL CALCULATED.3IONS-SCNC: 10 MMOL/L (ref 7–15)
AST SERPL W P-5'-P-CCNC: 36 U/L (ref 10–50)
BILIRUB DIRECT SERPL-MCNC: <0.2 MG/DL (ref 0–0.3)
BILIRUB SERPL-MCNC: 0.5 MG/DL
BUN SERPL-MCNC: 10.7 MG/DL (ref 6–20)
CALCIUM SERPL-MCNC: 9.1 MG/DL (ref 8.6–10)
CHLORIDE SERPL-SCNC: 101 MMOL/L (ref 98–107)
CREAT SERPL-MCNC: 0.81 MG/DL (ref 0.67–1.17)
DEPRECATED HCO3 PLAS-SCNC: 28 MMOL/L (ref 22–29)
GFR SERPL CREATININE-BSD FRML MDRD: >90 ML/MIN/1.73M2
GLUCOSE BLDC GLUCOMTR-MCNC: 157 MG/DL (ref 70–99)
GLUCOSE BLDC GLUCOMTR-MCNC: 163 MG/DL (ref 70–99)
GLUCOSE BLDC GLUCOMTR-MCNC: 178 MG/DL (ref 70–99)
GLUCOSE BLDC GLUCOMTR-MCNC: 191 MG/DL (ref 70–99)
GLUCOSE BLDC GLUCOMTR-MCNC: 207 MG/DL (ref 70–99)
GLUCOSE SERPL-MCNC: 142 MG/DL (ref 70–99)
HOLD SPECIMEN: NORMAL
INSULIN GROWTH FACTOR 1 (EXTERNAL): <10 NG/ML (ref 50–317)
MAGNESIUM SERPL-MCNC: 2.1 MG/DL (ref 1.7–2.3)
PHOSPHATE SERPL-MCNC: 2.6 MG/DL (ref 2.5–4.5)
POTASSIUM SERPL-SCNC: 3.6 MMOL/L (ref 3.4–5.3)
PROT SERPL-MCNC: 5.1 G/DL (ref 6.4–8.3)
SODIUM SERPL-SCNC: 139 MMOL/L (ref 136–145)

## 2023-04-15 PROCEDURE — 250N000013 HC RX MED GY IP 250 OP 250 PS 637

## 2023-04-15 PROCEDURE — 36592 COLLECT BLOOD FROM PICC: CPT

## 2023-04-15 PROCEDURE — 99233 SBSQ HOSP IP/OBS HIGH 50: CPT | Mod: GC | Performed by: STUDENT IN AN ORGANIZED HEALTH CARE EDUCATION/TRAINING PROGRAM

## 2023-04-15 PROCEDURE — 120N000002 HC R&B MED SURG/OB UMMC

## 2023-04-15 PROCEDURE — 250N000013 HC RX MED GY IP 250 OP 250 PS 637: Performed by: INTERNAL MEDICINE

## 2023-04-15 PROCEDURE — 250N000013 HC RX MED GY IP 250 OP 250 PS 637: Performed by: STUDENT IN AN ORGANIZED HEALTH CARE EDUCATION/TRAINING PROGRAM

## 2023-04-15 PROCEDURE — 250N000011 HC RX IP 250 OP 636

## 2023-04-15 PROCEDURE — 83735 ASSAY OF MAGNESIUM: CPT

## 2023-04-15 PROCEDURE — 84100 ASSAY OF PHOSPHORUS: CPT | Performed by: STUDENT IN AN ORGANIZED HEALTH CARE EDUCATION/TRAINING PROGRAM

## 2023-04-15 PROCEDURE — 82248 BILIRUBIN DIRECT: CPT

## 2023-04-15 PROCEDURE — 80053 COMPREHEN METABOLIC PANEL: CPT

## 2023-04-15 PROCEDURE — 84155 ASSAY OF PROTEIN SERUM: CPT

## 2023-04-15 PROCEDURE — 82310 ASSAY OF CALCIUM: CPT

## 2023-04-15 RX ORDER — POTASSIUM CHLORIDE 750 MG/1
20 TABLET, EXTENDED RELEASE ORAL DAILY
Status: DISCONTINUED | OUTPATIENT
Start: 2023-04-15 | End: 2023-04-18

## 2023-04-15 RX ADMIN — MICONAZOLE NITRATE: 20 CREAM TOPICAL at 08:43

## 2023-04-15 RX ADMIN — CARVEDILOL 12.5 MG: 12.5 TABLET, FILM COATED ORAL at 08:42

## 2023-04-15 RX ADMIN — DICLOFENAC SODIUM 2 G: 10 GEL TOPICAL at 17:38

## 2023-04-15 RX ADMIN — MICONAZOLE NITRATE: 20 CREAM TOPICAL at 08:44

## 2023-04-15 RX ADMIN — Medication 1 TABLET: at 08:41

## 2023-04-15 RX ADMIN — LIDOCAINE PATCH 4% 2 PATCH: 40 PATCH TOPICAL at 08:40

## 2023-04-15 RX ADMIN — DICLOFENAC SODIUM 2 G: 10 GEL TOPICAL at 08:40

## 2023-04-15 RX ADMIN — LEVOTHYROXINE SODIUM 100 MCG: 100 TABLET ORAL at 08:42

## 2023-04-15 RX ADMIN — MICONAZOLE NITRATE: 20 CREAM TOPICAL at 22:03

## 2023-04-15 RX ADMIN — LISINOPRIL 20 MG: 10 TABLET ORAL at 12:24

## 2023-04-15 RX ADMIN — ACETAMINOPHEN 650 MG: 325 TABLET, FILM COATED ORAL at 18:06

## 2023-04-15 RX ADMIN — POTASSIUM CHLORIDE 20 MEQ: 750 TABLET, EXTENDED RELEASE ORAL at 08:42

## 2023-04-15 RX ADMIN — ASPIRIN 81 MG: 81 TABLET ORAL at 08:41

## 2023-04-15 RX ADMIN — FLUCONAZOLE 200 MG: 200 TABLET ORAL at 08:42

## 2023-04-15 RX ADMIN — SPIRONOLACTONE 200 MG: 100 TABLET ORAL at 08:42

## 2023-04-15 RX ADMIN — HYDROXYZINE HYDROCHLORIDE 10 MG: 10 TABLET ORAL at 18:05

## 2023-04-15 RX ADMIN — CARVEDILOL 12.5 MG: 12.5 TABLET, FILM COATED ORAL at 18:05

## 2023-04-15 RX ADMIN — TESTOSTERONE 25 MG OF TESTOSTERONE: 50 GEL TOPICAL at 08:41

## 2023-04-15 RX ADMIN — POLYETHYLENE GLYCOL 3350 17 G: 17 POWDER, FOR SOLUTION ORAL at 08:41

## 2023-04-15 RX ADMIN — Medication 5 ML: at 17:38

## 2023-04-15 RX ADMIN — FUROSEMIDE 20 MG: 20 TABLET ORAL at 08:41

## 2023-04-15 RX ADMIN — Medication 5 ML: at 06:37

## 2023-04-15 ASSESSMENT — ACTIVITIES OF DAILY LIVING (ADL)
ADLS_ACUITY_SCORE: 48
ADLS_ACUITY_SCORE: 44
ADLS_ACUITY_SCORE: 41
ADLS_ACUITY_SCORE: 41
ADLS_ACUITY_SCORE: 48
ADLS_ACUITY_SCORE: 48
ADLS_ACUITY_SCORE: 41
ADLS_ACUITY_SCORE: 44
ADLS_ACUITY_SCORE: 41
ADLS_ACUITY_SCORE: 43

## 2023-04-15 NOTE — PROGRESS NOTES
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 on 3/23. He developed sudden headache and vision changes on 3/25, with MRI brain on 3/28, which demonstrated enlargement of known pituitary adenoma, with necrosis extending laterally beyond the left cavernous sinus, concerning for compression of cranial nerves at cavernous sinus. 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      Given that he is past the acute period of apoplexy, there is no indication for emergent decompression. He would benefit for redo EEA for resection of adenoma given its symptomatic nature. However, he would need to be medically optimized from the metabolic and infectious standpoint prior to surgery. He would also benefit from a multidisciplinary approach with recommendations from ophthalmology, endocrinology and radiation oncology regarding options for treatment.     Plan:  -Patient's wife is the legal decision maker-consent obtained for the surgery-uploaded in chart  - CT/CTA head- stereotaxy protocol to be completed night before the surgery  - Okay to discharge from neurosurgery perspective-if deemed medically stable by the primary team  - Please stop aspirin 7 days before surgery-surgery planned for 4/27/2023 with ENT colleagues for resection of pituitary adenoma  - Neurosurgery will continue to follow   -----------------------------------  Oliver Duogn  Neurosurgery PGY2    Please contact neurosurgery resident on call with questions.    Dial * * *[phone], enter [phone] when  prompted.    Interval History: No acute events overnight that were brought to neurosurgery's attention.    Objective:   Temp:  [98.2  F (36.8  C)-98.6  F (37  C)] 98.2  F (36.8  C)  Pulse:  [62-82] 71  Resp:  [16-18] 18  BP: (140-159)/() 140/95  SpO2:  [95 %-100 %] 97 %  I/O last 3 completed shifts:  In: 600 [P.O.:600]  Out: 1175 [Urine:1175]      NEUROLOGIC:  -- Opens eyes to voice, following commands, oriented x1  -- Able to name objects  -- Speech limited, perseverating speech, minimal spontaneous speech  Cranial Nerves:  -- visual fields full to confrontation although with limited participation, PERRL anisocoric L>R and sluggish, left CN III paresis and CN VI palsy, restricted ROM in right EOM with no apparent asymmetry   -- face symmetrical, tongue midline  -- sensory V1-V3 intact bilaterally  -- palate elevates symmetrically, uvula midline  -- hearing grossly intact bilat     Motor:  Limited participation - antigravity x4 extremities     Sensory:  intact to LT x 4 extremities      Reflexes:       Bi Tri BR Omi Pat Ach Bab     C5-6 C7-8 C6 UMN L2-4 S1 UMN   R 2+ 2+ 2+ Norm 2+ 2+ Norm   L 2+ 2+ 2+ Norm 2+ 2+ Norm      Gait: Deferred.     LABS:  Recent Labs   Lab 04/15/23  0746 04/15/23  0641 04/15/23  0106 04/14/23  0805 04/14/23  0644 04/13/23  1718 04/13/23  1321   NA  --  139  --   --  143  --  142   POTASSIUM  --  3.6  --   --  3.7  --  3.7   CHLORIDE  --  101  --   --  104  --  104   CO2  --  28  --   --  27  --  25   ANIONGAP  --  10  --   --  12  --  13   * 142* 191*   < > 167*   < > 197*   BUN  --  10.7  --   --  9.2  --  9.4   CR  --  0.81  --   --  0.77  --  0.79   KORIN  --  9.1  --   --  9.1  --  9.4    < > = values in this interval not displayed.       Recent Labs   Lab 04/14/23  0644   WBC 11.1*   RBC 2.86*   HGB 7.4*   HCT 25.0*   MCV 87   MCH 25.9*   MCHC 29.6*   RDW 26.5*   *       IMAGING:  No results found for this or any previous visit (from the past 24 hour(s)).

## 2023-04-15 NOTE — PLAN OF CARE
Goal Outcome Evaluation:      Plan of Care Reviewed With: patient    Overall Patient Progress: no changeOverall Patient Progress: no change    Patients mentation improved, Axox4 overnight, Able to hold logical conversation, about previous work, current [employer] games/scores. Able to make needs known overnight. VSS ex htn. On RA. C/o pain in the lower back, volteran gel applied. Patient voiding in urinal, no bm overnight. PICC saline locked. Continue plan of care.

## 2023-04-15 NOTE — PROGRESS NOTES
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 on 3/23. He developed sudden headache and vision changes on 3/25, with MRI brain on 3/28, which demonstrated enlargement of known pituitary adenoma, with necrosis extending laterally beyond the left cavernous sinus, concerning for compression of cranial nerves at cavernous sinus. 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      Given that he is past the acute period of apoplexy, there is no indication for emergent decompression. He would benefit for redo EEA for resection of adenoma given its symptomatic nature. However, he would need to be medically optimized from the metabolic and infectious standpoint prior to surgery. He would also benefit from a multidisciplinary approach with recommendations from ophthalmology, endocrinology and radiation oncology regarding options for treatment.     Plan:  -Patient's wife is the legal decision maker-consent obtained for the surgery-uploaded in chart  - Okay to discharge from neurosurgery perspective-if deemed medically stable by the primary team  - Please stop aspirin 7 days before surgery-surgery planned for 4/27/2023 with ENT colleagues for resection of pituitary adenoma  - Neurosurgery will continue to follow   -----------------------------------  Oliver Duong  Neurosurgery PGY2    Please contact neurosurgery resident on call with questions.    Dial * * *[phone], enter [phone] when prompted.    Interval History: No acute events overnight that were brought  to neurosurgery's attention.    Objective:   Temp:  [98.2  F (36.8  C)-98.6  F (37  C)] 98.2  F (36.8  C)  Pulse:  [62-82] 71  Resp:  [16-18] 18  BP: (140-159)/() 140/95  SpO2:  [95 %-100 %] 97 %  I/O last 3 completed shifts:  In: 600 [P.O.:600]  Out: 1175 [Urine:1175]      NEUROLOGIC:  -- Opens eyes to voice, following commands, oriented x1  -- Able to name objects  -- Speech limited, perseverating speech, minimal spontaneous speech  Cranial Nerves:  -- visual fields full to confrontation although with limited participation, PERRL anisocoric L>R and sluggish, left CN III paresis and CN VI palsy, restricted ROM in right EOM with no apparent asymmetry   -- face symmetrical, tongue midline  -- sensory V1-V3 intact bilaterally  -- palate elevates symmetrically, uvula midline  -- hearing grossly intact bilat     Motor:  Limited participation - antigravity x4 extremities     Sensory:  intact to LT x 4 extremities      Reflexes:       Bi Tri BR Omi Pat Ach Bab     C5-6 C7-8 C6 UMN L2-4 S1 UMN   R 2+ 2+ 2+ Norm 2+ 2+ Norm   L 2+ 2+ 2+ Norm 2+ 2+ Norm      Gait: Deferred.     LABS:  Recent Labs   Lab 04/15/23  0746 04/15/23  0641 04/15/23  0106 04/14/23  0805 04/14/23  0644 04/13/23  1718 04/13/23  1321   NA  --  139  --   --  143  --  142   POTASSIUM  --  3.6  --   --  3.7  --  3.7   CHLORIDE  --  101  --   --  104  --  104   CO2  --  28  --   --  27  --  25   ANIONGAP  --  10  --   --  12  --  13   * 142* 191*   < > 167*   < > 197*   BUN  --  10.7  --   --  9.2  --  9.4   CR  --  0.81  --   --  0.77  --  0.79   KORIN  --  9.1  --   --  9.1  --  9.4    < > = values in this interval not displayed.       Recent Labs   Lab 04/14/23  0644   WBC 11.1*   RBC 2.86*   HGB 7.4*   HCT 25.0*   MCV 87   MCH 25.9*   MCHC 29.6*   RDW 26.5*   *       IMAGING:  No results found for this or any previous visit (from the past 24 hour(s)).

## 2023-04-15 NOTE — PROGRESS NOTES
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 He developed sudden headache and vision changes on 3/25, with MRI brain on 3/28, which demonstrated enlargement of known pituitary adenoma, with necrosis extending laterally beyond the left cavernous sinus, concerning for compression of cranial nerves at cavernous sinus. 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     Voids spont in urinal.    incont of stool    PICC DL SL    Able to make needs known.    Mentation waxes and wanes    voltaran cream and lido patches on back.    Sister Pilar here to visit.    Fair to poor po intake.    -207 with coverage and CC    mepilex placed on sacrum and buttocks - open eraser size areas with scant drainage and scabs on his chux pad.    Up in chair with lift.    Cont to offer meds on mar and repositioning for low back pain that is chronic.

## 2023-04-15 NOTE — PROGRESS NOTES
[facility]    Progress Note - Medicine Service, [facility] TEAM 2       Date of Admission:  4/3/2023    Assessment & Plan   Gustavo Faria is a 58 year old male with recent Serratia bacteremia, HFrEF, prolonged QT, lower extremity wounds, DMII, hypothyroidism, low testosterone and known ACTH secreting pituitary adenoma w/resulting Cushing's disease s/p 2 partial resections in 2021 who [facility]. [facility], he developed new onset double vision and severe headache which prompted imaging that showed a large mass invading the cavernous sinuses and impinging on the cranial nerves. He is transferred to [facility] for this pituitary adenoma, concern for progression vs hemorrhage/apoplexy. Complicated by psychosis/metabolic encephalopathy and electrolyte abnormalities secondary to Cushing's syndrome.    Updates today:  - Psychiatry consult to assist with intermittent delusions while in the hospital   - Electrolytes remain stable  - Spironolactone 200 mg daily   - Will dispo to TCU prior to surgery     # Hypernatremia, DI vs cushing's, resolved  # Hypokalemia, resolved  Hypernatremia and hypokalemia, likely secondary to Cushing's.     - Endocrine following  - Goal Na: 135-140  - Spironolactone 200 daily (can go up to 400 mg daily depending on Na and K, per Endocrine)  - Strict I&Os  - Daily BMP + Mg  - PTA potassium supplement 20 mEq    # Acute metabolic encephalopathy  # Pyruria, Candiduria  # Encephalopathy likely secondary to cushing's disease  Admission symptoms included disorientation, intermittently following directions. Repetitive words - perseverating on particular phrases. Sx started the 2-3 days prior to admission (which [facility] was the [facility]). Vulnerable brain (multiple brain surgeries), enlarging pituitary mass. No seizure activity per EEG. Steroids removed and pt still  encephalopathy. Sodium has been in normal limits and patient remains altered. Worked up for infections - did reveal candiduria. Biggest concern for infectious vs cushing's. Most likely cushing's given largely unremarkable infectious work up.  - CT abd w/ contrast (4/10): no evidence of pyelonephritis  - Urine culture: candida, sensitive to Fluconazole  - Fluconazole 200 mg daily x7 days, completes 4/17  - Discontinued ceftriaxone as patient did not improve clinically with antibiotics  - No steroids  - Surgical plans for pituitary tumor as stated below  - Delirium precautions  - Electrolyte management  - PRN quetiapine if agitation that is not responsive to behavioral interventions     # Delusions, intermittent  Intermittent delusions regarding staff stalking patient while in the hospital. Unclear chronicity of these delusions - but has had them multiple times this hospitalization. Unclear psych history. Medical management for encephalopathy as stated above. Related to tumor?  - Appreciate psychiatric evaluation for further guidance    # ACTH secreting pituitary adenoma c/b type II DM, hypothyroidism and low testosterone s/p two partial resections in 2021   Patient noted double vision and severe headache beginning the evening of 3/25. CT imaging on 3/25 revealed a large sellar/suprasellar mass extending into the cavernous sinuses with no evidence of acute stroke. On 3/28, he underwent an MRI which confirmed the CT findings of a large mass invading the cavernous sinuses and impinging on the cranial nerves. Necrosis extending beyond left cavernous sinus. [facility]. Repeat MRI performed on 4/6/23: compared to 2021 MRI, lesions are smaller. Orbit MRI without optic nerve compression and no evidence of orbital infection.  - Neurosurgery following  - Plans for Neurosurgery: surgery 4/27/23  - Optimize medically prior to surgery  - Okay to discontinue Dexamethasone  - Ophthalmology following, no major  changes, agree with neurosurgery plans  - Endocrine following    Surgery Prep:  - Hold ASA 1 week prior to surgery  - Hold Empagliflozin 3 days prior to surgery  - CT/CTA head- stereotaxy protocol to be completed night before the surgery     # Type II DM, exacerbated by Cushing's   A1c of 7.6% on 2/2023. Patient was [facility] the following regimen [facility].    - Lantus 10 units  - MD Sliding scale insulin  - Carb coverage 1:15  - Hold PTA metformin, empa, and sitagliptin, can resume upon discharge    # Buttocks, sacrum and bilateral groin wounds   # RLE wound from puncture injury   # L dorsal foot wound from presumed trauma   # Soft tissue infection/abscess    # Hx serratia bacteremia in December 2022   For patient's lower extremity wounds and hx of Serratia bacteremia in December, he was initially started [facility]efazolin at the [facility]. His antibiotics were broadened to Vanc and Zosyn and ultimately he was transitioned to ceftazidime on 3/23 with plan to complete course on 4/4/23. BCx negative and wound cx grew serratia marcescens at the [facility]. He has been on ceftazidime since. MRI of the right tib/fib on 3/23 was negative for osteomyelitis but did show two fluid collections draining sponateneously. [facility] at the [facility] and determined no intervention was needed as wounds were draining on their own.  - Stop ceftazidime (3/23 - 4/5)  - WOC following  - PT/OT when able to follow commands  - Pain: tylenol PRN, dilaudid 0.5 mg PRN for dressing changes and oxy 5 mg PRN - however given encephalopathy, use sparingly    # HFmrEF, with global hypokinesis  # High burden of PVCs  # Prolonged QTc   # Elevated troponin, down-trended  Coronary angiogram on 2/27 shows normal coronaries. Troponin down-trending (88 -> 77). No chest pain. High burden of PVCs. Follow up ECHO with global hypokinesis, which is worse compared to February 2023 ECHO. EF is 45% (same compared to prior). Likely small vessel disease vs demand vs  cardiomyopathy 2/2 ceftazidime. Low concern for acute ACS.   - Discontinued tele  - Electrolyte mgmt as above  - No need for diuresis at this time - given hyper-K  - Continue PTA coreg  - Lisinopril 10  - Empagliflozin (see above)  - Resume ASA (hold 1 week prior to surgery)  - Avoid QTc prolonging medications     # Central Hypothyroidism  - Continue PTA levothyroxine     # Hypogonadism   - Continue PTA androgel (will need to bring in home medication)     # Chronic microcytic anemia   Hgb of 8.4 [facility]. Peripheral smear on 3/30 showed poikilocytosis with occasional red blood cell fragments but no other evidence of hemolysis.  Haptoglobin was normal.  Ferritin was 91, transferrin saturation was low, B12 was 495 and folate was 8.7. Likely combination of iron deficiency as well as inflammation/chronic disease.   - CTM        # Concern for malnutrition   - Nutrition following    Clinically Significant Risk Factors           # Hypercalcemia: corrected calcium is >10.1, will monitor as appropriate    # Hypoalbuminemia: Lowest albumin = 2.9 g/dL at 4/11/2023  7:07 AM, will monitor as appropriate   # Thrombocytopenia: Lowest platelets = 100 in last 2 days, will monitor for bleeding         # DMII: A1C = 9.5 % (Ref range: <5.7 %) within past 6 months    # Severe Malnutrition: based on nutrition assessment      The patient's care was discussed with the Attending Physician, Dr. Chris Burnett.    Linda Azul MD  Medicine Service, [facility] TEAM 2  [facility]  Securely message with [employer] (more info)  Text page via "[employer] Paging/Directory   See signed in provider for up to date coverage information  ______________________________________________________________________    Interval History    - RN notes reviewed  - Reports from nursing staff that patient is having some non-aggressive delusions over the past 24-48 hours about medical staff stalking the patient and him  requesting the police to protect him  - Patient up in the chair this morning, no acute distress, no major concerns from his perspective    Physical Exam   Vital Signs: Temp: 98.2  F (36.8  C) Temp src: Oral BP: (!) 140/95 Pulse: 71   Resp: 18 SpO2: 97 % O2 Device: None (Room air)    Weight: 156 lbs 4.9 oz     General Appearance: Eyes open, tracking, answers yes/no questions, no distress, up in chair, conversational   Eyes: Pupils moderate size, no nystagmus, EOMI  HEENT: Normal sclera, able to move neck FROM  Respiratory: Breathing comfortably on RA, CTAB  Cardiovascular: RRR, no murmurs  GI: BS+. Soft, nontender, nondistended. No guarding or rebound tenderness.   Musculoskeletal: No muscle wasting  Neuro:  strength intact, ankle strength intact, and sensation intact, EOMI  Psychiatric: Oriented to time, person, place, and situation     Data     I have personally reviewed the following data over the past 24 hrs:    N/A  \   N/A   / N/A     139 101 10.7 /  207 (H)   3.6 28 0.81 \

## 2023-04-15 NOTE — PLAN OF CARE
Goal Outcome Evaluation:      Plan of Care Reviewed With: patient    Overall Patient Progress: improving    Outcome Evaluation: Alert, disoriented to time and situation. Word finding difficulty. Some illogical statements. Calling appropriately, making needs known. C/o discomfort in low back but reports lidocaine patch helping. HTN, all other VSS on RA. Pt in recliner at start of shift, moved back to bed via lift. Pt did not order dinner, thus no carb coverage administered. Poor appetite. Pt using bedside urinal. No Bm on shift. LE wound dressings CDI, due to be changed 4/15. Pt sleeping majority of shift and refused repositioning.

## 2023-04-15 NOTE — PROGRESS NOTES
Calorie Count  Intake recorded for: 4/14  Total Kcals: 975 Total Protein: 24g  Kcals from Hospital Food: 975   Protein: 24g  Kcals from Outside Food (average):0 Protein: 0g  # Meals Ordered from Kitchen: 5 meals  # Meals Recorded: 3 meals   First - 25% peanut butter and jelly sandwich  Second - 100% turkey sausage link, raisin bran cereal, 2 8 oz orange juice, 4 oz apple juice, 4 oz skim milk, 25% bagel w/ cream cheese  Third - 100% beef broth, 2 lemon fruit ice, 25% black bean quesadilla  # Supplements Recorded: 0

## 2023-04-16 LAB
ALBUMIN SERPL BCG-MCNC: 3.2 G/DL (ref 3.5–5.2)
ALP SERPL-CCNC: 345 U/L (ref 40–129)
ALT SERPL W P-5'-P-CCNC: 159 U/L (ref 10–50)
ANION GAP SERPL CALCULATED.3IONS-SCNC: 12 MMOL/L (ref 7–15)
AST SERPL W P-5'-P-CCNC: 60 U/L (ref 10–50)
BILIRUB SERPL-MCNC: 0.4 MG/DL
BUN SERPL-MCNC: 14.8 MG/DL (ref 6–20)
CALCIUM SERPL-MCNC: 9.1 MG/DL (ref 8.6–10)
CHLORIDE SERPL-SCNC: 102 MMOL/L (ref 98–107)
CREAT SERPL-MCNC: 0.78 MG/DL (ref 0.67–1.17)
DEPRECATED HCO3 PLAS-SCNC: 28 MMOL/L (ref 22–29)
ERYTHROCYTE [DISTWIDTH] IN BLOOD BY AUTOMATED COUNT: 27.1 % (ref 10–15)
GFR SERPL CREATININE-BSD FRML MDRD: >90 ML/MIN/1.73M2
GLUCOSE BLDC GLUCOMTR-MCNC: 173 MG/DL (ref 70–99)
GLUCOSE BLDC GLUCOMTR-MCNC: 195 MG/DL (ref 70–99)
GLUCOSE BLDC GLUCOMTR-MCNC: 211 MG/DL (ref 70–99)
GLUCOSE BLDC GLUCOMTR-MCNC: 222 MG/DL (ref 70–99)
GLUCOSE BLDC GLUCOMTR-MCNC: 236 MG/DL (ref 70–99)
GLUCOSE SERPL-MCNC: 225 MG/DL (ref 70–99)
HCT VFR BLD AUTO: 25.2 % (ref 40–53)
HGB BLD-MCNC: 7.3 G/DL (ref 13.3–17.7)
MAGNESIUM SERPL-MCNC: 2.1 MG/DL (ref 1.7–2.3)
MCH RBC QN AUTO: 25.6 PG (ref 26.5–33)
MCHC RBC AUTO-ENTMCNC: 29 G/DL (ref 31.5–36.5)
MCV RBC AUTO: 88 FL (ref 78–100)
PHOSPHATE SERPL-MCNC: 2.4 MG/DL (ref 2.5–4.5)
PLATELET # BLD AUTO: 118 10E3/UL (ref 150–450)
POTASSIUM SERPL-SCNC: 3.2 MMOL/L (ref 3.4–5.3)
PROT SERPL-MCNC: 5.2 G/DL (ref 6.4–8.3)
RBC # BLD AUTO: 2.85 10E6/UL (ref 4.4–5.9)
SODIUM SERPL-SCNC: 142 MMOL/L (ref 136–145)
WBC # BLD AUTO: 9.5 10E3/UL (ref 4–11)

## 2023-04-16 PROCEDURE — 250N000013 HC RX MED GY IP 250 OP 250 PS 637

## 2023-04-16 PROCEDURE — 250N000013 HC RX MED GY IP 250 OP 250 PS 637: Performed by: STUDENT IN AN ORGANIZED HEALTH CARE EDUCATION/TRAINING PROGRAM

## 2023-04-16 PROCEDURE — 99233 SBSQ HOSP IP/OBS HIGH 50: CPT | Performed by: STUDENT IN AN ORGANIZED HEALTH CARE EDUCATION/TRAINING PROGRAM

## 2023-04-16 PROCEDURE — 120N000002 HC R&B MED SURG/OB UMMC

## 2023-04-16 PROCEDURE — 999N000007 HC SITE CHECK

## 2023-04-16 PROCEDURE — 80053 COMPREHEN METABOLIC PANEL: CPT

## 2023-04-16 PROCEDURE — 250N000011 HC RX IP 250 OP 636

## 2023-04-16 PROCEDURE — 84100 ASSAY OF PHOSPHORUS: CPT

## 2023-04-16 PROCEDURE — 250N000013 HC RX MED GY IP 250 OP 250 PS 637: Performed by: INTERNAL MEDICINE

## 2023-04-16 PROCEDURE — 83735 ASSAY OF MAGNESIUM: CPT

## 2023-04-16 PROCEDURE — 85027 COMPLETE CBC AUTOMATED: CPT

## 2023-04-16 PROCEDURE — 36592 COLLECT BLOOD FROM PICC: CPT

## 2023-04-16 RX ORDER — POTASSIUM CHLORIDE 750 MG/1
40 TABLET, EXTENDED RELEASE ORAL ONCE
Status: COMPLETED | OUTPATIENT
Start: 2023-04-16 | End: 2023-04-16

## 2023-04-16 RX ADMIN — DICLOFENAC SODIUM 2 G: 10 GEL TOPICAL at 11:40

## 2023-04-16 RX ADMIN — LEVOTHYROXINE SODIUM 100 MCG: 100 TABLET ORAL at 08:10

## 2023-04-16 RX ADMIN — CARVEDILOL 12.5 MG: 12.5 TABLET, FILM COATED ORAL at 08:10

## 2023-04-16 RX ADMIN — POTASSIUM & SODIUM PHOSPHATES POWDER PACK 280-160-250 MG 1 PACKET: 280-160-250 PACK at 18:14

## 2023-04-16 RX ADMIN — DICLOFENAC SODIUM 2 G: 10 GEL TOPICAL at 20:35

## 2023-04-16 RX ADMIN — MICONAZOLE NITRATE: 20 CREAM TOPICAL at 08:43

## 2023-04-16 RX ADMIN — MICONAZOLE NITRATE: 20 CREAM TOPICAL at 20:36

## 2023-04-16 RX ADMIN — Medication 5 ML: at 16:05

## 2023-04-16 RX ADMIN — ASPIRIN 81 MG: 81 TABLET ORAL at 08:10

## 2023-04-16 RX ADMIN — SPIRONOLACTONE 200 MG: 100 TABLET ORAL at 08:10

## 2023-04-16 RX ADMIN — FLUCONAZOLE 200 MG: 200 TABLET ORAL at 08:10

## 2023-04-16 RX ADMIN — POTASSIUM CHLORIDE 20 MEQ: 750 TABLET, EXTENDED RELEASE ORAL at 08:10

## 2023-04-16 RX ADMIN — POTASSIUM & SODIUM PHOSPHATES POWDER PACK 280-160-250 MG 1 PACKET: 280-160-250 PACK at 20:44

## 2023-04-16 RX ADMIN — Medication 1 TABLET: at 08:10

## 2023-04-16 RX ADMIN — FUROSEMIDE 20 MG: 20 TABLET ORAL at 08:10

## 2023-04-16 RX ADMIN — LIDOCAINE PATCH 4% 2 PATCH: 40 PATCH TOPICAL at 08:19

## 2023-04-16 RX ADMIN — TESTOSTERONE 25 MG OF TESTOSTERONE: 50 GEL TOPICAL at 08:42

## 2023-04-16 RX ADMIN — POTASSIUM & SODIUM PHOSPHATES POWDER PACK 280-160-250 MG 1 PACKET: 280-160-250 PACK at 12:57

## 2023-04-16 RX ADMIN — LISINOPRIL 20 MG: 10 TABLET ORAL at 11:38

## 2023-04-16 RX ADMIN — CARVEDILOL 12.5 MG: 12.5 TABLET, FILM COATED ORAL at 18:14

## 2023-04-16 RX ADMIN — POTASSIUM CHLORIDE 40 MEQ: 750 TABLET, EXTENDED RELEASE ORAL at 12:48

## 2023-04-16 RX ADMIN — POLYETHYLENE GLYCOL 3350 17 G: 17 POWDER, FOR SOLUTION ORAL at 08:19

## 2023-04-16 ASSESSMENT — ACTIVITIES OF DAILY LIVING (ADL)
ADLS_ACUITY_SCORE: 48

## 2023-04-16 NOTE — PLAN OF CARE
Goal Outcome Evaluation:      Plan of Care Reviewed With: patient    Overall Patient Progress: no change    Outcome Evaluation: A&Ox4, however pt slow to respond and follow commands. Neuro check WNL. VSS ex. HTN, pt on RA. C/o 7/10 lower back pain, PRN voltaren gel applied and lidocaine patches in place. Pt repositioned as tolerated, refused Q2hr repo. Pt weight shifting in bed. Sacral mepilex dressing changed. BGs elevated, SSI and carb coverage given. Pt appetite good. BL LE dressings CDI. Pt utilizing urinal, adequate urine output. No BM this shift, scheduled bowel meds administered. Phos and K+ replaced, recheck tomorrow AM. Plan to discharge to TCU prior to surgery.

## 2023-04-16 NOTE — PLAN OF CARE
Goal Outcome Evaluation:      Plan of Care Reviewed With: patient    Overall Patient Progress: no change    Outcome Evaluation: A&Ox4, VSS on RA. Pt c/o back pain. Voltaren cream administered with relief. PICC double lumen heparin locked. No BM this shift. Urinal at bedside, voiding spontaneously.  and 195. On sliding scale and carb coverage insulin. Continue with POC.

## 2023-04-16 NOTE — PROGRESS NOTES
[facility]    Medicine Progress Note - Medicine Service, MARO[facility] TEAM 2    Date of Admission:  4/3/2023    Assessment & Plan   Gustavo Faria is a 58 year old male with recent Serratia bacteremia, HFrEF, prolonged QT, lower extremity wounds, DMII, hypothyroidism, low testosterone and known ACTH secreting pituitary adenoma w/resulting Cushing's disease s/p 2 partial resections in 2021 who [facility]. [facility], he developed new onset double vision and severe headache which prompted imaging that showed a large mass invading the cavernous sinuses and impinging on the cranial nerves. He is transferred to [facility] for this pituitary adenoma, concern for progression vs hemorrhage/apoplexy. Complicated by psychosis/metabolic encephalopathy and electrolyte abnormalities secondary to Cushing's syndrome.    Updates today:  - Psychiatry consult to assist with intermittent delusions while in the hospital   - Sodium remains stable, extra dose of K for K of 3.2    - Continue spironolactone 200 mg daily   - Will dispo to TCU prior to surgery     # Hypernatremia, DI vs cushing's, resolved  # Hypokalemia, resolved  Hypernatremia and hypokalemia, likely secondary to Cushing's.     - Endocrine following  - Goal Na: 135-140  - Spironolactone 200 daily (can go up to 400 mg daily depending on Na and K, per Endocrine)  - Strict I&Os  - Daily BMP + Mg  - PTA potassium supplement 20 mEq    # Acute metabolic encephalopathy  # Pyruria, Candiduria  # Encephalopathy likely secondary to cushing's disease  Admission symptoms included disorientation, intermittently following directions. Repetitive words - perseverating on particular phrases. Sx started the 2-3 days prior to admission (which [facility] was [facility]). Vulnerable brain (multiple brain surgeries), enlarging pituitary mass. No seizure activity per  EEG. Steroids removed and pt still encephalopathy. Sodium has been in normal limits and patient remains altered. Worked up for infections - did reveal candiduria. Biggest concern for infectious vs cushing's. Most likely cushing's given largely unremarkable infectious work up.  - CT abd w/ contrast (4/10): no evidence of pyelonephritis  - Urine culture: candida, sensitive to Fluconazole  - Fluconazole 200 mg daily x7 days, completes 4/17  - Discontinued ceftriaxone as patient did not improve clinically with antibiotics  - No steroids  - Surgical plans for pituitary tumor as stated below  - Delirium precautions  - Electrolyte management  - PRN quetiapine if agitation that is not responsive to behavioral interventions     # Delusions, intermittent  Intermittent delusions regarding staff stalking patient while in the hospital. Unclear chronicity of these delusions - but has had them multiple times this hospitalization. Unclear psych history. Medical management for encephalopathy as stated above. Related to tumor?  - Appreciate psychiatric evaluation for further guidance    # ACTH secreting pituitary adenoma c/b type II DM, hypothyroidism and low testosterone s/p two partial resections in 2021   Patient noted double vision and severe headache beginning the evening of 3/25. CT imaging on 3/25 revealed a large sellar/suprasellar mass extending into the cavernous sinuses with no evidence of acute stroke. On 3/28, he underwent an MRI which confirmed the CT findings of a large mass invading the cavernous sinuses and impinging on the cranial nerves. Necrosis extending beyond left cavernous sinus. [facility]. Repeat MRI performed on 4/6/23: compared to 2021 MRI, lesions are smaller. Orbit MRI without optic nerve compression and no evidence of orbital infection.  - Neurosurgery following  - Plans for Neurosurgery: surgery 4/27/23  - Optimize medically prior to surgery  - DC'ed dexamethasone  - Ophthalmology  following, no major changes, agree with neurosurgery plans  - Endocrine following    Surgery Prep:  - Hold ASA 1 week prior to surgery  - Hold Empagliflozin 3 days prior to surgery  - CT/CTA head- stereotaxy protocol to be completed night before the surgery     # Type II DM, exacerbated by Cushing's   A1c of 7.6% on 2/2023. Patient [facility] regimen [facility].    - Lantus 10 units  - MD Sliding scale insulin  - Carb coverage 1:15  - Hold PTA metformin, empa, and sitagliptin, can resume upon discharge    # Buttocks, sacrum and bilateral groin wounds   # RLE wound from puncture injury   # L dorsal foot wound from presumed trauma   # Soft tissue infection/abscess    # Hx serratia bacteremia in December 2022   For patient's lower extremity wounds and hx of Serratia bacteremia in December, he was initially [facility]olin [facility]. His antibiotics were broadened to Vanc and Zosyn and ultimately he was transitioned to ceftazidime on 3/23 with plan to complete course on 4/4/23. BCx negative and wound cx grew serratia marcescens at the [facility]. He has been on ceftazidime since. MRI of the right tib/fib on 3/23 was negative for osteomyelitis but did show two fluid collections draining sponateneously. [facility] and determined no intervention was needed as wounds were draining on their own.  - Stop ceftazidime (3/23 - 4/5)  - WOC following  - PT/OT when able to follow commands  - Pain: tylenol PRN, dilaudid 0.5 mg PRN for dressing changes and oxy 5 mg PRN - however given encephalopathy, use sparingly    # HFmrEF, with global hypokinesis  # High burden of PVCs  # Prolonged QTc   # Elevated troponin, down-trended  Coronary angiogram on 2/27 shows normal coronaries. Troponin down-trending (88 -> 77). No chest pain. High burden of PVCs. Follow up ECHO with global hypokinesis, which is worse compared to February 2023 ECHO. EF is 45% (same compared to prior). Likely small vessel disease vs  demand vs cardiomyopathy 2/2 ceftazidime. Low concern for acute ACS.   - Discontinued tele  - Electrolyte mgmt as above  - No need for diuresis at this time - given hyper-K  - Continue PTA coreg  - Lisinopril 10  - Empagliflozin (see above)  - Resume ASA (hold 1 week prior to surgery)  - Avoid QTc prolonging medications     # Central Hypothyroidism  - Continue PTA levothyroxine     # Hypogonadism   - Continue PTA androgel (will need to bring in home medication)     # Chronic microcytic anemia   Hgb of 8.4 [facility]. Peripheral smear on 3/30 showed poikilocytosis with occasional red blood cell fragments but no other evidence of hemolysis.  Haptoglobin was normal.  Ferritin was 91, transferrin saturation was low, B12 was 495 and folate was 8.7. Likely combination of iron deficiency as well as inflammation/chronic disease.   - CTM        # Concern for malnutrition   - Nutrition following       Diet: Snacks/Supplements Adult: Ensure Max Protein (bariatric); Between Meals  Regular Diet Adult  Snacks/Supplements Adult: Ensure Enlive; Between Meals    DVT Prophylaxis: Pneumatic Compression Devices  Roland Catheter: Not present  Lines:   PICC 04/06/23 Double Lumen Right Basilic access-Site Assessment: WDL      Cardiac Monitoring: None  Code Status: Full Code      Clinically Significant Risk Factors              # Hypoalbuminemia: Lowest albumin = 2.9 g/dL at 4/11/2023  7:07 AM, will monitor as appropriate          # DMII: A1C = 9.5 % (Ref range: <5.7 %) within past 6 months    # Severe Malnutrition: based on nutrition assessment        Disposition Plan      Expected Discharge Date: 04/17/2023      Destination: home  Discharge Comments: Medically ready for discharge: likely 4/17  Patient's wife Eyad is NOK  PT: MO Muhammad MD  Medicine Service, [facility] TEAM 2  [facility]  Securely message with "[employer] (more info)  Text page via [employer]  Paging/Directory   See signed in provider for up to date coverage information  ______________________________________________________________________    Interval History   Doing ok this AM. No chest pain or SOB. No headaches or vision changes. No delusions.     4 point ROS including questioning patient regarding cardiovascular symptoms, gastrointestinal symptoms, respiratory symptoms, constitutional symptoms, and hematology / bleeding symptoms and all were negative except as noted in interval history as above.     Physical Exam   Vital Signs: Temp: 98  F (36.7  C) Temp src: Oral BP: (!) 146/89 Pulse: 63   Resp: 20 SpO2: 97 % O2 Device: None (Room air)    Weight: 153 lbs 7.04 oz    General Appearance: Eyes open, tracking, answers yes/no questions, no distress, up in bed, conversational   Eyes: Pupils moderate size, EOMI  HEENT: Normal sclera, able to move neck FROM  Respiratory: Breathing comfortably on RA, CTAB  Cardiovascular: RRR, no murmurs  GI: BS+. Soft, nontender, nondistended. No guarding or rebound tenderness.   Musculoskeletal: No muscle wasting  Neuro:  strength intact, ankle strength intact, and sensation intact, EOMI  Psychiatric: Oriented to time, person, place, and situation     Medical Decision Making           Data     I have personally reviewed the following data over the past 24 hrs:    9.5  \   7.3 (L)   / 118 (L)     142 102 14.8 /  236 (H)   3.2 (L) 28 0.78 \       ALT: 159 (H) AST: 60 (H) AP: 345 (H) TBILI: 0.4   ALB: 3.2 (L) TOT PROTEIN: 5.2 (L) LIPASE: N/A

## 2023-04-16 NOTE — PROGRESS NOTES
[facility], [place]   04/16/2023  Neurosurgery Progress Note:    Assessment:  Gustavo Faria is a 58 year old male with a PMH of DMII, HFrEF, BLE open wounds, history of serratia bacteremia in December 2022,  pituitary ACTH secreting macroadenoma s/p EEA for resection on 5/2021 and 7/2021 in [place], with baseline right CN 3 palsy, who was admitted at the [facility] on 3/23. He developed sudden headache and vision changes on 3/25, with MRI brain on 3/28, which demonstrated enlargement of known pituitary adenoma, with necrosis extending laterally beyond the left cavernous sinus, concerning for compression of cranial nerves at cavernous sinus. 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      Given that he is past the acute period of apoplexy, there is no indication for emergent decompression. He would benefit for redo EEA for resection of adenoma given its symptomatic nature. However, he would need to be medically optimized from the metabolic and infectious standpoint prior to surgery. He would also benefit from a multidisciplinary approach with recommendations from ophthalmology, endocrinology and radiation oncology regarding options for treatment.     Plan:  -Patient's wife is the legal decision maker-consent obtained for the surgery-uploaded in chart  - CT/CTA head- stereotaxy protocol to be completed night before the surgery  - Okay to discharge from neurosurgery perspective-if deemed medically stable by the primary team  - Please stop aspirin 7 days before surgery-surgery planned for 4/27/2023 with ENT colleagues for resection of pituitary adenoma  - Neurosurgery will continue to follow   -----------------------------------  Kelvin Cheng MD, PhD  PGY-2 Neurosurgery  Please page NSGY on call with questions      Please contact neurosurgery resident on  call with questions.    Dial * * [phone], enter [phone] when prompted.    Interval History: No acute events overnight that were brought to neurosurgery's attention.    Objective:   Temp:  [98.2  F (36.8  C)-98.7  F (37.1  C)] 98.4  F (36.9  C)  Pulse:  [62-82] 73  Resp:  [16-18] 18  BP: (140-158)/() 141/98  SpO2:  [95 %-100 %] 96 %  I/O last 3 completed shifts:  In: -   Out: 1250 [Urine:1250]      NEUROLOGIC:  -- Opens eyes to voice, following commands, oriented x1  -- Able to name objects  -- Speech limited, perseverating speech, minimal spontaneous speech  Cranial Nerves:  -- visual fields full to confrontation although with limited participation, PERRL anisocoric L>R and sluggish, left CN III paresis and CN VI palsy, restricted ROM in right EOM with no apparent asymmetry   -- face symmetrical, tongue midline  -- sensory V1-V3 intact bilaterally  -- palate elevates symmetrically, uvula midline  -- hearing grossly intact bilat     Motor:  Limited participation - antigravity x4 extremities     Sensory:  intact to LT x 4 extremities      Reflexes:       Bi Tri BR Omi Pat Ach Bab     C5-6 C7-8 C6 UMN L2-4 S1 UMN   R 2+ 2+ 2+ Norm 2+ 2+ Norm   L 2+ 2+ 2+ Norm 2+ 2+ Norm      Gait: Deferred.     LABS:  Recent Labs   Lab 04/15/23  1616 04/15/23  1149 04/15/23  0746 04/15/23  0641 04/14/23  0805 04/14/23  0644 04/13/23  1718 04/13/23  1321   NA  --   --   --  139  --  143  --  142   POTASSIUM  --   --   --  3.6  --  3.7  --  3.7   CHLORIDE  --   --   --  101  --  104  --  104   CO2  --   --   --  28  --  27  --  25   ANIONGAP  --   --   --  10  --  12  --  13   * 207* 157* 142*   < > 167*   < > 197*   BUN  --   --   --  10.7  --  9.2  --  9.4   CR  --   --   --  0.81  --  0.77  --  0.79   KORIN  --   --   --  9.1  --  9.1  --  9.4    < > = values in this interval not displayed.       Recent Labs   Lab 04/14/23  0644   WBC 11.1*   RBC 2.86*   HGB 7.4*   HCT 25.0*   MCV 87   MCH 25.9*   MCHC 29.6*   RDW 26.5*   PLT  100*       IMAGING:  No results found for this or any previous visit (from the past 24 hour(s)).

## 2023-04-16 NOTE — PLAN OF CARE
"Goal Outcome Evaluation:      Plan of Care Reviewed With: patient, sibling    Overall Patient Progress: no change    Outcome Evaluation: Alert, disoriented to time and situation. At start of shift pt in recliner and perseverating on \"getting up and going over there!\" while pointing to wall, difficult to redirect. Later in shift, when pt seemed more logical and wanting to get back into bed, pt did stand w/ Ax2 BG/W, steady, but not following commands and insisting on sitting behind the chair rather than returning to bed or the chair. Thus, placed back in bed w/ lift Ax2 for safety. Atarax x1 for anxiety to some relief, pt calling less frequently after. C/o low back pain w/ relief from lidocaine patches, Voltaren gel, and tylenol.  HTN, all other VSS on RA. BLE wound dressing changed per plan of care. Pt using bedside urinal. Fair appetite, sliding scale and carb coverage insulin provided.       "

## 2023-04-17 ENCOUNTER — APPOINTMENT (OUTPATIENT)
Dept: PHYSICAL THERAPY | Facility: CLINIC | Age: 58
DRG: 614 | End: 2023-04-17
Attending: STUDENT IN AN ORGANIZED HEALTH CARE EDUCATION/TRAINING PROGRAM
Payer: COMMERCIAL

## 2023-04-17 LAB
ALBUMIN SERPL BCG-MCNC: 3.2 G/DL (ref 3.5–5.2)
ALP SERPL-CCNC: 342 U/L (ref 40–129)
ALT SERPL W P-5'-P-CCNC: 176 U/L (ref 10–50)
ANION GAP SERPL CALCULATED.3IONS-SCNC: 12 MMOL/L (ref 7–15)
AST SERPL W P-5'-P-CCNC: 64 U/L (ref 10–50)
BILIRUB SERPL-MCNC: 0.4 MG/DL
BUN SERPL-MCNC: 14.7 MG/DL (ref 6–20)
CALCIUM SERPL-MCNC: 8.9 MG/DL (ref 8.6–10)
CHLORIDE SERPL-SCNC: 102 MMOL/L (ref 98–107)
CREAT SERPL-MCNC: 0.73 MG/DL (ref 0.67–1.17)
DEPRECATED HCO3 PLAS-SCNC: 29 MMOL/L (ref 22–29)
ERYTHROCYTE [DISTWIDTH] IN BLOOD BY AUTOMATED COUNT: 27.6 % (ref 10–15)
GFR SERPL CREATININE-BSD FRML MDRD: >90 ML/MIN/1.73M2
GLUCOSE BLDC GLUCOMTR-MCNC: 167 MG/DL (ref 70–99)
GLUCOSE BLDC GLUCOMTR-MCNC: 191 MG/DL (ref 70–99)
GLUCOSE BLDC GLUCOMTR-MCNC: 211 MG/DL (ref 70–99)
GLUCOSE BLDC GLUCOMTR-MCNC: 213 MG/DL (ref 70–99)
GLUCOSE BLDC GLUCOMTR-MCNC: 239 MG/DL (ref 70–99)
GLUCOSE SERPL-MCNC: 198 MG/DL (ref 70–99)
HCT VFR BLD AUTO: 24.8 % (ref 40–53)
HGB BLD-MCNC: 7.2 G/DL (ref 13.3–17.7)
MAGNESIUM SERPL-MCNC: 2.2 MG/DL (ref 1.7–2.3)
MCH RBC QN AUTO: 25.7 PG (ref 26.5–33)
MCHC RBC AUTO-ENTMCNC: 29 G/DL (ref 31.5–36.5)
MCV RBC AUTO: 89 FL (ref 78–100)
PHOSPHATE SERPL-MCNC: 2.9 MG/DL (ref 2.5–4.5)
PLATELET # BLD AUTO: 116 10E3/UL (ref 150–450)
POTASSIUM SERPL-SCNC: 3.9 MMOL/L (ref 3.4–5.3)
PROT SERPL-MCNC: 5.1 G/DL (ref 6.4–8.3)
RBC # BLD AUTO: 2.8 10E6/UL (ref 4.4–5.9)
SODIUM SERPL-SCNC: 143 MMOL/L (ref 136–145)
WBC # BLD AUTO: 7.8 10E3/UL (ref 4–11)

## 2023-04-17 PROCEDURE — 80053 COMPREHEN METABOLIC PANEL: CPT

## 2023-04-17 PROCEDURE — 97116 GAIT TRAINING THERAPY: CPT | Mod: GP

## 2023-04-17 PROCEDURE — 84100 ASSAY OF PHOSPHORUS: CPT | Performed by: STUDENT IN AN ORGANIZED HEALTH CARE EDUCATION/TRAINING PROGRAM

## 2023-04-17 PROCEDURE — 250N000013 HC RX MED GY IP 250 OP 250 PS 637

## 2023-04-17 PROCEDURE — 120N000002 HC R&B MED SURG/OB UMMC

## 2023-04-17 PROCEDURE — 250N000013 HC RX MED GY IP 250 OP 250 PS 637: Performed by: STUDENT IN AN ORGANIZED HEALTH CARE EDUCATION/TRAINING PROGRAM

## 2023-04-17 PROCEDURE — 99233 SBSQ HOSP IP/OBS HIGH 50: CPT | Performed by: PSYCHIATRY & NEUROLOGY

## 2023-04-17 PROCEDURE — 999N000007 HC SITE CHECK

## 2023-04-17 PROCEDURE — 250N000011 HC RX IP 250 OP 636

## 2023-04-17 PROCEDURE — 36592 COLLECT BLOOD FROM PICC: CPT

## 2023-04-17 PROCEDURE — 83735 ASSAY OF MAGNESIUM: CPT

## 2023-04-17 PROCEDURE — 85027 COMPLETE CBC AUTOMATED: CPT

## 2023-04-17 PROCEDURE — 99232 SBSQ HOSP IP/OBS MODERATE 35: CPT | Mod: GC | Performed by: STUDENT IN AN ORGANIZED HEALTH CARE EDUCATION/TRAINING PROGRAM

## 2023-04-17 PROCEDURE — 250N000011 HC RX IP 250 OP 636: Performed by: INTERNAL MEDICINE

## 2023-04-17 PROCEDURE — 97530 THERAPEUTIC ACTIVITIES: CPT | Mod: GP

## 2023-04-17 PROCEDURE — 250N000013 HC RX MED GY IP 250 OP 250 PS 637: Performed by: INTERNAL MEDICINE

## 2023-04-17 RX ORDER — ARIPIPRAZOLE 5 MG/1
5 TABLET ORAL AT BEDTIME
Status: DISCONTINUED | OUTPATIENT
Start: 2023-04-17 | End: 2023-04-25

## 2023-04-17 RX ADMIN — ASPIRIN 81 MG: 81 TABLET ORAL at 09:01

## 2023-04-17 RX ADMIN — POTASSIUM CHLORIDE 20 MEQ: 750 TABLET, EXTENDED RELEASE ORAL at 09:01

## 2023-04-17 RX ADMIN — LACTULOSE 20 G: 20 SOLUTION ORAL at 16:01

## 2023-04-17 RX ADMIN — MICONAZOLE NITRATE: 20 CREAM TOPICAL at 22:24

## 2023-04-17 RX ADMIN — FUROSEMIDE 20 MG: 20 TABLET ORAL at 09:01

## 2023-04-17 RX ADMIN — MICONAZOLE NITRATE: 20 CREAM TOPICAL at 12:06

## 2023-04-17 RX ADMIN — ACETAMINOPHEN 650 MG: 325 TABLET, FILM COATED ORAL at 14:04

## 2023-04-17 RX ADMIN — FLUCONAZOLE 200 MG: 200 TABLET ORAL at 09:01

## 2023-04-17 RX ADMIN — Medication 5 ML: at 14:04

## 2023-04-17 RX ADMIN — LEVOTHYROXINE SODIUM 100 MCG: 100 TABLET ORAL at 09:01

## 2023-04-17 RX ADMIN — ARIPIPRAZOLE 5 MG: 5 TABLET ORAL at 22:25

## 2023-04-17 RX ADMIN — HYDRALAZINE HYDROCHLORIDE 5 MG: 20 INJECTION INTRAMUSCULAR; INTRAVENOUS at 14:04

## 2023-04-17 RX ADMIN — CARVEDILOL 12.5 MG: 12.5 TABLET, FILM COATED ORAL at 18:21

## 2023-04-17 RX ADMIN — LIDOCAINE PATCH 4% 2 PATCH: 40 PATCH TOPICAL at 09:01

## 2023-04-17 RX ADMIN — Medication 1 TABLET: at 09:01

## 2023-04-17 RX ADMIN — POLYETHYLENE GLYCOL 3350 17 G: 17 POWDER, FOR SOLUTION ORAL at 09:01

## 2023-04-17 RX ADMIN — TESTOSTERONE 25 MG OF TESTOSTERONE: 50 GEL TOPICAL at 09:01

## 2023-04-17 RX ADMIN — Medication 5 ML: at 07:21

## 2023-04-17 RX ADMIN — SPIRONOLACTONE 200 MG: 100 TABLET ORAL at 09:01

## 2023-04-17 RX ADMIN — CARVEDILOL 12.5 MG: 12.5 TABLET, FILM COATED ORAL at 09:01

## 2023-04-17 RX ADMIN — MICONAZOLE NITRATE: 20 CREAM TOPICAL at 22:25

## 2023-04-17 RX ADMIN — LISINOPRIL 20 MG: 10 TABLET ORAL at 12:05

## 2023-04-17 RX ADMIN — Medication 5 ML: at 16:01

## 2023-04-17 ASSESSMENT — ACTIVITIES OF DAILY LIVING (ADL)
ADLS_ACUITY_SCORE: 48

## 2023-04-17 NOTE — PLAN OF CARE
Goal Outcome Evaluation:      Plan of Care Reviewed With: patient    Overall Patient Progress: no changeOverall Patient Progress: no change    Outcome Evaluation: No medical changed.  Remains with labile orientation/confusion.  Patient able to mask his cognative deficits well at times.  Dressing changes due today-still need to be done.  Tylenol given as premed for this afternoon's therapy session.  lidocane patch and voltaren given this am for low back pain.  Cooperative but resitant to getting out of bed or moving around with nursing.  Ate a late breakfast; no lunch yet. Says he is getting chipolte from [employer]-but unclear if this is true.  Monitor closely.

## 2023-04-17 NOTE — PROGRESS NOTES
Endocrine Progress Note  Patient: Gustavo Faria   MRN: [id number]  Date of Service: 04/17/2023    HPI summary and hospitalization course:  Gustavo Faria is a 58 year old male with PMHx of ACTH-secreting macroadenoma c/b central hypothyroidism, and central hypogonadism,  s/p transphenoidal surgery 5/2021 and 7/2021 in [place] has baseline records 3rd nerve palsy, ongoing serratia RLE cellulitis c/b recent serratia bacteremia, HFrEF, T2DM, and HTN [facility] of known expanding large sellar/suprasellar mass extending into cavernous sinuses and subsequent cranial nerve impingement.    [facility] and [facility] lower limb cellulitis/abscess developed sudden onset of headaches and double vision transferred to [facility] for multidisciplinary assessment.  Had an MRI done on 3/25 which demonstrates enlargement of known pituitary adenoma with necrosis extending laterally beyond the left cavernous sinus concerning for compression of the cranial nerves at the cavernous sinus.  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  On the arrival he was encephalopathic.  Taken off dexamethasone on 04/06/2023 with improvement in the mental status following that.    Repeated MRI following transfer to [facility] showed new enhancing to 1.4 cm lesion in the right frontal lobe with susceptibility of artifact.  4.3 cm heterogeneously enhancing pituitary mass with encasement and peripheral displacement of the cavernous portions of both internal carotid arteries and infiltration of the clivus.  Previous optic chiasm compression is resolved.  Pituitary stalk is midline.  Lesion is infiltrating both cavernous sinuses cranial nerves in the cavernous sinus  cannot be differentiated from the underlying mass.    Interval history:  Blood pressure is better controlled.  Started on Lasix 20 mg daily on 4/15.  Continued on spironolactone 200 mg daily.  On KCl supplements 20 mEq daily potassium level is stable within normal range.  He became persistently oriented x4 however continues to have slow response and difficulty in finding words.  On assessment today:  He stated he feels much better.  No nausea or vomiting.  No headaches.  Back pain improved significantly in response to lidocaine patch.  He is looking forward to work more with the PT today.        Physical Examination:  BP (!) 139/95 (BP Location: Left arm)   Pulse 83   Temp 98.2  F (36.8  C) (Oral)   Resp 18   Wt 65.7 kg (144 lb 13.5 oz)   SpO2 100%   BMI 23.38 kg/m    Physical Exam  Vitals reviewed.   Constitutional:       General: He is not in acute distress.     Appearance: He is not ill-appearing.   Eyes:      Comments: Left eye ptosis   Cardiovascular:      Rate and Rhythm: Normal rate and regular rhythm.   Pulmonary:      Effort: Pulmonary effort is normal.      Breath sounds: Normal breath sounds. No wheezing or rales.   Abdominal:      General: There is no distension.      Tenderness: There is no abdominal tenderness.   Musculoskeletal:      Right lower leg: Edema present.      Left lower leg: Edema present.   Neurological:      Mental Status: He is oriented to person, place, and time.   Psychiatric:         Mood and Affect: Mood normal.         Behavior: Behavior normal.         Medications:  Reviewed    Endocrine Labs:     Latest Reference Range & Units 04/17/23 07:25   Sodium 136 - 145 mmol/L 143   Potassium 3.4 - 5.3 mmol/L 3.9   Chloride 98 - 107 mmol/L 102   Carbon Dioxide (CO2) 22 - 29 mmol/L 29   Urea Nitrogen 6.0 - 20.0 mg/dL 14.7   Creatinine 0.67 - 1.17 mg/dL 0.73   GFR Estimate >60 mL/min/1.73m2 >90   Calcium 8.6 - 10.0 mg/dL 8.9   Anion Gap 7 - 15 mmol/L 12   Magnesium 1.7 - 2.3 mg/dL 2.2    Phosphorus 2.5 - 4.5 mg/dL 2.9   Albumin 3.5 - 5.2 g/dL 3.2 (L)   Protein Total 6.4 - 8.3 g/dL 5.1 (L)   Alkaline Phosphatase 40 - 129 U/L 342 (H)   ALT 10 - 50 U/L 176 (H)   AST 10 - 50 U/L 64 (H)       Images:  MRI brain with and without contrast on 4/6/2023:  Comparison:  Outside brain MR 3/28/2023 and 5/25/2021.      Technique:   1. Brain MRI: Axial diffusion and FLAIR images of the whole brain  obtained without intravenous contrast. Sagittal T1 and T2-weighted,  coronal T2-weighted, coronal T1-weighted images with focus on the  sella were obtained without intravenous contrast. Post intravenous  contrast using gadolinium coronal and sagittal T1-weighted images were  obtained focused on the sella. Dynamic postcontrast coronal  T1-weighted images were also obtained.     2. MRI of the Orbits focused on the orbits/visual pathways:  Axial  T2-weighted with inversion recovery and coronal T1-weighted images  were obtained without intravenous contrast. Axial and coronal  T1-weighted images with fat saturation were obtained after intravenous  gadolinium administration.     3. MRI Brain COW: Sagittal T1-weighted, axial T2-weighted with fat  saturation, turboFLAIR and diffusion-weighted with ADC map and coronal  T1-weighted and T2-weighted images of the brain were obtained without  intravenous contrast.       Contrast: 7.5mL Gadavist     Findings:    Brain MRI:  Enhancing 1.4 x 1.0 cm lesion with associated susceptibility artifact  in the right frontal lobe (series 16, image 17), stable since  5/25/2021. A similar punctate lesion in the left putamen also  unchanged.m     4.3 x 1.9 x 1.3 cm (right to left, AP, craniocaudal) heterogeneously  enhancing pituitary mass with encasement and peripheral displacement  of the cavernous portions of both internal carotid arteries (series  13, image 28 and series 14, image 11). The lesion infiltrates the  clivus. Compared with MRI from 2021 the craniocaudal dimension of the  mass is  decreased. The lesion is now more heterogeneous, previously  more homogeneous. Previous optic chiasm compression is resolved. No  abnormal enhancement of the optic nerves within the intraorbital  portion. The pituitary stalk is midline.     The lesion infiltrates both cavernous sinuses. The traversing cranial  nerves within the cavernous sinus cannot be differentiated from the  underlying mass.     FLAIR images through the whole brain shows nonspecific posterior  periventricular white matter hyperintensities. Otherwise no mass  effect, midline shift, or significant enlargement of the ventricles.  Scattered mucosal thickening of the paranasal sinuses. Mastoid air  cells are clear.     MRA Head: No aneurysm or stenosis of the major intracranial arteries  at the base of the brain.                                                                      Impression:   1.  4.3 cm heterogeneously enhancing pituitary mass with infiltration  of bilateral cavernous sinuses and the clivus. Findings are similar  when compared compared to prior exam 3/28/2023 suggestive for a  macroadenoma. Compared with an older MRI from 2021, the lesion appears  more heterogeneous and smaller. Previous compression of the optic  chiasm is no longer present.  2.  Patchy T2 hyperintense enhancing lesion with scattered  susceptibility artifacts in the right frontal subcortical white  matter. A similar tiny smaller lesion is also present in the left  putamen. Retrospectively this lesion was present back in 2021.  Constellation of findings are suggestive of a benign lesion such as  cavernoma or telangiectasia.  3.   MRA demonstrates no aneurysm or stenosis of the major  intracranial arteries. Bilateral cavernous internal carotid arteries  are patent despite encasement by the above-mentioned lesion.  4.  No abnormal optic nerve enhancement or optic nerve atrophy.        Assessment and plan:    Gustavo Faria is a 58 year old male with PMHx of   ACTH-secreting macroadenoma c/b central hypothyroidism, , and central hypogonadism,  s/p transphenoidal surgery 5/2021 and 7/2021 in [place] has baseline records 3rd nerve palsy, ongoing serratia RLE cellulitis c/b recent serratia bacteremia, HFrEF, T2DM, and HTN who [facility] of known expanding large sellar/suprasellar mass extending into cavernous sinuses and subsequent cranial nerve impingement.     #Pituitary macroadenoma:  #Cushing's disease:  #Pituitary apoplexy:  Known history of ACTH secreting macroadenoma , developed sudden onset of [facility] showed enlargement of known pituitary macroadenoma with necrosis extending laterally beyond the left cavernous sinus concerning for compression of cranial nerves at the cavernous sinuses.  No acute hemorrhage, small area of diffusion restriction at the left side of pituitary possibly consistent with ischemic pituitary adenoma  Started on dexamethasone 2 mg twice daily, was discontinued on4/6/23  Random serum cortisol level at 2:30 AM was 46.3, ACTH 321.  A.m. cortisol level of 41.1 on 4/7/2023.  Repeated MRI on 4/6 showed a 4.3 cm diffusely enhancing pituitary mass with infiltration of bilateral cavernous sinus and clivus.  He has been developing recurrent hypernatremia/hypokalemia during the admission, was intermittently on dextrose 5% infusion last received on 4/12 discontinued at 5:30 AM.  Aggressive potassium repletion.  Following increasing the dose of spironolactone to 200 mg daily, potassium repletion requirement dropped significantly currently on KCl 20 mill equivalent daily receiving extra doses intermittently.  Restarted on Lasix 20 mg daily.  Sodium and potassium levels today are within normal range.     Recommendations:  -Resection/debulking  arranged by neurosurgery will be done on  04/27 will be followed by radiotherapy.  -Continue with the  spironolactone 200 mg daily with titrating it up as tolerated maximum dose 400 mg to counteract the mineralocorticoid effect as needed.      -For perioperative planning, no need to initiate the steroids replacement unless he decompensates during the surgery or postoperatively he can get cortisol level following the surgery followed by a.m. cortisol level checks if there is any suspicion of postoperative AI to be started on steroids.       #Central hypothyroidism:  Prior to admission was on levothyroxine 100 mcg daily.  Free T4 on admission 1.3 TSH not detectable (picture of central hypothyroidism).     Recommendations:  -Continue with PTA levothyroxine 100 mcg daily.     #Hypogonadotrophic hypogonadism:  Was on AndroGel gel 1 pump daily.     Recommendations:  -Continue AndroGel.          #DM type II: Hemoglobin A1c on the admission 9.5%.  Prior to admission was on Sitagliptin 100 mg daily/metformin 500 mg twice daily, Jardiance 12.5 mg.  Oral intake continues to improve.  Fair glycemic control        Recommendations:  -Continue Lantus 10 units daily.  -medium-dose SSI 3 times daily before meals and at the bedtime.  -Continue with carb coverage with NovoLog 1 unit: 10 g CHO with meals and snacks.  -Hypoglycemia rescue protocol.  -POC BG checks 3 times daily AC at the bedtime at 2 AM.  -Carb counting protocol.  -Holding PTA oral medications.      Jeanne Chase     Endocrinology diabetes and metabolism  fellow   Pager number: [phone]

## 2023-04-17 NOTE — PROGRESS NOTES
[facility], [place]   04/17/2023  Neurosurgery Progress Note:    Assessment:  Gustavo Faria is a 58 year old male with a PMH of DMII, HFrEF, BLE open wounds, history of serratia bacteremia in December 2022,  pituitary ACTH secreting macroadenoma s/p EEA for resection on 5/2021 and 7/2021 in [place], with baseline right CN 3 palsy, who was admitted at the [facility] on 3/23. He developed sudden headache and vision changes on 3/25, with MRI brain on 3/28, which demonstrated enlargement of known pituitary adenoma, with necrosis extending laterally beyond the left cavernous sinus, concerning for compression of cranial nerves at cavernous sinus. 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      Given that he is past the acute period of apoplexy, there is no indication for emergent decompression. He would benefit for redo EEA for resection of adenoma given its symptomatic nature. However, he would need to be medically optimized from the metabolic and infectious standpoint prior to surgery. He would also benefit from a multidisciplinary approach with recommendations from ophthalmology, endocrinology and radiation oncology regarding options for treatment.     Plan:  -Patient's wife is the legal decision maker-consent obtained for the surgery-uploaded in chart  - CT/CTA head- stereotaxy protocol to be completed night before the surgery  - Okay to discharge from neurosurgery perspective-if deemed medically stable by the primary team  - Please stop aspirin 7 days before surgery-surgery planned for 4/27/2023 with ENT colleagues for resection of pituitary adenoma  - Neurosurgery will continue to follow   -----------------------------------  Oliver Duong  PGY-2 Neurosurgery  Please page NSGY on call with questions      Please contact neurosurgery resident on call with  questions.    Dial * * [phone], enter [phone] when prompted.    Interval History: No acute events overnight that were brought to neurosurgery's attention.    Objective:   Temp:  [98.2  F (36.8  C)-98.7  F (37.1  C)] 98.2  F (36.8  C)  Pulse:  [67-89] 83  Resp:  [16-18] 18  BP: (111-157)/(77-96) 139/95  SpO2:  [94 %-100 %] 100 %  I/O last 3 completed shifts:  In: 970 [P.O.:960; I.V.:10]  Out: 1650 [Urine:1650]      NEUROLOGIC:  -- Opens eyes to voice, following commands, oriented x1  -- Able to name objects  -- Speech limited, perseverating speech, minimal spontaneous speech  Cranial Nerves:  -- visual fields full to confrontation although with limited participation, PERRL anisocoric L>R and sluggish, left CN III paresis and CN VI palsy, restricted ROM in right EOM with no apparent asymmetry   -- face symmetrical, tongue midline  -- sensory V1-V3 intact bilaterally  -- palate elevates symmetrically, uvula midline  -- hearing grossly intact bilat     Motor:  Limited participation - antigravity x4 extremities     Sensory:  intact to LT x 4 extremities      Reflexes:       Bi Tri BR Omi Pat Ach Bab     C5-6 C7-8 C6 UMN L2-4 S1 UMN   R 2+ 2+ 2+ Norm 2+ 2+ Norm   L 2+ 2+ 2+ Norm 2+ 2+ Norm      Gait: Deferred.     LABS:  Recent Labs   Lab 04/17/23  0738 04/17/23  0725 04/17/23  0211 04/16/23  1134 04/16/23  1131 04/15/23  0746 04/15/23  0641   NA  --  143  --   --  142  --  139   POTASSIUM  --  3.9  --   --  3.2*  --  3.6   CHLORIDE  --  102  --   --  102  --  101   CO2  --  29  --   --  28  --  28   ANIONGAP  --  12  --   --  12  --  10   * 198* 213*   < > 225*   < > 142*   BUN  --  14.7  --   --  14.8  --  10.7   CR  --  0.73  --   --  0.78  --  0.81   KORIN  --  8.9  --   --  9.1  --  9.1    < > = values in this interval not displayed.       Recent Labs   Lab 04/17/23  0725   WBC 7.8   RBC 2.80*   HGB 7.2*   HCT 24.8*   MCV 89   MCH 25.7*   MCHC 29.0*   RDW 27.6*   *       IMAGING:  No results found for this or  any previous visit (from the past 24 hour(s)).

## 2023-04-17 NOTE — PLAN OF CARE
Goal Outcome Evaluation:      Plan of Care Reviewed With: patient    Overall Patient Progress: no change    Outcome Evaluation: Able to answer all orientation questions, but slow to respond w/ word finding difficuly. Pt has been ordering meals himself for several days but today could not figure out how to dial the room phone but refusing assistence. HTN, /96, sched carvedilol given, recheck below PRN hydralazine parameters. All other VSS on RA. PRN Voltaren for low back pain to relief. BLE wound dressing CDI, dressing due to be changed 4/17. Pt declining frequent repo, but did tolerate occasional repo. Ordered dinner but did not eat it despite encouragement. BG stable.

## 2023-04-17 NOTE — TUMOR CONFERENCE
Tumor Conference Information  Tumor Conference: Neuro-Onc  Specialties Present: Medical oncology, Radiation oncology, Pathology, Radiology, Surgery  Patient Status: Prospective  Stage: adenoma  Treatment to Date: Surgery  Clinical Trials: Not discussed  Genetic Testing Discussed/Recommended?: No  Supportive Care Services Discussed/Recommended?: No  Recommended Plan: Follows evidence-based guidelines (Comment: would offer additional surgery)  Did the review exceed 30 minutes?: did not           Documentation / Disclaimer Cancer Tumor Board Note  Cancer tumor board recommendations do not override what is determined to be reasonable care and treatment, which is dependent on the circumstances of a patient's case; the patient's medical, social, and personal concerns; and the clinical judgment of the oncologist [physician].

## 2023-04-17 NOTE — PROGRESS NOTES
Care Management Follow Up    Length of Stay (days): 14    Expected Discharge Date: 04/17/2023     Concerns to be Addressed: all concerns addressed in this encounter     Patient plan of care discussed at interdisciplinary rounds: Yes    Anticipated Discharge Disposition: Transitional Care     Anticipated Discharge Services: Transportation Services  Anticipated Discharge DME: None    Patient/family educated on Medicare website which has current facility and service quality ratings: yes  Education Provided on the Discharge Plan:  yes  Patient/Family in Agreement with the Plan:  yes    Referrals Placed by CM/SW:    [facility]  [address] [place]  [address]  P: [phone]  F: [phone]  4/14: Initial SNF referral sent via Epic  4/17: SW left a voicemail and requested a call back     [facility]  [address] [place] [address]  P: [phone]  F: [phone]  4/14: Initial SNF referral sent via Epic (failed)  4/17: Initial SNF referral resent via communications      [address]--  [place] [address]  P: [phone]  F: [phone]  4/14: Initial SNF referral sent via Epic (failed)  4/17: Initial SNF referral resent via communications    [facility]  [address] [facility] [address]  P: [phone]  F: [phone]  4/17: SW sent referral to admissions person Zoila Lino      Declined     [facility] at [address] [address] [phone]  Declined: Assist of two    [facility]  [address] [place] [address]  P: [phone]  F: [phone]  Declined: Payer/insurance denied or not accepted     [facility]  [facility] [address] [facility] [address]  P: [phone]  F: [phone]  Declined: Elopement risk    Private pay costs discussed: Not applicable    Additional Information:  Tasha with neurology expressed the neuro teams concerns with the  patient going to a TCU and when it's time for his procedure the patient will no longer be medically optimized. Neuro is considering moving the patient's surgery up unless the patient is accepted to [facility]    SAMIRA Morrison  [address]   Office: [phone]  Pager: [phone]  [email or web address]

## 2023-04-17 NOTE — CONSULTS
"        Psychiatry Consultation; Follow up              Reason for Consult, requesting source:    Paranoia and delusions. I saw him initially 4/12, and he was first seen by Michael Jaramillo CNP from our service 2/17.   Requesting source: Warren Muhammad    Labs and imaging reviewed, reviewed with nursing and PT    Total time spent in chart review, patient interview and coordination of care; 55 min                 Interim history:    He has a known diagnosis of an ACTH secreting pituitary macroadenoma first diagnosed when he he presented to outside hospital on [facility] in May 2021 with a several weeks history of a drooping right eyelid.  He was found to have a right gaze palsy and right eye, with a 30 pound weight gain over previous year, bilateral leg swelling, and low sex drive. Work-up with CT on 5/21/2021 showed a sellar/suprasellar mass measuring 3.6 cm x 2.8 x 3.3 cm compressing the optic chiasm and bilateral cavernous sinus.  Ultimately he was also diagnosed with central hypothyroidism requiring Synthroid, and ACTH dependent Cushing's syndrome.    He had been troubled by some delirium in January, but this was resolved by the time that Michael Jaramillo saw him in February. When I saw him last week he complaining of \"being held prisoner\". He did improve after PT was able to get him out of his room with a wheelchair. However, his paranoia has persisted and now he thinks that there is a stalker in the hospital. However, when I spoke to him this morning he was not complaining of anyone bothering him, although he did still have a flavor of paranoia, but no obvious confusion. He says that his sleep is ok, he is not feeling depressed, but he is worried about his upcoming surgery. Denies visual or auditory hallucinations.         Current Medications:       aspirin  81 mg Oral Daily     carvedilol  12.5 mg Oral BID w/meals     [Held by provider] empagliflozin  10 mg Oral Daily     fluconazole  200 mg Oral Daily     " "furosemide  20 mg Oral Daily     heparin lock flush  5-20 mL Intracatheter Q24H     insulin aspart   Subcutaneous TID AC     insulin aspart  1-7 Units Subcutaneous TID AC     insulin aspart  1-5 Units Subcutaneous At Bedtime     [START ON 4/18/2023] insulin glargine  12 Units Subcutaneous QAM AC     levothyroxine  100 mcg Oral Daily     lidocaine  2 patch Transdermal Q24H     lidocaine   Transdermal Q8H ANDI     lisinopril  20 mg Oral Daily     miconazole   Topical BID     miconazole with skin protectant   Topical BID     multivitamin w/minerals  1 tablet Oral Daily     polyethylene glycol  17 g Oral Daily     potassium chloride ER  20 mEq Oral Daily     sodium chloride (PF)  10-40 mL Intracatheter Q8H     sodium chloride (PF)  3 mL Intracatheter Q8H     spironolactone  200 mg Oral Daily     testosterone  25 mg of testosterone Transdermal Daily     vitamin D2  50,000 Units Oral Q7 Days              MSE:   Appearance: awake, alert and adequately groomed  Attitude:  cooperative  Eye Contact:  fair  Mood:  \"OK\"  Affect:  : slightly restricted  Speech:  clear, coherent  Psychomotor Behavior:  no evidence of tardive dyskinesia, dystonia, or tics  Muscle strength and tone: reduced strength   Thought Process:  circumstantial  Associations:  loosening of associations present  Thought Content:  he is somewhat paranoid  Insight:  fair  Judgement:  fair  Oriented to:  person and place, wasn't sure of day or date   Attention Span and Concentration:  fair  Recent and Remote Memory:  not formally assessed     Vital signs:  Temp: 98.2  F (36.8  C) Temp src: Oral BP: (!) 139/95 Pulse: 83   Resp: 18 SpO2: 100 % O2 Device: None (Room air)     Weight: 65.7 kg (144 lb 13.5 oz)  Estimated body mass index is 23.38 kg/m  as calculated from the following:    Height as of 2/16/23: 1.676 m (5' 6\").    Weight as of this encounter: 65.7 kg (144 lb 13.5 oz).    Qtc: 482 ms 4/12          DSM-5 Diagnosis:   Recent delirium   Unspecified anxiety " "disorder   unspecified neurocognitive disorder           Assessment:   He appears to have persistent delusions and paranoia. They are not severe, but I think that he would be more comfortable with an antipsychotic.   A cognitive assessment by OT may be helpful (is this associated with significant cognitive problems?)           Summary of Recommendations:   Consider Abilify 5 mg HS (will not effect QTc)     Page me or re-consult psychiatry as needed (psychiatry is signing off).     Minor Brown M.D.   Consult liaison psychiatry   [facility]   Contact information available via [facility] Paging/Directory.  If I am not available, then [facility] intake ([phone]) should know who   Is on call        \"Much or all of the text in this note was generated through the use of Dragon Dictate voice to text software. Errors in spelling or words which appear to be out of contact are unintentional, may be present due having escaped editing\"           "

## 2023-04-17 NOTE — PLAN OF CARE
Goal Outcome Evaluation:      Plan of Care Reviewed With: patient    Overall Patient Progress: no change    Outcome Evaluation: Pt was able to answer all orientation questions, but confused. VSS on RA. Denies pain. Right PICC heparin locked. BLE wound dressing CDI and will be due 4/17. No actue changes overnight. Continue with POC.

## 2023-04-17 NOTE — PROGRESS NOTES
[facility]    Medicine Progress Note - Medicine Service, CLAIRE TEAM 2    Date of Admission:  4/3/2023    Assessment & Plan   Gustavo Faria is a 58 year old male with recent Serratia bacteremia, HFrEF, prolonged QT, lower extremity wounds, DMII, hypothyroidism, low testosterone and known ACTH secreting pituitary adenoma w/resulting Cushing's disease s/p 2 partial resections in 2021 [facility]. [facility], he developed new onset double vision and severe headache which prompted imaging that showed a large mass invading the cavernous sinuses and impinging on the cranial nerves. He is transferred to [facility] for this pituitary adenoma, concern for progression vs hemorrhage/apoplexy. Complicated by psychosis/metabolic encephalopathy and electrolyte abnormalities secondary to Cushing's syndrome.    Updates today:  - Adding abilify 5 mg PO at bedtime per psychiatry recommendations     - Continue spironolactone 200 mg daily   - Attempting placement at [facility] if possible prior to surgery 04/27, per discussion with SW has been challenging to place so far.      # Hypernatremia, DI vs cushing's, resolved  # Hypokalemia, resolved  Hypernatremia and hypokalemia, likely secondary to Cushing's.     - Endocrine following  - Goal Na: 135-140  - Spironolactone 200 daily (can go up to 400 mg daily depending on Na and K, per Endocrine)  - Strict I&Os  - Daily BMP + Mg  - PTA potassium supplement 20 mEq    # Acute metabolic encephalopathy  # Pyruria, Candiduria  # Encephalopathy likely secondary to cushing's disease  Admission symptoms included disorientation, intermittently following directions. Repetitive words - perseverating on particular phrases. Sx started the 2-3 days prior to admission (which [facility] was [facility]). Vulnerable brain (multiple brain surgeries), enlarging  pituitary mass. No seizure activity per EEG. Steroids removed and pt still encephalopathy. Sodium has been in normal limits and patient remains altered. Worked up for infections - did reveal candiduria. Biggest concern for infectious vs cushing's. Most likely cushing's given largely unremarkable infectious work up.  - CT abd w/ contrast (4/10): no evidence of pyelonephritis  - Urine culture: candida, sensitive to Fluconazole  - Fluconazole 200 mg daily x7 days, completes 4/17  - Discontinued ceftriaxone as patient did not improve clinically with antibiotics  - No steroids  - Surgical plans for pituitary tumor as stated below  - Delirium precautions  - Electrolyte management  - PRN quetiapine if agitation that is not responsive to behavioral interventions     # Delusions, intermittent  Intermittent delusions regarding staff stalking patient while in the hospital. Unclear chronicity of these delusions - but has had them multiple times this hospitalization. Unclear psych history. Medical management for encephalopathy as stated above. Related to tumor?  - Appreciate psychiatric recommendations:   - Abilify 5 mg PO at bedtime     # ACTH secreting pituitary adenoma c/b type II DM, hypothyroidism and low testosterone s/p two partial resections in 2021   Patient noted double vision and severe headache beginning the evening of 3/25. CT imaging on 3/25 revealed a large sellar/suprasellar mass extending into the cavernous sinuses with no evidence of acute stroke. On 3/28, he underwent an MRI which confirmed the CT findings of a large mass invading the cavernous sinuses and impinging on the cranial nerves. Necrosis extending beyond left cavernous sinus. [facility]. Repeat MRI performed on 4/6/23: compared to 2021 MRI, lesions are smaller. Orbit MRI without optic nerve compression and no evidence of orbital infection.  - Neurosurgery following  - Plans for Neurosurgery: surgery 4/27/23  - Optimize medically  prior to surgery  - DC'ed dexamethasone  - Ophthalmology following, no major changes, agree with neurosurgery plans  - Endocrine following    Surgery Prep:  - Hold ASA 1 week prior to surgery  - Hold Empagliflozin 3 days prior to surgery  - CT/CTA head- stereotaxy protocol to be completed night before the surgery     # Type II DM, exacerbated by Cushing's   A1c of 7.6% on 2/2023. Patient [facility] the [facility] regimen [facility].    - Lantus 10 units  - MD Sliding scale insulin  - Carb coverage 1:15  - Hold PTA metformin, empa, and sitagliptin, can resume upon discharge    # Buttocks, sacrum and bilateral groin wounds   # RLE wound from puncture injury   # L dorsal foot wound from presumed trauma   # Soft tissue infection/abscess    # Hx serratia bacteremia in December 2022   For patient's lower extremity wounds and hx of Serratia bacteremia in December, he was initially started on [facility]azolin [facility]. His antibiotics were broadened to Vanc and Zosyn and ultimately he was transitioned to ceftazidime on 3/23 with plan to complete course on 4/4/23. BCx negative and wound cx grew serratia marcescens at the [facility]. He has been on ceftazidime since. MRI of the right tib/fib on 3/23 was negative for osteomyelitis but did show two fluid collections draining sponateneously. [facility] at the [facility] and determined no intervention was needed as wounds were draining on their own.  - Stop ceftazidime (3/23 - 4/5)  - WOC following  - PT/OT when able to follow commands  - Pain: tylenol PRN, dilaudid 0.5 mg PRN for dressing changes and oxy 5 mg PRN - however given encephalopathy, use sparingly    # HFmrEF, with global hypokinesis  # High burden of PVCs  # Prolonged QTc   # Elevated troponin, down-trended  Coronary angiogram on 2/27 shows normal coronaries. Troponin down-trending (88 -> 77). No chest pain. High burden of PVCs. Follow up ECHO with global hypokinesis, which is worse compared to February 2023 ECHO. EF is 45%  (same compared to prior). Likely small vessel disease vs demand vs cardiomyopathy 2/2 ceftazidime. Low concern for acute ACS.   - Discontinued tele  - Electrolyte mgmt as above  - No need for diuresis at this time - given hyper-K  - Continue PTA coreg  - Lisinopril 10  - Empagliflozin (see above)  - Resume ASA (hold 1 week prior to surgery)  - Avoid QTc prolonging medications     # Central Hypothyroidism  - Continue PTA levothyroxine     # Hypogonadism   - Continue PTA androgel (will need to bring in home medication)     # Chronic microcytic anemia   Hgb of 8.4 on [facility]. Peripheral smear on 3/30 showed poikilocytosis with occasional red blood cell fragments but no other evidence of hemolysis.  Haptoglobin was normal.  Ferritin was 91, transferrin saturation was low, B12 was 495 and folate was 8.7. Likely combination of iron deficiency as well as inflammation/chronic disease.   - CTM        # Concern for malnutrition   - Nutrition following       Diet: Snacks/Supplements Adult: Ensure Max Protein (bariatric); Between Meals  Regular Diet Adult  Snacks/Supplements Adult: Ensure Enlive; Between Meals  Room Service    DVT Prophylaxis: Pneumatic Compression Devices  Roland Catheter: Not present  Lines:   PICC 04/06/23 Double Lumen Right Basilic access-Site Assessment: WDL      Cardiac Monitoring: None  Code Status: Full Code      Clinically Significant Risk Factors        # Hypokalemia: Lowest K = 3.2 mmol/L in last 2 days, will replace as needed       # Hypoalbuminemia: Lowest albumin = 2.9 g/dL at 4/11/2023  7:07 AM, will monitor as appropriate   # Thrombocytopenia: Lowest platelets = 116 in last 2 days, will monitor for bleeding        # DMII: A1C = 9.5 % (Ref range: <5.7 %) within past 6 months    # Severe Malnutrition: based on nutrition assessment        Disposition Plan      Expected Discharge Date: 04/18/2023      Destination: home  Discharge Comments: Medically ready for discharge: likely  4/17  Patient's wife Eyad is NOK  PT: [facility]CU or [facility]          Dieter Jett MD  Medicine Service, [facility] TEAM 2  [facility]  Securely message with [employer] (more info)  Text page via [employer] Paging/Directory   See signed in provider for up to date coverage information  ______________________________________________________________________    Interval History   Doing ok this AM. No worsening chest pain or shortness of breath. Does have some ongoing frustrations with extended duration of stay in the hospital. Questions answered to patient satisfaction.     Physical Exam   Vital Signs: Temp: 98  F (36.7  C) Temp src: Oral BP: (!) 171/110 Pulse: 82   Resp: 18 SpO2: 100 % O2 Device: None (Room air)    Weight: 144 lbs 13.48 oz    General Appearance: Eyes open, tracking, answers yes/no questions, no distress, up in bed, conversational, generally appropriate during interview 4/17  Eyes: Pupils moderate size, EOMI  HEENT: Normal sclera, able to move neck FROM  Respiratory: Breathing comfortably on RA, CTAB  Cardiovascular: RRR, no murmurs  GI: BS+. Soft, nontender, nondistended. No guarding or rebound tenderness.   Musculoskeletal: No muscle wasting  Neuro:  strength intact, ankle strength intact, and sensation intact, EOMI  Psychiatric: Oriented to time, person, place, and situation     Medical Decision Making           Data     I have personally reviewed the following data over the past 24 hrs:    7.8  \   7.2 (L)   / 116 (L)     143 102 14.7 /  239 (H)   3.9 29 0.73 \       ALT: 176 (H) AST: 64 (H) AP: 342 (H) TBILI: 0.4   ALB: 3.2 (L) TOT PROTEIN: 5.1 (L) LIPASE: N/A

## 2023-04-18 ENCOUNTER — APPOINTMENT (OUTPATIENT)
Dept: OCCUPATIONAL THERAPY | Facility: CLINIC | Age: 58
DRG: 614 | End: 2023-04-18
Attending: STUDENT IN AN ORGANIZED HEALTH CARE EDUCATION/TRAINING PROGRAM
Payer: COMMERCIAL

## 2023-04-18 ENCOUNTER — APPOINTMENT (OUTPATIENT)
Dept: PHYSICAL THERAPY | Facility: CLINIC | Age: 58
DRG: 614 | End: 2023-04-18
Attending: STUDENT IN AN ORGANIZED HEALTH CARE EDUCATION/TRAINING PROGRAM
Payer: COMMERCIAL

## 2023-04-18 LAB
ALBUMIN SERPL BCG-MCNC: 3.2 G/DL (ref 3.5–5.2)
ALP SERPL-CCNC: 379 U/L (ref 40–129)
ALT SERPL W P-5'-P-CCNC: 191 U/L (ref 10–50)
ANION GAP SERPL CALCULATED.3IONS-SCNC: 14 MMOL/L (ref 7–15)
AST SERPL W P-5'-P-CCNC: 66 U/L (ref 10–50)
BILIRUB SERPL-MCNC: 0.5 MG/DL
BUN SERPL-MCNC: 16 MG/DL (ref 6–20)
CALCIUM SERPL-MCNC: 9 MG/DL (ref 8.6–10)
CHLORIDE SERPL-SCNC: 104 MMOL/L (ref 98–107)
CREAT SERPL-MCNC: 0.88 MG/DL (ref 0.67–1.17)
DEPRECATED HCO3 PLAS-SCNC: 27 MMOL/L (ref 22–29)
ERYTHROCYTE [DISTWIDTH] IN BLOOD BY AUTOMATED COUNT: 27.9 % (ref 10–15)
GFR SERPL CREATININE-BSD FRML MDRD: >90 ML/MIN/1.73M2
GLUCOSE BLDC GLUCOMTR-MCNC: 169 MG/DL (ref 70–99)
GLUCOSE BLDC GLUCOMTR-MCNC: 175 MG/DL (ref 70–99)
GLUCOSE BLDC GLUCOMTR-MCNC: 213 MG/DL (ref 70–99)
GLUCOSE BLDC GLUCOMTR-MCNC: 230 MG/DL (ref 70–99)
GLUCOSE BLDC GLUCOMTR-MCNC: 233 MG/DL (ref 70–99)
GLUCOSE BLDC GLUCOMTR-MCNC: 260 MG/DL (ref 70–99)
GLUCOSE BLDC GLUCOMTR-MCNC: 262 MG/DL (ref 70–99)
GLUCOSE SERPL-MCNC: 237 MG/DL (ref 70–99)
HCT VFR BLD AUTO: 26.5 % (ref 40–53)
HGB BLD-MCNC: 7.7 G/DL (ref 13.3–17.7)
MAGNESIUM SERPL-MCNC: 2.2 MG/DL (ref 1.7–2.3)
MCH RBC QN AUTO: 25.8 PG (ref 26.5–33)
MCHC RBC AUTO-ENTMCNC: 29.1 G/DL (ref 31.5–36.5)
MCV RBC AUTO: 89 FL (ref 78–100)
PHOSPHATE SERPL-MCNC: 2.7 MG/DL (ref 2.5–4.5)
PLATELET # BLD AUTO: 151 10E3/UL (ref 150–450)
POTASSIUM SERPL-SCNC: 3.7 MMOL/L (ref 3.4–5.3)
PROT SERPL-MCNC: 5.5 G/DL (ref 6.4–8.3)
RBC # BLD AUTO: 2.98 10E6/UL (ref 4.4–5.9)
SODIUM SERPL-SCNC: 145 MMOL/L (ref 136–145)
WBC # BLD AUTO: 7.8 10E3/UL (ref 4–11)

## 2023-04-18 PROCEDURE — 250N000013 HC RX MED GY IP 250 OP 250 PS 637

## 2023-04-18 PROCEDURE — 99233 SBSQ HOSP IP/OBS HIGH 50: CPT | Mod: GC | Performed by: INTERNAL MEDICINE

## 2023-04-18 PROCEDURE — 83735 ASSAY OF MAGNESIUM: CPT

## 2023-04-18 PROCEDURE — 99231 SBSQ HOSP IP/OBS SF/LOW 25: CPT | Mod: GC | Performed by: INTERNAL MEDICINE

## 2023-04-18 PROCEDURE — 84100 ASSAY OF PHOSPHORUS: CPT | Performed by: INTERNAL MEDICINE

## 2023-04-18 PROCEDURE — 85014 HEMATOCRIT: CPT

## 2023-04-18 PROCEDURE — 250N000013 HC RX MED GY IP 250 OP 250 PS 637: Performed by: STUDENT IN AN ORGANIZED HEALTH CARE EDUCATION/TRAINING PROGRAM

## 2023-04-18 PROCEDURE — 999N000007 HC SITE CHECK

## 2023-04-18 PROCEDURE — 82310 ASSAY OF CALCIUM: CPT

## 2023-04-18 PROCEDURE — 250N000011 HC RX IP 250 OP 636

## 2023-04-18 PROCEDURE — G0463 HOSPITAL OUTPT CLINIC VISIT: HCPCS

## 2023-04-18 PROCEDURE — 97116 GAIT TRAINING THERAPY: CPT | Mod: GP

## 2023-04-18 PROCEDURE — 97535 SELF CARE MNGMENT TRAINING: CPT | Mod: GO

## 2023-04-18 PROCEDURE — 80053 COMPREHEN METABOLIC PANEL: CPT

## 2023-04-18 PROCEDURE — 36592 COLLECT BLOOD FROM PICC: CPT

## 2023-04-18 PROCEDURE — 120N000002 HC R&B MED SURG/OB UMMC

## 2023-04-18 PROCEDURE — 97530 THERAPEUTIC ACTIVITIES: CPT | Mod: GP

## 2023-04-18 PROCEDURE — 250N000013 HC RX MED GY IP 250 OP 250 PS 637: Performed by: INTERNAL MEDICINE

## 2023-04-18 RX ORDER — FUROSEMIDE 20 MG
40 TABLET ORAL DAILY
Status: DISCONTINUED | OUTPATIENT
Start: 2023-04-19 | End: 2023-04-27

## 2023-04-18 RX ORDER — POTASSIUM CHLORIDE 20MEQ/15ML
40 LIQUID (ML) ORAL DAILY
Status: DISCONTINUED | OUTPATIENT
Start: 2023-04-19 | End: 2023-04-27

## 2023-04-18 RX ORDER — SPIRONOLACTONE 100 MG/1
300 TABLET, FILM COATED ORAL DAILY
Status: DISCONTINUED | OUTPATIENT
Start: 2023-04-19 | End: 2023-04-26

## 2023-04-18 RX ORDER — POTASSIUM CHLORIDE 750 MG/1
40 TABLET, EXTENDED RELEASE ORAL DAILY
Status: DISCONTINUED | OUTPATIENT
Start: 2023-04-19 | End: 2023-04-18

## 2023-04-18 RX ADMIN — MICONAZOLE NITRATE: 20 CREAM TOPICAL at 19:18

## 2023-04-18 RX ADMIN — ASPIRIN 81 MG: 81 TABLET ORAL at 08:40

## 2023-04-18 RX ADMIN — LISINOPRIL 20 MG: 10 TABLET ORAL at 12:57

## 2023-04-18 RX ADMIN — DICLOFENAC SODIUM 2 G: 10 GEL TOPICAL at 09:10

## 2023-04-18 RX ADMIN — CARVEDILOL 12.5 MG: 12.5 TABLET, FILM COATED ORAL at 08:40

## 2023-04-18 RX ADMIN — FLUCONAZOLE 200 MG: 200 TABLET ORAL at 08:40

## 2023-04-18 RX ADMIN — POTASSIUM CHLORIDE 20 MEQ: 750 TABLET, EXTENDED RELEASE ORAL at 08:40

## 2023-04-18 RX ADMIN — SPIRONOLACTONE 200 MG: 100 TABLET ORAL at 08:40

## 2023-04-18 RX ADMIN — LIDOCAINE PATCH 4% 2 PATCH: 40 PATCH TOPICAL at 08:58

## 2023-04-18 RX ADMIN — Medication 10 ML: at 16:37

## 2023-04-18 RX ADMIN — FUROSEMIDE 20 MG: 20 TABLET ORAL at 08:40

## 2023-04-18 RX ADMIN — ARIPIPRAZOLE 5 MG: 5 TABLET ORAL at 21:42

## 2023-04-18 RX ADMIN — POLYETHYLENE GLYCOL 3350 17 G: 17 POWDER, FOR SOLUTION ORAL at 08:44

## 2023-04-18 RX ADMIN — Medication 1 TABLET: at 08:40

## 2023-04-18 RX ADMIN — CARVEDILOL 12.5 MG: 12.5 TABLET, FILM COATED ORAL at 18:11

## 2023-04-18 RX ADMIN — LEVOTHYROXINE SODIUM 100 MCG: 100 TABLET ORAL at 08:40

## 2023-04-18 RX ADMIN — TESTOSTERONE 25 MG OF TESTOSTERONE: 50 GEL TOPICAL at 08:58

## 2023-04-18 ASSESSMENT — ACTIVITIES OF DAILY LIVING (ADL)
ADLS_ACUITY_SCORE: 48
ADLS_ACUITY_SCORE: 48
ADLS_ACUITY_SCORE: 40
ADLS_ACUITY_SCORE: 48
ADLS_ACUITY_SCORE: 40
ADLS_ACUITY_SCORE: 39
ADLS_ACUITY_SCORE: 40
ADLS_ACUITY_SCORE: 40
ADLS_ACUITY_SCORE: 39
ADLS_ACUITY_SCORE: 48

## 2023-04-18 NOTE — PLAN OF CARE
Goal Outcome Evaluation:      Plan of Care Reviewed With: patient    Overall Patient Progress: no change    Outcome Evaluation: A&O x 4 but confused at times.  Answers all orientation questions correctly but cognitive deficit is noted.  Please and cooperative this shift.  c/o mild back pain but declines meds.  Tolerates diet- ordered fried chicken for dinner from outside restaurant- carbs covered.  Appetite improving.  Dressings changed today to BLE and sacrum- wounds improving overall.  Double lumen PICC heparin locked.  No acute changes.  Continue to monitor and follow POC.

## 2023-04-18 NOTE — PLAN OF CARE
Goal Outcome Evaluation:      Plan of Care Reviewed With: patient    Overall Patient Progress: no changeOverall Patient Progress: no change    Outcome Evaluation: A&Ox 2, 3. Confused. When writer went for vitals pt was found covered in hair dye; pt is in process of cleaning before changing all linens. VSS on RA with elevated BP. Denies pain. 425ml out at 0600; still no bm since 4/12. No acute changes.

## 2023-04-18 NOTE — PROGRESS NOTES
"CLINICAL NUTRITION SERVICES - REASSESSMENT NOTE     Nutrition Prescription    RECOMMENDATIONS FOR MDs/PROVIDERS TO ORDER:  None at this time     Malnutrition Status:    Moderate malnutrition in the context of acute on chronic illness    Recommendations already ordered by Registered Dietitian (RD):  None at this time     Future/Additional Recommendations:  Monitor PO intakes, wt trends     EVALUATION OF THE PROGRESS TOWARD GOALS   Diet: Regular    Intake: Tolerating diet, appetite improving per pt report today and chart review. Pt ordered fried chicken from outside restaurant last night per RN note.  Pt says he's been \"chowing down\".  Pt says his appetite was low previously due to missing his wife.  Pt ordering 2-3 meals/day the past week per available [employer] records.  Ensure Enlive order at 2pm was discontinued today.    Calorie Counts  4/14: 975 kcal and 24 g protein  4/13: 1245 kcal and 30 g protein  4/12: 1133 kcal and 50 g protein  *3-day avg PO intake = 1118 kcal and 35 g protein (69% kcal needs and 43% protein needs)     NEW FINDINGS   Weight: lowest wts this admit 65.1-66 kg 4/11, 4/16, and 4/18.  Suspectr some fluid related wt shifts involved (pt is on lasix and spironolactone)  04/18/23 1029 65.1 kg (143 lb 8.3 oz) Bed scale   04/16/23 1428 65.7 kg (144 lb 13.5 oz) Bed scale   04/15/23 1736 69.6 kg (153 lb 7 oz) Bed scale   04/14/23 1821 70.9 kg (156 lb 4.9 oz) Bed scale   04/11/23 1121 66 kg (145 lb 8.1 oz) Bed scale   04/10/23 1009 69.5 kg (153 lb 3.5 oz) Bed scale   04/05/23 0740 68.6 kg (151 lb 3.8 oz) Bed scale   04/04/23 1002 68.7 kg (151 lb 7.3 oz      Update dosing weight 65 kg (actual, lowest recent wt 65.1 kg) and reassess estimated nutrition needs below:    ASSESSED NUTRITION NEEDS  Estimated Energy Needs: 0012-5308+ kcals/day (25 - 30+ kcals/kg)  Justification: Maintenance, aim higher end  Estimated Protein Needs:  grams protein/day (1.2 - 1.5 grams of " pro/kg)  Justification: Increased needs  Estimated Fluid Needs: (1 mL/kcal)   Justification: Maintenance, or other per provider pending fluid status    Labs:   - K+ and Mg++ WNL  - BGs 160s-200s    Meds:   - Lasix, spironolactone  - Medium sliding scale insulin TID before meals + at bedtime  - Novolog CHO counting (1 unit per 10 g CHO)  - Lantus  - Thera-Vit-M  - Klor-Con daily  - Vitamin D2 (50,000 units every 7 days)    Skin: WOC following for wounds (BLE w/ wounds due to arterial ulcer and unknown etiology; and also with incontinence associated dermatitis to buttocks, sacrum, and groin)    MALNUTRITION  % Intake: < 75% for > 7 days (moderate)  % Weight Loss: > 7.5% in 3 months (severe)  Subcutaneous Fat Loss: None observed  Muscle Loss: Temporal:  Mild possible  Fluid Accumulation/Edema: mild per flowsheets  Malnutrition Diagnosis: Moderate malnutrition in the context of acute on chronic illness    Previous Goals   Total avg nutritional intake to meet a minimum of 25 kcal/kg and 1.2 g PRO/kg daily (per dosing wt 68.7 kg).  Evaluation: Not met    Previous Nutrition Diagnosis  Inadequate oral intake related to decreased appetite and suspect intermittent confusion hindering PO as evidenced by mostly poor appetite documented the past week   Evaluation: Improving    CURRENT NUTRITION DIAGNOSIS  Inadequate oral intake related to decreased/variable appetite as evidenced by 3-day avg PO intake = 1118 kcal and 35 g protein (69% kcal needs and 43% protein needs), and pt report and chart review of previously poor appetite improving the past week    INTERVENTIONS  Implementation  None additional at this time    Goals  Patient to consume % of nutritionally adequate meal trays TID, or the equivalent with supplements/snacks.    Monitoring/Evaluation  Progress toward goals will be monitored and evaluated per protocol.     Gabbie Rascon RD, , LD  Weekday Pager: [phone]  Weekday Units covered: 7C (all beds) and 5A  (beds 5201 through 5205)  Weekend/Holiday RD Pager: [phone]

## 2023-04-18 NOTE — PROGRESS NOTES
Care Management Follow Up    Length of Stay (days): 15    Expected Discharge Date: 04/18/2023     Concerns to be Addressed: all concerns addressed in this encounter     Patient plan of care discussed at interdisciplinary rounds: Yes    Anticipated Discharge Disposition: Transitional Care     Anticipated Discharge Services: Transportation Services  Anticipated Discharge DME: None    Patient/family educated on Medicare website which has current facility and service quality ratings: yes  Education Provided on the Discharge Plan:  yes  Patient/Family in Agreement with the Plan:  yes    Referrals Placed by CM/SW:      [facility]  [address] [place]  [address]  P: [phone]  F: [phone]  4/14: Initial SNF referral sent via Epic  4/17: SW left a voicemail and requested a call back  4/18: SW left a voicemail and requested a call back      [facility]  [address] [place] [address]  P: [phone]  F: [phone]  4/14: Initial SNF referral sent via Epic (failed)  4/17: Initial SNF referral resent via communications   4/18: SW left a voicemail and requested a call back     [address] [address]  P: [phone]  F: [phone]  4/14: Initial SNF referral sent via Epic (failed)  4/17: Initial SNF referral resent via communications  4/18: SW left a voicemail and requested a call back     Declined     [facility] at [address] [address] [phone]  Declined: Assist of two     [facility]  [address] [place] [address]  P: [phone]  F: [phone]  Declined: Payer/insurance denied or not accepted     [facility]  [address] [facility] [address]  P: [phone]  F: [phone]  Declined: Not TCU appropriate. Will review the patient again after his neurosurgery      [facility]  [address] [facility] [address]  P:  [phone]  F: [phone]  Declined: Elopement risk    [facility]  [address] [place] [address]  P: [phone]  F: [phone]  Declined    Private pay costs discussed: Not applicable    Additional Information:  SW informed the neuro team that [facility] declined the patient due to the patient not being appropriate for a TCU at this time, however [facility] will reassess the patient after his neurosurgery. Neurosurgery reported the plan is to still do the neurosurgery on 4/27 and that is the soonest they will be able to do the surgery and that patient will likely remain in the hospital tell his procedure on 4/27.     Abrahan Anders, SAMIRA  [address]   Office: [phone]  Pager: [phone]  [email or web address]

## 2023-04-18 NOTE — PROGRESS NOTES
Endocrine Progress Note  Patient: Gustavo Faria   MRN: [id number]  Date of Service: 04/18/2023    HPI summary and hospitalization course:  Gustavo Faria is a 58 year old male with PMHx of ACTH-secreting macroadenoma c/b central hypothyroidism, and central hypogonadism,  s/p transphenoidal surgery 5/2021 and 7/2021 in [place] has baseline records 3rd nerve palsy, ongoing serratia RLE cellulitis c/b recent serratia bacteremia, HFrEF, T2DM, and HTN who [facility] of known expanding large sellar/suprasellar mass extending into cavernous sinuses and subsequent cranial nerve impingement.    [facility] and bilateral lower limb cellulitis/abscess developed sudden onset of headaches and double vision transferred to [facility] for multidisciplinary assessment.  Had an MRI done on 3/25 which demonstrates enlargement of known pituitary adenoma with necrosis extending laterally beyond the left cavernous sinus concerning for compression of the cranial nerves at the cavernous sinus.  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  On the arrival he was encephalopathic.  Taken off dexamethasone on 04/06/2023 with improvement in the mental status following that.    Repeated MRI following transfer to [facility] showed new enhancing to 1.4 cm lesion in the right frontal lobe with susceptibility of artifact.  4.3 cm heterogeneously enhancing pituitary mass with encasement and peripheral displacement of the cavernous portions of both internal carotid arteries and infiltration of the clivus.  Previous optic chiasm compression is resolved.  Pituitary stalk is midline.  Lesion is infiltrating both cavernous sinuses cranial nerves in the cavernous sinus  cannot be differentiated from the underlying mass.    Interval history:  From reviewing the RN's notes continuous to have intermittent confusion.  Was seen by the psychiatry team yesterday started on Abilify 5 mg daily for persistent mild delusion and paranoia.  His blood pressure started to increase over the night following having good control during the day.  His appetite continues to improve with increase oral intake.  He was fully oriented at the time of the assessment, stated he feels completely fine.   No headache , no nausea , no vomiting.   His appetite is back to normal.           Physical Examination:  BP (!) 160/114 (BP Location: Left arm, Patient Position: Semi-Mayer's, Cuff Size: Adult Regular)   Pulse 74   Temp 97.9  F (36.6  C) (Oral)   Resp 16   Wt 65.7 kg (144 lb 13.5 oz)   SpO2 94%   BMI 23.38 kg/m    Physical Exam  Vitals reviewed.   Constitutional:       General: He is not in acute distress.  Cardiovascular:      Rate and Rhythm: Normal rate.   Pulmonary:      Effort: Pulmonary effort is normal.   Musculoskeletal:      Right lower leg: Edema present.      Left lower leg: Edema present.   Neurological:      Mental Status: He is alert and oriented to person, place, and time.   Psychiatric:         Mood and Affect: Mood normal.         Behavior: Behavior normal.         Medications:  Reviewed    Endocrine Labs:     Latest Reference Range & Units 04/17/23 07:25   Sodium 136 - 145 mmol/L 143   Potassium 3.4 - 5.3 mmol/L 3.9   Chloride 98 - 107 mmol/L 102   Carbon Dioxide (CO2) 22 - 29 mmol/L 29   Urea Nitrogen 6.0 - 20.0 mg/dL 14.7   Creatinine 0.67 - 1.17 mg/dL 0.73   GFR Estimate >60 mL/min/1.73m2 >90   Calcium 8.6 - 10.0 mg/dL 8.9   Anion Gap 7 - 15 mmol/L 12   Magnesium 1.7 - 2.3 mg/dL 2.2   Phosphorus 2.5 - 4.5 mg/dL 2.9   Albumin 3.5 - 5.2 g/dL 3.2 (L)   Protein Total 6.4 - 8.3 g/dL 5.1 (L)   Alkaline Phosphatase 40 - 129 U/L 342 (H)   ALT 10 - 50 U/L 176 (H)   AST 10 - 50 U/L 64 (H)        Images:  MRI brain with and without contrast on 4/6/2023:  Comparison:  Outside brain MR 3/28/2023 and 5/25/2021.      Technique:   1. Brain MRI: Axial diffusion and FLAIR images of the whole brain  obtained without intravenous contrast. Sagittal T1 and T2-weighted,  coronal T2-weighted, coronal T1-weighted images with focus on the  sella were obtained without intravenous contrast. Post intravenous  contrast using gadolinium coronal and sagittal T1-weighted images were  obtained focused on the sella. Dynamic postcontrast coronal  T1-weighted images were also obtained.     2. MRI of the Orbits focused on the orbits/visual pathways:  Axial  T2-weighted with inversion recovery and coronal T1-weighted images  were obtained without intravenous contrast. Axial and coronal  T1-weighted images with fat saturation were obtained after intravenous  gadolinium administration.     3. MRI Brain COW: Sagittal T1-weighted, axial T2-weighted with fat  saturation, turboFLAIR and diffusion-weighted with ADC map and coronal  T1-weighted and T2-weighted images of the brain were obtained without  intravenous contrast.       Contrast: 7.5mL Gadavist     Findings:    Brain MRI:  Enhancing 1.4 x 1.0 cm lesion with associated susceptibility artifact  in the right frontal lobe (series 16, image 17), stable since  5/25/2021. A similar punctate lesion in the left putamen also  unchanged.m     4.3 x 1.9 x 1.3 cm (right to left, AP, craniocaudal) heterogeneously  enhancing pituitary mass with encasement and peripheral displacement  of the cavernous portions of both internal carotid arteries (series  13, image 28 and series 14, image 11). The lesion infiltrates the  clivus. Compared with MRI from 2021 the craniocaudal dimension of the  mass is decreased. The lesion is now more heterogeneous, previously  more homogeneous. Previous optic chiasm compression is resolved. No  abnormal enhancement of the optic nerves within the  intraorbital  portion. The pituitary stalk is midline.     The lesion infiltrates both cavernous sinuses. The traversing cranial  nerves within the cavernous sinus cannot be differentiated from the  underlying mass.     FLAIR images through the whole brain shows nonspecific posterior  periventricular white matter hyperintensities. Otherwise no mass  effect, midline shift, or significant enlargement of the ventricles.  Scattered mucosal thickening of the paranasal sinuses. Mastoid air  cells are clear.     MRA Head: No aneurysm or stenosis of the major intracranial arteries  at the base of the brain.                                                                      Impression:   1.  4.3 cm heterogeneously enhancing pituitary mass with infiltration  of bilateral cavernous sinuses and the clivus. Findings are similar  when compared compared to prior exam 3/28/2023 suggestive for a  macroadenoma. Compared with an older MRI from 2021, the lesion appears  more heterogeneous and smaller. Previous compression of the optic  chiasm is no longer present.  2.  Patchy T2 hyperintense enhancing lesion with scattered  susceptibility artifacts in the right frontal subcortical white  matter. A similar tiny smaller lesion is also present in the left  putamen. Retrospectively this lesion was present back in 2021.  Constellation of findings are suggestive of a benign lesion such as  cavernoma or telangiectasia.  3.   MRA demonstrates no aneurysm or stenosis of the major  intracranial arteries. Bilateral cavernous internal carotid arteries  are patent despite encasement by the above-mentioned lesion.  4.  No abnormal optic nerve enhancement or optic nerve atrophy.        Assessment and plan:    Gustavo Faria is a 58 year old male with PMHx of  ACTH-secreting macroadenoma c/b central hypothyroidism, , and central hypogonadism,  s/p transphenoidal surgery 5/2021 and 7/2021 in [place] has baseline records 3rd nerve palsy, ongoing  serratia RLE cellulitis c/b recent serratia bacteremia, HFrEF, T2DM, and HTN who [facility] of known expanding large sellar/suprasellar mass extending into cavernous sinuses and subsequent cranial nerve impingement.     #Pituitary macroadenoma:  #Cushing's disease:  #Pituitary apoplexy:  Known history of ACTH secreting macroadenoma , developed sudden onset of double [facility] and [facility]28 showed enlargement of known pituitary macroadenoma with necrosis extending laterally beyond the left cavernous sinus concerning for compression of cranial nerves at the cavernous sinuses.  No acute hemorrhage, small area of diffusion restriction at the left side of pituitary possibly consistent with ischemic pituitary adenoma  Started on dexamethasone 2 mg twice daily, was discontinued on4/6/23  Random serum cortisol level at 2:30 AM was 46.3, ACTH 321.  A.m. cortisol level of 41.1 on 4/7/2023.  Repeated MRI on 4/6 showed a 4.3 cm diffusely enhancing pituitary mass with infiltration of bilateral cavernous sinus and clivus.  He has been developing recurrent hypernatremia/hypokalemia during the admission, was intermittently on dextrose 5% infusion last received on 4/12 discontinued at 5:30 AM.  Aggressive potassium repletion.  Following increasing the dose of spironolactone to 200 mg daily, potassium repletion requirement dropped significantly currently on KCl 20 mill equivalent daily receiving extra doses intermittently.  Restarted on Lasix 20 mg daily.  Sodium and potassium levels today within normal range, however the sodium level is slowly trending up.     Recommendations:  -Resection/debulking  arranged by neurosurgery will be done on  04/27 will be followed by radiotherapy.  -Continue with the spironolactone 200 mg daily with titrating it up as tolerated maximum dose 400 mg to counteract the mineralocorticoid effect as needed.  -HTN/HFrEF  managed by the primary team.May consider increasing the lasix dose to PTA 40 mg and increasing the KCL to 40 mEq daily.     -For perioperative planning, no need to initiate the steroids replacement unless he decompensates during the surgery or postoperatively he can get cortisol level following the surgery followed by a.m. cortisol level checks if there is any suspicion of postoperative AI to be started on steroids.       #Central hypothyroidism:  Prior to admission was on levothyroxine 100 mcg daily.  Free T4 on admission 1.3 TSH not detectable (picture of central hypothyroidism).     Recommendations:  -Continue with PTA levothyroxine 100 mcg daily.     #Hypogonadotrophic hypogonadism:  Was on AndroGel gel 1 pump daily.     Recommendations:  -Continue AndroGel.          #DM type II: Hemoglobin A1c on the admission 9.5%.  Prior to admission was on Sitagliptin 100 mg daily/metformin 500 mg twice daily, Jardiance 12.5 mg.  Oral intake continues to improve.  Morning BG today 262       Recommendations:  -To increase Lantus from 10 units daily to 12 units daily.  -medium-dose SSI 3 times daily before meals and at the bedtime.  -Continue with carb coverage with NovoLog 1 unit: 10 g CHO with meals and snacks.  -Hypoglycemia rescue protocol.  -POC BG checks 3 times daily AC at the bedtime at 2 AM.  -Carb counting protocol.  -Holding PTA oral medications.      Jeanne Chase     Endocrinology diabetes and metabolism  fellow   Pager number: [phone]

## 2023-04-18 NOTE — PROGRESS NOTES
[facility]    Medicine Progress Note - Medicine Service, MARALEX[facility]ER TEAM 2    Date of Admission:  4/3/2023    Assessment & Plan   Gustavo Faria is a 58 year old male with recent Serratia bacteremia, HFrEF, prolonged QT, lower extremity wounds, DMII, hypothyroidism, low testosterone and known ACTH secreting pituitary adenoma w/resulting Cushing's disease s/p 2 partial resections in 2021 who [facility]. [facility], he developed new onset double vision and severe headache which prompted imaging that showed a large mass invading the cavernous sinuses and impinging on the cranial nerves. He is transferred to [facility] for this pituitary adenoma, concern for progression vs hemorrhage/apoplexy. Complicated by psychosis/metabolic encephalopathy and electrolyte abnormalities secondary to Cushing's syndrome. He is now medically stable and awaiting surgery scheduled for 4/27/2023.    Updates today:  - will increase Lasix to PTA 40mg and K supplement to 40mEq  - will discuss with Endo re: increasing Spironolactone as well  - Attempting placement at [facility] if possible prior to surgery 04/27, per discussion with SW has been challenging to place so far.      # Hypernatremia, DI vs cushing's, resolved  # Hypokalemia, resolved  Hypernatremia and hypokalemia, likely secondary to Cushing's.     - Endocrine following  - Goal Na: 135-140  - Spironolactone 200 daily (can go up to 400 mg daily depending on Na and K, per Endocrine)  - Strict I&Os  - Daily BMP + Mg  - PTA potassium supplement 40 mEq    # Acute metabolic encephalopathy  # Pyruria, Candiduria  # Encephalopathy likely secondary to cushing's disease  Admission symptoms included disorientation, intermittently following directions. Repetitive words - perseverating on particular phrases. Sx started the 2-3 days prior to admission (which patient  was [facility]). Vulnerable brain (multiple brain surgeries), enlarging pituitary mass. No seizure activity per EEG. Steroids removed and pt still encephalopathy. Sodium has been in normal limits and patient remains altered. Worked up for infections - did reveal candiduria. Biggest concern for infectious vs cushing's. Most likely cushing's given largely unremarkable infectious work up.  - CT abd w/ contrast (4/10): no evidence of pyelonephritis  - Urine culture: candida, sensitive to Fluconazole  - Fluconazole 200 mg daily x7 days, completed 4/17  - Discontinued ceftriaxone as patient did not improve clinically with antibiotics  - No steroids  - Surgical plans for pituitary tumor as stated below  - Delirium precautions  - Electrolyte management  - PRN quetiapine if agitation that is not responsive to behavioral interventions     # Delusions, intermittent  Intermittent delusions regarding staff stalking patient while in the hospital. Unclear chronicity of these delusions - but has had them multiple times this hospitalization. Unclear psych history. Medical management for encephalopathy as stated above. Related to tumor?  - Appreciate psychiatric recommendations:   - Abilify 5 mg PO at bedtime     # ACTH secreting pituitary adenoma c/b type II DM, hypothyroidism and low testosterone s/p two partial resections in 2021   Patient noted double vision and severe headache beginning the evening of 3/25. CT imaging on 3/25 revealed a large sellar/suprasellar mass extending into the cavernous sinuses with no evidence of acute stroke. On 3/28, he underwent an MRI which confirmed the CT findings of a large mass invading the cavernous sinuses and impinging on the cranial nerves. Necrosis extending beyond left cavernous sinus. [facility]. Repeat MRI performed on 4/6/23: compared to 2021 MRI, lesions are smaller. Orbit MRI without optic nerve compression and no evidence of orbital infection.  - Neurosurgery  following  - Plans for Neurosurgery: surgery 4/27/23  - Optimize medically prior to surgery  - DC'ed dexamethasone  - Ophthalmology following, no major changes, agree with neurosurgery plans  - Endocrine following    Surgery Prep:  - Hold ASA 1 week prior to surgery starting 4/20  - Hold Empagliflozin 3 days prior to surgery starting - on HOLD  - CT/CTA head- stereotaxy protocol to be completed night before the surgery     # Type II DM, exacerbated by Cushing's   A1c of 7.6% on 2/2023. Patient was [facility] the following regimen [facility].    - Lantus 10 units  - MD Sliding scale insulin  - Carb coverage 1:15  - Hold PTA metformin, empa, and sitagliptin, can resume upon discharge    # Buttocks, sacrum and bilateral groin wounds   # RLE wound from puncture injury   # L dorsal foot wound from presumed trauma   # Soft tissue infection/abscess    # Hx serratia bacteremia in December 2022   For patient's lower extremity wounds and hx of Serratia bacteremia in December, he was initially started [facility]azolin [facility] the [facility]. His antibiotics were broadened to Vanc and Zosyn and ultimately he was transitioned to ceftazidime on 3/23 with plan to complete course on 4/4/23. BCx negative and wound cx grew serratia marcescens at the [facility]. He has been on ceftazidime since. MRI of the right tib/fib on 3/23 was negative for osteomyelitis but did show two fluid collections draining sponateneously. [facility]o was consulted at the [facility] and determined no intervention was needed as wounds were draining on their own.  - Stop ceftazidime (3/23 - 4/5)  - WOC following  - PT/OT when able to follow commands  - Pain: tylenol PRN, dilaudid 0.5 mg PRN for dressing changes and oxy 5 mg PRN - however given encephalopathy, use sparingly    # HFmrEF, with global hypokinesis  # High burden of PVCs  # Prolonged QTc   # Elevated troponin, down-trended  Coronary angiogram on 2/27 shows normal coronaries. Troponin down-trending (88 -> 77). No chest pain. High burden  of PVCs. Follow up ECHO with global hypokinesis, which is worse compared to February 2023 ECHO. EF is 45% (same compared to prior). Likely small vessel disease vs demand vs cardiomyopathy 2/2 ceftazidime. Low concern for acute ACS.   - Discontinued tele  - Electrolyte mgmt as above  - Lasix 40mg daily  - Continue PTA coreg  - Lisinopril 10  - Empagliflozin (see above)  - Resume ASA (hold 1 week prior to surgery)  - Avoid QTc prolonging medications     # Central Hypothyroidism  - Continue PTA levothyroxine     # Hypogonadism   - Continue PTA androgel (will need to bring in home medication)     # Chronic microcytic anemia   Hgb of 8.4 [facility]. Peripheral smear on 3/30 showed poikilocytosis with occasional red blood cell fragments but no other evidence of hemolysis.  Haptoglobin was normal.  Ferritin was 91, transferrin saturation was low, B12 was 495 and folate was 8.7. Likely combination of iron deficiency as well as inflammation/chronic disease.   - CTM        # Concern for malnutrition   - Nutrition following       Diet: Regular Diet Adult    DVT Prophylaxis: Pneumatic Compression Devices  Roland Catheter: Not present  Lines:   PICC 04/06/23 Double Lumen Right Basilic access-Site Assessment: WDL      Cardiac Monitoring: None  Code Status: Full Code      Clinically Significant Risk Factors              # Hypoalbuminemia: Lowest albumin = 2.9 g/dL at 4/11/2023  7:07 AM, will monitor as appropriate   # Thrombocytopenia: Lowest platelets = 116 in last 2 days, will monitor for bleeding        # DMII: A1C = 9.5 % (Ref range: <5.7 %) within past 6 months    # Severe Malnutrition: based on nutrition assessment        Disposition Plan      Expected Discharge Date: 04/18/2023      Destination: home  Discharge Comments: Medically ready for discharge to [facility]: 4/18  Patient's wife Eyad is [facility]  PT: TCU            Zita Christensen MD  Internal Medicine - Pediatrics Resident, PGY-2  [facility]  [place]  4/18/2023  _________________________________________________________    Interval History   NAEON. Doing well this morning. Trying to order lunch. Has no complaints. Denies chest pain, trouble breathing, abdominal pain. VSS. No stool since 4/12.    Physical Exam   Vital Signs: Temp: 97.9  F (36.6  C) Temp src: Oral BP: (!) 143/94 Pulse: 91   Resp: 16 SpO2: 100 % O2 Device: None (Room air)    Weight: 143 lbs 8.31 oz    General Appearance: Eyes open, tracking, answers yes/no questions, no distress, up in chair, conversational, generally appropriate during interview 4/18  Eyes: Pupils moderate size, EOMI  HEENT: Normal sclera, able to move neck FROM  Respiratory: Breathing comfortably on RA, CTAB  Cardiovascular: RRR, no murmurs  GI: BS+. Soft, nontender, nondistended. No guarding or rebound tenderness.   Musculoskeletal: No muscle wasting  Neuro:  strength intact, ankle strength intact, and sensation intact, EOMI  Psychiatric: Oriented to time, person, place, and situation     Medical Decision Making           Data     I have personally reviewed the following data over the past 24 hrs:    7.8  \   7.7 (L)   / 151     145 104 16.0 /  233 (H)   3.7 27 0.88 \       ALT: 191 (H) AST: 66 (H) AP: 379 (H) TBILI: 0.5   ALB: 3.2 (L) TOT PROTEIN: 5.5 (L) LIPASE: N/A

## 2023-04-18 NOTE — PLAN OF CARE
Goal Outcome Evaluation:      Plan of Care Reviewed With: patient    Overall Patient Progress: improvingOverall Patient Progress: improving    Outcome Evaluation: see RD note

## 2023-04-18 NOTE — PROGRESS NOTES
[facility]  WO Nurse Inpatient Assessment     Consulted for: Buttocks, sacrum, bilateral groin, Right Leg    Patient History (according to provider note(s):      Gustavo Faria is a 58 year old male with recent Serratia bacteremia, HFrEF, prolonged QT, lower extremity wounds, DMII, hypothyroidism, low testosterone and known ACTH secreting pituitary adenoma w/resulting Cushing's disease s/p 2 partial resections in 2021 [facility]. [facility], he developed new onset double vision and severe headache which prompted imaging that showed a large mass invading the cavernous sinuses and impinging on the cranial nerves. He is transferred to [facility] for this pituitary adenoma with plans for possible surgical intervention.     Areas Assessed:      Areas visualized during today's visit: Sacrum/coccyx and Lower extremities     Wound location: Buttocks, Sacrum and Groin  4/4 4/11/23    Last photo: 4/18/23  Wound due to: Incontinence Associated Dermatitis (IAD)  Wound history/plan of care: incontinent of stool and urine. Has not had incontinent in the last few days. Groin IAD resolved.  Wound base:  buttocks mostly intact with scattered open areas 100 % dermis     Palpation of the wound bed: normal      Drainage: scant     Description of drainage: serous     Measurements (length x width x depth, in cm): scattered areas none measureing larger than 0.3  x 0.3  x  0.1 cm      Tunneling: N/A     Undermining: N/A  Periwound skin: Intact      Color:   groin has areas of hypopigmentation, buttock has areas of hyperpigmenting.       Temperature: normal   Odor: none  Pain: mild, tender  Pain interventions prior to dressing change: slow and gentle cares   Treatment goal: Heal  and Protection  STATUS: unchanged  Supplies ordered: at bedside and supplies stored on unit     Wound location:  BLE    Left foot 23    Left foot     Right Lateral Leg     Right Lateral Leg         Right anterior leg     Right anterior leg   Last photo: 23  Wound due to: Arterial Ulcer and Unknown Etiology  Wound history/plan of care: Presented to the hospital earlier this year for lower leg wound infection. Per nursing patient has been intermittently refusing dressing changes.   Wound base: Right leg wounds 5 % granulation tissue, 95 % non-granular tissue Left foot 95 % yellow fibrin,  5 % granulation tissue     Palpation of the wound bed: normal      Drainage: moderate     Description of drainage: cloudy     Measurements (length x width x depth, in cm):Right anterior leg 3.5  x 2  x  0.3 cm Right Lateral 8 x 2.5 x 0.3 Left foot 2.2 x 2.5 x 0.3     Tunneling: N/A     Undermining: Right anterior le.2 from 12-3 o'clock. Right Lateral 1 cm from 3-6 o'clock Left foot 0.5 cm 2 - 6 o'clock  Periwound skin: Intact and Dry/scaly      Color: normal and consistent with surrounding tissue      Temperature: normal   Odor: mild  Pain: moderate, sharp  Pain interventions prior to dressing change: patient tolerated well, slow and gentle cares  and distraction  Treatment goal: Heal  and Infection control/prevention  STATUS: stable  Supplies ordered: at bedside    Treatment Plan:     Sacrum wound: Every 3 days     Cleanse the area with NS and pat dry.    Apply No sting film barrier to periwound skin.    Cover wound with Sacral Mepilex (#[id number])    Change dressing Q 3 days.    Turn and reposition Q 2hrs side to side only.    Ensure pt has Jono-cushion while sitting up in the chair.    FYI- If pt has constant incontinent loose stools needing dressing changes Q shift please discontinue the Mepilex dressing and apply criticaid barrier paste BID and PRN.    Right anterior Leg (shin) wound: Change daily and PRN  Cleanse wound bed with normal saline and pat dry.  Cut a piece of PolyMem roll (#[id number]) to fit into the  wound bed and place it with the lettering facing up. Ensure to cover entire wound bed.  Cover with Mepilex border.    Right lateral leg and left foot wounds Change dressing every 3 days.  Cleanse wound bed with normal saline and pat dry.  Cut a piece of PolyMem roll (#[id number]) to fit into the wound bed and place it with the lettering facing up. Ensure to cover entire wound bed.  Cover with Mepilex border.    Orders: Written    RECOMMEND PRIMARY TEAM ORDER: D/C Merary antifungal to Groin no longer needed  Education provided: plan of care, wound progress and Moisture management  Discussed plan of care with: Nurse  [facility] nurse follow-up plan: weekly  Notify WO if wound(s) deteriorate.  Nursing to notify the Provider(s) and re-consult the [facility] Nurse if new skin concern.    DATA:     Current support surface: Standard  Standard gel/foam mattress (IsoFlex, Atmos air, etc)  Containment of urine/stool: Incontinence Protocol, Incontinent pad in bed and Primofit external catheter  BMI: Body mass index is 23.16 kg/m .   Active diet order: Orders Placed This Encounter      Regular Diet Adult     Output: I/O last 3 completed shifts:  In: 580 [P.O.:480; I.V.:100]  Out: 1125 [Urine:1125]     Labs:   Recent Labs   Lab 04/18/23  0943   ALBUMIN 3.2*   HGB 7.7*   WBC 7.8     Pressure injury risk assessment:   Sensory Perception: 3-->slightly limited  Moisture: 3-->occasionally moist  Activity: 2-->chairfast  Mobility: 2-->very limited  Nutrition: 3-->adequate  Friction and Shear: 1-->problem  Manan Score: 14    Veronica Gunter RN CWOCN  Pager no longer is use, please contact through [place] group: [facility] Nurse  Dept. Office Number: [phone]

## 2023-04-18 NOTE — PLAN OF CARE
Goal Outcome Evaluation:      Plan of Care Reviewed With: patient    Overall Patient Progress: improving Overall Patient Progress: improving    Outcome Evaluation: A&Ox3, disoriented to situation. VSS on RA. WOC changed dressings on RLL and L foot. Mepilex was also placed on lower sacrum/coccyx area. Lidocaine patch x2 bilateral lower back in place. Pt pivoted to commode with medium BM. Phosporus lab 2.4 today, replacement protocol not met and lab draw ordered for tomorrow.

## 2023-04-19 ENCOUNTER — APPOINTMENT (OUTPATIENT)
Dept: PHYSICAL THERAPY | Facility: CLINIC | Age: 58
DRG: 614 | End: 2023-04-19
Attending: STUDENT IN AN ORGANIZED HEALTH CARE EDUCATION/TRAINING PROGRAM
Payer: COMMERCIAL

## 2023-04-19 LAB
ANION GAP SERPL CALCULATED.3IONS-SCNC: 12 MMOL/L (ref 7–15)
BUN SERPL-MCNC: 17 MG/DL (ref 6–20)
CALCIUM SERPL-MCNC: 9.2 MG/DL (ref 8.6–10)
CHLORIDE SERPL-SCNC: 101 MMOL/L (ref 98–107)
CREAT SERPL-MCNC: 0.81 MG/DL (ref 0.67–1.17)
DEPRECATED HCO3 PLAS-SCNC: 28 MMOL/L (ref 22–29)
ERYTHROCYTE [DISTWIDTH] IN BLOOD BY AUTOMATED COUNT: 28.2 % (ref 10–15)
GFR SERPL CREATININE-BSD FRML MDRD: >90 ML/MIN/1.73M2
GLUCOSE BLDC GLUCOMTR-MCNC: 243 MG/DL (ref 70–99)
GLUCOSE BLDC GLUCOMTR-MCNC: 257 MG/DL (ref 70–99)
GLUCOSE BLDC GLUCOMTR-MCNC: 272 MG/DL (ref 70–99)
GLUCOSE BLDC GLUCOMTR-MCNC: 278 MG/DL (ref 70–99)
GLUCOSE BLDC GLUCOMTR-MCNC: 281 MG/DL (ref 70–99)
GLUCOSE BLDC GLUCOMTR-MCNC: 282 MG/DL (ref 70–99)
GLUCOSE SERPL-MCNC: 262 MG/DL (ref 70–99)
HCT VFR BLD AUTO: 25.9 % (ref 40–53)
HGB BLD-MCNC: 7.5 G/DL (ref 13.3–17.7)
MAGNESIUM SERPL-MCNC: 2.2 MG/DL (ref 1.7–2.3)
MCH RBC QN AUTO: 26 PG (ref 26.5–33)
MCHC RBC AUTO-ENTMCNC: 29 G/DL (ref 31.5–36.5)
MCV RBC AUTO: 90 FL (ref 78–100)
PHOSPHATE SERPL-MCNC: 2.5 MG/DL (ref 2.5–4.5)
PLATELET # BLD AUTO: 172 10E3/UL (ref 150–450)
POTASSIUM SERPL-SCNC: 3.9 MMOL/L (ref 3.4–5.3)
RBC # BLD AUTO: 2.89 10E6/UL (ref 4.4–5.9)
SODIUM SERPL-SCNC: 141 MMOL/L (ref 136–145)
WBC # BLD AUTO: 7 10E3/UL (ref 4–11)

## 2023-04-19 PROCEDURE — 36592 COLLECT BLOOD FROM PICC: CPT

## 2023-04-19 PROCEDURE — 84100 ASSAY OF PHOSPHORUS: CPT | Performed by: INTERNAL MEDICINE

## 2023-04-19 PROCEDURE — 99232 SBSQ HOSP IP/OBS MODERATE 35: CPT | Mod: GC | Performed by: INTERNAL MEDICINE

## 2023-04-19 PROCEDURE — 82374 ASSAY BLOOD CARBON DIOXIDE: CPT

## 2023-04-19 PROCEDURE — 97530 THERAPEUTIC ACTIVITIES: CPT | Mod: GP

## 2023-04-19 PROCEDURE — 999N000007 HC SITE CHECK

## 2023-04-19 PROCEDURE — 250N000013 HC RX MED GY IP 250 OP 250 PS 637: Performed by: INTERNAL MEDICINE

## 2023-04-19 PROCEDURE — 250N000013 HC RX MED GY IP 250 OP 250 PS 637

## 2023-04-19 PROCEDURE — 120N000002 HC R&B MED SURG/OB UMMC

## 2023-04-19 PROCEDURE — 82310 ASSAY OF CALCIUM: CPT

## 2023-04-19 PROCEDURE — 85027 COMPLETE CBC AUTOMATED: CPT

## 2023-04-19 PROCEDURE — 250N000011 HC RX IP 250 OP 636

## 2023-04-19 PROCEDURE — 250N000013 HC RX MED GY IP 250 OP 250 PS 637: Performed by: STUDENT IN AN ORGANIZED HEALTH CARE EDUCATION/TRAINING PROGRAM

## 2023-04-19 PROCEDURE — 83735 ASSAY OF MAGNESIUM: CPT

## 2023-04-19 RX ADMIN — CARVEDILOL 12.5 MG: 12.5 TABLET, FILM COATED ORAL at 08:18

## 2023-04-19 RX ADMIN — INSULIN ASPART 5 UNITS: 100 INJECTION, SOLUTION INTRAVENOUS; SUBCUTANEOUS at 08:33

## 2023-04-19 RX ADMIN — OXYCODONE HYDROCHLORIDE 2.5 MG: 5 TABLET ORAL at 21:16

## 2023-04-19 RX ADMIN — CARVEDILOL 12.5 MG: 12.5 TABLET, FILM COATED ORAL at 18:31

## 2023-04-19 RX ADMIN — Medication 1 TABLET: at 08:17

## 2023-04-19 RX ADMIN — ASPIRIN 81 MG: 81 TABLET ORAL at 08:18

## 2023-04-19 RX ADMIN — Medication 10 ML: at 16:39

## 2023-04-19 RX ADMIN — LISINOPRIL 20 MG: 10 TABLET ORAL at 12:08

## 2023-04-19 RX ADMIN — TESTOSTERONE 25 MG OF TESTOSTERONE: 50 GEL TOPICAL at 08:16

## 2023-04-19 RX ADMIN — POLYETHYLENE GLYCOL 3350 17 G: 17 POWDER, FOR SOLUTION ORAL at 08:16

## 2023-04-19 RX ADMIN — SPIRONOLACTONE 300 MG: 100 TABLET ORAL at 08:17

## 2023-04-19 RX ADMIN — FUROSEMIDE 40 MG: 20 TABLET ORAL at 08:18

## 2023-04-19 RX ADMIN — LEVOTHYROXINE SODIUM 100 MCG: 100 TABLET ORAL at 08:17

## 2023-04-19 RX ADMIN — FLUCONAZOLE 200 MG: 200 TABLET ORAL at 08:17

## 2023-04-19 RX ADMIN — MICONAZOLE NITRATE: 20 CREAM TOPICAL at 21:16

## 2023-04-19 RX ADMIN — POTASSIUM CHLORIDE 40 MEQ: 40 SOLUTION ORAL at 08:16

## 2023-04-19 RX ADMIN — INSULIN ASPART 6 UNITS: 100 INJECTION, SOLUTION INTRAVENOUS; SUBCUTANEOUS at 12:01

## 2023-04-19 RX ADMIN — ARIPIPRAZOLE 5 MG: 5 TABLET ORAL at 21:16

## 2023-04-19 RX ADMIN — INSULIN ASPART 6 UNITS: 100 INJECTION, SOLUTION INTRAVENOUS; SUBCUTANEOUS at 16:39

## 2023-04-19 ASSESSMENT — ACTIVITIES OF DAILY LIVING (ADL)
ADLS_ACUITY_SCORE: 40

## 2023-04-19 NOTE — PROGRESS NOTES
[facility]    Medicine Progress Note - Medicine Service, MAROON TEAM 2    Date of Admission:  4/3/2023    Assessment & Plan   Gustavo Faria is a 58 year old male with recent Serratia bacteremia, HFrEF, prolonged QT, lower extremity wounds, DMII, hypothyroidism, low testosterone and known ACTH secreting pituitary adenoma w/resulting Cushing's disease s/p 2 partial resections in 2021 who [facility]. [facility], he developed new onset double vision and severe headache which prompted imaging that showed a large mass invading the cavernous sinuses and impinging on the cranial nerves. He is transferred to [facility] for this pituitary adenoma, concern for progression vs hemorrhage/apoplexy. Complicated by psychosis/metabolic encephalopathy and electrolyte abnormalities secondary to Cushing's syndrome. He is now medically stable and awaiting surgery scheduled for 4/27/2023.    Updates today:  - increasing Lantus to 14 unit(s), increasing sliding scale insulin to high resistance dosing  - holding ASA starting tomorrow 4/20  - Attempting placement at [facility] if possible prior to surgery 04/27, per discussion with SW has been challenging to place so far.      # Hypernatremia, DI vs cushing's, resolved  # Hypokalemia, resolved  Hypernatremia and hypokalemia, likely secondary to Cushing's.     - Endocrine following  - Goal Na: 135-140  - Spironolactone 300 daily (can go up to 400 mg daily depending on Na and K, per Endocrine)  - Strict I&Os  - Daily BMP + Mg  - PTA potassium supplement 40 mEq    # Acute metabolic encephalopathy  # Pyruria, Candiduria  # Encephalopathy likely secondary to cushing's disease  Admission symptoms included disorientation, intermittently following directions. Repetitive words - perseverating on particular phrases. Sx started the 2-3 days prior to admission (which  [facility] was [facility]). Vulnerable brain (multiple brain surgeries), enlarging pituitary mass. No seizure activity per EEG. Steroids removed and pt still encephalopathy. Sodium has been in normal limits and patient remains altered. Worked up for infections - did reveal candiduria. Biggest concern for infectious vs cushing's. Most likely cushing's given largely unremarkable infectious work up.  - CT abd w/ contrast (4/10): no evidence of pyelonephritis  - Urine culture: candida, sensitive to Fluconazole  - Fluconazole 200 mg daily x7 days, completed 4/17  - Discontinued ceftriaxone as patient did not improve clinically with antibiotics  - No steroids  - Surgical plans for pituitary tumor as stated below  - Delirium precautions  - Electrolyte management  - PRN quetiapine if agitation that is not responsive to behavioral interventions     # Delusions, intermittent  Intermittent delusions regarding staff stalking patient while in the hospital. Unclear chronicity of these delusions - but has had them multiple times this hospitalization. Unclear psych history. Medical management for encephalopathy as stated above. Related to tumor?  - Appreciate psychiatric recommendations:   - Abilify 5 mg PO at bedtime     # ACTH secreting pituitary adenoma c/b type II DM, hypothyroidism and low testosterone s/p two partial resections in 2021   Patient noted double vision and severe headache beginning the evening of 3/25. CT imaging on 3/25 revealed a large sellar/suprasellar mass extending into the cavernous sinuses with no evidence of acute stroke. On 3/28, he underwent an MRI which confirmed the CT findings of a large mass invading the cavernous sinuses and impinging on the cranial nerves. Necrosis extending beyond left cavernous sinus. [facility]. Repeat MRI performed on 4/6/23: compared to 2021 MRI, lesions are smaller. Orbit MRI without optic nerve compression and no evidence of orbital infection.  -  Neurosurgery following  - Plans for Neurosurgery: surgery 4/27/23  - Optimize medically prior to surgery  - DC'ed dexamethasone  - Ophthalmology following, no major changes, agree with neurosurgery plans  - Endocrine following    Surgery Prep:  - Hold ASA 1 week prior to surgery starting 4/20  - Hold Empagliflozin 3 days prior to surgery starting - on HOLD  - CT/CTA head- stereotaxy protocol to be completed night before the surgery     # Type II DM, exacerbated by Cushing's   A1c of 7.6% on 2/2023. Patient was [facility] the following regimen [facility].    - Lantus 14 units  - HD Sliding scale insulin  - Carb coverage 1:15  - Hold PTA metformin, empa, and sitagliptin, can resume upon discharge    # Buttocks, sacrum and bilateral groin wounds   # RLE wound from puncture injury   # L dorsal foot wound from presumed trauma   # Soft tissue infection/abscess    # Hx serratia bacteremia in December 2022   For patient's lower extremity wounds and hx of Serratia bacteremia in December, he was initially [facility]azolin [facility]. His antibiotics were broadened to Vanc and Zosyn and ultimately he was transitioned to ceftazidime on 3/23 with plan to complete course on 4/4/23. BCx negative and wound cx grew serratia marcescens at the [facility]. He has been on ceftazidime since. MRI of the right tib/fib on 3/23 was negative for osteomyelitis but did show two fluid collections draining sponateneously. [facility] and determined no intervention was needed as wounds were draining on their own.  - Stop ceftazidime (3/23 - 4/5)  - WOC following  - PT/OT when able to follow commands  - Pain: tylenol PRN, dilaudid 0.5 mg PRN for dressing changes and oxy 5 mg PRN - however given encephalopathy, use sparingly    # HFmrEF, with global hypokinesis  # High burden of PVCs  # Prolonged QTc   # Elevated troponin, down-trended  Coronary angiogram on 2/27 shows normal coronaries. Troponin down-trending (88 -> 77). No chest pain.  High burden of PVCs. Follow up ECHO with global hypokinesis, which is worse compared to February 2023 ECHO. EF is 45% (same compared to prior). Likely small vessel disease vs demand vs cardiomyopathy 2/2 ceftazidime. Low concern for acute ACS.   - Discontinued tele  - Electrolyte mgmt as above  - Lasix 40mg daily  - Continue PTA coreg  - Lisinopril 10  - Empagliflozin (see above)  - Resume ASA (hold 1 week prior to surgery)  - Avoid QTc prolonging medications     # Central Hypothyroidism  - Continue PTA levothyroxine     # Hypogonadism   - Continue PTA androgel (will need to bring in home medication)     # Chronic microcytic anemia   Hgb of 8.4 [facility]. Peripheral smear on 3/30 showed poikilocytosis with occasional red blood cell fragments but no other evidence of hemolysis.  Haptoglobin was normal.  Ferritin was 91, transferrin saturation was low, B12 was 495 and folate was 8.7. Likely combination of iron deficiency as well as inflammation/chronic disease.   - CTM        # Concern for malnutrition   - Nutrition following       Diet: Regular Diet Adult    DVT Prophylaxis: Pneumatic Compression Devices  Roland Catheter: Not present  Lines:   PICC 04/06/23 Double Lumen Right Basilic access-Site Assessment: WDL      Cardiac Monitoring: None  Code Status: Full Code      Clinically Significant Risk Factors              # Hypoalbuminemia: Lowest albumin = 2.9 g/dL at 4/11/2023  7:07 AM, will monitor as appropriate          # DMII: A1C = 9.5 % (Ref range: <5.7 %) within past 6 months    # Severe Malnutrition: based on nutrition assessment        Disposition Plan             Zita Christensen MD  Internal Medicine - Pediatrics Resident, PGY-2  [facility]  4/19/2023  _________________________________________________________    Interval History   NAEON. Doing well this morning. Trying to get a nap in this morning. Denies dizziness, chest pain, SOB, abdominal pain. Tolerating PO intake,  VSS.    Physical Exam   Vital Signs: Temp: 98.3  F (36.8  C) Temp src: Oral BP: 119/81 Pulse: 86   Resp: 16 SpO2: 99 % O2 Device: None (Room air)    Weight: 143 lbs 8.31 oz    General Appearance: Eyes open, tracking, answers yes/no questions, no distress, up in chair, conversational  Eyes: Pupils moderate size, EOMI  HEENT: Normal sclera, able to move neck FROM  Respiratory: Breathing comfortably on RA, CTAB  Cardiovascular: RRR, no murmurs  GI: BS+. Soft, nontender, nondistended. No guarding or rebound tenderness.   Musculoskeletal: No muscle wasting  Neuro:  strength intact, ankle strength intact, and sensation intact, EOMI  Psychiatric: Oriented to time, person, place, and situation     Medical Decision Making           Data     I have personally reviewed the following data over the past 24 hrs:    7.0  \   7.5 (L)   / 172     141 101 17.0 /  281 (H)   3.9 28 0.81 \

## 2023-04-19 NOTE — PROGRESS NOTES
[facility], [place]   04/18/2023  Neurosurgery Progress Note:    Assessment:  Gustavo Faria is a 58 year old male with a PMH of DMII, HFrEF, BLE open wounds, history of serratia bacteremia in December 2022,  pituitary ACTH secreting macroadenoma s/p EEA for resection on 5/2021 and 7/2021 in [place], with baseline right CN 3 palsy, who was admitted at the [facility] on 3/23. He developed sudden headache and vision changes on 3/25, with MRI brain on 3/28, which demonstrated enlargement of known pituitary adenoma, with necrosis extending laterally beyond the left cavernous sinus, concerning for compression of cranial nerves at cavernous sinus. 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      Given that he is past the acute period of apoplexy, there is no indication for emergent decompression. He would benefit for redo EEA for resection of adenoma given its symptomatic nature. However, he would need to be medically optimized from the metabolic and infectious standpoint prior to surgery. He would also benefit from a multidisciplinary approach with recommendations from ophthalmology, endocrinology and radiation oncology regarding options for treatment.     Plan:  -Patient's wife is the legal decision maker-consent obtained for the surgery-uploaded in chart  - CT/CTA head- stereotaxy protocol to be completed night before the surgery  - Okay to discharge from neurosurgery perspective-if deemed medically stable by the primary team  - Please stop aspirin 7 days before surgery-surgery planned for 4/27/2023 with ENT colleagues for resection of pituitary adenoma  - Neurosurgery will continue to follow   -----------------------------------  Oliver Duong  PGY-2 Neurosurgery  Please page NSGY on call with questions      Please contact neurosurgery resident on call with  questions.    Dial * * [phone], enter [phone] when prompted.    Interval History: No acute events overnight that were brought to neurosurgery's attention.    Objective:   Temp:  [97.1  F (36.2  C)-98.6  F (37  C)] 98.1  F (36.7  C)  Pulse:  [58-91] 87  Resp:  [16-18] 18  BP: (107-143)/() 130/100  SpO2:  [97 %-100 %] 99 %  I/O last 3 completed shifts:  In: 300 [P.O.:300]  Out: 525 [Urine:525]      NEUROLOGIC:  -- Opens eyes to voice, following commands, oriented x2-3  -- Able to name objects  -- Speech limited, perseverating speech, minimal spontaneous speech  Cranial Nerves:  -- visual fields full to confrontation although with limited participation, PERRL anisocoric L>R and sluggish, left CN III paresis and CN VI palsy, restricted ROM in right EOM with no apparent asymmetry   -- face symmetrical, tongue midline  -- sensory V1-V3 intact bilaterally  -- palate elevates symmetrically, uvula midline  -- hearing grossly intact bilat     Motor:  Limited participation - antigravity x4 extremities     Sensory:  intact to LT x 4 extremities      Reflexes:       Bi Tri BR Omi Pat Ach Bab     C5-6 C7-8 C6 UMN L2-4 S1 UMN   R 2+ 2+ 2+ Norm 2+ 2+ Norm   L 2+ 2+ 2+ Norm 2+ 2+ Norm      Gait: Deferred.     LABS:  Recent Labs   Lab 04/19/23  0718 04/19/23  0201 04/18/23  2140 04/18/23  1025 04/18/23  0943 04/17/23  0738 04/17/23  0725 04/16/23  1134 04/16/23  1131   NA  --   --   --   --  145  --  143  --  142   POTASSIUM  --   --   --   --  3.7  --  3.9  --  3.2*   CHLORIDE  --   --   --   --  104  --  102  --  102   CO2  --   --   --   --  27  --  29  --  28   ANIONGAP  --   --   --   --  14  --  12  --  12   * 257* 175*   < > 237*   < > 198*   < > 225*   BUN  --   --   --   --  16.0  --  14.7  --  14.8   CR  --   --   --   --  0.88  --  0.73  --  0.78   KORIN  --   --   --   --  9.0  --  8.9  --  9.1    < > = values in this interval not displayed.       Recent Labs   Lab 04/18/23  0943   WBC 7.8   RBC 2.98*   HGB 7.7*    HCT 26.5*   MCV 89   MCH 25.8*   MCHC 29.1*   RDW 27.9*          IMAGING:  No results found for this or any previous visit (from the past 24 hour(s)).

## 2023-04-19 NOTE — PLAN OF CARE
Goal Outcome Evaluation:      Plan of Care Reviewed With: patient    Overall Patient Progress: no change Overall Patient Progress: no change    Outcome Evaluation: A&Ox4, oriented to situation, but is often confused/forgetful. Denies pain. Pt reported numbness/tingling BLE. Edema +3 LE, removed skid socks and replaced with larger size. Pt reports numbness/tingling relieved after sock change. BM today pivoted to bedside commode. Refused to pivot to chair this afternoon, required 2 assist with lift. Refused lidocaine patches today. Refused therapy. Continue POC.

## 2023-04-19 NOTE — PLAN OF CARE
Goal Outcome Evaluation:      Plan of Care Reviewed With: patient    Overall Patient Progress: no change    Outcome Evaluation: A&Ox4, answers orientation questions completely, but forgetful and confused. Denies pain. No BM overnight. Voids into bedside urinal. Regular diet and carb coverage. All dressing CDI. BG was 175 and 257.No acute changes overnight. Continue POC.

## 2023-04-19 NOTE — PROGRESS NOTES
Endocrine Progress Note  Patient: Gustavo Faria   MRN: [id number]  Date of Service: 04/19/2023    HPI summary and hospitalization course:  Gustavo Faria is a 58 year old male with PMHx of ACTH-secreting macroadenoma c/b central hypothyroidism, and central hypogonadism,  s/p transphenoidal surgery 5/2021 and 7/2021 in [place] has baseline records 3rd nerve palsy, ongoing serratia RLE cellulitis c/b recent serratia bacteremia, HFrEF, T2DM, and HTN who [facility] of known expanding large sellar/suprasellar mass extending into cavernous sinuses and subsequent cranial nerve impingement.    [facility] and [facility] lower limb cellulitis/abscess developed sudden onset of headaches and double vision transferred to [facility] for multidisciplinary assessment.  Had an MRI done on 3/25 which demonstrates enlargement of known pituitary adenoma with necrosis extending laterally beyond the left cavernous sinus concerning for compression of the cranial nerves at the cavernous sinus.  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  On the arrival he was encephalopathic.  Taken off dexamethasone on 04/06/2023 with improvement in the mental status following that.    Repeated MRI following transfer to [facility] showed new enhancing to 1.4 cm lesion in the right frontal lobe with susceptibility of artifact.  4.3 cm heterogeneously enhancing pituitary mass with encasement and peripheral displacement of the cavernous portions of both internal carotid arteries and infiltration of the clivus.  Previous optic chiasm compression is resolved.  Pituitary stalk is midline.  Lesion is infiltrating both cavernous sinuses cranial nerves in the cavernous sinus  cannot be differentiated from the underlying mass.    Interval history:  Lasix dose was increased to 40 mg daily.  KCl supplements increased to 40 mEq daily and the spironolactone dose was increased to 300 mg daily today.  Sodium and potassium level within normal range today.  He stated he does not have any headaches today however he is experiencing recurrence of double vision.  No nausea or vomiting.    Physical Examination:  BP (!) 130/100 (BP Location: Left arm)   Pulse 87   Temp 98.1  F (36.7  C) (Oral)   Resp 18   Wt 65.1 kg (143 lb 8.3 oz)   SpO2 99%   BMI 23.16 kg/m    Physical Exam  Vitals reviewed.   Constitutional:       General: He is not in acute distress.  Cardiovascular:      Rate and Rhythm: Normal rate.   Pulmonary:      Effort: Pulmonary effort is normal.   Musculoskeletal:      Right lower leg: Edema present.      Left lower leg: Edema present.   Neurological:      Mental Status: He is alert and oriented to person, place, and time.   Psychiatric:         Mood and Affect: Mood normal.         Behavior: Behavior normal.         Medications:  Reviewed    Endocrine Labs:     Latest Reference Range & Units Most Recent 04/16/23 11:31 04/17/23 07:25 04/18/23 09:43   Sodium 136 - 145 mmol/L 141  4/19/23 07:32 142 143 145   Potassium 3.4 - 5.3 mmol/L 3.9  4/19/23 07:32 3.2 (L) 3.9 3.7           Images:  MRI brain with and without contrast on 4/6/2023:  Comparison:  Outside brain MR 3/28/2023 and 5/25/2021.      Technique:   1. Brain MRI: Axial diffusion and FLAIR images of the whole brain  obtained without intravenous contrast. Sagittal T1 and T2-weighted,  coronal T2-weighted, coronal T1-weighted images with focus on the  sella were obtained without intravenous contrast. Post intravenous  contrast using gadolinium coronal and sagittal T1-weighted images were  obtained focused on the sella. Dynamic postcontrast coronal  T1-weighted images were also obtained.     2. MRI of the Orbits focused on the  orbits/visual pathways:  Axial  T2-weighted with inversion recovery and coronal T1-weighted images  were obtained without intravenous contrast. Axial and coronal  T1-weighted images with fat saturation were obtained after intravenous  gadolinium administration.     3. MRI Brain COW: Sagittal T1-weighted, axial T2-weighted with fat  saturation, turboFLAIR and diffusion-weighted with ADC map and coronal  T1-weighted and T2-weighted images of the brain were obtained without  intravenous contrast.       Contrast: 7.5mL Gadavist     Findings:    Brain MRI:  Enhancing 1.4 x 1.0 cm lesion with associated susceptibility artifact  in the right frontal lobe (series 16, image 17), stable since  5/25/2021. A similar punctate lesion in the left putamen also  unchanged.m     4.3 x 1.9 x 1.3 cm (right to left, AP, craniocaudal) heterogeneously  enhancing pituitary mass with encasement and peripheral displacement  of the cavernous portions of both internal carotid arteries (series  13, image 28 and series 14, image 11). The lesion infiltrates the  clivus. Compared with MRI from 2021 the craniocaudal dimension of the  mass is decreased. The lesion is now more heterogeneous, previously  more homogeneous. Previous optic chiasm compression is resolved. No  abnormal enhancement of the optic nerves within the intraorbital  portion. The pituitary stalk is midline.     The lesion infiltrates both cavernous sinuses. The traversing cranial  nerves within the cavernous sinus cannot be differentiated from the  underlying mass.     FLAIR images through the whole brain shows nonspecific posterior  periventricular white matter hyperintensities. Otherwise no mass  effect, midline shift, or significant enlargement of the ventricles.  Scattered mucosal thickening of the paranasal sinuses. Mastoid air  cells are clear.     MRA Head: No aneurysm or stenosis of the major intracranial arteries  at the base of the brain.                                                                       Impression:   1.  4.3 cm heterogeneously enhancing pituitary mass with infiltration  of bilateral cavernous sinuses and the clivus. Findings are similar  when compared compared to prior exam 3/28/2023 suggestive for a  macroadenoma. Compared with an older MRI from 2021, the lesion appears  more heterogeneous and smaller. Previous compression of the optic  chiasm is no longer present.  2.  Patchy T2 hyperintense enhancing lesion with scattered  susceptibility artifacts in the right frontal subcortical white  matter. A similar tiny smaller lesion is also present in the left  putamen. Retrospectively this lesion was present back in 2021.  Constellation of findings are suggestive of a benign lesion such as  cavernoma or telangiectasia.  3.   MRA demonstrates no aneurysm or stenosis of the major  intracranial arteries. Bilateral cavernous internal carotid arteries  are patent despite encasement by the above-mentioned lesion.  4.  No abnormal optic nerve enhancement or optic nerve atrophy.        Assessment and plan:    Gustavo Faria is a 58 year old male with PMHx of  ACTH-secreting macroadenoma c/b central hypothyroidism, , and central hypogonadism,  s/p transphenoidal surgery 5/2021 and 7/2021 in [place] has baseline records 3rd nerve palsy, ongoing serratia RLE cellulitis c/b recent serratia bacteremia, HFrEF, T2DM, and HTN who [facility] of known expanding large sellar/suprasellar mass extending into cavernous sinuses and subsequent cranial nerve impingement.     #Pituitary macroadenoma:  #Cushing's disease:  #Pituitary apoplexy:  Known history of ACTH secreting macroadenoma , developed sudden onset of double [facility] and [facility] showed enlargement of known pituitary macroadenoma with necrosis extending laterally beyond the left cavernous sinus concerning for compression of cranial  nerves at the cavernous sinuses.  No acute hemorrhage, small area of diffusion restriction at the left side of pituitary possibly consistent with ischemic pituitary adenoma  Started on dexamethasone 2 mg twice daily, was discontinued on4/6/23  Random serum cortisol level at 2:30 AM was 46.3, ACTH 321.  A.m. cortisol level of 41.1 on 4/7/2023.  Repeated MRI on 4/6 showed a 4.3 cm diffusely enhancing pituitary mass with infiltration of bilateral cavernous sinus and clivus.  He has been developing recurrent hypernatremia/hypokalemia during the admission, was intermittently on dextrose 5% infusion last received on 4/12 discontinued at 5:30 AM.  Aggressive potassium repletion.    Spironolactone dose was increased from 200 mg to 300 mg on 4/19.  KCl supplement increased from 20 mEq to 40 mill equivalent on 4/19.  Lasix dose increased from 20 mg up to 40 mg on 4/19.       Recommendations:  -Resection/debulking  arranged by neurosurgery will be done on  04/27 will be followed by radiotherapy.    -For perioperative planning, no need to initiate the steroids replacement unless he decompensates during the surgery or postoperatively he can get cortisol level following the surgery followed by a.m. cortisol level checks if there is any suspicion of postoperative AI to be started on steroids.       #Central hypothyroidism:  Prior to admission was on levothyroxine 100 mcg daily.  Free T4 on admission 1.3 TSH not detectable (picture of central hypothyroidism).     Recommendations:  -Continue with PTA levothyroxine 100 mcg daily.     #Hypogonadotrophic hypogonadism:  Was on AndroGel gel 1 pump daily.     Recommendations:  -Continue AndroGel.       #DM type II: Hemoglobin A1c on the admission 9.5%.  Prior to admission was on Sitagliptin 100 mg daily/metformin 500 mg twice daily, Jardiance 12.5 mg.  Good appetite.  BG readings started to trend up over the last 2 days.      Recommendations:  -To increase Lantus from 12 units daily to 14  units daily.  -To increase sliding scale insulin from medium dose to high-dose SSI.  -Continue with carb coverage with NovoLog 1 unit: 10 g CHO with meals and snacks.  -Hypoglycemia rescue protocol.  -POC BG checks 3 times daily AC at the bedtime at 2 AM.  -Carb counting protocol.  -Holding PTA oral medications.      Jeanne Chase     Endocrinology diabetes and metabolism  fellow   Pager number: [phone]       I have seen and examined the patient, reviewed and edited the fellow's note, and agree with the plan of care.  TAL Rai

## 2023-04-19 NOTE — PROGRESS NOTES
[facility], [place]   04/19/2023  Neurosurgery Progress Note:    Assessment:  Gustavo Faria is a 58 year old male with a PMH of DMII, HFrEF, BLE open wounds, history of serratia bacteremia in December 2022,  pituitary ACTH secreting macroadenoma s/p EEA for resection on 5/2021 and 7/2021 in [place], with baseline right CN 3 palsy, who was admitted at the [facility] on 3/23. He developed sudden headache and vision changes on 3/25, with MRI brain on 3/28, which demonstrated enlargement of known pituitary adenoma, with necrosis extending laterally beyond the left cavernous sinus, concerning for compression of cranial nerves at cavernous sinus. 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      Given that he is past the acute period of apoplexy, there is no indication for emergent decompression. He would benefit for redo EEA for resection of adenoma given its symptomatic nature. However, he would need to be medically optimized from the metabolic and infectious standpoint prior to surgery. He would also benefit from a multidisciplinary approach with recommendations from ophthalmology, endocrinology and radiation oncology regarding options for treatment.     Plan:  -Patient's wife is the legal decision maker-consent obtained for the surgery-uploaded in chart  - CT/CTA head- stereotaxy protocol to be completed night before the surgery  - Okay to discharge from neurosurgery perspective-if deemed medically stable by the primary team  - Please stop aspirin 7 days before surgery-surgery planned for 4/27/2023 with ENT colleagues for resection of pituitary adenoma  - Neurosurgery will continue to follow   -----------------------------------  Oliver Duong  PGY-2 Neurosurgery  Please page NSGY on call with questions      Please contact neurosurgery resident on call with  questions.    Dial * * [phone], enter [phone] when prompted.    Interval History: No acute events overnight that were brought to neurosurgery's attention.    Objective:   Temp:  [97.1  F (36.2  C)-98.6  F (37  C)] 98.1  F (36.7  C)  Pulse:  [58-91] 87  Resp:  [16-18] 18  BP: (107-143)/() 130/100  SpO2:  [97 %-100 %] 99 %  I/O last 3 completed shifts:  In: 300 [P.O.:300]  Out: 525 [Urine:525]      NEUROLOGIC:  -- Opens eyes to voice, following commands, oriented x3  -- Able to name objects  -- Speech limited, perseverating speech, minimal spontaneous speech  Cranial Nerves:  -- visual fields full to confrontation although with limited participation, PERRL anisocoric L>R and sluggish, left CN III paresis and CN VI palsy, restricted ROM in right EOM with no apparent asymmetry   -- face symmetrical, tongue midline  -- sensory V1-V3 intact bilaterally  -- palate elevates symmetrically, uvula midline  -- hearing grossly intact bilat     Motor:  Limited participation - antigravity x4 extremities     Sensory:  intact to LT x 4 extremities      Reflexes:       Bi Tri BR Omi Pat Ach Bab     C5-6 C7-8 C6 UMN L2-4 S1 UMN   R 2+ 2+ 2+ Norm 2+ 2+ Norm   L 2+ 2+ 2+ Norm 2+ 2+ Norm      Gait: Deferred.     LABS:  Recent Labs   Lab 04/19/23  0718 04/19/23  0201 04/18/23  2140 04/18/23  1025 04/18/23  0943 04/17/23  0738 04/17/23  0725 04/16/23  1134 04/16/23  1131   NA  --   --   --   --  145  --  143  --  142   POTASSIUM  --   --   --   --  3.7  --  3.9  --  3.2*   CHLORIDE  --   --   --   --  104  --  102  --  102   CO2  --   --   --   --  27  --  29  --  28   ANIONGAP  --   --   --   --  14  --  12  --  12   * 257* 175*   < > 237*   < > 198*   < > 225*   BUN  --   --   --   --  16.0  --  14.7  --  14.8   CR  --   --   --   --  0.88  --  0.73  --  0.78   KORIN  --   --   --   --  9.0  --  8.9  --  9.1    < > = values in this interval not displayed.       Recent Labs   Lab 04/18/23  0943   WBC 7.8   RBC 2.98*   HGB 7.7*   HCT  26.5*   MCV 89   MCH 25.8*   MCHC 29.1*   RDW 27.9*          IMAGING:  No results found for this or any previous visit (from the past 24 hour(s)).

## 2023-04-19 NOTE — PLAN OF CARE
Goal Outcome Evaluation:      Plan of Care Reviewed With: patient    Overall Patient Progress: improving    Outcome Evaluation: A&O x 4 but forgetful and confused intermittently.  VSS on room air.  Denies pain but has nonverbal s/s of pain with movement.  Tolerates regular diet with excellent appetite and no n/v.  Last BM was today.  Voids into bedside urinal.  All dressings changed by WOC today.  BG checks AC/HS/0200 with s/s and carb coverage.  No acute changes.  Continue POC.

## 2023-04-20 ENCOUNTER — APPOINTMENT (OUTPATIENT)
Dept: PHYSICAL THERAPY | Facility: CLINIC | Age: 58
DRG: 614 | End: 2023-04-20
Attending: STUDENT IN AN ORGANIZED HEALTH CARE EDUCATION/TRAINING PROGRAM
Payer: COMMERCIAL

## 2023-04-20 ENCOUNTER — APPOINTMENT (OUTPATIENT)
Dept: OCCUPATIONAL THERAPY | Facility: CLINIC | Age: 58
DRG: 614 | End: 2023-04-20
Attending: STUDENT IN AN ORGANIZED HEALTH CARE EDUCATION/TRAINING PROGRAM
Payer: COMMERCIAL

## 2023-04-20 LAB
ALBUMIN SERPL BCG-MCNC: 3.3 G/DL (ref 3.5–5.2)
ALP SERPL-CCNC: 326 U/L (ref 40–129)
ALT SERPL W P-5'-P-CCNC: 125 U/L (ref 10–50)
ANION GAP SERPL CALCULATED.3IONS-SCNC: 13 MMOL/L (ref 7–15)
AST SERPL W P-5'-P-CCNC: 26 U/L (ref 10–50)
BILIRUB DIRECT SERPL-MCNC: <0.2 MG/DL (ref 0–0.3)
BILIRUB SERPL-MCNC: 0.3 MG/DL
BUN SERPL-MCNC: 17.7 MG/DL (ref 6–20)
CALCIUM SERPL-MCNC: 9.1 MG/DL (ref 8.6–10)
CHLORIDE SERPL-SCNC: 99 MMOL/L (ref 98–107)
CREAT SERPL-MCNC: 0.84 MG/DL (ref 0.67–1.17)
DEPRECATED HCO3 PLAS-SCNC: 27 MMOL/L (ref 22–29)
ERYTHROCYTE [DISTWIDTH] IN BLOOD BY AUTOMATED COUNT: 28.1 % (ref 10–15)
GFR SERPL CREATININE-BSD FRML MDRD: >90 ML/MIN/1.73M2
GLUCOSE BLDC GLUCOMTR-MCNC: 185 MG/DL (ref 70–99)
GLUCOSE BLDC GLUCOMTR-MCNC: 191 MG/DL (ref 70–99)
GLUCOSE BLDC GLUCOMTR-MCNC: 256 MG/DL (ref 70–99)
GLUCOSE BLDC GLUCOMTR-MCNC: 262 MG/DL (ref 70–99)
GLUCOSE BLDC GLUCOMTR-MCNC: 297 MG/DL (ref 70–99)
GLUCOSE SERPL-MCNC: 318 MG/DL (ref 70–99)
HCT VFR BLD AUTO: 25.1 % (ref 40–53)
HGB BLD-MCNC: 7.3 G/DL (ref 13.3–17.7)
MAGNESIUM SERPL-MCNC: 2.2 MG/DL (ref 1.7–2.3)
MCH RBC QN AUTO: 25.8 PG (ref 26.5–33)
MCHC RBC AUTO-ENTMCNC: 29.1 G/DL (ref 31.5–36.5)
MCV RBC AUTO: 89 FL (ref 78–100)
PHOSPHATE SERPL-MCNC: 2.4 MG/DL (ref 2.5–4.5)
PLATELET # BLD AUTO: 204 10E3/UL (ref 150–450)
POTASSIUM SERPL-SCNC: 3.6 MMOL/L (ref 3.4–5.3)
PROT SERPL-MCNC: 5.5 G/DL (ref 6.4–8.3)
RBC # BLD AUTO: 2.83 10E6/UL (ref 4.4–5.9)
SODIUM SERPL-SCNC: 139 MMOL/L (ref 136–145)
WBC # BLD AUTO: 6.8 10E3/UL (ref 4–11)

## 2023-04-20 PROCEDURE — 250N000013 HC RX MED GY IP 250 OP 250 PS 637

## 2023-04-20 PROCEDURE — 84100 ASSAY OF PHOSPHORUS: CPT | Performed by: INTERNAL MEDICINE

## 2023-04-20 PROCEDURE — 250N000013 HC RX MED GY IP 250 OP 250 PS 637: Performed by: STUDENT IN AN ORGANIZED HEALTH CARE EDUCATION/TRAINING PROGRAM

## 2023-04-20 PROCEDURE — 999N000044 HC STATISTIC CVC DRESSING CHANGE

## 2023-04-20 PROCEDURE — 250N000009 HC RX 250: Performed by: INTERNAL MEDICINE

## 2023-04-20 PROCEDURE — 97535 SELF CARE MNGMENT TRAINING: CPT | Mod: GO

## 2023-04-20 PROCEDURE — 82248 BILIRUBIN DIRECT: CPT

## 2023-04-20 PROCEDURE — 120N000002 HC R&B MED SURG/OB UMMC

## 2023-04-20 PROCEDURE — 83735 ASSAY OF MAGNESIUM: CPT

## 2023-04-20 PROCEDURE — 99231 SBSQ HOSP IP/OBS SF/LOW 25: CPT | Mod: GC | Performed by: INTERNAL MEDICINE

## 2023-04-20 PROCEDURE — 82310 ASSAY OF CALCIUM: CPT

## 2023-04-20 PROCEDURE — 99232 SBSQ HOSP IP/OBS MODERATE 35: CPT | Mod: GC | Performed by: INTERNAL MEDICINE

## 2023-04-20 PROCEDURE — 258N000003 HC RX IP 258 OP 636: Performed by: INTERNAL MEDICINE

## 2023-04-20 PROCEDURE — 250N000011 HC RX IP 250 OP 636

## 2023-04-20 PROCEDURE — 36592 COLLECT BLOOD FROM PICC: CPT

## 2023-04-20 PROCEDURE — 250N000013 HC RX MED GY IP 250 OP 250 PS 637: Performed by: INTERNAL MEDICINE

## 2023-04-20 PROCEDURE — 97530 THERAPEUTIC ACTIVITIES: CPT | Mod: GP

## 2023-04-20 PROCEDURE — 85027 COMPLETE CBC AUTOMATED: CPT

## 2023-04-20 RX ADMIN — INSULIN ASPART 5 UNITS: 100 INJECTION, SOLUTION INTRAVENOUS; SUBCUTANEOUS at 08:39

## 2023-04-20 RX ADMIN — Medication 1 TABLET: at 08:26

## 2023-04-20 RX ADMIN — Medication 10 ML: at 16:30

## 2023-04-20 RX ADMIN — CARVEDILOL 12.5 MG: 12.5 TABLET, FILM COATED ORAL at 18:39

## 2023-04-20 RX ADMIN — ARIPIPRAZOLE 5 MG: 5 TABLET ORAL at 22:46

## 2023-04-20 RX ADMIN — SPIRONOLACTONE 300 MG: 100 TABLET ORAL at 08:27

## 2023-04-20 RX ADMIN — FLUCONAZOLE 200 MG: 200 TABLET ORAL at 08:26

## 2023-04-20 RX ADMIN — LEVOTHYROXINE SODIUM 100 MCG: 100 TABLET ORAL at 08:27

## 2023-04-20 RX ADMIN — Medication 5 ML: at 08:24

## 2023-04-20 RX ADMIN — DICLOFENAC SODIUM 2 G: 10 GEL TOPICAL at 22:55

## 2023-04-20 RX ADMIN — TESTOSTERONE 25 MG OF TESTOSTERONE: 50 GEL TOPICAL at 08:26

## 2023-04-20 RX ADMIN — INSULIN ASPART 2 UNITS: 100 INJECTION, SOLUTION INTRAVENOUS; SUBCUTANEOUS at 18:39

## 2023-04-20 RX ADMIN — LISINOPRIL 20 MG: 10 TABLET ORAL at 12:57

## 2023-04-20 RX ADMIN — DICLOFENAC SODIUM 2 G: 10 GEL TOPICAL at 08:44

## 2023-04-20 RX ADMIN — OXYCODONE HYDROCHLORIDE 2.5 MG: 5 TABLET ORAL at 12:57

## 2023-04-20 RX ADMIN — CARVEDILOL 12.5 MG: 12.5 TABLET, FILM COATED ORAL at 08:27

## 2023-04-20 RX ADMIN — POTASSIUM PHOSPHATE, MONOBASIC POTASSIUM PHOSPHATE, DIBASIC 9 MMOL: 224; 236 INJECTION, SOLUTION, CONCENTRATE INTRAVENOUS at 16:31

## 2023-04-20 RX ADMIN — POTASSIUM CHLORIDE 40 MEQ: 40 SOLUTION ORAL at 08:26

## 2023-04-20 RX ADMIN — HYDROXYZINE HYDROCHLORIDE 10 MG: 10 TABLET ORAL at 21:08

## 2023-04-20 RX ADMIN — INSULIN ASPART 5 UNITS: 100 INJECTION, SOLUTION INTRAVENOUS; SUBCUTANEOUS at 12:58

## 2023-04-20 RX ADMIN — ERGOCALCIFEROL 50000 UNITS: 1.25 CAPSULE, LIQUID FILLED ORAL at 08:26

## 2023-04-20 RX ADMIN — POLYETHYLENE GLYCOL 3350 17 G: 17 POWDER, FOR SOLUTION ORAL at 08:26

## 2023-04-20 RX ADMIN — FUROSEMIDE 40 MG: 20 TABLET ORAL at 08:27

## 2023-04-20 ASSESSMENT — ACTIVITIES OF DAILY LIVING (ADL)
ADLS_ACUITY_SCORE: 36
ADLS_ACUITY_SCORE: 40
ADLS_ACUITY_SCORE: 40
ADLS_ACUITY_SCORE: 36
ADLS_ACUITY_SCORE: 40
ADLS_ACUITY_SCORE: 36
ADLS_ACUITY_SCORE: 36
ADLS_ACUITY_SCORE: 40
ADLS_ACUITY_SCORE: 36
ADLS_ACUITY_SCORE: 40

## 2023-04-20 NOTE — PLAN OF CARE
Goal Outcome Evaluation:      Plan of Care Reviewed With: patient    Overall Patient Progress: no changeOverall Patient Progress: no change    Outcome Evaluation: Pt A&Ox4, forgetful/confused. Lower back pain in magan, oxy x1. No further numbness/tingling reported. Refused pivot back to bed; states he only uses the lift. Ate well, carb coverage difficult d/t inconsistent reporting and food being in garbage upon arrival today. No acute changes.

## 2023-04-20 NOTE — PLAN OF CARE
Goal Outcome Evaluation:      Plan of Care Reviewed With: patient    Overall Patient Progress: no change    Outcome Evaluation: A&Ox4, forgetful and confused. VSS and on RA ex HTN. Denies pain. WOC changed dressing on 4/17. Right PICC is heparin locked. Voids in bedside urinal. No BM overnight. 1 unit of insulin was administered for carb count of 12. BG were 282 ad 297. Continue with POC.

## 2023-04-20 NOTE — PROGRESS NOTES
[facility]    Medicine Progress Note - Medicine Service, CLAIRE TEAM 2    Date of Admission:  4/3/2023    Assessment & Plan   Gustavo Faria is a 58 year old male with recent Serratia bacteremia, HFrEF, prolonged QT, lower extremity wounds, DMII, hypothyroidism, low testosterone and known ACTH secreting pituitary adenoma w/resulting Cushing's disease s/p 2 partial resections in 2021 who [facility]. [facility], he developed new onset double vision and severe headache which prompted imaging that showed a large mass invading the cavernous sinuses and impinging on the cranial nerves. He is transferred to [facility] for this pituitary adenoma, concern for progression vs hemorrhage/apoplexy. Complicated by psychosis/metabolic encephalopathy and electrolyte abnormalities secondary to Cushing's syndrome. He is now medically stable and awaiting surgery scheduled for 4/27/2023.    Updates today:  - Ongoing high blood glucose in 200s but as patient just got increased Lantus to 14 unit(s) this AM so defer additional dose increase for now , continue sliding scale insulin at high resistance dosing   - holding ASA starting tomorrow 4/20 for procedure 4/27   - Attempting placement at [facility] if possible prior to surgery 04/27, per discussion with SW has been challenging to place so far.      # Hypernatremia, DI vs cushing's, resolved  # Hypokalemia, resolved  Hypernatremia and hypokalemia, likely secondary to Cushing's.     - Endocrine following  - Goal Na: 135-140  - Spironolactone 300 daily (can go up to 400 mg daily depending on Na and K, per Endocrine)  - Strict I&Os  - Daily BMP + Mg  - PTA potassium supplement 40 mEq    # Acute metabolic encephalopathy  # Pyruria, Candiduria  # Encephalopathy likely secondary to cushing's disease  Admission symptoms included disorientation, intermittently  following directions. Repetitive words - perseverating on particular phrases. Sx started the 2-3 days prior to admission (which [facility] was [facility]). Vulnerable brain (multiple brain surgeries), enlarging pituitary mass. No seizure activity per EEG. Steroids removed and pt still encephalopathy. Sodium has been in normal limits and patient remains altered. Worked up for infections - did reveal candiduria. Biggest concern for infectious vs cushing's. Most likely cushing's given largely unremarkable infectious work up.  - CT abd w/ contrast (4/10): no evidence of pyelonephritis  - Urine culture: candida, sensitive to Fluconazole  - Fluconazole 200 mg daily x7 days, completed 4/17  - Discontinued ceftriaxone as patient did not improve clinically with antibiotics  - No steroids  - Surgical plans for pituitary tumor as stated below  - Delirium precautions  - Electrolyte management  - PRN quetiapine if agitation that is not responsive to behavioral interventions     # Delusions, intermittent  Intermittent delusions regarding staff stalking patient while in the hospital. Unclear chronicity of these delusions - but has had them multiple times this hospitalization. Unclear psych history. Medical management for encephalopathy as stated above. Related to tumor?  - Appreciate psychiatric recommendations:   - Abilify 5 mg PO at bedtime     # ACTH secreting pituitary adenoma c/b type II DM, hypothyroidism and low testosterone s/p two partial resections in 2021   Patient noted double vision and severe headache beginning the evening of 3/25. CT imaging on 3/25 revealed a large sellar/suprasellar mass extending into the cavernous sinuses with no evidence of acute stroke. On 3/28, he underwent an MRI which confirmed the CT findings of a large mass invading the cavernous sinuses and impinging on the cranial nerves. Necrosis extending beyond left cavernous sinus. [facility]. Repeat MRI performed on 4/6/23:  compared to 2021 MRI, lesions are smaller. Orbit MRI without optic nerve compression and no evidence of orbital infection.  - Neurosurgery following  - Plans for Neurosurgery: surgery 4/27/23  - Optimize medically prior to surgery  - DC'ed dexamethasone  - Ophthalmology following, no major changes, agree with neurosurgery plans  - Endocrine following    Surgery Prep:  - Hold ASA 1 week prior to surgery starting 4/20  - Hold Empagliflozin 3 days prior to surgery starting - on HOLD  - CT/CTA head- stereotaxy protocol to be completed night before the surgery     # Type II DM, exacerbated by Cushing's   A1c of 7.6% on 2/2023. Patient [facility].    - Lantus 14 units  - HD Sliding scale insulin  - Carb coverage 1:15  - Hold PTA metformin, empa, and sitagliptin, can resume upon discharge    # Buttocks, sacrum and bilateral groin wounds   # RLE wound from puncture injury   # L dorsal foot wound from presumed trauma   # Soft tissue infection/abscess    # Hx serratia bacteremia in December 2022   For patient's lower extremity wounds and hx of Serratia bacteremia in December, he was initially [facility]olin [facility]. His antibiotics were broadened to Vanc and Zosyn and ultimately he was transitioned to ceftazidime on 3/23 with plan to complete course on 4/4/23. BCx negative and wound cx grew serratia marcescens at the [facility]. He has been on ceftazidime since. MRI of the right tib/fib on 3/23 was negative for osteomyelitis but did show two fluid collections draining sponateneously. [facility] and determined no intervention was needed as wounds were draining on their own.  - Stop ceftazidime (3/23 - 4/5)  - WOC following  - PT/OT when able to follow commands  - Pain: tylenol PRN, dilaudid 0.5 mg PRN for dressing changes and oxy 5 mg PRN - however given encephalopathy, use sparingly    # HFmrEF, with global hypokinesis  # High burden of PVCs  # Prolonged QTc   # Elevated  troponin, down-trended  Coronary angiogram on 2/27 shows normal coronaries. Troponin down-trending (88 -> 77). No chest pain. High burden of PVCs. Follow up ECHO with global hypokinesis, which is worse compared to February 2023 ECHO. EF is 45% (same compared to prior). Likely small vessel disease vs demand vs cardiomyopathy 2/2 ceftazidime. Low concern for acute ACS.   - Discontinued tele  - Electrolyte mgmt as above  - Lasix 40mg daily  - Continue PTA coreg  - Lisinopril 10  - Empagliflozin (see above)  - Resume ASA (hold 1 week prior to surgery)  - Avoid QTc prolonging medications     # Central Hypothyroidism  - Continue PTA levothyroxine     # Hypogonadism   - Continue PTA androgel (will need to bring in home medication)     # Chronic microcytic anemia   Hgb of 8.4 [facility]. Peripheral smear on 3/30 showed poikilocytosis with occasional red blood cell fragments but no other evidence of hemolysis.  Haptoglobin was normal.  Ferritin was 91, transferrin saturation was low, B12 was 495 and folate was 8.7. Likely combination of iron deficiency as well as inflammation/chronic disease.   - CTM        # Concern for malnutrition   - Nutrition following       Diet: Regular Diet Adult    DVT Prophylaxis: Pneumatic Compression Devices  Roland Catheter: Not present  Lines:   PICC 04/06/23 Double Lumen Right Basilic access-Site Assessment: WDL      Cardiac Monitoring: None  Code Status: Full Code      Clinically Significant Risk Factors              # Hypoalbuminemia: Lowest albumin = 2.9 g/dL at 4/11/2023  7:07 AM, will monitor as appropriate          # DMII: A1C = 9.5 % (Ref range: <5.7 %) within past 6 months    # Severe Malnutrition: based on nutrition assessment        Disposition Plan             Dieter Jett MD  PGY-1, Internal Medicine     _________________________________________________________    Interval History   No acute events overnight, doing well this morning. In the process of shaving this  morning and feeling pretty good. No new concerns. He is awaiting surgery next week.     Physical Exam   Vital Signs: Temp: 98  F (36.7  C) Temp src: Oral BP: (!) 144/96 Pulse: 87   Resp: 16 SpO2: 98 % O2 Device: None (Room air)    Weight: 143 lbs 8.31 oz    General Appearance: Eyes open, tracking, answers yes/no questions, no distress, up in chair, conversational, doing well AM 04/20  Eyes: Pupils moderate size, EOMI  HEENT: Normal sclera, able to move neck FROM  Respiratory: Breathing comfortably on RA, CTAB  Cardiovascular: RRR, no murmurs  GI: BS+. Soft, nontender, nondistended. No guarding or rebound tenderness.   Musculoskeletal: No muscle wasting  Neuro:  strength intact, ankle strength intact, and sensation intact, EOMI  Psychiatric: Oriented to time, person, place, and situation     Medical Decision Making           Data     I have personally reviewed the following data over the past 24 hrs:    6.8  \   7.3 (L)   / 204     139 99 17.7 /  262 (H)   3.6 27 0.84 \       ALT: 125 (H) AST: 26 AP: 326 (H) TBILI: 0.3   ALB: 3.3 (L) TOT PROTEIN: 5.5 (L) LIPASE: N/A

## 2023-04-20 NOTE — PROGRESS NOTES
[facility], [place]   04/20/2023  Neurosurgery Progress Note:    Assessment:  Gustavo Faria is a 58 year old male with a PMH of DMII, HFrEF, BLE open wounds, history of serratia bacteremia in December 2022,  pituitary ACTH secreting macroadenoma s/p EEA for resection on 5/2021 and 7/2021 in [place], with baseline right CN 3 palsy, who was admitted at the [facility] on 3/23. He developed sudden headache and vision changes on 3/25, with MRI brain on 3/28, which demonstrated enlargement of known pituitary adenoma, with necrosis extending laterally beyond the left cavernous sinus, concerning for compression of cranial nerves at cavernous sinus. 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      Given that he is past the acute period of apoplexy, there is no indication for emergent decompression. He would benefit for redo EEA for resection of adenoma given its symptomatic nature. However, he would need to be medically optimized from the metabolic and infectious standpoint prior to surgery. He would also benefit from a multidisciplinary approach with recommendations from ophthalmology, endocrinology and radiation oncology regarding options for treatment.     Plan:  -Patient's wife is the legal decision maker-consent obtained for the surgery-uploaded in chart    - Will discuss with primary team to perform X Ray L spine to begin with given patient was complaining of back pain    - CT/CTA head- stereotaxy protocol to be completed night before the surgery  - Okay to discharge from neurosurgery perspective-if deemed medically stable by the primary team  - Please stop aspirin 7 days before surgery-surgery planned for 4/27/2023 with ENT colleagues for resection of pituitary adenoma  - Neurosurgery will continue to follow   -----------------------------------  Oliver  Rhoda  PGY-2 Neurosurgery  Please page NSGY on call with questions      Please contact neurosurgery resident on call with questions.    Dial * * [phone], enter [phone] when prompted.    Interval History: No acute events overnight that were brought to neurosurgery's attention. Complaining of back pain, not radiculopathy, pain localized in lower back area. Reports pain in moving LLE antigravity.    Objective:   Temp:  [97.3  F (36.3  C)-98.2  F (36.8  C)] 97.3  F (36.3  C)  Pulse:  [69-96] 89  Resp:  [16-20] 16  BP: (122-144)/(77-98) 137/98  SpO2:  [98 %-100 %] 98 %  I/O last 3 completed shifts:  In: 1700 [P.O.:1680; I.V.:20]  Out: 950 [Urine:950]      NEUROLOGIC:  -- Opens eyes to voice, following commands, oriented x3  -- Able to name objects  -- Speech limited, perseverating speech, minimal spontaneous speech  Cranial Nerves:  -- visual fields full to confrontation although with limited participation, PERRL anisocoric L>R and sluggish, left CN III paresis and CN VI palsy, restricted ROM in right EOM with no apparent asymmetry   -- face symmetrical, tongue midline  -- sensory V1-V3 intact bilaterally  -- palate elevates symmetrically, uvula midline  -- hearing grossly intact bilat     Motor:  Limited participation - antigravity x4 extremities     Sensory:  intact to LT x 4 extremities      Reflexes:       Bi Tri BR Omi Pat Ach Bab     C5-6 C7-8 C6 UMN L2-4 S1 UMN   R 2+ 2+ 2+ Norm 2+ 2+ Norm   L 2+ 2+ 2+ Norm 2+ 2+ Norm      Gait: Deferred.     LABS:  Recent Labs   Lab 04/20/23  1727 04/20/23  1207 04/20/23  0827 04/19/23  0911 04/19/23  0732 04/18/23  1025 04/18/23  0943   NA  --   --  139  --  141  --  145   POTASSIUM  --   --  3.6  --  3.9  --  3.7   CHLORIDE  --   --  99  --  101  --  104   CO2  --   --  27  --  28  --  27   ANIONGAP  --   --  13  --  12  --  14   * 262* 318*   < > 262*   < > 237*   BUN  --   --  17.7  --  17.0  --  16.0   CR  --   --  0.84  --  0.81  --  0.88   KORIN  --   --  9.1  --  9.2  --   9.0    < > = values in this interval not displayed.       Recent Labs   Lab 04/20/23  0827   WBC 6.8   RBC 2.83*   HGB 7.3*   HCT 25.1*   MCV 89   MCH 25.8*   MCHC 29.1*   RDW 28.1*          IMAGING:  No results found for this or any previous visit (from the past 24 hour(s)).

## 2023-04-20 NOTE — PROGRESS NOTES
Endocrine Progress Note  Patient: Gustavo Faria   MRN: [id number]  Date of Service: 04/20/2023    HPI summary and hospitalization course:  Gustavo Faria is a 58 year old male with PMHx of ACTH-secreting macroadenoma c/b central hypothyroidism, and central hypogonadism,  s/p transphenoidal surgery 5/2021 and 7/2021 in [place] has baseline records 3rd nerve palsy, ongoing serratia RLE cellulitis c/b recent serratia bacteremia, HFrEF, T2DM, and HTN who [facility] of known expanding large sellar/suprasellar mass extending into cavernous sinuses and subsequent cranial nerve impingement.    [facility] and bilateral lower limb cellulitis/abscess developed sudden onset of headaches and double vision transferred to [facility] for multidisciplinary assessment.  Had an MRI done on 3/25 which demonstrates enlargement of known pituitary adenoma with necrosis extending laterally beyond the left cavernous sinus concerning for compression of the cranial nerves at the cavernous sinus.  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  On the arrival he was encephalopathic.  Taken off dexamethasone on 04/06/2023 with improvement in the mental status following that.    Repeated MRI following transfer to [facility] showed new enhancing to 1.4 cm lesion in the right frontal lobe with susceptibility of artifact.  4.3 cm heterogeneously enhancing pituitary mass with encasement and peripheral displacement of the cavernous portions of both internal carotid arteries and infiltration of the clivus.  Previous optic chiasm compression is resolved.  Pituitary stalk is midline.  Lesion is infiltrating both cavernous sinuses cranial nerves in the cavernous sinus  cannot be differentiated from the underlying mass.    Interval history:  Sodium and potassium level within normal range today.  Blood glucose above goal (this ). Correction Novolog dose increased yesterday, and Lantus increased to 14 units daily starting this AM.   Appetite is good, he reports eating more. Quesadilla and carrots today, no snacking between meals today. Reminded to alert nursing to all snacks so Novolog can be administered.     Physical Examination:  BP (!) 144/96 (BP Location: Left arm)   Pulse 87   Temp 98  F (36.7  C) (Oral)   Resp 16   Wt 65.1 kg (143 lb 8.3 oz)   SpO2 98%   BMI 23.16 kg/m    Physical Exam  Vitals reviewed.   Constitutional:       General: He is not in acute distress.  Pulmonary:      Effort: Pulmonary effort is normal.   Neurological:      Mental Status: He is alert.   Psychiatric:         Mood and Affect: Mood normal.         Behavior: Behavior normal.         Medications:  Reviewed    Endocrine Labs:   Latest Ref Rng 4/18/2023  9:43 AM 4/19/2023  7:32 AM 4/20/2023  8:27 AM   ENDO ADRENAL LABS       Creatinine 0.67 - 1.17 mg/dL 0.88  0.81  0.84    Potassium 3.4 - 5.3 mmol/L 3.7  3.9  3.6    Sodium 136 - 145 mmol/L 145  141  139              Images:  MRI brain with and without contrast on 4/6/2023:  Comparison:  Outside brain MR 3/28/2023 and 5/25/2021.      Technique:   1. Brain MRI: Axial diffusion and FLAIR images of the whole brain  obtained without intravenous contrast. Sagittal T1 and T2-weighted,  coronal T2-weighted, coronal T1-weighted images with focus on the  sella were obtained without intravenous contrast. Post intravenous  contrast using gadolinium coronal and sagittal T1-weighted images were  obtained focused on the sella. Dynamic postcontrast coronal  T1-weighted images were also obtained.     2. MRI of the Orbits focused on the orbits/visual pathways:  Axial  T2-weighted with inversion recovery and coronal T1-weighted images  were obtained without  intravenous contrast. Axial and coronal  T1-weighted images with fat saturation were obtained after intravenous  gadolinium administration.     3. MRI Brain COW: Sagittal T1-weighted, axial T2-weighted with fat  saturation, turboFLAIR and diffusion-weighted with ADC map and coronal  T1-weighted and T2-weighted images of the brain were obtained without  intravenous contrast.       Contrast: 7.5mL Gadavist     Findings:    Brain MRI:  Enhancing 1.4 x 1.0 cm lesion with associated susceptibility artifact  in the right frontal lobe (series 16, image 17), stable since  5/25/2021. A similar punctate lesion in the left putamen also  unchanged.m     4.3 x 1.9 x 1.3 cm (right to left, AP, craniocaudal) heterogeneously  enhancing pituitary mass with encasement and peripheral displacement  of the cavernous portions of both internal carotid arteries (series  13, image 28 and series 14, image 11). The lesion infiltrates the  clivus. Compared with MRI from 2021 the craniocaudal dimension of the  mass is decreased. The lesion is now more heterogeneous, previously  more homogeneous. Previous optic chiasm compression is resolved. No  abnormal enhancement of the optic nerves within the intraorbital  portion. The pituitary stalk is midline.     The lesion infiltrates both cavernous sinuses. The traversing cranial  nerves within the cavernous sinus cannot be differentiated from the  underlying mass.     FLAIR images through the whole brain shows nonspecific posterior  periventricular white matter hyperintensities. Otherwise no mass  effect, midline shift, or significant enlargement of the ventricles.  Scattered mucosal thickening of the paranasal sinuses. Mastoid air  cells are clear.     MRA Head: No aneurysm or stenosis of the major intracranial arteries  at the base of the brain.                                                                      Impression:   1.  4.3 cm heterogeneously enhancing pituitary mass with  infiltration  of bilateral cavernous sinuses and the clivus. Findings are similar  when compared compared to prior exam 3/28/2023 suggestive for a  macroadenoma. Compared with an older MRI from 2021, the lesion appears  more heterogeneous and smaller. Previous compression of the optic  chiasm is no longer present.  2.  Patchy T2 hyperintense enhancing lesion with scattered  susceptibility artifacts in the right frontal subcortical white  matter. A similar tiny smaller lesion is also present in the left  putamen. Retrospectively this lesion was present back in 2021.  Constellation of findings are suggestive of a benign lesion such as  cavernoma or telangiectasia.  3.   MRA demonstrates no aneurysm or stenosis of the major  intracranial arteries. Bilateral cavernous internal carotid arteries  are patent despite encasement by the above-mentioned lesion.  4.  No abnormal optic nerve enhancement or optic nerve atrophy.        Assessment and plan:    Gustavo Faria is a 58 year old male with PMHx of  ACTH-secreting macroadenoma c/b central hypothyroidism, , and central hypogonadism,  s/p transphenoidal surgery 5/2021 and 7/2021 in [place] has baseline records 3rd nerve palsy, ongoing serratia RLE cellulitis c/b recent serratia bacteremia, HFrEF, T2DM, and HTN who [facility] of known expanding large sellar/suprasellar mass extending into cavernous sinuses and subsequent cranial nerve impingement.     #Pituitary macroadenoma:  #Cushing's disease:  #Pituitary apoplexy:  Known history of ACTH secreting macroadenoma , developed sudden onset of double [facility] and [facility]/28 showed enlargement of known pituitary macroadenoma with necrosis extending laterally beyond the left cavernous sinus concerning for compression of cranial nerves at the cavernous sinuses.  No acute hemorrhage, small area of diffusion restriction at the left side of  pituitary possibly consistent with ischemic pituitary adenoma  Started on dexamethasone 2 mg twice daily, was discontinued on4/6/23  Random serum cortisol level at 2:30 AM was 46.3, ACTH 321.  A.m. cortisol level of 41.1 on 4/7/2023.  Repeated MRI on 4/6 showed a 4.3 cm diffusely enhancing pituitary mass with infiltration of bilateral cavernous sinus and clivus.  He has been developing recurrent hypernatremia/hypokalemia during the admission, was intermittently on dextrose 5% infusion last received on 4/12 discontinued at 5:30 AM.  Aggressive potassium repletion.    Spironolactone dose was increased from 200 mg to 300 mg on 4/19.  KCl supplement increased from 20 mEq to 40 mill equivalent on 4/19.  Lasix dose increased from 20 mg up to 40 mg on 4/19.  No changes 4/20.   Na and K at goal on AM labs.      Recommendations:  -Resection/debulking arranged by neurosurgery will be done on 04/27 will be followed by radiotherapy.  -For perioperative planning, no need to initiate the steroids replacement unless he decompensates during the surgery or postoperatively he can get cortisol level following the surgery followed by a.m. cortisol level checks if there is any suspicion of postoperative AI to be started on steroids.       #Central hypothyroidism:  Prior to admission was on levothyroxine 100 mcg daily.  Free T4 on admission 1.3 and TSH not detectable (picture of central hypothyroidism).     Recommendations:  -Continue with PTA levothyroxine 100 mcg daily.     #Hypogonadotrophic hypogonadism:  Was on AndroGel gel 1 pump daily PTA.      Recommendations:  -Continue AndroGel.       #DM type II: Hemoglobin A1c on admission 9.5%.  Prior to admission was on Sitagliptin 100 mg daily/metformin 500 mg twice daily, Jardiance 12.5 mg.  Good appetite.  BG readings continue above goal.    Recommendations:  -increase Lantus to 16 units daily starting today (2 additional units now)  -Correction: high-dose (1 unit/25 mg/dl>140 HS and  >200 at bedtime) Novolog   -Carb coverage: NovoLog 1 unit: 10 g CHO with meals and snacks.  -Hypoglycemia rescue protocol.  -POC BG checks 3 times daily AC at the bedtime and at 2 AM.  -Carb counting protocol.  -Holding PTA oral medications.      Daphney Gerard MD  Endocrinology and Diabetes   [phone]

## 2023-04-20 NOTE — PLAN OF CARE
Goal Outcome Evaluation:      Plan of Care Reviewed With: patient    Overall Patient Progress: no change Overall Patient Progress: no change    Outcome Evaluation: A&Ox4, VSS on RA. Edema +3 BLE, given PRN oxy x1 for leg pain. Phosporus AM lab 2.4. Initiated replacement protocol and 4/21 AM redraw, Pt to received IV phos 1630 today. Pt lantus dose change, received 2 additional units at 1500. [facility] team visited today and requested a dressing change due to edges rolling back on R PIV. I did not have time on day shift to change PIV or LLE dressing. The shin dressing is due to be changed today and other left dressing is saturated as well.

## 2023-04-21 ENCOUNTER — APPOINTMENT (OUTPATIENT)
Dept: OCCUPATIONAL THERAPY | Facility: CLINIC | Age: 58
DRG: 614 | End: 2023-04-21
Attending: STUDENT IN AN ORGANIZED HEALTH CARE EDUCATION/TRAINING PROGRAM
Payer: COMMERCIAL

## 2023-04-21 LAB
ANION GAP SERPL CALCULATED.3IONS-SCNC: 11 MMOL/L (ref 7–15)
BUN SERPL-MCNC: 15.5 MG/DL (ref 6–20)
CALCIUM SERPL-MCNC: 8.9 MG/DL (ref 8.6–10)
CHLORIDE SERPL-SCNC: 100 MMOL/L (ref 98–107)
CREAT SERPL-MCNC: 0.78 MG/DL (ref 0.67–1.17)
DEPRECATED HCO3 PLAS-SCNC: 27 MMOL/L (ref 22–29)
ERYTHROCYTE [DISTWIDTH] IN BLOOD BY AUTOMATED COUNT: 28.3 % (ref 10–15)
GFR SERPL CREATININE-BSD FRML MDRD: >90 ML/MIN/1.73M2
GLUCOSE BLDC GLUCOMTR-MCNC: 194 MG/DL (ref 70–99)
GLUCOSE BLDC GLUCOMTR-MCNC: 196 MG/DL (ref 70–99)
GLUCOSE BLDC GLUCOMTR-MCNC: 203 MG/DL (ref 70–99)
GLUCOSE BLDC GLUCOMTR-MCNC: 223 MG/DL (ref 70–99)
GLUCOSE BLDC GLUCOMTR-MCNC: 240 MG/DL (ref 70–99)
GLUCOSE BLDC GLUCOMTR-MCNC: 259 MG/DL (ref 70–99)
GLUCOSE BLDC GLUCOMTR-MCNC: 268 MG/DL (ref 70–99)
GLUCOSE SERPL-MCNC: 244 MG/DL (ref 70–99)
HCT VFR BLD AUTO: 24.8 % (ref 40–53)
HGB BLD-MCNC: 7.2 G/DL (ref 13.3–17.7)
MAGNESIUM SERPL-MCNC: 2.1 MG/DL (ref 1.7–2.3)
MCH RBC QN AUTO: 25.8 PG (ref 26.5–33)
MCHC RBC AUTO-ENTMCNC: 29 G/DL (ref 31.5–36.5)
MCV RBC AUTO: 89 FL (ref 78–100)
PHOSPHATE SERPL-MCNC: 2.3 MG/DL (ref 2.5–4.5)
PLATELET # BLD AUTO: 228 10E3/UL (ref 150–450)
POTASSIUM SERPL-SCNC: 4.2 MMOL/L (ref 3.4–5.3)
RBC # BLD AUTO: 2.79 10E6/UL (ref 4.4–5.9)
SODIUM SERPL-SCNC: 138 MMOL/L (ref 136–145)
WBC # BLD AUTO: 6.4 10E3/UL (ref 4–11)

## 2023-04-21 PROCEDURE — 120N000002 HC R&B MED SURG/OB UMMC

## 2023-04-21 PROCEDURE — 250N000013 HC RX MED GY IP 250 OP 250 PS 637

## 2023-04-21 PROCEDURE — 36592 COLLECT BLOOD FROM PICC: CPT

## 2023-04-21 PROCEDURE — 97535 SELF CARE MNGMENT TRAINING: CPT | Mod: GO

## 2023-04-21 PROCEDURE — 250N000013 HC RX MED GY IP 250 OP 250 PS 637: Performed by: INTERNAL MEDICINE

## 2023-04-21 PROCEDURE — 83735 ASSAY OF MAGNESIUM: CPT

## 2023-04-21 PROCEDURE — 85027 COMPLETE CBC AUTOMATED: CPT

## 2023-04-21 PROCEDURE — 80048 BASIC METABOLIC PNL TOTAL CA: CPT

## 2023-04-21 PROCEDURE — 250N000013 HC RX MED GY IP 250 OP 250 PS 637: Performed by: STUDENT IN AN ORGANIZED HEALTH CARE EDUCATION/TRAINING PROGRAM

## 2023-04-21 PROCEDURE — 99231 SBSQ HOSP IP/OBS SF/LOW 25: CPT | Mod: GC | Performed by: NEUROLOGICAL SURGERY

## 2023-04-21 PROCEDURE — 84100 ASSAY OF PHOSPHORUS: CPT | Performed by: INTERNAL MEDICINE

## 2023-04-21 PROCEDURE — 250N000011 HC RX IP 250 OP 636

## 2023-04-21 PROCEDURE — 99232 SBSQ HOSP IP/OBS MODERATE 35: CPT | Mod: GC | Performed by: INTERNAL MEDICINE

## 2023-04-21 PROCEDURE — 99231 SBSQ HOSP IP/OBS SF/LOW 25: CPT | Mod: GC | Performed by: INTERNAL MEDICINE

## 2023-04-21 PROCEDURE — 999N000007 HC SITE CHECK

## 2023-04-21 RX ADMIN — POTASSIUM & SODIUM PHOSPHATES POWDER PACK 280-160-250 MG 1 PACKET: 280-160-250 PACK at 12:19

## 2023-04-21 RX ADMIN — FLUCONAZOLE 200 MG: 200 TABLET ORAL at 08:50

## 2023-04-21 RX ADMIN — OXYCODONE HYDROCHLORIDE 2.5 MG: 5 TABLET ORAL at 19:20

## 2023-04-21 RX ADMIN — Medication 5 ML: at 10:04

## 2023-04-21 RX ADMIN — ARIPIPRAZOLE 5 MG: 5 TABLET ORAL at 21:08

## 2023-04-21 RX ADMIN — INSULIN ASPART 3 UNITS: 100 INJECTION, SOLUTION INTRAVENOUS; SUBCUTANEOUS at 17:34

## 2023-04-21 RX ADMIN — POTASSIUM & SODIUM PHOSPHATES POWDER PACK 280-160-250 MG 1 PACKET: 280-160-250 PACK at 19:20

## 2023-04-21 RX ADMIN — LIDOCAINE PATCH 4% 2 PATCH: 40 PATCH TOPICAL at 02:26

## 2023-04-21 RX ADMIN — Medication 1 TABLET: at 08:50

## 2023-04-21 RX ADMIN — LEVOTHYROXINE SODIUM 100 MCG: 100 TABLET ORAL at 08:50

## 2023-04-21 RX ADMIN — POTASSIUM CHLORIDE 40 MEQ: 40 SOLUTION ORAL at 08:48

## 2023-04-21 RX ADMIN — INSULIN ASPART 6 UNITS: 100 INJECTION, SOLUTION INTRAVENOUS; SUBCUTANEOUS at 12:43

## 2023-04-21 RX ADMIN — Medication 10 ML: at 08:52

## 2023-04-21 RX ADMIN — HYDROXYZINE HYDROCHLORIDE 10 MG: 10 TABLET ORAL at 01:06

## 2023-04-21 RX ADMIN — POLYETHYLENE GLYCOL 3350 17 G: 17 POWDER, FOR SOLUTION ORAL at 08:48

## 2023-04-21 RX ADMIN — Medication 5 ML: at 19:20

## 2023-04-21 RX ADMIN — TESTOSTERONE 25 MG OF TESTOSTERONE: 50 GEL TOPICAL at 08:49

## 2023-04-21 RX ADMIN — LISINOPRIL 20 MG: 10 TABLET ORAL at 12:19

## 2023-04-21 RX ADMIN — OXYCODONE HYDROCHLORIDE 2.5 MG: 5 TABLET ORAL at 09:39

## 2023-04-21 RX ADMIN — INSULIN ASPART 3 UNITS: 100 INJECTION, SOLUTION INTRAVENOUS; SUBCUTANEOUS at 08:58

## 2023-04-21 RX ADMIN — CARVEDILOL 12.5 MG: 12.5 TABLET, FILM COATED ORAL at 17:33

## 2023-04-21 RX ADMIN — SPIRONOLACTONE 300 MG: 100 TABLET ORAL at 08:50

## 2023-04-21 RX ADMIN — POTASSIUM & SODIUM PHOSPHATES POWDER PACK 280-160-250 MG 1 PACKET: 280-160-250 PACK at 15:46

## 2023-04-21 RX ADMIN — CARVEDILOL 12.5 MG: 12.5 TABLET, FILM COATED ORAL at 08:50

## 2023-04-21 RX ADMIN — FUROSEMIDE 40 MG: 20 TABLET ORAL at 08:50

## 2023-04-21 ASSESSMENT — ACTIVITIES OF DAILY LIVING (ADL)
ADLS_ACUITY_SCORE: 40
ADLS_ACUITY_SCORE: 36
ADLS_ACUITY_SCORE: 40
ADLS_ACUITY_SCORE: 36
ADLS_ACUITY_SCORE: 36
ADLS_ACUITY_SCORE: 40
ADLS_ACUITY_SCORE: 36
ADLS_ACUITY_SCORE: 40
ADLS_ACUITY_SCORE: 37
ADLS_ACUITY_SCORE: 36
ADLS_ACUITY_SCORE: 40
ADLS_ACUITY_SCORE: 37

## 2023-04-21 NOTE — PROGRESS NOTES
[facility]    Medicine Progress Note - Medicine Service, MARALEXANDER TEAM 2    Date of Admission:  4/3/2023    Assessment & Plan   Gustavo Faria is a 58 year old male with recent Serratia bacteremia, HFrEF, prolonged QT, lower extremity wounds, DMII, hypothyroidism, low testosterone and known ACTH secreting pituitary adenoma w/resulting Cushing's disease s/p 2 partial resections in 2021 who [facility]. [facility], he developed new onset double vision and severe headache which prompted imaging that showed a large mass invading the cavernous sinuses and impinging on the cranial nerves. He is transferred to [facility] for this pituitary adenoma, concern for progression vs hemorrhage/apoplexy. Complicated by psychosis/metabolic encephalopathy and electrolyte abnormalities secondary to Cushing's syndrome. He is now medically stable and awaiting surgery scheduled for 4/27/2023.    Updates today:  - continue Lantus 16 unit(s)  - will plan for sedated MRI early next week prior to procedure, in addition to CT head imaging  - Attempting placement at [facility] if possible prior to surgery 04/27, per discussion with SW has been challenging to place so far.      # Hypernatremia, DI vs cushing's, resolved  # Hypokalemia, resolved  Hypernatremia and hypokalemia, likely secondary to Cushing's.     - Endocrine following  - Goal Na: 135-140  - Spironolactone 300 daily (can go up to 400 mg daily depending on Na and K, per Endocrine)  - Strict I&Os  - Daily BMP + Mg  - PTA potassium supplement 40 mEq    # Acute metabolic encephalopathy  # Pyruria, Candiduria  # Encephalopathy likely secondary to cushing's disease  Admission symptoms included disorientation, intermittently following directions. Repetitive words - perseverating on particular phrases. Sx started the 2-3 days prior to admission (which  [facility] was [facility]). Vulnerable brain (multiple brain surgeries), enlarging pituitary mass. No seizure activity per EEG. Steroids removed and pt still encephalopathy. Sodium has been in normal limits and patient remains altered. Worked up for infections - did reveal candiduria. Biggest concern for infectious vs cushing's. Most likely cushing's given largely unremarkable infectious work up.  - CT abd w/ contrast (4/10): no evidence of pyelonephritis  - Urine culture: candida, sensitive to Fluconazole  - Fluconazole 200 mg daily x7 days, completed 4/17  - Discontinued ceftriaxone as patient did not improve clinically with antibiotics  - No steroids  - Surgical plans for pituitary tumor as stated below  - Delirium precautions  - Electrolyte management  - PRN quetiapine if agitation that is not responsive to behavioral interventions     # Delusions, intermittent  Intermittent delusions regarding staff stalking patient while in the hospital. Unclear chronicity of these delusions - but has had them multiple times this hospitalization. Unclear psych history. Medical management for encephalopathy as stated above. Related to tumor?  - Appreciate psychiatric recommendations:   - Abilify 5 mg PO at bedtime     # ACTH secreting pituitary adenoma c/b type II DM, hypothyroidism and low testosterone s/p two partial resections in 2021   Patient noted double vision and severe headache beginning the evening of 3/25. CT imaging on 3/25 revealed a large sellar/suprasellar mass extending into the cavernous sinuses with no evidence of acute stroke. On 3/28, he underwent an MRI which confirmed the CT findings of a large mass invading the cavernous sinuses and impinging on the cranial nerves. Necrosis extending beyond left cavernous sinus. [facility]. Repeat MRI performed on 4/6/23: compared to 2021 MRI, lesions are smaller. Orbit MRI without optic nerve compression and no evidence of orbital infection.  -  Neurosurgery following  - Plans for Neurosurgery: surgery 4/27/23  - Optimize medically prior to surgery  - DC'ed dexamethasone  - Ophthalmology following, no major changes, agree with neurosurgery plans  - Endocrine following    Surgery Prep:  - Hold ASA 1 week prior to surgery starting 4/20  - Hold Empagliflozin 3 days prior to surgery starting - on HOLD  - CT/CTA head- stereotaxy protocol to be completed night before the surgery     # Type II DM, exacerbated by Cushing's   A1c of 7.6% on 2/2023. Patient [facility] the [facility] regimen [facility].    - Lantus 14 units  - HD Sliding scale insulin  - Carb coverage 1:15  - Hold PTA metformin, empa, and sitagliptin, can resume upon discharge    # Buttocks, sacrum and bilateral groin wounds   # RLE wound from puncture injury   # L dorsal foot wound from presumed trauma   # Soft tissue infection/abscess    # Hx serratia bacteremia in December 2022   For patient's lower extremity wounds and hx of Serratia bacteremia in December, he was initially [facility]olin [facility]. His antibiotics were broadened to Vanc and Zosyn and ultimately he was transitioned to ceftazidime on 3/23 with plan to complete course on 4/4/23. BCx negative and wound cx grew serratia marcescens at the [facility]. He has been on ceftazidime since. MRI of the right tib/fib on 3/23 was negative for osteomyelitis but did show two fluid collections draining sponateneously. [facility] and determined no intervention was needed as wounds were draining on their own.  - Stop ceftazidime (3/23 - 4/5)  - WOC following  - PT/OT when able to follow commands  - Pain: tylenol PRN, dilaudid 0.5 mg PRN for dressing changes and oxy 5 mg PRN - however given encephalopathy, use sparingly    # HFmrEF, with global hypokinesis  # High burden of PVCs  # Prolonged QTc   # Elevated troponin, down-trended  Coronary angiogram on 2/27 shows normal coronaries. Troponin down-trending (88 -> 77). No chest pain.  High burden of PVCs. Follow up ECHO with global hypokinesis, which is worse compared to February 2023 ECHO. EF is 45% (same compared to prior). Likely small vessel disease vs demand vs cardiomyopathy 2/2 ceftazidime. Low concern for acute ACS.   - Discontinued tele  - Electrolyte mgmt as above  - Lasix 40mg daily  - Continue PTA coreg  - Lisinopril 10  - Empagliflozin (see above)  - Resume ASA - on HOLD  - Avoid QTc prolonging medications     # Central Hypothyroidism  - Continue PTA levothyroxine     # Hypogonadism   - Continue PTA androgel (will need to bring in home medication)     # Chronic microcytic anemia   Hgb of 8.4 [facility]. Peripheral smear on 3/30 showed poikilocytosis with occasional red blood cell fragments but no other evidence of hemolysis.  Haptoglobin was normal.  Ferritin was 91, transferrin saturation was low, B12 was 495 and folate was 8.7. Likely combination of iron deficiency as well as inflammation/chronic disease.   - CTM        # Concern for malnutrition   - Nutrition following       Diet: Regular Diet Adult    DVT Prophylaxis: Pneumatic Compression Devices  Roland Catheter: Not present  Lines:   PICC 04/06/23 Double Lumen Right Basilic access-Site Assessment: WDL      Cardiac Monitoring: None  Code Status: Full Code      Clinically Significant Risk Factors              # Hypoalbuminemia: Lowest albumin = 2.9 g/dL at 4/11/2023  7:07 AM, will monitor as appropriate          # DMII: A1C = 9.5 % (Ref range: <5.7 %) within past 6 months    # Severe Malnutrition: based on nutrition assessment        Disposition Plan             Zita Christensen MD  Internal Medicine - Pediatrics Resident, PGY-2  [facility]  4/21/2023  _________________________________________________________    Interval History   No acute events overnight, doing well this morning. Eating a pudding cup. No new concerns. He states he is too claustrophobic to do an MRI not sedated.     Physical Exam    Vital Signs: Temp: 97.6  F (36.4  C) Temp src: Oral BP: (!) 139/93 Pulse: 73   Resp: 16 SpO2: 100 % O2 Device: None (Room air)    Weight: 147 lbs 4.8 oz    General Appearance: Eyes open, tracking, answers yes/no questions, no distress, up in chair, conversational, doing well  Eyes: Pupils moderate size, EOMI  HEENT: Normal sclera, able to move neck FROM  Respiratory: Breathing comfortably on RA, CTAB  Cardiovascular: RRR, no murmurs  GI: BS+. Soft, nontender, nondistended. No guarding or rebound tenderness.   Musculoskeletal: No muscle wasting  Neuro:  strength intact, ankle strength intact, and sensation intact, EOMI  Psychiatric: Oriented to time, person, place, and situation     Medical Decision Making           Data     I have personally reviewed the following data over the past 24 hrs:    6.4  \   7.2 (L)   / 228     138 100 15.5 /  268 (H)   4.2 27 0.78 \

## 2023-04-21 NOTE — PLAN OF CARE
Goal Outcome Evaluation:      Plan of Care Reviewed With: patient    Overall Patient Progress: improving    Outcome Evaluation: A&Ox4, speech clear and spontaneous. VSS on RA ex HTN. Edema +3 BLE. Lidocaine patches on lower back. Urinal near bedside with adequate output. No BM this shift. Late night snacking with 2 units of insulin administered.  and 223. No acute changes overnight. Medically stable and awaiting surgrey on 4/27. Continue with POC.

## 2023-04-21 NOTE — PLAN OF CARE
Goal Outcome Evaluation:      Plan of Care Reviewed With: patient    Overall Patient Progress: improvingOverall Patient Progress: improving    Outcome Evaluation: A&Ox4, speech clear/spontaneous, not forgetful. VSS on RA except HTN. Edema +3 BLE, stated am oxy helped. Phos replaced. PICC team replaced dressing. Less snacking tonight, BG in range for bedtime so no insulin.

## 2023-04-21 NOTE — PROGRESS NOTES
[facility], [place]   04/21/2023  Neurosurgery Progress Note:    Assessment:  Gustavo Faria is a 58 year old male with a PMH of DMII, HFrEF, BLE open wounds, history of serratia bacteremia in December 2022,  pituitary ACTH secreting macroadenoma s/p EEA for resection on 5/2021 and 7/2021 in [place], with baseline right CN 3 palsy, who was admitted at the [facility] on 3/23. He developed sudden headache and vision changes on 3/25, with MRI brain on 3/28, which demonstrated enlargement of known pituitary adenoma, with necrosis extending laterally beyond the left cavernous sinus, concerning for compression of cranial nerves at cavernous sinus. 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      Given that he is past the acute period of apoplexy, there is no indication for emergent decompression. He would benefit for redo EEA for resection of adenoma given its symptomatic nature. However, he would need to be medically optimized from the metabolic and infectious standpoint prior to surgery. He would also benefit from a multidisciplinary approach with recommendations from ophthalmology, endocrinology and radiation oncology regarding options for treatment.     Plan:  -Patient's wife is the legal decision maker-consent obtained for the surgery-uploaded in chart    - Will discuss with primary team to perform X Ray L spine to begin with given patient was complaining of back pain    - CT/CTA head- stereotaxy protocol to be completed night before the surgery  - Okay to discharge from neurosurgery perspective-if deemed medically stable by the primary team  - Please stop aspirin 7 days before surgery-surgery planned for 4/27/2023 with ENT colleagues for resection of pituitary adenoma  - Neurosurgery will continue to follow   -----------------------------------  Oliver  Rhoda  PGY-2 Neurosurgery  Please page NSGY on call with questions      Please contact neurosurgery resident on call with questions.    Dial * * [phone], enter [phone] when prompted.    Interval History: No acute events overnight that were brought to neurosurgery's attention. Complaining of back pain, not radiculopathy, pain localized in lower back area. Reports pain in moving LLE antigravity.    Objective:   Temp:  [97.3  F (36.3  C)-98.2  F (36.8  C)] 97.6  F (36.4  C)  Pulse:  [69-90] 69  Resp:  [16] 16  BP: (124-146)/() 139/100  SpO2:  [98 %-100 %] 100 %  I/O last 3 completed shifts:  In: 1942 [P.O.:1917; I.V.:25]  Out: 1100 [Urine:1100]      NEUROLOGIC:  -- Opens eyes to voice, following commands, oriented x3  -- Able to name objects  -- Speech limited, perseverating speech, minimal spontaneous speech  Cranial Nerves:  -- visual fields full to confrontation although with limited participation, PERRL anisocoric L>R and sluggish, left CN III paresis and CN VI palsy, restricted ROM in right EOM with no apparent asymmetry   -- face symmetrical, tongue midline  -- sensory V1-V3 intact bilaterally  -- palate elevates symmetrically, uvula midline  -- hearing grossly intact bilat     Motor:  Limited participation - antigravity x4 extremities     Sensory:  intact to LT x 4 extremities      Reflexes:       Bi Tri BR Omi Pat Ach Bab     C5-6 C7-8 C6 UMN L2-4 S1 UMN   R 2+ 2+ 2+ Norm 2+ 2+ Norm   L 2+ 2+ 2+ Norm 2+ 2+ Norm      Gait: Deferred.     LABS:  Recent Labs   Lab 04/21/23  0740 04/21/23  0642 04/21/23  0154 04/20/23  1207 04/20/23  0827 04/19/23  0911 04/19/23  0732 04/18/23  1025 04/18/23  0943   NA  --   --   --   --  139  --  141  --  145   POTASSIUM  --   --   --   --  3.6  --  3.9  --  3.7   CHLORIDE  --   --   --   --  99  --  101  --  104   CO2  --   --   --   --  27  --  28  --  27   ANIONGAP  --   --   --   --  13  --  12  --  14   * 203* 223*   < > 318*   < > 262*   < > 237*   BUN  --   --   --    --  17.7  --  17.0  --  16.0   CR  --   --   --   --  0.84  --  0.81  --  0.88   KORIN  --   --   --   --  9.1  --  9.2  --  9.0    < > = values in this interval not displayed.       Recent Labs   Lab 04/20/23  0827   WBC 6.8   RBC 2.83*   HGB 7.3*   HCT 25.1*   MCV 89   MCH 25.8*   MCHC 29.1*   RDW 28.1*          IMAGING:  No results found for this or any previous visit (from the past 24 hour(s)).

## 2023-04-21 NOTE — PLAN OF CARE
Goal Outcome Evaluation:      Plan of Care Reviewed With: patient    Overall Patient Progress: improving    Outcome Evaluation: A&O x 4 but forgetful and confused about certain topics.  Word finding difficulty at times.  VSS on room air.  c/o pain 7/10 with dressing changes- received PRN oxycodone with good relief.  All dressings changed today including scaral mepilex.  Voids into bedside urinal.  No BM today.  Tolerates regular diet with no n/v.  Excellent appetite.  s/s and carb coverage with BG checks AC/HS/0200.  Double lumen PICC heparin locked.  Medically stable and awaiting surgery for tumor resection.  Needs MRI but would need anesthesia per MRI team.  Refusing MRI without anesthesia.  Primary team paged.  Continue POC.

## 2023-04-21 NOTE — PROGRESS NOTES
Endocrine Progress Note  Patient: Gustavo Faria   MRN: [id number]  Date of Service: 04/21/2023    HPI summary and hospitalization course:  Gustavo Faria is a 58 year old male with PMHx of ACTH-secreting macroadenoma c/b central hypothyroidism, and central hypogonadism,  s/p transphenoidal surgery 5/2021 and 7/2021 in [place] has baseline records 3rd nerve palsy, ongoing serratia RLE cellulitis c/b recent serratia bacteremia, HFrEF, T2DM, and HTN who [facility] of known expanding large sellar/suprasellar mass extending into cavernous sinuses and subsequent cranial nerve impingement.    [facility] and bilateral lower limb cellulitis/abscess developed sudden onset of headaches and double vision transferred to [facility] for multidisciplinary assessment.  Had an MRI done on 3/25 which demonstrates enlargement of known pituitary adenoma with necrosis extending laterally beyond the left cavernous sinus concerning for compression of the cranial nerves at the cavernous sinus.  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  On the arrival he was encephalopathic.  Taken off dexamethasone on 04/06/2023 with improvement in the mental status following that.    Repeated MRI following transfer to [facility] showed new enhancing to 1.4 cm lesion in the right frontal lobe with susceptibility of artifact.  4.3 cm heterogeneously enhancing pituitary mass with encasement and peripheral displacement of the cavernous portions of both internal carotid arteries and infiltration of the clivus.  Previous optic chiasm compression is resolved.  Pituitary stalk is midline.  Lesion is infiltrating both cavernous sinuses cranial nerves in the cavernous sinus  cannot be differentiated from the underlying mass.    Interval history:  Received Lantus 16 units total yesterday hyperglycemia improved following that BG readings closer to the target.  He has some snacks at the bedside was advised today to inform the RN  Staff when he eats any snacks.  No headaches today.  Continues to have intermittent double vision.  Continues to work with PT.  From reviewing the RN's notes no confusion documented yesterday was fully oriented all the time and cooperative..    Physical Examination:  BP (!) 139/100 (BP Location: Left arm)   Pulse 69   Temp 97.6  F (36.4  C) (Oral)   Resp 16   Wt 66.8 kg (147 lb 4.8 oz)   SpO2 100%   BMI 23.77 kg/m    Physical Exam  Vitals reviewed.   Cardiovascular:      Rate and Rhythm: Normal rate.   Pulmonary:      Effort: Pulmonary effort is normal.   Musculoskeletal:      Right lower leg: Edema present.      Left lower leg: Edema present.   Neurological:      Mental Status: He is oriented to person, place, and time.   Psychiatric:         Mood and Affect: Mood normal.         Behavior: Behavior normal.         Medications:  Reviewed    Endocrine Labs:   Latest Reference Range & Units 04/19/23 07:32 04/20/23 08:27 04/21/23 10:09   Sodium 136 - 145 mmol/L 141 139 138   Potassium 3.4 - 5.3 mmol/L 3.9 3.6 4.2             Images:  MRI brain with and without contrast on 4/6/2023:  Comparison:  Outside brain MR 3/28/2023 and 5/25/2021.      Technique:   1. Brain MRI: Axial diffusion and FLAIR images of the whole brain  obtained without intravenous contrast. Sagittal T1 and T2-weighted,  coronal T2-weighted, coronal T1-weighted images with focus on the  sella were obtained without intravenous contrast. Post intravenous  contrast using gadolinium coronal and sagittal T1-weighted images were  obtained focused on the sella. Dynamic postcontrast coronal  T1-weighted images were also obtained.     2. MRI of the Orbits focused on the orbits/visual pathways:   Axial  T2-weighted with inversion recovery and coronal T1-weighted images  were obtained without intravenous contrast. Axial and coronal  T1-weighted images with fat saturation were obtained after intravenous  gadolinium administration.     3. MRI Brain COW: Sagittal T1-weighted, axial T2-weighted with fat  saturation, turboFLAIR and diffusion-weighted with ADC map and coronal  T1-weighted and T2-weighted images of the brain were obtained without  intravenous contrast.       Contrast: 7.5mL Gadavist     Findings:    Brain MRI:  Enhancing 1.4 x 1.0 cm lesion with associated susceptibility artifact  in the right frontal lobe (series 16, image 17), stable since  5/25/2021. A similar punctate lesion in the left putamen also  unchanged.m     4.3 x 1.9 x 1.3 cm (right to left, AP, craniocaudal) heterogeneously  enhancing pituitary mass with encasement and peripheral displacement  of the cavernous portions of both internal carotid arteries (series  13, image 28 and series 14, image 11). The lesion infiltrates the  clivus. Compared with MRI from 2021 the craniocaudal dimension of the  mass is decreased. The lesion is now more heterogeneous, previously  more homogeneous. Previous optic chiasm compression is resolved. No  abnormal enhancement of the optic nerves within the intraorbital  portion. The pituitary stalk is midline.     The lesion infiltrates both cavernous sinuses. The traversing cranial  nerves within the cavernous sinus cannot be differentiated from the  underlying mass.     FLAIR images through the whole brain shows nonspecific posterior  periventricular white matter hyperintensities. Otherwise no mass  effect, midline shift, or significant enlargement of the ventricles.  Scattered mucosal thickening of the paranasal sinuses. Mastoid air  cells are clear.     MRA Head: No aneurysm or stenosis of the major intracranial arteries  at the base of the brain.                                                                       Impression:   1.  4.3 cm heterogeneously enhancing pituitary mass with infiltration  of bilateral cavernous sinuses and the clivus. Findings are similar  when compared compared to prior exam 3/28/2023 suggestive for a  macroadenoma. Compared with an older MRI from 2021, the lesion appears  more heterogeneous and smaller. Previous compression of the optic  chiasm is no longer present.  2.  Patchy T2 hyperintense enhancing lesion with scattered  susceptibility artifacts in the right frontal subcortical white  matter. A similar tiny smaller lesion is also present in the left  putamen. Retrospectively this lesion was present back in 2021.  Constellation of findings are suggestive of a benign lesion such as  cavernoma or telangiectasia.  3.   MRA demonstrates no aneurysm or stenosis of the major  intracranial arteries. Bilateral cavernous internal carotid arteries  are patent despite encasement by the above-mentioned lesion.  4.  No abnormal optic nerve enhancement or optic nerve atrophy.        Assessment and plan:    Gustavo Faria is a 58 year old male with PMHx of  ACTH-secreting macroadenoma c/b central hypothyroidism, , and central hypogonadism,  s/p transphenoidal surgery 5/2021 and 7/2021 in [place] has baseline records 3rd nerve palsy, ongoing serratia RLE cellulitis c/b recent serratia bacteremia, HFrEF, T2DM, and HTN who [facility] of known expanding large sellar/suprasellar mass extending into cavernous sinuses and subsequent cranial nerve impingement.     #Pituitary macroadenoma:  #Cushing's disease:  #Pituitary apoplexy:  Known history of ACTH secreting macroadenoma , developed sudden onset of double [facility] showed enlargement of known pituitary macroadenoma with necrosis extending laterally beyond the left cavernous sinus concerning for compression of cranial nerves at the cavernous  sinuses.  No acute hemorrhage, small area of diffusion restriction at the left side of pituitary possibly consistent with ischemic pituitary adenoma  Started on dexamethasone 2 mg twice daily, was discontinued on4/6/23  Random serum cortisol level at 2:30 AM was 46.3, ACTH 321.  A.m. cortisol level of 41.1 on 4/7/2023.  Repeated MRI on 4/6 showed a 4.3 cm diffusely enhancing pituitary mass with infiltration of bilateral cavernous sinus and clivus.  He has been developing recurrent hypernatremia/hypokalemia during the admission, was intermittently on dextrose 5% infusion last received on 4/12 discontinued at 5:30 AM. Was on aggressive potassium repletion.    Spironolactone dose was increased from 200 mg to 300 mg on 4/19.  KCl supplement increased from 20 mEq to 40 mill equivalent on 4/19.  Lasix dose increased from 20 mg up to 40 mg on 4/19.  No changes 4/20.   Na and K at goal on AM labs.      Recommendations:  -Resection/debulking arranged by neurosurgery will be done on 04/27 will be followed by radiotherapy.  -For perioperative planning, no need to initiate the steroids replacement unless he decompensates during the surgery or postoperatively he can get cortisol level following the surgery followed by a.m. cortisol level checks if there is any suspicion of postoperative AI to be started on steroids.       #Central hypothyroidism:  Prior to admission was on levothyroxine 100 mcg daily.  Free T4 on admission 1.3 and TSH not detectable (picture of central hypothyroidism).     Recommendations:  -Continue with PTA levothyroxine 100 mcg daily.     #Hypogonadotrophic hypogonadism:  Was on AndroGel gel 1 pump daily PTA.      Recommendations:  -Continue AndroGel.       #DM type II: Hemoglobin A1c on admission 9.5%.  Prior to admission was on Sitagliptin 100 mg daily/metformin 500 mg twice daily, Jardiance 12.5 mg.  Good appetite.  Hyperglycemia improved at the evening time yesterday and continues to have BG readings  closer to the goal.    Recommendations:  -Continue Lantus 16 units daily  -Correction: high-dose (1 unit/25 mg/dl>140 HS and >200 at bedtime) Novolog   -Carb coverage: NovoLog 1 unit: 10 g CHO with meals and snacks.  -Hypoglycemia rescue protocol.  -POC BG checks 3 times daily AC at the bedtime and at 2 AM.  -Carb counting protocol.  -Holding PTA oral medications.    Jeanne Chase     Endocrinology diabetes and metabolism  fellow   Pager number: [phone]

## 2023-04-22 ENCOUNTER — APPOINTMENT (OUTPATIENT)
Dept: GENERAL RADIOLOGY | Facility: CLINIC | Age: 58
DRG: 614 | End: 2023-04-22
Attending: STUDENT IN AN ORGANIZED HEALTH CARE EDUCATION/TRAINING PROGRAM
Payer: COMMERCIAL

## 2023-04-22 LAB
ANION GAP SERPL CALCULATED.3IONS-SCNC: 15 MMOL/L (ref 7–15)
BUN SERPL-MCNC: 13.4 MG/DL (ref 6–20)
CALCIUM SERPL-MCNC: 8.7 MG/DL (ref 8.6–10)
CHLORIDE SERPL-SCNC: 97 MMOL/L (ref 98–107)
CREAT SERPL-MCNC: 0.76 MG/DL (ref 0.67–1.17)
DEPRECATED HCO3 PLAS-SCNC: 27 MMOL/L (ref 22–29)
GFR SERPL CREATININE-BSD FRML MDRD: >90 ML/MIN/1.73M2
GLUCOSE BLDC GLUCOMTR-MCNC: 164 MG/DL (ref 70–99)
GLUCOSE BLDC GLUCOMTR-MCNC: 172 MG/DL (ref 70–99)
GLUCOSE BLDC GLUCOMTR-MCNC: 206 MG/DL (ref 70–99)
GLUCOSE BLDC GLUCOMTR-MCNC: 236 MG/DL (ref 70–99)
GLUCOSE BLDC GLUCOMTR-MCNC: 257 MG/DL (ref 70–99)
GLUCOSE SERPL-MCNC: 271 MG/DL (ref 70–99)
HOLD SPECIMEN: NORMAL
PHOSPHATE SERPL-MCNC: 2.4 MG/DL (ref 2.5–4.5)
POTASSIUM SERPL-SCNC: 4 MMOL/L (ref 3.4–5.3)
SODIUM SERPL-SCNC: 139 MMOL/L (ref 136–145)

## 2023-04-22 PROCEDURE — 250N000013 HC RX MED GY IP 250 OP 250 PS 637

## 2023-04-22 PROCEDURE — 84100 ASSAY OF PHOSPHORUS: CPT | Performed by: STUDENT IN AN ORGANIZED HEALTH CARE EDUCATION/TRAINING PROGRAM

## 2023-04-22 PROCEDURE — 250N000011 HC RX IP 250 OP 636

## 2023-04-22 PROCEDURE — 99232 SBSQ HOSP IP/OBS MODERATE 35: CPT | Mod: GC | Performed by: INTERNAL MEDICINE

## 2023-04-22 PROCEDURE — 250N000011 HC RX IP 250 OP 636: Performed by: INTERNAL MEDICINE

## 2023-04-22 PROCEDURE — 120N000002 HC R&B MED SURG/OB UMMC

## 2023-04-22 PROCEDURE — 80048 BASIC METABOLIC PNL TOTAL CA: CPT

## 2023-04-22 PROCEDURE — 72080 X-RAY EXAM THORACOLMB 2/> VW: CPT

## 2023-04-22 PROCEDURE — 250N000013 HC RX MED GY IP 250 OP 250 PS 637: Performed by: STUDENT IN AN ORGANIZED HEALTH CARE EDUCATION/TRAINING PROGRAM

## 2023-04-22 PROCEDURE — 72080 X-RAY EXAM THORACOLMB 2/> VW: CPT | Mod: 26 | Performed by: RADIOLOGY

## 2023-04-22 PROCEDURE — 250N000013 HC RX MED GY IP 250 OP 250 PS 637: Performed by: INTERNAL MEDICINE

## 2023-04-22 PROCEDURE — 36592 COLLECT BLOOD FROM PICC: CPT

## 2023-04-22 RX ORDER — FLUTICASONE PROPIONATE 50 MCG
1 SPRAY, SUSPENSION (ML) NASAL DAILY PRN
Status: DISCONTINUED | OUTPATIENT
Start: 2023-04-22 | End: 2023-04-22

## 2023-04-22 RX ORDER — FLUTICASONE PROPIONATE 50 MCG
1 SPRAY, SUSPENSION (ML) NASAL DAILY
Status: DISCONTINUED | OUTPATIENT
Start: 2023-04-22 | End: 2023-04-29

## 2023-04-22 RX ADMIN — OXYCODONE HYDROCHLORIDE 2.5 MG: 5 TABLET ORAL at 12:40

## 2023-04-22 RX ADMIN — Medication 5 ML: at 18:12

## 2023-04-22 RX ADMIN — TESTOSTERONE 25 MG OF TESTOSTERONE: 50 GEL TOPICAL at 08:55

## 2023-04-22 RX ADMIN — SPIRONOLACTONE 300 MG: 100 TABLET ORAL at 09:00

## 2023-04-22 RX ADMIN — Medication 10 ML: at 08:55

## 2023-04-22 RX ADMIN — INSULIN ASPART 5 UNITS: 100 INJECTION, SOLUTION INTRAVENOUS; SUBCUTANEOUS at 08:03

## 2023-04-22 RX ADMIN — POTASSIUM & SODIUM PHOSPHATES POWDER PACK 280-160-250 MG 1 PACKET: 280-160-250 PACK at 17:18

## 2023-04-22 RX ADMIN — INSULIN ASPART 1 UNITS: 100 INJECTION, SOLUTION INTRAVENOUS; SUBCUTANEOUS at 17:21

## 2023-04-22 RX ADMIN — INSULIN ASPART 2 UNITS: 100 INJECTION, SOLUTION INTRAVENOUS; SUBCUTANEOUS at 12:43

## 2023-04-22 RX ADMIN — FLUTICASONE PROPIONATE 1 SPRAY: 50 SPRAY, METERED NASAL at 12:40

## 2023-04-22 RX ADMIN — OXYCODONE HYDROCHLORIDE 2.5 MG: 5 TABLET ORAL at 01:38

## 2023-04-22 RX ADMIN — CARVEDILOL 12.5 MG: 12.5 TABLET, FILM COATED ORAL at 09:01

## 2023-04-22 RX ADMIN — LISINOPRIL 20 MG: 10 TABLET ORAL at 10:35

## 2023-04-22 RX ADMIN — LEVOTHYROXINE SODIUM 100 MCG: 100 TABLET ORAL at 09:01

## 2023-04-22 RX ADMIN — ACETAMINOPHEN 650 MG: 325 TABLET, FILM COATED ORAL at 07:59

## 2023-04-22 RX ADMIN — POLYETHYLENE GLYCOL 3350 17 G: 17 POWDER, FOR SOLUTION ORAL at 09:00

## 2023-04-22 RX ADMIN — Medication 5 ML: at 17:06

## 2023-04-22 RX ADMIN — HYDROMORPHONE HYDROCHLORIDE 0.5 MG: 1 INJECTION, SOLUTION INTRAMUSCULAR; INTRAVENOUS; SUBCUTANEOUS at 17:06

## 2023-04-22 RX ADMIN — ARIPIPRAZOLE 5 MG: 5 TABLET ORAL at 21:47

## 2023-04-22 RX ADMIN — POTASSIUM & SODIUM PHOSPHATES POWDER PACK 280-160-250 MG 1 PACKET: 280-160-250 PACK at 10:35

## 2023-04-22 RX ADMIN — POTASSIUM CHLORIDE 40 MEQ: 40 SOLUTION ORAL at 08:59

## 2023-04-22 RX ADMIN — FUROSEMIDE 40 MG: 20 TABLET ORAL at 09:01

## 2023-04-22 RX ADMIN — POTASSIUM & SODIUM PHOSPHATES POWDER PACK 280-160-250 MG 1 PACKET: 280-160-250 PACK at 13:45

## 2023-04-22 RX ADMIN — Medication 1 TABLET: at 09:00

## 2023-04-22 RX ADMIN — HYDRALAZINE HYDROCHLORIDE 5 MG: 20 INJECTION INTRAMUSCULAR; INTRAVENOUS at 18:12

## 2023-04-22 RX ADMIN — CARVEDILOL 12.5 MG: 12.5 TABLET, FILM COATED ORAL at 17:18

## 2023-04-22 ASSESSMENT — ACTIVITIES OF DAILY LIVING (ADL)
ADLS_ACUITY_SCORE: 40

## 2023-04-22 NOTE — PLAN OF CARE
Goal Outcome Evaluation:      Plan of Care Reviewed With: patient    Overall Patient Progress: no change    Outcome Evaluation: A&O x 4 but forgetful.  VSS ex HTN.  PRN hydralazine given and was not effective.  BP still 163/109 after hydralazine and coreg- team paged for further orders.  On room air.  c/o back pain 9/10 and received oxycodone x 1.  Lumbar x-ray completed today.  Uses bedside urinal.  Had 1 formed BM on the commode.  BG checks AC/HS/0200 with s/s and carb coverage.  BG improved today.  Increased dose of Lantus.  Poor appetite today.  Dressing to L foot, R lateral leg and sacrum CDI.  R shin dressing changed today.  Double lumen PICC heparin locked.  Up with heavy A-2 or lift.  Plan is for tumor resection potentially on 4/27.  Continue POC.

## 2023-04-22 NOTE — PLAN OF CARE
Goal Outcome Evaluation:       A&O VSS on RA. PICC heparin locked, new caps on. Reg diet, good appetite. Carb coverage and sliding scale insulin. Up to commode, large BM A2 GB walker. Voids in BSU. All LE wound dressings CDI. Sacral mepilex CDI.  Oxy x 1 for back pain. Lidocaine patches off. Calls frequently to make needs known.

## 2023-04-22 NOTE — PLAN OF CARE
Goal Outcome Evaluation:      Plan of Care Reviewed With: patient    Overall Patient Progress: no changeOverall Patient Progress: no change    Outcome Evaluation: A&Ox4, forgetful. VSS, except HTN, on RA. Utilizing bedside urinal. No BM this shift. Carb coverage for snacks administered. Oxy administered x1 for headache. Able to make needs known.

## 2023-04-22 NOTE — PROGRESS NOTES
Endocrine Progress Note  Patient: Gustavo Faria   MRN: [id number]  Date of Service: 04/22/2023    HPI summary and hospitalization course:  Gustavo Faria is a 58 year old male with PMHx of ACTH-secreting macroadenoma c/b central hypothyroidism, and central hypogonadism,  s/p transphenoidal surgery 5/2021 and 7/2021 in [place] has baseline records 3rd nerve palsy, ongoing serratia RLE cellulitis c/b recent serratia bacteremia, HFrEF, T2DM, and HTN who [facility] of known expanding large sellar/suprasellar mass extending into cavernous sinuses and subsequent cranial nerve impingement.    [facility] and bilateral lower limb cellulitis/abscess developed sudden onset of headaches and double vision transferred to [facility] for multidisciplinary assessment.  Had an MRI done on 3/25 which demonstrates enlargement of known pituitary adenoma with necrosis extending laterally beyond the left cavernous sinus concerning for compression of the cranial nerves at the cavernous sinus.  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  On the arrival he was encephalopathic.  Taken off dexamethasone on 04/06/2023 with improvement in the mental status following that.    Repeated MRI following transfer to [facility] showed new enhancing to 1.4 cm lesion in the right frontal lobe with susceptibility of artifact.  4.3 cm heterogeneously enhancing pituitary mass with encasement and peripheral displacement of the cavernous portions of both internal carotid arteries and infiltration of the clivus.  Previous optic chiasm compression is resolved.  Pituitary stalk is midline.  Lesion is infiltrating both cavernous sinuses cranial nerves in the cavernous sinus  cannot be differentiated from the underlying mass.    Interval history:  Received Lantus 16 units yesterday plus 44 units Novolog   He snacks during the day, getting CHO coverage.   Sleeping when see today, chart review only.     Physical Examination:  BP (!) 148/109 (BP Location: Left arm)   Pulse 61   Temp 98.1  F (36.7  C) (Oral)   Resp 16   Wt 73.4 kg (161 lb 13.4 oz)   SpO2 100%   BMI 26.12 kg/m    Physical Exam  Vitals reviewed.   Pulmonary:      Effort: Pulmonary effort is normal.     Sleeping when see on rounds today.     Medications:  Reviewed    Endocrine Labs:  Recent Labs   Lab 04/22/23  1150 04/22/23  0814 04/22/23  0724 04/22/23  0114 04/21/23  2109 04/21/23  1949   * 271* 257* 236* 259* 240*       Sodium   Date Value Ref Range Status   04/22/2023 139 136 - 145 mmol/L Final     Potassium   Date Value Ref Range Status   04/22/2023 4.0 3.4 - 5.3 mmol/L Final             Images:  MRI brain with and without contrast on 4/6/2023:  Comparison:  Outside brain MR 3/28/2023 and 5/25/2021.      Technique:   1. Brain MRI: Axial diffusion and FLAIR images of the whole brain  obtained without intravenous contrast. Sagittal T1 and T2-weighted,  coronal T2-weighted, coronal T1-weighted images with focus on the  sella were obtained without intravenous contrast. Post intravenous  contrast using gadolinium coronal and sagittal T1-weighted images were  obtained focused on the sella. Dynamic postcontrast coronal  T1-weighted images were also obtained.     2. MRI of the Orbits focused on the orbits/visual pathways:  Axial  T2-weighted with inversion recovery and coronal T1-weighted images  were obtained without intravenous contrast. Axial and coronal  T1-weighted images with fat saturation were obtained after intravenous  gadolinium administration.     3. MRI Brain COW: Sagittal T1-weighted, axial T2-weighted with fat  saturation, turboFLAIR and diffusion-weighted with ADC map and coronal  T1-weighted and  T2-weighted images of the brain were obtained without  intravenous contrast.       Contrast: 7.5mL Gadavist     Findings:    Brain MRI:  Enhancing 1.4 x 1.0 cm lesion with associated susceptibility artifact  in the right frontal lobe (series 16, image 17), stable since  5/25/2021. A similar punctate lesion in the left putamen also  unchanged.m     4.3 x 1.9 x 1.3 cm (right to left, AP, craniocaudal) heterogeneously  enhancing pituitary mass with encasement and peripheral displacement  of the cavernous portions of both internal carotid arteries (series  13, image 28 and series 14, image 11). The lesion infiltrates the  clivus. Compared with MRI from 2021 the craniocaudal dimension of the  mass is decreased. The lesion is now more heterogeneous, previously  more homogeneous. Previous optic chiasm compression is resolved. No  abnormal enhancement of the optic nerves within the intraorbital  portion. The pituitary stalk is midline.     The lesion infiltrates both cavernous sinuses. The traversing cranial  nerves within the cavernous sinus cannot be differentiated from the  underlying mass.     FLAIR images through the whole brain shows nonspecific posterior  periventricular white matter hyperintensities. Otherwise no mass  effect, midline shift, or significant enlargement of the ventricles.  Scattered mucosal thickening of the paranasal sinuses. Mastoid air  cells are clear.     MRA Head: No aneurysm or stenosis of the major intracranial arteries  at the base of the brain.                                                                      Impression:   1.  4.3 cm heterogeneously enhancing pituitary mass with infiltration  of bilateral cavernous sinuses and the clivus. Findings are similar  when compared compared to prior exam 3/28/2023 suggestive for a  macroadenoma. Compared with an older MRI from 2021, the lesion appears  more heterogeneous and smaller. Previous compression of the optic  chiasm is no longer  present.  2.  Patchy T2 hyperintense enhancing lesion with scattered  susceptibility artifacts in the right frontal subcortical white  matter. A similar tiny smaller lesion is also present in the left  putamen. Retrospectively this lesion was present back in 2021.  Constellation of findings are suggestive of a benign lesion such as  cavernoma or telangiectasia.  3.   MRA demonstrates no aneurysm or stenosis of the major  intracranial arteries. Bilateral cavernous internal carotid arteries  are patent despite encasement by the above-mentioned lesion.  4.  No abnormal optic nerve enhancement or optic nerve atrophy.        Assessment and plan:    Gustavo Faria is a 58 year old male with PMHx of  ACTH-secreting macroadenoma c/b central hypothyroidism, , and central hypogonadism,  s/p transphenoidal surgery 5/2021 and 7/2021 in [place] has baseline records 3rd nerve palsy, ongoing serratia RLE cellulitis c/b recent serratia bacteremia, HFrEF, T2DM, and HTN who [facility] of known expanding large sellar/suprasellar mass extending into cavernous sinuses and subsequent cranial nerve impingement.     #Pituitary macroadenoma:  #Cushing's disease:  #Pituitary apoplexy:  Known history of ACTH secreting macroadenoma , developed sudden onset of double vision and [facility] showed enlargement of known pituitary macroadenoma with necrosis extending laterally beyond the left cavernous sinus concerning for compression of cranial nerves at the cavernous sinuses.  No acute hemorrhage, small area of diffusion restriction at the left side of pituitary possibly consistent with ischemic pituitary adenoma  Started on dexamethasone 2 mg twice daily, was discontinued on4/6/23  Random serum cortisol level at 2:30 AM was 46.3, ACTH 321.  A.m. cortisol level of 41.1 on 4/7/2023.  Repeated MRI on 4/6 showed a 4.3 cm diffusely enhancing pituitary mass  with infiltration of bilateral cavernous sinus and clivus.  He has been developing recurrent hypernatremia/hypokalemia during the admission, was intermittently on dextrose 5% infusion last received on 4/12 discontinued at 5:30 AM. Was on aggressive potassium repletion.  Spironolactone dose was increased from 200 mg to 300 mg on 4/19.  KCl supplement increased from 20 mEq to 40 mill equivalent on 4/19.  Lasix dose increased from 20 mg up to 40 mg on 4/19.  Na and K at goal on AM labs.      Recommendations:  -Resection/debulking arranged by neurosurgery will be done on 04/27 will be followed by radiotherapy.  -For perioperative planning, no need to initiate the steroids replacement unless he decompensates during the surgery or postoperatively he can get cortisol level following the surgery followed by a.m. cortisol level checks if there is any suspicion of postoperative AI to be started on steroids.       #Central hypothyroidism:  Prior to admission was on levothyroxine 100 mcg daily.  Free T4 on admission 1.3 and TSH not detectable (picture of central hypothyroidism).     Recommendations:  -Continue with PTA levothyroxine 100 mcg daily.     #Hypogonadotrophic hypogonadism:  Was on AndroGel gel 1 pump daily PTA.      Recommendations:  -Continue AndroGel.       #DM type II: Hemoglobin A1c on admission 9.5%.  Prior to admission was on Sitagliptin 100 mg daily/metformin 500 mg twice daily, Jardiance 12.5 mg.  Good appetite. BG remain above goal     Recommendations:  -Lantus increased to 22 units daily starting today  -Correction: high-dose (1 unit/25 mg/dl>140 HS and >200 at bedtime) Novolog   -Carb coverage: NovoLog 1 unit: 10 g CHO with meals and snacks.  -Hypoglycemia rescue protocol.  -POC BG checks 3 times daily AC at the bedtime and at 2 AM.  -Carb counting protocol.  -Holding PTA oral medications.          Daphney Gerard MD  Endocrinology and Diabetes   [phone]

## 2023-04-22 NOTE — PROGRESS NOTES
[facility]    Medicine Progress Note - Medicine Service, MAROON TEAM 2    Date of Admission:  4/3/2023    Assessment & Plan   Gustavo Faria is a 58 year old male with recent Serratia bacteremia, HFrEF, prolonged QT, lower extremity wounds, DMII, hypothyroidism, low testosterone and known ACTH secreting pituitary adenoma w/resulting Cushing's disease s/p 2 partial resections in 2021 who [facility]. [facility], he developed new onset double vision and severe headache which prompted imaging that showed a large mass invading the cavernous sinuses and impinging on the cranial nerves. He is transferred to [facility] for this pituitary adenoma, concern for progression vs hemorrhage/apoplexy. Complicated by psychosis/metabolic encephalopathy and electrolyte abnormalities secondary to Cushing's syndrome. He is now medically stable and awaiting surgery scheduled for 4/27/2023.    Updates today:  - no changes today, electrolytes remain stable  - will obtain X ray L spine  - sedated MRI [facility] 4/24  - will [facility] pre-operative CT Tues or Wed  - Attempting placement at [facility] if possible prior to surgery 04/27, per discussion with SW has been challenging to place so far.      # Hypernatremia, DI vs cushing's, resolved  # Hypokalemia, resolved  Hypernatremia and hypokalemia, likely secondary to Cushing's.     - Endocrine following  - Goal Na: 135-140  - Spironolactone 300 daily (can go up to 400 mg daily depending on Na and K, per Endocrine)  - Strict I&Os  - Daily BMP + Mg  - PTA potassium supplement 40 mEq    # Acute metabolic encephalopathy  # Pyruria, Candiduria  # Encephalopathy likely secondary to cushing's disease  Admission symptoms included disorientation, intermittently following directions. Repetitive words - perseverating on particular phrases. Sx started the 2-3 days prior to  admission (which [facility] was [facility]). Vulnerable brain (multiple brain surgeries), enlarging pituitary mass. No seizure activity per EEG. Steroids removed and pt still encephalopathy. Sodium has been in normal limits and patient remains altered. Worked up for infections - did reveal candiduria. Biggest concern for infectious vs cushing's. Most likely cushing's given largely unremarkable infectious work up.  - CT abd w/ contrast (4/10): no evidence of pyelonephritis  - Urine culture: candida, sensitive to Fluconazole  - Fluconazole 200 mg daily, completed 8 days  - Discontinued ceftriaxone as patient did not improve clinically with antibiotics  - No steroids  - Surgical plans for pituitary tumor as stated below  - Delirium precautions  - Electrolyte management  - PRN quetiapine if agitation that is not responsive to behavioral interventions     # Delusions, intermittent  Intermittent delusions regarding staff stalking patient while in the hospital. Unclear chronicity of these delusions - but has had them multiple times this hospitalization. Unclear psych history. Medical management for encephalopathy as stated above. Seems to have resolved.  - Appreciate psychiatric recommendations:   - Abilify 5 mg PO at bedtime     # ACTH secreting pituitary adenoma c/b type II DM, hypothyroidism and low testosterone s/p two partial resections in 2021   Patient noted double vision and severe headache beginning the evening of 3/25. CT imaging on 3/25 revealed a large sellar/suprasellar mass extending into the cavernous sinuses with no evidence of acute stroke. On 3/28, he underwent an MRI which confirmed the CT findings of a large mass invading the cavernous sinuses and impinging on the cranial nerves. Necrosis extending beyond left cavernous sinus. [facility]. Repeat MRI performed on 4/6/23: compared to 2021 MRI, lesions are smaller. Orbit MRI without optic nerve compression and no evidence of orbital  infection.  - Neurosurgery following  - Plans for Neurosurgery: surgery 4/27/23  - Optimize medically prior to surgery  - DC'ed dexamethasone  - Ophthalmology following, no major changes, agree with neurosurgery plans  - Endocrine following    Surgery Prep:  - Hold ASA 1 week prior to surgery starting 4/20  - Hold Empagliflozin 3 days prior to surgery starting - on HOLD  - CT/CTA head- stereotaxy protocol to be completed night before the surgery     # Type II DM, exacerbated by Cushing's   A1c of 7.6% on 2/2023. Patient [facility] the [facility] regimen [facility].    - Lantus 14 units  - HD Sliding scale insulin  - Carb coverage 1:15  - Hold PTA metformin, empa, and sitagliptin, can resume upon discharge    # Buttocks, sacrum and bilateral groin wounds   # RLE wound from puncture injury   # L dorsal foot wound from presumed trauma   # Soft tissue infection/abscess    # Hx serratia bacteremia in December 2022   For patient's lower extremity wounds and hx of Serratia bacteremia in December, he was [facility]olin [facility]. His antibiotics were broadened to Vanc and Zosyn and ultimately he was transitioned to ceftazidime on 3/23 with plan to complete course on 4/4/23. BCx negative and wound cx grew serratia marcescens at the [facility]. He has been on ceftazidime since. MRI of the right tib/fib on 3/23 was negative for osteomyelitis but did show two fluid collections draining sponateneously. [facility] and determined no intervention was needed as wounds were draining on their own.  - Stop ceftazidime (3/23 - 4/5)  - WOC following  - PT/OT when able to follow commands  - Pain: tylenol PRN, dilaudid 0.5 mg PRN for dressing changes and oxy 5 mg PRN - however given encephalopathy, use sparingly    # HFmrEF, with global hypokinesis  # High burden of PVCs  # Prolonged QTc   # Elevated troponin, down-trended  Coronary angiogram on 2/27 shows normal coronaries. Troponin down-trending (88 -> 77).  No chest pain. High burden of PVCs. Follow up ECHO with global hypokinesis, which is worse compared to February 2023 ECHO. EF is 45% (same compared to prior). Likely small vessel disease vs demand vs cardiomyopathy 2/2 ceftazidime. Low concern for acute ACS.   - Discontinued tele  - Electrolyte mgmt as above  - Lasix 40mg daily  - Continue PTA coreg  - Lisinopril 10  - Empagliflozin (see above)  - Resume ASA - on HOLD  - Avoid QTc prolonging medications     # Central Hypothyroidism  - Continue PTA levothyroxine     # Hypogonadism   - Continue PTA androgel (will need to bring in home medication)     # Chronic microcytic anemia   Hgb of 8.4 on [facility]. Peripheral smear on 3/30 showed poikilocytosis with occasional red blood cell fragments but no other evidence of hemolysis.  Haptoglobin was normal.  Ferritin was 91, transferrin saturation was low, B12 was 495 and folate was 8.7. Likely combination of iron deficiency as well as inflammation/chronic disease.   - CTM        # Concern for malnutrition   - Nutrition following       Diet: Regular Diet Adult    DVT Prophylaxis: Pneumatic Compression Devices  Roland Catheter: Not present  Lines:   PICC 04/06/23 Double Lumen Right Basilic access-Site Assessment: WDL      Cardiac Monitoring: None  Code Status: Full Code      Clinically Significant Risk Factors              # Hypoalbuminemia: Lowest albumin = 2.9 g/dL at 4/11/2023  7:07 AM, will monitor as appropriate          # DMII: A1C = 9.5 % (Ref range: <5.7 %) within past 6 months    # Severe Malnutrition: based on nutrition assessment        Disposition Plan             Zita Christensen MD  Internal Medicine - Pediatrics Resident, PGY-2  [facility]  4/22/2023  _________________________________________________________    Interval History   No acute events overnight, doing well this morning. Says his nose, sinuses, and eyes have been dry. Denies headache, dizziness. Denies back pain. Says his legs  are mildly tender due to swelling. Has no other complaints. VSS.     Physical Exam   Vital Signs: Temp: 98.1  F (36.7  C) Temp src: Oral BP: (!) 148/97 Pulse: 70   Resp: 16 SpO2: 100 % O2 Device: None (Room air)    Weight: 147 lbs 4.8 oz    General Appearance: right eye slightly closed, awake, alert  Eyes: EOMI, muddy sclera   HEENT: Normal sclera, able to move neck FROM  Respiratory: Breathing comfortably on RA, CTAB  Cardiovascular: RRR, no murmurs  GI: BS+. Soft, nontender, nondistended. No guarding or rebound tenderness.   Musculoskeletal: moving all extremities spontaneously, 2+ swelling to shins bilaterally  Neuro: NFD, cranial nerves grossly intact  Psychiatric: Oriented to time, person, place, and situation     Medical Decision Making           Data     I have personally reviewed the following data over the past 24 hrs:    6.4  \   7.2 (L)   / 228     139 97 (L) 13.4 /  271 (H)   4.0 27 0.76 \

## 2023-04-22 NOTE — PROGRESS NOTES
[facility], [place]   04/22/2023  Neurosurgery Progress Note:    Assessment:  Gustavo Faria is a 58 year old male with a PMH of DMII, HFrEF, BLE open wounds, history of serratia bacteremia in December 2022,  pituitary ACTH secreting macroadenoma s/p EEA for resection on 5/2021 and 7/2021 in [place], with baseline right CN 3 palsy, who was admitted at the [facility] on 3/23. He developed sudden headache and vision changes on 3/25, with MRI brain on 3/28, which demonstrated enlargement of known pituitary adenoma, with necrosis extending laterally beyond the left cavernous sinus, concerning for compression of cranial nerves at cavernous sinus. 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      Given that he is past the acute period of apoplexy, there is no indication for emergent decompression. He would benefit for redo EEA for resection of adenoma given its symptomatic nature. However, he would need to be medically optimized from the metabolic and infectious standpoint prior to surgery. He would also benefit from a multidisciplinary approach with recommendations from ophthalmology, endocrinology and radiation oncology regarding options for treatment.     Plan:  -Patient's wife is the legal decision maker-consent obtained for the surgery-uploaded in chart    - Please get X Ray L spine to begin with given patient was complaining of back pain    - CT/CTA head- stereotaxy protocol to be completed night before the surgery  - Okay to discharge from neurosurgery perspective-if deemed medically stable by the primary team  - Please stop aspirin 7 days before surgery-surgery planned for 4/27/2023 with ENT colleagues for resection of pituitary adenoma  - Neurosurgery will continue to follow   -----------------------------------  Oliver Duong  PGY-2 Neurosurgery  Please  page NSGY on call with questions      Please contact neurosurgery resident on call with questions.    Dial * * [phone], enter [phone] when prompted.    Interval History: No acute events overnight that were brought to neurosurgery's attention.     Objective:   Temp:  [97.9  F (36.6  C)-99.1  F (37.3  C)] 98.1  F (36.7  C)  Pulse:  [70-87] 70  Resp:  [16-18] 16  BP: (126-149)/(86-97) 148/97  SpO2:  [96 %-100 %] 100 %  I/O last 3 completed shifts:  In: 780 [P.O.:780]  Out: 1450 [Urine:1450]      NEUROLOGIC:  -- Opens eyes to voice, following commands, oriented x3  -- Able to name objects  -- Speech limited, perseverating speech, minimal spontaneous speech  Cranial Nerves:  -- visual fields full to confrontation although with limited participation, PERRL anisocoric L>R and sluggish, left CN III paresis and CN VI palsy, restricted ROM in right EOM with no apparent asymmetry   -- face symmetrical, tongue midline  -- sensory V1-V3 intact bilaterally  -- palate elevates symmetrically, uvula midline  -- hearing grossly intact bilat     Motor:  Limited participation - antigravity x4 extremities     Sensory:  intact to LT x 4 extremities      Reflexes:       Bi Tri BR Omi Pat Ach Bab     C5-6 C7-8 C6 UMN L2-4 S1 UMN   R 2+ 2+ 2+ Norm 2+ 2+ Norm   L 2+ 2+ 2+ Norm 2+ 2+ Norm      Gait: Deferred.     LABS:  Recent Labs   Lab 04/22/23  0724 04/22/23  0114 04/21/23  2109 04/21/23  1121 04/21/23  1009 04/20/23  1207 04/20/23  0827 04/19/23  0911 04/19/23  0732   NA  --   --   --   --  138  --  139  --  141   POTASSIUM  --   --   --   --  4.2  --  3.6  --  3.9   CHLORIDE  --   --   --   --  100  --  99  --  101   CO2  --   --   --   --  27  --  27  --  28   ANIONGAP  --   --   --   --  11  --  13  --  12   * 236* 259*   < > 244*   < > 318*   < > 262*   BUN  --   --   --   --  15.5  --  17.7  --  17.0   CR  --   --   --   --  0.78  --  0.84  --  0.81   KORIN  --   --   --   --  8.9  --  9.1  --  9.2    < > = values in this interval  not displayed.       Recent Labs   Lab 04/21/23  1009   WBC 6.4   RBC 2.79*   HGB 7.2*   HCT 24.8*   MCV 89   MCH 25.8*   MCHC 29.0*   RDW 28.3*          IMAGING:  No results found for this or any previous visit (from the past 24 hour(s)).

## 2023-04-23 LAB
ALBUMIN SERPL BCG-MCNC: 3.5 G/DL (ref 3.5–5.2)
ALP SERPL-CCNC: 285 U/L (ref 40–129)
ALT SERPL W P-5'-P-CCNC: 101 U/L (ref 10–50)
ANION GAP SERPL CALCULATED.3IONS-SCNC: 13 MMOL/L (ref 7–15)
AST SERPL W P-5'-P-CCNC: 31 U/L (ref 10–50)
BILIRUB DIRECT SERPL-MCNC: <0.2 MG/DL (ref 0–0.3)
BILIRUB SERPL-MCNC: 0.4 MG/DL
BUN SERPL-MCNC: 13.4 MG/DL (ref 6–20)
CALCIUM SERPL-MCNC: 9.1 MG/DL (ref 8.6–10)
CHLORIDE SERPL-SCNC: 98 MMOL/L (ref 98–107)
CREAT SERPL-MCNC: 0.69 MG/DL (ref 0.67–1.17)
DEPRECATED HCO3 PLAS-SCNC: 30 MMOL/L (ref 22–29)
ERYTHROCYTE [DISTWIDTH] IN BLOOD BY AUTOMATED COUNT: 28.1 % (ref 10–15)
GFR SERPL CREATININE-BSD FRML MDRD: >90 ML/MIN/1.73M2
GLUCOSE BLDC GLUCOMTR-MCNC: 138 MG/DL (ref 70–99)
GLUCOSE BLDC GLUCOMTR-MCNC: 155 MG/DL (ref 70–99)
GLUCOSE BLDC GLUCOMTR-MCNC: 180 MG/DL (ref 70–99)
GLUCOSE BLDC GLUCOMTR-MCNC: 181 MG/DL (ref 70–99)
GLUCOSE BLDC GLUCOMTR-MCNC: 202 MG/DL (ref 70–99)
GLUCOSE SERPL-MCNC: 192 MG/DL (ref 70–99)
HCT VFR BLD AUTO: 27.6 % (ref 40–53)
HGB BLD-MCNC: 8.1 G/DL (ref 13.3–17.7)
MAGNESIUM SERPL-MCNC: 2.1 MG/DL (ref 1.7–2.3)
MCH RBC QN AUTO: 26 PG (ref 26.5–33)
MCHC RBC AUTO-ENTMCNC: 29.3 G/DL (ref 31.5–36.5)
MCV RBC AUTO: 89 FL (ref 78–100)
PHOSPHATE SERPL-MCNC: 2.8 MG/DL (ref 2.5–4.5)
PLATELET # BLD AUTO: 342 10E3/UL (ref 150–450)
POTASSIUM SERPL-SCNC: 3.1 MMOL/L (ref 3.4–5.3)
POTASSIUM SERPL-SCNC: 3.5 MMOL/L (ref 3.4–5.3)
PROT SERPL-MCNC: 5.9 G/DL (ref 6.4–8.3)
RBC # BLD AUTO: 3.12 10E6/UL (ref 4.4–5.9)
SODIUM SERPL-SCNC: 141 MMOL/L (ref 136–145)
WBC # BLD AUTO: 9 10E3/UL (ref 4–11)

## 2023-04-23 PROCEDURE — 84132 ASSAY OF SERUM POTASSIUM: CPT | Performed by: INTERNAL MEDICINE

## 2023-04-23 PROCEDURE — 250N000013 HC RX MED GY IP 250 OP 250 PS 637

## 2023-04-23 PROCEDURE — 36592 COLLECT BLOOD FROM PICC: CPT | Performed by: INTERNAL MEDICINE

## 2023-04-23 PROCEDURE — 36592 COLLECT BLOOD FROM PICC: CPT

## 2023-04-23 PROCEDURE — 99231 SBSQ HOSP IP/OBS SF/LOW 25: CPT | Performed by: INTERNAL MEDICINE

## 2023-04-23 PROCEDURE — 99231 SBSQ HOSP IP/OBS SF/LOW 25: CPT | Mod: GC | Performed by: NEUROLOGICAL SURGERY

## 2023-04-23 PROCEDURE — 93010 ELECTROCARDIOGRAM REPORT: CPT | Performed by: INTERNAL MEDICINE

## 2023-04-23 PROCEDURE — 82248 BILIRUBIN DIRECT: CPT

## 2023-04-23 PROCEDURE — 250N000013 HC RX MED GY IP 250 OP 250 PS 637: Performed by: STUDENT IN AN ORGANIZED HEALTH CARE EDUCATION/TRAINING PROGRAM

## 2023-04-23 PROCEDURE — 93005 ELECTROCARDIOGRAM TRACING: CPT

## 2023-04-23 PROCEDURE — 83735 ASSAY OF MAGNESIUM: CPT

## 2023-04-23 PROCEDURE — 80053 COMPREHEN METABOLIC PANEL: CPT

## 2023-04-23 PROCEDURE — 250N000011 HC RX IP 250 OP 636: Performed by: INTERNAL MEDICINE

## 2023-04-23 PROCEDURE — 120N000002 HC R&B MED SURG/OB UMMC

## 2023-04-23 PROCEDURE — 250N000011 HC RX IP 250 OP 636

## 2023-04-23 PROCEDURE — 99232 SBSQ HOSP IP/OBS MODERATE 35: CPT | Performed by: INTERNAL MEDICINE

## 2023-04-23 PROCEDURE — 84100 ASSAY OF PHOSPHORUS: CPT | Performed by: STUDENT IN AN ORGANIZED HEALTH CARE EDUCATION/TRAINING PROGRAM

## 2023-04-23 PROCEDURE — 85027 COMPLETE CBC AUTOMATED: CPT

## 2023-04-23 PROCEDURE — 82947 ASSAY GLUCOSE BLOOD QUANT: CPT

## 2023-04-23 PROCEDURE — 250N000013 HC RX MED GY IP 250 OP 250 PS 637: Performed by: INTERNAL MEDICINE

## 2023-04-23 RX ORDER — POTASSIUM CHLORIDE 20MEQ/15ML
40 LIQUID (ML) ORAL ONCE
Status: DISCONTINUED | OUTPATIENT
Start: 2023-04-23 | End: 2023-04-25

## 2023-04-23 RX ORDER — HYDROMORPHONE HYDROCHLORIDE 1 MG/ML
0.5 INJECTION, SOLUTION INTRAMUSCULAR; INTRAVENOUS; SUBCUTANEOUS EVERY 6 HOURS PRN
Status: DISCONTINUED | OUTPATIENT
Start: 2023-04-23 | End: 2023-04-27

## 2023-04-23 RX ORDER — PROCHLORPERAZINE 25 MG
25 SUPPOSITORY, RECTAL RECTAL ONCE
Status: COMPLETED | OUTPATIENT
Start: 2023-04-23 | End: 2023-04-23

## 2023-04-23 RX ORDER — PROCHLORPERAZINE MALEATE 5 MG
5 TABLET ORAL ONCE
Status: COMPLETED | OUTPATIENT
Start: 2023-04-23 | End: 2023-04-23

## 2023-04-23 RX ADMIN — INSULIN ASPART 3 UNITS: 100 INJECTION, SOLUTION INTRAVENOUS; SUBCUTANEOUS at 17:49

## 2023-04-23 RX ADMIN — HYDROXYZINE HYDROCHLORIDE 10 MG: 10 TABLET ORAL at 10:53

## 2023-04-23 RX ADMIN — Medication 10 ML: at 22:40

## 2023-04-23 RX ADMIN — OXYCODONE HYDROCHLORIDE 2.5 MG: 5 TABLET ORAL at 04:57

## 2023-04-23 RX ADMIN — CARVEDILOL 12.5 MG: 12.5 TABLET, FILM COATED ORAL at 17:36

## 2023-04-23 RX ADMIN — HYDRALAZINE HYDROCHLORIDE 5 MG: 20 INJECTION INTRAMUSCULAR; INTRAVENOUS at 03:57

## 2023-04-23 RX ADMIN — Medication 5 ML: at 16:24

## 2023-04-23 RX ADMIN — HYDRALAZINE HYDROCHLORIDE 5 MG: 20 INJECTION INTRAMUSCULAR; INTRAVENOUS at 12:56

## 2023-04-23 RX ADMIN — FUROSEMIDE 40 MG: 20 TABLET ORAL at 10:53

## 2023-04-23 RX ADMIN — INSULIN ASPART 2 UNITS: 100 INJECTION, SOLUTION INTRAVENOUS; SUBCUTANEOUS at 11:02

## 2023-04-23 RX ADMIN — HYDROMORPHONE HYDROCHLORIDE 0.5 MG: 1 INJECTION, SOLUTION INTRAMUSCULAR; INTRAVENOUS; SUBCUTANEOUS at 11:18

## 2023-04-23 RX ADMIN — Medication 1 TABLET: at 10:54

## 2023-04-23 RX ADMIN — POTASSIUM CHLORIDE 40 MEQ: 40 SOLUTION ORAL at 10:48

## 2023-04-23 RX ADMIN — HYDRALAZINE HYDROCHLORIDE 5 MG: 20 INJECTION INTRAMUSCULAR; INTRAVENOUS at 16:41

## 2023-04-23 RX ADMIN — FLUTICASONE PROPIONATE 1 SPRAY: 50 SPRAY, METERED NASAL at 10:54

## 2023-04-23 RX ADMIN — SPIRONOLACTONE 300 MG: 100 TABLET ORAL at 10:50

## 2023-04-23 RX ADMIN — LEVOTHYROXINE SODIUM 100 MCG: 100 TABLET ORAL at 10:54

## 2023-04-23 RX ADMIN — HYDROXYZINE HYDROCHLORIDE 10 MG: 10 TABLET ORAL at 02:26

## 2023-04-23 RX ADMIN — CARVEDILOL 12.5 MG: 12.5 TABLET, FILM COATED ORAL at 10:52

## 2023-04-23 RX ADMIN — TESTOSTERONE 25 MG OF TESTOSTERONE: 50 GEL TOPICAL at 10:45

## 2023-04-23 RX ADMIN — Medication 5 ML: at 03:58

## 2023-04-23 RX ADMIN — LISINOPRIL 20 MG: 10 TABLET ORAL at 10:52

## 2023-04-23 ASSESSMENT — ACTIVITIES OF DAILY LIVING (ADL)
ADLS_ACUITY_SCORE: 40

## 2023-04-23 NOTE — PROGRESS NOTES
Endocrine Progress Note  Patient: Gustavo Faria   MRN: [id number]  Date of Service: 04/23/2023    HPI summary and hospitalization course:  Gustavo Faria is a 58 year old male with PMHx of ACTH-secreting macroadenoma c/b central hypothyroidism, and central hypogonadism,  s/p transphenoidal surgery 5/2021 and 7/2021 in [place] has baseline records 3rd nerve palsy, ongoing serratia RLE cellulitis c/b recent serratia bacteremia, HFrEF, T2DM, and HTN who [facility] of known expanding large sellar/suprasellar mass extending into cavernous sinuses and subsequent cranial nerve impingement.    [facility] and bilateral lower limb cellulitis/abscess developed sudden onset of headaches and double vision transferred to [facility] for multidisciplinary assessment.  Had an MRI done on 3/25 which demonstrates enlargement of known pituitary adenoma with necrosis extending laterally beyond the left cavernous sinus concerning for compression of the cranial nerves at the cavernous sinus.  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  On the arrival he was encephalopathic.  Taken off dexamethasone on 04/06/2023 with improvement in the mental status following that.    Repeated MRI following transfer to [facility] showed new enhancing to 1.4 cm lesion in the right frontal lobe with susceptibility of artifact.  4.3 cm heterogeneously enhancing pituitary mass with encasement and peripheral displacement of the cavernous portions of both internal carotid arteries and infiltration of the clivus.  Previous optic chiasm compression is resolved.  Pituitary stalk is midline.  Lesion is infiltrating both cavernous sinuses cranial nerves in the cavernous sinus  cannot be differentiated from the underlying mass.    Interval history:  Lantus was increased to 22 units daily.   He snacks during the day, and has CHO coverage ordered for this.   Blood glucose better controlled today.     Physical Examination:  BP (!) 163/106 (BP Location: Left arm, Patient Position: Chair)   Pulse 69   Temp 98.3  F (36.8  C) (Oral)   Resp 16   Wt 73.4 kg (161 lb 13.4 oz)   SpO2 100%   BMI 26.12 kg/m    Physical Exam  Vitals reviewed.   Constitutional:       General: He is not in acute distress.  Pulmonary:      Effort: Pulmonary effort is normal. No respiratory distress.   Neurological:      Mental Status: He is alert.       Medications:  Reviewed     Endocrine Labs:  Recent Labs   Lab 04/23/23  1046 04/23/23  0752 04/23/23  0729 04/23/23  0112 04/22/23  2132 04/22/23  1544   * 192* 180* 155* 206* 164*       Sodium   Date Value Ref Range Status   04/23/2023 141 136 - 145 mmol/L Final     Potassium   Date Value Ref Range Status   04/23/2023 3.1 (L) 3.4 - 5.3 mmol/L Final             Images:  MRI brain with and without contrast on 4/6/2023:  Comparison:  Outside brain MR 3/28/2023 and 5/25/2021.      Technique:   1. Brain MRI: Axial diffusion and FLAIR images of the whole brain  obtained without intravenous contrast. Sagittal T1 and T2-weighted,  coronal T2-weighted, coronal T1-weighted images with focus on the  sella were obtained without intravenous contrast. Post intravenous  contrast using gadolinium coronal and sagittal T1-weighted images were  obtained focused on the sella. Dynamic postcontrast coronal  T1-weighted images were also obtained.     2. MRI of the Orbits focused on the orbits/visual pathways:  Axial  T2-weighted with inversion recovery and coronal T1-weighted images  were obtained without intravenous contrast. Axial and coronal  T1-weighted images with fat saturation were obtained after intravenous  gadolinium administration.     3. MRI Brain COW: Sagittal  T1-weighted, axial T2-weighted with fat  saturation, turboFLAIR and diffusion-weighted with ADC map and coronal  T1-weighted and T2-weighted images of the brain were obtained without  intravenous contrast.       Contrast: 7.5mL Gadavist     Findings:    Brain MRI:  Enhancing 1.4 x 1.0 cm lesion with associated susceptibility artifact  in the right frontal lobe (series 16, image 17), stable since  5/25/2021. A similar punctate lesion in the left putamen also  unchanged.m     4.3 x 1.9 x 1.3 cm (right to left, AP, craniocaudal) heterogeneously  enhancing pituitary mass with encasement and peripheral displacement  of the cavernous portions of both internal carotid arteries (series  13, image 28 and series 14, image 11). The lesion infiltrates the  clivus. Compared with MRI from 2021 the craniocaudal dimension of the  mass is decreased. The lesion is now more heterogeneous, previously  more homogeneous. Previous optic chiasm compression is resolved. No  abnormal enhancement of the optic nerves within the intraorbital  portion. The pituitary stalk is midline.     The lesion infiltrates both cavernous sinuses. The traversing cranial  nerves within the cavernous sinus cannot be differentiated from the  underlying mass.     FLAIR images through the whole brain shows nonspecific posterior  periventricular white matter hyperintensities. Otherwise no mass  effect, midline shift, or significant enlargement of the ventricles.  Scattered mucosal thickening of the paranasal sinuses. Mastoid air  cells are clear.     MRA Head: No aneurysm or stenosis of the major intracranial arteries  at the base of the brain.                                                                      Impression:   1.  4.3 cm heterogeneously enhancing pituitary mass with infiltration  of bilateral cavernous sinuses and the clivus. Findings are similar  when compared compared to prior exam 3/28/2023 suggestive for a  macroadenoma. Compared with an older  MRI from 2021, the lesion appears  more heterogeneous and smaller. Previous compression of the optic  chiasm is no longer present.  2.  Patchy T2 hyperintense enhancing lesion with scattered  susceptibility artifacts in the right frontal subcortical white  matter. A similar tiny smaller lesion is also present in the left  putamen. Retrospectively this lesion was present back in 2021.  Constellation of findings are suggestive of a benign lesion such as  cavernoma or telangiectasia.  3.   MRA demonstrates no aneurysm or stenosis of the major  intracranial arteries. Bilateral cavernous internal carotid arteries  are patent despite encasement by the above-mentioned lesion.  4.  No abnormal optic nerve enhancement or optic nerve atrophy.        Assessment and plan:    Gustavo Faria is a 58 year old male with PMHx of  ACTH-secreting macroadenoma c/b central hypothyroidism, , and central hypogonadism,  s/p transphenoidal surgery 5/2021 and 7/2021 in [place] has baseline records 3rd nerve palsy, ongoing serratia RLE cellulitis c/b recent serratia bacteremia, HFrEF, T2DM, and HTN who [facility] of known expanding large sellar/suprasellar mass extending into cavernous sinuses and subsequent cranial nerve impingement.     #Pituitary macroadenoma:  #Cushing's disease:  #Pituitary apoplexy:  Known history of ACTH secreting macroadenoma , developed sudden onset of double [facility] and [facility]/28 showed enlargement of known pituitary macroadenoma with necrosis extending laterally beyond the left cavernous sinus concerning for compression of cranial nerves at the cavernous sinuses.  No acute hemorrhage, small area of diffusion restriction at the left side of pituitary possibly consistent with ischemic pituitary adenoma  Started on dexamethasone 2 mg twice daily, was discontinued on4/6/23  Random serum cortisol level at 2:30 AM was 46.3,  ACTH 321.  A.m. cortisol level of 41.1 on 4/7/2023.  Repeated MRI on 4/6 showed a 4.3 cm diffusely enhancing pituitary mass with infiltration of bilateral cavernous sinus and clivus.  He has been developing recurrent hypernatremia/hypokalemia during the admission, was intermittently on dextrose 5% infusion last received on 4/12 discontinued at 5:30 AM. Was on aggressive potassium repletion.  Spironolactone dose was increased from 200 mg to 300 mg on 4/19.  KCl supplement increased from 20 mEq to 40 mill equivalent on 4/19.  Lasix dose increased from 20 mg up to 40 mg on 4/19.     Recommendations:  -Resection/debulking arranged by neurosurgery will be done on 04/27 will be followed by radiotherapy.  -For perioperative planning, no need to initiate the steroids replacement unless he decompensates during the surgery or postoperatively he can get cortisol level following the surgery followed by a.m. cortisol level checks if there is any suspicion of postoperative AI to be started on steroids.  -Low potassium this AM, receiving additional repletion with plan to recheck       #Central hypothyroidism:  Prior to admission was on levothyroxine 100 mcg daily.  Free T4 on admission 1.3 and TSH not detectable (picture of central hypothyroidism).     Recommendations:  -Continue with PTA levothyroxine 100 mcg daily.     #Hypogonadotrophic hypogonadism:  Was on AndroGel gel 1 pump daily PTA.      Recommendations:  -Continue AndroGel.       #DM type II: Hemoglobin A1c on admission 9.5%.  Prior to admission was on Sitagliptin 100 mg daily/metformin 500 mg twice daily, Jardiance 12.5 mg.  Will be NPO at midnight for sedated MRI on 4/24.     Recommendations:  -Lantus increased to 22 units daily, continue   -Correction: high-dose (1 unit/25 mg/dl>140 HS and >200 at bedtime) Novolog   -Carb coverage: NovoLog 1 unit: 10 g CHO with meals and snacks.  -Hypoglycemia rescue protocol.  -POC BG checks 3 times daily AC at the bedtime and at 2  AM.  -Carb counting protocol.  -Holding PTA oral medications.          Daphney Gerard MD  Endocrinology and Diabetes   [phone]

## 2023-04-23 NOTE — PROGRESS NOTES
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 [facility], he developed new onset double vision and severe headache which prompted imaging that showed a large mass invading the cavernous sinuses and impinging on the cranial nerves. He is transferred to [facility] for this pituitary adenoma, concern for progression vs hemorrhage/apoplexy. Complicated by psychosis/metabolic encephalopathy and electrolyte abnormalities secondary to Cushing's syndrome. He is now medically stable and awaiting surgery scheduled for 4/27/2023.    Updates today:  - Poor sleep overnight, increased anxiety and frustration with remaining in the hospital  - NF team started Hydroxyzine prn, pt did not think this helped but will continue for now  - Melatonin 5mg at bedtime scheduled at night 4/23  - K low at 3.1 this am 4/23, receiving scheduled KCl, recheck at 1600; may need additional repletion, if remains low will consider increasing Spironolactone 4/24  - Increased back, no new neuro symptoms, agreed on a dose of IV Dilaudid x1 for increased pain 4/23  - Sedated MRI karishma 4/24, NPO at [facility] on 4/23 for sedated MRI   - Will need pre-op CT per neurosurgery 4/25 or 4/26 pm      # Hypernatremia, DI vs cushing's, resolved  # Hypokalemia, resolved  Hypernatremia and hypokalemia, likely secondary to Cushing's.     - Endocrine following  - Goal Na: 135-140  - Spironolactone 300 daily (can go up to 400 mg daily depending on Na and K, per Endocrine)  - Strict I&Os  - Daily BMP  + Mg  - PTA potassium supplement 40 mEq  - K low at 3.1 this am 4/23, receiving scheduled KCl, recheck at 1600; may need additional repletion, if remains low will consider increasing Spironolactone 4/24      # Acute metabolic encephalopathy - improving  # Pyruria, Candiduria - resolved  # Encephalopathy likely secondary to cushing's disease  Admission symptoms included disorientation, intermittently following directions. Repetitive words - perseverating on particular phrases. Sx started the 2-3 days prior to admission (which [facility] was [facility]). Vulnerable brain (multiple brain surgeries), enlarging pituitary mass. No seizure activity per EEG. Steroids removed and pt still encephalopathy. Sodium has been in normal limits and patient remains altered. Worked up for infections - did reveal candiduria but unlikely this was etiology. Ultimately attributed to Cushings and known pituitary tumor.   - CT abd w/ contrast (4/10): no evidence of pyelonephritis  - Urine culture: candida, sensitive to Fluconazole, completed 8 day course   - Discontinued ceftriaxone as patient did not improve clinically with antibiotics  - No steroids  - Surgical plans for pituitary tumor as stated below  - Delirium precautions  - Electrolyte management  - PRN quetiapine if agitation that is not responsive to behavioral interventions     # Delusions, intermittent  Intermittent delusions regarding staff stalking patient while in the hospital. Unclear chronicity of these delusions - but has had them multiple times this hospitalization. Unclear psych history. Medical management for encephalopathy as stated above.  - Appreciate psychiatric recommendations:   - Abilify 5 mg PO at bedtime   - Poor sleep overnight 4/23, increased anxiety and frustration with remaining in the hospital, thinks that we are keeping him in the hospital for residents and students to practice on him, provided reassurance, discussed with bedside RN and my team that we  will minimize number of people in and out of room       # ACTH secreting pituitary adenoma c/b type II DM, hypothyroidism and low testosterone s/p two partial resections in 2021   Patient noted double vision and severe headache beginning the evening of 3/25. CT imaging on 3/25 revealed a large sellar/suprasellar mass extending into the cavernous sinuses with no evidence of acute stroke. On 3/28, he underwent an MRI which confirmed the CT findings of a large mass invading the cavernous sinuses and impinging on the cranial nerves. Necrosis extending beyond left cavernous sinus. [facility]. Repeat MRI performed on 4/6/23: compared to 2021 MRI, lesions are smaller. Orbit MRI without optic nerve compression and no evidence of orbital infection.  - Neurosurgery following  - Plans for Neurosurgery: surgery 4/27/23  - Optimize medically prior to surgery  - DC'ed dexamethasone  - Ophthalmology following, no major changes, agree with neurosurgery plans  - Endocrine following    Surgery Prep:  - Hold ASA 1 week prior to surgery starting 4/20  - Hold Empagliflozin 3 days prior to surgery starting - on HOLD  - CT/CTA head- stereotaxy protocol to be completed night before the surgery  - MRI Brain scheduled with sedation on 4/24      # Type II DM, exacerbated by Cushing's   A1c of 7.6% on 2/2023. Patient [facility].    - Lantus increased to 22 units by endocrine on 4/22 with improved BGs   - HD Sliding scale insulin  - Carb coverage 1:15  - Hold PTA metformin, empa, and sitagliptin, can resume upon discharge    # Buttocks, sacrum and bilateral groin wounds   # RLE wound from puncture injury   # L dorsal foot wound from presumed trauma   # Soft tissue infection/abscess    # Hx serratia bacteremia in December 2022   For patient's lower extremity wounds and hx of Serratia bacteremia in December, he was initially [facility]olin [facility]. His antibiotics were broadened to  Vanc and Zosyn and ultimately he was transitioned to ceftazidime on 3/23 with plan to complete course on 4/4/23. BCx negative and wound cx grew serratia marcescens at the [facility]. He has been on ceftazidime since. MRI of the right tib/fib on 3/23 was negative for osteomyelitis but did show two fluid collections draining sponateneously. [facility] at [facility] and determined no intervention was needed as wounds were draining on their own.  - Stop ceftazidime (3/23 - 4/5)  - WOC following  - PT/OT   - Pain: tylenol PRN, dilaudid 0.5 mg PRN for dressing changes and oxy 5 mg    # HFmrEF, with global hypokinesis  # High burden of PVCs  # Prolonged QTc   # Elevated troponin, down-trended  Coronary angiogram on 2/27 shows normal coronaries. Troponin down-trending (88 -> 77). No chest pain. High burden of PVCs. Follow up ECHO with global hypokinesis, which is worse compared to February 2023 ECHO. EF is 45% (same compared to prior). Likely small vessel disease vs demand vs cardiomyopathy 2/2 ceftazidime. Low concern for acute ACS.   - Discontinued tele  - Electrolyte mgmt as above  - Lasix 40mg daily  - Continue PTA coreg  - Lisinopril 10  - Empagliflozin (see above) - on HOLD   - ASA - on HOLD  - Avoid QTc prolonging medications     # Central Hypothyroidism  - Continue PTA levothyroxine     # Hypogonadism   - Continue PTA androgel (will need to bring in home medication)     # Chronic microcytic anemia   Hgb of 8.4 [facility]. Peripheral smear on 3/30 showed poikilocytosis with occasional red blood cell fragments but no other evidence of hemolysis.  Haptoglobin was normal.  Ferritin was 91, transferrin saturation was low, B12 was 495 and folate was 8.7. Likely combination of iron deficiency as well as inflammation/chronic disease.   - CTM        # Concern for malnutrition   - Nutrition following       Diet: Regular Diet Adult    DVT Prophylaxis: Pneumatic Compression Devices  Roland Catheter: Not present  Lines:    PICC 04/06/23 Double Lumen Right Basilic access-Site Assessment: WDL      Cardiac Monitoring: None  Code Status: Full Code      Clinically Significant Risk Factors        # Hypokalemia: Lowest K = 3.1 mmol/L in last 2 days, will replace as needed       # Hypoalbuminemia: Lowest albumin = 2.9 g/dL at 4/11/2023  7:07 AM, will monitor as appropriate          # DMII: A1C = 9.5 % (Ref range: <5.7 %) within past 6 months    # Severe Malnutrition: based on nutrition assessment        Disposition Plan              _________________________________________________________    Interval History   Did not sleep well last night, was feeling very anxious and paranoid, wanting to go home. He tells me that we are keeping him here so that the students and residents can practice on him, and that everyone else gets to go home at the end of the day but him, which is not fair. Denies fevers, chills, chest pain, SOB. Does have some increased generalized pain today in neck and mid-back. No new focal neuro symptoms. Just feeling mostly frustrated.     Physical Exam   Vital Signs: Temp: 98.4  F (36.9  C) Temp src: Oral BP: (!) 131/98 Pulse: 75   Resp: 17 SpO2: 99 % O2 Device: None (Room air)    Weight: 161 lbs 13.43 oz    General Appearance: right eye slightly closed, awake, alert  Eyes: EOMI, muddy sclera   HEENT: Normal sclera, able to move neck FROM  Respiratory: Breathing comfortably on RA, CTAB  Cardiovascular: RRR, no murmurs  GI: BS+. Soft, nontender, nondistended. No guarding or rebound tenderness.   Musculoskeletal: moving all extremities spontaneously, 2+ swelling to shins bilaterally  Neuro: NFD, cranial nerves grossly intact  Psychiatric: Oriented to time, person, place, and situation     Medical Decision Making           Data     I have personally reviewed the following data over the past 24 hrs:    9.0  \   8.1 (L)   / 342     141 98 13.4 /  181 (H)   3.1 (L) 30 (H) 0.69 \       ALT: 101 (H) AST: 31 AP: 285 (H) TBILI: 0.4    ALB: 3.5 TOT PROTEIN: 5.9 (L) LIPASE: N/A

## 2023-04-23 NOTE — PLAN OF CARE
"Goal Outcome Evaluation:      Plan of Care Reviewed With: patient    Overall Patient Progress: decliningOverall Patient Progress: declining    Outcome Evaluation: A&Ox4, VSS, except HTN, on RA. Nausea reported, 1 time compazine ordered but pt stated \"I'm already taking too many meds\" and declined the compazine. EKG showed new PVCs. HTN continued throughout the night, PRN hyralazine given x1. PRN atarax given for anxiety. Up to wheelchair x2. Lumbar x-ray showed compression fractures (known). Sedated MRI planned for 4/24. Expressing desire to go home for a few days before his procedure. Pt appeared very anxious at periods throughout the night.      "

## 2023-04-23 NOTE — PLAN OF CARE
"Goal Outcome Evaluation:      Plan of Care Reviewed With: patient    Overall Patient Progress: declining    Outcome Evaluation: A&O x 4 but confused and paranoid today.  Very anxious and depressed today as well.  Refusing cares, saying \"I just need a minute to get myself together.\"  Reapproached later and pt states the same thing.  Team paged regarding increasing anxiety.  Atarax not effective.  c/o severe pain 8/10 in head/eyes and received IV dilaudid x 1 with good relief.  PRN order for Q6hr PRN.  Up to recliner multiple times today.  Refusing meals- has not eaten today.  Denies nausea.  BG WNL.  VSS ex HTN- IV hydralazine given and BP remains high- team paged.  Refused much of nsg assessment today.  Refused CHG wipes and dressing changes.  Dressing to R shin still needs to be done- will pass on.  Plan for sedated MRI tomorrow.  Will be NPO at midnight because of planned sedation.      "

## 2023-04-23 NOTE — PROGRESS NOTES
[facility], [place]   04/23/2023  Neurosurgery Progress Note:    Assessment:  Gustavo Faria is a 58 year old male with a PMH of DMII, HFrEF, BLE open wounds, history of serratia bacteremia in December 2022,  pituitary ACTH secreting macroadenoma s/p EEA for resection on 5/2021 and 7/2021 in [place], with baseline right CN 3 palsy, who was admitted at the [facility] on 3/23. He developed sudden headache and vision changes on 3/25, with MRI brain on 3/28, which demonstrated enlargement of known pituitary adenoma, with necrosis extending laterally beyond the left cavernous sinus, concerning for compression of cranial nerves at cavernous sinus. 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      Given that he is past the acute period of apoplexy, there is no indication for emergent decompression. He would benefit for redo EEA for resection of adenoma given its symptomatic nature. However, he would need to be medically optimized from the metabolic and infectious standpoint prior to surgery. He would also benefit from a multidisciplinary approach with recommendations from ophthalmology, endocrinology and radiation oncology regarding options for treatment.     Plan:  -Patient's wife is the legal decision maker-consent obtained for the surgery-uploaded in chart  - MRI brain w/ and w/o contrast pituitary protocol prior to surgery - attempt MRI under anesthesia on Monday  - CT/CTA head- stereotaxy protocol to be completed night before the surgery  - Okay to discharge from neurosurgery perspective-if deemed medically stable by the primary team  - OR 4/27/23 - aspirin held  - Neurosurgery will continue to follow   -----------------------------------  Yaneth Ugalde MD  Neurosurgery PGY3    Please contact neurosurgery resident on call with questions.    Dial * *  [phone], enter [phone] when prompted.        Please contact neurosurgery resident on call with questions.    Dial * * [phone], enter [phone] when prompted.    Interval History: No acute events overnight that were brought to neurosurgery's attention. Frustrated this morning, asking to go home.     Objective:   Temp:  [97.2  F (36.2  C)-99.7  F (37.6  C)] 98.3  F (36.8  C)  Pulse:  [51-83] 69  Resp:  [16-18] 16  BP: (131-165)/() 163/106  SpO2:  [95 %-100 %] 100 %  I/O last 3 completed shifts:  In: 250 [P.O.:240; I.V.:10]  Out: 325 [Urine:325]      NEUROLOGIC:  -- Opens eyes to voice, following commands, oriented x3  Cranial Nerves:  -- visual fields full to confrontation although with limited participation, PERRL anisocoric L>R and sluggish, left CN III paresis and CN VI palsy, restricted ROM in right EOM with no apparent asymmetry   -- face symmetrical, tongue midline  -- sensory V1-V3 intact bilaterally  -- palate elevates symmetrically, uvula midline  -- hearing grossly intact bilat     Motor:  Antigravity x4 extremities     Sensory:  intact to LT x 4 extremities      Reflexes:       Bi Tri BR Omi Pat Ach Bab     C5-6 C7-8 C6 UMN L2-4 S1 UMN   R 2+ 2+ 2+ Norm 2+ 2+ Norm   L 2+ 2+ 2+ Norm 2+ 2+ Norm      Gait: Deferred.     LABS:  Recent Labs   Lab 04/23/23  1046 04/23/23  0752 04/23/23  0729 04/22/23  1150 04/22/23  0814 04/21/23  1121 04/21/23  1009   NA  --  141  --   --  139  --  138   POTASSIUM  --  3.1*  --   --  4.0  --  4.2   CHLORIDE  --  98  --   --  97*  --  100   CO2  --  30*  --   --  27  --  27   ANIONGAP  --  13  --   --  15  --  11   * 192* 180*   < > 271*   < > 244*   BUN  --  13.4  --   --  13.4  --  15.5   CR  --  0.69  --   --  0.76  --  0.78   KORIN  --  9.1  --   --  8.7  --  8.9    < > = values in this interval not displayed.       Recent Labs   Lab 04/23/23  0752   WBC 9.0   RBC 3.12*   HGB 8.1*   HCT 27.6*   MCV 89   MCH 26.0*   MCHC 29.3*   RDW 28.1*          IMAGING:  No results  found for this or any previous visit (from the past 24 hour(s)).

## 2023-04-23 NOTE — PROGRESS NOTES
"    SPIRITUAL HEALTH SERVICES Progress Note  [facility]    Saw pt Gustavo Faria per Staff Consult.      Patient/Family Understanding of Illness and Goals of Care - Pt has a pituitary adenoma, along with many other health challenges, that have resulted in prolonged hospitalization.  He expressed significant frustration that he is still hospitalized, and said that he wants to go home \"for a day or two.\"       Distress and Loss - Pt said he feels that he has been a \"guinea pig\" here at the hospital and has \"taught the residents and doctors things but they get to go home at the end of the day and I don't.\"  He feels angry and frustrated that he is still here despite frequently requesting to go home.  He said he feels like he is in shelter and that he will never leave. He also said he is in the process of  from his partner and that is adding to his stress level.       Strengths, Coping, and Resources - Pt has sisters who live in the [facility], and he said it is helpful when they come to visit, but that \"they get to leave and go home at the end of the visit.\" He could not identify significant coping strategies, and said that while he does have hope that he will get better that it is getting more difficult to believe it.        Meaning, Beliefs, and Spirituality - Pt derives meaning from his family and said that \"caring for my family\" is his primary motivation.        Plan of Care - [facility] will remain available and continue to support pt.  Unit  to follow     Rabbi Jodi Schmitz, [facility]  Staff   [phone]      "

## 2023-04-24 ENCOUNTER — APPOINTMENT (OUTPATIENT)
Dept: MRI IMAGING | Facility: CLINIC | Age: 58
DRG: 614 | End: 2023-04-24
Attending: STUDENT IN AN ORGANIZED HEALTH CARE EDUCATION/TRAINING PROGRAM
Payer: COMMERCIAL

## 2023-04-24 ENCOUNTER — ANESTHESIA EVENT (OUTPATIENT)
Dept: SURGERY | Facility: CLINIC | Age: 58
DRG: 614 | End: 2023-04-24
Payer: COMMERCIAL

## 2023-04-24 ENCOUNTER — APPOINTMENT (OUTPATIENT)
Dept: PHYSICAL THERAPY | Facility: CLINIC | Age: 58
DRG: 614 | End: 2023-04-24
Attending: STUDENT IN AN ORGANIZED HEALTH CARE EDUCATION/TRAINING PROGRAM
Payer: COMMERCIAL

## 2023-04-24 ENCOUNTER — ANESTHESIA (OUTPATIENT)
Dept: SURGERY | Facility: CLINIC | Age: 58
DRG: 614 | End: 2023-04-24
Payer: COMMERCIAL

## 2023-04-24 LAB
ABO/RH(D): NORMAL
ANION GAP SERPL CALCULATED.3IONS-SCNC: 12 MMOL/L (ref 7–15)
ANTIBODY SCREEN: NEGATIVE
APTT PPP: 24 SECONDS (ref 22–38)
ATRIAL RATE - MUSE: 96 BPM
BUN SERPL-MCNC: 13.3 MG/DL (ref 6–20)
CALCIUM SERPL-MCNC: 8.7 MG/DL (ref 8.6–10)
CHLORIDE SERPL-SCNC: 101 MMOL/L (ref 98–107)
CREAT SERPL-MCNC: 0.81 MG/DL (ref 0.67–1.17)
DEPRECATED HCO3 PLAS-SCNC: 29 MMOL/L (ref 22–29)
DIASTOLIC BLOOD PRESSURE - MUSE: NORMAL MMHG
GFR SERPL CREATININE-BSD FRML MDRD: >90 ML/MIN/1.73M2
GLUCOSE BLDC GLUCOMTR-MCNC: 122 MG/DL (ref 70–99)
GLUCOSE BLDC GLUCOMTR-MCNC: 130 MG/DL (ref 70–99)
GLUCOSE BLDC GLUCOMTR-MCNC: 138 MG/DL (ref 70–99)
GLUCOSE BLDC GLUCOMTR-MCNC: 165 MG/DL (ref 70–99)
GLUCOSE BLDC GLUCOMTR-MCNC: 188 MG/DL (ref 70–99)
GLUCOSE BLDC GLUCOMTR-MCNC: 83 MG/DL (ref 70–99)
GLUCOSE SERPL-MCNC: 128 MG/DL (ref 70–99)
INR PPP: 1.01 (ref 0.85–1.15)
INTERPRETATION ECG - MUSE: NORMAL
P AXIS - MUSE: 39 DEGREES
PHOSPHATE SERPL-MCNC: 3.2 MG/DL (ref 2.5–4.5)
POTASSIUM SERPL-SCNC: 3.6 MMOL/L (ref 3.4–5.3)
PR INTERVAL - MUSE: 134 MS
QRS DURATION - MUSE: 138 MS
QT - MUSE: 454 MS
QTC - MUSE: 523 MS
R AXIS - MUSE: -41 DEGREES
SODIUM SERPL-SCNC: 142 MMOL/L (ref 136–145)
SPECIMEN EXPIRATION DATE: NORMAL
SYSTOLIC BLOOD PRESSURE - MUSE: NORMAL MMHG
T AXIS - MUSE: -2 DEGREES
VENTRICULAR RATE- MUSE: 80 BPM

## 2023-04-24 PROCEDURE — 86850 RBC ANTIBODY SCREEN: CPT

## 2023-04-24 PROCEDURE — 250N000013 HC RX MED GY IP 250 OP 250 PS 637: Performed by: STUDENT IN AN ORGANIZED HEALTH CARE EDUCATION/TRAINING PROGRAM

## 2023-04-24 PROCEDURE — 99231 SBSQ HOSP IP/OBS SF/LOW 25: CPT | Mod: GC | Performed by: NEUROLOGICAL SURGERY

## 2023-04-24 PROCEDURE — 36592 COLLECT BLOOD FROM PICC: CPT

## 2023-04-24 PROCEDURE — 80048 BASIC METABOLIC PNL TOTAL CA: CPT

## 2023-04-24 PROCEDURE — 250N000013 HC RX MED GY IP 250 OP 250 PS 637: Performed by: INTERNAL MEDICINE

## 2023-04-24 PROCEDURE — 99232 SBSQ HOSP IP/OBS MODERATE 35: CPT | Mod: GC | Performed by: INTERNAL MEDICINE

## 2023-04-24 PROCEDURE — 70553 MRI BRAIN STEM W/O & W/DYE: CPT

## 2023-04-24 PROCEDURE — 258N000003 HC RX IP 258 OP 636: Performed by: NURSE ANESTHETIST, CERTIFIED REGISTERED

## 2023-04-24 PROCEDURE — 86923 COMPATIBILITY TEST ELECTRIC: CPT

## 2023-04-24 PROCEDURE — 250N000025 HC SEVOFLURANE, PER MIN

## 2023-04-24 PROCEDURE — 250N000009 HC RX 250: Performed by: NURSE ANESTHETIST, CERTIFIED REGISTERED

## 2023-04-24 PROCEDURE — 250N000013 HC RX MED GY IP 250 OP 250 PS 637

## 2023-04-24 PROCEDURE — 999N000141 HC STATISTIC PRE-PROCEDURE NURSING ASSESSMENT

## 2023-04-24 PROCEDURE — 370N000017 HC ANESTHESIA TECHNICAL FEE, PER MIN

## 2023-04-24 PROCEDURE — 97530 THERAPEUTIC ACTIVITIES: CPT | Mod: GP

## 2023-04-24 PROCEDURE — 120N000002 HC R&B MED SURG/OB UMMC

## 2023-04-24 PROCEDURE — 250N000011 HC RX IP 250 OP 636: Performed by: NURSE ANESTHETIST, CERTIFIED REGISTERED

## 2023-04-24 PROCEDURE — 255N000002 HC RX 255 OP 636: Performed by: INTERNAL MEDICINE

## 2023-04-24 PROCEDURE — 710N000010 HC RECOVERY PHASE 1, LEVEL 2, PER MIN

## 2023-04-24 PROCEDURE — 85610 PROTHROMBIN TIME: CPT

## 2023-04-24 PROCEDURE — A9585 GADOBUTROL INJECTION: HCPCS | Performed by: INTERNAL MEDICINE

## 2023-04-24 PROCEDURE — 84100 ASSAY OF PHOSPHORUS: CPT | Performed by: STUDENT IN AN ORGANIZED HEALTH CARE EDUCATION/TRAINING PROGRAM

## 2023-04-24 PROCEDURE — 250N000011 HC RX IP 250 OP 636

## 2023-04-24 PROCEDURE — 70553 MRI BRAIN STEM W/O & W/DYE: CPT | Mod: 26 | Performed by: RADIOLOGY

## 2023-04-24 PROCEDURE — 85730 THROMBOPLASTIN TIME PARTIAL: CPT

## 2023-04-24 RX ORDER — HYDROMORPHONE HCL IN WATER/PF 6 MG/30 ML
0.4 PATIENT CONTROLLED ANALGESIA SYRINGE INTRAVENOUS EVERY 5 MIN PRN
Status: DISCONTINUED | OUTPATIENT
Start: 2023-04-24 | End: 2023-04-24

## 2023-04-24 RX ORDER — HYDROMORPHONE HCL IN WATER/PF 6 MG/30 ML
0.2 PATIENT CONTROLLED ANALGESIA SYRINGE INTRAVENOUS EVERY 5 MIN PRN
Status: DISCONTINUED | OUTPATIENT
Start: 2023-04-24 | End: 2023-04-24

## 2023-04-24 RX ORDER — PROPOFOL 10 MG/ML
INJECTION, EMULSION INTRAVENOUS CONTINUOUS PRN
Status: DISCONTINUED | OUTPATIENT
Start: 2023-04-24 | End: 2023-04-24

## 2023-04-24 RX ORDER — SODIUM CHLORIDE, SODIUM LACTATE, POTASSIUM CHLORIDE, CALCIUM CHLORIDE 600; 310; 30; 20 MG/100ML; MG/100ML; MG/100ML; MG/100ML
INJECTION, SOLUTION INTRAVENOUS CONTINUOUS
Status: DISCONTINUED | OUTPATIENT
Start: 2023-04-24 | End: 2023-04-27

## 2023-04-24 RX ORDER — LIDOCAINE HYDROCHLORIDE 20 MG/ML
INJECTION, SOLUTION INFILTRATION; PERINEURAL PRN
Status: DISCONTINUED | OUTPATIENT
Start: 2023-04-24 | End: 2023-04-24

## 2023-04-24 RX ORDER — SODIUM CHLORIDE, SODIUM LACTATE, POTASSIUM CHLORIDE, CALCIUM CHLORIDE 600; 310; 30; 20 MG/100ML; MG/100ML; MG/100ML; MG/100ML
INJECTION, SOLUTION INTRAVENOUS CONTINUOUS PRN
Status: DISCONTINUED | OUTPATIENT
Start: 2023-04-24 | End: 2023-04-24

## 2023-04-24 RX ORDER — FENTANYL CITRATE 50 UG/ML
INJECTION, SOLUTION INTRAMUSCULAR; INTRAVENOUS PRN
Status: DISCONTINUED | OUTPATIENT
Start: 2023-04-24 | End: 2023-04-24

## 2023-04-24 RX ORDER — VASOPRESSIN IN 0.9 % NACL 2 UNIT/2ML
SYRINGE (ML) INTRAVENOUS PRN
Status: DISCONTINUED | OUTPATIENT
Start: 2023-04-24 | End: 2023-04-24

## 2023-04-24 RX ORDER — GADOBUTROL 604.72 MG/ML
7.5 INJECTION INTRAVENOUS ONCE
Status: COMPLETED | OUTPATIENT
Start: 2023-04-24 | End: 2023-04-24

## 2023-04-24 RX ORDER — ONDANSETRON 2 MG/ML
4 INJECTION INTRAMUSCULAR; INTRAVENOUS EVERY 30 MIN PRN
Status: DISCONTINUED | OUTPATIENT
Start: 2023-04-24 | End: 2023-04-24

## 2023-04-24 RX ORDER — FENTANYL CITRATE 50 UG/ML
50 INJECTION, SOLUTION INTRAMUSCULAR; INTRAVENOUS EVERY 5 MIN PRN
Status: DISCONTINUED | OUTPATIENT
Start: 2023-04-24 | End: 2023-04-24

## 2023-04-24 RX ORDER — PROPOFOL 10 MG/ML
INJECTION, EMULSION INTRAVENOUS PRN
Status: DISCONTINUED | OUTPATIENT
Start: 2023-04-24 | End: 2023-04-24

## 2023-04-24 RX ORDER — FENTANYL CITRATE 50 UG/ML
25 INJECTION, SOLUTION INTRAMUSCULAR; INTRAVENOUS EVERY 5 MIN PRN
Status: DISCONTINUED | OUTPATIENT
Start: 2023-04-24 | End: 2023-04-24

## 2023-04-24 RX ORDER — HALOPERIDOL 5 MG/ML
1 INJECTION INTRAMUSCULAR
Status: DISCONTINUED | OUTPATIENT
Start: 2023-04-24 | End: 2023-04-24

## 2023-04-24 RX ORDER — ONDANSETRON 4 MG/1
4 TABLET, ORALLY DISINTEGRATING ORAL EVERY 30 MIN PRN
Status: DISCONTINUED | OUTPATIENT
Start: 2023-04-24 | End: 2023-04-24

## 2023-04-24 RX ADMIN — PHENYLEPHRINE HYDROCHLORIDE 200 MCG: 10 INJECTION INTRAVENOUS at 13:48

## 2023-04-24 RX ADMIN — Medication 5 MG: at 22:42

## 2023-04-24 RX ADMIN — SPIRONOLACTONE 300 MG: 100 TABLET ORAL at 08:33

## 2023-04-24 RX ADMIN — NOREPINEPHRINE BITARTRATE 12.8 MCG: 1 INJECTION, SOLUTION, CONCENTRATE INTRAVENOUS at 14:30

## 2023-04-24 RX ADMIN — SUGAMMADEX 200 MG: 100 INJECTION, SOLUTION INTRAVENOUS at 14:52

## 2023-04-24 RX ADMIN — PHENYLEPHRINE HYDROCHLORIDE 200 MCG: 10 INJECTION INTRAVENOUS at 13:40

## 2023-04-24 RX ADMIN — NOREPINEPHRINE BITARTRATE 6.4 MCG: 1 INJECTION, SOLUTION, CONCENTRATE INTRAVENOUS at 14:31

## 2023-04-24 RX ADMIN — PHENYLEPHRINE HYDROCHLORIDE 100 MCG: 10 INJECTION INTRAVENOUS at 13:54

## 2023-04-24 RX ADMIN — NOREPINEPHRINE BITARTRATE 6.4 MCG: 1 INJECTION, SOLUTION, CONCENTRATE INTRAVENOUS at 14:10

## 2023-04-24 RX ADMIN — FENTANYL CITRATE 100 MCG: 50 INJECTION, SOLUTION INTRAMUSCULAR; INTRAVENOUS at 13:29

## 2023-04-24 RX ADMIN — FLUTICASONE PROPIONATE 1 SPRAY: 50 SPRAY, METERED NASAL at 08:49

## 2023-04-24 RX ADMIN — GADOBUTROL 7.5 ML: 604.72 INJECTION INTRAVENOUS at 14:47

## 2023-04-24 RX ADMIN — NOREPINEPHRINE BITARTRATE 12.8 MCG: 1 INJECTION, SOLUTION, CONCENTRATE INTRAVENOUS at 14:21

## 2023-04-24 RX ADMIN — Medication 2 UNITS: at 14:10

## 2023-04-24 RX ADMIN — NOREPINEPHRINE BITARTRATE 12.8 MCG: 1 INJECTION, SOLUTION, CONCENTRATE INTRAVENOUS at 14:04

## 2023-04-24 RX ADMIN — PROPOFOL 120 MG: 10 INJECTION, EMULSION INTRAVENOUS at 13:29

## 2023-04-24 RX ADMIN — TESTOSTERONE 25 MG OF TESTOSTERONE: 50 GEL TOPICAL at 08:33

## 2023-04-24 RX ADMIN — LISINOPRIL 20 MG: 10 TABLET ORAL at 16:25

## 2023-04-24 RX ADMIN — CARVEDILOL 12.5 MG: 12.5 TABLET, FILM COATED ORAL at 18:49

## 2023-04-24 RX ADMIN — Medication 50 MG: at 13:31

## 2023-04-24 RX ADMIN — MICONAZOLE NITRATE: 20 CREAM TOPICAL at 08:50

## 2023-04-24 RX ADMIN — PHENYLEPHRINE HYDROCHLORIDE 200 MCG: 10 INJECTION INTRAVENOUS at 13:57

## 2023-04-24 RX ADMIN — POTASSIUM CHLORIDE 40 MEQ: 40 SOLUTION ORAL at 08:33

## 2023-04-24 RX ADMIN — FUROSEMIDE 40 MG: 20 TABLET ORAL at 08:33

## 2023-04-24 RX ADMIN — LEVOTHYROXINE SODIUM 100 MCG: 100 TABLET ORAL at 08:33

## 2023-04-24 RX ADMIN — PHENYLEPHRINE HYDROCHLORIDE 200 MCG: 10 INJECTION INTRAVENOUS at 13:36

## 2023-04-24 RX ADMIN — SODIUM CHLORIDE, POTASSIUM CHLORIDE, SODIUM LACTATE AND CALCIUM CHLORIDE: 600; 310; 30; 20 INJECTION, SOLUTION INTRAVENOUS at 13:20

## 2023-04-24 RX ADMIN — PHENYLEPHRINE HYDROCHLORIDE 100 MCG: 10 INJECTION INTRAVENOUS at 13:59

## 2023-04-24 RX ADMIN — LIDOCAINE HYDROCHLORIDE 100 MG: 20 INJECTION, SOLUTION INFILTRATION; PERINEURAL at 13:29

## 2023-04-24 RX ADMIN — Medication 5 ML: at 16:25

## 2023-04-24 RX ADMIN — CARVEDILOL 12.5 MG: 12.5 TABLET, FILM COATED ORAL at 08:33

## 2023-04-24 RX ADMIN — PROPOFOL 125 MCG/KG/MIN: 10 INJECTION, EMULSION INTRAVENOUS at 13:35

## 2023-04-24 ASSESSMENT — ACTIVITIES OF DAILY LIVING (ADL)
ADLS_ACUITY_SCORE: 40

## 2023-04-24 ASSESSMENT — VISUAL ACUITY: OU: BLURRED VISION

## 2023-04-24 NOTE — ANESTHESIA POSTPROCEDURE EVALUATION
Patient: Gustavo Faria    Procedure: Procedure(s):  Anesthesia out of OR Magnetic Resonance Imaging Brain with or without contrast @1300       Anesthesia Type:  General    Note:  Disposition: Inpatient   Postop Pain Control: Uneventful            Sign Out: Well controlled pain   PONV: No   Neuro/Psych: Uneventful            Sign Out: Acceptable/Baseline neuro status   Airway/Respiratory: Uneventful            Sign Out: Acceptable/Baseline resp. status   CV/Hemodynamics: Uneventful            Sign Out: Acceptable CV status; No obvious hypovolemia; No obvious fluid overload   Other NRE: NONE   DID A NON-ROUTINE EVENT OCCUR? No           Last vitals:  Vitals Value Taken Time   /80 04/24/23 1500   Temp 36.8  C (98.3  F) 04/24/23 1447   Pulse 65 04/24/23 1505   Resp 10 04/24/23 1500   SpO2 100 % 04/24/23 1505   Vitals shown include unvalidated device data.    Electronically Signed By: Dieter Michelle MD  April 24, 2023  3:07 PM

## 2023-04-24 NOTE — PROGRESS NOTES
[facility]    Medicine Progress Note - Medicine Service, CLAIRE TEAM 2    Date of Admission:  4/3/2023    Assessment & Plan   Gustavo Faria is a 58 year old male with recent Serratia bacteremia, HFrEF, prolonged QT, lower extremity wounds, DMII, hypothyroidism, low testosterone and known ACTH secreting pituitary adenoma w/resulting Cushing's disease s/p 2 partial resections in 2021 [facility]. [facility], he developed new onset double vision and severe headache which prompted imaging that showed a large mass invading the cavernous sinuses and impinging on the cranial nerves. He is transferred to [facility] for this pituitary adenoma, concern for progression vs hemorrhage/apoplexy. Complicated by psychosis/metabolic encephalopathy and electrolyte abnormalities secondary to Cushing's syndrome. He is now medically stable and awaiting surgery scheduled for 4/27/2023.    Updates today:  - continued anxiety and frustration with remaining in the hospital  - will space vitals to daily to minimize people in his room  - plan for sedated MRI today 4/24 for surgical planning  - Will need pre-op CT per neurosurgery 4/26 pm   - K stable this am at 3.6  - continue Abilify 5 mg PO at bedtime, encouraged use of this medication to help with anxiety     # Hypernatremia, DI vs cushing's, resolved  # Hypokalemia, resolved  Hypernatremia and hypokalemia, likely secondary to Cushing's.     - Endocrine following  - Goal Na: 135-140  - Spironolactone 300 daily (can go up to 400 mg daily depending on Na and K, per Endocrine)  - Strict I&Os  - Daily BMP + Mg  - PTA potassium supplement 40 mEq        # Acute metabolic encephalopathy - improving  # Pyruria, Candiduria - resolved  # Encephalopathy likely secondary to cushing's disease  Admission symptoms included disorientation, intermittently following  directions. Repetitive words - perseverating on particular phrases. Sx started the 2-3 days prior to admission (which [facility] was the [facility]). Vulnerable brain (multiple brain surgeries), enlarging pituitary mass. No seizure activity per EEG. Steroids removed and pt still encephalopathy. Sodium has been in normal limits and patient remains altered. Worked up for infections - did reveal candiduria but unlikely this was etiology. Ultimately attributed to Cushings and known pituitary tumor.   - CT abd w/ contrast (4/10): no evidence of pyelonephritis  - Urine culture: candida, sensitive to Fluconazole, completed 8 day course   - Discontinued ceftriaxone as patient did not improve clinically with antibiotics  - No steroids  - Surgical plans for pituitary tumor as stated below  - Delirium precautions  - Electrolyte management  - PRN quetiapine if agitation that is not responsive to behavioral interventions     # Delusions, intermittent  Intermittent delusions regarding staff stalking patient while in the hospital. Unclear chronicity of these delusions - but has had them multiple times this hospitalization. Unclear psych history. Medical management for encephalopathy as stated above.  - Appreciate psychiatric recommendations:   - Abilify 5 mg PO at bedtime, encouraged use of this medication to help with anxiety  - Poor sleep overnight 4/23, increased anxiety and frustration with remaining in the hospital, thinks that we are keeping him in the hospital for residents and students to practice on him, provided reassurance, discussed with bedside RN and my team that we will minimize number of people in and out of room       # ACTH secreting pituitary adenoma c/b type II DM, hypothyroidism and low testosterone s/p two partial resections in 2021   Patient noted double vision and severe headache beginning the evening of 3/25. CT imaging on 3/25 revealed a large sellar/suprasellar mass extending into the cavernous sinuses  with no evidence of acute stroke. On 3/28, he underwent an MRI which confirmed the CT findings of a large mass invading the cavernous sinuses and impinging on the cranial nerves. Necrosis extending beyond left cavernous sinus. [facility]. Repeat MRI performed on 4/6/23: compared to 2021 MRI, lesions are smaller. Orbit MRI without optic nerve compression and no evidence of orbital infection.  - Neurosurgery following  - Plans for Neurosurgery: surgery 4/27/23  - Optimize medically prior to surgery  - DC'ed dexamethasone  - Ophthalmology following, no major changes, agree with neurosurgery plans  - Endocrine following    Surgery Prep:  - Hold ASA 1 week prior to surgery starting 4/20  - Hold Empagliflozin 3 days prior to surgery starting - on HOLD  - CT/CTA head- stereotaxy protocol to be completed night before the surgery  - MRI Brain scheduled with sedation on 4/24      # Type II DM, exacerbated by Cushing's   A1c of 7.6% on 2/2023. Patient was [facility] the following regimen [facility].    - Lantus increased to 22 units by endocrine on 4/22 with improved BGs   - HD Sliding scale insulin  - Carb coverage 1:15  - Hold PTA metformin, empa, and sitagliptin, can resume upon discharge    # Buttocks, sacrum and bilateral groin wounds   # RLE wound from puncture injury   # L dorsal foot wound from presumed trauma   # Soft tissue infection/abscess    # Hx serratia bacteremia in December 2022   For patient's lower extremity wounds and hx of Serratia bacteremia in December, he was initially [facility]olin [facility]. His antibiotics were broadened to Vanc and Zosyn and ultimately he was transitioned to ceftazidime on 3/23 with plan to complete course on 4/4/23. BCx negative and wound cx grew serratia marcescens at the [facility]. He has been on ceftazidime since. MRI of the right tib/fib on 3/23 was negative for osteomyelitis but did show two fluid collections draining sponateneously. [facility] and determined no intervention was needed as wounds were draining on their own.  - Stop ceftazidime (3/23 - 4/5)  - WOC following  - PT/OT   - Pain: tylenol PRN, dilaudid 0.5 mg PRN for dressing changes and oxy 5 mg    # HFmrEF, with global hypokinesis  # High burden of PVCs  # Prolonged QTc   # Elevated troponin, down-trended  Coronary angiogram on 2/27 shows normal coronaries. Troponin down-trending (88 -> 77). No chest pain. High burden of PVCs. Follow up ECHO with global hypokinesis, which is worse compared to February 2023 ECHO. EF is 45% (same compared to prior). Likely small vessel disease vs demand vs cardiomyopathy 2/2 ceftazidime. Low concern for acute ACS.   - Discontinued tele  - Electrolyte mgmt as above  - Lasix 40mg daily  - Continue PTA coreg  - Lisinopril 10  - Empagliflozin (see above) - on HOLD   - ASA - on HOLD  - Avoid QTc prolonging medications     # Central Hypothyroidism  - Continue PTA levothyroxine     # Hypogonadism   - Continue PTA androgel (will need to bring in home medication)     # Chronic microcytic anemia   Hgb of 8.4 on [facility]. Peripheral smear on 3/30 showed poikilocytosis with occasional red blood cell fragments but no other evidence of hemolysis.  Haptoglobin was normal.  Ferritin was 91, transferrin saturation was low, B12 was 495 and folate was 8.7. Likely combination of iron deficiency as well as inflammation/chronic disease.   - CTM        # Concern for malnutrition   - Nutrition following       Diet: NPO per Anesthesia Guidelines for Procedure/Surgery Except for: Meds    DVT Prophylaxis: Pneumatic Compression Devices  Roland Catheter: Not present  Lines:   PICC 04/06/23 Double Lumen Right Basilic access-Site Assessment: WDL      Cardiac Monitoring: None  Code Status: Full Code      Clinically Significant Risk Factors        # Hypokalemia: Lowest K = 3.1 mmol/L in last 2 days, will replace as needed       # Hypoalbuminemia: Lowest albumin = 2.9 g/dL at  4/11/2023  7:07 AM, will monitor as appropriate          # DMII: A1C = 9.5 % (Ref range: <5.7 %) within past 6 months    # Severe Malnutrition: based on nutrition assessment        Disposition Plan              _________________________________________________________    Interval History   PATRICIA, nursing notes reviewed. Continues to have frustrations re: being in the hospital. States he just wants to go home. Says he is willing to work with PT just not early in the morning or when he is having pain. Denied abilify last night, frustrated by how much medication he is receiving. Denies fevers, chills, chest pain, SOB.    Physical Exam   Vital Signs: Temp: 98.4  F (36.9  C) Temp src: Oral BP: (!) 142/85 Pulse: 73   Resp: 16 SpO2: 97 % O2 Device: None (Room air)    Weight: 161 lbs 13.43 oz    General Appearance: right eye slightly closed, awake, alert  Eyes: EOMI, muddy sclera   HEENT: Normal sclera, able to move neck FROM  Respiratory: Breathing comfortably on RA, CTAB  Cardiovascular: RRR, no murmurs  GI: BS+. Soft, nontender, nondistended. No guarding or rebound tenderness.   Musculoskeletal: moving all extremities spontaneously, 2+ swelling to shins bilaterally  Neuro: NFD, cranial nerves grossly intact  Psychiatric: Oriented to time, person, place, and situation     Medical Decision Making           Data     I have personally reviewed the following data over the past 24 hrs:    N/A  \   N/A   / N/A     142 101 13.3 /  128 (H)   3.6 29 0.81 \

## 2023-04-24 NOTE — PLAN OF CARE
Goal Outcome Evaluation:         Vitals: /87   Pulse 73   Temp 97.9  F (36.6  C) (Axillary)   Resp 20   Wt 73.4 kg (161 lb 13.4 oz)   SpO2 98%   BMI 26.12 kg/m      Activity: lift  Pain: denies  Neuro: WDL  Cardiac: WDL  Respiratory: WDL  GI/: WDL  Diet: regular  Lines: R PICC  Skin/Wounds: lower leg wounds  Phos recheck tomorrow AM    New changes this shift: MRI today under sedation. A&Ox4. VSS on room air. Able to make needs known.     Plan:

## 2023-04-24 NOTE — ANESTHESIA PROCEDURE NOTES
Airway       Patient location during procedure: OR       Procedure Start/Stop Times: 4/24/2023 1:34 PM  Staff -        CRNA: Kalli Bonner APRN CRNA       Performed By: CRNA  Consent for Airway        Urgency: elective  Indications and Patient Condition       Indications for airway management: julian-procedural       Induction type:intravenous       Mask difficulty assessment: 1 - vent by mask    Final Airway Details       Final airway type: endotracheal airway       Successful airway: ETT - single  Endotracheal Airway Details        ETT size (mm): 7.5       Cuffed: yes       Successful intubation technique: direct laryngoscopy       DL Blade Type: Gerard 2       Grade View of Cords: 1       Adjucts: stylet       Position: Right       Measured from: gums/teeth       Secured at (cm): 22       Bite block used: None    Post intubation assessment        Placement verified by: capnometry, equal breath sounds and chest rise        Number of attempts at approach: 1       Secured with: silk tape and other (comment) (tube tamer)       Ease of procedure: easy       Dentition: Intact and Unchanged (poor dentition, unchanged after DL)    Medication(s) Administered   Medication Administration Time: 4/24/2023 1:34 PM

## 2023-04-24 NOTE — ANESTHESIA PREPROCEDURE EVALUATION
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 [facility], he developed new onset double vision and severe headache which prompted imaging that showed a large mass invading the cavernous sinuses and impinging on the cranial nerves. He is transferred to [facility] for this pituitary adenoma with plans for possible surgical intervention.     No past medical history on file.   Past Surgical History:   Procedure Laterality Date     PICC DOUBLE LUMEN PLACEMENT Right 2023    40 cm total basilic      No Known Allergies   Social History     Tobacco Use     Smoking status: Former     Types: Cigarettes     Smokeless tobacco: Not on file   Vaping Use     Vaping status: Not on file   Substance Use Topics     Alcohol use: Not Currently      Wt Readings from Last 1 Encounters:   23 73.4 kg (161 lb 13.4 oz)        Anesthesia Evaluation   Pt has had prior anesthetic.         ROS/MED HX  ENT/Pulmonary:    (-) recent URI   Neurologic: Comment: Anisocoria, CN 3 palsy  Encephalopathy  Pituitary adenoma      Cardiovascular: Comment: Patient presented to [facility] ED on 2023: Acute on chronic fatigue/Dyspnea  Elevated Troponin:  -  Elevated troponin of unclear  Etiology, no  hemodynamics instability to be attributed to the mild troponin leak. No acute ischemic changes on ECG. No chest pain or  Acute SOB. He admitted ongoing  BERNAL.  - he has multiple cardiac risk factors but denies prior MI/ACS.    - continue ASA,   - continue cardiac monitoring and   - chemical stress testing  Is pending, ( postponed due to patient having coffee this morning)    (+) -----CHF Previous cardiac testing   Echo: Date: Results:  Interpretation Summary     There is mild concentric left ventricular hypertrophy.  Left ventricular systolic function is mildly reduced.  The visual ejection fraction is 40-45%.  Base-mid inferior and inferolateral hypokinesis.  No significant valve dysfunction.  The inferior vena cava was normal in size with preserved respiratory  variability.  Mild aortic root dilatation.  There is no pericardial effusion.  Stress Test: Date: Results:    ECG Reviewed: Date: Results:  Read as afib, but he appears to be in SR with premature ectopic atrial contraction. RBBB.   Cath: Date: 2/27/2023 Results:  DIAGNOSTIC - CORONARY   * Right dominant coronary artery system   * The left main artery was normal in appearance and free of obstructive disease.   * The LAD has no significant obstruction.   * The circumflex artery has no significant obstruction.   * The RCA has no significant obstruction.   HEMODYNAMICS   * The LVEDP is within normal limits   PRESENTATION / INDICATIONS   * Congestive Heart Failure - Highest NYHA Class w/in 2  - NYHA Class III       METS/Exercise Tolerance:     Hematologic:     (+) anemia,     Musculoskeletal: Comment: Open sacral and LE wounds  L2 compression fracture      GI/Hepatic: Comment: Malnutrition, transaminitis    (+) liver disease,     Renal/Genitourinary:       Endo: Comment: # ACTH secreting pituitary adenoma c/b type II DM, hypothyroidism and low testosterone s/p two partial resections in 2021   # Anisocoria w/left pupil (L is larger than R)  Patient noted double vision and severe headache beginning the evening of 3/25. CT imaging on 3/25 revealed a large sellar/suprasellar mass extending into the cavernous sinuses  "with no evidence of acute stroke. On 3/28, he underwent an MRI which confirmed the CT findings of a large mass invading the cavernous sinuses and impinging on the cranial nerves. [facility] recommended starting patient on dexamethasone as a temporizing measure and then transferring to [facility] for surgical consideration.   - Neurosurgery consult, appreciate recs   - Ophthalmology consult, appreciate recs   - Continue dexamethasone 2 mg BID     (+) type II DM, thyroid problem, Chronic steroid usage for     Psychiatric/Substance Use: Comment: Patient has repeatedly threatened to leave AMA at the [facility] and had a 72-hour hold placed temporarily which was quickly removed. He was evaluated by mental health at the [facility] who deemed patient had capacity. Per chart review, it does mention psychosis diagnosis in 2021, but [place], so no further information. On exam, patient is perseverated on the phrase \"it hurts\". He will answer questions but ends every sentence with \"it hurts\" and won't open his eyes. Patient's PTA medications show haldol, but per [facility] records, patient was not taking this at the [facility] hospital.     (+) psychiatric history Recreational drug usage: Cannabis.    Infectious Disease: Comment: Blood cx drawn on abx. Seratia bacteremia 12/2022.    On admission, leukocytosis of 16.0. Patient's WBC count at the [facility] was 14.8. Leukocytosis is likely in the setting of taking dexamethasone daily BID. He's afebrile and being treated currently for soft tissue infection with ceftazadime. Low suspicion for other infection at this time, but patient is a poor historian. Blood cx negative at the [facility] but unsure when these were collected.       Malignancy:   (+) Malignancy,     Other: Comment: Hypokalemia 2.8           Physical Exam    Airway        Mallampati: II   TM distance: > 3 FB   Neck ROM: full   Mouth opening: > 3 cm    Respiratory Devices and Support         Dental     Comment: " Nothing loose or removable per patient    (+) Modest Abnormalities - crowns, retainers, 1 or 2 missing teeth      Cardiovascular          Rhythm and rate: irregular and normal     Pulmonary           (+) decreased breath sounds           OUTSIDE LABS:  CBC:   Lab Results   Component Value Date    WBC 9.0 04/23/2023    WBC 6.4 04/21/2023    HGB 8.1 (L) 04/23/2023    HGB 7.2 (L) 04/21/2023    HCT 27.6 (L) 04/23/2023    HCT 24.8 (L) 04/21/2023     04/23/2023     04/21/2023     BMP:   Lab Results   Component Value Date     04/24/2023     04/23/2023    POTASSIUM 3.6 04/24/2023    POTASSIUM 3.5 04/23/2023    CHLORIDE 101 04/24/2023    CHLORIDE 98 04/23/2023    CO2 29 04/24/2023    CO2 30 (H) 04/23/2023    BUN 13.3 04/24/2023    BUN 13.4 04/23/2023    CR 0.81 04/24/2023    CR 0.69 04/23/2023     (H) 04/24/2023     (H) 04/24/2023     COAGS:   Lab Results   Component Value Date    PTT 24 04/11/2023    INR 0.99 04/11/2023    FIBR 176 04/11/2023     POC: No results found for: BGM, HCG, HCGS  HEPATIC:   Lab Results   Component Value Date    ALBUMIN 3.5 04/23/2023    PROTTOTAL 5.9 (L) 04/23/2023     (H) 04/23/2023    AST 31 04/23/2023    ALKPHOS 285 (H) 04/23/2023    BILITOTAL 0.4 04/23/2023     OTHER:   Lab Results   Component Value Date    LACT 2.4 (H) 04/12/2023    A1C 9.5 (H) 04/05/2023    KORIN 8.7 04/24/2023    PHOS 3.2 04/24/2023    MAG 2.1 04/23/2023    TSH <0.01 (L) 04/05/2023    T4 1.30 04/05/2023    SED 11 04/11/2023       Anesthesia Plan    ASA Status:  3      Anesthesia Type: General.     - Airway: ETT      Maintenance: Balanced.        Consents    Anesthesia Plan(s) and associated risks, benefits, and realistic alternatives discussed. Questions answered and patient/representative(s) expressed understanding.    - Discussed:     - Discussed with:  Patient    Use of blood products discussed: No .     Postoperative Care            Comments:    Other Comments:                  Dieter Michelle MD

## 2023-04-24 NOTE — PROGRESS NOTES
[facility], [place]   04/24/2023  Neurosurgery Progress Note:    Assessment:  Gustavo Faria is a 58 year old male with a PMH of DMII, HFrEF, BLE open wounds, history of serratia bacteremia in December 2022,  pituitary ACTH secreting macroadenoma s/p EEA for resection on 5/2021 and 7/2021 in [place], with baseline right CN 3 palsy, who was admitted at the [facility] on 3/23. He developed sudden headache and vision changes on 3/25, with MRI brain on 3/28, which demonstrated enlargement of known pituitary adenoma, with necrosis extending laterally beyond the left cavernous sinus, concerning for compression of cranial nerves at cavernous sinus. 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      Given that he is past the acute period of apoplexy, there is no indication for emergent decompression. He would benefit for redo EEA for resection of adenoma given its symptomatic nature. However, he would need to be medically optimized from the metabolic and infectious standpoint prior to surgery. He would also benefit from a multidisciplinary approach with recommendations from ophthalmology, endocrinology and radiation oncology regarding options for treatment.     Plan:  -Patient's wife is the legal decision maker-consent obtained for the surgery-uploaded in chart  - MRI brain w/ and w/o contrast pituitary protocol prior to surgery - attempt MRI under anesthesia on Monday  - CT/CTA head- stereotaxy protocol to be completed night before the surgery  - OR 4/27/23 - aspirin held  - Neurosurgery will continue to follow   -----------------------------------  Oliver Duong  Neurosurgery PGY2    Please contact neurosurgery resident on call with questions.    Dial * * [phone], enter [phone] when prompted.    Interval History: No acute events overnight that were brought to  neurosurgery's attention. Stable exam. Lumbar x-rays completed.    Objective:   Temp:  [98.3  F (36.8  C)-99.7  F (37.6  C)] 98.4  F (36.9  C)  Pulse:  [69-79] 73  Resp:  [16-17] 16  BP: (131-165)/() 142/85  SpO2:  [97 %-100 %] 97 %  I/O last 3 completed shifts:  In: 270 [P.O.:240; I.V.:30]  Out: 800 [Urine:800]    NEUROLOGIC:  -- Opens eyes to voice, following commands, oriented x3  Cranial Nerves:  -- visual fields full to confrontation although with limited participation, PERRL anisocoric L>R and sluggish, left CN III paresis and CN VI palsy, restricted ROM in right EOM with no apparent asymmetry   -- face symmetrical, tongue midline  -- sensory V1-V3 intact bilaterally  -- palate elevates symmetrically, uvula midline  -- hearing grossly intact bilat     Motor:  Antigravity x4 extremities     Sensory:  intact to LT x 4 extremities      Reflexes:       Bi Tri BR Omi Pat Ach Bab     C5-6 C7-8 C6 UMN L2-4 S1 UMN   R 2+ 2+ 2+ Norm 2+ 2+ Norm   L 2+ 2+ 2+ Norm 2+ 2+ Norm      Gait: Deferred.     LABS:  Recent Labs   Lab 04/24/23  0136 04/23/23  2102 04/23/23  1741 04/23/23  1629 04/23/23  1046 04/23/23  0752 04/22/23  1150 04/22/23  0814 04/21/23  1121 04/21/23  1009   NA  --   --   --   --   --  141  --  139  --  138   POTASSIUM  --   --   --  3.5  --  3.1*  --  4.0  --  4.2   CHLORIDE  --   --   --   --   --  98  --  97*  --  100   CO2  --   --   --   --   --  30*  --  27  --  27   ANIONGAP  --   --   --   --   --  13  --  15  --  11   * 138* 202*  --    < > 192*   < > 271*   < > 244*   BUN  --   --   --   --   --  13.4  --  13.4  --  15.5   CR  --   --   --   --   --  0.69  --  0.76  --  0.78   KORIN  --   --   --   --   --  9.1  --  8.7  --  8.9    < > = values in this interval not displayed.       Recent Labs   Lab 04/23/23  0752   WBC 9.0   RBC 3.12*   HGB 8.1*   HCT 27.6*   MCV 89   MCH 26.0*   MCHC 29.3*   RDW 28.1*          IMAGING:  No results found for this or any previous visit (from the  past 24 hour(s)).

## 2023-04-24 NOTE — ANESTHESIA CARE TRANSFER NOTE
Patient: Gustavo Faria    Procedure: Procedure(s):  Anesthesia out of OR Magnetic Resonance Imaging Brain with or without contrast @1300       Diagnosis: Pituitary adenoma (H) [D35.2]  Diagnosis Additional Information: No value filed.    Anesthesia Type:   General     Note:    Oropharynx: spontaneously breathing  Level of Consciousness: awake and drowsy      Independent Airway: airway patency satisfactory and stable  Dentition: dentition unchanged  Vital Signs Stable: post-procedure vital signs reviewed and stable  Report to RN Given: handoff report given  Patient transferred to: PACU    Handoff Report: Identifed the Patient, Identified the Reponsible Provider, Reviewed the pertinent medical history, Discussed the surgical course, Reviewed Intra-OP anesthesia mangement and issues during anesthesia, Set expectations for post-procedure period and Allowed opportunity for questions and acknowledgement of understanding      Vitals:  Vitals Value Taken Time   /80 04/24/23 1500   Temp 36.8  C (98.3  F) 04/24/23 1447   Pulse 52 04/24/23 1503   Resp 10 04/24/23 1500   SpO2 100 % 04/24/23 1503   Vitals shown include unvalidated device data.    Electronically Signed By: ARABELLA Valencia CRNA  April 24, 2023  3:04 PM

## 2023-04-24 NOTE — PROGRESS NOTES
Addendum to Progress Note dated 4/23/23    # No brain compression  Coding query sent due to prior documentation of cavernous sinus compression due to pituitary macroadenoma. This does not reflect brain compression.     Yaneth Ugalde MD

## 2023-04-24 NOTE — PLAN OF CARE
Goal Outcome Evaluation:       A&O VSS on RA. PICC Heparin locked. K recheck 3.5. Reg diet, poor appetite this shift. NPO at midnight  HS. Refused Abilify at HS, seems worried about meds. Hydralazine PRN given for SBP>150. New dressings applied right leg per plan of care. Calls to make needs known.

## 2023-04-24 NOTE — PLAN OF CARE
Goal Outcome Evaluation:      Plan of Care Reviewed With: patient    Overall Patient Progress: no changeOverall Patient Progress: no change  Outcome Evaluation: A&O x 4 .Very anxious and upset at start of the night.NPO at midnight for MRI today and   refusing cares, and wanting to sleep.As night went on he said he was nervous for MRI . Refused tylenol and robaxin for pain and  head and eye pain was better. Has left eye droop  and Blood sugar during the night was 122. Turned with 2 assist BP (!) 142/85 (BP Location: Left arm, Cuff Size: Adult Regular)   Pulse 73   Temp 98.4  F (36.9  C) (Oral)   Resp 16   Wt 73.4 kg (161 lb 13.4 oz)   SpO2 97%   BMI 26.12 kg/m   RA. Drsgs on leg and foot D&I.  Denies nausea.  BG WNL. Plan for sedated MRI tomorrow. Will continue to keep patient informed.

## 2023-04-25 LAB
ANION GAP SERPL CALCULATED.3IONS-SCNC: 15 MMOL/L (ref 7–15)
BUN SERPL-MCNC: 18 MG/DL (ref 6–20)
CALCIUM SERPL-MCNC: 8.8 MG/DL (ref 8.6–10)
CHLORIDE SERPL-SCNC: 100 MMOL/L (ref 98–107)
CREAT SERPL-MCNC: 1.01 MG/DL (ref 0.67–1.17)
DEPRECATED HCO3 PLAS-SCNC: 28 MMOL/L (ref 22–29)
ERYTHROCYTE [DISTWIDTH] IN BLOOD BY AUTOMATED COUNT: 28.1 % (ref 10–15)
GFR SERPL CREATININE-BSD FRML MDRD: 87 ML/MIN/1.73M2
GLUCOSE BLDC GLUCOMTR-MCNC: 146 MG/DL (ref 70–99)
GLUCOSE BLDC GLUCOMTR-MCNC: 177 MG/DL (ref 70–99)
GLUCOSE BLDC GLUCOMTR-MCNC: 203 MG/DL (ref 70–99)
GLUCOSE BLDC GLUCOMTR-MCNC: 206 MG/DL (ref 70–99)
GLUCOSE BLDC GLUCOMTR-MCNC: 224 MG/DL (ref 70–99)
GLUCOSE BLDC GLUCOMTR-MCNC: 238 MG/DL (ref 70–99)
GLUCOSE SERPL-MCNC: 193 MG/DL (ref 70–99)
HCT VFR BLD AUTO: 27.6 % (ref 40–53)
HGB BLD-MCNC: 7.9 G/DL (ref 13.3–17.7)
MCH RBC QN AUTO: 25.5 PG (ref 26.5–33)
MCHC RBC AUTO-ENTMCNC: 28.6 G/DL (ref 31.5–36.5)
MCV RBC AUTO: 89 FL (ref 78–100)
PHOSPHATE SERPL-MCNC: 3 MG/DL (ref 2.5–4.5)
PLATELET # BLD AUTO: 417 10E3/UL (ref 150–450)
POTASSIUM SERPL-SCNC: 2.9 MMOL/L (ref 3.4–5.3)
POTASSIUM SERPL-SCNC: 3.3 MMOL/L (ref 3.4–5.3)
RBC # BLD AUTO: 3.1 10E6/UL (ref 4.4–5.9)
SODIUM SERPL-SCNC: 143 MMOL/L (ref 136–145)
WBC # BLD AUTO: 10.8 10E3/UL (ref 4–11)

## 2023-04-25 PROCEDURE — 250N000013 HC RX MED GY IP 250 OP 250 PS 637

## 2023-04-25 PROCEDURE — 84100 ASSAY OF PHOSPHORUS: CPT | Performed by: INTERNAL MEDICINE

## 2023-04-25 PROCEDURE — 85014 HEMATOCRIT: CPT

## 2023-04-25 PROCEDURE — 99231 SBSQ HOSP IP/OBS SF/LOW 25: CPT | Mod: GC | Performed by: INTERNAL MEDICINE

## 2023-04-25 PROCEDURE — 84132 ASSAY OF SERUM POTASSIUM: CPT

## 2023-04-25 PROCEDURE — 250N000013 HC RX MED GY IP 250 OP 250 PS 637: Performed by: INTERNAL MEDICINE

## 2023-04-25 PROCEDURE — 250N000011 HC RX IP 250 OP 636

## 2023-04-25 PROCEDURE — 120N000002 HC R&B MED SURG/OB UMMC

## 2023-04-25 PROCEDURE — 250N000013 HC RX MED GY IP 250 OP 250 PS 637: Performed by: STUDENT IN AN ORGANIZED HEALTH CARE EDUCATION/TRAINING PROGRAM

## 2023-04-25 PROCEDURE — 999N000157 HC STATISTIC RCP TIME EA 10 MIN

## 2023-04-25 PROCEDURE — 80048 BASIC METABOLIC PNL TOTAL CA: CPT

## 2023-04-25 PROCEDURE — G0463 HOSPITAL OUTPT CLINIC VISIT: HCPCS

## 2023-04-25 PROCEDURE — 99231 SBSQ HOSP IP/OBS SF/LOW 25: CPT | Mod: GC | Performed by: NEUROLOGICAL SURGERY

## 2023-04-25 PROCEDURE — 36415 COLL VENOUS BLD VENIPUNCTURE: CPT

## 2023-04-25 PROCEDURE — 36592 COLLECT BLOOD FROM PICC: CPT

## 2023-04-25 RX ORDER — POTASSIUM CHLORIDE 20MEQ/15ML
40 LIQUID (ML) ORAL ONCE
Status: COMPLETED | OUTPATIENT
Start: 2023-04-25 | End: 2023-04-25

## 2023-04-25 RX ADMIN — Medication 10 ML: at 15:29

## 2023-04-25 RX ADMIN — OXYCODONE HYDROCHLORIDE 2.5 MG: 5 TABLET ORAL at 09:30

## 2023-04-25 RX ADMIN — ACETAMINOPHEN 650 MG: 325 TABLET, FILM COATED ORAL at 22:49

## 2023-04-25 RX ADMIN — FUROSEMIDE 40 MG: 20 TABLET ORAL at 07:57

## 2023-04-25 RX ADMIN — Medication 1 TABLET: at 07:56

## 2023-04-25 RX ADMIN — INSULIN ASPART 2 UNITS: 100 INJECTION, SOLUTION INTRAVENOUS; SUBCUTANEOUS at 08:05

## 2023-04-25 RX ADMIN — LISINOPRIL 20 MG: 10 TABLET ORAL at 10:46

## 2023-04-25 RX ADMIN — SPIRONOLACTONE 300 MG: 100 TABLET ORAL at 08:15

## 2023-04-25 RX ADMIN — Medication 5 ML: at 16:08

## 2023-04-25 RX ADMIN — POTASSIUM CHLORIDE 40 MEQ: 40 SOLUTION ORAL at 07:58

## 2023-04-25 RX ADMIN — CARVEDILOL 12.5 MG: 12.5 TABLET, FILM COATED ORAL at 17:36

## 2023-04-25 RX ADMIN — MICONAZOLE NITRATE: 20 CREAM TOPICAL at 10:48

## 2023-04-25 RX ADMIN — FLUTICASONE PROPIONATE 1 SPRAY: 50 SPRAY, METERED NASAL at 07:58

## 2023-04-25 RX ADMIN — TESTOSTERONE 25 MG OF TESTOSTERONE: 50 GEL TOPICAL at 08:25

## 2023-04-25 RX ADMIN — MICONAZOLE NITRATE: 20 CREAM TOPICAL at 10:49

## 2023-04-25 RX ADMIN — Medication 5 MG: at 22:48

## 2023-04-25 RX ADMIN — CARVEDILOL 12.5 MG: 12.5 TABLET, FILM COATED ORAL at 08:01

## 2023-04-25 RX ADMIN — INSULIN ASPART 3 UNITS: 100 INJECTION, SOLUTION INTRAVENOUS; SUBCUTANEOUS at 11:42

## 2023-04-25 RX ADMIN — INSULIN ASPART 4 UNITS: 100 INJECTION, SOLUTION INTRAVENOUS; SUBCUTANEOUS at 17:37

## 2023-04-25 RX ADMIN — POTASSIUM CHLORIDE 40 MEQ: 40 SOLUTION ORAL at 14:43

## 2023-04-25 RX ADMIN — LEVOTHYROXINE SODIUM 100 MCG: 100 TABLET ORAL at 07:57

## 2023-04-25 ASSESSMENT — ACTIVITIES OF DAILY LIVING (ADL)
ADLS_ACUITY_SCORE: 46
ADLS_ACUITY_SCORE: 38
ADLS_ACUITY_SCORE: 38
ADLS_ACUITY_SCORE: 46
ADLS_ACUITY_SCORE: 42
ADLS_ACUITY_SCORE: 38
ADLS_ACUITY_SCORE: 38
ADLS_ACUITY_SCORE: 42
ADLS_ACUITY_SCORE: 38
ADLS_ACUITY_SCORE: 42
ADLS_ACUITY_SCORE: 42
ADLS_ACUITY_SCORE: 38

## 2023-04-25 NOTE — PROGRESS NOTES
[facility]  WO Nurse Inpatient Assessment     Consulted for: Buttocks, sacrum, bilateral groin, Right Leg    Patient History (according to provider note(s):      Gustavo Faria is a 58 year old male with recent Serratia bacteremia, HFrEF, prolonged QT, lower extremity wounds, DMII, hypothyroidism, low testosterone and known ACTH secreting pituitary adenoma w/resulting Cushing's disease s/p 2 partial resections in 2021 [facility]. [facility], he developed new onset double vision and severe headache which prompted imaging that showed a large mass invading the cavernous sinuses and impinging on the cranial nerves. He is transferred to [facility] for this pituitary adenoma with plans for possible surgical intervention.     Areas Assessed:      Areas visualized during today's visit: Sacrum/coccyx and Lower extremities     Wound location: Buttocks, Sacrum and Groin      4/18 4/25  Last photo: 4/18/23  Wound due to: Incontinence Associated Dermatitis (IAD)  Wound history/plan of care: incontinent of stool and urine. Has not had incontinent in the last few days. Groin IAD resolved.  Wound base:  buttocks mostly intact with scattered open areas 100 % dermis     Palpation of the wound bed: normal      Drainage: scant     Description of drainage: serous     Measurements (length x width x depth, in cm): scattered areas one large measuring 1.5 x 1 x 0.1 and a new smaller area on right buttock 1 x 0.5 x 0.1        Tunneling: N/A     Undermining: N/A  Periwound skin: Intact      Color:   groin has areas of hypopigmentation, buttock has areas of hyperpigmenting.       Temperature: normal   Odor: none  Pain: mild, tender  Pain interventions prior to dressing change: slow and gentle cares   Treatment goal: Heal  and Protection  STATUS: evolving  Supplies ordered: at bedside and supplies  stored on unit     Wound location: BLE      Left foot         Right Lateral Leg       Right anterior leg   Last photo: 23  Wound due to: Arterial Ulcer and Unknown Etiology  Wound history/plan of care: Presented to the hospital earlier this year for lower leg wound infection. Per nursing patient has been intermittently refusing dressing changes.   Wound base: Right leg wounds 5 % granulation tissue, 95 % non-granular tissue Left foot 95 % yellow fibrin,  5 % granulation tissue     Palpation of the wound bed: normal      Drainage: moderate     Description of drainage: cloudy     Measurements (length x width x depth, in cm):Right anterior leg 3 x 2.5  x  0.3 cm Right Lateral 7.5 x 2.5 x 0.3 Left foot 2.2 x 2.3 x 0.3     Tunneling: N/A     Undermining: Right anterior le.2 from 12-3 o'clock.  Left foot 0.2 cm 2 - 6 o'clock  Periwound skin: Intact and Dry/scaly      Color: normal and consistent with surrounding tissue      Temperature: normal   Odor: mild  Pain: moderate, sharp  Pain interventions prior to dressing change: patient tolerated well, slow and gentle cares  and distraction  Treatment goal: Heal  and Infection control/prevention  STATUS: stable  Supplies ordered: at bedside    Treatment Plan:     Sacrum wound: Daily and PRN   Cleanse the area with Merary cleanse and protect, very gently with soft cloth.  Apply ostomy powder (#7239) on all open and denuded skin.  Apply thin layer of critic aid paste on top of it.  With repeat application, do not scrub the paste, only remove soiled paste and reapply.  If complete removal of paste is necessary use baby oil/mineral oil (#245665) and soft wash cloth.  Ensure pt has Jono-cushion (#712854) while sitting up in the chair.  Use only one Covidien pad in between mattress and pt. No brief while in bed.    DO NOT Use sacral mepilex is will cause the wound to become too wet.     Right anterior Leg (shin) wound and left foot wounds:  Change dressing every 3 days.  Cleanse wound with wound cleanser and pat dry.  Paint julian-wound skin with No sting barrier film (018578)  Apply Iodosorb Gel (052076) to open wound bed 3mm thick.  Do not apply Iodasorb to intact skin.  Cover with Mepilex border.  Change every 3 days or as needed when soiled/ saturated.  Only apply Iodasorb every 3 days or when gel turns from brown to white.    Right lateral leg: Change dressing every 3 days.  Cleanse wound bed with normal saline and pat dry.  Cut a piece of PolyMem roll (#005802) to fit into the wound bed and place it with the lettering facing up. Ensure to cover entire wound bed.  Cover with Mepilex border.    Orders: Written    RECOMMEND PRIMARY TEAM ORDER: D/C Merary antifungal to Groin no longer needed  Education provided: plan of care, wound progress and Moisture management  Discussed plan of care with: Nurse  WOC nurse follow-up plan: weekly  Notify WOC if wound(s) deteriorate.  Nursing to notify the Provider(s) and re-consult the WOC Nurse if new skin concern.    DATA:     Current support surface: Standard  Standard gel/foam mattress (IsoFlex, Atmos air, etc)  Containment of urine/stool: Incontinence Protocol, Incontinent pad in bed and Primofit external catheter  BMI: Body mass index is 26.12 kg/m .   Active diet order: Orders Placed This Encounter      Combination Diet Regular Diet     Output: I/O last 3 completed shifts:  In: 376 [I.V.:376]  Out: 850 [Urine:850]     Labs:   Recent Labs   Lab 04/25/23  0758 04/24/23  1145 04/23/23  0752   ALBUMIN  --   --  3.5   HGB 7.9*  --  8.1*   INR  --  1.01  --    WBC 10.8  --  9.0     Pressure injury risk assessment:   Sensory Perception: 3-->slightly limited  Moisture: 3-->occasionally moist  Activity: 2-->chairfast  Mobility: 2-->very limited  Nutrition: 3-->adequate  Friction and Shear: 1-->problem  Manan Score: 14    Veronica Gunter RN CWOCN  Pager no longer is use, please contact through [employer] group:  [facility] Nurse  Dept. Office Number: [phone]

## 2023-04-25 NOTE — PROGRESS NOTES
Goal Outcome Evaluation:    Pt is A&Ox4 and VSS on RA. Denies pain. Right PICC double lumen running 10 mL/hr LR and heparin locked. LE wounds and saccum dressings CDI.  and 165. Regular diet and carb coverage. Phos recheck tomorrow morning. Continue with POC.

## 2023-04-25 NOTE — PLAN OF CARE
Pt is alert and oriented. Incontinent of stool. Voids in urinal. Eating and drinking frequently. Covering carbs with insulin. Pt refuses repositioning. WOCN changed sacral wound care orders to barrier paste with ostomy powder at pt's request. Plan for CT evening of 4/26 and surgery 4/27.

## 2023-04-25 NOTE — PROGRESS NOTES
CLINICAL NUTRITION SERVICES - REASSESSMENT NOTE     Nutrition Prescription    RECOMMENDATIONS FOR MDs/PROVIDERS TO ORDER:  Continue with Regular diet as tolerated     Malnutrition Status:    Patient does not meet two of the established criteria necessary for diagnosing malnutrition but is at risk for malnutrition    Recommendations already ordered by Registered Dietitian (RD):  Ordered Ensure max protein once daily at 2:00 to optimize protein intake     Future/Additional Recommendations:  PO adequacy       EVALUATION OF THE PROGRESS TOWARD GOALS   Diet: Regular  Intake: Visited with patient today. Patient notes a good po and appetite. Tolerating 100% of meals.          NEW FINDINGS   PMH: HFrEF, prolonged QT, lower extremity wounds  - known ACTH secreting pituitary adenoma w/resulting Cushing's disease, hypothyroidism, low testosterone and DM2, s/p 2 partial resections in   - [facility].  - Transferred to [facility] for this pituitary adenoma, concern for progression vs hemorrhage/apoplexy  - Complicated by psychosis/metabolic encephalopathy and electrolyte abnormalities secondary to Cushing's syndrome.   - He is now medically stable and awaiting surgery scheduled for 2023, adenoma resection       Wt trend:  68.7 kg (151 lb 7.3 oz) bed scale -> up to 73.4 kg (161 lb 13.4 oz) on  bed scale.       Labs noted:  BUN: 18, Cr: 1.01, GFR: 90  Na+: 143  B (H)    Stool/GI:  Last BM x1 ()      Meds:  On insulin Glargine 22 units every morning  On insulin Novolog at bedtime and with meals   Lasix  Levothyroxine   Vitamin D2, 50,000 units every 7 days  Theravit-M  Bowel regimen ( lactulose / miralax)  Calcium carbonate prn      WOC RN note on :   Buttocks, sacrum, bilateral groin, right leg  Incontinence Associated Dermatitis (IAD)  STATUS: evolving  BLE wound:   Wound due to: Arterial Ulcer and Unknown Etiology  STATUS:  stable      MALNUTRITION  % Intake: Decreased intake does not meet criteria  % Weight Loss: > 7.5% in 3 months (severe)  Subcutaneous Fat Loss: None observed  Muscle Loss: Temporal: Mild   Fluid Accumulation/Edema: Mild  Malnutrition Diagnosis: Patient does not meet two of the established criteria necessary for diagnosing malnutrition but is at risk for malnutrition      Previous Goals   Patient to consume % of nutritionally adequate meal trays TID, or the equivalent with supplements/snacks.  Evaluation: Met    Previous Nutrition Diagnosis  Inadequate oral intake related to decreased/variable appetite as evidenced by 3-day avg PO intake = 1118 kcal and 35 g protein (69% kcal needs and 43% protein needs), and pt report and chart review of previously poor appetite improving the past week  Evaluation: Improving    CURRENT NUTRITION DIAGNOSIS  Increased kcal/protein needs related to wound healing support and plan for surgery as evidenced by estimated protein needs of  gm protein/day     INTERVENTIONS  Implementation  Medical food supplement therapy    Goals  Patient to consume % of nutritionally adequate meal trays TID, or the equivalent with supplements/snacks.    Monitoring/Evaluation  Progress toward goals will be monitored and evaluated per protocol.      Marc Pulido RD/INOCENCIO  5A [phone])/5B RD pager: [phone]  Weekend/Holiday RD pager: [phone]

## 2023-04-25 NOTE — PLAN OF CARE
Goal Outcome Evaluation:     [id number] A&Ox4, neuros unchanged.  VSS on RA.  R DL PICC CDI, purple TKO & red lumen heparin locked.  C/o pain tolerable w/ PRN Tylenol.   @ 0200.  Incontinent of medium BM.  Drsg care completed for coccyx/sacrum, R leg x2 & L foot.  T&R as often as pt allows.  Plan for surgery 4/27.

## 2023-04-25 NOTE — PROGRESS NOTES
[facility], [place]   04/25/2023  Neurosurgery Progress Note:    Assessment:  Gustavo Faria is a 58 year old male with a PMH of DMII, HFrEF, BLE open wounds, history of serratia bacteremia in December 2022,  pituitary ACTH secreting macroadenoma s/p EEA for resection on 5/2021 and 7/2021 in [place], with baseline right CN 3 palsy, who was admitted at the [facility] on 3/23. He developed sudden headache and vision changes on 3/25, with MRI brain on 3/28, which demonstrated enlargement of known pituitary adenoma, with necrosis extending laterally beyond the left cavernous sinus, concerning for compression of cranial nerves at cavernous sinus. 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      Given that he is past the acute period of apoplexy, there is no indication for emergent decompression. He would benefit for redo EEA for resection of adenoma given its symptomatic nature. However, he would need to be medically optimized from the metabolic and infectious standpoint prior to surgery. He would also benefit from a multidisciplinary approach with recommendations from ophthalmology, endocrinology and radiation oncology regarding options for treatment.     Plan:  -Patient's wife is the legal decision maker-consent obtained for the surgery-uploaded in chart  - MRI brain w/ and w/o contrast pituitary protocol prior to surgery - completed  - CT/CTA head- stereotaxy protocol to be completed night before the surgery  - OR 4/27/23 - aspirin held  - Neurosurgery will continue to follow   -----------------------------------  Oliver Duong  Neurosurgery Resident PGY2    Please contact neurosurgery resident on call with questions.    Dial * * *[phone], enter [phone] when prompted.    Interval History: No acute events overnight that were brought to neurosurgery's attention.  Stable exam.    Objective:   Temp:  [97.9  F (36.6  C)-99  F (37.2  C)] 98.5  F (36.9  C)  Pulse:  [] 90  Resp:  [10-20] 16  BP: ()/(65-94) 125/88  SpO2:  [95 %-100 %] 100 %  I/O last 3 completed shifts:  In: 376 [I.V.:376]  Out: 850 [Urine:850]    NEUROLOGIC:  -- Opens eyes to voice, following commands, oriented x3  Cranial Nerves:  -- visual fields full to confrontation although with limited participation, PERRL anisocoric L>R and sluggish, left CN III paresis and CN VI palsy, restricted ROM in right EOM with no apparent asymmetry   -- face symmetrical, tongue midline  -- sensory V1-V3 intact bilaterally  -- palate elevates symmetrically, uvula midline  -- hearing grossly intact bilat     Motor:  Antigravity x4 extremities     Sensory:  intact to LT x 4 extremities      Reflexes:       Bi Tri BR Omi Pat Ach Bab     C5-6 C7-8 C6 UMN L2-4 S1 UMN   R 2+ 2+ 2+ Norm 2+ 2+ Norm   L 2+ 2+ 2+ Norm 2+ 2+ Norm      Gait: Deferred.     LABS:  Recent Labs   Lab 04/25/23  0758 04/25/23  0733 04/25/23  0159 04/24/23  1140 04/24/23  1006 04/23/23  1741 04/23/23  1629 04/23/23  1046 04/23/23  0752     --   --   --  142  --   --   --  141   POTASSIUM 2.9*  --   --   --  3.6  --  3.5  --  3.1*   CHLORIDE 100  --   --   --  101  --   --   --  98   CO2 28  --   --   --  29  --   --   --  30*   ANIONGAP 15  --   --   --  12  --   --   --  13   * 177* 146*   < > 128*   < >  --    < > 192*   BUN 18.0  --   --   --  13.3  --   --   --  13.4   CR 1.01  --   --   --  0.81  --   --   --  0.69   KORIN 8.8  --   --   --  8.7  --   --   --  9.1    < > = values in this interval not displayed.       Recent Labs   Lab 04/25/23  0758   WBC 10.8   RBC 3.10*   HGB 7.9*   HCT 27.6*   MCV 89   MCH 25.5*   MCHC 28.6*   RDW 28.1*          IMAGING:  Recent Results (from the past 24 hour(s))   MR Brain w/o & w Contrast    Narrative    EXAM: MR BRAIN W/O & W CONTRAST  4/24/2023 2:46 PM     HISTORY:  Pituitary adenoma - update  imaging for upcoming surgery       COMPARISON:  4/6/2023 MRI    TECHNIQUE: Axial diffusion and FLAIR images of the whole brain  obtained without intravenous contrast. Sagittal T1 and T2-weighted,  coronal T2-weighted, coronal T1-weighted images with focus on the  sella were obtained without intravenous contrast. Post intravenous  contrast using gadolinium coronal and sagittal T1-weighted images were  obtained focused on the sella. Dynamic postcontrast coronal  T1-weighted images were also obtained.    CONTRAST: 7.5mL Gadavist.    FINDINGS:  Unchanged size of the heterogeneously enhancing large pituitary mass  measuring 4.2 x 3.1 cm, previously 4.3 x 3.1 cm. Invasion of bilateral  right greater than left cavernous sinuses with encasement of bilateral  internal carotid artery cavernous segments. Asymmetric smaller size of  the right cavernous segment internal carotid artery which is similar  to prior. The mass invades and erodes the superoposterior aspect of  the clivus. The mass also extends into and effaces the prepontine  cistern. The mass abuts the basilar artery. The mass grows anteriorly  on the left towards the orbital apex. Mass grows superiorly on both  the right and left aspects of the pituitary stalk although more so on  the right similar to prior. The stalk is relatively midline. The optic  chiasm is nondisplaced. Central necrosis of the right aspect of the  mass similar to prior.    Unchanged ill-defined enhancement in the lateral right frontal lobe  and a small focus in the left putamen with associated susceptibility  effect most suggestive of capillary telangiectasia.    Ventricles are proportionate to the cerebral sulci. Moderate confluent  and scattered T2/FLAIR hyperintensity in the cortical and deep  cerebral white matter suggestive of chronic small vessel ischemic  disease similar to prior. No suspicious restricted diffusion.    Mild mucosal thickening of the maxillary sinuses mostly  inferiorly.  Trace mucosal thickening in the ethmoid air cells and frontal sinuses.  Mild right mastoid air cell effusion.      Impression    IMPRESSION:  1. Unchanged large partially necrotic enhancing sellar mass with local  invasion.  2. Benign appearing vascular lesions in the right frontal lobe and  left basal ganglia most suggestive of capillary telangiectasia.  3. White matter changes suggestive of chronic small vessel ischemic  disease.    I have personally reviewed the examination and initial interpretation  and I agree with the findings.    JOSI GUERRERO MD         SYSTEM ID:  [id number]

## 2023-04-25 NOTE — PROGRESS NOTES
Care Management Follow Up    Length of Stay (days): 22    Expected Discharge Date: TBD     Concerns to be Addressed: discharge planning, TBD    Patient plan of care discussed at interdisciplinary rounds: Yes    Anticipated Discharge Disposition: TBD     Anticipated Discharge Services: TBD  Anticipated Discharge DME: TBD    Education Provided on the Discharge Plan: TBD     Referrals Placed by CM/SW:  TBD  Private pay costs discussed: Not applicable    Additional Information:  Team approached writer and stated patient desires to speak with RNCC regarding potential discharge needs.  Writer called patient and told him given his surgery is on 4/27/23, we will be unsure what his discharge needs are until we see how he is doing after surgery.  Writer assured RNCC will continue to follow patient, follow recommendations from PT and OT, and set up services as needed.  Patient in agreement with plan.  RNCC will continue to follow.       Pita Nunez RNBSN  RN Care Coordinator  [address] [address]  [email or web address]  Gender pronouns: she/her

## 2023-04-25 NOTE — PLAN OF CARE
Goal Outcome Evaluation:       Pt is A&Ox4. VSS on RA. Right PICC Heparin locked. C/o lower back pain 7/10 after repositioning, pt requested for oxycodone and pain tolerable w/ PRN oxycodone 2.5 mg, pt reported pain improvement 5/10. Repositioned q2h, coccyx pressure injury, applied barrier paste per wound nurse recommendation.    Signed by Sandra Gotti, nursing student.

## 2023-04-25 NOTE — PROGRESS NOTES
[facility]    Medicine Progress Note - Medicine Service, MAROON TEAM 2    Date of Admission:  4/3/2023    Assessment & Plan   Gustavo Faria is a 58 year old male with recent Serratia bacteremia, HFrEF, prolonged QT, lower extremity wounds, DMII, hypothyroidism, low testosterone and known ACTH secreting pituitary adenoma w/resulting Cushing's disease s/p 2 partial resections in 2021 who [facility]. [facility], he developed new onset double vision and severe headache which prompted imaging that showed a large mass invading the cavernous sinuses and impinging on the cranial nerves. He is transferred to [facility] for this pituitary adenoma, concern for progression vs hemorrhage/apoplexy. Complicated by psychosis/metabolic encephalopathy and electrolyte abnormalities secondary to Cushing's syndrome. He is now medically stable and awaiting surgery scheduled for 4/27/2023.    Updates today:  - feeling much better today  - NSGY to order CTA head for surgical planning  - will give extra dose of PO potassium due to K of 2.4 this am  - will discontinue Abilify; low threshold to restart if increased anxiety, paranoia     # Hypernatremia, DI vs cushing's, resolved  # Hypokalemia, resolved  Hypernatremia and hypokalemia, likely secondary to Cushing's.     - Endocrine following  - Goal Na: 135-140  - Spironolactone 300 daily (can go up to 400 mg daily depending on Na and K, per Endocrine)  - Strict I&Os  - Daily BMP + Mg  - PTA potassium supplement 40 mEq      # Acute metabolic encephalopathy - improved  # Pyruria, Candiduria - resolved  # Encephalopathy likely secondary to cushing's disease  Admission symptoms included disorientation, intermittently following directions. Repetitive words - perseverating on particular phrases. Sx started the 2-3 days prior to admission ([facility] Vulnerable brain (multiple brain surgeries), enlarging pituitary mass. No seizure activity per EEG. Steroids removed and pt still encephalopathic. Sodium had been in normal limits and patient remained altered. Worked up for infections - did reveal candiduria but unlikely this was etiology, though he did complete an 8 day course of fluconazole. Briefly on ceftriaxone but encephalopathy also did not improve. CT abd w/ contrast (4/10): no evidence of pyelonephritis  Ultimately attributed to Cushings and known pituitary tumor. Continues to remain lucid.  - Surgical plans for pituitary tumor as stated below  - Delirium precautions  - Electrolyte management as above  - PRN quetiapine if agitation that is not responsive to behavioral interventions     # Delusions, intermittent  Intermittent delusions regarding staff stalking patient while in the hospital. Unclear chronicity of these delusions - but has had them multiple times this hospitalization. Unclear psych history. Medical management for encephalopathy as stated above. Psychiatry was consulted during his care and agreed with paranoia and recommended Abilify. He was briefly on Abilify 5mg but patient then started to refuse nightly so this was discontinued. His paranoia and delusions have been very intermittent and appear to be associated with his anxiety.       # ACTH secreting pituitary adenoma c/b type II DM, hypothyroidism and low testosterone s/p two partial resections in 2021   Patient noted double vision and severe headache beginning the evening of 3/25. CT imaging on 3/25 revealed a large sellar/suprasellar mass extending into the cavernous sinuses with no evidence of acute stroke. On 3/28, he underwent an MRI which confirmed the CT findings of a large mass invading the cavernous sinuses and impinging on the cranial nerves. Necrosis extending beyond left cavernous sinus. [facility]. Repeat MRI performed on 4/6/23: compared to 2021  MRI, lesions are smaller. Orbit MRI without optic nerve compression and no evidence of orbital infection.  - Neurosurgery following  - Plans for Neurosurgery: surgery 4/27/23  - Optimize medically prior to surgery  - DC'ed dexamethasone  - Ophthalmology following, no major changes, agree with neurosurgery plans  - Endocrine following    Surgery Prep:  - Hold ASA 1 week prior to surgery starting 4/20  - Hold Empagliflozin 3 days prior to surgery starting - on HOLD  - CT/CTA head- stereotaxy protocol to be completed night before the surgery - NSGY to order  - MRI Brain with sedation on 4/24 - completed     # Type II DM, exacerbated by Cushing's   A1c of 7.6% on 2/2023. [facility].    - Lantus increased to 22 units by endocrine on 4/22 with improved BGs   - HD Sliding scale insulin  - Carb coverage 1:15  - Hold PTA metformin, empa, and sitagliptin, can resume upon discharge    # Buttocks, sacrum and bilateral groin wounds   # RLE wound from puncture injury   # L dorsal foot wound from presumed trauma   # Soft tissue infection/abscess    # Hx serratia bacteremia in December 2022   For patient's lower extremity wounds and hx of Serratia bacteremia in December, he was initially [facility]. His antibiotics were broadened to Vanc and Zosyn and ultimately he was transitioned to ceftazidime on 3/23 with plan to complete course on 4/4/23. BCx negative and wound cx grew serratia marcescens at the [facility]. He has been on ceftazidime since. MRI of the right tib/fib on 3/23 was negative for osteomyelitis but did show two fluid collections draining sponateneously. [facility]o [facility] at [facility] and determined no intervention was needed as wounds were draining on their own.  - Stop ceftazidime (3/23 - 4/5)  - WOC following  - PT/OT   - Pain: tylenol PRN, dilaudid 0.5 mg PRN for dressing changes and oxy 5 mg    # HFmrEF, with global hypokinesis  # High burden of PVCs  #  Prolonged QTc   # Elevated troponin, down-trended  Coronary angiogram on 2/27 shows normal coronaries. Troponin down-trending (88 -> 77). No chest pain. High burden of PVCs. Follow up ECHO with global hypokinesis, which is worse compared to February 2023 ECHO. EF is 45% (same compared to prior). Likely small vessel disease vs demand vs cardiomyopathy 2/2 ceftazidime. Low concern for acute ACS.   - Discontinued tele  - Electrolyte mgmt as above  - Lasix 40mg daily  - Continue PTA coreg  - Lisinopril 10  - Empagliflozin (see above) - on HOLD   - ASA - on HOLD  - Avoid QTc prolonging medications     # Central Hypothyroidism  - Continue PTA levothyroxine     # Hypogonadism   - Continue PTA androgel (will need to bring in home medication)     # Chronic microcytic anemia   Hgb of 8.4 [facility]. Peripheral smear on 3/30 showed poikilocytosis with occasional red blood cell fragments but no other evidence of hemolysis.  Haptoglobin was normal.  Ferritin was 91, transferrin saturation was low, B12 was 495 and folate was 8.7. Likely combination of iron deficiency as well as inflammation/chronic disease.   - CTM        # Concern for malnutrition   - Nutrition following       Diet: Combination Diet Regular Diet    DVT Prophylaxis: Pneumatic Compression Devices  Roland Catheter: Not present  Lines:   PICC 04/06/23 Double Lumen Right Basilic access-Site Assessment: WDL      Cardiac Monitoring: None  Code Status: Full Code      Clinically Significant Risk Factors        # Hypokalemia: Lowest K = 2.9 mmol/L in last 2 days, will replace as needed       # Hypoalbuminemia: Lowest albumin = 2.9 g/dL at 4/11/2023  7:07 AM, will monitor as appropriate          # DMII: A1C = 9.5 % (Ref range: <5.7 %) within past 6 months    # Severe Malnutrition: based on nutrition assessment        Disposition Plan            Zita Christensen MD  Internal Medicine - Pediatrics Resident, PGY-2  [facility]  [place]  4/25/2023  _________________________________________________________    Interval History   PARTICIA, nursing notes reviewed. Says he feels much better today. Understands surgery is in two days. No other complaints this am. Wanting to discuss discharge planning, discussed we can talk about that more in depth after the surgery but will talk with RNCC re: his concerns. VSS.     Physical Exam   Vital Signs: Temp: 98.5  F (36.9  C) Temp src: Oral BP: 111/81 Pulse: 90   Resp: 16 SpO2: 100 % O2 Device: None (Room air) Oxygen Delivery: 5 LPM  Weight: 161 lbs 13.43 oz    General Appearance: right eye slightly closed, awake, alert  Eyes: EOMI, muddy sclera   HEENT: Normal sclera, able to move neck FROM  Respiratory: Breathing comfortably on RA, CTAB  Cardiovascular: RRR, no murmurs  GI: BS+. Soft, nontender, nondistended. No guarding or rebound tenderness.   Musculoskeletal: moving all extremities spontaneously, 2+ swelling to shins bilaterally  Neuro: NFD, cranial nerves grossly intact  Psychiatric: Oriented to time, person, place, and situation     Medical Decision Making           Data     I have personally reviewed the following data over the past 24 hrs:    10.8  \   7.9 (L)   / 417     143 100 18.0 /  206 (H)   2.9 (L) 28 1.01 \

## 2023-04-26 ENCOUNTER — ANESTHESIA EVENT (OUTPATIENT)
Dept: SURGERY | Facility: CLINIC | Age: 58
DRG: 614 | End: 2023-04-26
Payer: COMMERCIAL

## 2023-04-26 LAB
ANION GAP SERPL CALCULATED.3IONS-SCNC: 11 MMOL/L (ref 7–15)
APTT PPP: 23 SECONDS (ref 22–38)
BUN SERPL-MCNC: 21.3 MG/DL (ref 6–20)
CALCIUM SERPL-MCNC: 8.6 MG/DL (ref 8.6–10)
CHLORIDE SERPL-SCNC: 101 MMOL/L (ref 98–107)
CREAT SERPL-MCNC: 0.98 MG/DL (ref 0.67–1.17)
DEPRECATED HCO3 PLAS-SCNC: 27 MMOL/L (ref 22–29)
GFR SERPL CREATININE-BSD FRML MDRD: 90 ML/MIN/1.73M2
GLUCOSE BLDC GLUCOMTR-MCNC: 175 MG/DL (ref 70–99)
GLUCOSE BLDC GLUCOMTR-MCNC: 221 MG/DL (ref 70–99)
GLUCOSE BLDC GLUCOMTR-MCNC: 224 MG/DL (ref 70–99)
GLUCOSE BLDC GLUCOMTR-MCNC: 245 MG/DL (ref 70–99)
GLUCOSE BLDC GLUCOMTR-MCNC: 289 MG/DL (ref 70–99)
GLUCOSE BLDC GLUCOMTR-MCNC: 356 MG/DL (ref 70–99)
GLUCOSE SERPL-MCNC: 297 MG/DL (ref 70–99)
INR PPP: 0.99 (ref 0.85–1.15)
PHOSPHATE SERPL-MCNC: 2.4 MG/DL (ref 2.5–4.5)
POTASSIUM SERPL-SCNC: 4.1 MMOL/L (ref 3.4–5.3)
SODIUM SERPL-SCNC: 139 MMOL/L (ref 136–145)

## 2023-04-26 PROCEDURE — 99231 SBSQ HOSP IP/OBS SF/LOW 25: CPT | Mod: GC | Performed by: INTERNAL MEDICINE

## 2023-04-26 PROCEDURE — 36415 COLL VENOUS BLD VENIPUNCTURE: CPT

## 2023-04-26 PROCEDURE — 85610 PROTHROMBIN TIME: CPT

## 2023-04-26 PROCEDURE — 250N000013 HC RX MED GY IP 250 OP 250 PS 637: Performed by: INTERNAL MEDICINE

## 2023-04-26 PROCEDURE — 250N000013 HC RX MED GY IP 250 OP 250 PS 637: Performed by: STUDENT IN AN ORGANIZED HEALTH CARE EDUCATION/TRAINING PROGRAM

## 2023-04-26 PROCEDURE — 80048 BASIC METABOLIC PNL TOTAL CA: CPT

## 2023-04-26 PROCEDURE — 85730 THROMBOPLASTIN TIME PARTIAL: CPT

## 2023-04-26 PROCEDURE — 250N000013 HC RX MED GY IP 250 OP 250 PS 637

## 2023-04-26 PROCEDURE — 250N000012 HC RX MED GY IP 250 OP 636 PS 637: Performed by: STUDENT IN AN ORGANIZED HEALTH CARE EDUCATION/TRAINING PROGRAM

## 2023-04-26 PROCEDURE — 84100 ASSAY OF PHOSPHORUS: CPT | Performed by: INTERNAL MEDICINE

## 2023-04-26 PROCEDURE — 999N000044 HC STATISTIC CVC DRESSING CHANGE

## 2023-04-26 PROCEDURE — 250N000011 HC RX IP 250 OP 636

## 2023-04-26 PROCEDURE — 99232 SBSQ HOSP IP/OBS MODERATE 35: CPT | Mod: GC | Performed by: NEUROLOGICAL SURGERY

## 2023-04-26 PROCEDURE — 120N000002 HC R&B MED SURG/OB UMMC

## 2023-04-26 RX ORDER — SPIRONOLACTONE 100 MG/1
100 TABLET, FILM COATED ORAL DAILY
Status: DISCONTINUED | OUTPATIENT
Start: 2023-04-27 | End: 2023-04-27

## 2023-04-26 RX ADMIN — POTASSIUM & SODIUM PHOSPHATES POWDER PACK 280-160-250 MG 1 PACKET: 280-160-250 PACK at 14:30

## 2023-04-26 RX ADMIN — FUROSEMIDE 40 MG: 20 TABLET ORAL at 07:53

## 2023-04-26 RX ADMIN — POTASSIUM & SODIUM PHOSPHATES POWDER PACK 280-160-250 MG 1 PACKET: 280-160-250 PACK at 11:31

## 2023-04-26 RX ADMIN — INSULIN ASPART 4 UNITS: 100 INJECTION, SOLUTION INTRAVENOUS; SUBCUTANEOUS at 12:05

## 2023-04-26 RX ADMIN — ACETAMINOPHEN 650 MG: 325 TABLET, FILM COATED ORAL at 14:23

## 2023-04-26 RX ADMIN — CARVEDILOL 12.5 MG: 12.5 TABLET, FILM COATED ORAL at 07:52

## 2023-04-26 RX ADMIN — INSULIN ASPART 2 UNITS: 100 INJECTION, SOLUTION INTRAVENOUS; SUBCUTANEOUS at 16:22

## 2023-04-26 RX ADMIN — POLYETHYLENE GLYCOL 3350 17 G: 17 POWDER, FOR SOLUTION ORAL at 14:23

## 2023-04-26 RX ADMIN — Medication 10 ML: at 15:31

## 2023-04-26 RX ADMIN — OXYCODONE HYDROCHLORIDE 2.5 MG: 5 TABLET ORAL at 12:55

## 2023-04-26 RX ADMIN — POTASSIUM & SODIUM PHOSPHATES POWDER PACK 280-160-250 MG 1 PACKET: 280-160-250 PACK at 19:48

## 2023-04-26 RX ADMIN — FLUTICASONE PROPIONATE 1 SPRAY: 50 SPRAY, METERED NASAL at 07:55

## 2023-04-26 RX ADMIN — OXYCODONE HYDROCHLORIDE 2.5 MG: 5 TABLET ORAL at 22:08

## 2023-04-26 RX ADMIN — DICLOFENAC SODIUM 2 G: 10 GEL TOPICAL at 12:55

## 2023-04-26 RX ADMIN — DICLOFENAC SODIUM 2 G: 10 GEL TOPICAL at 01:24

## 2023-04-26 RX ADMIN — LEVOTHYROXINE SODIUM 100 MCG: 100 TABLET ORAL at 07:52

## 2023-04-26 RX ADMIN — OXYCODONE HYDROCHLORIDE 2.5 MG: 5 TABLET ORAL at 01:23

## 2023-04-26 RX ADMIN — CARVEDILOL 12.5 MG: 12.5 TABLET, FILM COATED ORAL at 18:02

## 2023-04-26 RX ADMIN — ACETAMINOPHEN 650 MG: 325 TABLET, FILM COATED ORAL at 20:05

## 2023-04-26 RX ADMIN — POTASSIUM CHLORIDE 40 MEQ: 40 SOLUTION ORAL at 07:53

## 2023-04-26 RX ADMIN — LISINOPRIL 20 MG: 10 TABLET ORAL at 10:36

## 2023-04-26 RX ADMIN — SPIRONOLACTONE 300 MG: 100 TABLET ORAL at 08:05

## 2023-04-26 RX ADMIN — TESTOSTERONE 25 MG OF TESTOSTERONE: 50 GEL TOPICAL at 07:59

## 2023-04-26 RX ADMIN — Medication 1 TABLET: at 07:52

## 2023-04-26 RX ADMIN — INSULIN ASPART 5 UNITS: 100 INJECTION, SOLUTION INTRAVENOUS; SUBCUTANEOUS at 08:01

## 2023-04-26 ASSESSMENT — ACTIVITIES OF DAILY LIVING (ADL)
ADLS_ACUITY_SCORE: 38
ADLS_ACUITY_SCORE: 38
ADLS_ACUITY_SCORE: 41
ADLS_ACUITY_SCORE: 38
ADLS_ACUITY_SCORE: 41
ADLS_ACUITY_SCORE: 41
ADLS_ACUITY_SCORE: 38
ADLS_ACUITY_SCORE: 41

## 2023-04-26 NOTE — PROGRESS NOTES
Care Management Follow Up    Length of Stay (days): 23  Expected Discharge Date:    Concerns to be Addressed: all concerns addressed in this encounter     Patient plan of care discussed at interdisciplinary rounds: No  Anticipated Discharge Disposition: [facility] to [facility] vs [facility] Care  Anticipated Discharge Services: Transportation Services  Anticipated Discharge DME: None  Patient/family educated on Medicare website which has current facility and service quality ratings:   Education Provided on the Discharge Plan:    Patient/Family in Agreement with the Plan:    Referrals Placed by CM/SW: see previous SW notes.   Additional Information:    Patient's status reviewed by chart. Pt will have brain surgery tomorrow. Prior to surgery plan, PT recs TCU. Patient is . [facility]. According to  manager advise, MD here will reach out to MD from the [facility] and working on transfer pt there. The reason [facility] because they didn't have capacity of surgery.     Arianna Solorzano RN  5A RN Care Coordinator  Phone: [phone]  Pager: [phone]  For Weekend & Holiday on call RN Care Coordinator:  (Tasks: Home care, home infusion, medical equipment/oxygen, transportation, IMM & WAGONER forms, etc.)  [facility] ([phone]) Saturday & Sunday; ([phone]) [facility] Recognized Holidays  Pager: [phone] Units: 4A, 4C, 4E, 5A & 5B

## 2023-04-26 NOTE — PROGRESS NOTES
[facility], [place]   04/26/2023  Neurosurgery Progress Note:    Assessment:  Gustavo Faria is a 58 year old male with a PMH of DMII, HFrEF, BLE open wounds, history of serratia bacteremia in December 2022,  pituitary ACTH secreting macroadenoma s/p EEA for resection on 5/2021 and 7/2021 in [place], with baseline right CN 3 palsy, who was admitted at the [facility] on 3/23. He developed sudden headache and vision changes on 3/25, with MRI brain on 3/28, which demonstrated enlargement of known pituitary adenoma, with necrosis extending laterally beyond the left cavernous sinus, concerning for compression of cranial nerves at cavernous sinus. 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      Given that he is past the acute period of apoplexy, there is no indication for emergent decompression. He would benefit for redo EEA for resection of adenoma given its symptomatic nature. However, he would need to be medically optimized from the metabolic and infectious standpoint prior to surgery. He would also benefit from a multidisciplinary approach with recommendations from ophthalmology, endocrinology and radiation oncology regarding options for treatment.     Plan:  -Patient's wife is the legal decision maker-consent obtained for the surgery-uploaded in chart  - OR tomorrow with ENT colleagues for resection of tumor  - MRI brain w/ and w/o contrast pituitary protocol prior to surgery - completed  - CT/CTA head- stereotaxy protocol to be completed on 4/26/2023  - OR 4/27/23 - aspirin held  - Neurosurgery will continue to follow   -----------------------------------  Oliver Duong  Neurosurgery Resident PGY2    Please contact neurosurgery resident on call with questions.    Dial * * *[phone], enter [phone] when prompted.    Interval History: No acute events overnight that  were brought to neurosurgery's attention. Stable exam.    Objective:   Temp:  [98.2  F (36.8  C)-98.6  F (37  C)] 98.2  F (36.8  C)  Pulse:  [60-97] 85  Resp:  [16-18] 16  BP: (105-137)/(75-98) 124/98  SpO2:  [99 %] 99 %  I/O last 3 completed shifts:  In: 10 [I.V.:10]  Out: 825 [Urine:825]    NEUROLOGIC:  -- Opens eyes to voice, following commands, oriented x3  Cranial Nerves:  -- visual fields full to confrontation although with limited participation, PERRL anisocoric L>R and sluggish, left CN III paresis and CN VI palsy, restricted ROM in right EOM with no apparent asymmetry   -- face symmetrical, tongue midline  -- sensory V1-V3 intact bilaterally  -- palate elevates symmetrically, uvula midline  -- hearing grossly intact bilat     Motor:  Antigravity x4 extremities     Sensory:  intact to LT x 4 extremities      Reflexes:       Bi Tri BR Omi Pat Ach Bab     C5-6 C7-8 C6 UMN L2-4 S1 UMN   R 2+ 2+ 2+ Norm 2+ 2+ Norm   L 2+ 2+ 2+ Norm 2+ 2+ Norm      Gait: Deferred.     LABS:  Recent Labs   Lab 04/26/23  0720 04/26/23  0157 04/25/23  2158 04/25/23  1703 04/25/23  1616 04/25/23  1041 04/25/23  0758 04/24/23  1140 04/24/23  1006 04/23/23  1046 04/23/23  0752   NA  --   --   --   --   --   --  143  --  142  --  141   POTASSIUM  --   --   --   --  3.3*  --  2.9*  --  3.6   < > 3.1*   CHLORIDE  --   --   --   --   --   --  100  --  101  --  98   CO2  --   --   --   --   --   --  28  --  29  --  30*   ANIONGAP  --   --   --   --   --   --  15  --  12  --  13   * 356* 224*   < >  --    < > 193*   < > 128*   < > 192*   BUN  --   --   --   --   --   --  18.0  --  13.3  --  13.4   CR  --   --   --   --   --   --  1.01  --  0.81  --  0.69   KORIN  --   --   --   --   --   --  8.8  --  8.7  --  9.1    < > = values in this interval not displayed.       Recent Labs   Lab 04/25/23  0758   WBC 10.8   RBC 3.10*   HGB 7.9*   HCT 27.6*   MCV 89   MCH 25.5*   MCHC 28.6*   RDW 28.1*          IMAGING:  No results found for  this or any previous visit (from the past 24 hour(s)).

## 2023-04-26 NOTE — PLAN OF CARE
Goal Outcome Evaluation:  Pt is alert and oriented. Up to chair with lift this morning. Refused repositioning. C/o back pain, oxycodone, tylenol and voltaren gel given. Pt ordered 3 meals from the kitchen and 2 takeout meals. Eating meals and snacks all day long. Carbs covered. NPO after midnight for surgery tomorrow. CT scan needed overnight. One surgical skin prep completed with beard and head shampoo.

## 2023-04-26 NOTE — PROGRESS NOTES
[facility]    Medicine Progress Note - Medicine Service, MAROON TEAM 2    Date of Admission:  4/3/2023    Assessment & Plan   Gustavo Faria is a 58 year old male with recent Serratia bacteremia, HFrEF, prolonged QT, lower extremity wounds, DMII, hypothyroidism, low testosterone and known ACTH secreting pituitary adenoma w/resulting Cushing's disease s/p 2 partial resections in 2021 who [facility]. [facility], he developed new onset double vision and severe headache which prompted imaging that showed a large mass invading the cavernous sinuses and impinging on the cranial nerves. He is transferred to [facility] for this pituitary adenoma, concern for progression vs hemorrhage/apoplexy. Complicated by psychosis/metabolic encephalopathy and electrolyte abnormalities secondary to Cushing's syndrome. He is now medically stable and awaiting surgery scheduled for 4/27/2023.    Updates today:  - no changes today   - CT head imaging for surgical planning to be done later tonight  - plan for OR tomorrow for adenoma resection     # Hypernatremia, DI vs cushing's, resolved  # Hypokalemia, resolved  Hypernatremia and hypokalemia, likely secondary to Cushing's.     - Endocrine following  - Goal Na: 135-140  - Spironolactone 300 daily (can go up to 400 mg daily depending on Na and K, per Endocrine)  - Strict I&Os  - Daily BMP + Mg  - PTA potassium supplement 40 mEq      # Acute metabolic encephalopathy - improved  # Pyruria, Candiduria - resolved  # Encephalopathy likely secondary to cushing's disease  Admission symptoms included disorientation, intermittently following directions. Repetitive words - perseverating on particular phrases. Sx started the 2-3 days prior to admission ([facility] was [facility] Vulnerable brain (multiple brain surgeries), enlarging pituitary mass. No seizure  activity per EEG. Steroids removed and pt still encephalopathic. Sodium had been in normal limits and patient remained altered. Worked up for infections - did reveal candiduria but unlikely this was etiology, though he did complete an 8 day course of fluconazole. Briefly on ceftriaxone but encephalopathy also did not improve. CT abd w/ contrast (4/10): no evidence of pyelonephritis  Ultimately attributed to Cushings and known pituitary tumor. Continues to remain lucid.  - Surgical plans for pituitary tumor as stated below  - Delirium precautions  - Electrolyte management as above  - PRN quetiapine if agitation that is not responsive to behavioral interventions     # Delusions, intermittent  Intermittent delusions regarding staff stalking patient while in the hospital. Unclear chronicity of these delusions - but has had them multiple times this hospitalization. Unclear psych history. Medical management for encephalopathy as stated above. Psychiatry was consulted during his care and agreed with paranoia and recommended Abilify. He was briefly on Abilify 5mg but patient then started to refuse nightly so this was discontinued. His paranoia and delusions have been very intermittent and appear to be associated with his anxiety.       # ACTH secreting pituitary adenoma c/b type II DM, hypothyroidism and low testosterone s/p two partial resections in 2021   Patient noted double vision and severe headache beginning the evening of 3/25. CT imaging on 3/25 revealed a large sellar/suprasellar mass extending into the cavernous sinuses with no evidence of acute stroke. On 3/28, he underwent an MRI which confirmed the CT findings of a large mass invading the cavernous sinuses and impinging on the cranial nerves. Necrosis extending beyond left cavernous sinus. [facility]. Repeat MRI performed on 4/6/23: compared to 2021 MRI, lesions are smaller. Orbit MRI without optic nerve compression and no evidence of orbital  infection.  - Neurosurgery following  - Plans for Neurosurgery: surgery 4/27/23  - Optimize medically prior to surgery  - DC'ed dexamethasone  - Ophthalmology following, no major changes, agree with neurosurgery plans  - Endocrine following    Surgery Prep:  - Hold ASA 1 week prior to surgery starting 4/20  - Hold Empagliflozin 3 days prior to surgery starting - on HOLD  - CT/CTA head- stereotaxy protocol to be completed night before the surgery - NSGY to order  - MRI Brain with sedation on 4/24 - completed     # Type II DM, exacerbated by Cushing's   A1c of 7.6% on 2/2023. [facility].    - Lantus increased to 22 units by endocrine on 4/22 with improved BGs   - HD Sliding scale insulin  - Carb coverage 1:15  - Hold PTA metformin, empa, and sitagliptin, can resume upon discharge    # Buttocks, sacrum and bilateral groin wounds   # RLE wound from puncture injury   # L dorsal foot wound from presumed trauma   # Soft tissue infection/abscess    # Hx serratia bacteremia in December 2022   For patient's lower extremity wounds and hx of Serratia bacteremia in December, he was initially started on [facility]olin [facility] the [facility]. His antibiotics were broadened to Vanc and Zosyn and ultimately he was transitioned to ceftazidime on 3/23 with plan to complete course on 4/4/23. BCx negative and wound cx grew serratia marcescens at the [facility]. He has been on ceftazidime since. MRI of the right tib/fib on 3/23 was negative for osteomyelitis but did show two fluid collections draining sponateneously. Ortho was consulted at the [facility] and determined no intervention was needed as wounds were draining on their own.  - Stop ceftazidime (3/23 - 4/5)  - WOC following  - PT/OT   - Pain: tylenol PRN, dilaudid 0.5 mg PRN for dressing changes and oxy 5 mg    # HFmrEF, with global hypokinesis  # High burden of PVCs  # Prolonged QTc   # Elevated troponin, down-trended  Coronary angiogram on 2/27 shows normal  coronaries. Troponin down-trending (88 -> 77). No chest pain. High burden of PVCs. Follow up ECHO with global hypokinesis, which is worse compared to February 2023 ECHO. EF is 45% (same compared to prior). Likely small vessel disease vs demand vs cardiomyopathy 2/2 ceftazidime. Low concern for acute ACS.   - Discontinued tele  - Electrolyte mgmt as above  - Lasix 40mg daily  - Continue PTA coreg  - Lisinopril 10  - Empagliflozin (see above) - on HOLD   - ASA - on HOLD  - Avoid QTc prolonging medications     # Central Hypothyroidism  - Continue PTA levothyroxine     # Hypogonadism   - Continue PTA androgel (will need to bring in home medication)     # Chronic microcytic anemia   Hgb of 8.4 on [facility]. Peripheral smear on 3/30 showed poikilocytosis with occasional red blood cell fragments but no other evidence of hemolysis.  Haptoglobin was normal.  Ferritin was 91, transferrin saturation was low, B12 was 495 and folate was 8.7. Likely combination of iron deficiency as well as inflammation/chronic disease.   - CTM        # Concern for malnutrition   - Nutrition following       Diet: Combination Diet Regular Diet  NPO per Anesthesia Guidelines for Procedure/Surgery Except for: Meds    DVT Prophylaxis: Pneumatic Compression Devices  Roland Catheter: Not present  Lines:   PICC 04/06/23 Double Lumen Right Basilic access-Site Assessment: WDL      Cardiac Monitoring: None  Code Status: Full Code      Clinically Significant Risk Factors        # Hypokalemia: Lowest K = 2.9 mmol/L in last 2 days, will replace as needed       # Hypoalbuminemia: Lowest albumin = 2.9 g/dL at 4/11/2023  7:07 AM, will monitor as appropriate          # DMII: A1C = 9.5 % (Ref range: <5.7 %) within past 6 months    # Severe Malnutrition: based on nutrition assessment        Disposition Plan            Zita Christensen MD  Internal Medicine - Pediatrics Resident, PGY-2  [facility]  [place]  4/25/2023  _________________________________________________________    Interval History   PAMELAEON, nursing notes reviewed. A little anxious for the surgery tomorrow. Says he just wants to have a good day today. No other complaints. Voiding and stooling appropriately. Tolerating PO intake. VSS.    Physical Exam   Vital Signs: Temp: 98.2  F (36.8  C) Temp src: Oral BP: (!) 148/96 Pulse: 85   Resp: 16 SpO2: 99 % O2 Device: None (Room air)    Weight: 161 lbs 13.43 oz    General Appearance: right eye slightly closed, awake, alert  Eyes: EOMI, muddy sclera   HEENT: Normal sclera, able to move neck FROM  Respiratory: Breathing comfortably on RA, CTAB  Cardiovascular: RRR, no murmurs  GI: BS+. Soft, nontender, nondistended. No guarding or rebound tenderness.   Musculoskeletal: moving all extremities spontaneously, 2+ swelling to shins bilaterally  Neuro: NFD, cranial nerves grossly intact  Psychiatric: Oriented to time, person, place, and situation     Medical Decision Making           Data     I have personally reviewed the following data over the past 24 hrs:    N/A  \   N/A   / N/A     139 101 21.3 (H) /  221 (H)   4.1 27 0.98 \       INR:  0.99 PTT:  23   D-dimer:  N/A Fibrinogen:  N/A

## 2023-04-26 NOTE — PROGRESS NOTES
Pt is alert and oriented. PRN oxycodone and Voltaren cream administered for back pain overnight. Dressings are CDI. Right PICC is hep locked. Regular diet with carb coverage.  and 356. Plan for CT evening today and surgery 4/27. Continue plan of care.

## 2023-04-26 NOTE — PROGRESS NOTES
Endocrine Progress Note  Patient: Gustavo Faria   MRN: [id number]  Date of Service: 04/26/2023    Subjective:  Planned for OR tomorrow for pituitary macroadenoma debulking surgery/resection.  Sodium and potassium within normal range today.  Good blood pressure control with the current medications.  BG was trending up slowly over the last day with a spike of 356 at 2 AM.  He stated he did not eat anything after 11 PM.  No complaints today apart from blurring of vision.  Has good appetite no nausea or vomiting.      Physical Examination:  BP (!) 124/98   Pulse 85   Temp 98.2  F (36.8  C) (Oral)   Resp 16   Wt 73.4 kg (161 lb 13.4 oz)   SpO2 99%   BMI 26.12 kg/m    Physical Exam  Vitals reviewed.   Constitutional:       General: He is not in acute distress.  Cardiovascular:      Rate and Rhythm: Normal rate.   Pulmonary:      Effort: Pulmonary effort is normal.   Musculoskeletal:      Right lower leg: No edema.      Left lower leg: No edema.   Neurological:      Mental Status: He is alert and oriented to person, place, and time.   Psychiatric:         Mood and Affect: Mood normal.         Behavior: Behavior normal.         Medications:  Reviewed    Endocrine Labs:   Latest Reference Range & Units 04/26/23 08:59   Sodium 136 - 145 mmol/L 139   Potassium 3.4 - 5.3 mmol/L 4.1   Chloride 98 - 107 mmol/L 101   Carbon Dioxide (CO2) 22 - 29 mmol/L 27   Urea Nitrogen 6.0 - 20.0 mg/dL 21.3 (H)   Creatinine 0.67 - 1.17 mg/dL 0.98   GFR Estimate >60 mL/min/1.73m2 90   Calcium 8.6 - 10.0 mg/dL 8.6   Anion Gap 7 - 15 mmol/L 11   Phosphorus 2.5 - 4.5 mg/dL 2.4 (L)        Preoperative MRI brain on 4/24/2023:  FINDINGS:  Unchanged size of the heterogeneously enhancing large pituitary mass  measuring 4.2 x 3.1 cm, previously 4.3 x 3.1 cm. Invasion of bilateral  right greater than left cavernous sinuses with encasement of bilateral  internal carotid artery cavernous segments. Asymmetric smaller size of  the right cavernous  segment internal carotid artery which is similar  to prior. The mass invades and erodes the superoposterior aspect of  the clivus. The mass also extends into and effaces the prepontine  cistern. The mass abuts the basilar artery. The mass grows anteriorly  on the left towards the orbital apex. Mass grows superiorly on both  the right and left aspects of the pituitary stalk although more so on  the right similar to prior. The stalk is relatively midline. The optic  chiasm is nondisplaced. Central necrosis of the right aspect of the  mass similar to prior.     Unchanged ill-defined enhancement in the lateral right frontal lobe  and a small focus in the left putamen with associated susceptibility  effect most suggestive of capillary telangiectasia.     Ventricles are proportionate to the cerebral sulci. Moderate confluent  and scattered T2/FLAIR hyperintensity in the cortical and deep  cerebral white matter suggestive of chronic small vessel ischemic  disease similar to prior. No suspicious restricted diffusion.     Mild mucosal thickening of the maxillary sinuses mostly inferiorly.  Trace mucosal thickening in the ethmoid air cells and frontal sinuses.  Mild right mastoid air cell effusion.                                                                      IMPRESSION:  1. Unchanged large partially necrotic enhancing sellar mass with local  invasion.  2. Benign appearing vascular lesions in the right frontal lobe and  left basal ganglia most suggestive of capillary telangiectasia.  3. White matter changes suggestive of chronic small vessel ischemic  disease.     Assessment and plan:  Gustavo Faria is a 58 year old male with PMHx of  ACTH-secreting macroadenoma c/b central hypothyroidism, , and central hypogonadism,  s/p transphenoidal surgery 5/2021 and 7/2021 in [place] has baseline records 3rd nerve palsy, ongoing serratia RLE cellulitis c/b recent serratia bacteremia, HFrEF, T2DM, and HTN who was  transferred from [facility] for possible surgical intervention of known expanding large sellar/suprasellar mass extending into cavernous sinuses and subsequent cranial nerve impingement.     #Pituitary macroadenoma:  #Cushing's disease:  #Pituitary apoplexy:  Planned for surgery on 4/27/2023.  Currently blood pressure within the target, sodium, potassium level within normal range.    Recommendations for the day of the surgery:  -Reduce spironolactone dose for tomorrow morning to 100 mg.  -Hold the fixed dose of KCl 40 mEq with getting early morning BMP to assess if he needs any potassium correction before the surgery.  -no need to initiate the steroids replacement unless he decompensates during the surgery or postoperatively (in that situation to start on stress dose steroids to be given hydrocortisone 100 mg and to continue with 50 mg 3 times daily).  -If no steroids received with induction of the anesthesia , he can get cortisol level following the surgery followed by a.m. cortisol level checks, and if the cortisol level is less than 10 to start on high-dose steroids.  -Close monitoring for DI following the surgery with getting sodium level check 6 hourly and strict I's and O's if the urine output is more than 300 mill for 3 consecutive hours to obtain urine specific gravity if <=1.005 and to obtain urine and serum osmolarity if the urine osmolarity is less<300, or if develop hypernatremia to be given dose of desmopressin 0.1 mcg IV.    #Central hypothyroidism:  Prior to admission was on levothyroxine 100 mcg daily.  Free T4 on admission 1.3 and TSH not detectable (picture of central hypothyroidism).     Recommendations:  -Continue with PTA levothyroxine 100 mcg daily.     DM type II: Hemoglobin A1c on admission 9.5%.  Prior to admission was on Sitagliptin 100 mg daily/metformin 500 mg twice daily, Jardiance 12.5 mg.  Currently BG above the target Lantus dose was bumped up to 26 units today we will continue with  the high-dose SSI and carb coverage of 1: 10 g CHO with meals and snacks for today.    Recommendations for the day of the surgery: (Given most of the insulin resistance you have is due to Cushing disease we are anticipating change in insulin sensitivity following the surgery):    -Reduce the dose of Lantus tomorrow morning to 10 units and to start on non-DKA insulin drip in the morning prior to getting to the OR.  -While on insulin drip to hold the correction scale.  -Postoperatively with advancing the diet can resume lower carb coverage of 1 unit: 20 g CHO while on non-DKA insulin drip.  -We will transition him on Friday back to subcutaneous insulin after determining the new insulin requirement.      Primary team was contacted and updated about change of the recommendations for tomorrow for the surgery.    Jeanne Chase     Endocrinology diabetes and metabolism  fellow   Pager number: [phone]    I have seen and examined the patient and agree with the fellow's plan of care as noted.  April 26, 2023    Dr. Smitha Menendez [phone]

## 2023-04-27 ENCOUNTER — APPOINTMENT (OUTPATIENT)
Dept: CT IMAGING | Facility: CLINIC | Age: 58
DRG: 614 | End: 2023-04-27
Attending: STUDENT IN AN ORGANIZED HEALTH CARE EDUCATION/TRAINING PROGRAM
Payer: COMMERCIAL

## 2023-04-27 ENCOUNTER — ANESTHESIA (OUTPATIENT)
Dept: SURGERY | Facility: CLINIC | Age: 58
DRG: 614 | End: 2023-04-27
Payer: COMMERCIAL

## 2023-04-27 LAB
ANION GAP SERPL CALCULATED.3IONS-SCNC: 10 MMOL/L (ref 7–15)
ANION GAP SERPL CALCULATED.3IONS-SCNC: 10 MMOL/L (ref 7–15)
BASE EXCESS BLDA CALC-SCNC: 10.4 MMOL/L (ref -9.6–2)
BASE EXCESS BLDA CALC-SCNC: 10.7 MMOL/L (ref -9.6–2)
BASE EXCESS BLDA CALC-SCNC: 11 MMOL/L (ref -9.6–2)
BASOPHILS # BLD AUTO: 0 10E3/UL (ref 0–0.2)
BASOPHILS NFR BLD AUTO: 0 %
BLD PROD TYP BPU: NORMAL
BLOOD COMPONENT TYPE: NORMAL
BUN SERPL-MCNC: 19.9 MG/DL (ref 6–20)
BUN SERPL-MCNC: 22.1 MG/DL (ref 6–20)
CA-I BLD-MCNC: 4.4 MG/DL (ref 4.4–5.2)
CA-I BLD-MCNC: 4.4 MG/DL (ref 4.4–5.2)
CA-I BLD-MCNC: 4.5 MG/DL (ref 4.4–5.2)
CALCIUM SERPL-MCNC: 8.3 MG/DL (ref 8.6–10)
CALCIUM SERPL-MCNC: 8.5 MG/DL (ref 8.6–10)
CHLORIDE SERPL-SCNC: 101 MMOL/L (ref 98–107)
CHLORIDE SERPL-SCNC: 103 MMOL/L (ref 98–107)
CODING SYSTEM: NORMAL
CREAT SERPL-MCNC: 0.85 MG/DL (ref 0.67–1.17)
CREAT SERPL-MCNC: 0.93 MG/DL (ref 0.67–1.17)
CROSSMATCH: NORMAL
DEPRECATED HCO3 PLAS-SCNC: 29 MMOL/L (ref 22–29)
DEPRECATED HCO3 PLAS-SCNC: 31 MMOL/L (ref 22–29)
EOSINOPHIL # BLD AUTO: 0 10E3/UL (ref 0–0.7)
EOSINOPHIL NFR BLD AUTO: 0 %
ERYTHROCYTE [DISTWIDTH] IN BLOOD BY AUTOMATED COUNT: 24.7 % (ref 10–15)
ERYTHROCYTE [DISTWIDTH] IN BLOOD BY AUTOMATED COUNT: 27.3 % (ref 10–15)
GFR SERPL CREATININE-BSD FRML MDRD: >90 ML/MIN/1.73M2
GFR SERPL CREATININE-BSD FRML MDRD: >90 ML/MIN/1.73M2
GLUCOSE BLD-MCNC: 115 MG/DL (ref 70–99)
GLUCOSE BLD-MCNC: 75 MG/DL (ref 70–99)
GLUCOSE BLD-MCNC: 85 MG/DL (ref 70–99)
GLUCOSE BLDC GLUCOMTR-MCNC: 100 MG/DL (ref 70–99)
GLUCOSE BLDC GLUCOMTR-MCNC: 138 MG/DL (ref 70–99)
GLUCOSE BLDC GLUCOMTR-MCNC: 177 MG/DL (ref 70–99)
GLUCOSE BLDC GLUCOMTR-MCNC: 208 MG/DL (ref 70–99)
GLUCOSE BLDC GLUCOMTR-MCNC: 225 MG/DL (ref 70–99)
GLUCOSE BLDC GLUCOMTR-MCNC: 232 MG/DL (ref 70–99)
GLUCOSE BLDC GLUCOMTR-MCNC: 68 MG/DL (ref 70–99)
GLUCOSE BLDC GLUCOMTR-MCNC: 82 MG/DL (ref 70–99)
GLUCOSE BLDC GLUCOMTR-MCNC: 91 MG/DL (ref 70–99)
GLUCOSE SERPL-MCNC: 118 MG/DL (ref 70–99)
GLUCOSE SERPL-MCNC: 214 MG/DL (ref 70–99)
HCO3 BLDA-SCNC: 33 MMOL/L (ref 21–28)
HCO3 BLDA-SCNC: 34 MMOL/L (ref 21–28)
HCO3 BLDA-SCNC: 34 MMOL/L (ref 21–28)
HCT VFR BLD AUTO: 24.1 % (ref 40–53)
HCT VFR BLD AUTO: 28.7 % (ref 40–53)
HGB BLD-MCNC: 6.9 G/DL (ref 13.3–17.7)
HGB BLD-MCNC: 7.3 G/DL (ref 13.3–17.7)
HGB BLD-MCNC: 7.9 G/DL (ref 13.3–17.7)
HGB BLD-MCNC: 8.1 G/DL (ref 13.3–17.7)
HGB BLD-MCNC: 8.6 G/DL (ref 13.3–17.7)
IMM GRANULOCYTES # BLD: 0.5 10E3/UL
IMM GRANULOCYTES NFR BLD: 4 %
ISSUE DATE AND TIME: NORMAL
LACTATE BLD-SCNC: 1.7 MMOL/L
LACTATE BLD-SCNC: 2 MMOL/L
LACTATE BLD-SCNC: 2.1 MMOL/L
LYMPHOCYTES # BLD AUTO: 0.4 10E3/UL (ref 0.8–5.3)
LYMPHOCYTES NFR BLD AUTO: 4 %
MCH RBC QN AUTO: 25.3 PG (ref 26.5–33)
MCH RBC QN AUTO: 25.9 PG (ref 26.5–33)
MCHC RBC AUTO-ENTMCNC: 28.6 G/DL (ref 31.5–36.5)
MCHC RBC AUTO-ENTMCNC: 30 G/DL (ref 31.5–36.5)
MCV RBC AUTO: 86 FL (ref 78–100)
MCV RBC AUTO: 88 FL (ref 78–100)
MONOCYTES # BLD AUTO: 0.5 10E3/UL (ref 0–1.3)
MONOCYTES NFR BLD AUTO: 4 %
NEUTROPHILS # BLD AUTO: 10.5 10E3/UL (ref 1.6–8.3)
NEUTROPHILS NFR BLD AUTO: 88 %
NRBC # BLD AUTO: 0.2 10E3/UL
NRBC BLD AUTO-RTO: 1 /100
O2/TOTAL GAS SETTING VFR VENT: 38 %
O2/TOTAL GAS SETTING VFR VENT: 40 %
O2/TOTAL GAS SETTING VFR VENT: 40 %
PCO2 BLDA: 36 MM HG (ref 35–45)
PCO2 BLDA: 37 MM HG (ref 35–45)
PCO2 BLDA: 39 MM HG (ref 35–45)
PH BLDA: 7.55 [PH] (ref 7.35–7.45)
PH BLDA: 7.57 [PH] (ref 7.35–7.45)
PH BLDA: 7.58 [PH] (ref 7.35–7.45)
PHOSPHATE SERPL-MCNC: 2.2 MG/DL (ref 2.5–4.5)
PLATELET # BLD AUTO: 387 10E3/UL (ref 150–450)
PLATELET # BLD AUTO: 390 10E3/UL (ref 150–450)
PO2 BLDA: 103 MM HG (ref 80–105)
PO2 BLDA: 80 MM HG (ref 80–105)
PO2 BLDA: 96 MM HG (ref 80–105)
POTASSIUM BLD-SCNC: 2.7 MMOL/L (ref 3.5–5)
POTASSIUM BLD-SCNC: 3.4 MMOL/L (ref 3.5–5)
POTASSIUM BLD-SCNC: 3.4 MMOL/L (ref 3.5–5)
POTASSIUM SERPL-SCNC: 2.8 MMOL/L (ref 3.4–5.3)
POTASSIUM SERPL-SCNC: 3.4 MMOL/L (ref 3.4–5.3)
RBC # BLD AUTO: 2.73 10E6/UL (ref 4.4–5.9)
RBC # BLD AUTO: 3.32 10E6/UL (ref 4.4–5.9)
SODIUM BLD-SCNC: 143 MMOL/L (ref 133–144)
SODIUM BLD-SCNC: 144 MMOL/L (ref 133–144)
SODIUM BLD-SCNC: 144 MMOL/L (ref 133–144)
SODIUM SERPL-SCNC: 140 MMOL/L (ref 136–145)
SODIUM SERPL-SCNC: 144 MMOL/L (ref 136–145)
UNIT ABO/RH: NORMAL
UNIT NUMBER: NORMAL
UNIT STATUS: NORMAL
UNIT TYPE ISBT: 5100
WBC # BLD AUTO: 12 10E3/UL (ref 4–11)
WBC # BLD AUTO: 12.1 10E3/UL (ref 4–11)

## 2023-04-27 PROCEDURE — 88307 TISSUE EXAM BY PATHOLOGIST: CPT | Mod: 26 | Performed by: SPECIALIST

## 2023-04-27 PROCEDURE — 61781 SCAN PROC CRANIAL INTRA: CPT | Performed by: NEUROLOGICAL SURGERY

## 2023-04-27 PROCEDURE — 0GB04ZZ EXCISION OF PITUITARY GLAND, PERCUTANEOUS ENDOSCOPIC APPROACH: ICD-10-PCS | Performed by: NEUROLOGICAL SURGERY

## 2023-04-27 PROCEDURE — 999N000141 HC STATISTIC PRE-PROCEDURE NURSING ASSESSMENT: Performed by: NEUROLOGICAL SURGERY

## 2023-04-27 PROCEDURE — 250N000025 HC SEVOFLURANE, PER MIN: Performed by: NEUROLOGICAL SURGERY

## 2023-04-27 PROCEDURE — 120N000002 HC R&B MED SURG/OB UMMC

## 2023-04-27 PROCEDURE — 82330 ASSAY OF CALCIUM: CPT

## 2023-04-27 PROCEDURE — 84132 ASSAY OF SERUM POTASSIUM: CPT

## 2023-04-27 PROCEDURE — 250N000013 HC RX MED GY IP 250 OP 250 PS 637

## 2023-04-27 PROCEDURE — 250N000011 HC RX IP 250 OP 636: Performed by: NEUROLOGICAL SURGERY

## 2023-04-27 PROCEDURE — 272N000004 HC RX 272: Performed by: NEUROLOGICAL SURGERY

## 2023-04-27 PROCEDURE — 82310 ASSAY OF CALCIUM: CPT

## 2023-04-27 PROCEDURE — 250N000013 HC RX MED GY IP 250 OP 250 PS 637: Performed by: STUDENT IN AN ORGANIZED HEALTH CARE EDUCATION/TRAINING PROGRAM

## 2023-04-27 PROCEDURE — 85027 COMPLETE CBC AUTOMATED: CPT

## 2023-04-27 PROCEDURE — 370N000017 HC ANESTHESIA TECHNICAL FEE, PER MIN: Performed by: NEUROLOGICAL SURGERY

## 2023-04-27 PROCEDURE — 250N000011 HC RX IP 250 OP 636: Performed by: STUDENT IN AN ORGANIZED HEALTH CARE EDUCATION/TRAINING PROGRAM

## 2023-04-27 PROCEDURE — 250N000009 HC RX 250: Performed by: STUDENT IN AN ORGANIZED HEALTH CARE EDUCATION/TRAINING PROGRAM

## 2023-04-27 PROCEDURE — 4A10X4G MONITORING OF CENTRAL NERVOUS ELECTRICAL ACTIVITY, INTRAOPERATIVE, EXTERNAL APPROACH: ICD-10-PCS | Performed by: NEUROLOGICAL SURGERY

## 2023-04-27 PROCEDURE — 250N000009 HC RX 250: Performed by: INTERNAL MEDICINE

## 2023-04-27 PROCEDURE — 250N000011 HC RX IP 250 OP 636: Performed by: ANESTHESIOLOGY

## 2023-04-27 PROCEDURE — 250N000009 HC RX 250

## 2023-04-27 PROCEDURE — 99231 SBSQ HOSP IP/OBS SF/LOW 25: CPT | Mod: GC | Performed by: INTERNAL MEDICINE

## 2023-04-27 PROCEDURE — 36592 COLLECT BLOOD FROM PICC: CPT

## 2023-04-27 PROCEDURE — 360N000078 HC SURGERY LEVEL 5, PER MIN: Performed by: NEUROLOGICAL SURGERY

## 2023-04-27 PROCEDURE — 8E09XBZ COMPUTER ASSISTED PROCEDURE OF HEAD AND NECK REGION: ICD-10-PCS | Performed by: NEUROLOGICAL SURGERY

## 2023-04-27 PROCEDURE — P9016 RBC LEUKOCYTES REDUCED: HCPCS

## 2023-04-27 PROCEDURE — 250N000011 HC RX IP 250 OP 636

## 2023-04-27 PROCEDURE — 999N000044 HC STATISTIC CVC DRESSING CHANGE

## 2023-04-27 PROCEDURE — 250N000011 HC RX IP 250 OP 636: Performed by: INTERNAL MEDICINE

## 2023-04-27 PROCEDURE — 70496 CT ANGIOGRAPHY HEAD: CPT

## 2023-04-27 PROCEDURE — 272N000001 HC OR GENERAL SUPPLY STERILE: Performed by: NEUROLOGICAL SURGERY

## 2023-04-27 PROCEDURE — 70498 CT ANGIOGRAPHY NECK: CPT | Mod: 26 | Performed by: RADIOLOGY

## 2023-04-27 PROCEDURE — 88342 IMHCHEM/IMCYTCHM 1ST ANTB: CPT | Mod: 26 | Performed by: SPECIALIST

## 2023-04-27 PROCEDURE — 250N000009 HC RX 250: Performed by: NEUROLOGICAL SURGERY

## 2023-04-27 PROCEDURE — 4A11X4G MONITORING OF PERIPHERAL NERVOUS ELECTRICAL ACTIVITY, INTRAOPERATIVE, EXTERNAL APPROACH: ICD-10-PCS | Performed by: NEUROLOGICAL SURGERY

## 2023-04-27 PROCEDURE — 250N000013 HC RX MED GY IP 250 OP 250 PS 637: Performed by: INTERNAL MEDICINE

## 2023-04-27 PROCEDURE — 62165 REMOVE PITUIT TUMOR W/SCOPE: CPT | Mod: 62 | Performed by: OTOLARYNGOLOGY

## 2023-04-27 PROCEDURE — 258N000003 HC RX IP 258 OP 636: Performed by: ANESTHESIOLOGY

## 2023-04-27 PROCEDURE — 30999 UNLISTED PROCEDURE NOSE: CPT | Mod: GC | Performed by: OTOLARYNGOLOGY

## 2023-04-27 PROCEDURE — 83605 ASSAY OF LACTIC ACID: CPT

## 2023-04-27 PROCEDURE — 250N000011 HC RX IP 250 OP 636: Performed by: NURSE ANESTHETIST, CERTIFIED REGISTERED

## 2023-04-27 PROCEDURE — 250N000009 HC RX 250: Performed by: ANESTHESIOLOGY

## 2023-04-27 PROCEDURE — 70496 CT ANGIOGRAPHY HEAD: CPT | Mod: 26 | Performed by: RADIOLOGY

## 2023-04-27 PROCEDURE — 85025 COMPLETE CBC W/AUTO DIFF WBC: CPT

## 2023-04-27 PROCEDURE — 88307 TISSUE EXAM BY PATHOLOGIST: CPT | Mod: TC | Performed by: NEUROLOGICAL SURGERY

## 2023-04-27 PROCEDURE — 70450 CT HEAD/BRAIN W/O DYE: CPT

## 2023-04-27 PROCEDURE — 88341 IMHCHEM/IMCYTCHM EA ADD ANTB: CPT | Mod: 26 | Performed by: SPECIALIST

## 2023-04-27 PROCEDURE — 36415 COLL VENOUS BLD VENIPUNCTURE: CPT

## 2023-04-27 PROCEDURE — 258N000003 HC RX IP 258 OP 636: Performed by: INTERNAL MEDICINE

## 2023-04-27 PROCEDURE — 84100 ASSAY OF PHOSPHORUS: CPT | Performed by: INTERNAL MEDICINE

## 2023-04-27 PROCEDURE — 62165 REMOVE PITUIT TUMOR W/SCOPE: CPT | Mod: 62 | Performed by: NEUROLOGICAL SURGERY

## 2023-04-27 PROCEDURE — 70498 CT ANGIOGRAPHY NECK: CPT

## 2023-04-27 PROCEDURE — 710N000009 HC RECOVERY PHASE 1, LEVEL 1, PER MIN: Performed by: NEUROLOGICAL SURGERY

## 2023-04-27 PROCEDURE — 258N000001 HC RX 258: Performed by: STUDENT IN AN ORGANIZED HEALTH CARE EDUCATION/TRAINING PROGRAM

## 2023-04-27 RX ORDER — PROCHLORPERAZINE MALEATE 5 MG
10 TABLET ORAL EVERY 6 HOURS PRN
Status: DISCONTINUED | OUTPATIENT
Start: 2023-04-27 | End: 2023-05-19 | Stop reason: HOSPADM

## 2023-04-27 RX ORDER — LABETALOL HYDROCHLORIDE 5 MG/ML
10 INJECTION, SOLUTION INTRAVENOUS
Status: DISCONTINUED | OUTPATIENT
Start: 2023-04-27 | End: 2023-04-28 | Stop reason: HOSPADM

## 2023-04-27 RX ORDER — LIDOCAINE 40 MG/G
CREAM TOPICAL
Status: DISCONTINUED | OUTPATIENT
Start: 2023-04-27 | End: 2023-05-19 | Stop reason: HOSPADM

## 2023-04-27 RX ORDER — DEXAMETHASONE SODIUM PHOSPHATE 4 MG/ML
INJECTION, SOLUTION INTRA-ARTICULAR; INTRALESIONAL; INTRAMUSCULAR; INTRAVENOUS; SOFT TISSUE PRN
Status: DISCONTINUED | OUTPATIENT
Start: 2023-04-27 | End: 2023-04-27

## 2023-04-27 RX ORDER — HYDRALAZINE HYDROCHLORIDE 20 MG/ML
10-20 INJECTION INTRAMUSCULAR; INTRAVENOUS EVERY 30 MIN PRN
Status: DISCONTINUED | OUTPATIENT
Start: 2023-04-27 | End: 2023-05-19 | Stop reason: HOSPADM

## 2023-04-27 RX ORDER — ACETAMINOPHEN 325 MG/1
650 TABLET ORAL EVERY 4 HOURS PRN
Status: DISCONTINUED | OUTPATIENT
Start: 2023-04-30 | End: 2023-05-04

## 2023-04-27 RX ORDER — SODIUM CHLORIDE, SODIUM LACTATE, POTASSIUM CHLORIDE, CALCIUM CHLORIDE 600; 310; 30; 20 MG/100ML; MG/100ML; MG/100ML; MG/100ML
INJECTION, SOLUTION INTRAVENOUS CONTINUOUS PRN
Status: DISCONTINUED | OUTPATIENT
Start: 2023-04-27 | End: 2023-04-27

## 2023-04-27 RX ORDER — IOPAMIDOL 755 MG/ML
75 INJECTION, SOLUTION INTRAVASCULAR ONCE
Status: COMPLETED | OUTPATIENT
Start: 2023-04-27 | End: 2023-04-27

## 2023-04-27 RX ORDER — ACETAMINOPHEN 325 MG/1
975 TABLET ORAL EVERY 8 HOURS
Status: COMPLETED | OUTPATIENT
Start: 2023-04-28 | End: 2023-04-30

## 2023-04-27 RX ORDER — PROPOFOL 10 MG/ML
INJECTION, EMULSION INTRAVENOUS PRN
Status: DISCONTINUED | OUTPATIENT
Start: 2023-04-27 | End: 2023-04-27

## 2023-04-27 RX ORDER — SODIUM CHLORIDE, SODIUM LACTATE, POTASSIUM CHLORIDE, CALCIUM CHLORIDE 600; 310; 30; 20 MG/100ML; MG/100ML; MG/100ML; MG/100ML
INJECTION, SOLUTION INTRAVENOUS CONTINUOUS
Status: DISCONTINUED | OUTPATIENT
Start: 2023-04-27 | End: 2023-04-27

## 2023-04-27 RX ORDER — ONDANSETRON 2 MG/ML
INJECTION INTRAMUSCULAR; INTRAVENOUS PRN
Status: DISCONTINUED | OUTPATIENT
Start: 2023-04-27 | End: 2023-04-27

## 2023-04-27 RX ORDER — CEFAZOLIN SODIUM 2 G/100ML
2 INJECTION, SOLUTION INTRAVENOUS SEE ADMIN INSTRUCTIONS
Status: DISCONTINUED | OUTPATIENT
Start: 2023-04-27 | End: 2023-04-27 | Stop reason: HOSPADM

## 2023-04-27 RX ORDER — BISACODYL 10 MG
10 SUPPOSITORY, RECTAL RECTAL DAILY PRN
Status: DISCONTINUED | OUTPATIENT
Start: 2023-04-27 | End: 2023-05-19 | Stop reason: HOSPADM

## 2023-04-27 RX ORDER — HYDRALAZINE HYDROCHLORIDE 20 MG/ML
2.5-5 INJECTION INTRAMUSCULAR; INTRAVENOUS EVERY 10 MIN PRN
Status: DISCONTINUED | OUTPATIENT
Start: 2023-04-27 | End: 2023-04-28 | Stop reason: HOSPADM

## 2023-04-27 RX ORDER — EPINEPHRINE 1 MG/ML
INJECTION, SOLUTION, CONCENTRATE INTRAVENOUS PRN
Status: DISCONTINUED | OUTPATIENT
Start: 2023-04-27 | End: 2023-04-27 | Stop reason: HOSPADM

## 2023-04-27 RX ORDER — ONDANSETRON 2 MG/ML
4 INJECTION INTRAMUSCULAR; INTRAVENOUS EVERY 30 MIN PRN
Status: DISCONTINUED | OUTPATIENT
Start: 2023-04-27 | End: 2023-04-28 | Stop reason: HOSPADM

## 2023-04-27 RX ORDER — PHENYLEPHRINE HCL IN 0.9% NACL 50MG/250ML
.5-1.25 PLASTIC BAG, INJECTION (ML) INTRAVENOUS CONTINUOUS
Status: DISCONTINUED | OUTPATIENT
Start: 2023-04-27 | End: 2023-04-27

## 2023-04-27 RX ORDER — SODIUM CHLORIDE 9 MG/ML
INJECTION, SOLUTION INTRAVENOUS CONTINUOUS
Status: ACTIVE | OUTPATIENT
Start: 2023-04-28 | End: 2023-04-28

## 2023-04-27 RX ORDER — POTASSIUM CHLORIDE 29.8 MG/ML
INJECTION INTRAVENOUS PRN
Status: DISCONTINUED | OUTPATIENT
Start: 2023-04-27 | End: 2023-04-27

## 2023-04-27 RX ORDER — LIDOCAINE HYDROCHLORIDE 20 MG/ML
INJECTION, SOLUTION INFILTRATION; PERINEURAL PRN
Status: DISCONTINUED | OUTPATIENT
Start: 2023-04-27 | End: 2023-04-27

## 2023-04-27 RX ORDER — ONDANSETRON 4 MG/1
4 TABLET, ORALLY DISINTEGRATING ORAL EVERY 30 MIN PRN
Status: DISCONTINUED | OUTPATIENT
Start: 2023-04-27 | End: 2023-04-28 | Stop reason: HOSPADM

## 2023-04-27 RX ORDER — FENTANYL CITRATE 50 UG/ML
25 INJECTION, SOLUTION INTRAMUSCULAR; INTRAVENOUS EVERY 5 MIN PRN
Status: DISCONTINUED | OUTPATIENT
Start: 2023-04-27 | End: 2023-04-28 | Stop reason: HOSPADM

## 2023-04-27 RX ORDER — HYDROMORPHONE HYDROCHLORIDE 1 MG/ML
0.4 INJECTION, SOLUTION INTRAMUSCULAR; INTRAVENOUS; SUBCUTANEOUS EVERY 5 MIN PRN
Status: DISCONTINUED | OUTPATIENT
Start: 2023-04-27 | End: 2023-04-28 | Stop reason: HOSPADM

## 2023-04-27 RX ORDER — FENTANYL CITRATE 50 UG/ML
INJECTION, SOLUTION INTRAMUSCULAR; INTRAVENOUS PRN
Status: DISCONTINUED | OUTPATIENT
Start: 2023-04-27 | End: 2023-04-27

## 2023-04-27 RX ORDER — LABETALOL HYDROCHLORIDE 5 MG/ML
10-40 INJECTION, SOLUTION INTRAVENOUS EVERY 10 MIN PRN
Status: DISCONTINUED | OUTPATIENT
Start: 2023-04-27 | End: 2023-05-19 | Stop reason: HOSPADM

## 2023-04-27 RX ORDER — FENTANYL CITRATE 50 UG/ML
50 INJECTION, SOLUTION INTRAMUSCULAR; INTRAVENOUS EVERY 5 MIN PRN
Status: DISCONTINUED | OUTPATIENT
Start: 2023-04-27 | End: 2023-04-28 | Stop reason: HOSPADM

## 2023-04-27 RX ORDER — POLYETHYLENE GLYCOL 3350 17 G/17G
17 POWDER, FOR SOLUTION ORAL DAILY
Status: DISCONTINUED | OUTPATIENT
Start: 2023-04-28 | End: 2023-05-04

## 2023-04-27 RX ORDER — AMOXICILLIN 250 MG
1 CAPSULE ORAL 2 TIMES DAILY
Status: DISCONTINUED | OUTPATIENT
Start: 2023-04-28 | End: 2023-05-04

## 2023-04-27 RX ORDER — HYDROMORPHONE HYDROCHLORIDE 1 MG/ML
0.2 INJECTION, SOLUTION INTRAMUSCULAR; INTRAVENOUS; SUBCUTANEOUS EVERY 5 MIN PRN
Status: DISCONTINUED | OUTPATIENT
Start: 2023-04-27 | End: 2023-04-28 | Stop reason: HOSPADM

## 2023-04-27 RX ORDER — ONDANSETRON 4 MG/1
4 TABLET, ORALLY DISINTEGRATING ORAL EVERY 6 HOURS PRN
Status: DISCONTINUED | OUTPATIENT
Start: 2023-04-27 | End: 2023-05-19 | Stop reason: HOSPADM

## 2023-04-27 RX ORDER — CEFAZOLIN SODIUM 2 G/100ML
2 INJECTION, SOLUTION INTRAVENOUS
Status: COMPLETED | OUTPATIENT
Start: 2023-04-27 | End: 2023-04-27

## 2023-04-27 RX ORDER — LIDOCAINE HYDROCHLORIDE AND EPINEPHRINE 10; 10 MG/ML; UG/ML
INJECTION, SOLUTION INFILTRATION; PERINEURAL PRN
Status: DISCONTINUED | OUTPATIENT
Start: 2023-04-27 | End: 2023-04-27 | Stop reason: HOSPADM

## 2023-04-27 RX ORDER — ONDANSETRON 2 MG/ML
4 INJECTION INTRAMUSCULAR; INTRAVENOUS EVERY 6 HOURS PRN
Status: DISCONTINUED | OUTPATIENT
Start: 2023-04-27 | End: 2023-05-19 | Stop reason: HOSPADM

## 2023-04-27 RX ORDER — SODIUM CHLORIDE, SODIUM GLUCONATE, SODIUM ACETATE, POTASSIUM CHLORIDE AND MAGNESIUM CHLORIDE 526; 502; 368; 37; 30 MG/100ML; MG/100ML; MG/100ML; MG/100ML; MG/100ML
INJECTION, SOLUTION INTRAVENOUS CONTINUOUS PRN
Status: DISCONTINUED | OUTPATIENT
Start: 2023-04-27 | End: 2023-04-27

## 2023-04-27 RX ORDER — DEXTROSE MONOHYDRATE 25 G/50ML
INJECTION, SOLUTION INTRAVENOUS PRN
Status: DISCONTINUED | OUTPATIENT
Start: 2023-04-27 | End: 2023-04-27

## 2023-04-27 RX ADMIN — PROPOFOL 150 MG: 10 INJECTION, EMULSION INTRAVENOUS at 14:03

## 2023-04-27 RX ADMIN — SODIUM CHLORIDE, SODIUM GLUCONATE, SODIUM ACETATE, POTASSIUM CHLORIDE AND MAGNESIUM CHLORIDE: 526; 502; 368; 37; 30 INJECTION, SOLUTION INTRAVENOUS at 20:30

## 2023-04-27 RX ADMIN — REMIFENTANIL HYDROCHLORIDE 0.05 MCG/KG/MIN: 1 INJECTION, POWDER, LYOPHILIZED, FOR SOLUTION INTRAVENOUS at 14:27

## 2023-04-27 RX ADMIN — SUGAMMADEX 200 MG: 100 INJECTION, SOLUTION INTRAVENOUS at 21:00

## 2023-04-27 RX ADMIN — TESTOSTERONE 25 MG OF TESTOSTERONE: 50 GEL TOPICAL at 10:09

## 2023-04-27 RX ADMIN — FENTANYL CITRATE 100 MCG: 50 INJECTION, SOLUTION INTRAMUSCULAR; INTRAVENOUS at 14:01

## 2023-04-27 RX ADMIN — SODIUM CHLORIDE, POTASSIUM CHLORIDE, SODIUM LACTATE AND CALCIUM CHLORIDE: 600; 310; 30; 20 INJECTION, SOLUTION INTRAVENOUS at 13:33

## 2023-04-27 RX ADMIN — Medication 50 MG: at 14:04

## 2023-04-27 RX ADMIN — DEXAMETHASONE SODIUM PHOSPHATE 4 MG: 4 INJECTION, SOLUTION INTRA-ARTICULAR; INTRALESIONAL; INTRAMUSCULAR; INTRAVENOUS; SOFT TISSUE at 18:10

## 2023-04-27 RX ADMIN — Medication 1 TABLET: at 08:30

## 2023-04-27 RX ADMIN — HUMAN INSULIN 2 UNITS/HR: 100 INJECTION, SOLUTION SUBCUTANEOUS at 08:18

## 2023-04-27 RX ADMIN — Medication 10 MG: at 19:11

## 2023-04-27 RX ADMIN — Medication 20 MG: at 15:37

## 2023-04-27 RX ADMIN — LEVOTHYROXINE SODIUM 100 MCG: 100 TABLET ORAL at 08:28

## 2023-04-27 RX ADMIN — OXYCODONE HYDROCHLORIDE 2.5 MG: 5 TABLET ORAL at 05:42

## 2023-04-27 RX ADMIN — SODIUM CHLORIDE, SODIUM LACTATE, POTASSIUM CHLORIDE, CALCIUM CHLORIDE: 600; 310; 30; 20 INJECTION, SOLUTION INTRAVENOUS at 14:00

## 2023-04-27 RX ADMIN — POTASSIUM CHLORIDE 20 MEQ: 29.8 INJECTION, SOLUTION INTRAVENOUS at 16:16

## 2023-04-27 RX ADMIN — NICARDIPINE HYDROCHLORIDE 10 MG/HR: 0.2 INJECTION, SOLUTION INTRAVENOUS at 21:01

## 2023-04-27 RX ADMIN — Medication 2 G: at 14:47

## 2023-04-27 RX ADMIN — IOPAMIDOL 75 ML: 755 INJECTION, SOLUTION INTRAVENOUS at 05:21

## 2023-04-27 RX ADMIN — PROPOFOL 50 MG: 10 INJECTION, EMULSION INTRAVENOUS at 20:58

## 2023-04-27 RX ADMIN — LIDOCAINE HYDROCHLORIDE 100 MG: 20 INJECTION, SOLUTION INFILTRATION; PERINEURAL at 14:01

## 2023-04-27 RX ADMIN — DEXTROSE 50 % IN WATER (D50W) INTRAVENOUS SYRINGE 12.5 G: at 18:10

## 2023-04-27 RX ADMIN — Medication 0.5 MCG/KG/MIN: at 20:17

## 2023-04-27 RX ADMIN — FUROSEMIDE 40 MG: 20 TABLET ORAL at 08:30

## 2023-04-27 RX ADMIN — FENTANYL CITRATE 50 MCG: 50 INJECTION, SOLUTION INTRAMUSCULAR; INTRAVENOUS at 15:46

## 2023-04-27 RX ADMIN — HYDROMORPHONE HYDROCHLORIDE 0.5 MG: 1 INJECTION, SOLUTION INTRAMUSCULAR; INTRAVENOUS; SUBCUTANEOUS at 17:00

## 2023-04-27 RX ADMIN — CARVEDILOL 12.5 MG: 12.5 TABLET, FILM COATED ORAL at 08:30

## 2023-04-27 RX ADMIN — Medication 2 G: at 18:47

## 2023-04-27 RX ADMIN — Medication 20 MG: at 16:45

## 2023-04-27 RX ADMIN — FLUTICASONE PROPIONATE 1 SPRAY: 50 SPRAY, METERED NASAL at 08:30

## 2023-04-27 RX ADMIN — SPIRONOLACTONE 100 MG: 100 TABLET ORAL at 08:30

## 2023-04-27 RX ADMIN — POTASSIUM CHLORIDE 12 MEQ: 29.8 INJECTION, SOLUTION INTRAVENOUS at 20:02

## 2023-04-27 RX ADMIN — FENTANYL CITRATE 100 MCG: 50 INJECTION, SOLUTION INTRAMUSCULAR; INTRAVENOUS at 16:07

## 2023-04-27 RX ADMIN — ERGOCALCIFEROL 50000 UNITS: 1.25 CAPSULE, LIQUID FILLED ORAL at 08:30

## 2023-04-27 RX ADMIN — POTASSIUM CHLORIDE 8 MEQ: 29.8 INJECTION, SOLUTION INTRAVENOUS at 17:05

## 2023-04-27 RX ADMIN — ONDANSETRON 4 MG: 2 INJECTION INTRAMUSCULAR; INTRAVENOUS at 21:00

## 2023-04-27 ASSESSMENT — ACTIVITIES OF DAILY LIVING (ADL)
ADLS_ACUITY_SCORE: 42
ADLS_ACUITY_SCORE: 41
ADLS_ACUITY_SCORE: 42

## 2023-04-27 NOTE — ANESTHESIA PROCEDURE NOTES
Arterial Line Procedure Note    Pre-Procedure   Staff -        CRNA: Miguel Seth APRN CRNA       Performed By: CRNA       Location: OR       Pre-Anesthestic Checklist: patient identified, IV checked, risks and benefits discussed, informed consent, monitors and equipment checked, pre-op evaluation and at physician/surgeon's request  Timeout:       Correct Patient: Yes        Correct Procedure: Yes        Correct Site: Yes        Correct Position: Yes   Line Placement:   This line was placed Post Induction  Procedure   Procedure: arterial line       Laterality: right       Insertion Site: radial.  Sterile Prep        Standard elements of sterile barrier followed       Skin prep: Chloraprep  Insertion/Injection        Technique: ultrasound guided        1. Ultrasound was used to evaluate the access site.       2. Artery evaluated via ultrasound for patency/adequacy.       3. Using real-time ultrasound the needle/catheter was observed entering the artery/vein.       Catheter Type/Size: 20 G, 12 cm  Narrative         Secured by: other       Tegaderm dressing used.       Complications: None apparent,        Arterial waveform: Yes        IBP within 10% of NIBP: Yes

## 2023-04-27 NOTE — PROGRESS NOTES
[facility]    Medicine Progress Note - Medicine Service, CLAIRE TEAM 2    Date of Admission:  4/3/2023    Assessment & Plan   Gustavo Faria is a 58 year old male with recent Serratia bacteremia, HFrEF, prolonged QT, lower extremity wounds, DMII, hypothyroidism, low testosterone and known ACTH secreting pituitary adenoma w/resulting Cushing's disease s/p 2 partial resections in 2021 who [facility]. [facility], he developed new onset double vision and severe headache which prompted imaging that showed a large mass invading the cavernous sinuses and impinging on the cranial nerves. He is transferred to [facility] for this pituitary adenoma, concern for progression vs hemorrhage/apoplexy. Complicated by psychosis/metabolic encephalopathy and electrolyte abnormalities secondary to Cushing's syndrome. He is now medically stable and is going to the OR today for surgical removal.    Updates today:  - OR today for surgical removal  - s/p pRBCs for Hgb 6.9; repeat CBC  - WBC 12.8 this am, otherwise clinically appears very well; repeat CBC  - repeat BMP as well  - Per Endocrinology      - decreased Lantus to 10u, spironolactone to 100mg, held K supplements, discontinue sliding scale insulin and carb correction, started insulin gtt  - will follow post-op Endo recs if returns to our team but there could be a chance that NSGY will transition as the primary team post-op    # Hypernatremia, DI vs cushing's, resolved  # Hypokalemia, resolved  Hypernatremia and hypokalemia, likely secondary to Cushing's.     - Endocrine following  - Goal Na: 135-140  - Spironolactone 300 daily (can go up to 400 mg daily depending on Na and K, per Endocrine) - decreased to 100mg prior to surgery  - Strict I&Os  - Daily BMP + Mg  - PTA potassium supplement 40 mEq - HOLD prior to surgery      # Acute metabolic  encephalopathy - improved  # Pyruria, Candiduria - resolved  # Encephalopathy likely secondary to cushing's disease  Admission symptoms included disorientation, intermittently following directions. Repetitive words - perseverating on particular phrases. Sx started the 2-3 days prior to admission (which [facility] was the [facility]). Vulnerable brain (multiple brain surgeries), enlarging pituitary mass. No seizure activity per EEG. Steroids removed and pt still encephalopathic. Sodium had been in normal limits and patient remained altered. Worked up for infections - did reveal candiduria but unlikely this was etiology, though he did complete an 8 day course of fluconazole. Briefly on ceftriaxone but encephalopathy also did not improve. CT abd w/ contrast (4/10): no evidence of pyelonephritis  Ultimately attributed to Cushings and known pituitary tumor. Continues to remain lucid.  - Surgical plans for pituitary tumor as stated below  - Delirium precautions  - Electrolyte management as above  - PRN quetiapine if agitation that is not responsive to behavioral interventions     # Delusions, intermittent  Intermittent delusions regarding staff stalking patient while in the hospital. Unclear chronicity of these delusions - but has had them multiple times this hospitalization. Unclear psych history. Medical management for encephalopathy as stated above. Psychiatry was consulted during his care and agreed with paranoia and recommended Abilify. He was briefly on Abilify 5mg but patient then started to refuse nightly so this was discontinued. His paranoia and delusions have been very intermittent and appear to be associated with his anxiety.       # ACTH secreting pituitary adenoma c/b type II DM, hypothyroidism and low testosterone s/p two partial resections in 2021   Patient noted double vision and severe headache beginning the evening of 3/25. CT imaging on 3/25 revealed a large sellar/suprasellar mass extending into the  cavernous sinuses with no evidence of acute stroke. On 3/28, he underwent an MRI which confirmed the CT findings of a large mass invading the cavernous sinuses and impinging on the cranial nerves. Necrosis extending beyond left cavernous sinus. [facility]. Repeat MRI performed on 4/6/23: compared to 2021 MRI, lesions are smaller. Orbit MRI without optic nerve compression and no evidence of orbital infection.  - Neurosurgery following  - Plans for Neurosurgery: surgery 4/27/23  - Optimize medically prior to surgery  - DC'ed dexamethasone  - Ophthalmology following, no major changes, agree with neurosurgery plans  - Endocrine following    Surgery Prep:  - Hold ASA 1 week prior to surgery starting 4/20  - Hold Empagliflozin 3 days prior to surgery starting - on HOLD  - CT/CTA head- stereotaxy protocol to be completed night before the surgery - completed  - MRI Brain with sedation on 4/24 - completed     # Type II DM, exacerbated by Cushing's   A1c of 7.6% on 2/2023. Patient [facility] the [facility] regimen [facility].    - Lantus 26u - decreased to 10u prior to surgery  - HD Sliding scale insulin - HOLD prior to surgery  - Carb coverage 1:15 - HOLD prior to surgery  - non DKA insulin gtt for surgery  - Hold PTA metformin, empa, and sitagliptin, can resume upon discharge    # Buttocks, sacrum and bilateral groin wounds   # RLE wound from puncture injury   # L dorsal foot wound from presumed trauma   # Soft tissue infection/abscess    # Hx serratia bacteremia in December 2022   For patient's lower extremity wounds and hx of Serratia bacteremia in December, he was initially [facility]olin [facility]. His antibiotics were broadened to Vanc and Zosyn and ultimately he was transitioned to ceftazidime on 3/23 with plan to complete course on 4/4/23. BCx negative and wound cx grew serratia marcescens at the [facility]. He has been on ceftazidime since. MRI of the right tib/fib on 3/23 was negative for  osteomyelitis but did show two fluid collections draining sponateneously. [facility] at [facility] and determined no intervention was needed as wounds were draining on their own.  - Stop ceftazidime (3/23 - 4/5)  - WOC following  - PT/OT   - Pain: tylenol PRN, dilaudid 0.5 mg PRN for dressing changes and oxy 5 mg    # HFmrEF, with global hypokinesis  # High burden of PVCs  # Prolonged QTc   # Elevated troponin, down-trended  Coronary angiogram on 2/27 shows normal coronaries. Troponin down-trending (88 -> 77). No chest pain. High burden of PVCs. Follow up ECHO with global hypokinesis, which is worse compared to February 2023 ECHO. EF is 45% (same compared to prior). Likely small vessel disease vs demand vs cardiomyopathy 2/2 ceftazidime. Low concern for acute ACS.   - Electrolyte mgmt as above  - Lasix 40mg daily  - Continue PTA coreg  - Lisinopril 10  - Empagliflozin (see above) - on HOLD   - ASA - on HOLD  - Avoid QTc prolonging medications     # Central Hypothyroidism  - Continue PTA levothyroxine     # Hypogonadism   - Continue PTA androgel (will need to bring in home medication)     # Chronic microcytic anemia   Hgb of 8.4 [facility]. Peripheral smear on 3/30 showed poikilocytosis with occasional red blood cell fragments but no other evidence of hemolysis.  Haptoglobin was normal.  Ferritin was 91, transferrin saturation was low, B12 was 495 and folate was 8.7. Likely combination of iron deficiency as well as inflammation/chronic disease.   - CTM        # Concern for malnutrition   - Nutrition following       Diet: NPO per Anesthesia Guidelines for Procedure/Surgery Except for: Meds  Snacks/Supplements Adult: Other; Please send ensure max at 10:00  am daily; Between Meals    DVT Prophylaxis: Pneumatic Compression Devices  Roland Catheter: Not present  Lines:   PICC 04/06/23 Double Lumen Right Basilic access-Site Assessment: WDL      Cardiac Monitoring: None  Code Status: Full Code       Clinically Significant Risk Factors        # Hypokalemia: Lowest K = 3.3 mmol/L in last 2 days, will replace as needed       # Hypoalbuminemia: Lowest albumin = 2.9 g/dL at 4/11/2023  7:07 AM, will monitor as appropriate          # DMII: A1C = 9.5 % (Ref range: <5.7 %) within past 6 months    # Severe Malnutrition: based on nutrition assessment        Disposition Plan            Zita Christensen MD  Internal Medicine - Pediatrics Resident, PGY-2  [facility]  4/27/2023  _________________________________________________________    Interval History   PATRICIA, nursing notes reviewed. Anxious for surgery today, just wants to get it done. Has no complaints this morning. Denies chest pain, trouble breathing, cough, fevers/chills. Sister, Megan at bedside, [place] paperwork given to her. Updated on plan of care and transition to  NSGY as primary team post-up. VSS.    Physical Exam   Vital Signs: Temp: 98.2  F (36.8  C) Temp src: Oral BP: (!) 145/93 Pulse: 60   Resp: 16 SpO2: 99 % O2 Device: None (Room air)    Weight: 161 lbs 13.43 oz    General Appearance: right eye slightly closed, awake, alert  Eyes: EOMI, muddy sclera   HEENT: Normal sclera, able to move neck FROM  Respiratory: Breathing comfortably on RA, CTAB  Cardiovascular: RRR, no murmurs  GI: BS+. Soft, nontender, nondistended. No guarding or rebound tenderness.   Musculoskeletal: moving all extremities spontaneously, 2+ swelling to shins bilaterally  Neuro: NFD, cranial nerves grossly intact  Psychiatric: Oriented to time, person, place, and situation     Medical Decision Making           Data     I have personally reviewed the following data over the past 24 hrs:    12.1 (H)  \   6.9 (LL)   / 390     140 101 22.1 (H) /  177 (H)   3.4 29 0.93 \

## 2023-04-27 NOTE — PLAN OF CARE
"Goal Outcome Evaluation:      Plan of Care Reviewed With: patient    Overall Patient Progress: no changeOverall Patient Progress: no change    Outcome Evaluation: A&Ox4. VSS on RA. PRN Carb coverage with snacks. PRN Tylenol and oxy given for back and flank pain, \"sore rib from sitting up in the chair\" LE wounds CDI minimal dried drainage. Urinal at bedside, no BM overnight. NPO overnight. CHG bath, shower cap and linen changed overnight. CT around 0500. Sacral wounds cares complete. HGB this AM 6.9 plan for blood transfusion and transition to Insulin drip for surgery today.    BP (!) 147/104   Pulse 74   Temp 98.7  F (37.1  C) (Oral)   Resp 20   Wt 73.4 kg (161 lb 13.4 oz)   SpO2 99%   BMI 26.12 kg/m         "

## 2023-04-27 NOTE — ANESTHESIA PROCEDURE NOTES
Airway       Patient location during procedure: OR       Procedure Start/Stop Times: 4/27/2023 2:05 PM  Staff -        CRNA: Miguel Seth APRN CRNA       Performed By: CRNA  Consent for Airway        Urgency: elective  Indications and Patient Condition       Indications for airway management: julian-procedural       Induction type:intravenous       Mask difficulty assessment: 1 - vent by mask    Final Airway Details       Final airway type: endotracheal airway       Successful airway: ETT - single  Endotracheal Airway Details        ETT size (mm): 7.5       Cuffed: yes       Successful intubation technique: video laryngoscopy       VL Blade Size: Glidescope 4       Grade View of Cords: 1       Adjucts: stylet       Position: Right       Measured from: lips       Secured at (cm): 23       Bite block used: None    Post intubation assessment        Placement verified by: capnometry, equal breath sounds and chest rise        Number of attempts at approach: 1       Number of other approaches attempted: 0       Secured with: plastic tape       Ease of procedure: easy       Dentition: Intact and Unchanged    Medication(s) Administered   Medication Administration Time: 4/27/2023 2:05 PM

## 2023-04-27 NOTE — PROVIDER NOTIFICATION
Dressing was changed one day early, due to patient having surgery on 4/27/23 and requested to be changed today 4/26/23.

## 2023-04-27 NOTE — ANESTHESIA PREPROCEDURE EVALUATION
Anesthesia Pre-Procedure Evaluation    Patient: Gustavo Faria   MRN: [id number] : 1965        Procedure : Procedure(s):  SURGICAL PROCEDURE, ENDONASAL, ENDOSCOPIC, USING OPTICAL TRACKING SYSTEM, for pituitary adenoma resection,  possible let fascial aiden graft, possible abdomen fat graft          History reviewed. No pertinent past medical history.   Past Surgical History:   Procedure Laterality Date     ANESTHESIA OUT OF OR MRI N/A 2023    Procedure: Anesthesia out of OR Magnetic Resonance Imaging Brain with or without contrast @1300;  Surgeon: GENERIC ANESTHESIA PROVIDER;  Location: [facility] OR     PICC DOUBLE LUMEN PLACEMENT Right 2023    40 cm total basilic      No Known Allergies   Social History     Tobacco Use     Smoking status: Former     Types: Cigarettes     Smokeless tobacco: Not on file   Vaping Use     Vaping status: Not on file   Substance Use Topics     Alcohol use: Not Currently      Wt Readings from Last 1 Encounters:   23 73.4 kg (161 lb 13.4 oz)        Anesthesia Evaluation   Pt has had prior anesthetic. Type: General.        ROS/MED HX  ENT/Pulmonary:  - neg pulmonary ROS  (-) recent URI   Neurologic: Comment: Anisocoria, CN 3 palsy  Encephalopathy  Pituitary adenoma, s/p resection x2, ACTH secreting   Cushing's dz      Cardiovascular: Comment: - Elevated troponin of unclear etiology, downtrending   - Ongoing BERNAL.      (+) -----CHF Previous cardiac testing   Echo: Date: 23 Results:  IInterpretation Summary  Left ventricular function is decreased. The ejection fraction is 40-45%  (mildly reduced). Severe diffuse hypokinesis is present.  Global right ventricular function is normal. The right ventricle is normal  size.  There is mild-moderate aortic regurgitation.  There is mild dilation at the level of the sinuses of Valsalva (4.2 cm,  indexed 2.33 cm/m2).  This study was compared with the study from 2023. No significant changes  noted.  Stress Test: Date:  Results:    ECG Reviewed: Date: 4/23/23 Results:  Sinus rhythm with occasional Premature ventricular complexes and Premature atrial complexes   Left axis deviation   Right bundle branch block   Moderate voltage criteria for LVH,  Cath:  Date: 2/27/2023 Results:  DIAGNOSTIC - CORONARY   * Right dominant coronary artery system   * The left main artery was normal in appearance and free of obstructive disease.   * The LAD has no significant obstruction.   * The circumflex artery has no significant obstruction.   * The RCA has no significant obstruction.   HEMODYNAMICS   * The LVEDP is within normal limits   PRESENTATION / INDICATIONS   * Congestive Heart Failure - Highest NYHA Class w/in 2  - NYHA Class III       METS/Exercise Tolerance:     Hematologic: Comments: Transfused 1 U PRBC this am    (+) anemia (hgb 7.9, 4/25),     Musculoskeletal: Comment: Open sacral, groin, and LE wounds  L2 compression fracture      GI/Hepatic: Comment: Malnutrition    (+) liver disease,     Renal/Genitourinary:       Endo: Comment: - ACTH secreting pituitary adenoma.  - Type II DM, on Insulin gtt now  - Hypothyroidism and low testosterone s/p two partial resections in 2021         (+) type II DM, Last HgA1c: 9.5, date: 4/5/23, Using insulin, thyroid problem, Chronic steroid usage for     Psychiatric/Substance Use: Comment: - psychosis diagnosis in 2021    (+) psychiatric history Recreational drug usage: Cannabis.    Infectious Disease: Comment: -Seratia bacteremia 12/2022      Malignancy:   (+) Malignancy (pituitary adenoma),     Other:            Physical Exam    Airway        Mallampati: II   TM distance: > 3 FB   Neck ROM: limited   Mouth opening: > 3 cm    Respiratory Devices and Support         Dental       (+) Multiple visibly decayed, broken teeth      Cardiovascular   cardiovascular exam normal          Pulmonary   pulmonary exam normal                OUTSIDE LABS:  CBC:   Lab Results   Component Value Date    WBC 10.8 04/25/2023     WBC 9.0 04/23/2023    HGB 7.9 (L) 04/25/2023    HGB 8.1 (L) 04/23/2023    HCT 27.6 (L) 04/25/2023    HCT 27.6 (L) 04/23/2023     04/25/2023     04/23/2023     BMP:   Lab Results   Component Value Date     04/26/2023     04/25/2023    POTASSIUM 4.1 04/26/2023    POTASSIUM 3.3 (L) 04/25/2023    CHLORIDE 101 04/26/2023    CHLORIDE 100 04/25/2023    CO2 27 04/26/2023    CO2 28 04/25/2023    BUN 21.3 (H) 04/26/2023    BUN 18.0 04/25/2023    CR 0.98 04/26/2023    CR 1.01 04/25/2023     (H) 04/26/2023     (H) 04/26/2023     COAGS:   Lab Results   Component Value Date    PTT 23 04/26/2023    INR 0.99 04/26/2023    FIBR 176 04/11/2023     POC: No results found for: BGM, HCG, HCGS  HEPATIC:   Lab Results   Component Value Date    ALBUMIN 3.5 04/23/2023    PROTTOTAL 5.9 (L) 04/23/2023     (H) 04/23/2023    AST 31 04/23/2023    ALKPHOS 285 (H) 04/23/2023    BILITOTAL 0.4 04/23/2023     OTHER:   Lab Results   Component Value Date    LACT 2.4 (H) 04/12/2023    A1C 9.5 (H) 04/05/2023    KORIN 8.6 04/26/2023    PHOS 2.4 (L) 04/26/2023    MAG 2.1 04/23/2023    TSH <0.01 (L) 04/05/2023    T4 1.30 04/05/2023    SED 11 04/11/2023       Anesthesia Plan    ASA Status:  4      Anesthesia Type: General.     - Airway: ETT   Induction: Intravenous.   Maintenance: Balanced.   Techniques and Equipment:     - Airway: Video-Laryngoscope     - Lines/Monitors: 2nd IV, Arterial Line, BIS (EEG and EMG Neuromonitoring)     - Drips/Meds: Norepi, Remifentanil (Insulin gtt)     Consents    Anesthesia Plan(s) and associated risks, benefits, and realistic alternatives discussed. Questions answered and patient/representative(s) expressed understanding.    - Discussed:     - Discussed with:  Patient      - Extended Intubation/Ventilatory Support Discussed: Yes.      - Patient is DNR/DNI Status: No    Use of blood products discussed: Yes.     - Discussed with: Patient.     - Consented: consented to blood  products            Reason for refusal: other.     Postoperative Care    Pain management: IV analgesics, Multi-modal analgesia.   PONV prophylaxis: Ondansetron (or other 5HT-3), Dexamethasone or Solumedrol     Comments:              PAC Discussion and Assessment    ASA Classification: 4    Anesthetic techniques and relevant risks discussed: GA  Invasive monitoring and risk discussed: Yes    Possibility and Risk of blood transfusion discussed: Yes  NPO instructions given: NPO after midnight    Needs early admission to pre-op area: No                                             Mora Page MD

## 2023-04-27 NOTE — PROGRESS NOTES
Endocrine Progress Note  Patient: Gustavo Faria   MRN: [id number]  Date of Service: 04/27/2023    Subjective:  - OR today for pituitary macroadenoma debulking surgery/resection.   - Has remained NPO since midnight and is currently hungry.   - Continues to have blurry vision but has not worsened.   - Denies N/V/D, abdominal pain, headache.    Physical Examination:  BP (!) 127/91   Pulse 64   Temp 98.5  F (36.9  C) (Axillary)   Resp 16   Wt 73.4 kg (161 lb 13.4 oz)   SpO2 99%   BMI 26.12 kg/m      Constitutional: healthy, alert and no distress   Cardiovascular: RRR. No murmurs, clicks gallops or rub  Respiratory: CTAB, no increased work of breathing  Psychiatric: mentation appears normal, affect normal/bright, judgment, slightly nervous for surgery, and insight intact  Head: Normocephalic and atraumatic  Neck: Neck supple. No adenopathy. Thyroid symmetric, normal size,  Abdomen: Abdomen soft, non-tender. BS normal. No masses, organomegaly  NEURO: Gait normal. Reflexes normal and symmetric. Sensation grossly WNL.  SKIN: no suspicious lesions or rashes and striae bilateral abdomen.   JOINT/EXTREMITIES: +2-3 edema to below bilateral knees    Medications:  Reviewed    Endocrine Labs:  Last Comprehensive Metabolic Panel:  Sodium   Date Value Ref Range Status   04/27/2023 144 136 - 145 mmol/L Final     Sodium POCT   Date Value Ref Range Status   04/27/2023 144 133 - 144 mmol/L Final     Potassium   Date Value Ref Range Status   04/27/2023 2.8 (L) 3.4 - 5.3 mmol/L Final     Potassium POCT   Date Value Ref Range Status   04/27/2023 2.7 (LL) 3.5 - 5.0 mmol/L Final     Chloride   Date Value Ref Range Status   04/27/2023 103 98 - 107 mmol/L Final     Carbon Dioxide (CO2)   Date Value Ref Range Status   04/27/2023 31 (H) 22 - 29 mmol/L Final     Anion Gap   Date Value Ref Range Status   04/27/2023 10 7 - 15 mmol/L Final     Glucose   Date Value Ref Range Status   04/27/2023 118 (H) 70 - 99 mg/dL Final     GLUCOSE BY METER  POCT   Date Value Ref Range Status   04/27/2023 82 70 - 99 mg/dL Final     Glucose Whole Blood POCT   Date Value Ref Range Status   04/27/2023 85 70 - 99 mg/dL Final     Urea Nitrogen   Date Value Ref Range Status   04/27/2023 19.9 6.0 - 20.0 mg/dL Final     Creatinine   Date Value Ref Range Status   04/27/2023 0.85 0.67 - 1.17 mg/dL Final     GFR Estimate   Date Value Ref Range Status   04/27/2023 >90 >60 mL/min/1.73m2 Final     Comment:     eGFR calculated using 2021 CKD-EPI equation.     Calcium   Date Value Ref Range Status   04/27/2023 8.5 (L) 8.6 - 10.0 mg/dL Final     Bilirubin Total   Date Value Ref Range Status   04/23/2023 0.4 <=1.2 mg/dL Final     Alkaline Phosphatase   Date Value Ref Range Status   04/23/2023 285 (H) 40 - 129 U/L Final     ALT   Date Value Ref Range Status   04/23/2023 101 (H) 10 - 50 U/L Final     AST   Date Value Ref Range Status   04/23/2023 31 10 - 50 U/L Final      Hemoglobin A1C Adrenal Corticotropin Cortisol Serum   Latest Ref Rng <5.7 % <47 pg/mL ug/dL   2/16/2023  8:54 AM 7.6 (H)      4/5/2023  2:35 AM  321 (H)  46.3    4/5/2023  6:11 AM 9.5 (H)      4/6/2023  6:26 AM   48.7    4/7/2023  8:42 AM  273 (H)  41.1    4/9/2023  9:12 AM  364 (H)  37.8       T4 Free Insulin Growth Factor 1 (External)   Latest Ref Rng 0.90 - 1.70 ng/dL 50 - 317 ng/mL   4/5/2023  6:11 AM 1.30     4/5/2023  11:10 AM  <10 (L)       TSH Phosphorus   Latest Ref Rng 0.30 - 4.20 uIU/mL 2.5 - 4.5 mg/dL   4/5/2023  6:11 AM <0.01 (L)     4/24/2023  10:06 AM  3.2    4/25/2023  7:58 AM  3.0    4/26/2023  8:59 AM  2.4 (L)    4/27/2023  5:41 AM  2.2 (L)       Preoperative MRI brain on 4/24/2023:  FINDINGS:  Unchanged size of the heterogeneously enhancing large pituitary mass  measuring 4.2 x 3.1 cm, previously 4.3 x 3.1 cm. Invasion of bilateral  right greater than left cavernous sinuses with encasement of bilateral  internal carotid artery cavernous segments. Asymmetric smaller size of  the right cavernous segment  internal carotid artery which is similar  to prior. The mass invades and erodes the superoposterior aspect of  the clivus. The mass also extends into and effaces the prepontine  cistern. The mass abuts the basilar artery. The mass grows anteriorly  on the left towards the orbital apex. Mass grows superiorly on both  the right and left aspects of the pituitary stalk although more so on  the right similar to prior. The stalk is relatively midline. The optic  chiasm is nondisplaced. Central necrosis of the right aspect of the  mass similar to prior.     Unchanged ill-defined enhancement in the lateral right frontal lobe  and a small focus in the left putamen with associated susceptibility  effect most suggestive of capillary telangiectasia.     Ventricles are proportionate to the cerebral sulci. Moderate confluent  and scattered T2/FLAIR hyperintensity in the cortical and deep  cerebral white matter suggestive of chronic small vessel ischemic  disease similar to prior. No suspicious restricted diffusion.     Mild mucosal thickening of the maxillary sinuses mostly inferiorly.  Trace mucosal thickening in the ethmoid air cells and frontal sinuses.  Mild right mastoid air cell effusion.                                                                      IMPRESSION:  1. Unchanged large partially necrotic enhancing sellar mass with local  invasion.  2. Benign appearing vascular lesions in the right frontal lobe and  left basal ganglia most suggestive of capillary telangiectasia.  3. White matter changes suggestive of chronic small vessel ischemic  disease.      Assessment and plan:  Gustavo Faria is a 58 year old male with PMHx of  ACTH-secreting macroadenoma c/b central hypothyroidism, and central hypogonadism, s/p transphenoidal surgery 5/2021 and 7/2021 in [place] has baseline records 3rd nerve palsy, ongoing serratia RLE cellulitis c/b recent serratia bacteremia, HFrEF, T2DM, and HTN who [facility] for surgical intervention of known expanding large sellar/suprasellar mass extending into cavernous sinuses and subsequent cranial nerve impingement.    #Pituitary macroadenoma:  #Cushing's disease:  #Pituitary apoplexy:  Surgery today (4/27/2023)  Currently blood pressure within the target, sodium, potassium level within normal range.     Recommendations for the day of the surgery:  -Reduce spironolactone dose for tomorrow morning to 100 mg.  -Hold the fixed dose of KCl 40 mEq with getting early morning BMP to assess if he needs any potassium correction before the surgery.  -no need to initiate the steroids replacement unless he decompensates during the surgery or postoperatively (in that situation to start on stress dose steroids to be given hydrocortisone 100 mg and to continue with 50 mg 3 times daily).  -If no steroids received with induction of the anesthesia , he can get cortisol level following the surgery followed by a.m. cortisol level checks, and if the cortisol level is less than 10 to start on high-dose steroids.  -Close monitoring for DI following the surgery with getting sodium level check 6 hourly and strict I's and O's if the urine output is more than 300 mill for 3 consecutive hours to obtain urine specific gravity if <=1.005 and to obtain urine and serum osmolarity if the urine osmolarity is less<300, or if develop hypernatremia to be given dose of desmopressin 0.1 mcg IV.     #Central hypothyroidism:  Prior to admission was on levothyroxine 100 mcg daily.  Free T4 on admission 1.3 and TSH not detectable (picture of central hypothyroidism).     Recommendations:  -Continue with PTA levothyroxine 100 mcg daily.     #DM type II: Hemoglobin A1c on admission 9.5%.  Prior to admission was on Sitagliptin 100 mg daily/metformin 500 mg twice daily, Jardiance 12.5 mg.  Currently fasting BG above target at 225.     Recommendations for the day of the surgery: (Given most of the insulin resistance is  due to Cushing disease we are anticipating change in insulin sensitivity following the surgery):     -Reduce the dose of Lantus morning of surgery to 10 units and to start on non-DKA insulin drip in the morning prior to getting to the OR.  -While on insulin drip hold the correction scale.  -Postoperatively with advancing the diet can resume lower carb coverage of 1 unit: 20 g CHO while on non-DKA insulin drip.  -We will transition him on Friday back to subcutaneous insulin after determining the new insulin requirement.    Case discussed with Dr. Shon Borja, MS4, scribed for Dr. Smitha Menendez. I, Dr. Smitha Menendez reviewed, edited the aforementioned note and personally performed the entire clinical encounter documented in this note.l    I was present with the medical student who participated in the service and in the documentation of the note.  Pt not seen today as he is in the OR. Signed April 27, 2023    Smitha Menendez MD, MS    [phone]

## 2023-04-27 NOTE — PROGRESS NOTES
[facility], [place]   04/27/2023  Neurosurgery Progress Note:    Assessment:  Gustavo Faria is a 58 year old male with a PMH of DMII, HFrEF, BLE open wounds, history of serratia bacteremia in December 2022,  pituitary ACTH secreting macroadenoma s/p EEA for resection on 5/2021 and 7/2021 in [place], with baseline right CN 3 palsy, who was admitted at the [facility] on 3/23. He developed sudden headache and vision changes on 3/25, with MRI brain on 3/28, which demonstrated enlargement of known pituitary adenoma, with necrosis extending laterally beyond the left cavernous sinus, concerning for compression of cranial nerves at cavernous sinus. 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      Given that he is past the acute period of apoplexy, there is no indication for emergent decompression. He would benefit for redo EEA for resection of adenoma given its symptomatic nature. However, he would need to be medically optimized from the metabolic and infectious standpoint prior to surgery. He would also benefit from a multidisciplinary approach with recommendations from ophthalmology, endocrinology and radiation oncology regarding options for treatment.     Plan:  -Patient's wife is the legal decision maker-consent obtained for the surgery-uploaded in chart  - OR today with ENT colleagues for resection of tumor  - CT/CTA head- stereotaxy protocol to be completed  - Hb this AM 6.9- getting blood transfusion before OR  - Continue to hold ASA  -----------------------------------  Oliver Duong  Neurosurgery Resident PGY2    Please contact neurosurgery resident on call with questions.    Dial * * [phone], enter [phone] when prompted.    Interval History: No acute events overnight that were brought to neurosurgery's attention. Stable exam.    Objective:   Temp:  [98  F  (36.7  C)-98.7  F (37.1  C)] 98.7  F (37.1  C)  Pulse:  [74-89] 74  Resp:  [16-20] 20  BP: (121-159)/() 147/104  SpO2:  [97 %-99 %] 99 %  I/O last 3 completed shifts:  In: 1190 [P.O.:1180; I.V.:10]  Out: 300 [Urine:300]    NEUROLOGIC:  -- Opens eyes to voice, following commands, oriented x3  Cranial Nerves:  -- visual fields full to confrontation although with limited participation, PERRL anisocoric L>R and sluggish, left CN III paresis and CN VI palsy, restricted ROM in right EOM with no apparent asymmetry   -- face symmetrical, tongue midline  -- sensory V1-V3 intact bilaterally  -- palate elevates symmetrically, uvula midline  -- hearing grossly intact bilat     Motor:  Antigravity x4 extremities     Sensory:  intact to LT x 4 extremities      Reflexes:       Bi Tri BR Omi Pat Ach Bab     C5-6 C7-8 C6 UMN L2-4 S1 UMN   R 2+ 2+ 2+ Norm 2+ 2+ Norm   L 2+ 2+ 2+ Norm 2+ 2+ Norm      Gait: Deferred.     LABS:  Recent Labs   Lab 04/27/23  0541 04/27/23  0252 04/26/23  2332 04/26/23  1151 04/26/23  0859 04/25/23  1703 04/25/23  1616 04/25/23  1041 04/25/23  0758     --   --   --  139  --   --   --  143   POTASSIUM 3.4  --   --   --  4.1  --  3.3*  --  2.9*   CHLORIDE 101  --   --   --  101  --   --   --  100   CO2 29  --   --   --  27  --   --   --  28   ANIONGAP 10  --   --   --  11  --   --   --  15   * 232* 289*   < > 297*   < >  --    < > 193*   BUN 22.1*  --   --   --  21.3*  --   --   --  18.0   CR 0.93  --   --   --  0.98  --   --   --  1.01   KORIN 8.3*  --   --   --  8.6  --   --   --  8.8    < > = values in this interval not displayed.       Recent Labs   Lab 04/27/23  0541   WBC 12.1*   RBC 2.73*   HGB 6.9*   HCT 24.1*   MCV 88   MCH 25.3*   MCHC 28.6*   RDW 27.3*          IMAGING:  Recent Results (from the past 24 hour(s))   CT Head w/o Contrast    Impression    RESIDENT PRELIMINARY INTERPRETATION  Impression: Large pituitary mass, similar to previous MRI. Limited  imaging performed  primarily for the purposes of stereotactic  localization.

## 2023-04-27 NOTE — PLAN OF CARE
Goal Outcome Evaluation:                 Outcome Evaluation: Pleasant but slightly nervous this morning.  Hgb 6.9 this morning.  1 Unit PRBC's started at approx 0845.  Tolerated well.  Insulin gtt started this am as well-per orders.  Scheduled lantus and other medications given with ok from care team.  Sister arrived and in room with patient.  Mepilex placed on buttocks where there are some open areas.  Barrier cream washed off.  Left 5a to go to pre-op at approximately 1020.  Blood nearly complete.  Post blood labs not drawn at time of departure yet.

## 2023-04-28 ENCOUNTER — APPOINTMENT (OUTPATIENT)
Dept: OCCUPATIONAL THERAPY | Facility: CLINIC | Age: 58
DRG: 614 | End: 2023-04-28
Payer: COMMERCIAL

## 2023-04-28 LAB
ANION GAP SERPL CALCULATED.3IONS-SCNC: 11 MMOL/L (ref 7–15)
ANION GAP SERPL CALCULATED.3IONS-SCNC: 14 MMOL/L (ref 7–15)
ANION GAP SERPL CALCULATED.3IONS-SCNC: 18 MMOL/L (ref 7–15)
APTT PPP: 23 SECONDS (ref 22–38)
BUN SERPL-MCNC: 20.2 MG/DL (ref 6–20)
BUN SERPL-MCNC: 20.9 MG/DL (ref 6–20)
BUN SERPL-MCNC: 22.5 MG/DL (ref 6–20)
CALCIUM SERPL-MCNC: 7.6 MG/DL (ref 8.6–10)
CALCIUM SERPL-MCNC: 7.7 MG/DL (ref 8.6–10)
CALCIUM SERPL-MCNC: 8.3 MG/DL (ref 8.6–10)
CHLORIDE SERPL-SCNC: 100 MMOL/L (ref 98–107)
CHLORIDE SERPL-SCNC: 101 MMOL/L (ref 98–107)
CHLORIDE SERPL-SCNC: 99 MMOL/L (ref 98–107)
CORTIS SERPL-MCNC: 38.2 UG/DL
CREAT SERPL-MCNC: 0.77 MG/DL (ref 0.67–1.17)
CREAT SERPL-MCNC: 0.8 MG/DL (ref 0.67–1.17)
CREAT SERPL-MCNC: 0.83 MG/DL (ref 0.67–1.17)
DEPRECATED HCO3 PLAS-SCNC: 23 MMOL/L (ref 22–29)
DEPRECATED HCO3 PLAS-SCNC: 25 MMOL/L (ref 22–29)
DEPRECATED HCO3 PLAS-SCNC: 28 MMOL/L (ref 22–29)
ERYTHROCYTE [DISTWIDTH] IN BLOOD BY AUTOMATED COUNT: 22.9 % (ref 10–15)
ERYTHROCYTE [DISTWIDTH] IN BLOOD BY AUTOMATED COUNT: 23.2 % (ref 10–15)
GFR SERPL CREATININE-BSD FRML MDRD: >90 ML/MIN/1.73M2
GLUCOSE BLDC GLUCOMTR-MCNC: 156 MG/DL (ref 70–99)
GLUCOSE BLDC GLUCOMTR-MCNC: 180 MG/DL (ref 70–99)
GLUCOSE BLDC GLUCOMTR-MCNC: 180 MG/DL (ref 70–99)
GLUCOSE BLDC GLUCOMTR-MCNC: 223 MG/DL (ref 70–99)
GLUCOSE BLDC GLUCOMTR-MCNC: 242 MG/DL (ref 70–99)
GLUCOSE BLDC GLUCOMTR-MCNC: 252 MG/DL (ref 70–99)
GLUCOSE BLDC GLUCOMTR-MCNC: 307 MG/DL (ref 70–99)
GLUCOSE BLDC GLUCOMTR-MCNC: 347 MG/DL (ref 70–99)
GLUCOSE BLDC GLUCOMTR-MCNC: 83 MG/DL (ref 70–99)
GLUCOSE BLDC GLUCOMTR-MCNC: 95 MG/DL (ref 70–99)
GLUCOSE SERPL-MCNC: 204 MG/DL (ref 70–99)
GLUCOSE SERPL-MCNC: 323 MG/DL (ref 70–99)
GLUCOSE SERPL-MCNC: 330 MG/DL (ref 70–99)
HCT VFR BLD AUTO: 30.3 % (ref 40–53)
HCT VFR BLD AUTO: 31.7 % (ref 40–53)
HGB BLD-MCNC: 9.3 G/DL (ref 13.3–17.7)
HGB BLD-MCNC: 9.7 G/DL (ref 13.3–17.7)
HOLD SPECIMEN: NORMAL
INR PPP: 1.02 (ref 0.85–1.15)
MAGNESIUM SERPL-MCNC: 1.8 MG/DL (ref 1.7–2.3)
MAGNESIUM SERPL-MCNC: 1.9 MG/DL (ref 1.7–2.3)
MCH RBC QN AUTO: 26.6 PG (ref 26.5–33)
MCH RBC QN AUTO: 26.6 PG (ref 26.5–33)
MCHC RBC AUTO-ENTMCNC: 30.6 G/DL (ref 31.5–36.5)
MCHC RBC AUTO-ENTMCNC: 30.7 G/DL (ref 31.5–36.5)
MCV RBC AUTO: 87 FL (ref 78–100)
MCV RBC AUTO: 87 FL (ref 78–100)
PHOSPHATE SERPL-MCNC: 2.2 MG/DL (ref 2.5–4.5)
PLATELET # BLD AUTO: 362 10E3/UL (ref 150–450)
PLATELET # BLD AUTO: 380 10E3/UL (ref 150–450)
POTASSIUM SERPL-SCNC: 2.8 MMOL/L (ref 3.4–5.3)
POTASSIUM SERPL-SCNC: 3 MMOL/L (ref 3.4–5.3)
POTASSIUM SERPL-SCNC: 3.5 MMOL/L (ref 3.4–5.3)
POTASSIUM SERPL-SCNC: 3.5 MMOL/L (ref 3.4–5.3)
POTASSIUM SERPL-SCNC: 3.6 MMOL/L (ref 3.4–5.3)
RBC # BLD AUTO: 3.49 10E6/UL (ref 4.4–5.9)
RBC # BLD AUTO: 3.65 10E6/UL (ref 4.4–5.9)
SODIUM SERPL-SCNC: 139 MMOL/L (ref 136–145)
SODIUM SERPL-SCNC: 140 MMOL/L (ref 136–145)
WBC # BLD AUTO: 15.2 10E3/UL (ref 4–11)
WBC # BLD AUTO: 16.4 10E3/UL (ref 4–11)

## 2023-04-28 PROCEDURE — 82533 TOTAL CORTISOL: CPT

## 2023-04-28 PROCEDURE — 99231 SBSQ HOSP IP/OBS SF/LOW 25: CPT | Performed by: INTERNAL MEDICINE

## 2023-04-28 PROCEDURE — 36415 COLL VENOUS BLD VENIPUNCTURE: CPT

## 2023-04-28 PROCEDURE — 250N000013 HC RX MED GY IP 250 OP 250 PS 637

## 2023-04-28 PROCEDURE — 36592 COLLECT BLOOD FROM PICC: CPT

## 2023-04-28 PROCEDURE — 85730 THROMBOPLASTIN TIME PARTIAL: CPT | Performed by: STUDENT IN AN ORGANIZED HEALTH CARE EDUCATION/TRAINING PROGRAM

## 2023-04-28 PROCEDURE — 83735 ASSAY OF MAGNESIUM: CPT | Performed by: STUDENT IN AN ORGANIZED HEALTH CARE EDUCATION/TRAINING PROGRAM

## 2023-04-28 PROCEDURE — 97535 SELF CARE MNGMENT TRAINING: CPT | Mod: GO

## 2023-04-28 PROCEDURE — 83735 ASSAY OF MAGNESIUM: CPT

## 2023-04-28 PROCEDURE — 85610 PROTHROMBIN TIME: CPT | Performed by: STUDENT IN AN ORGANIZED HEALTH CARE EDUCATION/TRAINING PROGRAM

## 2023-04-28 PROCEDURE — 85027 COMPLETE CBC AUTOMATED: CPT

## 2023-04-28 PROCEDURE — 250N000013 HC RX MED GY IP 250 OP 250 PS 637: Performed by: INTERNAL MEDICINE

## 2023-04-28 PROCEDURE — 36415 COLL VENOUS BLD VENIPUNCTURE: CPT | Performed by: STUDENT IN AN ORGANIZED HEALTH CARE EDUCATION/TRAINING PROGRAM

## 2023-04-28 PROCEDURE — 84100 ASSAY OF PHOSPHORUS: CPT

## 2023-04-28 PROCEDURE — 85027 COMPLETE CBC AUTOMATED: CPT | Performed by: STUDENT IN AN ORGANIZED HEALTH CARE EDUCATION/TRAINING PROGRAM

## 2023-04-28 PROCEDURE — 250N000013 HC RX MED GY IP 250 OP 250 PS 637: Performed by: STUDENT IN AN ORGANIZED HEALTH CARE EDUCATION/TRAINING PROGRAM

## 2023-04-28 PROCEDURE — 250N000013 HC RX MED GY IP 250 OP 250 PS 637: Performed by: PHYSICIAN ASSISTANT

## 2023-04-28 PROCEDURE — 84295 ASSAY OF SERUM SODIUM: CPT

## 2023-04-28 PROCEDURE — 250N000009 HC RX 250: Performed by: STUDENT IN AN ORGANIZED HEALTH CARE EDUCATION/TRAINING PROGRAM

## 2023-04-28 PROCEDURE — 99232 SBSQ HOSP IP/OBS MODERATE 35: CPT | Mod: GC | Performed by: INTERNAL MEDICINE

## 2023-04-28 PROCEDURE — 258N000003 HC RX IP 258 OP 636

## 2023-04-28 PROCEDURE — 97165 OT EVAL LOW COMPLEX 30 MIN: CPT | Mod: GO

## 2023-04-28 PROCEDURE — 97530 THERAPEUTIC ACTIVITIES: CPT | Mod: GO

## 2023-04-28 PROCEDURE — 84132 ASSAY OF SERUM POTASSIUM: CPT | Performed by: STUDENT IN AN ORGANIZED HEALTH CARE EDUCATION/TRAINING PROGRAM

## 2023-04-28 PROCEDURE — 250N000011 HC RX IP 250 OP 636

## 2023-04-28 PROCEDURE — 120N000002 HC R&B MED SURG/OB UMMC

## 2023-04-28 PROCEDURE — 36592 COLLECT BLOOD FROM PICC: CPT | Performed by: STUDENT IN AN ORGANIZED HEALTH CARE EDUCATION/TRAINING PROGRAM

## 2023-04-28 RX ORDER — NALOXONE HYDROCHLORIDE 0.4 MG/ML
0.2 INJECTION, SOLUTION INTRAMUSCULAR; INTRAVENOUS; SUBCUTANEOUS
Status: DISCONTINUED | OUTPATIENT
Start: 2023-04-28 | End: 2023-05-19 | Stop reason: HOSPADM

## 2023-04-28 RX ORDER — SODIUM CHLORIDE 9 MG/ML
INJECTION, SOLUTION INTRAVENOUS CONTINUOUS
Status: DISCONTINUED | OUTPATIENT
Start: 2023-04-29 | End: 2023-04-29

## 2023-04-28 RX ORDER — NICOTINE POLACRILEX 4 MG
15-30 LOZENGE BUCCAL
Status: DISCONTINUED | OUTPATIENT
Start: 2023-04-28 | End: 2023-04-29

## 2023-04-28 RX ORDER — OXYCODONE HYDROCHLORIDE 5 MG/1
5 TABLET ORAL EVERY 4 HOURS PRN
Status: DISCONTINUED | OUTPATIENT
Start: 2023-04-28 | End: 2023-05-04

## 2023-04-28 RX ORDER — SPIRONOLACTONE 100 MG/1
100 TABLET, FILM COATED ORAL DAILY
Status: DISCONTINUED | OUTPATIENT
Start: 2023-04-28 | End: 2023-04-29

## 2023-04-28 RX ORDER — BISACODYL 10 MG
10 SUPPOSITORY, RECTAL RECTAL ONCE
Status: DISCONTINUED | OUTPATIENT
Start: 2023-04-28 | End: 2023-05-04

## 2023-04-28 RX ORDER — POTASSIUM CHLORIDE 20MEQ/15ML
20 LIQUID (ML) ORAL ONCE
Status: COMPLETED | OUTPATIENT
Start: 2023-04-28 | End: 2023-04-28

## 2023-04-28 RX ORDER — POTASSIUM CHLORIDE 20MEQ/15ML
40 LIQUID (ML) ORAL ONCE
Status: COMPLETED | OUTPATIENT
Start: 2023-04-28 | End: 2023-04-28

## 2023-04-28 RX ORDER — DEXTROSE MONOHYDRATE 25 G/50ML
25-50 INJECTION, SOLUTION INTRAVENOUS
Status: DISCONTINUED | OUTPATIENT
Start: 2023-04-28 | End: 2023-04-29

## 2023-04-28 RX ORDER — NALOXONE HYDROCHLORIDE 0.4 MG/ML
0.4 INJECTION, SOLUTION INTRAMUSCULAR; INTRAVENOUS; SUBCUTANEOUS
Status: DISCONTINUED | OUTPATIENT
Start: 2023-04-28 | End: 2023-05-19 | Stop reason: HOSPADM

## 2023-04-28 RX ORDER — POTASSIUM CHLORIDE 750 MG/1
40 TABLET, EXTENDED RELEASE ORAL ONCE
Status: COMPLETED | OUTPATIENT
Start: 2023-04-28 | End: 2023-04-28

## 2023-04-28 RX ADMIN — Medication 5 ML: at 11:33

## 2023-04-28 RX ADMIN — Medication 1 TABLET: at 11:19

## 2023-04-28 RX ADMIN — BISACODYL 10 MG RECTAL SUPPOSITORY 10 MG: at 11:35

## 2023-04-28 RX ADMIN — POTASSIUM & SODIUM PHOSPHATES POWDER PACK 280-160-250 MG 1 PACKET: 280-160-250 PACK at 04:00

## 2023-04-28 RX ADMIN — ACETAMINOPHEN 975 MG: 325 TABLET, FILM COATED ORAL at 11:20

## 2023-04-28 RX ADMIN — HUMAN INSULIN 4 UNITS/HR: 100 INJECTION, SOLUTION SUBCUTANEOUS at 18:52

## 2023-04-28 RX ADMIN — SODIUM CHLORIDE: 9 INJECTION, SOLUTION INTRAVENOUS at 00:51

## 2023-04-28 RX ADMIN — POTASSIUM CHLORIDE 40 MEQ: 40 SOLUTION ORAL at 11:34

## 2023-04-28 RX ADMIN — HUMAN INSULIN 2 UNITS/HR: 100 INJECTION, SOLUTION SUBCUTANEOUS at 16:46

## 2023-04-28 RX ADMIN — Medication 5 ML: at 01:06

## 2023-04-28 RX ADMIN — SPIRONOLACTONE 100 MG: 100 TABLET ORAL at 18:25

## 2023-04-28 RX ADMIN — OXYCODONE HYDROCHLORIDE 5 MG: 5 TABLET ORAL at 11:33

## 2023-04-28 RX ADMIN — POTASSIUM & SODIUM PHOSPHATES POWDER PACK 280-160-250 MG 1 PACKET: 280-160-250 PACK at 11:18

## 2023-04-28 RX ADMIN — POTASSIUM CHLORIDE 20 MEQ: 20 SOLUTION ORAL at 13:54

## 2023-04-28 RX ADMIN — LEVOTHYROXINE SODIUM 100 MCG: 100 TABLET ORAL at 11:19

## 2023-04-28 RX ADMIN — Medication 5 ML: at 17:52

## 2023-04-28 RX ADMIN — SALINE NASAL SPRAY 2 SPRAY: 1.5 SOLUTION NASAL at 18:23

## 2023-04-28 RX ADMIN — FLUTICASONE PROPIONATE 1 SPRAY: 50 SPRAY, METERED NASAL at 11:21

## 2023-04-28 RX ADMIN — OXYCODONE HYDROCHLORIDE 5 MG: 5 TABLET ORAL at 04:00

## 2023-04-28 RX ADMIN — POTASSIUM & SODIUM PHOSPHATES POWDER PACK 280-160-250 MG 1 PACKET: 280-160-250 PACK at 00:52

## 2023-04-28 ASSESSMENT — ACTIVITIES OF DAILY LIVING (ADL)
ADLS_ACUITY_SCORE: 42
ADLS_ACUITY_SCORE: 46
ADLS_ACUITY_SCORE: 42
ADLS_ACUITY_SCORE: 46
ADLS_ACUITY_SCORE: 42

## 2023-04-28 NOTE — OP NOTE
Procedure Date: 04/27/2023    PREOPERATIVE DIAGNOSES:     1.  ACTH-secreting adenoma, status post 2 prior nasal surgeries.  2.  Right cranial nerve III palsy.  3.  Left cranial nerve VI palsy.  4.  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    POSTOPERATIVE DIAGNOSES:     1.  ACTH-secreting adenoma, status post 2 prior nasal surgeries.  2.  Right cranial nerve III palsy.  3.  Left cranial nerve VI palsy.  4.  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     PROCEDURE PERFORMED:     1.  Endoscopic endonasal transcavernous approach for resection of functional pituitary adenoma.  2.  [place] of right-sided pedicled nasoseptal flap, which was pedicled off the posterior septal branch of the sphenopalatine artery.    SURGEON:  Kendall Arellano MD.    CO-SURGEON:  Rod Jeong MD.    RESIDENTS:  1.  Devika Jackson.  2.  Bud White.    DESCRIPTION OF PROCEDURE IN DETAIL:  The patient was identified in the preoperative holding area.  Consent was confirmed.  He was subsequently brought back to the operating room, where general anesthesia was induced and he was intubated without issue.  The eyes were protected.  A timeout was performed, and all were in agreement.  He was subsequently turned 180 degrees and prepped and draped in standard fashion in preparation for endoscopic sinonasal surgery.  The neurosurgery service performed image guidance registration.  We then started the procedure by decongesting the bilateral nasal cavities with 1:100,000 epinephrine-soaked pledgets.  We started with a transpterygoid approach on the left side.  We performed maxillary antrostomy, total ethmoidectomy as well as wide sphenoidotomy.  The mucosa over the sphenoid rostrum and pterygoid bone was elevated after removing the back wall of the maxillary sinus using a Kerrison 2 rongeur.  The pterygoid bone was then drilled.  The sphenoid sinus mucosa on the left side was stripped using a Johnson elevator as well as Blanaomi  forceps.    We then started harvesting a right-sided pedicled nasoseptal flap, which was pedicled off the posterior septal branch of the sphenopalatine artery.  We started by delimiting a nasoseptal flap pedicle using needle tip Bovie electrocautery, starting superiorly at the sphenoid ostium.  The superior incision was carried anteriorly below the olfactory cleft, and the nasal septal flap body was then carried inferiorly and stopped just posterior to the mucocutaneous junction.  The inferior incision was started at the choanal arch and was carried anteriorly over the vomer and inferiorly onto the nasal surface of the hard palate.  It was connected with our superior incision just posterior to the mucocutaneous junction.  It was then painstakingly elevated off the bone and cartilage of the septum.  There was fat and scarring of the posterior septum, which was identified then removed.  Once it was isolated, the pedicle was inserted in the nasopharynx for later use.  There was debris and reconstructive material within the sphenoid sinus, which was removed using Blakesley forceps as well as a microdebrider.  The sphenoid sinus mucosa was then stripped using a [place] elevator as well as Blakesley forceps.   The intersinus septations and sphenoid rostrum and pterygoid bone were drilled bilaterally.  The bone was drilled superiorly towards the planum skull base as well.  We then had binaural access to the sphenoid sinus for and were joined by the neurosurgery service, then tumor resection ensued.    Briefly, the bone over the sellar dura was removed.  The bone over the parasellar carotid artery, cavernous and clival carotid artery was removed using a drill and a Kerrison 2 rongeur.  The dura was exposed and then opened in the midline was performed using curved scissor and feather blade.  Tumor was found in the left cavernous sinus anterior to the carotid artery and was extremely fibrous in appearance.  It was removed to  the best of our ability using a combination of pituitary forceps as well as pituitary ring curette.  After this was done, the surgical cavity was then inspected, and it was very difficult to distinguish tumor from postsurgical material and pituitary gland in the right cavernous sinus.  We thus concluded the procedure.  We proceeded with reconstruction.  The surgical bed was lined with Surgicel and Gelfoam for hemostasis.  Meticulous hemostasis was ensured.  I then retrieved the nasoseptal flap from the nasopharynx and placed it over our surgical defect, making sure that all dura was covered and the left carotid artery was covered as well.  The flap was bolstered in place using Surgicel, DuraSeal, Gelfoam and NasoPore sponge.  I fashioned Jean Baptiste splints and sutured them along the septum using a 3-0 nylon suture.  An orogastric tube was placed in the stomach, and the contents were suctioned.  The patient was subsequently returned to anesthesia for awakening.  He was extubated in uneventful fashion and transferred to the PACU in stable condition.    Kendall Arellano MD        D: 2023   T: 2023   MT: jacy/tera    Name:     ROEL ORONA  MRN:      [id number]        Account:        [id number]   :      1965           Procedure Date: 2023     Document: [id number]

## 2023-04-28 NOTE — BRIEF OP NOTE
[facility]    Brief Operative Note    Pre-operative diagnosis: Pituitary adenoma (H) [D35.2]  Post-operative diagnosis Same as pre-operative diagnosis    Procedure: Procedure(s):  SURGICAL PROCEDURE, ENDONASAL, ENDOSCOPIC, USING OPTICAL TRACKING SYSTEM, for pituitary adenoma resection,  Surgeon: Surgeon(s) and Role:     * Rod Jeong MD - Primary     * Kendall Arellano MD - Assisting     * Avani Jackson MD - Resident - Assisting     * Khris White MD - Resident - Assisting  Anesthesia: General   Estimated Blood Loss: 50      Drains: None  Specimens:   ID Type Source Tests Collected by Time Destination   1 : Cavernous tumor  Tissue Brain SURGICAL PATHOLOGY EXAM Rod Jeong MD 4/27/2023  7:44 PM      Findings:   Fibrous tumor strongly aderent to the lateral wall of the left cavernous carotid artery. no CSF leak.  Complications: None.   Implants: * No implants in log *

## 2023-04-28 NOTE — ANESTHESIA POSTPROCEDURE EVALUATION
Patient: Gustavo Faria    Procedure: Procedure(s):  SURGICAL PROCEDURE, ENDONASAL, ENDOSCOPIC, USING OPTICAL TRACKING SYSTEM, for pituitary adenoma resection,       Anesthesia Type:  General    Note:  Disposition: Inpatient   Postop Pain Control: Uneventful            Sign Out: Well controlled pain   PONV: No   Neuro/Psych: Uneventful            Sign Out: Acceptable/Baseline neuro status   Airway/Respiratory: Uneventful            Sign Out: Acceptable/Baseline resp. status   CV/Hemodynamics: Uneventful            Sign Out: Acceptable CV status; No obvious hypovolemia; No obvious fluid overload   Other NRE: NONE   DID A NON-ROUTINE EVENT OCCUR? No           Last vitals:  Vitals Value Taken Time   /75 04/27/23 2145   Temp     Pulse 80 04/27/23 2157   Resp 18 04/27/23 2145   SpO2 95 % 04/27/23 2157   Vitals shown include unvalidated device data.    Electronically Signed By: Benedict Solares MD  April 27, 2023  9:58 PM

## 2023-04-28 NOTE — PROGRESS NOTES
04/28/23 1200   Appointment Info   Signing Clinician's Name / Credentials (OT) LIDIA Townsend   Student Supervision On-site supervision provided   Rehab Comments (OT) Nasal       Present no   Living Environment   People in Home sibling(s)  (2 younger sisters)   Current Living Arrangements condominium   Home Accessibility stairs to enter home   Number of Stairs, Main Entrance greater than 10 stairs   Stair Railings, Main Entrance railings safe and in good condition   Transportation Anticipated family or friend will provide   Living Environment Comments Pt reports living in a condo with his 2 younger sisters. Pt reports sisters are available to assist 24/7.   Self-Care   Usual Activity Tolerance fair   Current Activity Tolerance poor   Regular Exercise No   Equipment Currently Used at Home shower chair   Fall history within last six months yes   Number of times patient has fallen within last six months 1   Activity/Exercise/Self-Care Comment Difficult to assess PLOF, due to pts current mental state, unsure if he is an accurate historian. However, pt reports sisters recently began assisting him with ADLs.   Instrumental Activities of Daily Living (IADL)   IADL Comments Difficult to assess PLOF ue to pts current mental state, unsure if he is an accurate historian. However, pt reports previously sharing IADL responsibilities with sisters.   General Information   Onset of Illness/Injury or Date of Surgery 05/03/23   Referring Physician Rod Jeong MD   Additional Occupational Profile Info/Pertinent History of Current Problem Per EMR, 58 year old male with recent Serratia bacteremia, HFrEF, prolonged QT, lower extremity wounds, DMII, hypothyroidism, low testosterone and known ACTH secreting pituitary adenoma w/resulting Cushing's disease s/p 2 partial resections in 2021 [facility]. [facility], he developed new onset double vision and severe headache which prompted imaging that showed a large mass invading the cavernous sinuses and impinging on the cranial nerves.   Existing Precautions/Restrictions   (nasal)   Limitations/Impairments visual   Left Upper Extremity (Weight-bearing Status) partial weight-bearing (PWB)   Right Upper Extremity (Weight-bearing Status) partial weight-bearing (PWB)   Left Lower Extremity (Weight-bearing Status) full weight-bearing (FWB)   Right Lower Extremity (Weight-bearing Status) full weight-bearing (FWB)   Heart Disease Risk Factors Medical history   General Observations and Info Activity: Up with Assist   Cognitive Status Examination   Orientation Status orientation to person, place and time   Cognitive Status Comments Noted discrepancies in pt's reporting of living environment and history, recommend continued monitoring/assessment of cognition.   Visual Perception   Visual Impairment/Limitations   (CN 3 Palsy)   Sensory   Sensory Quick Adds sensation intact   Range of Motion Comprehensive   General Range of Motion no range of motion deficits identified   Strength Comprehensive (MMT)   Comment, General Manual Muscle Testing (MMT) Assessment Not formally assessed at [facility] d/t nasal precautions, however generalized UE weakness observed.   Coordination   Upper Extremity Coordination No deficits were identified   Transfers   Transfers bed-chair transfer   Transfer Skill: Bed to Chair/Chair to Bed   Bed-Chair [place] (Transfers) 2 person assist  (OH lift)   Balance   Balance Assessment   (Not assessed at [facility] d/t current level of function)   Bathing Assessment/Intervention   [place] Level (Bathing) maximum assist (25% patient effort)   Comment, (Bathing) Per clinical judgement   Lower Body Dressing Assessment/Training   [place] Level (Lower Body Dressing) maximum assist (25% patient effort)   Comment, (Lower Body Dressing) Per clinical judgement   Toileting    [place] Level (Toileting) maximum assist (25% patient effort)   Comment, (Toileting) Per clinical judgement   Clinical Impression   Criteria for Skilled Therapeutic Interventions Met (OT) Yes, treatment indicated   OT Diagnosis Pt is currently functioning below baseline with ADL and IADLs   OT Problem List-Impairments impacting ADL activity tolerance impaired;cognition;strength;post-surgical precautions   Assessment of Occupational Performance 3-5 Performance Deficits   Identified Performance Deficits Dressingf, bathing, toileting, functional transfers, functional mobilty   Planned Therapy Interventions (OT) ADL retraining;cognition;strengthening;transfer training;home program guidelines;progressive activity/exercise   Clinical Decision Making Complexity (OT) low complexity   Risk & Benefits of therapy have been explained evaluation/treatment results reviewed   Clinical Impression Comments Pt would benefit from skilled OT service to addresss deficits in functional mobility, cognition, and over successful perforamce of ADL and IADLs.   OT Total Evaluation Time   OT Eval, Low Complexity Minutes (29048) 10   OT Goals   Therapy Frequency (OT) 5 times/wk   OT Predicted Duration/Target Date for Goal Attainment 05/12/23   OT Goals Hygiene/Grooming;Upper Body Dressing;Lower Body Dressing;Upper Body Bathing;Lower Body Bathing;Cognition   OT: Hygiene/Grooming modified independent   OT: Upper Body Dressing Modified independent   OT: Lower Body Dressing Modified independent   OT: Upper Body Bathing Modified independent   OT: Lower Body Bathing Minimal assist   OT: Toilet Transfer/Toileting Modified independent;Minimal assist   OT: Cognitive Patient/caregiver will verbalize understanding of cognitive assessment results/recommendations as needed for safe discharge planning   Interventions   Interventions Quick Adds Therapeutic Activity;Self-Care/Home Management   Self-Care/Home Management   Self-Care/Home Mgmt/ADL,  Compensatory, Meal Prep Minutes (75235) 8   Symptoms Noted During/After Treatment (Meal Preparation/Planning Training) none   Treatment Detail/Skilled Intervention OT:Facilitated seated toothbrushing task to promote IND with ADLs. Pt SBA with task, required OT to gather supplies. VSS throughout session. Pt left with immediate needs met, chair alarm on, and call light w/in reach.   Therapeutic Activities   Therapeutic Activity Minutes (41911) 15   Symptoms noted during/after treatment none   Treatment Detail/Skilled Intervention OT:Facilitated functional transfer to promote ease and IND with ADLs. Encouraged STP to recliner, however pt insisted OH lift was necessary at this time. Increased time for room set up. Pt CGA with log roll R><L, totalAx2 with OH lift from bed>recliner.   OT Discharge Planning   OT Plan EOB ADLs, SPT to commode, cog assessment?   OT Discharge Recommendation (DC Rec) Transitional Care Facility   OT Rationale for DC Rec Pt significantly below baseline currently needing mod-max A to complete bed mobility and transfers. Pt's d/c location is with his sister who may not be able to provided a high level of assist. TCU is recommended at this time to provide increased time to allow for strengthening and increased ADL IND.   OT Brief overview of current status OH lift x2   Total Session Time   Timed Code Treatment Minutes 23   Total Session Time (sum of timed and untimed services) 33

## 2023-04-28 NOTE — PROGRESS NOTES
Endocrine Progress Note  Patient: Gustavo Faria   MRN: [id number]  Date of Service: 04/28/2023    Subjective:  - Pituitary macroadenoma debulking surgery/resection yesterday (4/27/23)  - Overall, still feels tired but better.  - Vision has improved and he is able to watch TV. Still is a little blurry.   - Maintains an appetite.  - Grateful to be done with the surgery.  - Denies headache, SOB, chest pain, N/V/D.     Physical Examination:  /86 (BP Location: Left arm)   Pulse 95   Temp 98.4  F (36.9  C) (Oral)   Resp 18   Wt 73.4 kg (161 lb 13.4 oz)   SpO2 100%   BMI 26.12 kg/m      Constitutional: healthy, alert and no distress   Cardiovascular: RRR. No murmurs, clicks gallops or rub  Respiratory: lungs CTAB. No increased work of breathing  Psychiatric: mentation appears normal and affect normal/bright  Head: Normocephalic.   Neck: Neck supple. Thyroid symmetric, normal size,  ENT: Nasal bandage with some blood in place   Abdomen: Abdomen soft, non-tender. BS normal. No masses, organomegaly  NEURO: Sensation grossly WNL.  JOINT/EXTREMITIES: +2-3 pitting bilateral lower extremity edema.    Medications:  Reviewed    Endocrine Labs:  Last Comprehensive Metabolic Panel:  Sodium   Date Value Ref Range Status   04/28/2023 139 136 - 145 mmol/L Final     Potassium   Date Value Ref Range Status   04/28/2023 3.0 (L) 3.4 - 5.3 mmol/L Final     Potassium POCT   Date Value Ref Range Status   04/27/2023 3.4 (L) 3.5 - 5.0 mmol/L Final     Chloride   Date Value Ref Range Status   04/28/2023 100 98 - 107 mmol/L Final     Carbon Dioxide (CO2)   Date Value Ref Range Status   04/28/2023 25 22 - 29 mmol/L Final     Anion Gap   Date Value Ref Range Status   04/28/2023 14 7 - 15 mmol/L Final     Glucose   Date Value Ref Range Status   04/28/2023 330 (H) 70 - 99 mg/dL Final     GLUCOSE BY METER POCT   Date Value Ref Range Status   04/28/2023 307 (H) 70 - 99 mg/dL Final     Urea Nitrogen   Date Value Ref Range Status    04/28/2023 20.2 (H) 6.0 - 20.0 mg/dL Final     Creatinine   Date Value Ref Range Status   04/28/2023 0.80 0.67 - 1.17 mg/dL Final     GFR Estimate   Date Value Ref Range Status   04/28/2023 >90 >60 mL/min/1.73m2 Final     Comment:     eGFR calculated using 2021 CKD-EPI equation.     Calcium   Date Value Ref Range Status   04/28/2023 7.7 (L) 8.6 - 10.0 mg/dL Final     Bilirubin Total   Date Value Ref Range Status   04/23/2023 0.4 <=1.2 mg/dL Final     Alkaline Phosphatase   Date Value Ref Range Status   04/23/2023 285 (H) 40 - 129 U/L Final     ALT   Date Value Ref Range Status   04/23/2023 101 (H) 10 - 50 U/L Final     AST   Date Value Ref Range Status   04/23/2023 31 10 - 50 U/L Final        Hemoglobin A1C Adrenal Corticotropin Cortisol Serum   Latest Ref Rng <5.7 % <47 pg/mL ug/dL   2/16/2023  8:54 AM 7.6 (H)      4/5/2023  2:35 AM  321 (H)  46.3    4/5/2023  6:11 AM 9.5 (H)      4/6/2023  6:26 AM   48.7    4/7/2023  8:42 AM  273 (H)  41.1    4/9/2023  9:12 AM  364 (H)  37.8       T4 Free Insulin Growth Factor 1 (External)   Latest Ref Rng 0.90 - 1.70 ng/dL 50 - 317 ng/mL   4/5/2023  6:11 AM 1.30     4/5/2023  11:10 AM  <10 (L)       TSH   Latest Ref Rng 0.30 - 4.20 uIU/mL   2/16/2023  8:54 AM    4/5/2023  2:35 AM    4/5/2023  6:11 AM <0.01 (L)           Assessment and plan:  Gustavo Faria is a 58 year old male with PMHx of  ACTH-secreting macroadenoma c/b central hypothyroidism, and central hypogonadism, s/p transphenoidal surgery 5/2021 and 7/2021 in [place] has baseline records 3rd nerve palsy, ongoing serratia RLE cellulitis c/b recent serratia bacteremia, HFrEF, T2DM, and HTN who was transferred from [facility] for surgical intervention of known expanding large sellar/suprasellar mass extending into cavernous sinuses and subsequent cranial nerve impingement. Now POD#1.      #Pituitary macroadenoma:  #Cushing's disease:  #Pituitary apoplexy:  Unable to remove entire pituitary macroadenoma due to  fibrous tissue. Cortisol level after surgery still elevated at 38.2. Blood pressure within target range. Sodium WNL but hypokalemic to 2.8.   - Restart KCl 40 mEq daily and replete aggressively  - Restart spironolactone 100mg, titrate up as tolerated.  - BMP every 6 hours to monitor sodium and potassium  - Cortisol level in the AM  - Strict I's and O's     #Central hypothyroidism:  Prior to admission was on levothyroxine 100 mcg daily.  Free T4 on admission 1.3 and TSH not detectable (picture of central hypothyroidism).     Recommendations:  -Continue with PTA levothyroxine 100 mcg daily.  - Free T4 tomorrow morning     #DM type II: Hemoglobin A1c on admission 9.5%.  Prior to admission was on Sitagliptin 100 mg daily/metformin 500 mg twice daily, Jardiance 12.5 mg.  Currently fasting BG above target at 252; did receive dexamethasone 4mg yesterday. BG increased since no carb coverage in place. Received Novolog 12 units and Lantus 22 units today.   - Will start insulin drip with transition back to SC insulin once optimal dosing known.   - Start High-dose sliding scale insulin for meals and snacks  - Start NovoLog carb correction at 1 unit:10 CHO  - Hold PTA sitagliptin/metformin, jardiance      Case discussed with Dr. Shon Borja, MS4 scribed for Dr. Smitha Menendez. I, Dr. Smitha Menendez reviewed, edited the aforementioned note and personally performed the entire clinical encounter documented in this note.l    I was present with the medical student who participated in the service and in the documentation of the note.  I have verified the history and personally performed a physical exam and medical decision making.  I agree with assessment and plan of care as documented in the note.  Signed April 28, 2023    Smitha Menendez MD, MS    [phone]

## 2023-04-28 NOTE — PROGRESS NOTES
Pt arrived to pre op holding, pt states pain is tolerable, no nausea. D/C instructions given to pt and family, verbalized understanding. PIV removed prior to D/C.    [employer], [place]   04/28/2023  Neurosurgery Progress Note:    Assessment:  Gustavo Faria is a 58 year old male with a PMH of DMII, HFrEF, BLE open wounds, history of serratia bacteremia in December 2022,  pituitary ACTH secreting macroadenoma s/p EEA for resection on 5/2021 and 7/2021 in [place], with baseline right CN 3 palsy, who was admitted at the [facility] on 3/23. He developed sudden headache and vision changes on 3/25, with MRI brain on 3/28, which demonstrated enlargement of known pituitary adenoma, with necrosis extending laterally beyond the left cavernous sinus, concerning for compression of cranial nerves at cavernous sinus. 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      Patient is POD-1 s/p Endoscopic endonasal transcavernous approach for resection of functional pituitary adenoma with harvesting of right-sided pedicled nasoseptal flap, which was pedicled off the posterior septal branch of the sphenopalatine artery    Plan:  - Watch for DI/CSF leak  - Follow Na  - Follow urine output/ inputs/ outputs  - Scheduled Zofran x 24 hours  - MRI Brain with/without- pituitary protocol today as able  - Follow Hb this AM (received PRBC intra-op)  - Will switch to insulin gtt if BG remains high  - Continue to hold ASA  - Please inform Neurosurgery if there is any change in exam  -----------------------------------  Oliver Duong  Neurosurgery Resident PGY2    Please contact neurosurgery resident on call with questions.    Dial * * [phone], enter [phone] when prompted.    Interval History: No acute events overnight. Stable neurological exam.     Objective:   Temp:  [97.8  F (36.6  C)-99.3  F (37.4  C)] 97.9  F (36.6  C)  Pulse:  [] 74  Resp:  [16-20] 18  BP: (100-141)/(75-98) 141/98  MAP:  [90 mmHg-91 mmHg] 91 mmHg  Arterial Line BP:  (125-129)/(69-70) 129/70  SpO2:  [91 %-100 %] 98 %  I/O last 3 completed shifts:  In: 5315.25 [P.O.:3200; I.V.:1766.25; IV Piggyback:70]  Out: 2600 [Urine:2600]    NEUROLOGIC:  -- Opens eyes to voice, following commands, oriented x3  Cranial Nerves:  -- visual fields full to confrontation although with limited participation, PERRL anisocoric L>R and sluggish, left CN III paresis and CN VI palsy, restricted ROM in right EOM with no apparent asymmetry   -- face symmetrical, tongue midline  -- sensory V1-V3 intact bilaterally  -- palate elevates symmetrically, uvula midline  -- hearing grossly intact bilat     Motor:  Antigravity x4 extremities     Sensory:  intact to LT x 4 extremities      Reflexes:       Bi Tri BR Omi Pat Ach Bab     C5-6 C7-8 C6 UMN L2-4 S1 UMN   R 2+ 2+ 2+ Norm 2+ 2+ Norm   L 2+ 2+ 2+ Norm 2+ 2+ Norm      Gait: Deferred.     LABS:  Recent Labs   Lab 04/28/23  1651 04/28/23  1401 04/28/23  1148 04/28/23  1143 04/28/23  0815 04/27/23  2212 04/27/23  1939 04/27/23  1218 04/27/23  1126   NA  --   --  139  --  140  140  --  143   < > 144   POTASSIUM  --  3.6 3.0*  --  2.8*  --  3.4*   < > 2.8*   CHLORIDE  --   --  100  --  99  --   --   --  103   CO2  --   --  25  --  23  --   --   --  31*   ANIONGAP  --   --  14  --  18*  --   --   --  10   *  --  330* 307* 323*   < > 115*   < > 118*   BUN  --   --  20.2*  --  20.9*  --   --   --  19.9   CR  --   --  0.80  --  0.83  --   --   --  0.85   KORIN  --   --  7.7*  --  7.6*  --   --   --  8.5*    < > = values in this interval not displayed.       Recent Labs   Lab 04/28/23  0815   WBC 15.2*   RBC 3.49*   HGB 9.3*   HCT 30.3*   MCV 87   MCH 26.6   MCHC 30.7*   RDW 23.2*          IMAGING:  No results found for this or any previous visit (from the past 24 hour(s)).

## 2023-04-28 NOTE — PLAN OF CARE
Status: POD 1 pituitary adenoma resection. 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 Baseline right CN 3 palsy  Vitals: VSS on RA. Tachy overnight into the 120s; has since improved, MD notified.   Neuros: A&Ox4. BUE 5/5, BLE 3/5. N/T to toes bilaterally. Periorbital swelling, more pronounced on R. Ptosis to R eye. Pupils sluggish bilaterally. Clear, crusty drainage to R eye. Denies saltly/metallic taste.   IV: DL PICC, purple lumen infusing NS at 100 mL/hour. Red lumen hep locked  Labs/Electrolytes: Hgb recheck 9.7. BG checks AC/HS.  Resp/trach: Denies SOB, weaned off O2   Diet: Regular diet, good appetite. Strict I&Os  Bowel status: LBM this shift x1   : Voiding via perez, output within parameters.   Skin: right and left leg wounds with mepilex in place, weeping serosang drainage. Would to L foot with mepilex. Buttocks/sacral wounds with mepilex in place. Wound care orders in place. Dry/scaly skin to BLE. Edematous to BLE.   Pain: HA partially managed with prn oxycodone x1   Activity: Up with 2/lift. Not oob this shift   Social: No visitors this shift   Plan: Continue to monitor I&Os. Follow POC.     Arrived from: PACU to 6A at 2300   2 RN Skin Assessment Completed by: Rogerio   Non-intact findings documented (yes/no/NA): See above

## 2023-04-28 NOTE — ANESTHESIA CARE TRANSFER NOTE
Patient: Gustavo Faria    Procedure: Procedure(s):  SURGICAL PROCEDURE, ENDONASAL, ENDOSCOPIC, USING OPTICAL TRACKING SYSTEM, for pituitary adenoma resection,       Diagnosis: Pituitary adenoma (H) [D35.2]  Diagnosis Additional Information: No value filed.    Anesthesia Type:   General     Note:    Oropharynx: oropharynx clear of all foreign objects and spontaneously breathing  Level of Consciousness: awake  Oxygen Supplementation: face mask  Level of Supplemental Oxygen (L/min / FiO2): 6  Independent Airway: airway patency satisfactory and stable  Dentition: dentition unchanged  Vital Signs Stable: post-procedure vital signs reviewed and stable  Report to RN Given: handoff report given  Patient transferred to: PACU  Comments: A&O, moving all extremities   No pain  Endorses hunger  Handoff Report: Identifed the Patient, Identified the Reponsible Provider, Reviewed the pertinent medical history, Discussed the surgical course, Reviewed Intra-OP anesthesia mangement and issues during anesthesia, Set expectations for post-procedure period and Allowed opportunity for questions and acknowledgement of understanding      Vitals:  Vitals Value Taken Time   /75 04/27/23 2145   Temp     Pulse 78 04/27/23 2154   Resp 18 04/27/23 2145   SpO2 94 % 04/27/23 2154   Vitals shown include unvalidated device data.    Electronically Signed By: Benedict Solares MD  April 27, 2023  9:55 PM

## 2023-04-28 NOTE — PROGRESS NOTES
[facility]    Medicine Progress Note - Medicine Service, MAROON TEAM 2    Date of Admission:  4/3/2023    Assessment & Plan   Gustavo Faria is a 58 year old male with recent Serratia bacteremia, HFrEF, prolonged QT, lower extremity wounds, DMII, hypothyroidism, low testosterone and known ACTH secreting pituitary adenoma w/resulting Cushing's disease s/p 2 partial resections in 2021 who [facility]. [facility], he developed new onset double vision and severe headache which prompted imaging that showed a large mass invading the cavernous sinuses and impinging on the cranial nerves. He is transferred to [facility] for this pituitary adenoma, concern for progression vs hemorrhage/apoplexy. Complicated by psychosis/metabolic encephalopathy and electrolyte abnormalities secondary to Cushing's syndrome. He is now medically stable and is going to the OR today for surgical removal.    Updates today:  - Neurosurgery now primary but plan to transfer back to medicine in next 1-2 days. Medicine will continue to follow as consultant in meantime  - Endocrinology following closely as well.     # Hypernatremia, DI vs cushing's, resolved  # Hypokalemia, resolved  Hypernatremia and hypokalemia, likely secondary to Cushing's.     - Endocrine following. Managing spironolactone  - On K replacement protocol  - Goal Na: 135-140  - Strict I&Os  - Daily BMP + Mg    # Acute metabolic encephalopathy - resolved  # Pyruria, Candiduria - resolved  # Encephalopathy likely secondary to cushing's disease  Admission symptoms included disorientation, intermittently following directions. Repetitive words - perseverating on particular phrases. Sx started the 2-3 days prior to admission (which [facility] was [facility]). Vulnerable brain (multiple brain surgeries), enlarging pituitary mass. No seizure activity per EEG.  Steroids removed and pt still encephalopathic. Sodium had been in normal limits and patient remained altered. Worked up for infections - did reveal candiduria but unlikely this was etiology, though he did complete an 8 day course of fluconazole. Briefly on ceftriaxone but encephalopathy also did not improve. CT abd w/ contrast (4/10): no evidence of pyelonephritis  Ultimately attributed to Cushings and known pituitary tumor. Continues to remain lucid.  - Surgical plans for pituitary tumor as stated below  - Delirium precautions  - Electrolyte management as above  - PRN quetiapine if agitation that is not responsive to behavioral interventions     # Delusions, intermittent  Intermittent delusions regarding staff stalking patient while in the hospital. Unclear chronicity of these delusions - but has had them multiple times this hospitalization. Unclear psych history. Medical management for encephalopathy as stated above. Psychiatry was consulted during his care and agreed with paranoia and recommended Abilify. He was briefly on Abilify 5mg but patient then started to refuse nightly so this was discontinued. His paranoia and delusions have been very intermittent and appear to be associated with his anxiety.     # ACTH secreting pituitary adenoma c/b type II DM, hypothyroidism and low testosterone s/p two partial resections in 2021   Patient noted double vision and severe headache beginning the evening of 3/25. CT imaging on 3/25 revealed a large sellar/suprasellar mass extending into the cavernous sinuses with no evidence of acute stroke. On 3/28, he underwent an MRI which confirmed the CT findings of a large mass invading the cavernous sinuses and impinging on the cranial nerves. Necrosis extending beyond left cavernous sinus. [facility]. Repeat MRI performed on 4/6/23: compared to 2021 MRI, lesions are smaller. Orbit MRI without optic nerve compression and no evidence of orbital infection.  -  Neurosurgery following closely. 4/27 neurosurgery and ENT combined to perform endoscopic endonasal transcavernous approach for resection of functional pituitary adenoma. Complicated by significant scar tissue to cavernous sinus.   - Ophthalmology following, no major changes, agree with neurosurgery plans  - Endocrine following     # Type II DM, exacerbated by Cushing's   A1c of 7.6% on 2/2023.  - Endocrinology managing  - Hold PTA metformin, empa, and sitagliptin, can resume upon discharge    # Buttocks, sacrum and bilateral groin wounds   # RLE wound from puncture injury   # L dorsal foot wound from presumed trauma   # Soft tissue infection/abscess    # Hx serratia bacteremia in December 2022   For patient's lower extremity wounds and hx of Serratia bacteremia in December, he was [facility]. His antibiotics were broadened to Vanc and Zosyn and ultimately he was transitioned to ceftazidime on 3/23 with plan to complete course on 4/4/23. BCx negative and wound cx grew serratia marcescens at the [facility]. He has been on ceftazidime since. MRI of the right tib/fib on 3/23 was negative for osteomyelitis but did show two fluid collections draining sponateneously. [facility] and determined no intervention was needed as wounds were draining on their own.  - completed course of ceftazidime (3/23 - 4/5)  - WOC following  - PT/OT   - Pain: tylenol PRN, dilaudid 0.5 mg PRN for dressing changes and oxy 5 mg    # HFmrEF, with global hypokinesis  # High burden of PVCs  # Prolonged QTc   # Elevated troponin, down-trended  Coronary angiogram on 2/27 shows normal coronaries. Troponin down-trending (88 -> 77). No chest pain. High burden of PVCs. Follow up ECHO with global hypokinesis, which is worse compared to February 2023 ECHO. EF is 45% (same compared to prior). Likely small vessel disease vs demand vs cardiomyopathy 2/2 ceftazidime. Low concern for acute ACS.   - Electrolyte mgmt as  above  - Lasix 40mg daily --held  - Coreg and lisinopril held. BP currently normal.   - Empagliflozin (see above) - on HOLD   - ASA - on HOLD  - Avoid QTc prolonging medications     # Central Hypothyroidism  - Continue PTA levothyroxine     # Hypogonadism   - Continue PTA androgel (will need to bring in home medication)     # Chronic microcytic anemia   Hgb of 8.4 [facility]. Peripheral smear on 3/30 showed poikilocytosis with occasional red blood cell fragments but no other evidence of hemolysis.  Haptoglobin was normal.  Ferritin was 91, transferrin saturation was low, B12 was 495 and folate was 8.7. Likely combination of iron deficiency as well as inflammation/chronic disease.   - CTM        # Concern for malnutrition   - Nutrition following       Diet: Advance Diet as Tolerated: Regular Diet Adult  Snacks/Supplements Adult: Other; Please send ensure max at 10:00  and HS; Between Meals    DVT Prophylaxis: Pneumatic Compression Devices  Roland Catheter: PRESENT, indication: Strict 1-2 Hour I&O  Lines:   PICC 04/06/23 Double Lumen Right Basilic access-Site Assessment: WDL      Cardiac Monitoring: None  Code Status: Full Code      Clinically Significant Risk Factors        # Hypokalemia: Lowest K = 2.7 mmol/L in last 2 days, will replace as needed       # Hypoalbuminemia: Lowest albumin = 2.9 g/dL at 4/11/2023  7:07 AM, will monitor as appropriate          # DMII: A1C = 9.5 % (Ref range: <5.7 %) within past 6 months    # Severe Malnutrition: based on nutrition assessment        Disposition Plan            Mj Blank MD   of Medicine  Med-Peds Hospitalist  Pager [phone]          Please see A&P for additional details of medical decision making.  25 MINUTES SPENT BY ME on the date of service doing chart review, history, exam, documentation & further activities per the note.    I have personally reviewed the following data over the past 24 hrs:    15.2 (H)  \   9.3 (L)   / 380     140;  140 99 20.9 (H) /  323 (H)   2.8 (L) 23 0.83 \       Procal: N/A CRP: N/A Lactic Acid: 2.1 (H)       INR:  1.02 PTT:  23   D-dimer:  N/A Fibrinogen:  N/A         Kt Blank MD  Date of Service (when I saw the patient): 04/28/23    _________________________________________________________    Interval History   Care team notes reviewed. Surgery was complicated by scar tissue but he tolerated well. Feels much better this am. Wants a suppository for hard stool. Otherwise no complaints.     Physical Exam   Vital Signs: Temp: 98.4  F (36.9  C) Temp src: Oral BP: 118/86 Pulse: 95   Resp: 18 SpO2: 100 % O2 Device: None (Room air) Oxygen Delivery: 4 LPM  Weight: 161 lbs 13.43 oz    Gen: resting comfortably, awakens to voice, NAD  ENT: MMM, nasal gauze in place with some slight bleeding.   CV: RRR, no MRG, no LE edema  Resp: CTAB, non-labored  GI: Soft, NT, ND, NABS      Medical Decision Making           Data     I have personally reviewed the following data over the past 24 hrs:    15.2 (H)  \   9.3 (L)   / 380     140; 140 99 20.9 (H) /  323 (H)   2.8 (L) 23 0.83 \       Procal: N/A CRP: N/A Lactic Acid: 2.1 (H)       INR:  1.02 PTT:  23   D-dimer:  N/A Fibrinogen:  N/A

## 2023-04-28 NOTE — OP NOTE
Procedure Date: 04/27/2023    PREOPERATIVE DIAGNOSES:  1.  Adrenocorticotropic hormone (ACTH) secreting adenoma, status post 2 prior endoscopic endonasal resections at an outside hospital.  2.  Right cranial nerve III palsy.  3.  Left cranial nerve VI palsy.  4.  Possible pituitary apoplectic event as well as multiple endocrinopathies including hypothyroidism and type 2 diabetes in addition to the history of hypercortisolemia.    POSTOPERATIVE DIAGNOSES:  1.  Adrenocorticotropic hormone (ACTH) secreting adenoma, status post 2 prior endoscopic endonasal resections at an outside hospital.  2.  Right cranial nerve III palsy.  3.  Left cranial nerve VI palsy.  4.  Possible pituitary apoplectic event as well as multiple endocrinopathies including hypothyroidism and type 2 diabetes in addition to the history of hypercortisolemia.    PROCEDURES PERFORMED:    1.  Endoscopic endonasal transcavernous approach for resection of functional pituitary adenoma.  2.  Stereotactic neuronavigation using [employer] system.  3.  Neuromonitoring with somatosensory evoked potential (SSEP), electroencephalogram (EEG) as well as bilateral cranial nerve III and cranial nerve VI electromyography (EMG) activity.  4.  Modifier 22 for the surgery for an extra 2 hours of operative time due to the fact that there is significant scar tissue secondary to this being the 3rd operation that this patient has undergone in this region.    ATTENDING SURGEON:  Rod Jeong MD, PhD    CO-SURGEON:  Kendall Arellano MD    RESIDENT SURGEON:    1.  Khris White MD, PhD  2.  Avani Jackson MD    ANESTHESIA:  General endotracheal anesthesia.    ESTIMATED BLOOD LOSS:  50 mL.    INDICATIONS:  The patient is a 58-year-old male with a history of type 2 diabetes, heart failure, multiple recurrent lower extremity wounds resulting in bacteremia, and a pituitary adenoma secreting ACTH, status post endoscopic endonasal approach for pituitary resection  performed at an outside hospital in 05/2021 as well as a redo surgery performed 07/2021.  Of note, the patient has a baseline right cranial nerve III palsy as well as a left cranial nerve VI palsy.  The patient was initially admitted to [facility] in 03/2023 for evaluation and treatment of recurrent lower extremity wounds.  During his hospitalization for the treatment of lower extremity wounds, he developed an acute onset severe headache with associated double vision.  He subsequently underwent an MRI of the brain on 03/28 which demonstrated enlargement of a known pituitary adenoma as well as some portion of necrotic element within the tumor itself, particularly the left cavernous sinus tumor.  In association with this, the patient developed an acute left cranial nerve VI palsy.  However, given the patient's multiple comorbidities and difficulty was transferred to the [facility].  We were unable to intervene immediately for the treatment of the tumor given his unstable comorbidities.  Fortunately, he was hemodynamically stable.  He was eventually transferred to the [facility] on 04/05.  At the time, he was severely encephalopathic secondary to a combination of sepsis as well as hypothyroidism and other metabolic derangements.  After these ailments were managed appropriately, the patient fortunately improved with regard to his mental status.  However, he continues to have a fixed left cranial nerve VI palsy.  He underwent repeat MRI, which demonstrated stable heterogeneously enhancing pituitary mass with infiltration of the bilateral cavernous sinuses as well as the clivus.  This is stable compared to the MRI acquired in 03/2023.  Based on the fact that this is a persistent tumor, with this likely apoplectic event in March, in conjunction with the fact that it is a functional tumor, we had recommended the above operation.  The risks, benefits, and  alternatives were discussed with the patient and he elected to proceed.    DESCRIPTION OF PROCEDURE:  After informed consent was obtained, the patient was brought to the operating room where he was transferred supine onto the operating room table.  General anesthesia and endotracheal intubation were performed by our anesthesia colleagues.  A Roland catheter and arterial line were placed.  The bed was turned 180 degrees.  All pressure points were carefully padded to avoid neurovascular injury.  His head was placed in a horseshoe and he was registered to the [employer] system and excellent registration was achieved.  The left abdominal region as well as the left lateral thigh were marked in anticipation of possible need for fascia aiden or abdominal fat graft harvesting during the procedure.  The patient was then prepped and draped in a standard fashion.    A timeout was performed to verify site and side of surgery as well as with available imaging in the room were corresponded to the correct patient.  Dr. Arellano and his team initiated the approach to the sinus by raising a right nasal septal flap.  Please see Dr. Arellano's note for details regarding this portion of the procedure.      After a wide exposure was obtained by Dr. Arellano, the neurosurgery team of the patient scrubbed into the room.  Using neuronavigation, we attempted to identify landmarks.  However, given the significant scar tissue and distortion of the normal anatomy, this proved challenging.  We were able to identify the floor of the sella as well as the clival recess.  We began there and drilled off this portion of the bone as well as laterally to expose the base of the sella and the likely region of the left cavernous sinus.  We proceeded slowly with extreme caution given the proximity to the cavernous carotid.  We used a Doppler ultrasound frequently and were able to identify the location of the carotid fairly early on.  Using this approach,  we removed the bone overlying the lateral sella and cavernous sinus on the left.  We then turned our attention back to the right and noted significant amount of what appeared to be fat and scar tissue around the location likely right cavernous segment of the carotid artery and right parasellar space.  We removed some of this material; however, we did not identify any clear evidence of tumor given the risk of causing injury to the carotid or other important structures.  Given the distorted nature of the anatomy, we turned our attention back to the left with a goal of resecting the tumor within the left cavernous sinus.  We were able to open the dura over the left sellar region and extended the dural opening over the left cavernous sinus.  Here, we immediately encountered tumor.  The tumor was extremely firm and fibrous in nature.  We obtained several samples, which were sent for permanent pathology.  We further dissected and then using the stimulating plate dissector, we were able to mobilize some amount of tumor off of the lateral cavernous carotid wall. We were able to establish a plane between the carotid artery and the tumor capsule in the cavernous sinus. We then carefully dissected this tumor further and using gentle traction applied with a pituitary forceps, we were able to remove more sections of the tumor, which we sent for permanent pathology.  Unfortunately, the tumor was extremely adherent to the carotid as well as other structures within the cavernous sinus and resection of much of the tumor proved exceedingly difficult.  We were able to debulk a significant portion of the tumor, however.  We then turned our attention to the sellar region, which we were able to positively identify more tumor, which we resected.  However, again given the distorted anatomy and significant scarring, we were not confident in identifying further tumor nor in distinguishing what we suspected to be the normal gland at the  midline.  We again inspected the right parasellar region, but determined the risk of attempting further resection of scar to be too dangerous and at this point determined that we should cease the operation.  The wound was irrigated with copious amounts of irrigation.  There was no CSF leak encountered during the operation.  They were brief periods of intermittent spontaneous firing of the left cranial nerve III and VI, but no sustained abnormal firing nor any changes in SSEP or EEG monitoring during the operation.    We then turned to the reconstruction.  Dr. Arellano then placed the nasoseptal flap over the surgical area and he completed the remainder of the closure.  Please see Dr. Arellano's note for details regarding this reconstruction.    The drapes were taken down.  At the end of the procedure, all counts including needle, instrument, and sponge were correct x2.  The head of the bed was exchanged for the [employer] adapter with a donut.  The bed was turned 180 degrees, and the patient was extubated and transferred to a hospital bed and then PACU for further recovery.    Dr. Jeong, neurosurgery staff, was present during the key and critical portions of the case and immediately available during the entire procedure.    Rod Jeong MD    As Dictated by BACILIO SRIVASTAVA MD, PhD        D: 2023   T: 2023   MT: trisha    Name:     ROEL ORONA  MRN:      [id number]        Account:        [id number]   :      1965           Procedure Date: 2023     Document: [id number]    I was scrubbed and present during the key portions of the neurosurgical portion of the operation and I was immediately available for the entire procedure. Upon conclusion of the procedure, I called the patient's sister for an update.    Rod Jeong MD PhD

## 2023-04-29 ENCOUNTER — APPOINTMENT (OUTPATIENT)
Dept: MRI IMAGING | Facility: CLINIC | Age: 58
DRG: 614 | End: 2023-04-29
Payer: COMMERCIAL

## 2023-04-29 ENCOUNTER — ANESTHESIA (OUTPATIENT)
Dept: SURGERY | Facility: CLINIC | Age: 58
DRG: 614 | End: 2023-04-29
Payer: COMMERCIAL

## 2023-04-29 ENCOUNTER — ANESTHESIA EVENT (OUTPATIENT)
Dept: SURGERY | Facility: CLINIC | Age: 58
DRG: 614 | End: 2023-04-29
Payer: COMMERCIAL

## 2023-04-29 LAB
ANION GAP SERPL CALCULATED.3IONS-SCNC: 10 MMOL/L (ref 7–15)
ANION GAP SERPL CALCULATED.3IONS-SCNC: 10 MMOL/L (ref 7–15)
ANION GAP SERPL CALCULATED.3IONS-SCNC: 11 MMOL/L (ref 7–15)
ANION GAP SERPL CALCULATED.3IONS-SCNC: 13 MMOL/L (ref 7–15)
APTT PPP: 23 SECONDS (ref 22–38)
BUN SERPL-MCNC: 16.4 MG/DL (ref 6–20)
BUN SERPL-MCNC: 17.5 MG/DL (ref 6–20)
BUN SERPL-MCNC: 18.3 MG/DL (ref 6–20)
BUN SERPL-MCNC: 20.4 MG/DL (ref 6–20)
CALCIUM SERPL-MCNC: 8 MG/DL (ref 8.6–10)
CALCIUM SERPL-MCNC: 8 MG/DL (ref 8.6–10)
CALCIUM SERPL-MCNC: 8.2 MG/DL (ref 8.6–10)
CALCIUM SERPL-MCNC: 8.3 MG/DL (ref 8.6–10)
CHLORIDE SERPL-SCNC: 102 MMOL/L (ref 98–107)
CHLORIDE SERPL-SCNC: 103 MMOL/L (ref 98–107)
CHLORIDE SERPL-SCNC: 104 MMOL/L (ref 98–107)
CHLORIDE SERPL-SCNC: 105 MMOL/L (ref 98–107)
CORTIS SERPL-MCNC: 27.4 UG/DL
CREAT SERPL-MCNC: 0.63 MG/DL (ref 0.67–1.17)
CREAT SERPL-MCNC: 0.66 MG/DL (ref 0.67–1.17)
CREAT SERPL-MCNC: 0.66 MG/DL (ref 0.67–1.17)
CREAT SERPL-MCNC: 0.76 MG/DL (ref 0.67–1.17)
DEPRECATED HCO3 PLAS-SCNC: 25 MMOL/L (ref 22–29)
DEPRECATED HCO3 PLAS-SCNC: 28 MMOL/L (ref 22–29)
DEPRECATED HCO3 PLAS-SCNC: 28 MMOL/L (ref 22–29)
DEPRECATED HCO3 PLAS-SCNC: 29 MMOL/L (ref 22–29)
ERYTHROCYTE [DISTWIDTH] IN BLOOD BY AUTOMATED COUNT: 23.3 % (ref 10–15)
GFR SERPL CREATININE-BSD FRML MDRD: >90 ML/MIN/1.73M2
GLUCOSE BLDC GLUCOMTR-MCNC: 106 MG/DL (ref 70–99)
GLUCOSE BLDC GLUCOMTR-MCNC: 107 MG/DL (ref 70–99)
GLUCOSE BLDC GLUCOMTR-MCNC: 113 MG/DL (ref 70–99)
GLUCOSE BLDC GLUCOMTR-MCNC: 169 MG/DL (ref 70–99)
GLUCOSE BLDC GLUCOMTR-MCNC: 180 MG/DL (ref 70–99)
GLUCOSE BLDC GLUCOMTR-MCNC: 190 MG/DL (ref 70–99)
GLUCOSE BLDC GLUCOMTR-MCNC: 78 MG/DL (ref 70–99)
GLUCOSE BLDC GLUCOMTR-MCNC: 81 MG/DL (ref 70–99)
GLUCOSE BLDC GLUCOMTR-MCNC: 82 MG/DL (ref 70–99)
GLUCOSE BLDC GLUCOMTR-MCNC: 98 MG/DL (ref 70–99)
GLUCOSE SERPL-MCNC: 104 MG/DL (ref 70–99)
GLUCOSE SERPL-MCNC: 216 MG/DL (ref 70–99)
GLUCOSE SERPL-MCNC: 73 MG/DL (ref 70–99)
GLUCOSE SERPL-MCNC: 84 MG/DL (ref 70–99)
HCT VFR BLD AUTO: 29.6 % (ref 40–53)
HGB BLD-MCNC: 9 G/DL (ref 13.3–17.7)
INR PPP: 0.94 (ref 0.85–1.15)
MAGNESIUM SERPL-MCNC: 1.9 MG/DL (ref 1.7–2.3)
MCH RBC QN AUTO: 26.6 PG (ref 26.5–33)
MCHC RBC AUTO-ENTMCNC: 30.4 G/DL (ref 31.5–36.5)
MCV RBC AUTO: 88 FL (ref 78–100)
PHOSPHATE SERPL-MCNC: 2.1 MG/DL (ref 2.5–4.5)
PLATELET # BLD AUTO: 348 10E3/UL (ref 150–450)
POTASSIUM SERPL-SCNC: 3.3 MMOL/L (ref 3.4–5.3)
POTASSIUM SERPL-SCNC: 3.5 MMOL/L (ref 3.4–5.3)
POTASSIUM SERPL-SCNC: 3.7 MMOL/L (ref 3.4–5.3)
POTASSIUM SERPL-SCNC: 4.1 MMOL/L (ref 3.4–5.3)
RBC # BLD AUTO: 3.38 10E6/UL (ref 4.4–5.9)
SODIUM SERPL-SCNC: 141 MMOL/L (ref 136–145)
SODIUM SERPL-SCNC: 141 MMOL/L (ref 136–145)
SODIUM SERPL-SCNC: 143 MMOL/L (ref 136–145)
SODIUM SERPL-SCNC: 143 MMOL/L (ref 136–145)
WBC # BLD AUTO: 15.4 10E3/UL (ref 4–11)

## 2023-04-29 PROCEDURE — 70553 MRI BRAIN STEM W/O & W/DYE: CPT

## 2023-04-29 PROCEDURE — 250N000009 HC RX 250: Performed by: NURSE ANESTHETIST, CERTIFIED REGISTERED

## 2023-04-29 PROCEDURE — 85048 AUTOMATED LEUKOCYTE COUNT: CPT

## 2023-04-29 PROCEDURE — 250N000013 HC RX MED GY IP 250 OP 250 PS 637

## 2023-04-29 PROCEDURE — A9585 GADOBUTROL INJECTION: HCPCS | Performed by: NEUROLOGICAL SURGERY

## 2023-04-29 PROCEDURE — 250N000025 HC SEVOFLURANE, PER MIN

## 2023-04-29 PROCEDURE — 250N000011 HC RX IP 250 OP 636

## 2023-04-29 PROCEDURE — 250N000011 HC RX IP 250 OP 636: Performed by: NURSE ANESTHETIST, CERTIFIED REGISTERED

## 2023-04-29 PROCEDURE — 99232 SBSQ HOSP IP/OBS MODERATE 35: CPT | Mod: GC | Performed by: INTERNAL MEDICINE

## 2023-04-29 PROCEDURE — 999N000141 HC STATISTIC PRE-PROCEDURE NURSING ASSESSMENT

## 2023-04-29 PROCEDURE — 85610 PROTHROMBIN TIME: CPT

## 2023-04-29 PROCEDURE — 82533 TOTAL CORTISOL: CPT | Performed by: STUDENT IN AN ORGANIZED HEALTH CARE EDUCATION/TRAINING PROGRAM

## 2023-04-29 PROCEDURE — 258N000003 HC RX IP 258 OP 636

## 2023-04-29 PROCEDURE — 83735 ASSAY OF MAGNESIUM: CPT

## 2023-04-29 PROCEDURE — 250N000013 HC RX MED GY IP 250 OP 250 PS 637: Performed by: STUDENT IN AN ORGANIZED HEALTH CARE EDUCATION/TRAINING PROGRAM

## 2023-04-29 PROCEDURE — 120N000002 HC R&B MED SURG/OB UMMC

## 2023-04-29 PROCEDURE — 99231 SBSQ HOSP IP/OBS SF/LOW 25: CPT | Performed by: INTERNAL MEDICINE

## 2023-04-29 PROCEDURE — 70553 MRI BRAIN STEM W/O & W/DYE: CPT | Mod: 26 | Performed by: STUDENT IN AN ORGANIZED HEALTH CARE EDUCATION/TRAINING PROGRAM

## 2023-04-29 PROCEDURE — 710N000010 HC RECOVERY PHASE 1, LEVEL 2, PER MIN

## 2023-04-29 PROCEDURE — 84100 ASSAY OF PHOSPHORUS: CPT

## 2023-04-29 PROCEDURE — 80048 BASIC METABOLIC PNL TOTAL CA: CPT

## 2023-04-29 PROCEDURE — 85014 HEMATOCRIT: CPT

## 2023-04-29 PROCEDURE — 370N000017 HC ANESTHESIA TECHNICAL FEE, PER MIN

## 2023-04-29 PROCEDURE — 36592 COLLECT BLOOD FROM PICC: CPT

## 2023-04-29 PROCEDURE — 255N000002 HC RX 255 OP 636: Performed by: NEUROLOGICAL SURGERY

## 2023-04-29 PROCEDURE — 250N000013 HC RX MED GY IP 250 OP 250 PS 637: Performed by: INTERNAL MEDICINE

## 2023-04-29 PROCEDURE — 258N000003 HC RX IP 258 OP 636: Performed by: NURSE ANESTHETIST, CERTIFIED REGISTERED

## 2023-04-29 PROCEDURE — 85730 THROMBOPLASTIN TIME PARTIAL: CPT

## 2023-04-29 RX ORDER — LIDOCAINE HYDROCHLORIDE 20 MG/ML
INJECTION, SOLUTION INFILTRATION; PERINEURAL PRN
Status: DISCONTINUED | OUTPATIENT
Start: 2023-04-29 | End: 2023-04-29

## 2023-04-29 RX ORDER — SPIRONOLACTONE 50 MG/1
200 TABLET, FILM COATED ORAL DAILY
Status: DISCONTINUED | OUTPATIENT
Start: 2023-04-30 | End: 2023-05-12

## 2023-04-29 RX ORDER — PROPOFOL 10 MG/ML
INJECTION, EMULSION INTRAVENOUS CONTINUOUS PRN
Status: DISCONTINUED | OUTPATIENT
Start: 2023-04-29 | End: 2023-04-29

## 2023-04-29 RX ORDER — NICOTINE POLACRILEX 4 MG
15-30 LOZENGE BUCCAL
Status: DISCONTINUED | OUTPATIENT
Start: 2023-04-29 | End: 2023-05-19 | Stop reason: HOSPADM

## 2023-04-29 RX ORDER — GADOBUTROL 604.72 MG/ML
0.1 INJECTION INTRAVENOUS ONCE
Status: COMPLETED | OUTPATIENT
Start: 2023-04-29 | End: 2023-04-29

## 2023-04-29 RX ORDER — FENTANYL CITRATE 50 UG/ML
INJECTION, SOLUTION INTRAMUSCULAR; INTRAVENOUS PRN
Status: DISCONTINUED | OUTPATIENT
Start: 2023-04-29 | End: 2023-04-29

## 2023-04-29 RX ORDER — POTASSIUM CHLORIDE 20MEQ/15ML
20 LIQUID (ML) ORAL ONCE
Status: COMPLETED | OUTPATIENT
Start: 2023-04-29 | End: 2023-04-29

## 2023-04-29 RX ORDER — SODIUM CHLORIDE, SODIUM LACTATE, POTASSIUM CHLORIDE, CALCIUM CHLORIDE 600; 310; 30; 20 MG/100ML; MG/100ML; MG/100ML; MG/100ML
INJECTION, SOLUTION INTRAVENOUS CONTINUOUS PRN
Status: DISCONTINUED | OUTPATIENT
Start: 2023-04-29 | End: 2023-04-29

## 2023-04-29 RX ORDER — DEXTROSE MONOHYDRATE 25 G/50ML
25-50 INJECTION, SOLUTION INTRAVENOUS
Status: DISCONTINUED | OUTPATIENT
Start: 2023-04-29 | End: 2023-05-19 | Stop reason: HOSPADM

## 2023-04-29 RX ORDER — CARVEDILOL 12.5 MG/1
12.5 TABLET ORAL 2 TIMES DAILY WITH MEALS
Status: DISCONTINUED | OUTPATIENT
Start: 2023-04-29 | End: 2023-05-14

## 2023-04-29 RX ORDER — PROPOFOL 10 MG/ML
INJECTION, EMULSION INTRAVENOUS PRN
Status: DISCONTINUED | OUTPATIENT
Start: 2023-04-29 | End: 2023-04-29

## 2023-04-29 RX ORDER — MAGNESIUM OXIDE 400 MG/1
400 TABLET ORAL EVERY 4 HOURS
Status: COMPLETED | OUTPATIENT
Start: 2023-04-29 | End: 2023-04-29

## 2023-04-29 RX ORDER — SIMETHICONE 80 MG
80 TABLET,CHEWABLE ORAL EVERY 6 HOURS PRN
Status: DISCONTINUED | OUTPATIENT
Start: 2023-04-29 | End: 2023-05-07

## 2023-04-29 RX ORDER — ONDANSETRON 2 MG/ML
INJECTION INTRAMUSCULAR; INTRAVENOUS PRN
Status: DISCONTINUED | OUTPATIENT
Start: 2023-04-29 | End: 2023-04-29

## 2023-04-29 RX ADMIN — PHENYLEPHRINE HYDROCHLORIDE 100 MCG: 10 INJECTION INTRAVENOUS at 10:28

## 2023-04-29 RX ADMIN — LABETALOL HYDROCHLORIDE 10 MG: 5 INJECTION, SOLUTION INTRAVENOUS at 05:24

## 2023-04-29 RX ADMIN — PHENYLEPHRINE HYDROCHLORIDE 100 MCG: 10 INJECTION INTRAVENOUS at 10:05

## 2023-04-29 RX ADMIN — SODIUM CHLORIDE, POTASSIUM CHLORIDE, SODIUM LACTATE AND CALCIUM CHLORIDE: 600; 310; 30; 20 INJECTION, SOLUTION INTRAVENOUS at 09:44

## 2023-04-29 RX ADMIN — PHENYLEPHRINE HYDROCHLORIDE 200 MCG: 10 INJECTION INTRAVENOUS at 11:00

## 2023-04-29 RX ADMIN — Medication 50 MG: at 09:51

## 2023-04-29 RX ADMIN — SUGAMMADEX 200 MG: 100 INJECTION, SOLUTION INTRAVENOUS at 11:19

## 2023-04-29 RX ADMIN — SALINE NASAL SPRAY 2 SPRAY: 1.5 SOLUTION NASAL at 15:55

## 2023-04-29 RX ADMIN — Medication 5 MG: at 20:37

## 2023-04-29 RX ADMIN — POTASSIUM CHLORIDE 20 MEQ: 20 SOLUTION ORAL at 15:53

## 2023-04-29 RX ADMIN — OXYCODONE HYDROCHLORIDE 5 MG: 5 TABLET ORAL at 15:52

## 2023-04-29 RX ADMIN — SALINE NASAL SPRAY 2 SPRAY: 1.5 SOLUTION NASAL at 20:37

## 2023-04-29 RX ADMIN — PHENYLEPHRINE HYDROCHLORIDE 100 MCG: 10 INJECTION INTRAVENOUS at 09:56

## 2023-04-29 RX ADMIN — PROPOFOL 200 MG: 10 INJECTION, EMULSION INTRAVENOUS at 09:50

## 2023-04-29 RX ADMIN — ONDANSETRON 4 MG: 2 INJECTION INTRAMUSCULAR; INTRAVENOUS at 10:35

## 2023-04-29 RX ADMIN — PHENYLEPHRINE HYDROCHLORIDE 200 MCG: 10 INJECTION INTRAVENOUS at 10:45

## 2023-04-29 RX ADMIN — GADOBUTROL 7.3 ML: 604.72 INJECTION INTRAVENOUS at 10:12

## 2023-04-29 RX ADMIN — SALINE NASAL SPRAY 2 SPRAY: 1.5 SOLUTION NASAL at 18:47

## 2023-04-29 RX ADMIN — EMPAGLIFLOZIN 10 MG: 10 TABLET, FILM COATED ORAL at 15:51

## 2023-04-29 RX ADMIN — PHENYLEPHRINE HYDROCHLORIDE 200 MCG: 10 INJECTION INTRAVENOUS at 10:32

## 2023-04-29 RX ADMIN — Medication 5 ML: at 06:30

## 2023-04-29 RX ADMIN — LIDOCAINE HYDROCHLORIDE 150 MG: 20 INJECTION, SOLUTION INFILTRATION; PERINEURAL at 11:12

## 2023-04-29 RX ADMIN — PHENYLEPHRINE HYDROCHLORIDE 200 MCG: 10 INJECTION INTRAVENOUS at 11:12

## 2023-04-29 RX ADMIN — FENTANYL CITRATE 100 MCG: 50 INJECTION, SOLUTION INTRAMUSCULAR; INTRAVENOUS at 09:48

## 2023-04-29 RX ADMIN — ACETAMINOPHEN 650 MG: 325 TABLET, FILM COATED ORAL at 18:48

## 2023-04-29 RX ADMIN — SIMETHICONE 80 MG: 80 TABLET, CHEWABLE ORAL at 22:41

## 2023-04-29 RX ADMIN — CARVEDILOL 12.5 MG: 12.5 TABLET, FILM COATED ORAL at 18:47

## 2023-04-29 RX ADMIN — PHENYLEPHRINE HYDROCHLORIDE 200 MCG: 10 INJECTION INTRAVENOUS at 10:53

## 2023-04-29 RX ADMIN — LABETALOL HYDROCHLORIDE 20 MG: 5 INJECTION, SOLUTION INTRAVENOUS at 05:52

## 2023-04-29 RX ADMIN — DOCUSATE SODIUM 50 MG AND SENNOSIDES 8.6 MG 1 TABLET: 8.6; 5 TABLET, FILM COATED ORAL at 20:36

## 2023-04-29 RX ADMIN — SALINE NASAL SPRAY 2 SPRAY: 1.5 SOLUTION NASAL at 22:01

## 2023-04-29 RX ADMIN — PROPOFOL 100 MCG/KG/MIN: 10 INJECTION, EMULSION INTRAVENOUS at 09:55

## 2023-04-29 RX ADMIN — Medication 400 MG: at 15:52

## 2023-04-29 RX ADMIN — Medication 400 MG: at 20:36

## 2023-04-29 RX ADMIN — SODIUM CHLORIDE: 9 INJECTION, SOLUTION INTRAVENOUS at 00:27

## 2023-04-29 RX ADMIN — Medication 10 ML: at 15:50

## 2023-04-29 ASSESSMENT — ACTIVITIES OF DAILY LIVING (ADL)
ADLS_ACUITY_SCORE: 50
ADLS_ACUITY_SCORE: 46
ADLS_ACUITY_SCORE: 46
ADLS_ACUITY_SCORE: 50
ADLS_ACUITY_SCORE: 50
ADLS_ACUITY_SCORE: 46
ADLS_ACUITY_SCORE: 50
ADLS_ACUITY_SCORE: 50
ADLS_ACUITY_SCORE: 46
ADLS_ACUITY_SCORE: 50

## 2023-04-29 NOTE — PROGRESS NOTES
[facility]    Medicine Progress Note - Medicine Service, CLAIRE TEAM 2    Date of Admission:  4/3/2023    Assessment & Plan   Gustavo Faria is a 58 year old male with recent Serratia bacteremia, HFrEF, prolonged QT, lower extremity wounds, DMII, hypothyroidism, low testosterone and known ACTH secreting pituitary adenoma w/resulting Cushing's disease s/p 2 partial resections in 2021 who [facility]. [facility], he developed new onset double vision and severe headache which prompted imaging that showed a large mass invading the cavernous sinuses and impinging on the cranial nerves. He is transferred to [facility] for this pituitary adenoma, concern for progression vs hemorrhage/apoplexy. Complicated by psychosis/metabolic encephalopathy and electrolyte abnormalities secondary to Cushing's syndrome. He is now medically stable and is going to the OR today for surgical removal.    Updates today:  - Neurosurgery remains primary but plan to transfer back to medicine in next 1-2 days. Medicine will continue to follow as consultants in meantime  - Endocrinology continues to manage DM, Electrolytes  - Got sedated MRI today  - Will begin to restart BP medication--Carvedilol 12.5 mg BID started 4/29. Continue to hold lisinopril for now. Restart and uptitrate as needed (previous dose was 20 mg daily)      # Hypernatremia, DI vs cushing's, resolved  # Hypokalemia, resolved  Hypernatremia and hypokalemia, likely secondary to Cushing's.     - Endocrine following. Managing spironolactone  - On K replacement protocol  - Goal Na: 135-140  - Strict I&Os  - Daily BMP + Mg    # Acute metabolic encephalopathy - resolved  # Pyruria, Candiduria - resolved  # Encephalopathy likely secondary to cushing's disease  Admission symptoms included disorientation, intermittently following directions. Repetitive words  - perseverating on particular phrases. Sx started the 2-3 days prior to admission (which [facility] was [facility]). Vulnerable brain (multiple brain surgeries), enlarging pituitary mass. No seizure activity per EEG. Steroids removed and pt still encephalopathic. Sodium had been in normal limits and patient remained altered. Worked up for infections - did reveal candiduria but unlikely this was etiology, though he did complete an 8 day course of fluconazole. Briefly on ceftriaxone but encephalopathy also did not improve. CT abd w/ contrast (4/10): no evidence of pyelonephritis  Ultimately attributed to Cushings and known pituitary tumor. Continues to remain lucid.  - Surgical plans for pituitary tumor as stated below  - Delirium precautions  - Electrolyte management as above  - PRN quetiapine if agitation that is not responsive to behavioral interventions     # Delusions, intermittent  Intermittent delusions regarding staff stalking patient while in the hospital. Unclear chronicity of these delusions - but has had them multiple times this hospitalization. Unclear psych history. Medical management for encephalopathy as stated above. Psychiatry was consulted during his care and agreed with paranoia and recommended Abilify. He was briefly on Abilify 5mg but patient then started to refuse nightly so this was discontinued. His paranoia and delusions have been very intermittent and appear to be associated with his anxiety.     # ACTH secreting pituitary adenoma c/b type II DM, hypothyroidism and low testosterone s/p two partial resections in 2021   Patient noted double vision and severe headache beginning the evening of 3/25. CT imaging on 3/25 revealed a large sellar/suprasellar mass extending into the cavernous sinuses with no evidence of acute stroke. On 3/28, he underwent an MRI which confirmed the CT findings of a large mass invading the cavernous sinuses and impinging on the cranial nerves. Necrosis extending  beyond left cavernous sinus. [facility]. Repeat MRI performed on 4/6/23: compared to 2021 MRI, lesions are smaller. Orbit MRI without optic nerve compression and no evidence of orbital infection.  - 4/27 neurosurgery and ENT combined to perform endoscopic endonasal transcavernous approach for resection of functional pituitary adenoma. Complicated by significant scar tissue to cavernous sinus.   - Ophthalmology following, no major changes, agree with neurosurgery plans  - Endocrine following     # Type II DM, exacerbated by Cushing's   A1c of 7.6% on 2/2023.  - Endocrinology managing. Currently on insulin gtt and carb coverage.   - Hold PTA metformin, empa, and sitagliptin, can resume upon discharge    # Buttocks, sacrum and bilateral groin wounds   # RLE wound from puncture injury   # L dorsal foot wound from presumed trauma   # Soft tissue infection/abscess    # Hx serratia bacteremia in December 2022   For patient's lower extremity wounds and hx of Serratia bacteremia in December, he was [facility]. His antibiotics were broadened to Vanc and Zosyn and ultimately he was transitioned to ceftazidime on 3/23 with plan to complete course on 4/4/23. BCx negative and wound cx grew serratia marcescens at the [facility]. He has been on ceftazidime since. MRI of the right tib/fib on 3/23 was negative for osteomyelitis but did show two fluid collections draining sponateneously. [facility] and determined no intervention was needed as wounds were draining on their own.  - completed course of ceftazidime (3/23 - 4/5)  - WOC following  - PT/OT   - Pain: tylenol PRN, dilaudid 0.5 mg PRN for dressing changes and oxy 5 mg    # HFmrEF, with global hypokinesis  # High burden of PVCs  # Prolonged QTc   # Elevated troponin, down-trended  Coronary angiogram on 2/27 shows normal coronaries. Troponin down-trending (88 -> 77). No chest pain. High burden of PVCs. Follow up ECHO with  global hypokinesis, which is worse compared to February 2023 ECHO. EF is 45% (same compared to prior). Likely small vessel disease vs demand vs cardiomyopathy 2/2 ceftazidime. Low concern for acute ACS.   - Electrolyte mgmt as above  - Lasix 40mg daily--held since surgery on 4/27. CTM volume status closely  - Coreg and lisinopril held since surgery 4/27. BP now elevated   - Restart carvedilol 12.5 mg BID 4/29  - Continue to hold lisinopril for now. Restart and uptitrate as needed. (Prior dose was 20 mg daily)  - Empagliflozin (see above) - on HOLD   - ASA - on HOLD  - Avoid QTc prolonging medications     # Central Hypothyroidism  - Continue PTA levothyroxine     # Hypogonadism   - Continue PTA androgel (will need to bring in home medication)     # Chronic microcytic anemia   Hgb of 8.4 [facility]. Peripheral smear on 3/30 showed poikilocytosis with occasional red blood cell fragments but no other evidence of hemolysis.  Haptoglobin was normal.  Ferritin was 91, transferrin saturation was low, B12 was 495 and folate was 8.7. Likely combination of iron deficiency as well as inflammation/chronic disease.   - CTM        # Concern for malnutrition   - Nutrition following       Diet: Snacks/Supplements Adult: Other; Please send ensure max at 10:00  and HS; Between Meals  NPO per Anesthesia Guidelines for Procedure/Surgery Except for: Meds    DVT Prophylaxis: Pneumatic Compression Devices  Roland Catheter: Not present  Lines:   PICC 04/06/23 Double Lumen Right Basilic access-Site Assessment: WDL      Cardiac Monitoring: None  Code Status: Full Code      Clinically Significant Risk Factors        # Hypokalemia: Lowest K = 2.7 mmol/L in last 2 days, will replace as needed       # Hypoalbuminemia: Lowest albumin = 2.9 g/dL at 4/11/2023  7:07 AM, will monitor as appropriate          # DMII: A1C = 9.5 % (Ref range: <5.7 %) within past 6 months    # Severe Malnutrition: based on nutrition assessment        Disposition  Christopher Blank MD   of Medicine  [facility] Hospitalist  Pager [phone]          Please see A&P for additional details of medical decision making.  25 MINUTES SPENT BY ME on the date of service doing chart review, history, exam, documentation & further activities per the note.    I have personally reviewed the following data over the past 24 hrs:    15.4 (H)  \   9.0 (L)   / 348     141 102 20.4 (H) /  113 (H)   3.3 (L) 28 0.76 \       INR:  0.94 PTT:  23   D-dimer:  N/A Fibrinogen:  N/A         Kt Blank MD  Date of Service (when I saw the patient): 04/29/23    _________________________________________________________    Interval History   Care team notes reviewed. Had some paranoia last night. Better today. Vision improved.  BP starting to become elevated. Tolerating diet. Got sedated MRI today.     Physical Exam   Vital Signs: Temp: 97.8  F (36.6  C) Temp src: Axillary BP: (!) 142/98 Pulse: 63   Resp: 16 SpO2: 100 % O2 Device: None (Room air)    Weight: 161 lbs 13.43 oz    Gen: Sitting up in bed, talking on the phone, NAD  ENT: MMM, nasal harness in place with some bloody drainage  Resp: breathing comfortably, non-labored        Medical Decision Making           Data     I have personally reviewed the following data over the past 24 hrs:    15.4 (H)  \   9.0 (L)   / 348     141 102 20.4 (H) /  113 (H)   3.3 (L) 28 0.76 \       INR:  0.94 PTT:  23   D-dimer:  N/A Fibrinogen:  N/A

## 2023-04-29 NOTE — PROGRESS NOTES
Endocrine Progress Note  Patient: Gustavo Faria   MRN: [id number]  Date of Service: 04/29/2023       Assessment and plan:  Gustavo Faria is a 58 year old male with PMHx of  ACTH-secreting macroadenoma c/b central hypothyroidism, and central hypogonadism, s/p transphenoidal surgery 5/2021 and 7/2021 in [place] has baseline records 3rd nerve palsy, ongoing serratia RLE cellulitis c/b recent serratia bacteremia, HFrEF, T2DM, and HTN who was transferred from [facility] for surgical intervention of known expanding large sellar/suprasellar mass extending into cavernous sinuses and subsequent cranial nerve impingement. Now POD#2.      #Pituitary macroadenoma:  #Cushing's disease:  #Pituitary apoplexy:  Unable to remove entire pituitary macroadenoma due to fibrous tissue. Cortisol level after surgery still elevated at 38.2. Blood pressure within target range. Sodium WNL Potassium 3.5.   - Continue KCl 40 mEq daily and replete aggressively  - Increase spironolactone to 200mg, titrate up as tolerated.  - BMP every 6 hours to monitor sodium and potassium  - Strict I's and O's     #Central hypothyroidism:  Prior to admission was on levothyroxine 100 mcg daily.  Free T4 on admission 1.3 and TSH not detectable (picture of central hypothyroidism).     Recommendations:  -Continue with PTA levothyroxine 100 mcg daily.  - Free T4 tomorrow morning     #DM type II: Hemoglobin A1c on admission 9.5%.  Prior to admission was on Sitagliptin 100 mg daily/metformin 500 mg twice daily, Jardiance 12.5 mg.  Currently fasting BG above target at 252; did receive dexamethasone 4mg yesterday. BG increased since no carb coverage in place. Insulin drip was stopped at 2 AM . BG have been stable of the drip.    - Increase Lantus to 25 units daily  - Change carb coverage to 1 units/15 g CHO with meals and snacks  - Continue High-dose sliding scale insulin for AC & HS  - Will restart Jardiance 10 mg daily  - Continue hypoglycemia protocol  -  Accu checks AC & HS      Patient was seen and discussed with Attending Shon  Patient labs, chart and imaging reviewed      Rhiannon Arroyo  Endocrinology Fellow  [phone]    I have seen and examined the patient and agree with the fellow's plan of care as noted.  April 29, 2023    Dr. Smitha Menendez [phone]    ======================================================================    Subjective:  - Pituitary macroadenoma debulking surgery/resection yesterday (4/27/23)  - Overall, still feels tired but better.     Physical Examination:  BP (!) 142/98   Pulse 63   Temp 97.8  F (36.6  C) (Axillary)   Resp 16   Wt 73.4 kg (161 lb 13.4 oz)   SpO2 100%   BMI 26.12 kg/m      Constitutional: healthy, alert and no distress   Cardiovascular: RRR. No murmurs, clicks gallops or rub  Respiratory: lungs CTAB. No increased work of breathing  Psychiatric: mentation appears normal and affect normal/bright  Head: Normocephalic.   Neck: Neck supple. Thyroid symmetric, normal size,  ENT: Nasal bandage with some blood in place   Abdomen: Abdomen soft, non-tender. BS normal. No masses, organomegaly  NEURO: Sensation grossly WNL.  JOINT/EXTREMITIES: +2-3 pitting bilateral lower extremity edema.    Medications:  Reviewed    Endocrine Labs:       Hemoglobin A1C Adrenal Corticotropin Cortisol Serum   Latest Ref Rng <5.7 % <47 pg/mL ug/dL   2/16/2023  8:54 AM 7.6 (H)      4/5/2023  2:35 AM  321 (H)  46.3    4/5/2023  6:11 AM 9.5 (H)      4/6/2023  6:26 AM   48.7    4/7/2023  8:42 AM  273 (H)  41.1    4/9/2023  9:12 AM  364 (H)  37.8       T4 Free Insulin Growth Factor 1 (External)   Latest Ref Rng 0.90 - 1.70 ng/dL 50 - 317 ng/mL   4/5/2023  6:11 AM 1.30     4/5/2023  11:10 AM  <10 (L)       TSH   Latest Ref Rng 0.30 - 4.20 uIU/mL   2/16/2023  8:54 AM    4/5/2023  2:35 AM    4/5/2023  6:11 AM <0.01 (L)

## 2023-04-29 NOTE — PROGRESS NOTES
[facility], [place]   04/29/2023  Neurosurgery Progress Note:    Assessment:  Gustavo Faria is a 58 year old male with a PMH of DMII, HFrEF, BLE open wounds, history of serratia bacteremia in December 2022,  pituitary ACTH secreting macroadenoma s/p EEA for resection on 5/2021 and 7/2021 in [place], with baseline right CN 3 palsy, who was admitted at the [facility] on 3/23. He developed sudden headache and vision changes on 3/25, with MRI brain on 3/28, which demonstrated enlargement of known pituitary adenoma, with necrosis extending laterally beyond the left cavernous sinus, concerning for compression of cranial nerves at cavernous sinus. 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      Patient is POD-2 s/p Endoscopic endonasal transcavernous approach for resection of functional pituitary adenoma with harvesting of right-sided pedicled nasoseptal flap, which was pedicled off the posterior septal branch of the sphenopalatine artery    Plan:  - Watch for DI/CSF leak  - Follow Na  - Follow urine output/ inputs/ outputs  - Scheduled Zofran x 24 hours  - On sliding scale insulin now   - Continue to hold ASA  - Please inform Neurosurgery if there is any change in exam  -----------------------------------  Kelvin Cheng MD, PhD  PGY-2 Neurosurgery  Please page NSGY on call with questions      Please contact neurosurgery resident on call with questions.    Dial * * [phone], enter [phone] when prompted.    Interval History: No acute events overnight. Stable neurological exam. MRI Brain completed.    Objective:   Temp:  [97.8  F (36.6  C)-98.4  F (36.9  C)] 97.8  F (36.6  C)  Pulse:  [] 63  Resp:  [16-18] 16  BP: (118-157)/() 142/98  SpO2:  [94 %-100 %] 100 %  I/O last 3 completed shifts:  In: 2016 [P.O.:2016]  Out: 1350 [Urine:1350]    NEUROLOGIC:  --  Opens eyes to voice, following commands, oriented x3  Cranial Nerves:  -- visual fields full to confrontation although with limited participation, PERRL anisocoric L>R and sluggish, left CN III paresis and CN VI palsy, restricted ROM in right EOM with no apparent asymmetry   -- face symmetrical, tongue midline  -- sensory V1-V3 intact bilaterally  -- palate elevates symmetrically, uvula midline  -- hearing grossly intact bilat     Motor:  Antigravity x4 extremities     Sensory:  intact to LT x 4 extremities      Reflexes:       Bi Tri BR Omi Pat Ach Bab     C5-6 C7-8 C6 UMN L2-4 S1 UMN   R 2+ 2+ 2+ Norm 2+ 2+ Norm   L 2+ 2+ 2+ Norm 2+ 2+ Norm      Gait: Deferred.     LABS:  Recent Labs   Lab 04/29/23  0644 04/29/23  0201 04/29/23  0107 04/29/23  0005 04/29/23  0001 04/28/23  1802 04/28/23  1754     --   --   --  141  --  140   POTASSIUM 3.5  --   --   --  3.3*  --  3.5  3.5   CHLORIDE 104  --   --   --  102  --  101   CO2 29  --   --   --  28  --  28   ANIONGAP 10  --   --   --  11  --  11   * 113* 106*   < > 73   < > 204*   BUN 18.3  --   --   --  20.4*  --  22.5*   CR 0.66*  --   --   --  0.76  --  0.77   KORIN 8.0*  --   --   --  8.3*  --  8.3*    < > = values in this interval not displayed.       Recent Labs   Lab 04/29/23  0644   WBC 15.4*   RBC 3.38*   HGB 9.0*   HCT 29.6*   MCV 88   MCH 26.6   MCHC 30.4*   RDW 23.3*          IMAGING:  No results found for this or any previous visit (from the past 24 hour(s)).

## 2023-04-29 NOTE — CONSULTS
Called and spoke to pt , Sindhu. [facility]. Pt  will gather paper work and call back unit. Updated charge that process is underway and that [facility], but we are now waiting on them. If too much is needed after RNCC leaves for day station nurses/staff can leave for follow up tomorrow. RNCC called [phone], reached  and let him know my name title and calling location, as well as I was looking to start the transferring process for a  to come back to them as planned.  [phone]- received call back from Sindhu pt . She was ready to do a Doctor to Doctor call for the transfer. After contacting attending via [employer] with no reply the only option was to give the bed away and try tomorrow.   JAMES Vickers RN 4/29/2023 4:12 PM  Float RN Care Coordinator  Unit RNCC pager: [phone]     For Weekend & Holiday on call RN Care Coordinator:  (Tasks: Home care, home infusion, medical equipment/oxygen, transportation, IMM & MOON forms, etc.)     Text Paging in [employer] Smart [employer] is the preferred method of contact for these teams     [place] [facility] [place] (0800-1630) Saturday & Sunday; (0800-1630)  Recognized Holidays  Pager #2: [phone] Units: 6A, 6B, 6C, 6D    For Weekend & Holiday on call Social Work:  (Tasks: TCU, transportation, Hospice, adjustment to illness counseling, Health Care Directives, Child Protection and Domestic Violence concerns, Vulnerable Adult, IMM forms, etc.)     Text Paging in [employer] Smart Web is the preferred method of contact for these teams    [place] (0800 - 1630) Saturday and Sunday  Pager: [phone] Units: 6A, 6B, 6C, 6D     ______________________________________________     After hours for all units everyday- (only the  is available after hours until midnight)  Pager [phone]

## 2023-04-29 NOTE — PROGRESS NOTES
Otolaryngology Progress Note    Subjective/Intvl events: NAEON. Getting MRI today. Reports right sided vision continues to remain significantly improved. Denies worsening headache, increased bloody nasal drainage, or salty/metallic tastes in mouth.     O: BP (!) 137/105 (BP Location: Left leg)   Pulse 61   Temp 97.6  F (36.4  C) (Axillary)   Resp 18   Wt 73.4 kg (161 lb 13.4 oz)   SpO2 100%   BMI 26.12 kg/m     General: Alert and oriented x 3, no acute distress   HEENT: Bilateral lateral gaze palsy and restricted EOM on the right, PERRL, CN V1-V3 intact, facial movement symmetric, palate elevates symmetrically with uvula midline. Oropharynx clear. Mild amount of bloody nasal drainage within b/l nares. Nasal sling off face.    Pulmonary: Breathing non-labored, no stridor, no accessory muscle use.        Intake/Output Summary (Last 24 hours) at 4/29/2023 1023  Last data filed at 4/29/2023 0500  Gross per 24 hour   Intake 1216 ml   Output 1125 ml   Net 91 ml       LABS:    BMP  Recent Labs   Lab 04/29/23  0926 04/29/23  0808 04/29/23  0644 04/29/23  0201 04/29/23  0005 04/29/23  0001 04/28/23  1802 04/28/23  1754 04/28/23  1651 04/28/23  1401 04/28/23  1148   NA  --   --  143  --   --  141  --  140  --   --  139   POTASSIUM  --   --  3.5  --   --  3.3*  --  3.5  3.5  --  3.6 3.0*   CHLORIDE  --   --  104  --   --  102  --  101  --   --  100   KORIN  --   --  8.0*  --   --  8.3*  --  8.3*  --   --  7.7*   CO2  --   --  29  --   --  28  --  28  --   --  25   BUN  --   --  18.3  --   --  20.4*  --  22.5*  --   --  20.2*   CR  --   --  0.66*  --   --  0.76  --  0.77  --   --  0.80   GLC 98 107* 104* 113*   < > 73   < > 204*   < >  --  330*    < > = values in this interval not displayed.     CBC  Recent Labs   Lab 04/29/23  0644 04/28/23  0815 04/28/23  0045 04/27/23  1939 04/27/23  1607 04/27/23  1126   WBC 15.4* 15.2* 16.4*  --   --  12.0*   RBC 3.38* 3.49* 3.65*  --   --  3.32*   HGB 9.0* 9.3* 9.7* 7.3*   < > 8.6*    HCT 29.6* 30.3* 31.7*  --   --  28.7*   MCV 88 87 87  --   --  86   MCH 26.6 26.6 26.6  --   --  25.9*   MCHC 30.4* 30.7* 30.6*  --   --  30.0*   RDW 23.3* 23.2* 22.9*  --   --  24.7*    380 362  --   --  387    < > = values in this interval not displayed.     INR  Recent Labs   Lab 04/29/23  0644 04/28/23  0110 04/26/23  0920 04/24/23  1145   INR 0.94 1.02 0.99 1.01       A/P: Gustavo Faria is a 58 year old male with a past medical history of pituitary ACTH secreting macroadenoma s/p endoscopic endonasal transcavernous approach for resection of functional pituitary adenoma w/ nasoseptal flap on 4/28. He is recovering well post-operatively without signs of CSF leak.    - Serial neuro exams  - Nasal saline starting POD 1  - Sinus precautions: No nose blowing, sneeze with mouth open, no straining, and scheduled bowel regimen  - Rest of care per primary team      -- Patient and above plan discussed with Dr. Adan Stout MD  Otolaryngology-Head & Neck Surgery, PGY1   Please contact ENT with questions by dialing * [phone] and entering job [phone] when prompted.

## 2023-04-29 NOTE — PLAN OF CARE
"  Status: POD 1 pituitary adenoma resection. 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 Baseline right CN 3 palsy  Vitals: VSS on RA. Tachy at times.   Neuros: A&Ox4. BUE 5/5, BLE 3/5. N/T to toes bilaterally. Periorbital swelling, more pronounced on R. Ptosis to R eye. Pupils sluggish bilaterally. Clear, crusty drainage to R eye. Denies saltly/metallic taste.   IV: DL PICC, purple lumen infusing NS TKO with Insulin drip mL/hour.   Labs/Electrolytes:  BG checks Q1 hour- see results. Next check due at 1950  Resp/trach: Denies SOB, weaned off O2   Diet: Regular diet, fair appetite. Strict I&Os  Bowel status: LBM this shift x2 after suppository. Prefers BSC over bedpad.   : Voiding via perez, output within parameters.   Skin: right and left leg wounds with mepilex in place, weeping serosang drainage. Buttocks/sacral wounds with mepilex in place. Wound care orders in place. Dry/scaly skin to BLE. Edematous to BLE.   Pain: HA partially managed with prn oxycodone x1   Activity: Up with 2/lift. Sat in recliner much of the afternoon  Social: Anxiety this magan, insisting on staff putting his bed in the hallway \"to look outside.\" Upset and not directable, MD saw pt and he was able to calm.   Plan: Continue to monitor I&Os. Follow POC.                          "

## 2023-04-29 NOTE — ANESTHESIA PREPROCEDURE EVALUATION
Anesthesia Pre-Procedure Evaluation    Patient: Gustvao Faria   MRN: [id number] : 1965        Procedure : Procedure(s):  Anesthesia out of OR MRI          History reviewed. No pertinent past medical history.   Past Surgical History:   Procedure Laterality Date     ANESTHESIA OUT OF OR MRI N/A 2023    Procedure: Anesthesia out of OR Magnetic Resonance Imaging Brain with or without contrast @1300;  Surgeon: GENERIC ANESTHESIA PROVIDER;  Location: [facility] OR     OPTICAL TRACKING SYSTEM ENDOSCOPIC ENDONASAL SURGERY N/A 2023    Procedure: SURGICAL PROCEDURE, ENDONASAL, ENDOSCOPIC, USING OPTICAL TRACKING SYSTEM, for pituitary adenoma resection,;  Surgeon: Rod Jeong MD;  Location: [facility] OR     PICC DOUBLE LUMEN PLACEMENT Right 2023    40 cm total basilic      No Known Allergies   Social History     Tobacco Use     Smoking status: Former     Types: Cigarettes     Smokeless tobacco: Not on file   Vaping Use     Vaping status: Not on file   Substance Use Topics     Alcohol use: Not Currently      Wt Readings from Last 1 Encounters:   23 73.4 kg (161 lb 13.4 oz)        Anesthesia Evaluation   Pt has had prior anesthetic. Type: General.        ROS/MED HX  ENT/Pulmonary:  - neg pulmonary ROS     Neurologic: Comment: Anisocoria, CN 3 palsy  Encephalopathy  Pituitary adenoma, s/p multiple resections, ACTH secreting   Cushing's dz      Cardiovascular:     (+) -----CHF Previous cardiac testing   Echo: Date: 23 Results:  IInterpretation Summary  Left ventricular function is decreased. The ejection fraction is 40-45%  (mildly reduced). Severe diffuse hypokinesis is present.  Global right ventricular function is normal. The right ventricle is normal  size.  There is mild-moderate aortic regurgitation.  There is mild dilation at the level of the sinuses of Valsalva (4.2 cm,  indexed 2.33 cm/m2).  This study was compared with the study from 2023. No significant changes  noted.  Stress  Test: Date: Results:    ECG Reviewed: Date: 4/23/23 Results:  Sinus rhythm with occasional Premature ventricular complexes and Premature atrial complexes   Left axis deviation   Right bundle branch block   Moderate voltage criteria for LVH,  Cath:  Date: 2/27/2023 Results:  DIAGNOSTIC - CORONARY   * Right dominant coronary artery system   * The left main artery was normal in appearance and free of obstructive disease.   * The LAD has no significant obstruction.   * The circumflex artery has no significant obstruction.   * The RCA has no significant obstruction.   HEMODYNAMICS   * The LVEDP is within normal limits   PRESENTATION / INDICATIONS   * Congestive Heart Failure - Highest NYHA Class w/in 2  - NYHA Class III       METS/Exercise Tolerance:     Hematologic:     (+) anemia,     Musculoskeletal: Comment: Open sacral, groin, and LE wounds  L2 compression fracture      GI/Hepatic:     (+) liver disease,  (-) GERD   Renal/Genitourinary:    (-) renal disease   Endo:     (+) type II DM, Using insulin, thyroid problem, Chronic steroid usage for     Psychiatric/Substance Use:     (+) psychiatric history Recreational drug usage: Cannabis.    Infectious Disease:       Malignancy:    : pituitary adenoma.   Other:            Physical Exam    Airway  airway exam normal           Respiratory Devices and Support         Dental       (+) Modest Abnormalities - crowns, retainers, 1 or 2 missing teeth      Cardiovascular   cardiovascular exam normal          Pulmonary   pulmonary exam normal            Other findings: Persistent serosanguinous nasal discharge    OUTSIDE LABS:  CBC:   Lab Results   Component Value Date    WBC 15.4 (H) 04/29/2023    WBC 15.2 (H) 04/28/2023    HGB 9.0 (L) 04/29/2023    HGB 9.3 (L) 04/28/2023    HCT 29.6 (L) 04/29/2023    HCT 30.3 (L) 04/28/2023     04/29/2023     04/28/2023     BMP:   Lab Results   Component Value Date     04/29/2023     04/29/2023    POTASSIUM 3.5  04/29/2023    POTASSIUM 3.3 (L) 04/29/2023    CHLORIDE 104 04/29/2023    CHLORIDE 102 04/29/2023    CO2 29 04/29/2023    CO2 28 04/29/2023    BUN 18.3 04/29/2023    BUN 20.4 (H) 04/29/2023    CR 0.66 (L) 04/29/2023    CR 0.76 04/29/2023    GLC 98 04/29/2023     (H) 04/29/2023     COAGS:   Lab Results   Component Value Date    PTT 23 04/29/2023    INR 0.94 04/29/2023    FIBR 176 04/11/2023     POC: No results found for: BGM, HCG, HCGS  HEPATIC:   Lab Results   Component Value Date    ALBUMIN 3.5 04/23/2023    PROTTOTAL 5.9 (L) 04/23/2023     (H) 04/23/2023    AST 31 04/23/2023    ALKPHOS 285 (H) 04/23/2023    BILITOTAL 0.4 04/23/2023     OTHER:   Lab Results   Component Value Date    PH 7.58 (H) 04/27/2023    LACT 2.1 (H) 04/27/2023    A1C 9.5 (H) 04/05/2023    KORIN 8.0 (L) 04/29/2023    PHOS 2.1 (L) 04/29/2023    MAG 1.9 04/29/2023    TSH <0.01 (L) 04/05/2023    T4 1.30 04/05/2023    SED 11 04/11/2023       Anesthesia Plan    ASA Status:  3   NPO Status:  NPO Appropriate    Anesthesia Type: General.     - Airway: ETT   Induction: Intravenous.   Maintenance: Balanced.   Techniques and Equipment:     - Lines/Monitors: PICC in situ     Consents    Anesthesia Plan(s) and associated risks, benefits, and realistic alternatives discussed. Questions answered and patient/representative(s) expressed understanding.    - Discussed:     - Discussed with:  Patient         Postoperative Care       PONV prophylaxis: Ondansetron (or other 5HT-3)     Comments:                Dalton Nash MD

## 2023-04-29 NOTE — PLAN OF CARE
Status: POD#2 pituitary adenoma resection. 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 Baseline right CN 3 palsy  Vitals: Hypertensive, IV meds given x2. Intermittently tachycardic  Neuros: A&Ox4, forgetful, whispers. BUE 4, BLE 3. Baseline N/T to feet. Periorbital swelling, more pronounced on R. Ptosis to R eye. Pupils sluggish bilaterally  IV: DL PICC infusing NS @ 75ml/hr  Labs/Electrolytes: ACHS BG checks  Resp/trach: LSC  Diet: NPO ex meds  Bowel status: LBM 4/28  : Voiding with intermittent incontinence   Skin: right and left leg wounds with mepilex in place, weeping serosang drainage. Buttocks/sacral wounds with mepilex in place. Wound care orders in place. Dry/scaly skin to BLE. Edematous to BLE. Refusing nasal sling  Pain: Denies  Activity: A2  Plan: MRI under sedation today, not yet scheduled. Continue to monitor and follow POC  Updates this shift: Off insulin gtt overnight. Pt broke NPO @0530 and drank ~100ml water, provider notified.

## 2023-04-29 NOTE — ANESTHESIA CARE TRANSFER NOTE
Patient: Gustavo Faria    Procedure: Procedure(s):  Anesthesia out of OR MRI       Diagnosis: Pituitary mass (H) [E23.6]  Diagnosis Additional Information: No value filed.    Anesthesia Type:   No value filed.     Note:    Oropharynx: spontaneously breathing  Level of Consciousness: awake  Oxygen Supplementation: face mask    Independent Airway: airway patency satisfactory and stable    Vital Signs Stable: post-procedure vital signs reviewed and stable  Report to RN Given: handoff report given  Patient transferred to: PACU    Handoff Report: Identifed the Patient, Identified the Reponsible Provider, Reviewed the pertinent medical history, Discussed the surgical course, Reviewed Intra-OP anesthesia mangement and issues during anesthesia, Set expectations for post-procedure period and Allowed opportunity for questions and acknowledgement of understanding      Vitals:  Vitals Value Taken Time   /104 04/29/23 1140   Temp 36.5    Pulse 64 04/29/23 1141   Resp 12    SpO2 100 % 04/29/23 1141   Vitals shown include unvalidated device data.    Electronically Signed By: Charles Patrick Schlatter, APRN CRNA  April 29, 2023  11:42 AM

## 2023-04-29 NOTE — ANESTHESIA PROCEDURE NOTES
Airway       Patient location during procedure: OR       Procedure Start/Stop Times: 4/29/2023 9:55 AM  Staff -        CRNA: Schlatter, Charles Patrick, APRN CRNA       Performed By: CRNAIndications and Patient Condition       Indications for airway management: julian-procedural       Induction type:intravenous       Mask difficulty assessment: 2 - vent by mask + OA or adjuvant +/- NMBA    Final Airway Details       Final airway type: endotracheal airway       Successful airway: ETT - single  Endotracheal Airway Details        ETT size (mm): 7.5       Cuffed: yes       Successful intubation technique: video laryngoscopy       VL Blade Size: MAC 4       Grade View of Cords: 1       Adjucts: stylet       Position: Right       Secured at (cm): 23       Bite block used: Oral Airway    Post intubation assessment        Placement verified by: capnometry, equal breath sounds and chest rise        Number of attempts at approach: 1       Number of other approaches attempted: 0       Secured with: pink tape       Ease of procedure: easy       Dentition: Intact    Medication(s) Administered   Medication Administration Time: 4/29/2023 9:55 AM

## 2023-04-30 ENCOUNTER — APPOINTMENT (OUTPATIENT)
Dept: GENERAL RADIOLOGY | Facility: CLINIC | Age: 58
DRG: 614 | End: 2023-04-30
Attending: STUDENT IN AN ORGANIZED HEALTH CARE EDUCATION/TRAINING PROGRAM
Payer: COMMERCIAL

## 2023-04-30 LAB
ANION GAP SERPL CALCULATED.3IONS-SCNC: 11 MMOL/L (ref 7–15)
ANION GAP SERPL CALCULATED.3IONS-SCNC: 12 MMOL/L (ref 7–15)
ANION GAP SERPL CALCULATED.3IONS-SCNC: 8 MMOL/L (ref 7–15)
ANION GAP SERPL CALCULATED.3IONS-SCNC: 9 MMOL/L (ref 7–15)
BUN SERPL-MCNC: 13.9 MG/DL (ref 6–20)
BUN SERPL-MCNC: 14 MG/DL (ref 6–20)
BUN SERPL-MCNC: 14.3 MG/DL (ref 6–20)
BUN SERPL-MCNC: 17.4 MG/DL (ref 6–20)
CALCIUM SERPL-MCNC: 8.1 MG/DL (ref 8.6–10)
CALCIUM SERPL-MCNC: 8.3 MG/DL (ref 8.6–10)
CALCIUM SERPL-MCNC: 8.5 MG/DL (ref 8.6–10)
CALCIUM SERPL-MCNC: 8.8 MG/DL (ref 8.6–10)
CHLORIDE SERPL-SCNC: 101 MMOL/L (ref 98–107)
CHLORIDE SERPL-SCNC: 103 MMOL/L (ref 98–107)
CHLORIDE SERPL-SCNC: 104 MMOL/L (ref 98–107)
CHLORIDE SERPL-SCNC: 105 MMOL/L (ref 98–107)
CREAT SERPL-MCNC: 0.61 MG/DL (ref 0.67–1.17)
CREAT SERPL-MCNC: 0.62 MG/DL (ref 0.67–1.17)
CREAT SERPL-MCNC: 0.65 MG/DL (ref 0.67–1.17)
CREAT SERPL-MCNC: 0.65 MG/DL (ref 0.67–1.17)
DEPRECATED HCO3 PLAS-SCNC: 28 MMOL/L (ref 22–29)
DEPRECATED HCO3 PLAS-SCNC: 29 MMOL/L (ref 22–29)
DEPRECATED HCO3 PLAS-SCNC: 29 MMOL/L (ref 22–29)
DEPRECATED HCO3 PLAS-SCNC: 30 MMOL/L (ref 22–29)
ERYTHROCYTE [DISTWIDTH] IN BLOOD BY AUTOMATED COUNT: 23.9 % (ref 10–15)
GFR SERPL CREATININE-BSD FRML MDRD: >90 ML/MIN/1.73M2
GLUCOSE BLDC GLUCOMTR-MCNC: 105 MG/DL (ref 70–99)
GLUCOSE BLDC GLUCOMTR-MCNC: 108 MG/DL (ref 70–99)
GLUCOSE BLDC GLUCOMTR-MCNC: 117 MG/DL (ref 70–99)
GLUCOSE BLDC GLUCOMTR-MCNC: 134 MG/DL (ref 70–99)
GLUCOSE BLDC GLUCOMTR-MCNC: 139 MG/DL (ref 70–99)
GLUCOSE BLDC GLUCOMTR-MCNC: 227 MG/DL (ref 70–99)
GLUCOSE BLDC GLUCOMTR-MCNC: 52 MG/DL (ref 70–99)
GLUCOSE BLDC GLUCOMTR-MCNC: 57 MG/DL (ref 70–99)
GLUCOSE SERPL-MCNC: 136 MG/DL (ref 70–99)
GLUCOSE SERPL-MCNC: 136 MG/DL (ref 70–99)
GLUCOSE SERPL-MCNC: 142 MG/DL (ref 70–99)
GLUCOSE SERPL-MCNC: 245 MG/DL (ref 70–99)
HCT VFR BLD AUTO: 30.3 % (ref 40–53)
HGB BLD-MCNC: 9 G/DL (ref 13.3–17.7)
MAGNESIUM SERPL-MCNC: 2 MG/DL (ref 1.7–2.3)
MCH RBC QN AUTO: 26.5 PG (ref 26.5–33)
MCHC RBC AUTO-ENTMCNC: 29.7 G/DL (ref 31.5–36.5)
MCV RBC AUTO: 89 FL (ref 78–100)
PHOSPHATE SERPL-MCNC: 1.6 MG/DL (ref 2.5–4.5)
PLATELET # BLD AUTO: 329 10E3/UL (ref 150–450)
POTASSIUM SERPL-SCNC: 3.9 MMOL/L (ref 3.4–5.3)
POTASSIUM SERPL-SCNC: 4 MMOL/L (ref 3.4–5.3)
POTASSIUM SERPL-SCNC: 4 MMOL/L (ref 3.4–5.3)
POTASSIUM SERPL-SCNC: 4.4 MMOL/L (ref 3.4–5.3)
RBC # BLD AUTO: 3.39 10E6/UL (ref 4.4–5.9)
SODIUM SERPL-SCNC: 142 MMOL/L (ref 136–145)
SODIUM SERPL-SCNC: 143 MMOL/L (ref 136–145)
WBC # BLD AUTO: 15.3 10E3/UL (ref 4–11)

## 2023-04-30 PROCEDURE — 85027 COMPLETE CBC AUTOMATED: CPT

## 2023-04-30 PROCEDURE — 250N000013 HC RX MED GY IP 250 OP 250 PS 637

## 2023-04-30 PROCEDURE — 84100 ASSAY OF PHOSPHORUS: CPT

## 2023-04-30 PROCEDURE — 250N000013 HC RX MED GY IP 250 OP 250 PS 637: Performed by: STUDENT IN AN ORGANIZED HEALTH CARE EDUCATION/TRAINING PROGRAM

## 2023-04-30 PROCEDURE — 120N000002 HC R&B MED SURG/OB UMMC

## 2023-04-30 PROCEDURE — 72080 X-RAY EXAM THORACOLMB 2/> VW: CPT | Mod: 26 | Performed by: RADIOLOGY

## 2023-04-30 PROCEDURE — 250N000011 HC RX IP 250 OP 636

## 2023-04-30 PROCEDURE — 71045 X-RAY EXAM CHEST 1 VIEW: CPT

## 2023-04-30 PROCEDURE — 99232 SBSQ HOSP IP/OBS MODERATE 35: CPT | Mod: GC | Performed by: INTERNAL MEDICINE

## 2023-04-30 PROCEDURE — 71045 X-RAY EXAM CHEST 1 VIEW: CPT | Mod: 26 | Performed by: RADIOLOGY

## 2023-04-30 PROCEDURE — 36592 COLLECT BLOOD FROM PICC: CPT

## 2023-04-30 PROCEDURE — 250N000013 HC RX MED GY IP 250 OP 250 PS 637: Performed by: INTERNAL MEDICINE

## 2023-04-30 PROCEDURE — 99232 SBSQ HOSP IP/OBS MODERATE 35: CPT | Performed by: INTERNAL MEDICINE

## 2023-04-30 PROCEDURE — 83735 ASSAY OF MAGNESIUM: CPT

## 2023-04-30 PROCEDURE — 82310 ASSAY OF CALCIUM: CPT

## 2023-04-30 PROCEDURE — 72080 X-RAY EXAM THORACOLMB 2/> VW: CPT

## 2023-04-30 RX ORDER — MAGNESIUM OXIDE 400 MG/1
400 TABLET ORAL EVERY 4 HOURS
Status: COMPLETED | OUTPATIENT
Start: 2023-04-30 | End: 2023-04-30

## 2023-04-30 RX ORDER — HYDROXYZINE HYDROCHLORIDE 25 MG/1
50 TABLET, FILM COATED ORAL EVERY 6 HOURS PRN
Status: DISCONTINUED | OUTPATIENT
Start: 2023-04-30 | End: 2023-05-19 | Stop reason: HOSPADM

## 2023-04-30 RX ORDER — LISINOPRIL 10 MG/1
10 TABLET ORAL DAILY
Status: DISCONTINUED | OUTPATIENT
Start: 2023-04-30 | End: 2023-05-01

## 2023-04-30 RX ORDER — FUROSEMIDE 40 MG
40 TABLET ORAL DAILY
Status: DISCONTINUED | OUTPATIENT
Start: 2023-04-30 | End: 2023-05-19

## 2023-04-30 RX ORDER — HYDROXYZINE HYDROCHLORIDE 25 MG/1
25 TABLET, FILM COATED ORAL EVERY 6 HOURS PRN
Status: DISCONTINUED | OUTPATIENT
Start: 2023-04-30 | End: 2023-05-19 | Stop reason: HOSPADM

## 2023-04-30 RX ADMIN — Medication 400 MG: at 17:20

## 2023-04-30 RX ADMIN — LEVOTHYROXINE SODIUM 100 MCG: 100 TABLET ORAL at 08:25

## 2023-04-30 RX ADMIN — SPIRONOLACTONE 200 MG: 100 TABLET ORAL at 08:25

## 2023-04-30 RX ADMIN — Medication 10 ML: at 17:20

## 2023-04-30 RX ADMIN — MICONAZOLE NITRATE: 20 CREAM TOPICAL at 21:19

## 2023-04-30 RX ADMIN — FUROSEMIDE 40 MG: 40 TABLET ORAL at 13:53

## 2023-04-30 RX ADMIN — SALINE NASAL SPRAY 2 SPRAY: 1.5 SOLUTION NASAL at 08:27

## 2023-04-30 RX ADMIN — MICONAZOLE NITRATE: 20 CREAM TOPICAL at 10:40

## 2023-04-30 RX ADMIN — POTASSIUM & SODIUM PHOSPHATES POWDER PACK 280-160-250 MG 2 PACKET: 280-160-250 PACK at 20:48

## 2023-04-30 RX ADMIN — HYDROXYZINE HYDROCHLORIDE 25 MG: 25 TABLET, FILM COATED ORAL at 04:36

## 2023-04-30 RX ADMIN — POTASSIUM & SODIUM PHOSPHATES POWDER PACK 280-160-250 MG 2 PACKET: 280-160-250 PACK at 13:54

## 2023-04-30 RX ADMIN — MICONAZOLE NITRATE: 20 CREAM TOPICAL at 10:39

## 2023-04-30 RX ADMIN — SALINE NASAL SPRAY 2 SPRAY: 1.5 SOLUTION NASAL at 10:39

## 2023-04-30 RX ADMIN — CARVEDILOL 12.5 MG: 12.5 TABLET, FILM COATED ORAL at 17:20

## 2023-04-30 RX ADMIN — LABETALOL HYDROCHLORIDE 10 MG: 5 INJECTION, SOLUTION INTRAVENOUS at 05:38

## 2023-04-30 RX ADMIN — Medication 5 ML: at 08:15

## 2023-04-30 RX ADMIN — SALINE NASAL SPRAY 2 SPRAY: 1.5 SOLUTION NASAL at 13:54

## 2023-04-30 RX ADMIN — Medication 400 MG: at 13:54

## 2023-04-30 RX ADMIN — Medication 1 TABLET: at 08:25

## 2023-04-30 RX ADMIN — EMPAGLIFLOZIN 10 MG: 10 TABLET, FILM COATED ORAL at 08:25

## 2023-04-30 RX ADMIN — Medication 5 ML: at 14:24

## 2023-04-30 RX ADMIN — OXYCODONE HYDROCHLORIDE 5 MG: 5 TABLET ORAL at 00:51

## 2023-04-30 RX ADMIN — CARVEDILOL 12.5 MG: 12.5 TABLET, FILM COATED ORAL at 06:04

## 2023-04-30 RX ADMIN — SALINE NASAL SPRAY 2 SPRAY: 1.5 SOLUTION NASAL at 21:19

## 2023-04-30 RX ADMIN — POTASSIUM & SODIUM PHOSPHATES POWDER PACK 280-160-250 MG 2 PACKET: 280-160-250 PACK at 17:20

## 2023-04-30 ASSESSMENT — ACTIVITIES OF DAILY LIVING (ADL)
ADLS_ACUITY_SCORE: 43
ADLS_ACUITY_SCORE: 50
ADLS_ACUITY_SCORE: 42
ADLS_ACUITY_SCORE: 43
ADLS_ACUITY_SCORE: 50
ADLS_ACUITY_SCORE: 42
ADLS_ACUITY_SCORE: 50
ADLS_ACUITY_SCORE: 42
ADLS_ACUITY_SCORE: 50
ADLS_ACUITY_SCORE: 50

## 2023-04-30 NOTE — PLAN OF CARE
Status: POD#2 pituitary adenoma resection. 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 Baseline right CN 3 palsy  Vitals: Intermittently tachycardic in the 100s. On RA. HTN - IV BP meds given x1, AM PO meds given early.  Neuros: A&Ox4, forgetful, whispers. BUE 4, BLE 3. Baseline N/T to feet. Periorbital swelling, more pronounced on R. Ptosis to R eye. Pupils sluggish bilaterally  IV: DL PICC HL  Labs/Electrolytes: ACHS BG checks  Resp/trach: LSC  Diet: Regular diet, tray set up. Carb coverage.  Bowel status: LBM 4/28, bowel meds given. C/o gas discomfort, PRN simethicone given  : No episodes of incontinence this shift.  Skin: Right and left leg wounds with mepilex in place, weeping serosang drainage. Dressing change due 5/2. Buttocks/sacral wounds with mepilex in place. Wound care orders in place. Dry/scaly skin to BLE. Edematous to BLE. Wearing nasal sling this shift.   Pain: PRN oxycodone given x1, atarax x1 for anxiety  Activity: A2, lift  Social: Pt anxious this AM wanting to leave the room. Writer suggested to wait for more staff during the day, Pt was on phone with sister to help calm down. Writer obtained order for atarax to help with anxiety which he eventually agreed to take. Currently has crayons and coloring pages at bedside.  [facility]. Continue to monitor and follow POC

## 2023-04-30 NOTE — CONSULTS
4/30- [facility] (called [phone] spoke to  and was transferred to patient placement).     Per pt placement, they do not have any available beds today.    Spoke to Medicine team to update of this and they passed on that discharge readiness will be decided by Neurosurgery.     Care Coordination will continue to follow.     Ana Ray, RN   Care Coordinator        Called and spoke to pt , Sindhu. [facility] are [facility]. Pt  will gather paper work and call back unit. Updated charge that process is underway and that [facility], but we are now waiting on them. If too much is needed after RNCC leaves for day station nurses/staff can leave for follow up tomorrow. RNCC called [phone], reached  and let him know my name title and calling location, as well as I was looking to start the transferring process for a [place] to come back to them as planned.  0622- received call back from Sindhu pt . She was ready to do a Doctor to Doctor call for the transfer. After contacting attending via [employer] with no reply the only option was to give the bed away and try tomorrow.   JAMES Vickers RN 4/29/2023 4:12 PM  Float RN Care Coordinator  Unit RNCC pager: [phone]     For Weekend & Holiday on call RN Care Coordinator:  (Tasks: Home care, home infusion, medical equipment/oxygen, transportation, IMM & MOON forms, etc.)     Text Paging in [facility] Smart Web is the preferred method of contact for these teams     [place] & [place] ([id number]) Saturday & Sunday; ([phone])  Recognized Holidays  Pager #2: [phone] Units: 6A, 6B, 6C, 6D    For Weekend & Holiday on call Social Work:  (Tasks: TCU, transportation, Hospice, adjustment to illness counseling, Health Care Directives, Child Protection and Domestic Violence concerns, Vulnerable Adult, IMM forms, etc.)     Text Paging in [facility]  Smart Web is the preferred method of contact for these teams    [place] (0800 - 1630) Saturday and Sunday  Pager: [phone] Units: 6A, 6B, 6C, 6D     ______________________________________________     After hours for all units everyday- (only the  is available after hours until midnight)  Pager [phone]

## 2023-04-30 NOTE — PLAN OF CARE
Status: POD#3 pituitary adenoma resection. 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 Baseline right CN 3 palsy  Vitals: Intermittent tachycardia in low 100's  Neuros: A&Ox4, forgetful, whispers. BUE 4, BLE 3. Baseline N/T to feet. Periorbital swelling, more pronounced on R. Ptosis to R eye. Pupils sluggish bilaterally  IV: DL PICC HL  Labs/Electrolytes: ACHS BG checks. Noon BG 56, asymptomatic and increased quickly to 116 after 2 cups of apple juice. Electrolytes replaced- see labs  Resp/trach: LSC  Diet: Regular, good intake  Bowel status: Large BM on BSC this am  : Voiding with intermittent incontinence, none today. Larger amounts this afternoon after po Lasix.  Skin: right and left leg wounds with mepilex in place, weeping clear drainage. Both changed last evening and again today d/t large amount of drainage. Next due 5/2 or PRN  Buttocks/sacral wounds with mepilex in place. Wound care orders in place. Dry/scaly skin to BLE. Edematous to BLE. Refusing nasal sling a times and will dap any nasal drainage. Small amount of bloody drainage continues   Pain: Back and rib pain today, Tylenol and Oxycodone x2.  Activity: Up with lift to recliner today, tolerated approx 3 hours.  [facility]   Social: Sister visited this afternoon. Anxiety at times, allowed pt to go for a wheelchair ride off unit with family and with RN x1.   Continue to monitor and follow POC

## 2023-04-30 NOTE — PLAN OF CARE
Status: POD#2 pituitary adenoma resection. 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 Baseline right CN 3 palsy  Vitals: Intermittent tachycardia in low 100's  Neuros: A&Ox4, forgetful, whispers. BUE 4, BLE 3. Baseline N/T to feet. Periorbital swelling, more pronounced on R. Ptosis to R eye. Pupils sluggish bilaterally  IV: DL PICC HL  Labs/Electrolytes: ACHS BG checks  Resp/trach: LSC  Diet: Regular, fair po intake  Bowel status: LBM 4/28  : Voiding with intermittent incontinence, none today.  Skin: right and left leg wounds with mepilex in place, weeping clear drainage. Both changed this evening. Next due 5/2.  Buttocks/sacral wounds with mepilex in place. Wound care orders in place. Dry/scaly skin to BLE. Edematous to BLE. Refusing nasal sling  Pain: Back and rib pain today, Tylenol and Oxycodone x2.  Activity: Up with lift to recliner today, tolerated approx 3 hours.  Plan: MRI under sedation today, tolerated well.   Social: Sister visited this afternoon. No anxiety today, pt very pleased to move to a window bed.  Continue to monitor and follow POC  Please get weight next time OOB.

## 2023-04-30 NOTE — PROGRESS NOTES
Endocrine Progress Note  Patient: Gustavo Faria   MRN: [id number]  Date of Service: 04/30/2023       Assessment and plan:  Gustavo Faria is a 58 year old male with PMHx of  ACTH-secreting macroadenoma c/b central hypothyroidism, and central hypogonadism, s/p transphenoidal surgery 5/2021 and 7/2021 in [place] has baseline records 3rd nerve palsy, ongoing serratia RLE cellulitis c/b recent serratia bacteremia, HFrEF, T2DM, and HTN who was transferred from [facility] for surgical intervention of known expanding large sellar/suprasellar mass extending into cavernous sinuses and subsequent cranial nerve impingement. Now POD#3.      #Pituitary macroadenoma:  #Cushing's disease:  #Pituitary apoplexy:  Unable to remove entire pituitary macroadenoma due to fibrous tissue. Cortisol level after surgery still elevated at 38.2. Blood pressure within target range. Sodium WNL Potassium 3.5.   - Continue KCl 40 mEq daily and replete aggressively  - Continue spironolactone to 200 mg.  - BMP every 6 hours to monitor sodium and potassium  - Strict I's and O's     #Central hypothyroidism:  Prior to admission was on levothyroxine 100 mcg daily.  Free T4 on admission 1.3 and TSH not detectable (picture of central hypothyroidism).     Recommendations:  -Continue with PTA levothyroxine 100 mcg daily.  - Free T4 tomorrow morning     #DM type II: Hemoglobin A1c on admission 9.5%.  Prior to admission was on Sitagliptin 100 mg daily/metformin 500 mg twice daily, Jardiance 12.5 mg (not available)   Currently fasting BG above target at 252; did receive dexamethasone 4mg yesterday. BG increased since no carb coverage in place. Insulin drip was stopped on 4/29. Adding jardiance is helping in getting his BG down, will cut back on his prandial coverage.    - Increased Lantus to 28 units daily.  - Change carb coverage to 1 units/20 g CHO with meals and snacks  - Continue High-dose sliding scale insulin for AC & HS  - Continue Jardiance  10 mg daily  - Continue hypoglycemia protocol  - Accu checks AC & HS    Patient and family reports [facility].  [facility] will [facility].      Patient was seen and discussed with Attending Dr Menendez  Patient labs, chart and imaging reviewed      Rhiannon Arroyo  Endocrinology Fellow  [phone]      I have seen and examined the patient and agree with the fellow's plan of care as noted.  April 30, 2023    Dr. Smitha Menendez [phone]      ======================================================================    Subjective:  - Pituitary macroadenoma debulking surgery/resection(4/27/23)  - Overall, still feels tired but better.     Physical Examination:  BP (!) 134/102 (BP Location: Left arm)   Pulse 76   Temp 97.8  F (36.6  C) (Axillary)   Resp 16   Wt 73.4 kg (161 lb 13.4 oz)   SpO2 96%   BMI 26.12 kg/m      Constitutional: healthy, alert and no distress   Cardiovascular: RRR. No murmurs, clicks gallops or rub  Respiratory: lungs CTAB. No increased work of breathing  Psychiatric: mentation appears normal and affect normal/bright  Head: Normocephalic.   Neck: Neck supple. Thyroid symmetric, normal size,  ENT: Nasal bandage with some blood in place   Abdomen: Abdomen soft, non-tender. BS normal. No masses, organomegaly  NEURO: Sensation grossly WNL.  JOINT/EXTREMITIES: +2-3 pitting bilateral lower extremity edema.    Medications:  Reviewed    Endocrine Labs:       Hemoglobin A1C Adrenal Corticotropin Cortisol Serum   Latest Ref Rng <5.7 % <47 pg/mL ug/dL   2/16/2023  8:54 AM 7.6 (H)      4/5/2023  2:35 AM  321 (H)  46.3    4/5/2023  6:11 AM 9.5 (H)      4/6/2023  6:26 AM   48.7    4/7/2023  8:42 AM  273 (H)  41.1    4/9/2023  9:12 AM  364 (H)  37.8       T4 Free Insulin Growth Factor 1 (External)   Latest Ref Rng 0.90 - 1.70 ng/dL 50 - 317 ng/mL   4/5/2023  6:11 AM 1.30     4/5/2023  11:10 AM  <10 (L)       TSH   Latest Ref Rng 0.30 - 4.20 uIU/mL   2/16/2023  8:54  AM    4/5/2023  2:35 AM    4/5/2023  6:11 AM <0.01 (L)

## 2023-04-30 NOTE — PROGRESS NOTES
Otolaryngology Progress Note    Subjective/Intvl events: NAEON. He is frustrated that he has been in the hospital so long and would like to leave today. Stable amount of nasal drainge, no change in his vision. Denies salty/metallic taste in mouth.     O: BP (!) 134/102 (BP Location: Left arm)   Pulse 76   Temp 97.8  F (36.6  C) (Axillary)   Resp 16   Wt 74.6 kg (164 lb 8 oz)   SpO2 96%   BMI 26.55 kg/m     General: Alert and oriented x 3, no acute distress   HEENT: Bilateral lateral gaze palsy and restricted EOM on the right, CN V1-V3 intact, facial movement symmetric, Oropharynx clear. Mild amount of bloody nasal drainage within b/l nares.    Pulmonary: Breathing non-labored, no stridor, no accessory muscle use.        Intake/Output Summary (Last 24 hours) at 4/29/2023 1023  Last data filed at 4/29/2023 0500  Gross per 24 hour   Intake 1216 ml   Output 1125 ml   Net 91 ml       LABS:    BMP  Recent Labs   Lab 04/30/23  0821 04/30/23  0806 04/30/23  0205 04/29/23  2354 04/29/23  2019 04/29/23  1937 04/29/23  1813 04/29/23  1317     --   --  142  --  141  --  143   POTASSIUM 4.0  --   --  3.9  --  4.1  --  3.7   CHLORIDE 103  --   --  105  --  103  --  105   KORIN 8.3*  --   --  8.1*  --  8.2*  --  8.0*   CO2 28  --   --  29  --  25  --  28   BUN 14.0  --   --  17.4  --  17.5  --  16.4   CR 0.61*  --   --  0.65*  --  0.66*  --  0.63*   * 227* 139* 136*   < > 216*   < > 84    < > = values in this interval not displayed.     CBC  Recent Labs   Lab 04/30/23  0821 04/29/23  0644 04/28/23  0815 04/28/23  0045   WBC 15.3* 15.4* 15.2* 16.4*   RBC 3.39* 3.38* 3.49* 3.65*   HGB 9.0* 9.0* 9.3* 9.7*   HCT 30.3* 29.6* 30.3* 31.7*   MCV 89 88 87 87   MCH 26.5 26.6 26.6 26.6   MCHC 29.7* 30.4* 30.7* 30.6*   RDW 23.9* 23.3* 23.2* 22.9*    348 380 362     INR  Recent Labs   Lab 04/29/23  0644 04/28/23  0110 04/26/23  0920 04/24/23  1145   INR 0.94 1.02 0.99 1.01       A/P: Gustavo Faria is a 58 year old  male with a past medical history of pituitary ACTH secreting macroadenoma s/p endoscopic endonasal transcavernous approach for resection of functional pituitary adenoma w/ nasoseptal flap on 4/28. He is recovering well post-operatively without signs of CSF leak.    - Serial neuro exams  - Nasal saline starting QID  - Sinus precautions: No nose blowing, sneeze with mouth open, no straining, and scheduled bowel regimen  - Rest of care per primary team      -- Patient and above plan discussed with Dr. Adan Gibson MD  Otolaryngology-Head & Neck Surgery, PGY3  Please contact ENT with questions by dialing [phone] and entering job code [phone] when prompted.

## 2023-04-30 NOTE — PROGRESS NOTES
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 He developed sudden headache and vision changes on 3/25, with MRI brain on 3/28, which demonstrated enlargement of known pituitary adenoma, with necrosis extending laterally beyond the left cavernous sinus, concerning for compression of cranial nerves at cavernous sinus. 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      Patient is POD-3 s/p Endoscopic endonasal transcavernous approach for resection of functional pituitary adenoma with harvesting of right-sided pedicled nasoseptal flap, which was pedicled off the posterior septal branch of the sphenopalatine artery    Plan:  - Watch for DI/CSF leak  - Follow Na  - Follow urine output/ inputs/ outputs  - Scheduled Zofran x 24 hours  - On sliding scale insulin now   - Continue to hold ASA  - Please inform Neurosurgery if there is any change in exam  -----------------------------------  Kelvin Cheng MD, PhD  PGY-2 Neurosurgery  Please page NSGY on call with questions      Please contact neurosurgery resident on call with questions.    Dial * * *[phone], enter [phone] when prompted.    Interval History: No acute events overnight. Stable neurological exam. MRI Brain completed.    Objective:   Temp:  [97.5  F (36.4  C)-98.3  F (36.8  C)] 97.8  F (36.6  C)  Pulse:  [] 84  Resp:  [12-18] 16  BP: (103-169)/() 143/101  SpO2:  [90 %-100 %] 92 %  I/O last 3 completed shifts:  In: 1504 [P.O.:1504]  Out: 1950 [Urine:1950]    NEUROLOGIC:  --  Opens eyes to voice, following commands, oriented x3  Cranial Nerves:  -- visual fields full to confrontation although with limited participation, PERRL anisocoric L>R and sluggish, left CN III paresis and CN VI palsy, restricted ROM in right EOM with no apparent asymmetry   -- face symmetrical, tongue midline  -- sensory V1-V3 intact bilaterally  -- palate elevates symmetrically, uvula midline  -- hearing grossly intact bilat     Motor:  Antigravity x4 extremities     Sensory:  intact to LT x 4 extremities      Reflexes:       Bi Tri BR Omi Pat Ach Bab     C5-6 C7-8 C6 UMN L2-4 S1 UMN   R 2+ 2+ 2+ Norm 2+ 2+ Norm   L 2+ 2+ 2+ Norm 2+ 2+ Norm      Gait: Deferred.     LABS:  Recent Labs   Lab 04/30/23  0205 04/29/23  2354 04/29/23  2200 04/29/23 2019 04/29/23  1937 04/29/23  1813 04/29/23  1317   NA  --  142  --   --  141  --  143   POTASSIUM  --  3.9  --   --  4.1  --  3.7   CHLORIDE  --  105  --   --  103  --  105   CO2  --  29  --   --  25  --  28   ANIONGAP  --  8  --   --  13  --  10   * 136* 169*   < > 216*   < > 84   BUN  --  17.4  --   --  17.5  --  16.4   CR  --  0.65*  --   --  0.66*  --  0.63*   KORIN  --  8.1*  --   --  8.2*  --  8.0*    < > = values in this interval not displayed.       Recent Labs   Lab 04/29/23  0644   WBC 15.4*   RBC 3.38*   HGB 9.0*   HCT 29.6*   MCV 88   MCH 26.6   MCHC 30.4*   RDW 23.3*          IMAGING:  Recent Results (from the past 24 hour(s))   MR Brain w/o & w Contrast    Narrative    EXAM: MR BRAIN W/O & W CONTRAST  4/29/2023 12:15 PM     HISTORY: for post resection evaluation       COMPARISON: MRI 4/24/2023, 4/6/2023     TECHNIQUE: Axial diffusion and FLAIR images of the whole brain  obtained without intravenous contrast. Sagittal T1 and T2-weighted,  coronal T2-weighted, coronal T1-weighted images with focus on the  sella were obtained without intravenous contrast. Sagittal T2 space  with coronal and axial reconstructions were also performed. Post  intravenous  contrast using gadolinium coronal and sagittal T1-weighted  images were obtained focused on the sella. Dynamic postcontrast  coronal T1-weighted images were also obtained.    CONTRAST: 7.3 cc Gadavist.    FINDINGS:  Endoscopic maxillary antrostomy, total ethmoidectomy and wide  sphenoidectomy, and transcavernous pituitary resection changes.  Layering debris in the sinonasal cavity, nasopharynx and right  maxillary sinus. Residual suprasellar heterogeneous enhancing tumor  measures 35 x 21 x 21 mm. The lesion continues to laterally displace,  encase the cavernous carotid arteries and expands the cavernous sinus.  The pituitary stalk is midline. The optic chiasm, prechiasmatic optic  nerves are not substantially displaced.    On the T2 space coronal series 103 there is T2 hyperintense fluid  filling the sinonasal resection cavity, part of which demonstrates CSF  signal. Additionally there appears to be a focal defect of the left  cribriform plate (coronal series 103, image 148; sagittal series 102,  image 98).    There is no mass effect, midline shift, or acute intracranial  hemorrhage. The ventricles are proportionate to the cerebral sulci.  Diffusion weighted images are negative for acute infarct. 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 Stable scattered punctate foci of subcortical,  periventricular white matter T2 hyperintensities. No substantial  generalized parenchymal volume loss. Normal major intracranial  vascular flow voids.    Right greater than left mastoid air cell fluid. The orbits are normal.      Impression    IMPRESSION:  1. Immediate postoperative changes status post subtotal sellar mass  resection. Residual enhancing lesion as detailed.  2. Question defect of the left cribriform plate favored to be be  artifactual given the anatomic location is not in close approximation  to the surgical path or surgical site, and therefore depends on  the  level of clinical concern for CSF leak.  3. No acute intracranial pathology.    I have personally reviewed the examination and initial interpretation  and I agree with the findings.    EVA DAMON MD         SYSTEM ID:  [id number]

## 2023-04-30 NOTE — PROGRESS NOTES
[facility]    Medicine Progress Note - Medicine Service, MAROON TEAM 2    Date of Admission:  4/3/2023    Assessment & Plan   Gustavo Faria is a 58 year old male with recent Serratia bacteremia, HFrEF, prolonged QT, lower extremity wounds, DMII, hypothyroidism, low testosterone and known ACTH secreting pituitary adenoma w/resulting Cushing's disease s/p 2 partial resections in 2021 who [facility] to [facility] for lower extremity wounds. [facility], he developed new onset double vision and severe headache which prompted imaging that showed a large mass invading the cavernous sinuses and impinging on the cranial nerves. He is transferred to [facility] for this pituitary adenoma, concern for progression vs hemorrhage/apoplexy. Complicated by psychosis/metabolic encephalopathy and electrolyte abnormalities secondary to Cushing's syndrome. He is now medically stable and is going to the OR today for surgical removal.    Updates today:  - Restart lisinopril at 10 mg daily  - Restart lasix 40 mg daily  - Neurosurgery remains primary--defer to them re: ability to transfer back to the [facility]  - Endocrinology continues to manage DM, Electrolytes     # Hypernatremia, DI vs cushing's, resolved  # Hypokalemia, resolved  Hypernatremia and hypokalemia, likely secondary to Cushing's.     - Endocrine following. Managing spironolactone  - On K replacement protocol  - Goal Na: 135-140  - Strict I&Os  - Daily BMP + Mg    # Acute metabolic encephalopathy - resolved  # Pyruria, Candiduria - resolved  # Encephalopathy likely secondary to cushing's disease  Admission symptoms included disorientation, intermittently following directions. Repetitive words - perseverating on particular phrases. Sx started the 2-3 days prior to admission (which [facility] was the [facility]). Vulnerable brain (multiple brain surgeries), enlarging pituitary mass. No seizure  activity per EEG. Steroids removed and pt still encephalopathic. Sodium had been in normal limits and patient remained altered. Worked up for infections - did reveal candiduria but unlikely this was etiology, though he did complete an 8 day course of fluconazole. Briefly on ceftriaxone but encephalopathy also did not improve. CT abd w/ contrast (4/10): no evidence of pyelonephritis  Ultimately attributed to Cushings and known pituitary tumor. Continues to remain lucid.  - Surgical management per neurosurgery  - Delirium precautions  - Electrolyte management as above  - PRN quetiapine if agitation that is not responsive to behavioral interventions     # Delusions, intermittent  Intermittent delusions regarding staff stalking patient while in the hospital. Unclear chronicity of these delusions - but has had them multiple times this hospitalization. Unclear psych history. Medical management for encephalopathy as stated above. Psychiatry was consulted during his care and agreed with paranoia and recommended Abilify. He was briefly on Abilify 5mg but patient then started to refuse nightly so this was discontinued. His paranoia and delusions have been very intermittent and appear to be associated with his anxiety.     # ACTH secreting pituitary adenoma c/b type II DM, hypothyroidism and low testosterone s/p two partial resections in 2021   Patient noted double vision and severe headache beginning the evening of 3/25. CT imaging on 3/25 revealed a large sellar/suprasellar mass extending into the cavernous sinuses with no evidence of acute stroke. On 3/28, he underwent an MRI which confirmed the CT findings of a large mass invading the cavernous sinuses and impinging on the cranial nerves. Necrosis extending beyond left cavernous sinus. [facility]. Repeat MRI performed on 4/6/23: compared to 2021 MRI, lesions are smaller. Orbit MRI without optic nerve compression and no evidence of orbital infection.  -  4/27 neurosurgery and ENT combined to perform endoscopic endonasal transcavernous approach for resection of functional pituitary adenoma. Complicated by significant scar tissue to cavernous sinus.   - Ophthalmology following, no major changes, agree with neurosurgery plans  - Endocrine following     # Type II DM, exacerbated by Cushing's   A1c of 7.6% on 2/2023.  - Endocrinology managing. Currently on insulin gtt and carb coverage.   - Hold PTA metformin, empa, and sitagliptin, can resume upon discharge    # Buttocks, sacrum and bilateral groin wounds   # RLE wound from puncture injury   # L dorsal foot wound from presumed trauma   # Soft tissue infection/abscess    # Hx serratia bacteremia in December 2022   For patient's lower extremity wounds and hx of Serratia bacteremia in December, he was initially started [facility]efazolin at the [facility]. His antibiotics were broadened to Vanc and Zosyn and ultimately he was transitioned to ceftazidime on 3/23 with plan to complete course on 4/4/23. BCx negative and wound cx grew serratia marcescens at the [facility]. He has been on ceftazidime since. MRI of the right tib/fib on 3/23 was negative for osteomyelitis but did show two fluid collections draining sponateneously. Ortho was consulted at the [facility] and determined no intervention was needed as wounds were draining on their own.  - completed course of ceftazidime (3/23 - 4/5)  - WOC following  - PT/OT   - Pain: tylenol PRN, dilaudid 0.5 mg PRN for dressing changes and oxy 5 mg    # HFmrEF, with global hypokinesis  # Hypertension  # High burden of PVCs  # Prolonged QTc   # Elevated troponin, down-trended  Coronary angiogram on 2/27 shows normal coronaries. Troponin down-trending (88 -> 77). No chest pain. High burden of PVCs. Follow up ECHO with global hypokinesis, which is worse compared to February 2023 ECHO. EF is 45% (same compared to prior). Likely small vessel disease vs demand vs cardiomyopathy 2/2 ceftazidime. Low concern for acute  ACS.   - Electrolyte mgmt as above  - Lasix 40mg daily--held since surgery on 4/27. Weight up significantly. Restart 4/30  - Coreg and lisinopril held since surgery 4/27. BP now elevated   - Carvedilol 12.5 mg BID restarted 4/29  - Lisinopril 10 mg daily restarted 4/30 (Prior dose was 20 mg daily)  - Empagliflozin (see above) - on HOLD   - ASA - on HOLD  - Avoid QTc prolonging medications     # Central Hypothyroidism  - Continue PTA levothyroxine     # Hypogonadism   - Continue PTA androgel (will need to bring in home medication)     # Chronic microcytic anemia   Hgb of 8.4 on [facility] the [facility]. Peripheral smear on 3/30 showed poikilocytosis with occasional red blood cell fragments but no other evidence of hemolysis.  Haptoglobin was normal.  Ferritin was 91, transferrin saturation was low, B12 was 495 and folate was 8.7. Likely combination of iron deficiency as well as inflammation/chronic disease.   - CTM        # Concern for malnutrition   - Nutrition following       Diet: Snacks/Supplements Adult: Other; Please send ensure max at 10:00  and HS; Between Meals  Regular Diet Adult    DVT Prophylaxis: Pneumatic Compression Devices  Roland Catheter: Not present  Lines:   PICC 04/06/23 Double Lumen Right Basilic access-Site Assessment: WDL      Cardiac Monitoring: None  Code Status: Full Code      Clinically Significant Risk Factors        # Hypokalemia: Lowest K = 3 mmol/L in last 2 days, will replace as needed       # Hypoalbuminemia: Lowest albumin = 2.9 g/dL at 4/11/2023  7:07 AM, will monitor as appropriate          # DMII: A1C = 9.5 % (Ref range: <5.7 %) within past 6 months    # Severe Malnutrition: based on nutrition assessment        Disposition Plan            Mj Blank MD   of Medicine  Med-Peds Hospitalist  Pager [phone]          Please see A&P for additional details of medical decision making.  35 MINUTES SPENT BY ME on the date of service doing chart review, history, exam,  documentation & further activities per the note.    I have personally reviewed the following data over the past 24 hrs:    15.3 (H)  \   9.0 (L)   / 329     142 105 17.4 /  227 (H)   3.9 29 0.65 (L) \         Kt Blank MD  Date of Service (when I saw the patient): 04/30/2023      _________________________________________________________    Interval History   Care team notes reviewed. Continues to have some anxiety and agitation, particularly at night. Today is complaining about someone giving him a cup of crayons instead of tea overnight. BP remains elevated.     Physical Exam   Vital Signs: Temp: 97.8  F (36.6  C) Temp src: Axillary BP: (!) 134/102 Pulse: 76   Resp: 16 SpO2: 96 % O2 Device: None (Room air) Oxygen Delivery: 2 LPM  Weight: 161 lbs 13.43 oz    Gen: Sitting up in chair, talkative, and pleasant, somewhat paranoid about care overnight  ENT: MMM, nasal harness in place with some bloody drainage  Resp: breathing comfortably, non-labored  CV: RRR, ext WWP, no LE edema noted        Medical Decision Making           Data     I have personally reviewed the following data over the past 24 hrs:    15.3 (H)  \   9.0 (L)   / 329     142 105 17.4 /  227 (H)   3.9 29 0.65 (L) \

## 2023-05-01 DIAGNOSIS — D35.2 PITUITARY ADENOMA (H): Primary | ICD-10-CM

## 2023-05-01 LAB
ALBUMIN UR-MCNC: NEGATIVE MG/DL
ANION GAP SERPL CALCULATED.3IONS-SCNC: 9 MMOL/L (ref 7–15)
APPEARANCE UR: CLEAR
BILIRUB UR QL STRIP: NEGATIVE
BUN SERPL-MCNC: 12.2 MG/DL (ref 6–20)
CALCIUM SERPL-MCNC: 8.7 MG/DL (ref 8.6–10)
CHLORIDE SERPL-SCNC: 102 MMOL/L (ref 98–107)
COLOR UR AUTO: ABNORMAL
CREAT SERPL-MCNC: 0.64 MG/DL (ref 0.67–1.17)
DEPRECATED HCO3 PLAS-SCNC: 31 MMOL/L (ref 22–29)
ERYTHROCYTE [DISTWIDTH] IN BLOOD BY AUTOMATED COUNT: 23.9 % (ref 10–15)
GFR SERPL CREATININE-BSD FRML MDRD: >90 ML/MIN/1.73M2
GLUCOSE BLDC GLUCOMTR-MCNC: 132 MG/DL (ref 70–99)
GLUCOSE BLDC GLUCOMTR-MCNC: 142 MG/DL (ref 70–99)
GLUCOSE BLDC GLUCOMTR-MCNC: 157 MG/DL (ref 70–99)
GLUCOSE BLDC GLUCOMTR-MCNC: 159 MG/DL (ref 70–99)
GLUCOSE BLDC GLUCOMTR-MCNC: 227 MG/DL (ref 70–99)
GLUCOSE BLDC GLUCOMTR-MCNC: 86 MG/DL (ref 70–99)
GLUCOSE SERPL-MCNC: 141 MG/DL (ref 70–99)
GLUCOSE UR STRIP-MCNC: >=1000 MG/DL
HCT VFR BLD AUTO: 31.8 % (ref 40–53)
HGB BLD-MCNC: 9.5 G/DL (ref 13.3–17.7)
HGB UR QL STRIP: NEGATIVE
KETONES UR STRIP-MCNC: NEGATIVE MG/DL
LEUKOCYTE ESTERASE UR QL STRIP: NEGATIVE
MAGNESIUM SERPL-MCNC: 2.3 MG/DL (ref 1.7–2.3)
MCH RBC QN AUTO: 26.6 PG (ref 26.5–33)
MCHC RBC AUTO-ENTMCNC: 29.9 G/DL (ref 31.5–36.5)
MCV RBC AUTO: 89 FL (ref 78–100)
MUCOUS THREADS #/AREA URNS LPF: PRESENT /LPF
NITRATE UR QL: NEGATIVE
PH UR STRIP: 7.5 [PH] (ref 5–7)
PHOSPHATE SERPL-MCNC: 2.4 MG/DL (ref 2.5–4.5)
PHOSPHATE SERPL-MCNC: 2.5 MG/DL (ref 2.5–4.5)
PLATELET # BLD AUTO: 357 10E3/UL (ref 150–450)
POTASSIUM SERPL-SCNC: 4.1 MMOL/L (ref 3.4–5.3)
RBC # BLD AUTO: 3.57 10E6/UL (ref 4.4–5.9)
RBC URINE: 1 /HPF
SARS-COV-2 RNA RESP QL NAA+PROBE: NEGATIVE
SODIUM SERPL-SCNC: 142 MMOL/L (ref 136–145)
SP GR UR STRIP: 1.01 (ref 1–1.03)
SP GR UR STRIP: 1.01 (ref 1–1.03)
SQUAMOUS EPITHELIAL: <1 /HPF
UROBILINOGEN UR STRIP-MCNC: NORMAL MG/DL
WBC # BLD AUTO: 17.7 10E3/UL (ref 4–11)
WBC URINE: 1 /HPF

## 2023-05-01 PROCEDURE — 258N000003 HC RX IP 258 OP 636: Performed by: NEUROLOGICAL SURGERY

## 2023-05-01 PROCEDURE — 250N000011 HC RX IP 250 OP 636

## 2023-05-01 PROCEDURE — 250N000013 HC RX MED GY IP 250 OP 250 PS 637: Performed by: INTERNAL MEDICINE

## 2023-05-01 PROCEDURE — 250N000013 HC RX MED GY IP 250 OP 250 PS 637

## 2023-05-01 PROCEDURE — 99232 SBSQ HOSP IP/OBS MODERATE 35: CPT | Performed by: INTERNAL MEDICINE

## 2023-05-01 PROCEDURE — 36592 COLLECT BLOOD FROM PICC: CPT | Performed by: STUDENT IN AN ORGANIZED HEALTH CARE EDUCATION/TRAINING PROGRAM

## 2023-05-01 PROCEDURE — 36592 COLLECT BLOOD FROM PICC: CPT | Performed by: INTERNAL MEDICINE

## 2023-05-01 PROCEDURE — 81001 URINALYSIS AUTO W/SCOPE: CPT | Performed by: INTERNAL MEDICINE

## 2023-05-01 PROCEDURE — 81003 URINALYSIS AUTO W/O SCOPE: CPT

## 2023-05-01 PROCEDURE — 36592 COLLECT BLOOD FROM PICC: CPT

## 2023-05-01 PROCEDURE — 82310 ASSAY OF CALCIUM: CPT

## 2023-05-01 PROCEDURE — 83735 ASSAY OF MAGNESIUM: CPT

## 2023-05-01 PROCEDURE — 84100 ASSAY OF PHOSPHORUS: CPT | Performed by: STUDENT IN AN ORGANIZED HEALTH CARE EDUCATION/TRAINING PROGRAM

## 2023-05-01 PROCEDURE — 85027 COMPLETE CBC AUTOMATED: CPT

## 2023-05-01 PROCEDURE — 87040 BLOOD CULTURE FOR BACTERIA: CPT | Performed by: INTERNAL MEDICINE

## 2023-05-01 PROCEDURE — U0003 INFECTIOUS AGENT DETECTION BY NUCLEIC ACID (DNA OR RNA); SEVERE ACUTE RESPIRATORY SYNDROME CORONAVIRUS 2 (SARS-COV-2) (CORONAVIRUS DISEASE [COVID-19]), AMPLIFIED PROBE TECHNIQUE, MAKING USE OF HIGH THROUGHPUT TECHNOLOGIES AS DESCRIBED BY CMS-2020-01-R: HCPCS | Performed by: INTERNAL MEDICINE

## 2023-05-01 PROCEDURE — 250N000009 HC RX 250: Performed by: NEUROLOGICAL SURGERY

## 2023-05-01 PROCEDURE — 84100 ASSAY OF PHOSPHORUS: CPT

## 2023-05-01 PROCEDURE — 120N000002 HC R&B MED SURG/OB UMMC

## 2023-05-01 PROCEDURE — 250N000013 HC RX MED GY IP 250 OP 250 PS 637: Performed by: STUDENT IN AN ORGANIZED HEALTH CARE EDUCATION/TRAINING PROGRAM

## 2023-05-01 RX ORDER — ACETAMINOPHEN 325 MG/1
650 TABLET ORAL EVERY 4 HOURS PRN
DISCHARGE
Start: 2023-05-01 | End: 2023-05-19

## 2023-05-01 RX ORDER — SPIRONOLACTONE 100 MG/1
200 TABLET, FILM COATED ORAL DAILY
DISCHARGE
Start: 2023-05-02 | End: 2023-05-19

## 2023-05-01 RX ORDER — NALOXONE HYDROCHLORIDE 0.4 MG/ML
0.2 INJECTION, SOLUTION INTRAMUSCULAR; INTRAVENOUS; SUBCUTANEOUS
DISCHARGE
Start: 2023-05-01

## 2023-05-01 RX ORDER — LIDOCAINE 4 G/G
2 PATCH TOPICAL EVERY 24 HOURS
DISCHARGE
Start: 2023-05-01 | End: 2023-05-19

## 2023-05-01 RX ORDER — ERGOCALCIFEROL 1.25 MG/1
50000 CAPSULE, LIQUID FILLED ORAL
DISCHARGE
Start: 2023-05-04

## 2023-05-01 RX ORDER — BISACODYL 10 MG
10 SUPPOSITORY, RECTAL RECTAL DAILY PRN
DISCHARGE
Start: 2023-05-01

## 2023-05-01 RX ORDER — LISINOPRIL 20 MG/1
20 TABLET ORAL DAILY
DISCHARGE
Start: 2023-05-02 | End: 2023-05-19

## 2023-05-01 RX ORDER — OXYCODONE HYDROCHLORIDE 5 MG/1
5 TABLET ORAL EVERY 4 HOURS PRN
Refills: 0 | Status: SHIPPED | DISCHARGE
Start: 2023-05-01 | End: 2023-05-19

## 2023-05-01 RX ORDER — LACTULOSE 10 G/15ML
20 SOLUTION ORAL DAILY PRN
DISCHARGE
Start: 2023-05-01

## 2023-05-01 RX ORDER — MULTIPLE VITAMINS W/ MINERALS TAB 9MG-400MCG
1 TAB ORAL DAILY
DISCHARGE
Start: 2023-05-02

## 2023-05-01 RX ORDER — ONDANSETRON 2 MG/ML
4 INJECTION INTRAMUSCULAR; INTRAVENOUS EVERY 6 HOURS PRN
DISCHARGE
Start: 2023-05-01

## 2023-05-01 RX ORDER — MICONAZOLE NITRATE 20 MG/G
CREAM TOPICAL 2 TIMES DAILY
DISCHARGE
Start: 2023-05-01 | End: 2023-05-19

## 2023-05-01 RX ORDER — CARVEDILOL 12.5 MG/1
12.5 TABLET ORAL 2 TIMES DAILY WITH MEALS
DISCHARGE
Start: 2023-05-01 | End: 2023-05-19

## 2023-05-01 RX ORDER — ONDANSETRON 4 MG/1
4 TABLET, ORALLY DISINTEGRATING ORAL EVERY 6 HOURS PRN
DISCHARGE
Start: 2023-05-01

## 2023-05-01 RX ORDER — PROCHLORPERAZINE MALEATE 10 MG
10 TABLET ORAL EVERY 6 HOURS PRN
DISCHARGE
Start: 2023-05-01

## 2023-05-01 RX ORDER — POLYETHYLENE GLYCOL 3350 17 G/17G
17 POWDER, FOR SOLUTION ORAL DAILY
DISCHARGE
Start: 2023-05-02 | End: 2023-05-19

## 2023-05-01 RX ORDER — SIMETHICONE 80 MG
80 TABLET,CHEWABLE ORAL EVERY 6 HOURS PRN
DISCHARGE
Start: 2023-05-01 | End: 2023-05-19

## 2023-05-01 RX ORDER — AMOXICILLIN 250 MG
1 CAPSULE ORAL 2 TIMES DAILY
DISCHARGE
Start: 2023-05-01 | End: 2023-05-19

## 2023-05-01 RX ORDER — HYDROXYZINE HYDROCHLORIDE 25 MG/1
25-50 TABLET, FILM COATED ORAL EVERY 6 HOURS PRN
DISCHARGE
Start: 2023-05-01

## 2023-05-01 RX ORDER — LISINOPRIL 10 MG/1
20 TABLET ORAL DAILY
Status: DISCONTINUED | OUTPATIENT
Start: 2023-05-01 | End: 2023-05-15

## 2023-05-01 RX ADMIN — Medication 5 ML: at 10:57

## 2023-05-01 RX ADMIN — MICONAZOLE NITRATE: 20 CREAM TOPICAL at 07:55

## 2023-05-01 RX ADMIN — SALINE NASAL SPRAY 2 SPRAY: 1.5 SOLUTION NASAL at 13:46

## 2023-05-01 RX ADMIN — SALINE NASAL SPRAY 2 SPRAY: 1.5 SOLUTION NASAL at 10:03

## 2023-05-01 RX ADMIN — Medication 1 TABLET: at 07:58

## 2023-05-01 RX ADMIN — LEVOTHYROXINE SODIUM 100 MCG: 100 TABLET ORAL at 07:58

## 2023-05-01 RX ADMIN — Medication 5 ML: at 06:46

## 2023-05-01 RX ADMIN — EMPAGLIFLOZIN 10 MG: 10 TABLET, FILM COATED ORAL at 07:58

## 2023-05-01 RX ADMIN — Medication 5 ML: at 15:32

## 2023-05-01 RX ADMIN — FUROSEMIDE 40 MG: 40 TABLET ORAL at 07:58

## 2023-05-01 RX ADMIN — SALINE NASAL SPRAY 2 SPRAY: 1.5 SOLUTION NASAL at 12:00

## 2023-05-01 RX ADMIN — SALINE NASAL SPRAY 2 SPRAY: 1.5 SOLUTION NASAL at 08:02

## 2023-05-01 RX ADMIN — CARVEDILOL 12.5 MG: 12.5 TABLET, FILM COATED ORAL at 07:58

## 2023-05-01 RX ADMIN — LISINOPRIL 20 MG: 20 TABLET ORAL at 08:07

## 2023-05-01 RX ADMIN — MICONAZOLE NITRATE: 20 CREAM TOPICAL at 07:54

## 2023-05-01 RX ADMIN — POTASSIUM PHOSPHATE, MONOBASIC AND POTASSIUM PHOSPHATE, DIBASIC 9 MMOL: 224; 236 INJECTION, SOLUTION, CONCENTRATE INTRAVENOUS at 09:17

## 2023-05-01 RX ADMIN — SPIRONOLACTONE 200 MG: 100 TABLET ORAL at 07:58

## 2023-05-01 ASSESSMENT — ACTIVITIES OF DAILY LIVING (ADL)
ADLS_ACUITY_SCORE: 45
ADLS_ACUITY_SCORE: 45
ADLS_ACUITY_SCORE: 48
ADLS_ACUITY_SCORE: 43
ADLS_ACUITY_SCORE: 48
ADLS_ACUITY_SCORE: 43
ADLS_ACUITY_SCORE: 45
ADLS_ACUITY_SCORE: 43
ADLS_ACUITY_SCORE: 43
ADLS_ACUITY_SCORE: 48

## 2023-05-01 NOTE — PROGRESS NOTES
Care Management Follow Up    Length of Stay (days): 28    Expected Discharge Date: 05/01/2023     Concerns to be Addressed: discharge planning  Patient plan of care discussed at interdisciplinary rounds: Yes    Anticipated Discharge Disposition: transfer back to [facility]    Patient/family educated on Medicare website which has current facility and service quality ratings: No    Referrals Placed by CM/SW:  Transfer back to [facility]  Private pay costs discussed: Not applicable    Additional Information:  Informed by medicine provider (primary team as of today) that patient is medically ready [facility]. Call placed to [facility] at 1145 (ph: [phone], opt 0 to speak with  and transfer to pt placement). Informed patient placement that patient is ready for return, has a transfer agreement in place. They took patient information and state they will call RNCC back to discuss bed availability.     6488 Addendum:  [facility]. They do not currently have a bed available for patient, she requests we call back this afternoon after 2:30 pm to see if beds have become available. Gayle provided patient placement ph: [phone].    1331 Addendum:  Spoke with patient and patient's sister regarding process of returning to [facility] and current lack of beds, plan to follow up this afternoon. Patient expressed negative experience with staff here and he is adamant about leaving this hospital today, wether that be to return to the [facility] or home to his sisters house. Patient is currently an assist of 2+lift and therapy recommends a TCU stay. Explained to patient that if he were to leave the hospital and return home today, he would not have appropriate services in place to receive the rehab he is needing. Additionally we would need to arrange for a stretcher ride home that would likely be a high out of pocket cost for him. Patient voiced understanding but remains  adamant that if transfer back to [facility] does not happen today he will be going home. Sister is reluctant and seems to fully understand the risks of him returning home today but ultimately she will respect his wishes.     Spoke with provider who has contacted the [facility] directly to discuss the above situation in hopes to facilitate transfer back today. Placement staff state they are waiting to verifying they have appropriate staffing to accept the patient back and should know by 2:30/3 this afternoon.    RNCC will follow up with patient placement after 2:30. For now, have arranged a tentative stretcher ride via [facility] for time listed below. Per discussion with [facility] patient placement, they will assume the bill for transportation due to the transfer agreement. Once it is confirmed patient is able to return, will need to obtain name, number, address of person responsible for transport bill and provide that information to [facility] billing department.     1415 Addendum:  Call received from Gayle at the [facility], they do not have staffing available to accept patient back today. Updated provider who spoke with patient and patient's sister, he continues to be adamant about discharge today. Per provider, sister hopes to take him to the [facility] ER. Provider has tried to get in touch with a physician at the [facility] to discuss this patient and need for transfer back for patient's safety. Patient is assist of 2+lift and does not have the necessary care or equipment at home. If patient wishes to discharge home, will need to have stretcher ride home which would be costly for patient. Call placed to Montrell at [facility] (ph:[phone]) to inquire about non-med stretcher ride to take patient to his preferred destination. Montrell quotes a $500 flat fee [facility] rate.     Spoke again with Siddhartha at the [facility] who states the physician we are trying to reach will not be here until 5 pm so they are not able to connect with Dr. Blank. Siddhartha also adds that if the  patient were to leave AMA, the [facility] will not be obligated to pay his [facility] bill (will need to share this information with patient at some point so he is aware).     After much discussion with the provider, provider met with patient who ultimately agreed to stay at [facility] if he is able to transfer rooms. Patient will be moved to a private room.     Will contact the [facility] tomorrow morning to see if bed available. Stretcher ride cancelled.     [facility] (transfer agreement)  Ph: [phone]  Pt placement: [phone]    Cristina Dominguez, RN, BSN  6A RN Care Coordinator  Ph: [phone]   Pager: [phone]

## 2023-05-01 NOTE — PROVIDER NOTIFICATION
"Pt put call light on for RN to \"look at PICC\". This RN assessed PICC site and found proximal portion of dressing rolled and leaving the PICC insertion site SUKH w/ no covering. Charge RN contacted vascular and was advised for RN to change dressing w/ extra 2 chloroprep. This RN changed PICC dressing according to normal practice w/ additional 2 chloropreps and successfully redressed w/ the external cath length at 3 cm. NSG notified of situation and asked if they wanted an x-ray to verify placement; this RN updated pt's assigned nurse.  "

## 2023-05-01 NOTE — PLAN OF CARE
"Care [id number]    Status: POD#4 pituitary adenoma resection  Vitals: HTN within parameters on RA.   Neuros: AOx4. Forgetful. Intermittently uncooperative with cares. Whispered speech, illogical at times. Fixated on discharge/leaving AMA. Pupils sluggish, deviated when tracking, R ptosis. N/T to BLE.   IV: DL PICC hep locked   Resp/trach: Depth/pattern regular. No SOB noted.   Diet: regular diet, carb coverage, poor PO intake.   Bowel status: BM 5/1.   : Strict I&O. Voids via urinal, intermittent incontinence.  Skin: Refusing repositioning, education provided. right and leg leg wounds covered with mepilex, dressing changes due 5/3. Buttocks wounds, cleansed per orders and covered with sacral mepilex. Edema to BLE. Intermittently wearing nasal sling w/ bloody drainage.   Pain: denies   Activity: A2 lift. Refusing repositioning.   Social: Sister at bedside   Plan: Medically ready to [facility] to [facility] when bed is available. Continue with POC.     Updates this shift: Pt adamant about leaving today with or without bed availability. Education provided about a safe discharge plan. MD and care coordinator came to bedside multiple times to talk with patient. Pt expressed frustrations around staff assistance because he \"is not a child\".  Pt became agreeable to stay if transferred to a new room. Pt refused 1600 assessment and said he does not want any nursing interventions completed for him. He stated \"I am a guest here. I just need a break\" Education provided on the importance of continuing with assessments, vital signs, blood sugars, etc. Pt intermittently agreeable, but easily agitated. Multiple attempts made for a full head to toe. Pt said he would like for staff to check on him every 15-20 mins but will not allow any assessments or vital signs. MD paged to notify about refusals.         "

## 2023-05-01 NOTE — ANESTHESIA POSTPROCEDURE EVALUATION
Patient: Gustavo Faria    Procedure: Procedure(s):  Anesthesia out of OR MRI       Anesthesia Type:  General    Note:  Disposition: Inpatient   Postop Pain Control: Uneventful            Sign Out: Well controlled pain   PONV: No   Neuro/Psych: Uneventful            Sign Out: Acceptable/Baseline neuro status   Airway/Respiratory: Uneventful            Sign Out: Acceptable/Baseline resp. status   CV/Hemodynamics: Uneventful            Sign Out: Acceptable CV status; No obvious hypovolemia; No obvious fluid overload   Other NRE: NONE   DID A NON-ROUTINE EVENT OCCUR? No           Last vitals:  Vitals Value Taken Time   /106 04/29/23 1230   Temp 36.4  C (97.6  F) 04/29/23 1200   Pulse 70 04/29/23 1232   Resp 14 04/29/23 1200   SpO2 97 % 04/29/23 1203   Vitals shown include unvalidated device data.    Electronically Signed By: Dalton Nash MD  April 30, 2023  7:30 PM

## 2023-05-01 NOTE — PROGRESS NOTES
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 He developed sudden headache and vision changes on 3/25, with MRI brain on 3/28, which demonstrated enlargement of known pituitary adenoma, with necrosis extending laterally beyond the left cavernous sinus, concerning for compression of cranial nerves at cavernous sinus. 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      Patient is POD-4 s/p Endoscopic endonasal transcavernous approach for resection of functional pituitary adenoma with harvesting of right-sided pedicled nasoseptal flap, which was pedicled off the posterior septal branch of the sphenopalatine artery    Plan:  - Watch for DI/CSF leak  - Follow Na  - Follow urine output/ inputs/ outputs  - Scheduled Zofran x 24 hours  - On sliding scale insulin now   - Continue to hold ASA  - Please inform Neurosurgery if there is any change in exam  - Pending endocrinology plan, [facility]  - Medicine to take primary    Follow up plan  - Follow up in 6 weeks with Dr Jeong (message sent to schedulers)  - MRI pituitary in 6 weeks before discharge (message sent to schedulers)  - Upright lumbar xrays to evaluate his compression fractures (ordered for you)  -----------------------------------  Yaneth Ugalde MD  Neurosurgery PGY3    Please contact neurosurgery resident on call with questions.    Dial * * [phone], enter [phone] when prompted.      Interval History: No acute events overnight.       Objective:    Temp:  [97.2  F (36.2  C)-98.9  F (37.2  C)] 98.2  F (36.8  C)  Pulse:  [] 93  Resp:  [16-20] 20  BP: (141-153)/(103-122) 153/115  SpO2:  [96 %-100 %] 97 %  I/O last 3 completed shifts:  In: 1580 [P.O.:1560; I.V.:20]  Out: 3125 [Urine:3125]    NEUROLOGIC:  -- Opens eyes to voice, following commands, oriented x3  Cranial Nerves:  -- visual fields full to confrontation although with limited participation, PERRL anisocoric L>R and sluggish, left CN III paresis and CN VI palsy, restricted ROM in right EOM with no apparent asymmetry   -- face symmetrical, tongue midline  -- sensory V1-V3 intact bilaterally  -- palate elevates symmetrically, uvula midline  -- hearing grossly intact bilat     Motor:  Antigravity x4 extremities     Sensory:  intact to LT x 4 extremities      Reflexes:       Bi Tri BR Omi Pat Ach Bab     C5-6 C7-8 C6 UMN L2-4 S1 UMN   R 2+ 2+ 2+ Norm 2+ 2+ Norm   L 2+ 2+ 2+ Norm 2+ 2+ Norm      Gait: Deferred.     LABS:  Recent Labs   Lab 05/01/23  0749 05/01/23  0650 05/01/23  0203 04/30/23  2140 04/30/23  1706 04/30/23  1428   NA  --  142  --  143  --  142   POTASSIUM  --  4.1  --  4.4  --  4.0   CHLORIDE  --  102  --  101  --  104   CO2  --  31*  --  30*  --  29   ANIONGAP  --  9  --  12  --  9   * 141* 157* 142*   < > 136*   BUN  --  12.2  --  13.9  --  14.3   CR  --  0.64*  --  0.65*  --  0.62*   KORIN  --  8.7  --  8.8  --  8.5*    < > = values in this interval not displayed.       Recent Labs   Lab 05/01/23  0650   WBC 17.7*   RBC 3.57*   HGB 9.5*   HCT 31.8*   MCV 89   MCH 26.6   MCHC 29.9*   RDW 23.9*          IMAGING:  Recent Results (from the past 24 hour(s))   XR Thoracic Lumbar Standing 2 Views    Narrative    EXAM: XR THORACIC LUMBAR STANDING 2 VIEWS  LOCATION: [facility]  DATE/TIME: 4/30/2023 12:22 PM CDT    INDICATION: follow compression fx  COMPARISON: 04/20/2023      Impression    IMPRESSION: Trace dextrocurvature. AP  alignment grossly preserved. Known T9, T11, L1, and L2 compression fractures are not well visualized secondary to technique in addition to overlying stool and bowel gas and pulmonary consolidation. No obvious   significant changes identified. No new fractures are identified. Trace thoracolumbar levocurvature. Multilevel disc height loss.   XR Chest Port 1 View    Narrative    Exam: XR CHEST PORT 1 VIEW, 4/30/2023 8:14 PM    Comparison: 4/8/2023    History: Confirm picc placement    Findings:  Single AP portable upright view of the chest. Right upper extremity  PICC with tip in the mid SVC.    Trachea is midline. Unchanged cardiomegaly. No acute airspace disease.  There is no pneumothorax or pleural effusion. The upper abdomen is  unremarkable.      Impression    Impression:   1. New Right upper extremity PICC with tip in the mid SVC.  2. Unchanged cardiomegaly.    I have personally reviewed the examination and initial interpretation  and I agree with the findings.    FARIHA NATARAJAN MD         SYSTEM ID:  [id number]

## 2023-05-01 NOTE — PLAN OF CARE
Cared for from [id number]    Status: POD#4 pituitary adenoma resection  Vitals: HTN within parameters, on RA.  Neuros: AO x 4, forgetful. Soft, illogical speech. Bilateral pupils sluggish, difficulty tracking. Right ptosis.. N/T feet, baseline.  IV: PICC: purple lumen HL, red lumen infusing phosphorus replacement  Labs/Electrolytes: Mg & K WNL, rechecks in AM. Phos 2.5, replacement infusing.  Resp/trach: LS clear  Diet: Regular diet with carb coverage. Strict I&O.  Bowel status: Large BM this AM via BSC  : voiding via urinal, need UA.  Skin: right and leg leg wounds covered with mepilex, dressing changes due 5/3. Buttocks wounds, cleansed per orders and covered with sacral mepilex. Edema to BLE. Nasal sling in place with small amount of serosang drainage.  Pain: denied  Activity: A2/Lift.   Social: pt argumentative & demanding, requesting discharge papers- MD aware.  Plan[facility] per NSG note, continue POC.

## 2023-05-01 NOTE — PROGRESS NOTES
Endocrine Progress Note  Patient: Gustavo Faria   MRN: [id number]  Date of Service: 05/01/2023       Assessment and plan:  Gustavo Faria is a 58 year old male with PMHx of  ACTH-secreting macroadenoma c/b central hypothyroidism, and central hypogonadism, s/p transphenoidal surgery 5/2021 and 7/2021 in [place] has baseline records 3rd nerve palsy, ongoing serratia RLE cellulitis c/b recent serratia bacteremia, HFrEF, T2DM, and HTN who was transferred from [facility] for surgical intervention of known expanding large sellar/suprasellar mass extending into cavernous sinuses and subsequent cranial nerve impingement. Now POD#4.      #Pituitary macroadenoma:  #Cushing's disease:  #Pituitary apoplexy:  Unable to remove entire pituitary macroadenoma due to fibrous tissue. Cortisol level after surgery still elevated at 38.2 with repeat serum cortisol on 4/29 was at 27.4.  Blood pressure above target. Sodium WNL Potassium 4.1   - Continue spironolactone to 200 mg.  - Strict I's and O's     #Central hypothyroidism:  Prior to admission was on levothyroxine 100 mcg daily.  Free T4 on admission 1.3 and TSH not detectable (picture of central hypothyroidism).     Recommendations:  -Continue with PTA levothyroxine 100 mcg daily.  - Free T4 tomorrow morning     #DM type II: Hemoglobin A1c on admission 9.5%.  Prior to admission was on Sitagliptin 100 mg daily/metformin 500 mg twice daily, Jardiance 12.5 mg (not available)   Patient BG are trending down well. He had Low Bg in the afternoon on 4/30. Will cut back on his basal insulin.    - Reduce Lantus to 20 units daily.  - Continue carb coverage to 1 units/20 g CHO with meals and snacks  - Continue High-dose sliding scale insulin for AC & HS  - Continue Jardiance 10 mg daily  - Continue hypoglycemia protocol  - Accu checks AC & HS      Patient was seen and discussed with Attending Dr Menendez  Patient labs, chart and imaging reviewed      Rhiannon Arroyo  Endocrinology  Fellow  [phone]    I have reviewed the fellow's note and agree with the plan  Dr. Smitha Menendez [phone]      ======================================================================    Subjective:  - Pituitary macroadenoma debulking surgery/resection(4/27/23)  - Overall, still feels tired but better.     Physical Examination:  BP (!) 117/111 (BP Location: Left arm)   Pulse 84   Temp 98.5  F (36.9  C) (Oral)   Resp 18   Wt 74.6 kg (164 lb 8 oz)   SpO2 97%   BMI 26.55 kg/m      Constitutional: healthy, alert and no distress   Respiratory: lungs CTAB. No increased work of breathing  Psychiatric: mentation appears normal and affect normal/bright  Head: Normocephalic.   Neck: Neck supple. Thyroid symmetric, normal size,  ENT: Nasal bandage with in place   Abdomen: Abdomen soft, non-tender. BS normal. No masses, organomegaly  NEURO: Sensation grossly WNL.  JOINT/EXTREMITIES: +2-3 pitting bilateral lower extremity edema.    Medications:  Reviewed    Endocrine Labs:     Latest Reference Range & Units 04/05/23 02:35 04/06/23 06:26 04/07/23 08:42 04/09/23 09:12 04/28/23 08:15 04/29/23 06:44   Cortisol Serum ug/dL 46.3 48.7 41.1 37.8 38.2 27.4      Hemoglobin A1C Adrenal Corticotropin Cortisol Serum   Latest Ref Rng <5.7 % <47 pg/mL ug/dL   2/16/2023  8:54 AM 7.6 (H)      4/5/2023  2:35 AM  321 (H)  46.3    4/5/2023  6:11 AM 9.5 (H)      4/6/2023  6:26 AM   48.7    4/7/2023  8:42 AM  273 (H)  41.1    4/9/2023  9:12 AM  364 (H)  37.8       T4 Free Insulin Growth Factor 1 (External)   Latest Ref Rng 0.90 - 1.70 ng/dL 50 - 317 ng/mL   4/5/2023  6:11 AM 1.30     4/5/2023  11:10 AM  <10 (L)       TSH   Latest Ref Rng 0.30 - 4.20 uIU/mL   2/16/2023  8:54 AM    4/5/2023  2:35 AM    4/5/2023  6:11 AM <0.01 (L)

## 2023-05-01 NOTE — PROVIDER NOTIFICATION
MD Blank made aware that patient was illogical this morning. He stated that this has been the patient's baseline in the hospital and may be experiencing some mild delusions.

## 2023-05-01 NOTE — PROGRESS NOTES
[facility]    Medicine Progress Note - Medicine Service, CLAIRE TEAM 2    Date of Admission:  4/3/2023    Assessment & Plan   Gustavo Faria is a 58 year old male with recent Serratia bacteremia, HFrEF, prolonged QT, lower extremity wounds, DMII, hypothyroidism, low testosterone and known ACTH secreting pituitary adenoma w/resulting Cushing's disease s/p 2 partial resections in 2021 who [facility] for [facility]. During [facility] the [facility], he developed new onset double vision and severe headache which prompted imaging that showed a large mass invading the cavernous sinuses and impinging on the cranial nerves. He is transferred to [facility] for this pituitary adenoma, concern for progression vs hemorrhage/apoplexy. Complicated by psychosis/metabolic encephalopathy and electrolyte abnormalities secondary to Cushing's syndrome. Underwent endoscopic endonasal transcavernous approach for resection of functional pituitary adenoma 4/27.     Updates today:  - Increase Lisinopril to 20 mg daily (prior to surgery dose)  - Rising leukocytosis: check UA and blood cultures  - Endocrinology continues to manage DM, Electrolytes   - Stable to [facility] back to [facility] when bed [facility]    Addendum:   Gustavo is very eager to leave [facility] and get to the [facility]. He attempted to leave the hospital rather than wait for bed availability to the [facility]. His sister Pilar, was willing to take him from the hospital but was planning to take him directly to the [facility] ER. However, due to his immobility, this is much more complicated than a simple discharge would require her finding a way to get him in a cab (she does not have a car) vs arranging stretcher transport to her home and then her calling 911 to have him brought to the [facility] ER.     Called [facility] multiple times to stress the importance of  Gustavo originally would not tell me why he was so insistent on  leaving but later confided in me that he believes someone drugged him and put him in a different room last night (consistent with previous intermittent delusions). He is now willing to stay if he can get a private room, which we have arranged for.   - The [facility] is making him a high priority for transfer but wanted to stress that if he leaves this hospital AMA, that this hospitalization would not be paid for by the [facility], so it is definitely in his best interest to remain in the hospital.   - I do not believe that he is fully decisional, but if his sister were in agreement to his going then, I don't think we could stop them and would need to arrange as safe of an outpatient plan as possible.     # ACTH secreting pituitary adenoma c/b type II DM, hypothyroidism and low testosterone s/p two partial resections in 2021   Patient noted double vision and severe headache beginning the evening of 3/25. CT imaging on 3/25 revealed a large sellar/suprasellar mass extending into the cavernous sinuses with no evidence of acute stroke. On 3/28, he underwent an MRI which confirmed the CT findings of a large mass invading the cavernous sinuses and impinging on the cranial nerves. Necrosis extending beyond left cavernous sinus. [facility]. Repeat MRI performed on 4/6/23: compared to 2021 MRI, lesions are smaller. Orbit MRI without optic nerve compression and no evidence of orbital infection.  - 4/27 neurosurgery and ENT combined to perform endoscopic endonasal transcavernous approach for resection of functional pituitary adenoma. Complicated by significant scar tissue to cavernous sinus. Good post-op recovery. Follow up MRI done 5/29. Neurosurgery okay with [facility] to [facility]. They will arrange follow up.   - Ophthalmology following, no major changes, agree with neurosurgery plans  - Endocrine following     # Type II DM, exacerbated by Cushing's   A1c of 7.6% on 2/2023.  - Endocrinology managing. Current regimen:   -  Lantus 28 units daily.  - Carb coverage to 1 units/20 g CHO with meals and snacks  - High-dose sliding scale insulin for AC & HS  - Jardiance 10 mg daily  - hypoglycemia protocol  - Accu checks AC & HS    # Leukocytosis  # Buttocks, sacrum and bilateral groin wounds   # RLE wound from puncture injury   # L dorsal foot wound from presumed trauma   # Soft tissue infection/abscess    # Hx serratia bacteremia in December 2022   For patient's lower extremity wounds and hx of Serratia bacteremia in December, he was initially started on cefazolin at the [facility]. His antibiotics were broadened to Vanc and Zosyn and ultimately he was transitioned to ceftazidime on 3/23 with plan to complete course on 4/4/23. BCx negative and wound cx grew serratia marcescens at the [facility]. He has been on ceftazidime since. MRI of the right tib/fib on 3/23 was negative for osteomyelitis but did show two fluid collections draining sponateneously. Ortho was consulted at the [facility] and determined no intervention was needed as wounds were draining on their own.  - completed course of ceftazidime (3/23 - 4/5)  - Rising WBC since surgery. Suspect just post-surgical stress response. No fever. CXR done to check PICC position with no infiltrate. Wound appear unchanged.     - Repeat blood cultures/UA 5/1 due to rising WBC after surgery.   - WOC following  - PT/OT   - Pain: tylenol PRN, dilaudid 0.5 mg PRN for dressing changes and oxy 5 mg    # HFmrEF, with global hypokinesis  # Hypertension  # High burden of PVCs  # Prolonged QTc   # Elevated troponin, down-trended  Coronary angiogram on 2/27 showed normal coronaries. Troponin down-trended (88 -> 77). No chest pain. High burden of PVCs. Follow up ECHO with global hypokinesis, which is worse compared to February 2023 ECHO. EF is 45% (same compared to prior). Likely small vessel disease vs demand vs cardiomyopathy 2/2 ceftazidime. Low concern for acute ACS.   - Electrolyte mgmt as above  - Lasix 40mg daily--held  since surgery on 4/27. Restarted 4/30  - Coreg and lisinopril held for surgery 4/27.    - Carvedilol 12.5 mg BID restarted 4/29  - Lisinopril restarted 4/30 and titrated up to prior dose 20 mg daily on 5/1  - ASA - on HOLD after surgery  - Avoid QTc prolonging medications    # Acute metabolic encephalopathy - resolved  # Pyruria, Candiduria - resolved  # Encephalopathy likely secondary to cushing's disease  Admission symptoms included disorientation, intermittently following directions. Repetitive words - perseverating on particular phrases. Sx started the 2-3 days prior to admission (which patient was the [facility]). Vulnerable brain (multiple brain surgeries), enlarging pituitary mass. No seizure activity per EEG. Steroids removed and pt still encephalopathic. Sodium had been in normal limits and patient remained altered. Worked up for infections - did reveal candiduria but unlikely this was etiology, though he did complete an 8 day course of fluconazole. Briefly on ceftriaxone but encephalopathy also did not improve. CT abd w/ contrast (4/10): no evidence of pyelonephritis  Ultimately attributed to Cushings and known pituitary tumor. Continues to remain lucid.  - Surgical management per neurosurgery  - Delirium precautions  - Electrolyte management as above  - PRN quetiapine if agitation that is not responsive to behavioral interventions     # Delusions, intermittent  Intermittent delusions regarding staff stalking patient while in the hospital. Unclear chronicity of these delusions - but has had them multiple times this hospitalization. Unclear psych history. Medical management for encephalopathy as stated above. Psychiatry was consulted during his care and agreed with paranoia and recommended Abilify. He was briefly on Abilify 5mg but patient then started to refuse nightly so this was discontinued. His paranoia and delusions have been very intermittent and appear to be associated with his anxiety.     #  Hypernatremia, DI vs cushing's, resolved  # Hypokalemia, resolved  Hypernatremia and hypokalemia, likely secondary to Cushing's.     - Endocrine following. Managing spironolactone  - On K replacement protocol  - Goal Na: 135-140  - Strict I&Os  - Daily BMP + Mg     # Central Hypothyroidism  - Continue PTA levothyroxine     # Hypogonadism   - Continue PTA androgel (will need to bring in home medication)     # Chronic microcytic anemia   Hgb of 8.4 on [facility] at the [facility]. Peripheral smear on 3/30 showed poikilocytosis with occasional red blood cell fragments but no other evidence of hemolysis.  Haptoglobin was normal.  Ferritin was 91, transferrin saturation was low, B12 was 495 and folate was 8.7. Likely combination of iron deficiency as well as inflammation/chronic disease.   - CTM        # Concern for malnutrition   - Nutrition following       Diet: Snacks/Supplements Adult: Other; Please send ensure max at 10:00  and HS; Between Meals  Regular Diet Adult    DVT Prophylaxis: Pneumatic Compression Devices  Roland Catheter: Not present  Lines:   PICC 04/06/23 Double Lumen Right Basilic access-Site Assessment: WDL      Cardiac Monitoring: None  Code Status: Full Code      Clinically Significant Risk Factors              # Hypoalbuminemia: Lowest albumin = 2.9 g/dL at 4/11/2023  7:07 AM, will monitor as appropriate          # DMII: A1C = 9.5 % (Ref range: <5.7 %) within past 6 months    # Severe Malnutrition: based on nutrition assessment        Disposition Plan     Expected Discharge Date: 05/01/2023      Destination: home  Discharge Comments: Transfer agreement in place and pt has been accepted pending MD call to [facility] and [facility] bed availability.         Mj Blank MD   of Medicine  Med-Peds Hospitalist  Pager [phone]          Please see A&P for additional details of medical decision making.  35 MINUTES SPENT BY ME on the date of service doing chart review, history, exam, documentation & further  activities per the note.    I have personally reviewed the following data over the past 24 hrs:    17.7 (H)  \   9.5 (L)   / 357     143 101 13.9 /  157 (H)   4.4 30 (H) 0.65 (L) \         Kt Blank MD  Date of Service (when I saw the patient): 05/01/2023      _________________________________________________________    Interval History   Care team notes reviewed. Improved anxiety last night. No major events noted. No complaints today. [facility]. Tolerating diet.     Physical Exam   Vital Signs: Temp: 98.3  F (36.8  C) Temp src: Oral BP: (!) 150/112 Pulse: 92   Resp: 18 SpO2: 96 % O2 Device: None (Room air)    Weight: 164 lbs 8 oz    Gen: Sitting up in chair, talkative, and pleasant, somewhat paranoid about care overnight  ENT: MMM, nasal harness in place with mild bloody drainage  Resp: breathing comfortably, non-labored  CV: RRR, ext WWP, no LE edema noted, mild UE edema  Skin: LE wounds dressed.         Medical Decision Making           Data     I have personally reviewed the following data over the past 24 hrs:    17.7 (H)  \   9.5 (L)   / 357     143 101 13.9 /  157 (H)   4.4 30 (H) 0.65 (L) \

## 2023-05-01 NOTE — PROGRESS NOTES
Otolaryngology Progress Note    Subjective/Intvl events: NAEON. He continues to still be frustrated about being in the hospital. He denies headaches, changing or worsening vision, but still had blurred vision when moving his eyes. Small amount of s/s drainage from his nose.  Denies salty/metallic taste in mouth.     O: BP (!) 117/111 (BP Location: Left arm)   Pulse 84   Temp 98.5  F (36.9  C) (Oral)   Resp 18   Wt 74.6 kg (164 lb 8 oz)   SpO2 97%   BMI 26.55 kg/m     General: Alert and oriented x 3, no acute distress   HEENT: Bilateral lateral gaze palsy and restricted EOM on the right, CN V1-V3 intact, facial movement symmetric, Oropharynx clear. Mild amount of bloody nasal drainage within b/l nares.    Pulmonary: Breathing non-labored, no stridor, no accessory muscle use.        Intake/Output Summary (Last 24 hours) at 4/29/2023 1023  Last data filed at 4/29/2023 0500  Gross per 24 hour   Intake 1216 ml   Output 1125 ml   Net 91 ml       LABS:    BMP  Recent Labs   Lab 05/01/23  1140 05/01/23  0749 05/01/23  0650 05/01/23  0203 04/30/23  2140 04/30/23  1706 04/30/23  1428 04/30/23  1235 04/30/23  0821   NA  --   --  142  --  143  --  142  --  142   POTASSIUM  --   --  4.1  --  4.4  --  4.0  --  4.0   CHLORIDE  --   --  102  --  101  --  104  --  103   KORIN  --   --  8.7  --  8.8  --  8.5*  --  8.3*   CO2  --   --  31*  --  30*  --  29  --  28   BUN  --   --  12.2  --  13.9  --  14.3  --  14.0   CR  --   --  0.64*  --  0.65*  --  0.62*  --  0.61*   GLC 86 142* 141* 157* 142*   < > 136*   < > 245*    < > = values in this interval not displayed.     CBC  Recent Labs   Lab 05/01/23  0650 04/30/23  0821 04/29/23  0644 04/28/23  0815   WBC 17.7* 15.3* 15.4* 15.2*   RBC 3.57* 3.39* 3.38* 3.49*   HGB 9.5* 9.0* 9.0* 9.3*   HCT 31.8* 30.3* 29.6* 30.3*   MCV 89 89 88 87   MCH 26.6 26.5 26.6 26.6   MCHC 29.9* 29.7* 30.4* 30.7*   RDW 23.9* 23.9* 23.3* 23.2*    329 348 380     INR  Recent Labs   Lab 04/29/23  0644  04/28/23  0110 04/26/23  0920   INR 0.94 1.02 0.99       A/P: Gustavo Faria is a 58 year old male with a past medical history of pituitary ACTH secreting macroadenoma s/p endoscopic endonasal transcavernous approach for resection of functional pituitary adenoma w/ nasoseptal flap on 4/28. He is recovering well post-operatively without signs of CSF leak.    - Serial neuro exams  - Nasal saline starting QID  - Sinus precautions: No nose blowing, sneeze with mouth open, no straining, and scheduled bowel regimen  - Rest of care per primary team      -- Patient and above plan discussed with Dr. Adan Jimenez, PGY-2  Otolaryngology-Head & Neck Surgery  Please contact ENT with questions by dialing [phone] and entering job [phone] when prompted.

## 2023-05-01 NOTE — PLAN OF CARE
"[id number]: Pt called wanting to go back to bed from commode. Answered call light. Myself and bedside RN, Disha, went to room to assist patient. Ended up cleaning up patient, changing sheets and gown. Getting patient back to bed and  Pt mostly making sense but then would say random, illogical things. Example: He was very fixated on the computers. \"Are the computers same in every room?\" \"Why do you have all orange computers?\" (The computers are not orange, they are black.) He had made intermittent comments that seemed illogical or random. According to bedside nurse, this is not necessarily new behavior to her understanding. However, will let provider know.     "

## 2023-05-01 NOTE — PLAN OF CARE
Status: POD#4 pituitary adenoma resection. 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 Baseline right CN 3 palsy  Vitals: Intermittently tachycardic in the 100s. On RA. HTN - BP parameters changed to <160.  Neuros: A&Ox4, forgetful, whispers. BUE 4, BLE 3. Baseline N/T to feet. Periorbital swelling, more pronounced on R. Ptosis to R eye. Pupils sluggish bilaterally.   IV: DL PICC HL  Labs/Electrolytes: ACHS BG checks. Specific gravity 1.008  Resp/trach: LSC  Diet: Regular diet, tray set up. Carb coverage.  Bowel status: LBM 4/30.   : Voiding via urinal  Skin: Right and left leg wounds with mepilex in place, weeping serosang drainage. Dressing changes due 5/3. Buttocks/sacral wounds with mepilex in place. Wound care orders in place. Dry/scaly skin to BLE. Edematous to BLE. Wearing nasal sling intermittently this shift.   Pain: Denies  Activity: A2, lift  Social: Sister at bedside during eves, supportive.   [facility]. Continue to monitor and follow POC

## 2023-05-02 ENCOUNTER — APPOINTMENT (OUTPATIENT)
Dept: OCCUPATIONAL THERAPY | Facility: CLINIC | Age: 58
DRG: 614 | End: 2023-05-02
Attending: STUDENT IN AN ORGANIZED HEALTH CARE EDUCATION/TRAINING PROGRAM
Payer: COMMERCIAL

## 2023-05-02 LAB
ANION GAP SERPL CALCULATED.3IONS-SCNC: 11 MMOL/L (ref 7–15)
BUN SERPL-MCNC: 12.8 MG/DL (ref 6–20)
CALCIUM SERPL-MCNC: 8.7 MG/DL (ref 8.6–10)
CHLORIDE SERPL-SCNC: 101 MMOL/L (ref 98–107)
CREAT SERPL-MCNC: 0.65 MG/DL (ref 0.67–1.17)
DEPRECATED HCO3 PLAS-SCNC: 31 MMOL/L (ref 22–29)
ERYTHROCYTE [DISTWIDTH] IN BLOOD BY AUTOMATED COUNT: 24.6 % (ref 10–15)
GFR SERPL CREATININE-BSD FRML MDRD: >90 ML/MIN/1.73M2
GLUCOSE BLDC GLUCOMTR-MCNC: 109 MG/DL (ref 70–99)
GLUCOSE BLDC GLUCOMTR-MCNC: 152 MG/DL (ref 70–99)
GLUCOSE BLDC GLUCOMTR-MCNC: 175 MG/DL (ref 70–99)
GLUCOSE BLDC GLUCOMTR-MCNC: 176 MG/DL (ref 70–99)
GLUCOSE BLDC GLUCOMTR-MCNC: 182 MG/DL (ref 70–99)
GLUCOSE SERPL-MCNC: 151 MG/DL (ref 70–99)
HCT VFR BLD AUTO: 30.1 % (ref 40–53)
HGB BLD-MCNC: 8.9 G/DL (ref 13.3–17.7)
MAGNESIUM SERPL-MCNC: 2.3 MG/DL (ref 1.7–2.3)
MCH RBC QN AUTO: 26.9 PG (ref 26.5–33)
MCHC RBC AUTO-ENTMCNC: 29.6 G/DL (ref 31.5–36.5)
MCV RBC AUTO: 91 FL (ref 78–100)
PHOSPHATE SERPL-MCNC: 3.1 MG/DL (ref 2.5–4.5)
PLATELET # BLD AUTO: 325 10E3/UL (ref 150–450)
POTASSIUM SERPL-SCNC: 4 MMOL/L (ref 3.4–5.3)
RBC # BLD AUTO: 3.31 10E6/UL (ref 4.4–5.9)
SODIUM SERPL-SCNC: 143 MMOL/L (ref 136–145)
T4 FREE SERPL-MCNC: 0.96 NG/DL (ref 0.9–1.7)
WBC # BLD AUTO: 16.4 10E3/UL (ref 4–11)

## 2023-05-02 PROCEDURE — 85027 COMPLETE CBC AUTOMATED: CPT

## 2023-05-02 PROCEDURE — G0463 HOSPITAL OUTPT CLINIC VISIT: HCPCS

## 2023-05-02 PROCEDURE — 84100 ASSAY OF PHOSPHORUS: CPT

## 2023-05-02 PROCEDURE — 250N000013 HC RX MED GY IP 250 OP 250 PS 637

## 2023-05-02 PROCEDURE — 97535 SELF CARE MNGMENT TRAINING: CPT | Mod: GO

## 2023-05-02 PROCEDURE — 84439 ASSAY OF FREE THYROXINE: CPT | Performed by: STUDENT IN AN ORGANIZED HEALTH CARE EDUCATION/TRAINING PROGRAM

## 2023-05-02 PROCEDURE — 250N000011 HC RX IP 250 OP 636

## 2023-05-02 PROCEDURE — 36415 COLL VENOUS BLD VENIPUNCTURE: CPT

## 2023-05-02 PROCEDURE — 83735 ASSAY OF MAGNESIUM: CPT

## 2023-05-02 PROCEDURE — 99232 SBSQ HOSP IP/OBS MODERATE 35: CPT | Mod: GC | Performed by: INTERNAL MEDICINE

## 2023-05-02 PROCEDURE — 250N000013 HC RX MED GY IP 250 OP 250 PS 637: Performed by: STUDENT IN AN ORGANIZED HEALTH CARE EDUCATION/TRAINING PROGRAM

## 2023-05-02 PROCEDURE — 82310 ASSAY OF CALCIUM: CPT

## 2023-05-02 PROCEDURE — 120N000002 HC R&B MED SURG/OB UMMC

## 2023-05-02 PROCEDURE — 99207 PR NO BILLABLE SERVICE THIS VISIT: CPT | Performed by: INTERNAL MEDICINE

## 2023-05-02 PROCEDURE — 250N000013 HC RX MED GY IP 250 OP 250 PS 637: Performed by: INTERNAL MEDICINE

## 2023-05-02 RX ADMIN — FUROSEMIDE 40 MG: 40 TABLET ORAL at 08:50

## 2023-05-02 RX ADMIN — SIMETHICONE 80 MG: 80 TABLET, CHEWABLE ORAL at 20:59

## 2023-05-02 RX ADMIN — SALINE NASAL SPRAY 2 SPRAY: 1.5 SOLUTION NASAL at 09:02

## 2023-05-02 RX ADMIN — LISINOPRIL 20 MG: 20 TABLET ORAL at 08:51

## 2023-05-02 RX ADMIN — CARVEDILOL 12.5 MG: 12.5 TABLET, FILM COATED ORAL at 08:51

## 2023-05-02 RX ADMIN — MICONAZOLE NITRATE: 20 CREAM TOPICAL at 09:03

## 2023-05-02 RX ADMIN — SALINE NASAL SPRAY 2 SPRAY: 1.5 SOLUTION NASAL at 15:51

## 2023-05-02 RX ADMIN — Medication 1 TABLET: at 08:50

## 2023-05-02 RX ADMIN — SALINE NASAL SPRAY 2 SPRAY: 1.5 SOLUTION NASAL at 17:41

## 2023-05-02 RX ADMIN — SPIRONOLACTONE 200 MG: 100 TABLET ORAL at 08:50

## 2023-05-02 RX ADMIN — SALINE NASAL SPRAY 2 SPRAY: 1.5 SOLUTION NASAL at 14:26

## 2023-05-02 RX ADMIN — Medication 5 ML: at 16:02

## 2023-05-02 RX ADMIN — CARVEDILOL 12.5 MG: 12.5 TABLET, FILM COATED ORAL at 17:37

## 2023-05-02 RX ADMIN — LEVOTHYROXINE SODIUM 100 MCG: 100 TABLET ORAL at 08:50

## 2023-05-02 RX ADMIN — MICONAZOLE NITRATE: 20 CREAM TOPICAL at 09:05

## 2023-05-02 RX ADMIN — DICLOFENAC SODIUM 2 G: 10 GEL TOPICAL at 08:51

## 2023-05-02 RX ADMIN — EMPAGLIFLOZIN 10 MG: 10 TABLET, FILM COATED ORAL at 08:50

## 2023-05-02 RX ADMIN — ACETAMINOPHEN 650 MG: 325 TABLET, FILM COATED ORAL at 08:50

## 2023-05-02 RX ADMIN — SALINE NASAL SPRAY 2 SPRAY: 1.5 SOLUTION NASAL at 12:33

## 2023-05-02 ASSESSMENT — ACTIVITIES OF DAILY LIVING (ADL)
ADLS_ACUITY_SCORE: 45
ADLS_ACUITY_SCORE: 43
ADLS_ACUITY_SCORE: 45
ADLS_ACUITY_SCORE: 43
ADLS_ACUITY_SCORE: 45
ADLS_ACUITY_SCORE: 45

## 2023-05-02 NOTE — PROGRESS NOTES
Endocrine Progress Note  Patient: Gustavo Faria   MRN: [id number]  Date of Service: 05/02/2023       Assessment and plan:  Gustavo Faria is a 58 year old male with PMHx of  ACTH-secreting macroadenoma c/b central hypothyroidism, and central hypogonadism, s/p transphenoidal surgery 5/2021 and 7/2021 in [place] has baseline records 3rd nerve palsy, ongoing serratia RLE cellulitis c/b recent serratia bacteremia, HFrEF, T2DM, and HTN who was transferred from [facility] for surgical intervention of known expanding large sellar/suprasellar mass extending into cavernous sinuses and subsequent cranial nerve impingement. Now POD#5.      #Pituitary macroadenoma:  #Cushing's disease:  #Pituitary apoplexy:  Unable to remove entire pituitary macroadenoma due to fibrous tissue. Cortisol level after surgery still elevated at 38.2 with repeat serum cortisol on 4/29 was at 27.4.  Blood pressure above target. Sodium WNL.  - Continue spironolactone to 200 mg.  - Strict I's and O's     #Central hypothyroidism:  Prior to admission was on levothyroxine 100 mcg daily.  Free T4 on admission 1.3 and TSH not detectable (picture of central hypothyroidism).      Recommendations:  -Continue with PTA levothyroxine 100 mcg daily.  - FT4 added to todays morning blood sample as add on.     #DM type II: Hemoglobin A1c on admission 9.5%.  Prior to admission was on Sitagliptin 100 mg daily/metformin 500 mg twice daily, Jardiance 12.5 mg (not available)   Patient BG are trending down well. He had Low Bg in the afternoon on 4/30. Will cut back on his basal insulin.    - Continue Lantus to 20 units daily.  - Continue carb coverage to 1 units/20 g CHO with meals and snacks  - Continue High-dose sliding scale insulin for AC & HS  - Continue Jardiance 10 mg daily  - Continue hypoglycemia protocol  - Accu checks AC & HS      Patient was seen and discussed with Attending Dr Menendez  Patient labs, chart and imaging reviewed      Rhiannon  Cruz  Endocrinology Fellow  [phone]    I have reviewed the fellow's note and agree with the plan  Dr. Smitha Menendez [phone]       ======================================================================    Subjective:  - Pituitary macroadenoma debulking surgery/resection(4/27/23)  - Overall, still feels tired but better.     Physical Examination:  BP (!) 130/94 (BP Location: Left arm)   Pulse 80   Temp 98.4  F (36.9  C) (Oral)   Resp 14   Wt 74.6 kg (164 lb 8 oz)   SpO2 92%   BMI 26.55 kg/m      Constitutional: healthy, alert and no distress   Respiratory: lungs CTAB. No increased work of breathing  Psychiatric: mentation appears normal and affect normal/bright  Head: Normocephalic.   Neck: Neck supple. Thyroid symmetric, normal size,  ENT: Nasal bandage with in place   Abdomen: Abdomen soft, non-tender. BS normal. No masses, organomegaly  NEURO: Sensation grossly WNL.  JOINT/EXTREMITIES: +2-3 pitting bilateral lower extremity edema.    Medications:  Reviewed    Endocrine Labs:     Latest Reference Range & Units 04/05/23 02:35 04/06/23 06:26 04/07/23 08:42 04/09/23 09:12 04/28/23 08:15 04/29/23 06:44   Cortisol Serum ug/dL 46.3 48.7 41.1 37.8 38.2 27.4      Hemoglobin A1C Adrenal Corticotropin Cortisol Serum   Latest Ref Rng <5.7 % <47 pg/mL ug/dL   2/16/2023  8:54 AM 7.6 (H)      4/5/2023  2:35 AM  321 (H)  46.3    4/5/2023  6:11 AM 9.5 (H)      4/6/2023  6:26 AM   48.7    4/7/2023  8:42 AM  273 (H)  41.1    4/9/2023  9:12 AM  364 (H)  37.8       T4 Free Insulin Growth Factor 1 (External)   Latest Ref Rng 0.90 - 1.70 ng/dL 50 - 317 ng/mL   4/5/2023  6:11 AM 1.30     4/5/2023  11:10 AM  <10 (L)       TSH   Latest Ref Rng 0.30 - 4.20 uIU/mL   2/16/2023  8:54 AM    4/5/2023  2:35 AM    4/5/2023  6:11 AM <0.01 (L)

## 2023-05-02 NOTE — PLAN OF CARE
Care [id number]    Status: POD#5 pituitary adenoma resection  Vitals: HTN within parameters on RA.   Neuros: AOx4. Forgetful. Pupils sluggish/fixed, deviated when tracking, R ptosis. N/T to BLE. BUE 4/5, BLE 3/5.   IV: DL PICC hep locked. CHG wipes completed.  Labs: Na 143.   Resp/trach: Depth/pattern regular. No SOB noted.   Diet: regular diet, carb coverage, intake improving  Bowel status: BM 5/1.   : Strict I&O. Voids via urinal, intermittent incontinence.  Skin:  BLE wounds and sacral wounds cleansed by WOC - see updated orders for wound care. Small amounts of bloody drainage from bilateral nostrils.   Pain: tylenol x1 today for leg pain   Activity: A2 lift. Repositioning self as needed, allowing staff to help. Up to chair x1.    Social: Sister at bedside   Plan: [facility]. Continue with POC.  Updates this shift: Pt pleasant and cooperative w/ cares today. Some difficulty in tracking strict I&Os due to patient family bringing snacks, drinks and patient having multiple unfinished things on the bedside table.

## 2023-05-02 NOTE — PROGRESS NOTES
CLINICAL NUTRITION SERVICES - REASSESSMENT NOTE     Nutrition Prescription    RECOMMENDATIONS FOR MDs/PROVIDERS TO ORDER:   None at this time.     Malnutrition Status:   Patient does not meet two of the established criteria necessary for diagnosing malnutrition    Recommendations already ordered by Registered Dietitian (RD):   Will change Ensure Max to Glucerna per patient preference.    Future/Additional Recommendations:   Monitor weight and intake trends.      EVALUATION OF THE PROGRESS TOWARD GOALS   Diet: Regular  Nutrition Support: Ensure Max at 10 am and HS snack times.   Intake: Per flowsheets, intake documentation likely missing some meals/snacks, but on average 50% of meals consumed. Per 6 day average (excluding 4/27 as NPO whole day), pt is ordering 3950 kcal and 140 g protein per [employer]. This is likely meeting >100% minimum energy and >100% protein needs.     NEW FINDINGS   Visited with patient. Patient reports having very good/increased from baseline intakes. Patient mentioned that he has been ordering ice creams to help with his sore throat. He also mentioned being a  in the past and finds some meals difficult to stomach so he would order a different meal. This explains why some days he would order upwards of 5,000 kcal or 4 meals. Overall patient happy with current nutrition status and stated no further questions pertinent to current stay.     Skin: WOC following, multiple skin wounds.     LABS   Labs Reviewed   05/01/23 06:50 05/02/23 06:53   Sodium 142 143   Potassium 4.1 4.0   GFR Estimate >90 >90   Magnesium 2.3 2.3   Phosphorus 2.5 3.1   Glucose 141 (H) 151 (H)   WBC 17.7 (H) 16.4 (H)   Hemoglobin 9.5 (L) 8.9 (L)   MCV 89 91     MEDICATIONS   Medications Reviewed  - Jardiance  - Lasix  - Insulin Aspart  - Insulin Glargine  - Levothyroxine  - Lisinopril  - Thera-vit-m  - Miralax  - Senokot  - Vit D2 (once a week)    ANTHROPOMETRICS   Weight Trends: Weight appears up from admission, but  near possible previous baseline (171 lbs in February). Minimal data to accurately assess long term weight trends.   04/30/23 74.6 kg (164 lb 8 oz) Bed scale   04/22/23 73.4 kg (161 lb 13.4 oz) Bed scale   04/20/23 66.8 kg (147 lb 4.8 oz) --   04/18/23 65.1 kg (143 lb 8.3 oz) Bed scale   04/16/23 65.7 kg (144 lb 13.5 oz) Bed scale   04/15/23 69.6 kg (153 lb 7 oz) Bed scale   04/14/23 70.9 kg (156 lb 4.9 oz) Bed scale   04/11/23 66 kg (145 lb 8.1 oz) Bed scale   04/10/23 69.5 kg (153 lb 3.5 oz) Bed scale   04/05/23 68.6 kg (151 lb 3.8 oz) Bed scale   04/04/23 68.7 kg (151 lb 7.3 oz) Bed scale     Wt Readings from Last Encounters:   04/30/23 74.6 kg (164 lb 8 oz)   02/18/23 77.6 kg (171 lb 1.2 oz)   02/11/23 77.1 kg (170 lb)     MALNUTRITION   % Intake: No decreased intake noted  % Weight Loss: Weight loss does not meet criteria  Subcutaneous Fat Loss: None observed  Muscle Loss: None observed  Fluid Accumulation/Edema: Moderate - Flowsheets  Malnutrition Diagnosis: Patient does not meet two of the established criteria necessary for diagnosing malnutrition    Previous Goals   Patient to consume % of nutritionally adequate meal trays TID, or the equivalent with supplements/snacks.  Evaluation: Met    Previous Nutrition Diagnosis   Increased kcal/protein needs related to wound healing support and plan for surgery as evidenced by estimated protein needs of  gm protein/day   Evaluation: Improving    CURRENT NUTRITION DIAGNOSIS   Increased kcal/protein needs related to wound healing support as evidenced by estimated protein needs of  gm protein/day     INTERVENTIONS   Implementation   Will change Ensure Max to Glucerna per patient preference.     Goals   Patient to consume % of nutritionally adequate meal trays TID, or the equivalent with supplements/snacks.    Monitoring/Evaluation   Progress toward goals will be monitored and evaluated per protocol.    Little Peace RD, LD   6A/7D pager  [phone]  Weekend pager [phone]

## 2023-05-02 NOTE — PROGRESS NOTES
Otolaryngology Progress Note    Subjective/Intvl events: NAEON.He denies headaches, changing or worsening vision, but still had blurred vision when moving his eyes. Small amount of s/s drainage from his nose.  Denies salty/metallic taste in mouth. Endorses feeling thirsty and increased urination.    O: BP (!) 139/98 (BP Location: Left arm)   Pulse 81   Temp 98.8  F (37.1  C) (Axillary)   Resp 16   Wt 74.6 kg (164 lb 8 oz)   SpO2 100%   BMI 26.55 kg/m     General: Alert and oriented x 3, no acute distress   HEENT: Bilateral lateral gaze palsy and restricted EOM on the right, CN V1-V3 intact, facial movement symmetric, Oropharynx clear. Mild amount of bloody nasal drainage within b/l nares.    Pulmonary: Breathing non-labored, no stridor, no accessory muscle use.        Intake/Output Summary (Last 24 hours) at 4/29/2023 1023  Last data filed at 4/29/2023 0500  Gross per 24 hour   Intake 1216 ml   Output 1125 ml   Net 91 ml       LABS:    [facility]  Recent Labs   Lab 05/02/23  0733 05/02/23  0653 05/01/23  2202 05/01/23  1930 05/01/23  0749 05/01/23  0650 05/01/23  0203 04/30/23  2140 04/30/23  1706 04/30/23  1428   NA  --  143  --   --   --  142  --  143  --  142   POTASSIUM  --  4.0  --   --   --  4.1  --  4.4  --  4.0   CHLORIDE  --  101  --   --   --  102  --  101  --  104   KORIN  --  8.7  --   --   --  8.7  --  8.8  --  8.5*   CO2  --  31*  --   --   --  31*  --  30*  --  29   BUN  --  12.8  --   --   --  12.2  --  13.9  --  14.3   CR  --  0.65*  --   --   --  0.64*  --  0.65*  --  0.62*   * 151* 132* 227*   < > 141*   < > 142*   < > 136*    < > = values in this interval not displayed.     CBC  Recent Labs   Lab 05/02/23  0653 05/01/23  0650 04/30/23  0821 04/29/23  0644   WBC 16.4* 17.7* 15.3* 15.4*   RBC 3.31* 3.57* 3.39* 3.38*   HGB 8.9* 9.5* 9.0* 9.0*   HCT 30.1* 31.8* 30.3* 29.6*   MCV 91 89 89 88   MCH 26.9 26.6 26.5 26.6   MCHC 29.6* 29.9* 29.7* 30.4*   RDW 24.6* 23.9* 23.9* 23.3*    357 329  348     INR  Recent Labs   Lab 04/29/23  0644 04/28/23  0110 04/26/23  0920   INR 0.94 1.02 0.99       A/P: Gustavo Faria is a 58 year old male with a past medical history of pituitary ACTH secreting macroadenoma s/p endoscopic endonasal transcavernous approach for resection of functional pituitary adenoma w/ nasoseptal flap on 4/28. He is recovering well post-operatively without signs of CSF leak.    - Serial neuro exams  - Nasal saline starting QID  - Appreciate Endo following and recs for Cushing's and DMII  - Sinus precautions: No nose blowing, sneeze with mouth open, no straining, and scheduled bowel regimen  - Rest of care per primary team      -- Patient and above plan discussed with Dr. Adan Jimenez, PGY-2  Otolaryngology-Head & Neck Surgery  Please contact ENT with questions by dialing [phone] and entering job code [phone] when prompted.

## 2023-05-02 NOTE — PROGRESS NOTES
SPIRITUAL HEALTH SERVICES Progress Note  [facility] 6A    I met with Gustavo per patient request communicated through RN. He shared that he is feeling better today and [facility]. Gustavo spoke about the difficulty of being in the hospital for a long time and having to share a room with another patient. He mentioned having a sister who is homebound and another that comes to see him. Gustavo has family in [place] as well. Gustavo asked for word find puzzles which I provided from the resource library.    Hamzah Ferraro  Chaplain Resident  Pager [phone]    * [facility] remains available 24/7 for emergent requests/referrals, either by having the switchboard page the on-call  or by entering an ASAP/STAT consult in Epic (this will also page the on-call ). Routine Epic consults receive an initial response within 24 hours.*

## 2023-05-02 NOTE — PROGRESS NOTES
Care Management Follow Up    Length of Stay (days): 29    Expected Discharge Date: 05/02/2023     Concerns to be Addressed: discharge planning  Patient plan of care discussed at interdisciplinary rounds: Yes     Anticipated Discharge Disposition: transfer back to [facility]     Patient/family educated on Medicare website which has current facility and service quality ratings: No     Referrals Placed by CM/SW:  Transfer back to [facility]  Private pay costs discussed: Not applicable    Additional Information:  [facility] spoke with [facility]. As of 0813, they do not have any beds available and have 13 patients boarding in their ED. Cal[facility] states we can call back after 2 pm to check availability.    1410 Addendum:  [facility], spoke with [facility]. They do not have any beds available to accept patient back today and continue to have patients boarding in the ED. [facility] asks that we call back tomorrow morning.      Met with patient to inform him no beds available today. Patient voiced understanding. Also informed patient that the [facility] states if he leaves here AMA they will not be responsible for his hospital bill. Patient states he does not intend to leave AMA at this time.     [facility] will continue to follow.    [facility] (transfer agreement)  Ph: [phone]  Pt placement: [phone] opt. 1    Cristina Dominguez RN, BSN  6A RN Care Coordinator  Ph: [phone]   Pager: [phone]

## 2023-05-02 NOTE — PROGRESS NOTES
[facility]    Medicine Progress Note - Medicine Service, MAROON TEAM 2    Date of Admission:  4/3/2023    Assessment & Plan   Gustavo Faria is a 58 year old male with recent Serratia bacteremia, HFrEF, prolonged QT, lower extremity wounds, DMII, hypothyroidism, low testosterone and known ACTH secreting pituitary adenoma w/resulting Cushing's disease s/p 2 partial resections in 2021 who [facility]. [facility], he developed new onset double vision and severe headache which prompted imaging that showed a large mass invading the cavernous sinuses and impinging on the cranial nerves. He is transferred to [facility] for this pituitary adenoma, concern for progression vs hemorrhage/apoplexy. Complicated by psychosis/metabolic encephalopathy and electrolyte abnormalities secondary to Cushing's syndrome. Underwent endoscopic endonasal transcavernous approach for resection of functional pituitary adenoma 4/27.     Updates today:  - Stable [facility] to [facility]  - [facility] consult for lower extremity wounds  - leukocytosis downtrending       # ACTH secreting pituitary adenoma c/b type II DM, hypothyroidism and low testosterone s/p two partial resections in 2021   Patient noted double vision and severe headache beginning the evening of 3/25. CT imaging on 3/25 revealed a large sellar/suprasellar mass extending into the cavernous sinuses with no evidence of acute stroke. On 3/28, he underwent an MRI which confirmed the CT findings of a large mass invading the cavernous sinuses and impinging on the cranial nerves. Necrosis extending beyond left cavernous sinus. [facility]. Repeat MRI performed on 4/6/23: compared to 2021 MRI, lesions are smaller. Orbit MRI without optic nerve compression and no evidence of orbital infection.  - 4/27 neurosurgery and ENT combined to  perform endoscopic endonasal transcavernous approach for resection of functional pituitary adenoma. Complicated by significant scar tissue to cavernous sinus. Good post-op recovery. Follow up MRI done 5/29. Neurosurgery okay with [facility] once [facility]. They will arrange follow up.   - Ophthalmology following, no major changes, agree with neurosurgery plans  - Endocrine following  - neurosurgery following     # Type II DM, exacerbated by Cushing's   A1c of 7.6% on 2/2023.  - Endocrinology managing. Current regimen:   - Lantus 20 units daily.  - Carb coverage to 1 units/20 g CHO with meals and snacks  - High-dose sliding scale insulin for AC & HS  - Jardiance 10 mg daily  - hypoglycemia protocol  - Accu checks AC & HS    # Leukocytosis  # Buttocks, sacrum and bilateral groin wounds   # RLE wound from puncture injury   # L dorsal foot wound from presumed trauma   # Soft tissue infection/abscess    # Hx serratia bacteremia in December 2022   For patient's lower extremity wounds and hx of Serratia bacteremia in December, he was initially [facility]azolin [facility]. His antibiotics were broadened to Vanc and Zosyn and ultimately he was transitioned to ceftazidime on 3/23 with plan to complete course on 4/4/23. BCx negative and wound cx grew serratia marcescens at the [facility]. He has been on ceftazidime since. MRI of the right tib/fib on 3/23 was negative for osteomyelitis but did show two fluid collections draining sponateneously. [facility]o was [facility] at the [facility] and determined no intervention was needed as wounds were draining on their own.  - completed course of ceftazidime (3/23 - 4/5)  - Rising WBC since surgery. Suspect just post-surgical stress response. No fever. CXR done to check PICC position with no infiltrate. Wound appear unchanged.     - Repeat blood cultures/UA 5/1 due to rising WBC after surgery    - blood cx 5/1 NGTD    - UA negative   - repeat WOC consult 5/2/23 to re-assess chronic lower  extremity wounds  - PT/OT   - Pain: tylenol PRN, dilaudid 0.5 mg PRN for dressing changes and oxy 5 mg    # HFmrEF, with global hypokinesis  # Hypertension  # High burden of PVCs  # Prolonged QTc   # Elevated troponin, down-trended  Coronary angiogram on 2/27 showed normal coronaries. Troponin down-trended (88 -> 77). No chest pain. High burden of PVCs. Follow up ECHO with global hypokinesis, which is worse compared to February 2023 ECHO. EF is 45% (same compared to prior). Likely small vessel disease vs demand vs cardiomyopathy 2/2 ceftazidime. Low concern for acute ACS.   - Electrolyte mgmt as above  - Lasix 40mg daily--held since surgery on 4/27. Restarted 4/30  - Coreg and lisinopril held for surgery 4/27.    - Carvedilol 12.5 mg BID restarted 4/29  - Lisinopril restarted 4/30 and titrated up to prior dose 20 mg daily on 5/1  - ASA - on HOLD after surgery  - Avoid QTc prolonging medications    # Acute metabolic encephalopathy - resolved  # Pyruria, Candiduria - resolved  # Encephalopathy likely secondary to cushing's disease  Admission symptoms included disorientation, intermittently following directions. Repetitive words - perseverating on particular phrases. Sx started the 2-3 days prior to admission (which [facility] was the [facility]). Vulnerable brain (multiple brain surgeries), enlarging pituitary mass. No seizure activity per EEG. Steroids removed and pt still encephalopathic. Sodium had been in normal limits and patient remained altered. Worked up for infections - did reveal candiduria but unlikely this was etiology, though he did complete an 8 day course of fluconazole. Briefly on ceftriaxone but encephalopathy also did not improve. CT abd w/ contrast (4/10): no evidence of pyelonephritis  Ultimately attributed to Cushings and known pituitary tumor. Continues to remain lucid.  - Surgical management per neurosurgery  - Delirium precautions  - Electrolyte management as above  - PRN quetiapine if agitation  that is not responsive to behavioral interventions     # Delusions, intermittent  Intermittent delusions regarding staff stalking patient while in the hospital. Unclear chronicity of these delusions - but has had them multiple times this hospitalization. Unclear psych history. Medical management for encephalopathy as stated above. Psychiatry was consulted during his care and agreed with paranoia and recommended Abilify. He was briefly on Abilify 5mg but patient then started to refuse nightly so this was discontinued. His paranoia and delusions have been very intermittent and appear to be associated with his anxiety.     # Hypernatremia, DI vs cushing's, resolved  # Hypokalemia, resolved  Hypernatremia and hypokalemia, likely secondary to Cushing's.     - Endocrine following. Managing spironolactone  - On K replacement protocol  - Goal Na: 135-140  - Strict I&Os  - Daily BMP + Mg     # Central Hypothyroidism  - Continue PTA levothyroxine     # Hypogonadism   - Continue PTA androgel (will need to bring in home medication)     # Chronic microcytic anemia   Hgb of 8.4 on [facility] the [facility]. Peripheral smear on 3/30 showed poikilocytosis with occasional red blood cell fragments but no other evidence of hemolysis.  Haptoglobin was normal.  Ferritin was 91, transferrin saturation was low, B12 was 495 and folate was 8.7. Likely combination of iron deficiency as well as inflammation/chronic disease.   - CTM        # Concern for malnutrition   - Nutrition following       Diet: Snacks/Supplements Adult: Other; Please send ensure max at 10:00  and HS; Between Meals  Regular Diet Adult  Diet    DVT Prophylaxis: Pneumatic Compression Devices  Roland Catheter: Not present  Lines:          Cardiac Monitoring: None  Code Status: Full Code      Clinically Significant Risk Factors              # Hypoalbuminemia: Lowest albumin = 2.9 g/dL at 4/11/2023  7:07 AM, will monitor as appropriate          # DMII: A1C = 9.5 % (Ref range: <5.7  %) within past 6 months    # Severe Malnutrition: based on nutrition assessment        Disposition Plan      Expected Discharge Date: 05/03/2023      Destination: home  Discharge Comments: Transfer agreement in place and pt has been accepted pending MD call to VA and VA bed availability. No beds available 5/1/23 or 5/2         Addendum 5/1/2023  Gustavo is very eager to leave [facility] and get to the [facility]. He attempted to leave the hospital rather than wait for bed availability to the [facility]. His sister Pilar, was willing to take him from the hospital but was planning to take him directly to the [facility] ER. However, due to his immobility, this is much more complicated than a simple discharge would require her finding a way to get him in a cab (she does not have a car) vs arranging stretcher transport to her home and then her calling 911 to have him brought to the [facility] ER.     - The [facility] is making him a high priority for transfer but wanted to stress that if he leaves this hospital AMA, that this hospitalization would not be paid for by the VA, so it is definitely in his best interest to remain in the hospital.   - I do not believe that he is fully decisional, but if his sister were in agreement to his going then, I don't think we could stop them and would need to arrange as safe of an outpatient plan as possible.             I have personally reviewed the following data over the past 24 hrs:    16.4 (H)  \   8.9 (L)   / 325     143 101 12.8 /  176 (H)   4.0 31 (H) 0.65 (L) \         Leela Verdin MD  Date of Service (when I saw the patient): 05/02/2023      _________________________________________________________    Interval History   NAEO    This morning, pt is in good spirits. No pain, SOB, cough, diarrhea, abdominal pain, nausea, vomiting. Having some intermittent mild bleeding from nose this morning. He looks forward to getting to the [facility] once a bed is available, hoping for today    Physical Exam   Vital Signs: Temp: 98.9  F (37.2   C) Temp src: Axillary BP: 128/80 Pulse: 79   Resp: 16 SpO2: 100 % O2 Device: None (Room air)    Weight: 164 lbs 8 oz    Gen: Sitting up in bed, talkative, and pleasant  ENT: MMM, nasal harness in place with mild bloody drainage  Resp: breathing comfortably, non-labored. CTAB  CV: RRR, ext WWP, 3+ bilateral pitting lower extremity edema  Skin: multiple LE wounds w/ c/d/i dressings. Venous stasis skin changes L>R lower extremity           Data     I have personally reviewed the following data over the past 24 hrs:    16.4 (H)  \   8.9 (L)   / 325     143 101 12.8 /  176 (H)   4.0 31 (H) 0.65 (L) \

## 2023-05-02 NOTE — CONSULTS
[facility]  WO Nurse Inpatient Assessment     Consulted for: Buttocks, sacrum, bilateral groin, Right Leg    Patient History (according to provider note(s):      Gustavo Faria is a 58 year old male with recent Serratia bacteremia, HFrEF, prolonged QT, lower extremity wounds, DMII, hypothyroidism, low testosterone and known ACTH secreting pituitary adenoma w/resulting Cushing's disease s/p 2 partial resections in 2021 who [facility]. During [facility], he developed new onset double vision and severe headache which prompted imaging that showed a large mass invading the cavernous sinuses and impinging on the cranial nerves. He is transferred to [facility] for this pituitary adenoma with plans for possible surgical intervention.     Areas Assessed:      Areas visualized during today's visit: Sacrum/coccyx and Lower extremities     Wound location: Buttocks, Sacrum and Groin       4/25 5/2  Last photo: 4/18/23  Wound due to: Incontinence Associated Dermatitis (IAD)  Wound history/plan of care: incontinent of stool and urine. Has not had incontinent in the last few days. Groin IAD resolved.  Wound base:  buttocks mostly intact with scattered open areas 100 % dermis     Palpation of the wound bed: normal      Drainage: scant     Description of drainage: serous     Measurements (length x width x depth, in cm): scattered areas measuring about 0.5 x 0.5 x 0.1     Tunneling: N/A     Undermining: N/A  Periwound skin: Intact      Color:   groin has areas of hypopigmentation, buttock has areas of hyperpigmenting.       Temperature: normal   Odor: none  Pain: mild, tender  Pain interventions prior to dressing change: slow and gentle cares   Treatment goal: Heal  and Protection  STATUS: evolving  Supplies ordered: at bedside and supplies stored on unit     Wound location: BLE      Left foot       5        Right Lateral Leg       Right anterior leg   Last photo: 23  Wound due to: Arterial Ulcer and Unknown Etiology  Wound history/plan of care: Presented to the hospital earlier this year for lower leg wound infection. Per nursing patient has been intermittently refusing dressing changes.   Wound base: Right leg wounds 10 % granulation tissue, 90 % non-granular tissue Left foot 95 % yellow fibrin,  5 % granulation tissue     Palpation of the wound bed: normal      Drainage: moderate     Description of drainage: cloudy     Measurements (length x width x depth, in cm):Right anterior leg 3 x 2.5  x  3.5 cm Right Lateral 7.5 x 3.5 x 0.3 Left foot 2.2 x 2.3 x 0.3     Tunneling: Right anterior leg 4 cm @ 1 o'clock     Undermining: Right anterior le.2 from 12-3 o'clock.  Left foot 0.2 cm 2 - 6 o'clock  Periwound skin: Intact, Dry/scaly and Edematous      Color: normal and consistent with surrounding tissue      Temperature: normal   Odor: mild  Pain: moderate, sharp  Pain interventions prior to dressing change: patient tolerated well, slow and gentle cares  and distraction  Treatment goal: Heal  and Infection control/prevention  STATUS: evolving  Supplies ordered: at bedside    Treatment Plan:     Sacrum wound: Daily and PRN   Cleanse the area with Merary cleanse and protect, very gently with soft cloth.  Apply ostomy powder (#4514) on all open and denuded skin.  Apply thin layer of critic aid paste on top of it.  With repeat application, do not scrub the paste, only remove soiled paste and reapply.  If complete removal of paste is necessary use baby oil/mineral oil (#384873) and soft wash cloth.  Ensure pt has Jono-cushion (#675219) while sitting up in the chair.  Use only one Covidien pad in between mattress and pt. No brief while in bed.    DO NOT Use sacral mepilex is will cause the wound to become too wet.     Right anterior Leg: Change daily and PRN  Remove old dressing.  "Ensure packing is removed from tunnel.  Cleanse wound bed with normal saline and pat dry.  Cut a piece of PolyMem rope (#580729) and gently pack into the tunnel at 1 o'clock Use the rope to fill in the rest of the wound bed. Ensure to cover entire wound bed.  Cover with Mepilex border.    Right lateral leg and left foot wounds Change dressing every other days.  Cleanse wound bed with normal saline and pat dry.  Cut a piece of PolyMem roll (#005991) to fit inside the wound bed and place it with the lettering facing up. Ensure to cover entire wound bed.  Cover with Mepilex border.    EdemaWear stockings:     To bilateral lower legs    Remove once daily for skin inspections and cares    Cleanse and moisturize any intact or scaly skin before applying EdemaWear    Ok to apply additional compression over the EdemaWear (ie Lymph wraps)    Application:     Apply the EdemaWear from base of toes to knee, or above knee if tolerated and it is a long stocking    Create a wide 3\" cuff at the top if needed to prevent rolling down    Only trim the stocking if it is excessively long; do NOT cut in half; can often fold over excess length onto foot    When wounds are present:    Wound dressings that directly treat the wound bed can be applied under the EdemaWear    Any additional cover dressings (dry gauze, ABD pads, Kerlix, etc) should be applied ON TOP of the stockings whenever feasible     Stockings may get soiled with drainage and will need to be washed; ensure an extra clean, dry pair always available    May need two people to apply the stocking - bunch up stocking and pull against each other, lifting over wounds    Care:    When stockings are soiled, DO NOT THROW AWAY, hand wash with mild soap, rinse, hang dry    Replace approximately every 4 to 6 months        EdemaWear size Max circumference Stripe color PS # # stockings per pack   Small 18\"  (45cm) navy [id number]     Orders: Written    RECOMMEND PRIMARY TEAM ORDER: GALINDO Reagan " antifungal to Groin no longer needed  Education provided: plan of care, wound progress and Moisture management  Discussed plan of care with: Nurse  [facility] nurse follow-up plan: weekly  Notify [facility] if wound(s) deteriorate.  Nursing to notify the Provider(s) and re-consult the [facility] Nurse if new skin concern.    DATA:     Current support surface: Standard  Standard gel/foam mattress (IsoFlex, Atmos air, etc)  Containment of urine/stool: Incontinence Protocol, Incontinent pad in bed and Primofit external catheter  BMI: Body mass index is 26.55 kg/m .   Active diet order: Orders Placed This Encounter      Regular Diet Adult      Diet     Output: I/O last 3 completed shifts:  In: 1260 [P.O.:1210; I.V.:50]  Out: 3300 [Urine:3300]     Labs:   Recent Labs   Lab 05/02/23  0653 04/30/23  0821 04/29/23  0644   HGB 8.9*   < > 9.0*   INR  --   --  0.94   WBC 16.4*   < > 15.4*    < > = values in this interval not displayed.     Pressure injury risk assessment:   Sensory Perception: 3-->slightly limited  Moisture: 3-->occasionally moist  Activity: 2-->chairfast  Mobility: 2-->very limited  Nutrition: 2-->probably inadequate  Friction and Shear: 1-->problem  Manan Score: 13    Veronica Gunter RN CWOCN  Pager no longer is use, please contact through [employer]   Rafael group: [facility] Nurse  Dept. Office Number: [phone]

## 2023-05-02 NOTE — PROGRESS NOTES
[facility], [place]   05/01/2023  Neurosurgery Progress Note:    Assessment:  Gustavo Faria is a 58 year old male with a PMH of DMII, HFrEF, BLE open wounds, history of serratia bacteremia in December 2022,  pituitary ACTH secreting macroadenoma s/p EEA for resection on 5/2021 and 7/2021 in [place], with baseline right CN 3 palsy, who was admitted at the [facility] on 3/23. He developed sudden headache and vision changes on 3/25, with MRI brain on 3/28, which demonstrated enlargement of known pituitary adenoma, with necrosis extending laterally beyond the left cavernous sinus, concerning for compression of cranial nerves at cavernous sinus. 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      Patient is POD-5 s/p Endoscopic endonasal transcavernous approach for resection of functional pituitary adenoma with harvesting of right-sided pedicled nasoseptal flap, which was pedicled off the posterior septal branch of the sphenopalatine artery    Plan:  - Watch for DI/CSF leak  - Follow Na  - Follow urine output/ inputs/ outputs  - Scheduled Zofran x 24 hours  - On sliding scale insulin now   - Continue to hold ASA  - Please inform Neurosurgery if there is any change in exam  - Pending endocrinology plan, may be ready for [facility] to [facility] vs[facility]  - Medicine as primary team    Follow up plan  - Follow up in 6 weeks with Dr Jeong (message sent to schedulers)  - MRI pituitary in 6 weeks before discharge (message sent to schedulers)  - Upright lumbar xrays to evaluate his compression fractures (ordered for you)  -----------------------------------  Yaneth Ugalde MD  Neurosurgery PGY3    Please contact neurosurgery resident on call with questions.    Dial * * [phone], enter [phone] when prompted.      Interval History: No acute events overnight. Yesterday with  requests to leave hospital, now agreeable to await [facility] to [facility].       Objective:   Temp:  [98.2  F (36.8  C)-98.5  F (36.9  C)] 98.4  F (36.9  C)  Pulse:  [] 102  Resp:  [16-20] 16  BP: (111-153)/() 115/87  SpO2:  [96 %-100 %] 100 %  I/O last 3 completed shifts:  In: 735 [P.O.:685; I.V.:50]  Out: 2800 [Urine:2800]    NEUROLOGIC:  -- Opens eyes to voice, following commands, oriented x3  Cranial Nerves:  -- visual fields full to confrontation although with limited participation, PERRL anisocoric L>R and sluggish, left CN III paresis and CN VI palsy, restricted ROM in right EOM with no apparent asymmetry   -- face symmetrical, tongue midline  -- sensory V1-V3 intact bilaterally  -- palate elevates symmetrically, uvula midline  -- hearing grossly intact bilat     Motor:  Antigravity x4 extremities     Sensory:  intact to LT x 4 extremities      Reflexes:       Bi Tri BR Omi Pat Ach Bab     C5-6 C7-8 C6 UMN L2-4 S1 UMN   R 2+ 2+ 2+ Norm 2+ 2+ Norm   L 2+ 2+ 2+ Norm 2+ 2+ Norm      Gait: Deferred.     LABS:  Recent Labs   Lab 05/01/23  2202 05/01/23  1930 05/01/23  1743 05/01/23  0749 05/01/23  0650 05/01/23  0203 04/30/23  2140 04/30/23  1706 04/30/23  1428   NA  --   --   --   --  142  --  143  --  142   POTASSIUM  --   --   --   --  4.1  --  4.4  --  4.0   CHLORIDE  --   --   --   --  102  --  101  --  104   CO2  --   --   --   --  31*  --  30*  --  29   ANIONGAP  --   --   --   --  9  --  12  --  9   * 227* 159*   < > 141*   < > 142*   < > 136*   BUN  --   --   --   --  12.2  --  13.9  --  14.3   CR  --   --   --   --  0.64*  --  0.65*  --  0.62*   KORIN  --   --   --   --  8.7  --  8.8  --  8.5*    < > = values in this interval not displayed.       Recent Labs   Lab 05/01/23  0650   WBC 17.7*   RBC 3.57*   HGB 9.5*   HCT 31.8*   MCV 89   MCH 26.6   MCHC 29.9*   RDW 23.9*          IMAGING:  No results found for this or any previous visit (from the past 24 hour(s)).

## 2023-05-02 NOTE — PLAN OF CARE
"Status: POD #5 pituitary adenoma resection  Vitals: VSS on RA   Neuros: Refusing assessments and was made DND overnight, appears to be oriented, can be forgetful, whispered speech, R ptosis  IV: DL PICC HL  Resp/trach: No SOB noted  Diet: Strict I&Os, Reg, on carb coverage, was snacking overnight  Bowel status: BM 5/1  : Voiding spontaneously  Skin: Intermittently wearing nasal sling to catch nasal drainage, BLE wounds are covered w/ mepilex due to be changed 5/3, Buttocks wounds covered w/ mepilex  Pain: Denied   Activity: Ax2 w/ lift, refusing repositioning, occasionally repositioning self  Social: Refusing some cares over night, was compliant w/ insulin overnight  Plan: Will continue to monitor and follow POC, Plan to discharge to either [facility] or TCU  Updates this shift: Pt seemed to sleep well in between cares. Pt refused 2000 assessment stating that he \"doesn't need anymore assessments because [he's] going to the [facility] in the morning.\" After refusal, primary team was paged for and placed DND orders. Pt snacked a couple times overnight and accepted getting carb coverage for snacks. Difficult to get accurate I&Os due to patient pouring himself home drinks and not always finishing them.   "

## 2023-05-03 ENCOUNTER — APPOINTMENT (OUTPATIENT)
Dept: PHYSICAL THERAPY | Facility: CLINIC | Age: 58
DRG: 614 | End: 2023-05-03
Attending: STUDENT IN AN ORGANIZED HEALTH CARE EDUCATION/TRAINING PROGRAM
Payer: COMMERCIAL

## 2023-05-03 LAB
CORTIS SERPL-MCNC: 15.1 UG/DL
ERYTHROCYTE [DISTWIDTH] IN BLOOD BY AUTOMATED COUNT: 24.3 % (ref 10–15)
GLUCOSE BLDC GLUCOMTR-MCNC: 130 MG/DL (ref 70–99)
GLUCOSE BLDC GLUCOMTR-MCNC: 145 MG/DL (ref 70–99)
GLUCOSE BLDC GLUCOMTR-MCNC: 167 MG/DL (ref 70–99)
GLUCOSE BLDC GLUCOMTR-MCNC: 168 MG/DL (ref 70–99)
GLUCOSE BLDC GLUCOMTR-MCNC: 174 MG/DL (ref 70–99)
HCT VFR BLD AUTO: 29.3 % (ref 40–53)
HGB BLD-MCNC: 8.7 G/DL (ref 13.3–17.7)
HOLD SPECIMEN: NORMAL
MCH RBC QN AUTO: 26.8 PG (ref 26.5–33)
MCHC RBC AUTO-ENTMCNC: 29.7 G/DL (ref 31.5–36.5)
MCV RBC AUTO: 90 FL (ref 78–100)
PLATELET # BLD AUTO: 354 10E3/UL (ref 150–450)
RBC # BLD AUTO: 3.25 10E6/UL (ref 4.4–5.9)
WBC # BLD AUTO: 16.3 10E3/UL (ref 4–11)

## 2023-05-03 PROCEDURE — 250N000013 HC RX MED GY IP 250 OP 250 PS 637

## 2023-05-03 PROCEDURE — 97530 THERAPEUTIC ACTIVITIES: CPT | Mod: GP | Performed by: REHABILITATION PRACTITIONER

## 2023-05-03 PROCEDURE — 250N000013 HC RX MED GY IP 250 OP 250 PS 637: Performed by: STUDENT IN AN ORGANIZED HEALTH CARE EDUCATION/TRAINING PROGRAM

## 2023-05-03 PROCEDURE — 99232 SBSQ HOSP IP/OBS MODERATE 35: CPT | Mod: GC | Performed by: INTERNAL MEDICINE

## 2023-05-03 PROCEDURE — 250N000011 HC RX IP 250 OP 636

## 2023-05-03 PROCEDURE — 85027 COMPLETE CBC AUTOMATED: CPT

## 2023-05-03 PROCEDURE — 82533 TOTAL CORTISOL: CPT | Performed by: INTERNAL MEDICINE

## 2023-05-03 PROCEDURE — 36415 COLL VENOUS BLD VENIPUNCTURE: CPT

## 2023-05-03 PROCEDURE — 99232 SBSQ HOSP IP/OBS MODERATE 35: CPT | Performed by: INTERNAL MEDICINE

## 2023-05-03 PROCEDURE — 250N000013 HC RX MED GY IP 250 OP 250 PS 637: Performed by: INTERNAL MEDICINE

## 2023-05-03 PROCEDURE — 120N000002 HC R&B MED SURG/OB UMMC

## 2023-05-03 RX ORDER — LEVOTHYROXINE SODIUM 25 UG/1
25 TABLET ORAL ONCE
Status: COMPLETED | OUTPATIENT
Start: 2023-05-03 | End: 2023-05-03

## 2023-05-03 RX ORDER — LEVOTHYROXINE SODIUM 125 UG/1
125 TABLET ORAL
Status: DISCONTINUED | OUTPATIENT
Start: 2023-05-04 | End: 2023-05-03

## 2023-05-03 RX ORDER — LEVOTHYROXINE SODIUM 112 UG/1
112 TABLET ORAL
Status: DISCONTINUED | OUTPATIENT
Start: 2023-05-04 | End: 2023-05-19 | Stop reason: HOSPADM

## 2023-05-03 RX ADMIN — LEVOTHYROXINE SODIUM 25 MCG: 25 TABLET ORAL at 12:47

## 2023-05-03 RX ADMIN — LISINOPRIL 20 MG: 20 TABLET ORAL at 08:39

## 2023-05-03 RX ADMIN — INSULIN ASPART 1 UNITS: 100 INJECTION, SOLUTION INTRAVENOUS; SUBCUTANEOUS at 18:20

## 2023-05-03 RX ADMIN — CARVEDILOL 12.5 MG: 12.5 TABLET, FILM COATED ORAL at 08:39

## 2023-05-03 RX ADMIN — LEVOTHYROXINE SODIUM 100 MCG: 100 TABLET ORAL at 08:39

## 2023-05-03 RX ADMIN — SALINE NASAL SPRAY 2 SPRAY: 1.5 SOLUTION NASAL at 15:57

## 2023-05-03 RX ADMIN — DOCUSATE SODIUM 50 MG AND SENNOSIDES 8.6 MG 1 TABLET: 8.6; 5 TABLET, FILM COATED ORAL at 19:51

## 2023-05-03 RX ADMIN — MICONAZOLE NITRATE: 20 CREAM TOPICAL at 11:48

## 2023-05-03 RX ADMIN — FUROSEMIDE 40 MG: 40 TABLET ORAL at 08:39

## 2023-05-03 RX ADMIN — SALINE NASAL SPRAY 2 SPRAY: 1.5 SOLUTION NASAL at 08:42

## 2023-05-03 RX ADMIN — OXYCODONE HYDROCHLORIDE 5 MG: 5 TABLET ORAL at 01:05

## 2023-05-03 RX ADMIN — CARVEDILOL 12.5 MG: 12.5 TABLET, FILM COATED ORAL at 18:30

## 2023-05-03 RX ADMIN — Medication 10 ML: at 18:31

## 2023-05-03 RX ADMIN — BISACODYL 10 MG RECTAL SUPPOSITORY 10 MG: at 22:04

## 2023-05-03 RX ADMIN — SALINE NASAL SPRAY 2 SPRAY: 1.5 SOLUTION NASAL at 18:33

## 2023-05-03 RX ADMIN — Medication 1 TABLET: at 08:39

## 2023-05-03 RX ADMIN — MICONAZOLE NITRATE: 20 CREAM TOPICAL at 11:47

## 2023-05-03 RX ADMIN — EMPAGLIFLOZIN 10 MG: 10 TABLET, FILM COATED ORAL at 08:38

## 2023-05-03 RX ADMIN — SPIRONOLACTONE 200 MG: 100 TABLET ORAL at 08:39

## 2023-05-03 RX ADMIN — OXYCODONE HYDROCHLORIDE 5 MG: 5 TABLET ORAL at 14:09

## 2023-05-03 RX ADMIN — Medication 5 MG: at 19:51

## 2023-05-03 RX ADMIN — SALINE NASAL SPRAY 2 SPRAY: 1.5 SOLUTION NASAL at 11:48

## 2023-05-03 RX ADMIN — SALINE NASAL SPRAY 2 SPRAY: 1.5 SOLUTION NASAL at 22:03

## 2023-05-03 RX ADMIN — MICONAZOLE NITRATE: 20 CREAM TOPICAL at 19:52

## 2023-05-03 RX ADMIN — SALINE NASAL SPRAY 2 SPRAY: 1.5 SOLUTION NASAL at 19:51

## 2023-05-03 ASSESSMENT — ACTIVITIES OF DAILY LIVING (ADL)
ADLS_ACUITY_SCORE: 45

## 2023-05-03 NOTE — PROGRESS NOTES
Otolaryngology Progress Note    Subjective/Intvl events: NAEON.[facility] is still [facility]. Denies clear drainage from nose and salty/metallic taste in mouth.     O: BP (!) 125/92 (BP Location: Left arm)   Pulse 92   Temp 98.7  F (37.1  C) (Axillary)   Resp 14   Wt 74.6 kg (164 lb 8 oz)   SpO2 100%   BMI 26.55 kg/m     General: Alert and oriented x 3, no acute distress   HEENT: Bilateral lateral gaze palsy and restricted EOM on the right, CN V1-V3 intact, facial movement symmetric, Oropharynx clear. Mild amount of bloody nasal drainage within b/l nares.    Pulmonary: Breathing non-labored, no stridor, no accessory muscle use.      UOP: All voids not measured       LABS:    BMP   (5/1)   Hgb pending   WBC pending  Plt pending       A/P: Gustavo Faria is a 58 year old male with a past medical history of pituitary ACTH secreting macroadenoma s/p endoscopic endonasal transcavernous approach for resection of functional pituitary adenoma w/ nasoseptal flap on 4/28. He is recovering well post-operatively without signs of CSF leak.    - Serial neuro exams  - Nasal saline QID  - Appreciate Endo following and recs for Cushing's and DMII  - Sinus precautions: No nose blowing, sneeze with mouth open, no straining, and scheduled bowel regimen  - Rest of care per primary team      -- Patient and above plan discussed with Dr. Adan Jackson, PGY-4  Otolaryngology-Head & Neck Surgery  Please contact ENT with questions by dialing [phone] and entering job [phone] when prompted.

## 2023-05-03 NOTE — PROGRESS NOTES
Endocrine Progress Note  Patient: Gustavo Faria   MRN: [id number]  Date of Service: 05/03/2023       Assessment and plan:  Gustavo Faria is a 58 year old male with PMHx of  ACTH-secreting macroadenoma c/b central hypothyroidism, and central hypogonadism, s/p transphenoidal surgery 5/2021 and 7/2021 in [place] has baseline records 3rd nerve palsy, ongoing serratia RLE cellulitis c/b recent serratia bacteremia, HFrEF, T2DM, and HTN who was transferred from [facility] for surgical intervention of known expanding large sellar/suprasellar mass extending into cavernous sinuses and subsequent cranial nerve impingement.     #Pituitary macroadenoma:  #Cushing's disease:  #Pituitary apoplexy:  Unable to remove entire pituitary macroadenoma due to fibrous tissue. Cortisol level after surgery still elevated at 38.2 with repeat serum cortisol on 4/29 was at 27.4.  Blood pressure above target. Sodium WNL.  - Continue spironolactone to 200 mg.  - Strict I's and O's  - Added Cortisol to morning labs. Will follow up with results.     #Central hypothyroidism:  Prior to admission was on levothyroxine 100 mcg daily.  Free T4 on admission 1.3 and TSH not detectable (picture of central hypothyroidism).  Repeat FT4 on 5/2 is at 0.96      Recommendations:  -Increased levothyroxine to 112 mcg daily.  -Gave extra dose of LT 25 mcg today.  -Repeat FT in 1 week.     #DM type II: Hemoglobin A1c on admission 9.5%.  Prior to admission was on Sitagliptin 100 mg daily/metformin 500 mg twice daily, Jardiance 12.5 mg (not available)   Patient BG are trending down well. He had Low Bg in the afternoon on 4/30. Will cut back on his basal insulin.    - Continue Lantus to 20 units daily.  - Continue carb coverage to 1 units/20 g CHO with meals and snacks  - Changed to Medium-dose sliding scale insulin for AC & HS  - Continue Jardiance 10 mg daily  - Continue hypoglycemia protocol  - Accu checks AC & HS      Patient was seen and discussed with  Attending Dr Menendez  Patient labs, chart and imaging reviewed      Rhiannon Arroyo  Endocrinology Fellow  [phone]    I have reviewed the fellow's note and agree with the plan  Dr. Smitha Menendez [phone]     I have seen and examined the patient and agree with the fellow's plan of care as noted.  May 3, 2023    Dr. Smitha Menendez [phone]      ======================================================================    Subjective:  - Pituitary macroadenoma debulking surgery/resection(4/27/23)  - Overall, still feels tired but better.     Physical Examination:  /76 (BP Location: Left arm)   Pulse 101   Temp 98.2  F (36.8  C) (Oral)   Resp 16   Wt 74.6 kg (164 lb 8 oz)   SpO2 100%   BMI 26.55 kg/m      Constitutional: healthy, alert and no distress   Respiratory: lungs CTAB. No increased work of breathing  Psychiatric: mentation appears normal and affect normal/bright  Head: Normocephalic.   Neck: Neck supple. Thyroid symmetric, normal size,  ENT: Nasal bandage with in place   Abdomen: Abdomen soft, non-tender. BS normal. No masses, organomegaly  NEURO: Sensation grossly WNL.  JOINT/EXTREMITIES: +2-3 pitting bilateral lower extremity edema.    Medications:  Reviewed    Endocrine Labs:     Latest Reference Range & Units 04/05/23 02:35 04/06/23 06:26 04/07/23 08:42 04/09/23 09:12 04/28/23 08:15 04/29/23 06:44   Cortisol Serum ug/dL 46.3 48.7 41.1 37.8 38.2 27.4      Hemoglobin A1C Adrenal Corticotropin Cortisol Serum   Latest Ref Rng <5.7 % <47 pg/mL ug/dL   2/16/2023  8:54 AM 7.6 (H)      4/5/2023  2:35 AM  321 (H)  46.3    4/5/2023  6:11 AM 9.5 (H)      4/6/2023  6:26 AM   48.7    4/7/2023  8:42 AM  273 (H)  41.1    4/9/2023  9:12 AM  364 (H)  37.8       T4 Free Insulin Growth Factor 1 (External)   Latest Ref Rng 0.90 - 1.70 ng/dL 50 - 317 ng/mL   4/5/2023  6:11 AM 1.30     4/5/2023  11:10 AM  <10 (L)       TSH   Latest Ref Rng 0.30 - 4.20 uIU/mL   2/16/2023  8:54 AM    4/5/2023  2:35 AM    4/5/2023  6:11 AM <0.01 (L)

## 2023-05-03 NOTE — PROGRESS NOTES
[facility]    Medicine Progress Note - Medicine Service, MAROON TEAM 2    Date of Admission:  4/3/2023    Assessment & Plan   Gustavo Faria is a 58 year old male with recent Serratia bacteremia, HFrEF, prolonged QT, lower extremity wounds, DMII, hypothyroidism, low testosterone and known ACTH secreting pituitary adenoma w/resulting Cushing's disease s/p 2 partial resections in 2021 who [facility] for lower extremity wounds. During [facility], he developed new onset double vision and severe headache which prompted imaging that showed a large mass invading the cavernous sinuses and impinging on the cranial nerves. He is transferred to [facility] for this pituitary adenoma, concern for progression vs hemorrhage/apoplexy. Complicated by psychosis/metabolic encephalopathy and electrolyte abnormalities secondary to Cushing's syndrome. Underwent endoscopic endonasal transcavernous approach for resection of functional pituitary adenoma 4/27.     Updates today:  - Stable to [facility] to [facility] when [facility] - no bed available today  - ophthalmology consult at discharge per neurosurgery for ptosis   - continue co-management with endocrine, neurosurgery, and ENT     # ACTH secreting pituitary adenoma c/b type II DM, hypothyroidism and low testosterone s/p two partial resections in 2021   Patient noted double vision and severe headache beginning the evening of 3/25. CT imaging on 3/25 revealed a large sellar/suprasellar mass extending into the cavernous sinuses with no evidence of acute stroke. On 3/28, he underwent an MRI which confirmed the CT findings of a large mass invading the cavernous sinuses and impinging on the cranial nerves. Necrosis extending beyond left cavernous sinus. [facility]. Repeat MRI performed on 4/6/23: compared to 2021 MRI, lesions are smaller. Orbit MRI without optic nerve  compression and no evidence of orbital infection.  - 4/27 neurosurgery and ENT combined to perform endoscopic endonasal transcavernous approach for resection of functional pituitary adenoma. Complicated by significant scar tissue to cavernous sinus. Good post-op recovery. Follow up MRI done 5/29. Neurosurgery okay with [facility] to [facility] once bed is available. They will arrange follow up.   - Ophthalmology following, no major changes, agree with neurosurgery plans  ` - opthalmology outpatient consult placed after discharge for eval of ptosis, comprehensive eye exam   - Endocrine following  - neurosurgery following     # Type II DM, exacerbated by Cushing's   A1c of 7.6% on 2/2023.  - Endocrinology managing. Current regimen:   - Lantus 20 units daily.  - Carb coverage to 1 units/20 g CHO with meals and snacks  - High-dose sliding scale insulin for AC & HS  - Jardiance 10 mg daily  - hypoglycemia protocol  - Accu checks AC & HS    # Leukocytosis  # Buttocks, sacrum and bilateral groin wounds   # RLE wound from puncture injury   # L dorsal foot wound from presumed trauma   # Soft tissue infection/abscess    # Hx serratia bacteremia in December 2022   For patient's lower extremity wounds and hx of Serratia bacteremia in December, he was initially started on [facility]efazolin at the [facility]. His antibiotics were broadened to Vanc and Zosyn and ultimately he was transitioned to ceftazidime on 3/23 with plan to complete course on 4/4/23. BCx negative and wound cx grew serratia marcescens at the [facility]. He has been on ceftazidime since. MRI of the right tib/fib on 3/23 was negative for osteomyelitis but did show two fluid collections draining sponateneously. Ortho was consulted at the [facility] and determined no intervention was needed as wounds were draining on their own.  - completed course of ceftazidime (3/23 - 4/5)  - elevated WBC since surgery. Suspect just post-surgical stress response. No fever. CXR done to check PICC position with no  infiltrate. Wound appear unchanged.     - Repeat blood cultures/UA 5/1 due to rising WBC after surgery    - blood cx 5/1 NGTD    - UA negative   - repeat WOC consult 5/2/23 to re-assess chronic lower extremity wounds - wounds stable, no evidence of active infection  - PT/OT   - Pain: tylenol PRN, dilaudid 0.5 mg PRN for dressing changes and oxy 5 mg    # HFmrEF, with global hypokinesis  # Hypertension  # High burden of PVCs  # Prolonged QTc   # Elevated troponin, down-trended  Coronary angiogram on 2/27 showed normal coronaries. Troponin down-trended (88 -> 77). No chest pain. High burden of PVCs. Follow up ECHO with global hypokinesis, which is worse compared to February 2023 ECHO. EF is 45% (same compared to prior). Likely small vessel disease vs demand vs cardiomyopathy 2/2 ceftazidime. Low concern for acute ACS.   - Electrolyte mgmt as above  - Lasix 40mg daily--held since surgery on 4/27. Restarted 4/30  - Coreg and lisinopril held for surgery 4/27.    - Carvedilol 12.5 mg BID restarted 4/29  - Lisinopril restarted 4/30 and titrated up to prior dose 20 mg daily on 5/1  - ASA - on HOLD after surgery  - Avoid QTc prolonging medications    # Acute metabolic encephalopathy - resolved  # Pyruria, Candiduria - resolved  # Encephalopathy likely secondary to cushing's disease  Admission symptoms included disorientation, intermittently following directions. Repetitive words - perseverating on particular phrases. Sx started the 2-3 days prior to admission (which [facility] was the [facility]). Vulnerable brain (multiple brain surgeries), enlarging pituitary mass. No seizure activity per EEG. Steroids removed and pt still encephalopathic. Sodium had been in normal limits and patient remained altered. Worked up for infections - did reveal candiduria but unlikely this was etiology, though he did complete an 8 day course of fluconazole. Briefly on ceftriaxone but encephalopathy also did not improve. CT abd w/ contrast (4/10):  no evidence of pyelonephritis  Ultimately attributed to Cushings and known pituitary tumor. Continues to remain lucid.  - Surgical management per neurosurgery  - Delirium precautions  - Electrolyte management as above  - PRN quetiapine if agitation that is not responsive to behavioral interventions     # Delusions, intermittent  Intermittent delusions regarding staff stalking patient while in the hospital. Unclear chronicity of these delusions - but has had them multiple times this hospitalization. Unclear psych history. Medical management for encephalopathy as stated above. Psychiatry was consulted during his care and agreed with paranoia and recommended Abilify. He was briefly on Abilify 5mg but patient then started to refuse nightly so this was discontinued. His paranoia and delusions have been very intermittent and appear to be associated with his anxiety.     # Hypernatremia, DI vs cushing's, resolved  # Hypokalemia, resolved  Hypernatremia and hypokalemia, likely secondary to Cushing's.     - Endocrine following. Managing spironolactone  - On K replacement protocol  - Goal Na: 135-140  - Strict I&Os  - Daily BMP + Mg     # Central Hypothyroidism  - Continue PTA levothyroxine     # Hypogonadism   - Continue PTA androgel (will need to bring in home medication)     # Chronic microcytic anemia   Hgb of 8.4 on [facility] the [facility]. Peripheral smear on 3/30 showed poikilocytosis with occasional red blood cell fragments but no other evidence of hemolysis.  Haptoglobin was normal.  Ferritin was 91, transferrin saturation was low, B12 was 495 and folate was 8.7. Likely combination of iron deficiency as well as inflammation/chronic disease.   - CTM        # Concern for malnutrition   - Nutrition following       Diet: Regular Diet Adult  Diet  Snacks/Supplements Adult: Glucerna; Between Meals    DVT Prophylaxis: Pneumatic Compression Devices  Roland Catheter: Not present  Lines:   PICC 04/06/23 Double Lumen Right Basilic  access-Site Assessment: WDL      Cardiac Monitoring: None  Code Status: Full Code      Clinically Significant Risk Factors              # Hypoalbuminemia: Lowest albumin = 2.9 g/dL at 4/11/2023  7:07 AM, will monitor as appropriate          # DMII: A1C = 9.5 % (Ref range: <5.7 %) within past 6 months    # Severe Malnutrition: based on nutrition assessment        Disposition Plan      Expected Discharge Date: 05/04/2023      Destination: home  Discharge Comments: Transfer agreement in place and pt has been accepted pending MD call to [facility] and [facility] bed availability. No beds available today 5/3         Addendum 5/1/2023  Gustavo is very eager to leave [facility] and get to the [facility]. He attempted to leave the hospital rather than wait for bed availability to the [facility]. His sister Pilar, was willing to take him from the hospital but was planning to take him directly to the [facility] ER. However, due to his immobility, this is much more complicated than a simple discharge would require her finding a way to get him in a cab (she does not have a car) vs arranging stretcher transport to her home and then her calling 911 to have him brought to the [facility] ER.     - The [facility] is making him a high priority for transfer but wanted to stress that if he leaves this hospital AMA, that this hospitalization would not be paid for by the VA, so it is definitely in his best interest to remain in the hospital.   - I do not believe that he is fully decisional, but if his sister were in agreement to his going then, I don't think we could stop them and would need to arrange as safe of an outpatient plan as possible.             I have personally reviewed the following data over the past 24 hrs:    16.3 (H)  \   8.7 (L)   / 354     N/A N/A N/A /  174 (H)   N/A N/A N/A \       TSH: N/A T4: N/A A1C: N/A         Leela Verdin MD  Date of Service (when I saw the patient): 05/03/2023      _________________________________________________________    Interval History    SHLOMO    Pt stable this morning. He is disappointed there are no beds for him at the [facility] today, very eager to get out of the hospital. Does not have new questions, concerns today other than discharging to [facility]. He does feel better since his surgery    Physical Exam   Vital Signs: Temp: 98.7  F (37.1  C) Temp src: Oral BP: (!) 151/101 Pulse: 83   Resp: 16 SpO2: 100 % O2 Device: None (Room air)    Weight: 164 lbs 8 oz    Gen: Sitting up in bed, talkative, and pleasant  ENT: MMM, nasal harness in place with no visible blood, drainage on gauze   Resp: breathing comfortably, non-labored. CTAB  CV: RRR, ext WWP, 3+ bilateral pitting lower extremity edema  Skin: multiple LE wounds w/ c/d/i dressings. Venous stasis skin changes L>R lower extremity           Data     I have personally reviewed the following data over the past 24 hrs:    16.3 (H)  \   8.7 (L)   / 354     N/A N/A N/A /  174 (H)   N/A N/A N/A \       TSH: N/A T4: N/A A1C: N/A

## 2023-05-03 NOTE — PROGRESS NOTES
Care Management Follow Up    Length of Stay (days): 30    Expected Discharge Date: 05/04/2023     Concerns to be Addressed: all concerns addressed in this encounter     Patient plan of care discussed at interdisciplinary rounds: Yes    Anticipated Discharge Disposition: Transfer back to [facility] operated by [facility] #[phone]                          Patient placement #[phone]  Option #1     Anticipated Discharge Services: Transportation Services  Anticipated Discharge DME: None      Additional Information:  Phone call to [facility] Patient Placement, regarding return transfer to their hospital. I spoke to [facility] #[phone] who states a bed in not available today, call back tomorrow morning.      France Olivas RN   6A care coordinator #[phone]

## 2023-05-03 NOTE — PLAN OF CARE
Status: POD #6 pituitary adenoma resection  Vitals: VSS on RA   Neuros: DND overnight, A&O x4, forgetful, whispered speech, R ptosis, pupils sluggish/fixed, deviated gaze when tracking, BUE 4/5, BLE 3/5, N/T to BLE  IV: DL PICC HL  Resp/trach: No SOB noted  Diet: Strict I&Os, Reg, on carb coverage, was snacking overnight  Bowel status: BM 5/1  : Voiding spontaneously  Skin: Intermittently wearing nasal sling to catch nasal drainage, BLE wounds are covered w/ mepilex CDI,  Buttocks wounds covered w/ mepilex CDI  Pain: Complained of BLE, found adequate relief w/ PRN oxy   Activity: Ax2 w/ lift, occasionally repositioning self  Social: Pt was compliant w/ cares  Plan: Will continue to monitor and follow POC, Plan to discharge to either [facility] or [facility]  Updates this shift: Pt was compliant w/ cares overnight, seemed to sleep well in between cares. Difficult to track I&Os due to not drinking all of fluids that are given to him.

## 2023-05-03 NOTE — PROGRESS NOTES
[facility], [place]   05/03/2023  Neurosurgery Progress Note:    Assessment:  Gustavo Faria is a 58 year old male with a PMH of DMII, HFrEF, BLE open wounds, history of serratia bacteremia in December 2022,  pituitary ACTH secreting macroadenoma s/p EEA for resection on 5/2021 and 7/2021 in [place], with baseline right CN 3 palsy, who was admitted at the [facility] on 3/23. He developed sudden headache and vision changes on 3/25, with MRI brain on 3/28, which demonstrated enlargement of known pituitary adenoma, with necrosis extending laterally beyond the left cavernous sinus, concerning for compression of cranial nerves at cavernous sinus. 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      Patient is POD-6 s/p Endoscopic endonasal transcavernous approach for resection of functional pituitary adenoma with harvesting of right-sided pedicled nasoseptal flap, which was pedicled off the posterior septal branch of the sphenopalatine artery    Plan:  - Watch for DI/CSF leak  - Follow Na  - Follow urine output/ inputs/ outputs  - Scheduled Zofran x 24 hours  - On sliding scale insulin now   - Continue to hold ASA  - Please inform Neurosurgery if there is any change in exam  - Pending endocrinology plan, may be ready for transfer to [facility] vs[facility]  - Medicine as primary team    Follow up plan  - Follow up in 6 weeks with Dr Jeong (message sent to schedulers)  - MRI pituitary in 6 weeks before discharge (message sent to schedulers)  - Upright lumbar xrays to evaluate his compression fractures (ordered for you)  -----------------------------------  Yaneth Ugalde MD  Neurosurgery PGY3    Please contact neurosurgery resident on call with questions.    Dial * * [phone], enter [phone] when prompted.      Interval History: No acute events overnight.       Objective:    Temp:  [98.4  F (36.9  C)-98.9  F (37.2  C)] 98.7  F (37.1  C)  Pulse:  [79-92] 92  Resp:  [14-16] 14  BP: (125-139)/(80-98) 125/92  SpO2:  [92 %-100 %] 100 %  I/O last 3 completed shifts:  In: 1585 [P.O.:1555; I.V.:30]  Out: 2150 [Urine:2150]    NEUROLOGIC:  -- Opens eyes to voice, following commands, oriented x3  Cranial Nerves:  -- visual fields full to confrontation although with limited participation, PERRL anisocoric L>R and sluggish, left CN III paresis and CN VI palsy, restricted ROM in right EOM with no apparent asymmetry   -- face symmetrical, tongue midline  -- sensory V1-V3 intact bilaterally  -- palate elevates symmetrically, uvula midline  -- hearing grossly intact bilat     Motor:  Antigravity x4 extremities     Sensory:  intact to LT x 4 extremities      Reflexes:       Bi Tri BR Omi Pat Ach Bab     C5-6 C7-8 C6 UMN L2-4 S1 UMN   R 2+ 2+ 2+ Norm 2+ 2+ Norm   L 2+ 2+ 2+ Norm 2+ 2+ Norm      Gait: Deferred.     LABS:  Recent Labs   Lab 05/02/23  2158 05/02/23  1615 05/02/23  1132 05/02/23  0733 05/02/23  0653 05/01/23  0749 05/01/23  0650 05/01/23  0203 04/30/23  2140   NA  --   --   --   --  143  --  142  --  143   POTASSIUM  --   --   --   --  4.0  --  4.1  --  4.4   CHLORIDE  --   --   --   --  101  --  102  --  101   CO2  --   --   --   --  31*  --  31*  --  30*   ANIONGAP  --   --   --   --  11  --  9  --  12   * 109* 176*   < > 151*   < > 141*   < > 142*   BUN  --   --   --   --  12.8  --  12.2  --  13.9   CR  --   --   --   --  0.65*  --  0.64*  --  0.65*   KORIN  --   --   --   --  8.7  --  8.7  --  8.8    < > = values in this interval not displayed.       Recent Labs   Lab 05/02/23  0653   WBC 16.4*   RBC 3.31*   HGB 8.9*   HCT 30.1*   MCV 91   MCH 26.9   MCHC 29.6*   RDW 24.6*          IMAGING:  No results found for this or any previous visit (from the past 24 hour(s)).

## 2023-05-03 NOTE — PLAN OF CARE
Status: POD #6 pituitary adenoma resection  Vitals: VSS on RA   Neuros: A&O x4, forgetful, repeated requests, anxiety, whispered speech, R ptosis, pupils sluggish/fixed, bilateral eyes not tracking past opposite midline, BUE 4/5, BLE 3/5, N/T to BLE  IV: DL PICC HL  Resp/trach: No SOB noted  Diet: Strict I&Os, Reg, on carb coverage, was snacking overnight  Bowel status: BM 5/1  : Voiding spontaneously  Skin: Intermittently wearing nasal sling to catch nasal drainage, BLE wounds are covered w/ mepilex CDI,  Buttocks wounds SUKH, wound cares complete.  Pain: Complained of BLE, found adequate relief w/ PRN oxy   Activity: Ax2 w/ lift, occasionally repositioning self  Social: Pt was compliant w/ cares  Plan: Will continue to monitor and follow POC, Plan to [facility] to either [facility] or [facility]  Updates this shift: Anxiety, refused atarax. RN took pt outside in W/C. Sacral wound cares completed. Pt noncompliant with notifying staff for BG before meals, orders meals at unusual times.

## 2023-05-04 ENCOUNTER — APPOINTMENT (OUTPATIENT)
Dept: OCCUPATIONAL THERAPY | Facility: CLINIC | Age: 58
DRG: 614 | End: 2023-05-04
Attending: STUDENT IN AN ORGANIZED HEALTH CARE EDUCATION/TRAINING PROGRAM
Payer: COMMERCIAL

## 2023-05-04 LAB
GLUCOSE BLDC GLUCOMTR-MCNC: 107 MG/DL (ref 70–99)
GLUCOSE BLDC GLUCOMTR-MCNC: 174 MG/DL (ref 70–99)
GLUCOSE BLDC GLUCOMTR-MCNC: 178 MG/DL (ref 70–99)
GLUCOSE BLDC GLUCOMTR-MCNC: 203 MG/DL (ref 70–99)
GLUCOSE BLDC GLUCOMTR-MCNC: 215 MG/DL (ref 70–99)

## 2023-05-04 PROCEDURE — 97530 THERAPEUTIC ACTIVITIES: CPT | Mod: GO

## 2023-05-04 PROCEDURE — 250N000013 HC RX MED GY IP 250 OP 250 PS 637: Performed by: STUDENT IN AN ORGANIZED HEALTH CARE EDUCATION/TRAINING PROGRAM

## 2023-05-04 PROCEDURE — 99232 SBSQ HOSP IP/OBS MODERATE 35: CPT | Performed by: INTERNAL MEDICINE

## 2023-05-04 PROCEDURE — 99232 SBSQ HOSP IP/OBS MODERATE 35: CPT | Mod: GC | Performed by: INTERNAL MEDICINE

## 2023-05-04 PROCEDURE — 250N000013 HC RX MED GY IP 250 OP 250 PS 637

## 2023-05-04 PROCEDURE — 250N000013 HC RX MED GY IP 250 OP 250 PS 637: Performed by: INTERNAL MEDICINE

## 2023-05-04 PROCEDURE — 250N000011 HC RX IP 250 OP 636

## 2023-05-04 PROCEDURE — 120N000002 HC R&B MED SURG/OB UMMC

## 2023-05-04 RX ORDER — POLYETHYLENE GLYCOL 3350 17 G/17G
17 POWDER, FOR SOLUTION ORAL 2 TIMES DAILY PRN
Status: DISCONTINUED | OUTPATIENT
Start: 2023-05-04 | End: 2023-05-19 | Stop reason: HOSPADM

## 2023-05-04 RX ORDER — ACETAMINOPHEN 325 MG/1
975 TABLET ORAL EVERY 6 HOURS PRN
Status: DISCONTINUED | OUTPATIENT
Start: 2023-05-04 | End: 2023-05-19 | Stop reason: HOSPADM

## 2023-05-04 RX ORDER — OXYCODONE HYDROCHLORIDE 5 MG/1
5 TABLET ORAL EVERY 6 HOURS PRN
Status: DISCONTINUED | OUTPATIENT
Start: 2023-05-04 | End: 2023-05-12

## 2023-05-04 RX ORDER — AMOXICILLIN 250 MG
2 CAPSULE ORAL 2 TIMES DAILY PRN
Status: DISCONTINUED | OUTPATIENT
Start: 2023-05-04 | End: 2023-05-19 | Stop reason: HOSPADM

## 2023-05-04 RX ADMIN — SALINE NASAL SPRAY 2 SPRAY: 1.5 SOLUTION NASAL at 12:22

## 2023-05-04 RX ADMIN — LISINOPRIL 20 MG: 20 TABLET ORAL at 08:16

## 2023-05-04 RX ADMIN — SALINE NASAL SPRAY 2 SPRAY: 1.5 SOLUTION NASAL at 17:10

## 2023-05-04 RX ADMIN — INSULIN ASPART 1 UNITS: 100 INJECTION, SOLUTION INTRAVENOUS; SUBCUTANEOUS at 12:19

## 2023-05-04 RX ADMIN — Medication 5 ML: at 16:10

## 2023-05-04 RX ADMIN — EMPAGLIFLOZIN 10 MG: 10 TABLET, FILM COATED ORAL at 08:18

## 2023-05-04 RX ADMIN — SALINE NASAL SPRAY 2 SPRAY: 1.5 SOLUTION NASAL at 20:15

## 2023-05-04 RX ADMIN — FUROSEMIDE 40 MG: 40 TABLET ORAL at 08:17

## 2023-05-04 RX ADMIN — SALINE NASAL SPRAY 2 SPRAY: 1.5 SOLUTION NASAL at 16:10

## 2023-05-04 RX ADMIN — LEVOTHYROXINE SODIUM 112 MCG: 0.11 TABLET ORAL at 08:17

## 2023-05-04 RX ADMIN — CARVEDILOL 12.5 MG: 12.5 TABLET, FILM COATED ORAL at 08:18

## 2023-05-04 RX ADMIN — OXYCODONE HYDROCHLORIDE 5 MG: 5 TABLET ORAL at 20:15

## 2023-05-04 RX ADMIN — SALINE NASAL SPRAY 2 SPRAY: 1.5 SOLUTION NASAL at 08:26

## 2023-05-04 RX ADMIN — SPIRONOLACTONE 200 MG: 100 TABLET ORAL at 08:17

## 2023-05-04 RX ADMIN — ERGOCALCIFEROL 50000 UNITS: 1.25 CAPSULE, LIQUID FILLED ORAL at 08:16

## 2023-05-04 RX ADMIN — MICONAZOLE NITRATE: 20 CREAM TOPICAL at 08:19

## 2023-05-04 ASSESSMENT — ACTIVITIES OF DAILY LIVING (ADL)
ADLS_ACUITY_SCORE: 50
ADLS_ACUITY_SCORE: 45
ADLS_ACUITY_SCORE: 50
ADLS_ACUITY_SCORE: 50
ADLS_ACUITY_SCORE: 45
ADLS_ACUITY_SCORE: 50
ADLS_ACUITY_SCORE: 45
ADLS_ACUITY_SCORE: 42
ADLS_ACUITY_SCORE: 42

## 2023-05-04 NOTE — PROGRESS NOTES
Care Management Follow Up    Length of Stay (days): 31    Expected Discharge Date:  TBD     Concerns to be Addressed: all concerns addressed in this encounter     Patient plan of care discussed at interdisciplinary rounds: Yes    Anticipated Discharge Disposition: [facility]  Anticipated Discharge Services: Transportation Services  Anticipated Discharge DME: None    Patient/family educated on Medicare website which has current facility and service quality ratings: yes        Additional Information:  Writer reached out to [facility] placement to follow up on earlier call regarding bed availability. Spoke with Julissa in Patient Placement who stated no bed available today. Patient [facility] to [facility] vs stay at [facility]. Care management will continue to follow.    [facility] (transfer agreement)  Ph: [phone]  Pt placement: [phone] opt. 1      Gabbie Hines RN

## 2023-05-04 NOTE — PROGRESS NOTES
Endocrine Progress Note  Patient: Gustavo Faria   MRN: [id number]  Date of Service: 05/04/2023       Assessment and plan:  Gustavo Faria is a 58 year old male with PMHx of  ACTH-secreting macroadenoma c/b central hypothyroidism, and central hypogonadism, s/p transphenoidal surgery 5/2021 and 7/2021 in [place] has baseline records 3rd nerve palsy, ongoing serratia RLE cellulitis c/b recent serratia bacteremia, HFrEF, T2DM, and HTN who was transferred from [facility] for surgical intervention of known expanding large sellar/suprasellar mass extending into cavernous sinuses and subsequent cranial nerve impingement.     #Pituitary macroadenoma:  #Cushing's disease:  #Pituitary apoplexy:  Unable to remove entire pituitary macroadenoma due to fibrous tissue. Cortisol level after surgery still elevated at 38.2 with repeat serum cortisol on 4/29 was at 27.4.  Blood pressure above target. Sodium WNL May 3, 2023 AM cortisol had improved to 15.1.  - Continue spironolactone to 200 mg.  - Strict I's and O's     #Central hypothyroidism:  Prior to admission was on levothyroxine 100 mcg daily.  Free T4 on admission 1.3 and TSH not detectable (picture of central hypothyroidism).  Repeat FT4 on 5/2 is at 0.96    Recommendations:  Now on levothyroxine to 112 mcg daily.  -Repeat FT in 1 week roughly May 11.     #DM type II: Hemoglobin A1c on admission 9.5%.  Prior to admission was on Sitagliptin 100 mg daily/metformin 500 mg twice daily, Jardiance 12.5 mg (not available) little higher overnight.  Will increase Lantus to 22 units.    - Increase Lantus to 22 units daily. (ORDERED FOR YOU) -as patient has already received 20 units today, will give an extra 2 units now. (ordered for you)  - Continue carb coverage to 1 units/20 g CHO with meals and snacks  - Changed to Medium-dose sliding scale insulin for AC & HS  - Continue Jardiance 10 mg daily  - Continue hypoglycemia protocol  - Accu checks AC & HS    Dr. Smitha Menendez  [phone]      ======================================================================    Subjective:  - Pituitary macroadenoma debulking surgery/resection(4/27/23)  - Overall, still feels tired but better.   May 3, 2023 AM cortisol had improved to 15.1.    Physical Examination:  /83   Pulse 64   Temp 99.2  F (37.3  C) (Oral)   Resp 16   Wt 74.6 kg (164 lb 8 oz)   SpO2 100%   BMI 26.55 kg/m      Constitutional: healthy, alert and no distress   Respiratory: lungs CTAB. No increased work of breathing  Psychiatric: mentation appears normal and affect normal/bright  Head: Normocephalic.   Neck: Neck supple. Thyroid symmetric, normal size,  ENT: Nasal bandage with in place   Abdomen: Abdomen soft, non-tender. BS normal. No masses, organomegaly  NEURO: Sensation grossly WNL.  JOINT/EXTREMITIES: +2-3 pitting bilateral lower extremity edema.    Medications:  Reviewed    Endocrine Labs:

## 2023-05-04 NOTE — PLAN OF CARE
/87   Pulse 91   Temp 98.2  F (36.8  C) (Oral)   Resp 16   Wt 74.6 kg (164 lb 8 oz)   SpO2 100%   BMI 26.55 kg/m     Time: 1356-2297  Reason for admission: Pituitary adenoma.  Activity: assist of two with lift transfers.   Pain: denied pain or discomfort.   Neuro: alert and oriented, UE 4/5, and LE 3/5.   Cardiac: WDL X tachycardic.   Resp: denied SOB, lung sounds clear bilaterally.   GI/: regular diet, voids without difficulties, bowel sounds present x four quadrants, C/o constipation, received PRN suppository per patient request.    Lines: PICC, heparin locked.   Skin: LE wounds and nasal sling.   Labs/Imaging: BG at 2200, 167, BG at 0200, 203.  New changes to shift: no change.   Plan: continue with current plan of cares.

## 2023-05-04 NOTE — PROGRESS NOTES
[facility], [place]   05/04/2023  Neurosurgery Progress Note:    Assessment:  Gustavo Faria is a 58 year old male with a PMH of DMII, HFrEF, BLE open wounds, history of serratia bacteremia in December 2022,  pituitary ACTH secreting macroadenoma s/p EEA for resection on 5/2021 and 7/2021 in [place], with baseline right CN 3 palsy, who was admitted at the [facility] on 3/23. He developed sudden headache and vision changes on 3/25, with MRI brain on 3/28, which demonstrated enlargement of known pituitary adenoma, with necrosis extending laterally beyond the left cavernous sinus, concerning for compression of cranial nerves at cavernous sinus. 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      Patient is POD-7 s/p Endoscopic endonasal transcavernous approach for resection of functional pituitary adenoma with harvesting of right-sided pedicled nasoseptal flap, which was pedicled off the posterior septal branch of the sphenopalatine artery    Plan:  - Watch for DI/CSF leak  - Follow Na  - Follow urine output/ inputs/ outputs  - Scheduled Zofran x 24 hours  - On sliding scale insulin now   - Ok to restart ASA  - Please inform Neurosurgery if there is any change in exam  - Recommend Ophthalmology referral for management of ptosis/esotropia    Follow up plan  - Follow up in 6 weeks with Dr Jeong (message sent to schedulers)  - MRI pituitary in 6 weeks before discharge (message sent to schedulers)  - Upright lumbar xrays to evaluate his compression fractures (ordered for you)  -----------------------------------  Yaneth Ugalde MD  Neurosurgery PGY3    Please contact neurosurgery resident on call with questions.    Dial * [phone], enter [phone] when prompted.      Interval History: No acute events overnight.       Objective:   Temp:  [98.2  F (36.8  C)-99.2  F  (37.3  C)] 99.2  F (37.3  C)  Pulse:  [] 82  Resp:  [16] 16  BP: (105-131)/(76-87) 116/81  SpO2:  [100 %] 100 %  I/O last 3 completed shifts:  In: 1101 [P.O.:1101]  Out: 3600 [Urine:3600]    NEUROLOGIC:  -- Opens eyes to voice, following commands, oriented x3  Cranial Nerves:  -- visual fields full to confrontation although with limited participation, PERRL anisocoric L>R and sluggish, left CN III paresis and CN VI palsy, restricted ROM in right EOM with no apparent asymmetry   -- face symmetrical, tongue midline  -- sensory V1-V3 intact bilaterally  -- palate elevates symmetrically, uvula midline  -- hearing grossly intact bilat     Motor:  Antigravity x4 extremities     Sensory:  intact to LT x 4 extremities      Reflexes:       Bi Tri BR Omi Pat Ach Bab     C5-6 C7-8 C6 UMN L2-4 S1 UMN   R 2+ 2+ 2+ Norm 2+ 2+ Norm   L 2+ 2+ 2+ Norm 2+ 2+ Norm      Gait: Deferred.     LABS:  Recent Labs   Lab 05/04/23  0213 05/03/23  2201 05/03/23  1759 05/02/23  0733 05/02/23  0653 05/01/23  0749 05/01/23  0650 05/01/23  0203 04/30/23  2140   NA  --   --   --   --  143  --  142  --  143   POTASSIUM  --   --   --   --  4.0  --  4.1  --  4.4   CHLORIDE  --   --   --   --  101  --  102  --  101   CO2  --   --   --   --  31*  --  31*  --  30*   ANIONGAP  --   --   --   --  11  --  9  --  12   * 167* 168*   < > 151*   < > 141*   < > 142*   BUN  --   --   --   --  12.8  --  12.2  --  13.9   CR  --   --   --   --  0.65*  --  0.64*  --  0.65*   KORIN  --   --   --   --  8.7  --  8.7  --  8.8    < > = values in this interval not displayed.       Recent Labs   Lab 05/03/23  0735   WBC 16.3*   RBC 3.25*   HGB 8.7*   HCT 29.3*   MCV 90   MCH 26.8   MCHC 29.7*   RDW 24.3*          IMAGING:  No results found for this or any previous visit (from the past 24 hour(s)).

## 2023-05-04 NOTE — PROGRESS NOTES
Otolaryngology Progress Note  May 4, 2023    Subjective/Intvl events: NAEON. Patient states that he would like his nasal splints removed from his nose because he can't breath. We discussed that his splints are sutured in place and will need to stay in place until his follow-up to allow for further healing. The patient acknowledged his understanding but stated he would like it removed as soon as possible. He denies clear drainage from nose and salty/metallic taste in mouth. [facility]    O: /81 (BP Location: Right arm)   Pulse 82   Temp 99.2  F (37.3  C) (Oral)   Resp 16   Wt 74.6 kg (164 lb 8 oz)   SpO2 100%   BMI 26.55 kg/m     General: Alert and oriented x 3, no acute distress   HEENT: Bilateral lateral gaze palsy and restricted EOM on the right, CN V1-V3 intact, facial movement symmetric, Oropharynx clear. Mild amount of bloody nasal drainage within b/l nares.    Pulmonary: Breathing non-labored, no stridor, no accessory muscle use.      UOP: Increased UOP 3.2L w/ 1 unmeasured output      LABS:  Cortisol 5/3 - 15.1   (5/2)   No other labs ordered this AM      A/P: Gustavo Faria is a 58 year old male with a past medical history of pituitary ACTH secreting macroadenoma s/p endoscopic endonasal transcavernous approach for resection of functional pituitary adenoma w/ nasoseptal flap on 4/28. He is recovering well post-operatively without signs of CSF leak.    - Serial neuro exams  - Nasal saline QID  - Appreciate Endo following and recs for Cushing's and DMII  - Sinus precautions: No nose blowing, sneeze with mouth open, no straining, and scheduled bowel regimen  - Rest of care per primary team      -- Patient and above plan discussed with Dr. Adan Jimenez, PGY-2  Otolaryngology-Head & Neck Surgery  Please contact ENT with questions by dialing * [phone] and entering job [phone] when prompted.

## 2023-05-04 NOTE — PROVIDER NOTIFICATION
Patient wants to talk to his MD, patient does not want to tell the writer why he wants to talk to his MD, according to the patient, it is private, treatment team, Igor Cedillo MD, notified.

## 2023-05-04 NOTE — PROGRESS NOTES
[facility]    Medicine Progress Note - Medicine Service, MAROON TEAM 2    Date of Admission:  4/3/2023    Assessment & Plan   Gustavo Faria is a 58 year old male with recent Serratia bacteremia, HFrEF, prolonged QT, lower extremity wounds, DMII, hypothyroidism, low testosterone and known ACTH secreting pituitary adenoma w/resulting Cushing's disease s/p 2 partial resections in 2021 who [facility] for [facility]. [facility], he developed new onset double vision and severe headache which prompted imaging that showed a large mass invading the cavernous sinuses and impinging on the cranial nerves. He is transferred to [facility] for this pituitary adenoma, concern for progression vs hemorrhage/apoplexy. Complicated by psychosis/metabolic encephalopathy and electrolyte abnormalities secondary to Cushing's syndrome. Underwent endoscopic endonasal transcavernous approach for resection of functional pituitary adenoma 4/27.     Updates today:  - Stable [facility] to [facility] when [facility]   - insulin glargine increased to 22u daily  - changed miralax, senna, and melatonin to PRN to reduce pill burden and repeated patient refusal  - decreased oxycodone to 5 mg Q6H PRN from Q4H PRN  - increased PRN tylenol to 975 mg Q6H PRN. Tylenol should be used first for pain control   - restart aspirin 81 mg tomorrow AM   - continue co-management with endocrine, neurosurgery, and ENT      # Pituitary adenoma resection this admission, c/b significant scar tissue to cavernous sinus, 4/27/23  #R eye ptosis, esotropia  # ACTH secreting pituitary adenoma c/b type II DM, hypothyroidism and low testosterone s/p two partial resections in 2021     Patient noted double vision and severe headache beginning the evening of 3/25. CT imaging on 3/25 revealed a large sellar/suprasellar mass extending into the cavernous sinuses with  no evidence of acute stroke. On 3/28, he underwent an MRI which confirmed the CT findings of a large mass invading the cavernous sinuses and impinging on the cranial nerves. Necrosis extending beyond left cavernous sinus. [facility]. Repeat MRI performed on 4/6/23: compared to 2021 MRI, lesions are smaller. Orbit MRI without optic nerve compression and no evidence of orbital infection.  - 4/27 neurosurgery and ENT combined to perform endoscopic endonasal transcavernous approach for resection of functional pituitary adenoma. Complicated by significant scar tissue to cavernous sinus. Good post-op recovery. Follow up MRI done 5/29. Neurosurgery okay with [facility] to [facility] once [facility]. They will arrange follow up.   - Ophthalmology following, no major changes, agree with neurosurgery plans  ` - opthalmology outpatient consult placed after discharge for eval of ptosis, comprehensive eye exam   - Endocrine following  - neurosurgery following     # Type II DM, exacerbated by Cushing's   A1c of 7.6% on 2/2023.  - Endocrinology managing. Current regimen:   - Lantus 22 units daily.  - Carb coverage to 1 units/20 g CHO with meals and snacks  - High-dose sliding scale insulin for AC & HS  - Jardiance 10 mg daily  - hypoglycemia protocol  - Accu checks AC & HS    # Post-surgical Leukocytosis  # Buttocks, sacrum and bilateral groin wounds   # RLE wound from puncture injury   # L dorsal foot wound from presumed trauma   # Soft tissue infection/abscess    # Hx serratia bacteremia in December 2022   For patient's lower extremity wounds and hx of Serratia bacteremia in December, he was initially [facility]olin [facility] the [facility]. His antibiotics were broadened to Vanc and Zosyn and ultimately he was transitioned to ceftazidime on 3/23 with plan to complete course on 4/4/23. BCx negative and wound cx grew serratia marcescens at the [facility]. He has been on ceftazidime since. MRI of the right tib/fib on 3/23 was  negative for osteomyelitis but did show two fluid collections draining sponateneously. [facility]o was [facility] at the [facility] and determined no intervention was needed as wounds were draining on their own.  - trend WBC, recheck ordered for AM (5/5/23)   - completed course of ceftazidime (3/23 - 4/5)  - elevated WBC since surgery. Suspect just post-surgical stress response. No fever. CXR done to check PICC position with no infiltrate. Wound appear unchanged.     - Repeat blood cultures/UA 5/1 due to rising WBC after surgery    - blood cx 5/1 NGTD    - UA negative   - repeat WOC consult 5/2/23 to re-assess chronic lower extremity wounds - wounds stable, no evidence of active infection  - PT/OT   - Pain: tylenol 975 mg Q6H PRN, oxycodone 5 mg Q6H PRN    # HFmrEF, with global hypokinesis  # Hypertension  # High burden of PVCs  # Prolonged QTc   # Elevated troponin, down-trended  Coronary angiogram on 2/27 showed normal coronaries. Troponin down-trended (88 -> 77). No chest pain. High burden of PVCs. Follow up ECHO with global hypokinesis, which is worse compared to February 2023 ECHO. EF is 45% (same compared to prior). Likely small vessel disease vs demand vs cardiomyopathy 2/2 ceftazidime. Low concern for acute ACS.   - Electrolyte mgmt as above  - Lasix 40mg daily--held since surgery on 4/27. Restarted 4/30  - Coreg and lisinopril held for surgery 4/27.    - Carvedilol 12.5 mg BID restarted 4/29  - Lisinopril restarted 4/30 and titrated up to prior dose 20 mg daily on 5/1  - ASA - restarted 5/5/23  - Avoid QTc prolonging medications    # Acute metabolic encephalopathy - resolved  # Pyruria, Candiduria - resolved  # Encephalopathy likely secondary to cushing's disease  Admission symptoms included disorientation, intermittently following directions. Repetitive words - perseverating on particular phrases. Sx started the 2-3 days prior to admission (which patient was the [facility]). Vulnerable brain (multiple brain surgeries),  enlarging pituitary mass. No seizure activity per EEG. Steroids removed and pt still encephalopathic. Sodium had been in normal limits and patient remained altered. Worked up for infections - did reveal candiduria but unlikely this was etiology, though he did complete an 8 day course of fluconazole. Briefly on ceftriaxone but encephalopathy also did not improve. CT abd w/ contrast (4/10): no evidence of pyelonephritis  Ultimately attributed to Cushings and known pituitary tumor. Continues to remain lucid.  - Surgical management per neurosurgery  - Delirium precautions  - Electrolyte management as above  - PRN quetiapine if agitation that is not responsive to behavioral interventions     # Delusions, intermittent  Intermittent delusions regarding staff stalking patient while in the hospital. Unclear chronicity of these delusions - but has had them multiple times this hospitalization. Unclear psych history. Medical management for encephalopathy as stated above. Psychiatry was consulted during his care and agreed with paranoia and recommended Abilify. He was briefly on Abilify 5mg but patient then started to refuse nightly so this was discontinued. His paranoia and delusions have been very intermittent and appear to be associated with his anxiety.     # Hypernatremia, DI vs cushing's, resolved  # Hypokalemia, resolved  Hypernatremia and hypokalemia, likely secondary to Cushing's.     - Endocrine following. Managing spironolactone  - On K replacement protocol  - Goal Na: 135-140  - Strict I&Os  - Daily BMP + Mg     # Central Hypothyroidism  - Continue PTA levothyroxine     # Hypogonadism   - Continue PTA androgel (will need to bring in home medication)     # Chronic microcytic anemia   Hgb of 8.4 on [facility] at the [facility]. Peripheral smear on 3/30 showed poikilocytosis with occasional red blood cell fragments but no other evidence of hemolysis.  Haptoglobin was normal.  Ferritin was 91, transferrin saturation was low, B12  was 495 and folate was 8.7. Likely combination of iron deficiency as well as inflammation/chronic disease.   - CTM        # Concern for malnutrition   - Nutrition following       Diet: Regular Diet Adult  Diet  Snacks/Supplements Adult: Glucerna; Between Meals    DVT Prophylaxis: Pneumatic Compression Devices  Roland Catheter: Not present  Lines:   PICC 04/06/23 Double Lumen Right Basilic access-Site Assessment: WDL      Cardiac Monitoring: None  Code Status: Full Code      Clinically Significant Risk Factors              # Hypoalbuminemia: Lowest albumin = 2.9 g/dL at 4/11/2023  7:07 AM, will monitor as appropriate          # DMII: A1C = 9.5 % (Ref range: <5.7 %) within past 6 months    # Severe Malnutrition: based on nutrition assessment        Disposition Plan            Addendum 5/1/2023  Gustavo is very eager to leave [facility] and get to the [facility]. He attempted to leave the hospital rather than wait for bed availability to the [facility]. His sister Pilar, was willing to take him from the hospital but was planning to take him directly to the [facility] ER. However, due to his immobility, this is much more complicated than a simple discharge would require her finding a way to get him in a cab (she does not have a car) vs arranging stretcher transport to her home and then her calling 911 to have him brought to the [facility] ER.     - The [facility] is making him a high priority for transfer but wanted to stress that if he leaves this hospital AMA, that this hospitalization would not be paid for by the VA, so it is definitely in his best interest to remain in the hospital.   - I do not believe that he is fully decisional, but if his sister were in agreement to his going then, I don't think we could stop them and would need to arrange as safe of an outpatient plan as possible.                   Leela Verdin MD  Date of Service (when I saw the patient): 05/04/2023      _________________________________________________________    Interval History  "  SHLOMO    Pt says \"I'm ready to go outside, I'm feeling much better.\" He is really looking forward [facility] the [facility]. He has no questions or concerns today. Denies pain.  Likes the popsicles in the hospital.    Physical Exam   Vital Signs: Temp: 99.2  F (37.3  C) Temp src: Oral BP: 100/81 Pulse: 62   Resp: 16 SpO2: 100 % O2 Device: None (Room air)    Weight: 164 lbs 8 oz    Gen: Sitting up in bed, no acute distress  ENT: MMM, R eye ptosis, nasal harness off   Resp: breathing comfortably, non-labored. CTAB  CV: RRR, ext WWP, 3+ bilateral pitting lower extremity edema  Skin: multiple LE wounds w/ c/d/i dressings. Venous stasis skin changes L>R lower extremity           Data         "

## 2023-05-04 NOTE — PLAN OF CARE
Status: POD #7 pituitary adenoma resection  Vitals: VSS on RA   Neuros: A&O x4, forgetful, repeated requests, anxiety, whispered speech, R ptosis, pupils sluggish/fixed, bilateral eyes not tracking past opposite midline, BUE 4/5, BLE 3/5, N/T to BLE  IV: DL PICC HL  Resp/trach: No SOB noted  Diet: Strict I&Os, Reg, on carb coverage.  Bowel status: BM 5/4. Pt refused stool softeners today.  : Voiding spontaneously  Skin: Intermittently wearing nasal sling to catch nasal drainage, BLE wounds are covered w/ mepilex CDI,  Buttocks wounds SUKH, wound cares complete.  Pain: Denies  Activity: Ax2 w/ lift, occasionally repositioning self  Social: Pt sister at bedside, took patient out to enjoy the warm weather.  Plan: Will continue to monitor and follow POC. Plan to discharge to either [facility] or [facility]. Pt refused to allow RN to do wound dressings this shift.    Pt refusing cares. RN educated patient on importance of not refusing cares. Pt continuing to refuse cares.

## 2023-05-04 NOTE — PLAN OF CARE
Status: POD #7 pituitary adenoma resection  Vitals: VSS on RA   Neuros: A&O x4, forgetful, repeated requests, anxiety, whispered speech, R ptosis, pupils sluggish/fixed, bilateral eyes not tracking past opposite midline, BUE 4/5, BLE 3/5, N/T to BLE  IV: DL PICC HL  Resp/trach: No SOB noted  Diet: Strict I&Os, Reg, on carb coverage.  Bowel status: BM 5/1. Pt refused stool softeners today.  : Voiding spontaneously  Skin: Intermittently wearing nasal sling to catch nasal drainage, BLE wounds are covered w/ mepilex CDI,  Buttocks wounds SUKH, wound cares complete.  Pain: Tylenol offered, pt denied pain.  Activity: Ax2 w/ lift, occasionally repositioning self  Social: Pt with repeated requests, such as utilizing call light 4 times in 3 minutes for a non-urgent request. Boundaries reviewed with pt, provider, and bedside staff.   Plan: Will continue to monitor and follow POC. Plan to [facility] to either [facility] or [facility]  Updates this shift: Anxiety, refused atarax. NST took pt outside in W/C. Sacral wound cares and LE wound cares declined by pt until he was taken outside. NST took pt outside later in the shift, wound cares not completed due to lack of time. Will pass on to evening shift to compete if possible. Pt noncompliant with notifying staff for BG before meals, orders meals at unusual times.

## 2023-05-04 NOTE — PLAN OF CARE
[id number]  Status: POD #6 pituitary adenoma resection  Vitals: VSS on RA, sometimes tachy 101  Neuros: A&O x4, forgetful, repeated requests, anxiety, whispered speech, R ptosis, pupils sluggish/fixed, bilateral eyes not tracking past opposite midline, BUE 4/5, BLE 3/5, N/T to BLE  IV: DL PICC HL  Diet: Strict I&Os, Regular diet, on carb coverage  Bowel status: LBM 5/1, had scheduled stool meds in am  : Voiding spontaneously  Skin: nasal sling- pt changes own gauze, BLE wounds are covered w/ mepilex CDI, sacral wound padmini  Pain: c/o pain in legs with movement. Declined pain meds   Activity: Ax2 w/ lift, up in chair this shift  Plan: [facility] discharge [facility] when [facility]

## 2023-05-04 NOTE — PROGRESS NOTES
Care Management Follow Up    Length of Stay (days): 31    Expected Discharge Date:  TBD    Concerns to be Addressed: discharge planning  Patient plan of care discussed at interdisciplinary rounds: Yes     Anticipated [facility]     Patient/family educated on Medicare website which has current facility and service quality ratings: No     Referrals Placed by CM/SW:  [facility]  Private pay costs discussed: Not applicable    Additional Information:  [facility] the [facility], spoke with Lizett. As of 0820 this morning they do not have any beds available, have boarders in the ED. Lizett states we can call back this afternoon after 2 pm.    Please see RNCC note from this after noon, no beds available at [facility] today.     Case has been brought to supervisors attention due to concerns of patient safety if unable to get [facility] to the [facility] in a timely manner.     [facility] (transfer agreement)  Ph: [phone]  Pt placement: [phone] opt. 1    Cristina Dominguez RN, BSN  6A RN Care Coordinator  Ph: [phone]   Pager: [phone]

## 2023-05-05 ENCOUNTER — APPOINTMENT (OUTPATIENT)
Dept: PHYSICAL THERAPY | Facility: CLINIC | Age: 58
DRG: 614 | End: 2023-05-05
Attending: STUDENT IN AN ORGANIZED HEALTH CARE EDUCATION/TRAINING PROGRAM
Payer: COMMERCIAL

## 2023-05-05 LAB
ANION GAP SERPL CALCULATED.3IONS-SCNC: 11 MMOL/L (ref 7–15)
BUN SERPL-MCNC: 12.1 MG/DL (ref 6–20)
CALCIUM SERPL-MCNC: 8.6 MG/DL (ref 8.6–10)
CHLORIDE SERPL-SCNC: 100 MMOL/L (ref 98–107)
CREAT SERPL-MCNC: 0.74 MG/DL (ref 0.67–1.17)
DEPRECATED HCO3 PLAS-SCNC: 29 MMOL/L (ref 22–29)
ERYTHROCYTE [DISTWIDTH] IN BLOOD BY AUTOMATED COUNT: 24.7 % (ref 10–15)
GFR SERPL CREATININE-BSD FRML MDRD: >90 ML/MIN/1.73M2
GLUCOSE BLDC GLUCOMTR-MCNC: 142 MG/DL (ref 70–99)
GLUCOSE BLDC GLUCOMTR-MCNC: 143 MG/DL (ref 70–99)
GLUCOSE BLDC GLUCOMTR-MCNC: 198 MG/DL (ref 70–99)
GLUCOSE BLDC GLUCOMTR-MCNC: 84 MG/DL (ref 70–99)
GLUCOSE SERPL-MCNC: 148 MG/DL (ref 70–99)
HCT VFR BLD AUTO: 28.4 % (ref 40–53)
HGB BLD-MCNC: 8.4 G/DL (ref 13.3–17.7)
MAGNESIUM SERPL-MCNC: 2.2 MG/DL (ref 1.7–2.3)
MCH RBC QN AUTO: 26.5 PG (ref 26.5–33)
MCHC RBC AUTO-ENTMCNC: 29.6 G/DL (ref 31.5–36.5)
MCV RBC AUTO: 90 FL (ref 78–100)
PHOSPHATE SERPL-MCNC: 2.8 MG/DL (ref 2.5–4.5)
PLATELET # BLD AUTO: 374 10E3/UL (ref 150–450)
POTASSIUM SERPL-SCNC: 3.8 MMOL/L (ref 3.4–5.3)
RBC # BLD AUTO: 3.17 10E6/UL (ref 4.4–5.9)
SODIUM SERPL-SCNC: 140 MMOL/L (ref 136–145)
WBC # BLD AUTO: 16.3 10E3/UL (ref 4–11)

## 2023-05-05 PROCEDURE — 83735 ASSAY OF MAGNESIUM: CPT

## 2023-05-05 PROCEDURE — 250N000012 HC RX MED GY IP 250 OP 636 PS 637: Performed by: INTERNAL MEDICINE

## 2023-05-05 PROCEDURE — 250N000013 HC RX MED GY IP 250 OP 250 PS 637

## 2023-05-05 PROCEDURE — 250N000013 HC RX MED GY IP 250 OP 250 PS 637: Performed by: STUDENT IN AN ORGANIZED HEALTH CARE EDUCATION/TRAINING PROGRAM

## 2023-05-05 PROCEDURE — 99232 SBSQ HOSP IP/OBS MODERATE 35: CPT | Mod: GC | Performed by: INTERNAL MEDICINE

## 2023-05-05 PROCEDURE — 120N000002 HC R&B MED SURG/OB UMMC

## 2023-05-05 PROCEDURE — 80048 BASIC METABOLIC PNL TOTAL CA: CPT

## 2023-05-05 PROCEDURE — 250N000013 HC RX MED GY IP 250 OP 250 PS 637: Performed by: INTERNAL MEDICINE

## 2023-05-05 PROCEDURE — 97530 THERAPEUTIC ACTIVITIES: CPT | Mod: GP

## 2023-05-05 PROCEDURE — 84100 ASSAY OF PHOSPHORUS: CPT

## 2023-05-05 PROCEDURE — 85027 COMPLETE CBC AUTOMATED: CPT

## 2023-05-05 PROCEDURE — 36592 COLLECT BLOOD FROM PICC: CPT

## 2023-05-05 PROCEDURE — 250N000011 HC RX IP 250 OP 636

## 2023-05-05 RX ORDER — HYDROMORPHONE HYDROCHLORIDE 1 MG/ML
0.5 INJECTION, SOLUTION INTRAMUSCULAR; INTRAVENOUS; SUBCUTANEOUS ONCE
Status: COMPLETED | OUTPATIENT
Start: 2023-05-05 | End: 2023-05-05

## 2023-05-05 RX ADMIN — CARVEDILOL 12.5 MG: 12.5 TABLET, FILM COATED ORAL at 18:36

## 2023-05-05 RX ADMIN — HYDROMORPHONE HYDROCHLORIDE 0.5 MG: 1 INJECTION, SOLUTION INTRAMUSCULAR; INTRAVENOUS; SUBCUTANEOUS at 02:55

## 2023-05-05 RX ADMIN — SALINE NASAL SPRAY 2 SPRAY: 1.5 SOLUTION NASAL at 13:08

## 2023-05-05 RX ADMIN — LACTULOSE 20 G: 20 SOLUTION ORAL at 08:42

## 2023-05-05 RX ADMIN — LEVOTHYROXINE SODIUM 112 MCG: 0.11 TABLET ORAL at 08:20

## 2023-05-05 RX ADMIN — INSULIN ASPART 1 UNITS: 100 INJECTION, SOLUTION INTRAVENOUS; SUBCUTANEOUS at 14:22

## 2023-05-05 RX ADMIN — INSULIN ASPART 1 UNITS: 100 INJECTION, SOLUTION INTRAVENOUS; SUBCUTANEOUS at 08:33

## 2023-05-05 RX ADMIN — SALINE NASAL SPRAY 2 SPRAY: 1.5 SOLUTION NASAL at 12:00

## 2023-05-05 RX ADMIN — SPIRONOLACTONE 100 MG: 100 TABLET ORAL at 08:20

## 2023-05-05 RX ADMIN — INSULIN ASPART 1 UNITS: 100 INJECTION, SOLUTION INTRAVENOUS; SUBCUTANEOUS at 16:38

## 2023-05-05 RX ADMIN — CARVEDILOL 12.5 MG: 12.5 TABLET, FILM COATED ORAL at 08:20

## 2023-05-05 RX ADMIN — EMPAGLIFLOZIN 10 MG: 10 TABLET, FILM COATED ORAL at 08:18

## 2023-05-05 RX ADMIN — MICONAZOLE NITRATE: 20 CREAM TOPICAL at 08:21

## 2023-05-05 RX ADMIN — SIMETHICONE 80 MG: 80 TABLET, CHEWABLE ORAL at 21:16

## 2023-05-05 RX ADMIN — MICONAZOLE NITRATE: 20 CREAM TOPICAL at 21:19

## 2023-05-05 RX ADMIN — Medication 10 ML: at 16:27

## 2023-05-05 RX ADMIN — SALINE NASAL SPRAY 2 SPRAY: 1.5 SOLUTION NASAL at 21:21

## 2023-05-05 RX ADMIN — Medication 1 TABLET: at 13:06

## 2023-05-05 RX ADMIN — OXYCODONE HYDROCHLORIDE 5 MG: 5 TABLET ORAL at 08:20

## 2023-05-05 RX ADMIN — ASPIRIN 81 MG: 81 TABLET ORAL at 08:20

## 2023-05-05 RX ADMIN — OXYCODONE HYDROCHLORIDE 5 MG: 5 TABLET ORAL at 22:20

## 2023-05-05 RX ADMIN — ACETAMINOPHEN 975 MG: 325 TABLET, FILM COATED ORAL at 13:06

## 2023-05-05 RX ADMIN — FUROSEMIDE 40 MG: 40 TABLET ORAL at 08:20

## 2023-05-05 RX ADMIN — MICONAZOLE NITRATE: 20 CREAM TOPICAL at 21:18

## 2023-05-05 RX ADMIN — SPIRONOLACTONE 100 MG: 100 TABLET ORAL at 08:32

## 2023-05-05 RX ADMIN — SALINE NASAL SPRAY 2 SPRAY: 1.5 SOLUTION NASAL at 10:00

## 2023-05-05 RX ADMIN — INSULIN GLARGINE 22 UNITS: 100 INJECTION, SOLUTION SUBCUTANEOUS at 08:18

## 2023-05-05 RX ADMIN — LISINOPRIL 20 MG: 20 TABLET ORAL at 08:20

## 2023-05-05 RX ADMIN — SALINE NASAL SPRAY 2 SPRAY: 1.5 SOLUTION NASAL at 08:22

## 2023-05-05 ASSESSMENT — ACTIVITIES OF DAILY LIVING (ADL)
ADLS_ACUITY_SCORE: 42
ADLS_ACUITY_SCORE: 50
ADLS_ACUITY_SCORE: 42
ADLS_ACUITY_SCORE: 42
ADLS_ACUITY_SCORE: 38
ADLS_ACUITY_SCORE: 38
ADLS_ACUITY_SCORE: 42

## 2023-05-05 NOTE — PROGRESS NOTES
SPIRITUAL HEALTH SERVICES Progress Note  [facility] ([place]) 6A    I offered [facility] follow up to Gustavo, who declined as he was resting. He requested a book of word search puzzles which I provided from the patient family resource library.     Hamzah Ferraro  Chaplain Resident  Pager [phone]    * [facility] remains available 24/7 for emergent requests/referrals, either by having the switchboard page the on-call  or by entering an ASAP/STAT consult in Epic (this will also page the on-call ). Routine Epic consults receive an initial response within 24 hours.*

## 2023-05-05 NOTE — PROGRESS NOTES
Patient refuses repositioning. Educated patient on rational for. Patient continues to refuse. Continue to monitor.

## 2023-05-05 NOTE — PROGRESS NOTES
Otolaryngology Progress Note  May 4, 2023    Subjective/Intvl events: NAEON. Afebrile and vitally stable. Patient reports that his vision is stable. He denies clear drainage from his nose or salty/metallic taste. He stated again that he wanted his nasal splints removed and we re-iterated that it cannot be removed until his follow-up in about 2 weeks. The patient acknowledged his understanding.    O: /81 (BP Location: Right arm)   Pulse 88   Temp 98.1  F (36.7  C) (Axillary)   Resp 20   Wt 74.6 kg (164 lb 8 oz)   SpO2 99%   BMI 26.55 kg/m     General: Alert and oriented x 3, no acute distress   HEENT: Bilateral lateral gaze palsy and restricted EOM on the right, CN V1-V3 intact, facial movement symmetric, Oropharynx clear. Mild amount of bloody nasal drainage within b/l nares.    Pulmonary: Breathing non-labored, no stridor, no accessory muscle use.      UOP: Increased UOP 2.2L w/ 3 unmeasured output      LABS:  AM labs pending      A/P: Gustavo Faria is a 58 year old male with a past medical history of pituitary ACTH secreting macroadenoma s/p endoscopic endonasal transcavernous approach for resection of functional pituitary adenoma w/ nasoseptal flap on 4/28. He is recovering well post-operatively without signs of CSF leak.    - Serial neuro exams  - Nasal saline QID  - Appreciate Endo following and recs for Cushing's and DMII  - Sinus precautions: No nose blowing, sneeze with mouth open, no straining, and scheduled bowel regimen  - Follow-up scheduled in 2 weeks to remove Jean Baptiste splints  - Rest of care per primary team      -- Patient and above plan discussed with Dr. Adan Jimenez, PGY-2  Otolaryngology-Head & Neck Surgery  Please contact ENT with questions by dialing [phone] and entering job code [phone] when prompted.

## 2023-05-05 NOTE — PLAN OF CARE
Status: POD #8 pituitary adenoma resection  Vitals: VSS  Neuros: unchanged: A&O x4, forgetful, repeated requests, anxiety, whispered speech, R ptosis, pupils sluggish/fixed, bilateral eyes not tracking past opposite midline, BUE 4/5, BLE 3/5, N/T to BLE  IV: RUE DL PICC, HL'd  Labs/Electrolytes: WNL  Resp/trach: WNL  Diet: tolerating regular diet without difficulty but does not comply consistently with alerting RNs to have carb coverage insulin given with snacking from home brought foods  Bowel status: BM today 5/5, incontinent, please hold bowel meds  : voiding without difficulty, on strict I&Os  Skin: Intermittently wearing nasal sling to catch nasal drainage, BLE wounds are covered w/ mepilex changed CDI,  Buttocks wounds SUKH, patient refuses intervention for buttocks wounds. Refuses repositioning.   Pain: managed effectively with PO oxycodone and tylenol  Activity: up with 1-2, GB, walker to pivot. Writer informed pt that staff will not be using lift anymore to transfer routinely  Social: pt informs his own family members.   Plan: awaiting [facility] bed [facility]  Updates this shift: pt does have anxiety but does not routinely accept using atarax, resolved anxious period from this afternoon by going outside in w/c for 5 min. Pt has been known to be noncompliant with repositioning and assessments.

## 2023-05-05 NOTE — PROGRESS NOTES
Endocrine Progress Note  Patient: Gustavo Faria   MRN: [id number]  Date of Service: 05/05/2023       Assessment and plan:  Gustavo Faria is a 58 year old male with PMHx of  ACTH-secreting macroadenoma c/b central hypothyroidism, and central hypogonadism, s/p transphenoidal surgery 5/2021 and 7/2021 in [place] has baseline records 3rd nerve palsy, ongoing serratia RLE cellulitis c/b recent serratia bacteremia, HFrEF, T2DM, and HTN who was transferred from [facility] for surgical intervention of known expanding large sellar/suprasellar mass extending into cavernous sinuses and subsequent cranial nerve impingement.     #Pituitary macroadenoma:  #Cushing's disease:  #Pituitary apoplexy:  Unable to remove entire pituitary macroadenoma due to fibrous tissue. Cortisol level after surgery still elevated at 38.2 with repeat serum cortisol on 4/29 was at 27.4.  Blood pressure above target. Sodium WNL May 3, 2023 AM cortisol had improved to 15.1.  - Continue spironolactone to 200 mg.  - Repeat A.M Cortisol on 5/7/23.  - Strict I's and O's     #Central hypothyroidism:  Prior to admission was on levothyroxine 100 mcg daily.  Free T4 on admission 1.3 and TSH not detectable (picture of central hypothyroidism).  Repeat FT4 on 5/2 is at 0.96    Recommendations:  Now on levothyroxine to 112 mcg daily.  -Repeat FT in 1 week roughly May 11.     #DM type II: Hemoglobin A1c on admission 9.5%.  Prior to admission was on Sitagliptin 100 mg daily/metformin 500 mg twice daily, Jardiance 12.5 mg (not available) little higher overnight.  Will increase Lantus to 22 units.    - Continue Lantus to 22 units daily.   - Continue carb coverage to 1 units/20 g CHO with meals and snacks  - Changed to Medium-dose sliding scale insulin for AC & HS  - Continue Jardiance 10 mg daily  - Continue hypoglycemia protocol  - Accu checks AC & HS      Patient was discussed with On call Attending   Patient labs, chart and imaging reviewed      Rhiannon  Cruz  Endocrinology Fellow  [phone]      ======================================================================    Subjective:  - Pituitary macroadenoma debulking surgery/resection(4/27/23)  - Overall, still feels tired but better.   May 3, 2023 AM cortisol had improved to 15.1.    Physical Examination:  BP 96/66 (BP Location: Left arm)   Pulse 84   Temp 98  F (36.7  C) (Axillary)   Resp 16   Wt 74.6 kg (164 lb 8 oz)   SpO2 100%   BMI 26.55 kg/m      Constitutional: healthy, alert and no distress   Respiratory: lungs CTAB. No increased work of breathing  Psychiatric: mentation appears normal and affect normal/bright  Head: Normocephalic.   Neck: Neck supple. Thyroid symmetric, normal size,  ENT: Nasal bandage with in place   Abdomen: Abdomen soft, non-tender. BS normal. No masses, organomegaly  NEURO: Sensation grossly WNL.  JOINT/EXTREMITIES: +2-3 pitting bilateral lower extremity edema.    Medications:  Reviewed    Endocrine Labs:

## 2023-05-05 NOTE — PLAN OF CARE
Status: POD #8 pituitary adenoma resection  Vitals: VSS on RA   Neuros: A&O x4, forgetful, repeated requests, anxiety, whispered speech, R ptosis, pupils sluggish/fixed, bilateral eyes not tracking past opposite midline, BUE 4/5, BLE 3/5, N/T to BLE  IV: DL PICC HL  Resp/trach: No SOB noted  Diet: Strict I&Os, Reg, on carb coverage.  Bowel status: BM 5/4.  : Voiding spontaneously, int. incontinent   Skin: Intermittently wearing nasal sling to catch nasal drainage, BLE wounds are covered w/ mepilex changed CDI,  Buttocks wounds SUKH, patient refuses intervention for buttocks wounds. Refuses repositioning.   Pain: oxy and one time dose of dilaudid.  Activity: Ax2 w/ lift, occasionally repositioning self. Up to chair at 3am.  Plan: Will continue to monitor and follow POC. Plan to [facility] to either [facility] or [facility]..

## 2023-05-05 NOTE — PROGRESS NOTES
"Care Management Follow Up    Length of Stay (days): 32    Expected Discharge Date:  TBD     Concerns to be Addressed: all concerns addressed in this encounter     Patient plan of care discussed at interdisciplinary rounds: Yes    Anticipated Discharge Disposition: [facility].     Anticipated Discharge Services: Transportation Services  Anticipated Discharge DME: None      Additional Information:  Writer reached out to [facility] placement to follow up on bed availability. 0815 - Spoke with Brayan who informed writer Patient Placement had not been updated on bed availability and requested a call back after 0930.    0950: Writer contacted Patient Placement for follow up. Spoke with Shahnaz, who informed writer 'there are no beds\". Shahnaz reported 13 boarders in ED and [facility] ED now on divert. Care management will continue to follow.     [facility] (transfer agreement)  Ph: [phone]  Pt placement: [phone] opt. 1      Gabbie Hines RN        "

## 2023-05-05 NOTE — CONSULTS
SPIRITUAL HEALTH SERVICES Progress Note  [facility] ([place]) 6A    I met briefly with Gustavo per his request for emotional support. He shared that he misses his son in [place] and appreciates emotional reassurance which I provided. Gustavo is continuing to be supported by friends and family over the phone.    Hamzah Ferraro  Chaplain Resident  Pager [phone]    * [facility] remains available 24/7 for emergent requests/referrals, either by having the switchboard page the on-call  or by entering an ASAP/STAT consult in Epic (this will also page the on-call ). Routine Epic consults receive an initial response within 24 hours.*  t

## 2023-05-05 NOTE — PROGRESS NOTES
[facility]    Medicine Progress Note - Medicine Service, MARO[facility] TEAM 2    Date of Admission:  4/3/2023    Assessment & Plan   Gustavo Faria is a 58 year old male with recent Serratia bacteremia, HFrEF, prolonged QT, lower extremity wounds, DMII, hypothyroidism, low testosterone and known ACTH secreting pituitary adenoma w/resulting Cushing's disease s/p 2 partial resections in 2021 who [facility] for lower extremity wounds. During [facility] the [facility], he developed new onset double vision and severe headache which prompted imaging that showed a large mass invading the cavernous sinuses and impinging on the cranial nerves. He is transferred to [facility] for this pituitary adenoma, concern for progression vs hemorrhage/apoplexy. Complicated by psychosis/metabolic encephalopathy and electrolyte abnormalities secondary to Cushing's syndrome. Underwent endoscopic endonasal transcavernous approach for resection of functional pituitary adenoma 4/27.     Updates today:  - Stable to [facility] to [facility] when bed available   - restarted aspirin 81 mg today       # Pituitary adenoma resection this admission, c/b significant scar tissue to cavernous sinus, 4/27/23  #R eye ptosis, esotropia  # ACTH secreting pituitary adenoma c/b type II DM, hypothyroidism and low testosterone s/p two partial resections in 2021     Patient noted double vision and severe headache beginning the evening of 3/25. CT imaging on 3/25 revealed a large sellar/suprasellar mass extending into the cavernous sinuses with no evidence of acute stroke. On 3/28, he underwent an MRI which confirmed the CT findings of a large mass invading the cavernous sinuses and impinging on the cranial nerves. Necrosis extending beyond left cavernous sinus. [facility]. Repeat MRI performed on 4/6/23: compared to 2021 MRI, lesions are smaller. Orbit MRI without optic  nerve compression and no evidence of orbital infection.  - 4/27 neurosurgery and ENT combined to perform endoscopic endonasal transcavernous approach for resection of functional pituitary adenoma. Complicated by significant scar tissue to cavernous sinus. Good post-op recovery. Follow up MRI done 5/29. Neurosurgery okay with [facility] to [facility] once bed is available. They will arrange follow up.   - Ophthalmology following, no major changes, agree with neurosurgery plans  ` - opthalmology outpatient consult placed after discharge for eval of ptosis, comprehensive eye exam   - Endocrine following  - neurosurgery following     # Type II DM, exacerbated by Cushing's   A1c of 7.6% on 2/2023.  - Endocrinology managing. Current regimen:   - Lantus 22 units daily.  - Carb coverage to 1 units/20 g CHO with meals and snacks  - High-dose sliding scale insulin for AC & HS  - Jardiance 10 mg daily  - hypoglycemia protocol  - Accu checks AC & HS    # Post-surgical Leukocytosis  # Buttocks, sacrum and bilateral groin wounds   # RLE wound from puncture injury   # L dorsal foot wound from presumed trauma   # Soft tissue infection/abscess    # Hx serratia bacteremia in December 2022   For patient's lower extremity wounds and hx of Serratia bacteremia in December, he was initially started on [facility]efazolin at the [facility]. His antibiotics were broadened to Vanc and Zosyn and ultimately he was transitioned to ceftazidime on 3/23 with plan to complete course on 4/4/23. BCx negative and wound cx grew serratia marcescens at the [facility]. He has been on ceftazidime since. MRI of the right tib/fib on 3/23 was negative for osteomyelitis but did show two fluid collections draining sponateneously. Ortho was consulted at the [facility] and determined no intervention was needed as wounds were draining on their own.  - trend WBC, recheck ordered for AM (5/5/23)   - completed course of ceftazidime (3/23 - 4/5)  - elevated WBC since surgery. Suspect just post-surgical  stress response. No fever. CXR done to check PICC position with no infiltrate. Wound appear unchanged.     - Repeat blood cultures/UA 5/1 due to rising WBC after surgery    - blood cx 5/1 NGTD    - UA negative   - repeat WOC consult 5/2/23 to re-assess chronic lower extremity wounds - wounds stable, no evidence of active infection  - PT/OT   - Pain: tylenol 975 mg Q6H PRN, oxycodone 5 mg Q6H PRN    # HFmrEF, with global hypokinesis  # Hypertension  # High burden of PVCs  # Prolonged QTc   # Elevated troponin, down-trended  Coronary angiogram on 2/27 showed normal coronaries. Troponin down-trended (88 -> 77). No chest pain. High burden of PVCs. Follow up ECHO with global hypokinesis, which is worse compared to February 2023 ECHO. EF is 45% (same compared to prior). Likely small vessel disease vs demand vs cardiomyopathy 2/2 ceftazidime. Low concern for acute ACS.   - Electrolyte mgmt as above  - Lasix 40mg daily--held since surgery on 4/27. Restarted 4/30  - Coreg and lisinopril held for surgery 4/27.    - Carvedilol 12.5 mg BID restarted 4/29  - Lisinopril restarted 4/30 and titrated up to prior dose 20 mg daily on 5/1  - ASA - restarted 5/5/23  - Avoid QTc prolonging medications    # Acute metabolic encephalopathy - resolved  # Pyruria, Candiduria - resolved  # Encephalopathy likely secondary to cushing's disease  Admission symptoms included disorientation, intermittently following directions. Repetitive words - perseverating on particular phrases. Sx started the 2-3 days prior to admission (which [facility] was the [facility]). Vulnerable brain (multiple brain surgeries), enlarging pituitary mass. No seizure activity per EEG. Steroids removed and pt still encephalopathic. Sodium had been in normal limits and patient remained altered. Worked up for infections - did reveal candiduria but unlikely this was etiology, though he did complete an 8 day course of fluconazole. Briefly on ceftriaxone but encephalopathy also  did not improve. CT abd w/ contrast (4/10): no evidence of pyelonephritis  Ultimately attributed to Cushings and known pituitary tumor. Continues to remain lucid.  - Surgical management per neurosurgery  - Delirium precautions  - Electrolyte management as above  - PRN quetiapine if agitation that is not responsive to behavioral interventions     # Delusions, intermittent  Intermittent delusions regarding staff stalking patient while in the hospital. Unclear chronicity of these delusions - but has had them multiple times this hospitalization. Unclear psych history. Medical management for encephalopathy as stated above. Psychiatry was consulted during his care and agreed with paranoia and recommended Abilify. He was briefly on Abilify 5mg but patient then started to refuse nightly so this was discontinued. His paranoia and delusions have been very intermittent and appear to be associated with his anxiety.     # Hypernatremia, DI vs cushing's, resolved  # Hypokalemia, resolved  Hypernatremia and hypokalemia, likely secondary to Cushing's.     - Endocrine following. Managing spironolactone  - On K replacement protocol  - Goal Na: 135-140  - Strict I&Os  - Daily BMP + Mg     # Central Hypothyroidism  - Continue PTA levothyroxine     # Hypogonadism   - Continue PTA androgel (will need to bring in home medication)     # Chronic microcytic anemia   Hgb of 8.4 on [facility] the [facility]. Peripheral smear on 3/30 showed poikilocytosis with occasional red blood cell fragments but no other evidence of hemolysis.  Haptoglobin was normal.  Ferritin was 91, transferrin saturation was low, B12 was 495 and folate was 8.7. Likely combination of iron deficiency as well as inflammation/chronic disease.   - CTM        # Concern for malnutrition   - Nutrition following       Diet: Regular Diet Adult  Diet  Snacks/Supplements Adult: Glucerna; Between Meals    DVT Prophylaxis: Pneumatic Compression Devices  Roland Catheter: Not present  Lines:  "  PICC 04/06/23 Double Lumen Right Basilic access-Site Assessment: WDL      Cardiac Monitoring: None  Code Status: Full Code      Clinically Significant Risk Factors              # Hypoalbuminemia: Lowest albumin = 2.9 g/dL at 4/11/2023  7:07 AM, will monitor as appropriate          # DMII: A1C = 9.5 % (Ref range: <5.7 %) within past 6 months    # Severe Malnutrition: based on nutrition assessment        Disposition Plan            Addendum 5/1/2023  Gustavo is very eager to leave [facility] and get to the [facility]. He attempted to leave the hospital rather than wait for bed availability to the [facility]. His sister Pilar, was willing to take him from the hospital but was planning to take him directly to the [facility] ER. However, due to his immobility, this is much more complicated than a simple discharge would require her finding a way to get him in a cab (she does not have a car) vs arranging stretcher transport to her home and then her calling 911 to have him brought to the [facility] ER.     - The [facility] is making him a high priority for transfer but wanted to stress that if he leaves this hospital AMA, that this hospitalization would not be paid for by the VA, so it is definitely in his best interest to remain in the hospital.   - I do not believe that he is fully decisional, but if his sister were in agreement to his going then, I don't think we could stop them and would need to arrange as safe of an outpatient plan as possible.                   Leela Verdin MD  Date of Service (when I saw the patient): 05/05/2023      _________________________________________________________    Interval History   ON - wanted more pain meds for a dressing change. Was given 0.5 mg IV dilaudid.    Today, pt is doing \"much better.\" Thanked the staff several times for taking care of him, feels that everyone is doing a great job in the hospital. Discussed pain plan with patient. He says that he only has pain when his bandages are changed on his legs. He has taken " oxycodone for this pain, but doesn't want to be on it long term. He would prefer to take tylenol, discussed that tylenol is available and is first-line for pain for him. He also intermittently confused tylenol and aspirin, and was reassured that tylenol is not a blood thinner and is safe to take.      Physical Exam   Vital Signs: Temp: 98.1  F (36.7  C) Temp src: Axillary BP: 107/81 Pulse: 88   Resp: 20 SpO2: 99 % O2 Device: None (Room air)    Weight: 164 lbs 8 oz    Gen: Sitting up in bed, no acute distress  ENT: MMM, R eye ptosis, nasal harness off, no active drainage   Resp: breathing comfortably, non-labored. CTAB  CV: RRR, ext WWP, 2+ bilateral pitting lower extremity edema  Skin: multiple LE wounds w/ c/d/i dressings. Venous stasis skin changes L>R lower extremity \      Data

## 2023-05-05 NOTE — PROGRESS NOTES
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 He developed sudden headache and vision changes on 3/25, with MRI brain on 3/28, which demonstrated enlargement of known pituitary adenoma, with necrosis extending laterally beyond the left cavernous sinus, concerning for compression of cranial nerves at cavernous sinus. 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      Patient is POD-8 s/p Endoscopic endonasal transcavernous approach for resection of functional pituitary adenoma with harvesting of right-sided pedicled nasoseptal flap, which was pedicled off the posterior septal branch of the sphenopalatine artery    Plan:  - Watch for DI/CSF leak  - Follow urine output/ inputs/ outputs  - Ok to restart ASA  - Please inform Neurosurgery if there is any change in exam  - Recommend Ophthalmology referral for management of ptosis/esotropia    Follow up plan  - Follow up in 6 weeks with Dr Jeong (message sent to schedulers)  - MRI pituitary in 6 weeks before discharge (message sent to schedulers)  - Upright lumbar xrays to evaluate his compression fractures (ordered for you)  -----------------------------------  Yaneth Ugalde MD  Neurosurgery PGY3    Please contact neurosurgery resident on call with questions.    Dial * * [phone], enter [phone] when prompted.      Interval History: No acute events overnight.       Objective:   Temp:  [98.1  F (36.7  C)-100.1  F (37.8  C)] 98.1  F (36.7  C)  Pulse:  [62-88] 88  Resp:  [16-20] 20  BP:  ()/(75-83) 107/81  SpO2:  [95 %-100 %] 99 %  I/O last 3 completed shifts:  In: 1173 [P.O.:1173]  Out: 2180 [Urine:2180]    NEUROLOGIC:  -- Opens eyes to voice, following commands, oriented x3  Cranial Nerves:  -- visual fields full to confrontation although with limited participation, PERRL anisocoric L>R and sluggish, left CN III paresis and CN VI palsy, restricted ROM in right EOM with no apparent asymmetry   -- face symmetrical, tongue midline  -- sensory V1-V3 intact bilaterally  -- palate elevates symmetrically, uvula midline  -- hearing grossly intact bilat     Motor:  Antigravity x4 extremities     Sensory:  intact to LT x 4 extremities      Reflexes:       Bi Tri BR Omi Pat Ach Bab     C5-6 C7-8 C6 UMN L2-4 S1 UMN   R 2+ 2+ 2+ Norm 2+ 2+ Norm   L 2+ 2+ 2+ Norm 2+ 2+ Norm      Gait: Deferred.     LABS:  Recent Labs   Lab 05/04/23  2156 05/04/23  1657 05/04/23  1131 05/02/23  0733 05/02/23  0653 05/01/23  0749 05/01/23  0650 05/01/23  0203 04/30/23  2140   NA  --   --   --   --  143  --  142  --  143   POTASSIUM  --   --   --   --  4.0  --  4.1  --  4.4   CHLORIDE  --   --   --   --  101  --  102  --  101   CO2  --   --   --   --  31*  --  31*  --  30*   ANIONGAP  --   --   --   --  11  --  9  --  12   * 107* 178*   < > 151*   < > 141*   < > 142*   BUN  --   --   --   --  12.8  --  12.2  --  13.9   CR  --   --   --   --  0.65*  --  0.64*  --  0.65*   KORIN  --   --   --   --  8.7  --  8.7  --  8.8    < > = values in this interval not displayed.       Recent Labs   Lab 05/03/23  0735   WBC 16.3*   RBC 3.25*   HGB 8.7*   HCT 29.3*   MCV 90   MCH 26.8   MCHC 29.7*   RDW 24.3*          IMAGING:  No results found for this or any previous visit (from the past 24 hour(s)).

## 2023-05-06 ENCOUNTER — APPOINTMENT (OUTPATIENT)
Dept: GENERAL RADIOLOGY | Facility: CLINIC | Age: 58
DRG: 614 | End: 2023-05-06
Attending: PHYSICIAN ASSISTANT
Payer: COMMERCIAL

## 2023-05-06 LAB
ALBUMIN SERPL BCG-MCNC: 3 G/DL (ref 3.5–5.2)
ALBUMIN UR-MCNC: NEGATIVE MG/DL
ALP SERPL-CCNC: 232 U/L (ref 40–129)
ALT SERPL W P-5'-P-CCNC: 54 U/L (ref 10–50)
ANION GAP SERPL CALCULATED.3IONS-SCNC: 9 MMOL/L (ref 7–15)
APPEARANCE UR: CLEAR
AST SERPL W P-5'-P-CCNC: 30 U/L (ref 10–50)
BACTERIA BLD CULT: NO GROWTH
BASE EXCESS BLDV CALC-SCNC: 7.6 MMOL/L (ref -7.7–1.9)
BILIRUB SERPL-MCNC: 0.2 MG/DL
BILIRUB UR QL STRIP: NEGATIVE
BUN SERPL-MCNC: 15.1 MG/DL (ref 6–20)
CALCIUM SERPL-MCNC: 8.9 MG/DL (ref 8.6–10)
CHLORIDE SERPL-SCNC: 99 MMOL/L (ref 98–107)
COLOR UR AUTO: ABNORMAL
CREAT SERPL-MCNC: 0.79 MG/DL (ref 0.67–1.17)
CRP SERPL-MCNC: 163 MG/L
DEPRECATED HCO3 PLAS-SCNC: 29 MMOL/L (ref 22–29)
ERYTHROCYTE [DISTWIDTH] IN BLOOD BY AUTOMATED COUNT: 23.9 % (ref 10–15)
GFR SERPL CREATININE-BSD FRML MDRD: >90 ML/MIN/1.73M2
GLUCOSE BLDC GLUCOMTR-MCNC: 120 MG/DL (ref 70–99)
GLUCOSE BLDC GLUCOMTR-MCNC: 140 MG/DL (ref 70–99)
GLUCOSE BLDC GLUCOMTR-MCNC: 156 MG/DL (ref 70–99)
GLUCOSE BLDC GLUCOMTR-MCNC: 214 MG/DL (ref 70–99)
GLUCOSE BLDC GLUCOMTR-MCNC: 97 MG/DL (ref 70–99)
GLUCOSE BLDC GLUCOMTR-MCNC: 97 MG/DL (ref 70–99)
GLUCOSE SERPL-MCNC: 153 MG/DL (ref 70–99)
GLUCOSE UR STRIP-MCNC: >=1000 MG/DL
HCO3 BLDV-SCNC: 33 MMOL/L (ref 21–28)
HCT VFR BLD AUTO: 28.1 % (ref 40–53)
HGB BLD-MCNC: 8.5 G/DL (ref 13.3–17.7)
HGB UR QL STRIP: NEGATIVE
KETONES UR STRIP-MCNC: NEGATIVE MG/DL
LACTATE SERPL-SCNC: 1.8 MMOL/L (ref 0.7–2)
LEUKOCYTE ESTERASE UR QL STRIP: NEGATIVE
MAGNESIUM SERPL-MCNC: 2.1 MG/DL (ref 1.7–2.3)
MCH RBC QN AUTO: 26.8 PG (ref 26.5–33)
MCHC RBC AUTO-ENTMCNC: 30.2 G/DL (ref 31.5–36.5)
MCV RBC AUTO: 89 FL (ref 78–100)
NITRATE UR QL: NEGATIVE
NT-PROBNP SERPL-MCNC: 1162 PG/ML (ref 0–900)
O2/TOTAL GAS SETTING VFR VENT: 21 %
PCO2 BLDV: 49 MM HG (ref 40–50)
PH BLDV: 7.44 [PH] (ref 7.32–7.43)
PH UR STRIP: 7 [PH] (ref 5–7)
PLATELET # BLD AUTO: 399 10E3/UL (ref 150–450)
PO2 BLDV: 29 MM HG (ref 25–47)
POTASSIUM SERPL-SCNC: 3.9 MMOL/L (ref 3.4–5.3)
PROCALCITONIN SERPL IA-MCNC: 0.28 NG/ML
PROT SERPL-MCNC: 6.1 G/DL (ref 6.4–8.3)
RBC # BLD AUTO: 3.17 10E6/UL (ref 4.4–5.9)
RBC URINE: 0 /HPF
SODIUM SERPL-SCNC: 137 MMOL/L (ref 136–145)
SP GR UR STRIP: 1.01 (ref 1–1.03)
SQUAMOUS EPITHELIAL: <1 /HPF
TROPONIN T SERPL HS-MCNC: 170 NG/L
TROPONIN T SERPL HS-MCNC: 177 NG/L
UROBILINOGEN UR STRIP-MCNC: NORMAL MG/DL
WBC # BLD AUTO: 15.2 10E3/UL (ref 4–11)
WBC URINE: 1 /HPF

## 2023-05-06 PROCEDURE — 71045 X-RAY EXAM CHEST 1 VIEW: CPT | Mod: 26 | Performed by: RADIOLOGY

## 2023-05-06 PROCEDURE — 80053 COMPREHEN METABOLIC PANEL: CPT | Performed by: PHYSICIAN ASSISTANT

## 2023-05-06 PROCEDURE — 250N000013 HC RX MED GY IP 250 OP 250 PS 637: Performed by: STUDENT IN AN ORGANIZED HEALTH CARE EDUCATION/TRAINING PROGRAM

## 2023-05-06 PROCEDURE — 250N000013 HC RX MED GY IP 250 OP 250 PS 637

## 2023-05-06 PROCEDURE — 84145 PROCALCITONIN (PCT): CPT | Performed by: PHYSICIAN ASSISTANT

## 2023-05-06 PROCEDURE — 258N000003 HC RX IP 258 OP 636: Performed by: PHYSICIAN ASSISTANT

## 2023-05-06 PROCEDURE — 99232 SBSQ HOSP IP/OBS MODERATE 35: CPT | Performed by: PHYSICIAN ASSISTANT

## 2023-05-06 PROCEDURE — 250N000011 HC RX IP 250 OP 636

## 2023-05-06 PROCEDURE — 82803 BLOOD GASES ANY COMBINATION: CPT | Performed by: PHYSICIAN ASSISTANT

## 2023-05-06 PROCEDURE — 83605 ASSAY OF LACTIC ACID: CPT | Performed by: PHYSICIAN ASSISTANT

## 2023-05-06 PROCEDURE — 84484 ASSAY OF TROPONIN QUANT: CPT

## 2023-05-06 PROCEDURE — 250N000013 HC RX MED GY IP 250 OP 250 PS 637: Performed by: INTERNAL MEDICINE

## 2023-05-06 PROCEDURE — 85027 COMPLETE CBC AUTOMATED: CPT | Performed by: PHYSICIAN ASSISTANT

## 2023-05-06 PROCEDURE — 81001 URINALYSIS AUTO W/SCOPE: CPT | Performed by: PHYSICIAN ASSISTANT

## 2023-05-06 PROCEDURE — 83735 ASSAY OF MAGNESIUM: CPT | Performed by: PHYSICIAN ASSISTANT

## 2023-05-06 PROCEDURE — 86140 C-REACTIVE PROTEIN: CPT | Performed by: PHYSICIAN ASSISTANT

## 2023-05-06 PROCEDURE — 99207 EKG 12-LEAD, TRACING ONLY: CPT | Performed by: INTERNAL MEDICINE

## 2023-05-06 PROCEDURE — 99232 SBSQ HOSP IP/OBS MODERATE 35: CPT | Mod: GC | Performed by: INTERNAL MEDICINE

## 2023-05-06 PROCEDURE — 84484 ASSAY OF TROPONIN QUANT: CPT | Performed by: PHYSICIAN ASSISTANT

## 2023-05-06 PROCEDURE — 87040 BLOOD CULTURE FOR BACTERIA: CPT | Performed by: PHYSICIAN ASSISTANT

## 2023-05-06 PROCEDURE — 120N000002 HC R&B MED SURG/OB UMMC

## 2023-05-06 PROCEDURE — 71045 X-RAY EXAM CHEST 1 VIEW: CPT

## 2023-05-06 PROCEDURE — 83880 ASSAY OF NATRIURETIC PEPTIDE: CPT | Performed by: PHYSICIAN ASSISTANT

## 2023-05-06 PROCEDURE — 36415 COLL VENOUS BLD VENIPUNCTURE: CPT | Performed by: PHYSICIAN ASSISTANT

## 2023-05-06 PROCEDURE — 93005 ELECTROCARDIOGRAM TRACING: CPT

## 2023-05-06 PROCEDURE — 36592 COLLECT BLOOD FROM PICC: CPT | Performed by: PHYSICIAN ASSISTANT

## 2023-05-06 RX ADMIN — SALINE NASAL SPRAY 2 SPRAY: 1.5 SOLUTION NASAL at 20:30

## 2023-05-06 RX ADMIN — SALINE NASAL SPRAY 2 SPRAY: 1.5 SOLUTION NASAL at 14:14

## 2023-05-06 RX ADMIN — SALINE NASAL SPRAY 2 SPRAY: 1.5 SOLUTION NASAL at 05:54

## 2023-05-06 RX ADMIN — BISACODYL 10 MG RECTAL SUPPOSITORY 10 MG: at 10:07

## 2023-05-06 RX ADMIN — SALINE NASAL SPRAY 2 SPRAY: 1.5 SOLUTION NASAL at 07:54

## 2023-05-06 RX ADMIN — SPIRONOLACTONE 200 MG: 100 TABLET ORAL at 07:50

## 2023-05-06 RX ADMIN — MICONAZOLE NITRATE: 20 CREAM TOPICAL at 07:56

## 2023-05-06 RX ADMIN — OXYCODONE HYDROCHLORIDE 5 MG: 5 TABLET ORAL at 12:22

## 2023-05-06 RX ADMIN — SODIUM CHLORIDE, POTASSIUM CHLORIDE, SODIUM LACTATE AND CALCIUM CHLORIDE 500 ML: 600; 310; 30; 20 INJECTION, SOLUTION INTRAVENOUS at 19:31

## 2023-05-06 RX ADMIN — SALINE NASAL SPRAY 2 SPRAY: 1.5 SOLUTION NASAL at 16:54

## 2023-05-06 RX ADMIN — INSULIN GLARGINE 22 UNITS: 100 INJECTION, SOLUTION SUBCUTANEOUS at 07:55

## 2023-05-06 RX ADMIN — LISINOPRIL 20 MG: 20 TABLET ORAL at 07:50

## 2023-05-06 RX ADMIN — CARVEDILOL 12.5 MG: 12.5 TABLET, FILM COATED ORAL at 07:50

## 2023-05-06 RX ADMIN — POLYETHYLENE GLYCOL 3350 17 G: 17 POWDER, FOR SOLUTION ORAL at 10:07

## 2023-05-06 RX ADMIN — LEVOTHYROXINE SODIUM 112 MCG: 0.11 TABLET ORAL at 07:50

## 2023-05-06 RX ADMIN — Medication 1 TABLET: at 12:15

## 2023-05-06 RX ADMIN — INSULIN ASPART 1 UNITS: 100 INJECTION, SOLUTION INTRAVENOUS; SUBCUTANEOUS at 12:15

## 2023-05-06 RX ADMIN — SALINE NASAL SPRAY 2 SPRAY: 1.5 SOLUTION NASAL at 12:17

## 2023-05-06 RX ADMIN — Medication 10 ML: at 16:52

## 2023-05-06 RX ADMIN — SALINE NASAL SPRAY 2 SPRAY: 1.5 SOLUTION NASAL at 10:07

## 2023-05-06 RX ADMIN — EMPAGLIFLOZIN 10 MG: 10 TABLET, FILM COATED ORAL at 07:50

## 2023-05-06 RX ADMIN — FUROSEMIDE 40 MG: 40 TABLET ORAL at 07:50

## 2023-05-06 RX ADMIN — SALINE NASAL SPRAY 2 SPRAY: 1.5 SOLUTION NASAL at 22:35

## 2023-05-06 ASSESSMENT — ACTIVITIES OF DAILY LIVING (ADL)
ADLS_ACUITY_SCORE: 39
ADLS_ACUITY_SCORE: 38
ADLS_ACUITY_SCORE: 39
ADLS_ACUITY_SCORE: 38
ADLS_ACUITY_SCORE: 39
ADLS_ACUITY_SCORE: 38

## 2023-05-06 NOTE — PROGRESS NOTES
Otolaryngology Progress Note  May 6, 2023    Subjective/Intvl events: NAEON. Afebrile and vitally stable. Patient reports that his vision in the right eye is slightly worse, he feels that it is due to stress.. He denies clear drainage from his nose or salty/metallic taste. He is asking about nasal splint removal and also about having a suppository. [facility]       O: /79 (BP Location: Left arm, Patient Position: Semi-Mayer's)   Pulse 91   Temp 97.7  F (36.5  C) (Axillary)   Resp 16   Wt 74.6 kg (164 lb 8 oz)   SpO2 97%   BMI 26.55 kg/m     General: Alert and oriented x 3, no acute distress   HEENT: Bilateral lateral gaze palsy and restricted EOM on the right, CN V1-V3 intact, facial movement symmetric, Oropharynx clear. Mild amount of bloody nasal drainage within b/l nares.    Pulmonary: Breathing non-labored, no stridor, no accessory muscle use.        A/P: Gustavo Faria is a 58 year old male with a past medical history of pituitary ACTH secreting macroadenoma s/p endoscopic endonasal transcavernous approach for resection of functional pituitary adenoma w/ nasoseptal flap on 4/28. He is recovering well post-operatively without signs of CSF leak.    - Serial neuro exams  - Nasal saline QID  - Appreciate Endo following management of Cushing's and DMII  - Sinus precautions: No nose blowing, sneeze with mouth open, no straining, and scheduled bowel regimen  - Follow-up scheduled in 2 weeks to remove Jean Baptiste splints  - Rest of care per primary team    -- Patient and above plan discussed with Dr. Adan Gibson MD PGY3  Otolaryngology-Head & Neck Surgery  Please contact ENT with questions by dialing * [phone] and entering job code [phone] when prompted.

## 2023-05-06 NOTE — PROGRESS NOTES
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 He developed sudden headache and vision changes on 3/25, with MRI brain on 3/28, which demonstrated enlargement of known pituitary adenoma, with necrosis extending laterally beyond the left cavernous sinus, concerning for compression of cranial nerves at cavernous sinus. 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      Patient is POD-9 s/p Endoscopic endonasal transcavernous approach for resection of functional pituitary adenoma with harvesting of right-sided pedicled nasoseptal flap, which was pedicled off the posterior septal branch of the sphenopalatine artery    Plan:  - Watch for DI/CSF leak  - Follow urine output/ inputs/ outputs  - Please inform Neurosurgery if there is any change in exam  - Recommend Ophthalmology referral for management of ptosis/esotropia    Follow up plan  - Follow up in 6 weeks with Dr Jeong (message sent to schedulers)  - MRI pituitary in 6 weeks before discharge (message sent to schedulers)  - Upright lumbar xrays to evaluate his compression fractures (ordered for you)  -----------------------------------  Yaneth Ugalde MD  Neurosurgery PGY3    Please contact neurosurgery resident on call with questions.    Dial * * *[phone], enter [phone] when prompted.      Interval History: No acute events overnight.       Objective:   Temp:  [97.7  F (36.5  C)-98.2  F (36.8  C)] 98.2  F (36.8  C)  Pulse:  [81-96] 85  Resp:  [16] 16  BP: ()/(66-87)  101/77  SpO2:  [94 %-100 %] 100 %  I/O last 3 completed shifts:  In: 2180 [P.O.:2180]  Out: 2315 [Urine:2315]    NEUROLOGIC:  -- Opens eyes to voice, following commands, oriented x3  Cranial Nerves:  -- visual fields full to confrontation although with limited participation, PERRL anisocoric L>R and sluggish, left CN III paresis and CN VI palsy, restricted ROM in right EOM with no apparent asymmetry   -- face symmetrical, tongue midline  -- sensory V1-V3 intact bilaterally  -- palate elevates symmetrically, uvula midline  -- hearing grossly intact bilat     Motor:  Antigravity x4 extremities     Sensory:  intact to LT x 4 extremities      Reflexes:       Bi Tri BR Omi Pat Ach Bab     C5-6 C7-8 C6 UMN L2-4 S1 UMN   R 2+ 2+ 2+ Norm 2+ 2+ Norm   L 2+ 2+ 2+ Norm 2+ 2+ Norm      Gait: Deferred.     LABS:  Recent Labs   Lab 05/05/23  2134 05/05/23  1634 05/05/23  1112 05/05/23  0720 05/02/23  0733 05/02/23  0653 05/01/23  0749 05/01/23  0650   NA  --   --   --  140  --  143  --  142   POTASSIUM  --   --   --  3.8  --  4.0  --  4.1   CHLORIDE  --   --   --  100  --  101  --  102   CO2  --   --   --  29  --  31*  --  31*   ANIONGAP  --   --   --  11  --  11  --  9   GLC 84 143* 142* 148*   < > 151*   < > 141*   BUN  --   --   --  12.1  --  12.8  --  12.2   CR  --   --   --  0.74  --  0.65*  --  0.64*   KORIN  --   --   --  8.6  --  8.7  --  8.7    < > = values in this interval not displayed.       Recent Labs   Lab 05/05/23  0720   WBC 16.3*   RBC 3.17*   HGB 8.4*   HCT 28.4*   MCV 90   MCH 26.5   MCHC 29.6*   RDW 24.7*          IMAGING:  No results found for this or any previous visit (from the past 24 hour(s)).

## 2023-05-06 NOTE — PROGRESS NOTES
[facility]    Medicine Progress Note - Medicine Service, MAROON TEAM 2    Date of Admission:  4/3/2023    Assessment & Plan   Gustavo Faria is a 58 year old male with recent Serratia bacteremia, HFrEF, prolonged QT, lower extremity wounds, DMII, hypothyroidism, low testosterone and known ACTH secreting pituitary adenoma w/resulting Cushing's disease s/p 2 partial resections in 2021 who [facility]. [facility], he developed new onset double vision and severe headache which prompted imaging that showed a large mass invading the cavernous sinuses and impinging on the cranial nerves. He is transferred to [facility] for this pituitary adenoma, concern for progression vs hemorrhage/apoplexy. Complicated by psychosis/metabolic encephalopathy and electrolyte abnormalities secondary to Cushing's syndrome. Underwent endoscopic endonasal transcavernous approach for resection of functional pituitary adenoma 4/27.     Updates today:  - Stable to [facility] to [facility] when [facility] -- no beds today  - clarify w/ ENT about timing of nasal splint removal -- at least two weeks from procedure date (5/11 at earliest) and would be removed as an outpatient  - recommended Congregation use of nasal spray to help with nasal splint discomfort, can increase as needed    # Pituitary adenoma resection this admission, c/b significant scar tissue to cavernous sinus, 4/27/23  #R eye ptosis, esotropia  # ACTH secreting pituitary adenoma c/b type II DM, hypothyroidism and low testosterone s/p two partial resections in 2021     Patient noted double vision and severe headache beginning the evening of 3/25. CT imaging on 3/25 revealed a large sellar/suprasellar mass extending into the cavernous sinuses with no evidence of acute stroke. On 3/28, he underwent an MRI which confirmed the CT findings of a large mass  invading the cavernous sinuses and impinging on the cranial nerves. Necrosis extending beyond left cavernous sinus. [facility]. Repeat MRI performed on 4/6/23: compared to 2021 MRI, lesions are smaller. Orbit MRI without optic nerve compression and no evidence of orbital infection.  - 4/27 neurosurgery and ENT combined to perform endoscopic endonasal transcavernous approach for resection of functional pituitary adenoma. Complicated by significant scar tissue to cavernous sinus. Good post-op recovery. Follow up MRI done 5/29. Neurosurgery okay with [facility] to [facility] once bed [facility]. They will arrange follow up.   - Ophthalmology following, no major changes, agree with neurosurgery plans  ` - opthalmology outpatient consult placed after discharge for eval of ptosis, comprehensive eye exam   - Endocrine following  - neurosurgery following  - ENT following     # Type II DM, exacerbated by Cushing's   A1c of 7.6% on 2/2023.  - Endocrinology managing. Current regimen:   - Lantus 22 units daily.  - Carb coverage to 1 units/20 g CHO with meals and snacks  - High-dose sliding scale insulin for AC & HS  - Jardiance 10 mg daily  - hypoglycemia protocol  - Accu checks AC & HS    # Post-surgical Leukocytosis  # Buttocks, sacrum and bilateral groin wounds   # RLE wound from puncture injury   # L dorsal foot wound from presumed trauma   # Soft tissue infection/abscess    # Hx serratia bacteremia in December 2022   For patient's lower extremity wounds and hx of Serratia bacteremia in December, he was initially started [facility]efazolin at the [facility]. His antibiotics were broadened to Vanc and Zosyn and ultimately he was transitioned to ceftazidime on 3/23 with plan to complete course on 4/4/23. BCx negative and wound cx grew serratia marcescens at the [facility]. He has been on ceftazidime since. MRI of the right tib/fib on 3/23 was negative for osteomyelitis but did show two fluid collections draining sponateneously. Ortho  was consulted at the [facility] and determined no intervention was needed as wounds were draining on their own.  - trend WBC, recheck ordered for AM (5/5/23)   - completed course of ceftazidime (3/23 - 4/5)  - elevated WBC since surgery. Suspect just post-surgical stress response. No fever. CXR done to check PICC position with no infiltrate. Wound appear unchanged.     - Repeat blood cultures/UA 5/1 due to rising WBC after surgery    - blood cx 5/1 NGTD    - UA negative   - repeat WOC consult 5/2/23 to re-assess chronic lower extremity wounds - wounds stable, no evidence of active infection  - PT/OT   - Pain: tylenol 975 mg Q6H PRN, oxycodone 5 mg Q6H PRN    # HFmrEF, with global hypokinesis  # Hypertension  # High burden of PVCs  # Prolonged QTc   # Elevated troponin, down-trended  Coronary angiogram on 2/27 showed normal coronaries. Troponin down-trended (88 -> 77). No chest pain. High burden of PVCs. Follow up ECHO with global hypokinesis, which is worse compared to February 2023 ECHO. EF is 45% (same compared to prior). Likely small vessel disease vs demand vs cardiomyopathy 2/2 ceftazidime. Low concern for acute ACS.   - Electrolyte mgmt as above  - Lasix 40mg daily--held since surgery on 4/27. Restarted 4/30  - Coreg and lisinopril held for surgery 4/27.    - Carvedilol 12.5 mg BID restarted 4/29  - Lisinopril restarted 4/30 and titrated up to prior dose 20 mg daily on 5/1  - ASA - restarted 5/5/23  - Avoid QTc prolonging medications    # Acute metabolic encephalopathy - resolved  # Pyruria, Candiduria - resolved  # Encephalopathy likely secondary to cushing's disease  Admission symptoms included disorientation, intermittently following directions. Repetitive words - perseverating on particular phrases. Sx started the 2-3 days prior to admission (which patient was the [facility]). Vulnerable brain (multiple brain surgeries), enlarging pituitary mass. No seizure activity per EEG. Steroids removed and pt still  encephalopathic. Sodium had been in normal limits and patient remained altered. Worked up for infections - did reveal candiduria but unlikely this was etiology, though he did complete an 8 day course of fluconazole. Briefly on ceftriaxone but encephalopathy also did not improve. CT abd w/ contrast (4/10): no evidence of pyelonephritis  Ultimately attributed to Cushings and known pituitary tumor. Continues to remain lucid.  - Surgical management per neurosurgery  - Delirium precautions  - Electrolyte management as above  - PRN quetiapine if agitation that is not responsive to behavioral interventions     # Delusions, intermittent  Intermittent delusions regarding staff stalking patient while in the hospital. Unclear chronicity of these delusions - but has had them multiple times this hospitalization. Unclear psych history. Medical management for encephalopathy as stated above. Psychiatry was consulted during his care and agreed with paranoia and recommended Abilify. He was briefly on Abilify 5mg but patient then started to refuse nightly so this was discontinued. His paranoia and delusions have been very intermittent and appear to be associated with his anxiety.     # Hypernatremia, DI vs cushing's, resolved  # Hypokalemia, resolved  Hypernatremia and hypokalemia, likely secondary to Cushing's.     - Endocrine following. Managing spironolactone  - On K replacement protocol  - Goal Na: 135-140  - Strict I&Os  - Daily BMP + Mg     # Central Hypothyroidism  - Continue PTA levothyroxine     # Hypogonadism   - Continue PTA androgel (will need to bring in home medication)     # Chronic microcytic anemia   Hgb of 8.4 on [facility] at the [facility]. Peripheral smear on 3/30 showed poikilocytosis with occasional red blood cell fragments but no other evidence of hemolysis.  Haptoglobin was normal.  Ferritin was 91, transferrin saturation was low, B12 was 495 and folate was 8.7. Likely combination of iron deficiency as well as  inflammation/chronic disease.   - CTM        # Concern for malnutrition   - Nutrition following    Discharge Needs  - Follow up in 6 weeks with Dr Jeong, neurosurgery (message sent to schedulers),  - MRI pituitary in 6 weeks before discharge (message sent to schedulers)  - Upright lumbar xrays to evaluate his compression fractures (ordered by neurosurgery)   - outpatient follow-up w/ ENT -- contact ENT when transfers to the [facility] to schedule follow-up for splint removal in clinic       Diet: Regular Diet Adult  Diet  Snacks/Supplements Adult: Glucerna; Between Meals    DVT Prophylaxis: Pneumatic Compression Devices  Roland Catheter: Not present  Lines:   PICC 04/06/23 Double Lumen Right Basilic access-Site Assessment: WDL      Cardiac Monitoring: None  Code Status: Full Code      Clinically Significant Risk Factors              # Hypoalbuminemia: Lowest albumin = 2.9 g/dL at 4/11/2023  7:07 AM, will monitor as appropriate          # DMII: A1C = 9.5 % (Ref range: <5.7 %) within past 6 months    # Severe Malnutrition: based on nutrition assessment        Disposition Plan            Addendum 5/1/2023  Gustavo is very eager to leave [facility] and get to the [facility]. He attempted to leave the hospital rather than wait for bed availability to the [facility]. His sister Pilar, was willing to take him from the hospital but was planning to take him directly to the [facility] ER. However, due to his immobility, this is much more complicated than a simple discharge would require her finding a way to get him in a cab (she does not have a car) vs arranging stretcher transport to her home and then her calling 911 to have him brought to the [facility] ER.     - The [facility] is making him a high priority for transfer but wanted to stress that if he leaves this hospital AMA, that this hospitalization would not be paid for by the [facility], so it is definitely in his best interest to remain in the hospital.   - I do not believe that he is fully decisional, but if his sister were  "in agreement to his going then, I don't think we could stop them and would need to arrange as safe of an outpatient plan as possible.             I have personally reviewed the following data over the past 24 hrs:    N/A  \   N/A   / N/A     N/A N/A N/A /  97   N/A N/A N/A \         Leela Verdin MD  Date of Service (when I saw the patient): 05/06/2023      _________________________________________________________    Interval History   NAEO    This morning, pt expresses frustration about not being transferred back to the [facility] yet. Says [facility] doesn't do anything on the weekend and won't take him back over the weekend. He also feels like \"I can't breathe\" with the nasal splints in and finds them uncomfortable. Says he didn't have to keep them in so long in the past, wants them out as soon as possible     Physical Exam   Vital Signs: Temp: 98  F (36.7  C) Temp src: Axillary BP: 101/77 Pulse: 85   Resp: 16 SpO2: 100 % O2 Device: None (Room air)    Weight: 164 lbs 8 oz    Gen: Sitting up in bed, no acute distress  ENT: MMM, R eye ptosis, nasal harness off, no active drainage from nares  Resp: breathing comfortably, non-labored. CTAB  CV: RRR, ext WWP, mild bilateral lower extremity edema L>R  Skin: multiple LE wounds w/ c/d/i dressings. Venous stasis skin changes L>R lower extremity \      Data     I have personally reviewed the following data over the past 24 hrs:    N/A  \   N/A   / N/A     N/A N/A N/A /  97   N/A N/A N/A \       "

## 2023-05-06 NOTE — PLAN OF CARE
Status: POD #8 pituitary adenoma resection  Vitals: VSS  Neuros: unchanged: A&O x4, forgetful, repeated requests, anxiety, whispered speech, R ptosis, pupils sluggish/fixed, bilateral eyes not tracking past opposite midline, BUE 4/5, BLE 3/5, N/T to BLE  IV: RUE DL PICC, heparin locked  Labs/Electrolytes: WNL  Resp/trach: Stable on RA   Diet: Regular diet, strict I's and O's however does not comply with carb counting for carb coverage insulin with frequent snacks in room from home  Bowel status: LBM 5/5, incontinent, please hold bowel meds  : Voiding spontaneously with bedside urinal, strict I's and O's  Skin:BLE wounds are covered w/ mepilex changed CDI,  Buttocks wounds SUKH, patient refuses intervention for buttocks wounds. Refuses repositioning. fungal cream self applied in PM  Pain: Managed with PRN oxy given at 2220  Activity: Up with assist of 1-2, GB/walker to pivot, Writer informed pt that staff will not be using lift anymore to transfer routinely, reinforced discharge of lift  Social: No visitors this shift  Plan: [facility]ing [facility]  Updates this shift: Patient reported feeling anxious and requested to sit in wc in hallway but then requested to go back to bed.    Pneumoboots in place:  yes    Gloria and Chloe

## 2023-05-06 NOTE — PROGRESS NOTES
Brief Care Coordination Note:    Call placed to [facility], they do not have any beds available for patient to transfer back this morning. They continue to have boarders in their ED. They request we call back this afternoon, after 2 pm to see if any bed have become available.     RNCC will continue to follow for transfer back to [facility].     1410 Addendum:  Call placed to [facility], they do not have any beds available today to accept patient back. RNCC to call again tomorrow.     [facility] (transfer agreement)  Ph: [phone]  Pt placement: [phone] opt. 1    Cristina Dominguez, RN, BSN  6A RN Care Coordinator  Ph: [phone]   Pager: [phone]

## 2023-05-06 NOTE — PLAN OF CARE
/77 (BP Location: Left arm)   Pulse 85   Temp 98.2  F (36.8  C) (Axillary)   Resp 16   Wt 74.6 kg (164 lb 8 oz)   SpO2 100%   BMI 26.55 kg/m      Goal Outcome Evaluation: no change      Neuro: A&Ox4.repeated requests. R Ptosis. Pupils sluggish. BUE's 4/5; BLE's 3/5  with baseline N/T. No delusional behavior noted.  Cardiac: Afebrile, VSS.   Respiratory: RA. Nasal precautions maintained.  GI/: Voiding spontaneously. Lat BM 5/5  Diet/appetite: Tolerating diet. I/O's monitored. Denies nausea   Endocrine: 0200 bg 120  Activity: refuses repositioning. Able to turn self in bed  Pain: . Denies   Skin: numerous open areas on buttocks surrounded by pale blanchable and non blanchable skin. Allowed application of barrier cream x1. Declined keeping padmini.  Lines: dbl lumen PICC- hep locked  [facility]  Rested btwn cares. Up most of night. Call light appropriate. Bed alarm activated. Able to make needs known. Continue to monitor. Notify MD of changes/concerns.     Problem: Plan of Care - These are the overarching goals to be used throughout the patient stay.    Goal: Readiness for Transition of Care  Outcome: Not Progressing     Problem: Plan of Care - These are the overarching goals to be used throughout the patient stay.    Goal: Optimal Comfort and Wellbeing  Outcome: Not Progressing                     HISTORY OF PRESENT ILLNESS:  The patient is a 20 year old female who comes in for a complete physical exam.    Attending community college, working full time at [employer].  Studying psychology.     Questions about contraception options.  No longer taking OCP for the past month. Has same boyfriend for 2+ years.   Negative STI screening last year.     Is concerned about efficacy of method. Also wants something that she does not need to remember to take. sometimes forgets to take her OCP but does not usually forget her psych medications.     Patient Active Problem List   Diagnosis   • Attention deficit hyperactivity disorder (ADHD), combined type   • Chronic insomnia   • Depression         MEDICATIONS  Current Outpatient Medications   Medication Sig   • dexmethylphenidate (FOCALIN XR) 20 MG 24 hr capsule Take 1 capsule by mouth daily.   • buPROPion XL (WELLBUTRIN XL) 300 MG 24 hr tablet Take 1 tablet by mouth daily.   • dexmethylphenidate (FOCALIN) 10 MG tablet Take 1 tablet by mouth 2 times daily.     No current facility-administered medications for this visit.       ALLERGIES  Allergies as of 02/08/2023 - Reviewed 02/08/2023   Allergen Reaction Noted   • Seasonal Other (See Comments) 06/02/2009       HISTORIES  Past Medical History:   Diagnosis Date   • ADHD (attention deficit hyperactivity disorder)    • Allergy    • Depression        History reviewed. No pertinent surgical history.    Family History   Problem Relation Age of Onset   • Congestive Heart Failure Maternal Grandfather    • Myocardial Infarction Paternal Grandfather        Social History     Occupational History   • Occupation: college student   • Occupation: [employer]     Employer: [employer]   Tobacco Use   • Smoking status: Never   • Smokeless tobacco: Never   Substance and Sexual Activity   • Alcohol use: No   • Drug use: No   • Sexual activity: Never        REVIEW OF SYSTEMS  Constitutional:  Denies weight loss, generalized fatigue, chills,  or night sweats  Eyes:  Denies change in visual acuity, denies diplopia, denies photophobia   HENT:  Denies hearing loss, tinnitus, chronic nasal congestion, sore throat, or change in voice  Respiratory:  Denies shortness of breath, chronic cough, sputum production, or hemoptysis  Cardiovascular:  Denies chest pain, palpitations, dyspnea on exertion, or claudication symptoms  Gastrointestinal:  Denies difficulty swallowing, abdominal pain, diarrhea, constipation, melena, or changes in bowel habits  Genitourinary:  Denies dysuria, hematuria, frequency, urinary incontinence, hesitancy, or weak stream  Musculoskeletal:  Denies back pain or joint pain   Integument:  Denies rash or changes in moles, no lesions seen  Neurologic:  Denies headache, focal weakness or sensory changes   Endocrine:  Denies polyuria or polydipsia, denies temperature intolerance   Lymphatic:  Denies swollen glands   Psychiatric:  Denies changes in mood, anxiety, insomnia, concerns about excess alcohol consumption or illicit drug use      Physical Exam  Vital Signs:    Vitals:    02/08/23 1356   BP: 108/70   BP Location: [facility] Left upper extremity   Patient Position: Sitting   Cuff Size: Regular   Pulse: 81   SpO2: 97%   Weight: 62.6 kg (138 lb)   Height: 5' 5.5\" (1.664 m)   LMP: 01/14/2023     Constitutional:  Comfortable and pleasant. In no acute distress. Appears stated age.   Integument:  Warm. Dry. No erythema. No rash. Normal turgor.   HENT:  Normocephalic. Atraumatic. Bilateral ear canals and tympanic membranes are normal. Oropharynx moist. Tongue is midline. No tonsillar erythema, exudates or swelling. Nares clear bilaterally.   Neck:  No tenderness. Supple. No visible or palpable thyroid. No anterior or posterior cervical adenopathy, no submandibular or supraclavicular adenopathy.   Eyes:  Pupils equal, round, and reactive to light and accommodation, extraocular movements are intact. Conjunctivae normal. No discharge. Fundi are normal  bilaterally.  Cardiovascular:  Normal heart rate. Normal rhythm. No murmurs. No rubs. No gallops. No pedal edema.   Respiratory:  Breath sounds are clear. No labored respirations.   Gastrointestinal:  Bowel sounds normal. Soft. No tenderness. No masses. No hepatosplenomegaly.    Neurologic:  Alert and oriented x3. Normal motor function. No focal deficits noted. Deep tendon reflex at the knee is normal bilaterally. Cranial nerves II through XII are intact.  Psychiatric:  Alert and oriented x3. Appropriate affect and judgment.  Extremities:  No onychomycosis, no deformity, no lesions or rash,  dorsalis pedis  pulses detected bilaterally.        IMPRESSION/PLAN  Wellness care.  No labs done today.  Discussed contraception. Referred to gyne for additional conversation.

## 2023-05-06 NOTE — PLAN OF CARE
Goal Outcome Evaluation:    /79 (BP Location: Left arm, Patient Position: [facility]ayer's)   Pulse 91   Temp 97.7  F (36.5  C) (Axillary)   Resp 16   Wt 74.6 kg (164 lb 8 oz)   SpO2 97%   BMI 26.55 kg/m       Status: POD #9 pituitary adenoma resection  Vitals: VSS  Neuros:  A&O x4, forgetful, repeated requests, anxiety, whispered speech, R ptosis, pupils sluggish/fixed, bilateral eyes not tracking past opposite midline, BUE 4/5, BLE 3/5, N/T to BLE  IV: R DL PICC, heparin locked  Labs/Electrolytes: WNL  Resp/trach: Stable on RA, denies SOB   Diet: Regular diet, strict I's and O's however does not comply with carb counting for carb coverage insulin with frequent snacks in room.  GI/: Voiding spontaneously with bedside urinal, strict I's and O's. Small BM this shift  Skin:BLE wounds are covered w/ mepilex changed CDI,  Buttocks wounds SUKH, patient refuses intervention for buttocks wounds. Refuses repositioning. fungal cream self applied.  Pain: Managed with PRN oxy given X1  Activity: Up with assist of 1-2, GB/walker to pivot  [facility]

## 2023-05-07 ENCOUNTER — APPOINTMENT (OUTPATIENT)
Dept: CT IMAGING | Facility: CLINIC | Age: 58
DRG: 614 | End: 2023-05-07
Payer: COMMERCIAL

## 2023-05-07 LAB
ANION GAP SERPL CALCULATED.3IONS-SCNC: 13 MMOL/L (ref 7–15)
BUN SERPL-MCNC: 11.8 MG/DL (ref 6–20)
CALCIUM SERPL-MCNC: 8.7 MG/DL (ref 8.6–10)
CHLORIDE SERPL-SCNC: 99 MMOL/L (ref 98–107)
CORTIS SERPL-MCNC: 17.8 UG/DL
CREAT SERPL-MCNC: 0.79 MG/DL (ref 0.67–1.17)
DEPRECATED HCO3 PLAS-SCNC: 26 MMOL/L (ref 22–29)
ERYTHROCYTE [DISTWIDTH] IN BLOOD BY AUTOMATED COUNT: 23.8 % (ref 10–15)
GFR SERPL CREATININE-BSD FRML MDRD: >90 ML/MIN/1.73M2
GLUCOSE BLDC GLUCOMTR-MCNC: 131 MG/DL (ref 70–99)
GLUCOSE BLDC GLUCOMTR-MCNC: 154 MG/DL (ref 70–99)
GLUCOSE BLDC GLUCOMTR-MCNC: 295 MG/DL (ref 70–99)
GLUCOSE BLDC GLUCOMTR-MCNC: 66 MG/DL (ref 70–99)
GLUCOSE BLDC GLUCOMTR-MCNC: 67 MG/DL (ref 70–99)
GLUCOSE BLDC GLUCOMTR-MCNC: 70 MG/DL (ref 70–99)
GLUCOSE BLDC GLUCOMTR-MCNC: 92 MG/DL (ref 70–99)
GLUCOSE SERPL-MCNC: 117 MG/DL (ref 70–99)
HCT VFR BLD AUTO: 28.5 % (ref 40–53)
HGB BLD-MCNC: 8.5 G/DL (ref 13.3–17.7)
MAGNESIUM SERPL-MCNC: 2 MG/DL (ref 1.7–2.3)
MCH RBC QN AUTO: 26.4 PG (ref 26.5–33)
MCHC RBC AUTO-ENTMCNC: 29.8 G/DL (ref 31.5–36.5)
MCV RBC AUTO: 89 FL (ref 78–100)
MRSA DNA SPEC QL NAA+PROBE: NEGATIVE
PHOSPHATE SERPL-MCNC: 2.7 MG/DL (ref 2.5–4.5)
PLATELET # BLD AUTO: 409 10E3/UL (ref 150–450)
POTASSIUM SERPL-SCNC: 4.3 MMOL/L (ref 3.4–5.3)
RBC # BLD AUTO: 3.22 10E6/UL (ref 4.4–5.9)
SA TARGET DNA: NEGATIVE
SODIUM SERPL-SCNC: 138 MMOL/L (ref 136–145)
TROPONIN T SERPL HS-MCNC: 172 NG/L
WBC # BLD AUTO: 14.1 10E3/UL (ref 4–11)

## 2023-05-07 PROCEDURE — 82024 ASSAY OF ACTH: CPT | Performed by: INTERNAL MEDICINE

## 2023-05-07 PROCEDURE — 83735 ASSAY OF MAGNESIUM: CPT

## 2023-05-07 PROCEDURE — 84484 ASSAY OF TROPONIN QUANT: CPT

## 2023-05-07 PROCEDURE — 36415 COLL VENOUS BLD VENIPUNCTURE: CPT

## 2023-05-07 PROCEDURE — 73701 CT LOWER EXTREMITY W/DYE: CPT | Mod: 26 | Performed by: RADIOLOGY

## 2023-05-07 PROCEDURE — 87070 CULTURE OTHR SPECIMN AEROBIC: CPT

## 2023-05-07 PROCEDURE — 250N000013 HC RX MED GY IP 250 OP 250 PS 637

## 2023-05-07 PROCEDURE — 250N000011 HC RX IP 250 OP 636: Performed by: INTERNAL MEDICINE

## 2023-05-07 PROCEDURE — 999N000044 HC STATISTIC CVC DRESSING CHANGE

## 2023-05-07 PROCEDURE — 250N000011 HC RX IP 250 OP 636

## 2023-05-07 PROCEDURE — 82310 ASSAY OF CALCIUM: CPT

## 2023-05-07 PROCEDURE — 73701 CT LOWER EXTREMITY W/DYE: CPT | Mod: RT

## 2023-05-07 PROCEDURE — 82533 TOTAL CORTISOL: CPT | Performed by: STUDENT IN AN ORGANIZED HEALTH CARE EDUCATION/TRAINING PROGRAM

## 2023-05-07 PROCEDURE — 84100 ASSAY OF PHOSPHORUS: CPT

## 2023-05-07 PROCEDURE — 258N000001 HC RX 258: Performed by: STUDENT IN AN ORGANIZED HEALTH CARE EDUCATION/TRAINING PROGRAM

## 2023-05-07 PROCEDURE — 250N000013 HC RX MED GY IP 250 OP 250 PS 637: Performed by: STUDENT IN AN ORGANIZED HEALTH CARE EDUCATION/TRAINING PROGRAM

## 2023-05-07 PROCEDURE — 36592 COLLECT BLOOD FROM PICC: CPT | Performed by: INTERNAL MEDICINE

## 2023-05-07 PROCEDURE — 99233 SBSQ HOSP IP/OBS HIGH 50: CPT | Mod: GC | Performed by: INTERNAL MEDICINE

## 2023-05-07 PROCEDURE — 120N000002 HC R&B MED SURG/OB UMMC

## 2023-05-07 PROCEDURE — 87641 MR-STAPH DNA AMP PROBE: CPT

## 2023-05-07 PROCEDURE — 87077 CULTURE AEROBIC IDENTIFY: CPT

## 2023-05-07 PROCEDURE — 250N000013 HC RX MED GY IP 250 OP 250 PS 637: Performed by: INTERNAL MEDICINE

## 2023-05-07 PROCEDURE — 87075 CULTR BACTERIA EXCEPT BLOOD: CPT

## 2023-05-07 PROCEDURE — 85027 COMPLETE CBC AUTOMATED: CPT

## 2023-05-07 RX ORDER — CEFTRIAXONE 2 G/1
2 INJECTION, POWDER, FOR SOLUTION INTRAMUSCULAR; INTRAVENOUS EVERY 24 HOURS
Status: DISCONTINUED | OUTPATIENT
Start: 2023-05-07 | End: 2023-05-07

## 2023-05-07 RX ORDER — IOPAMIDOL 755 MG/ML
100 INJECTION, SOLUTION INTRAVASCULAR ONCE
Status: COMPLETED | OUTPATIENT
Start: 2023-05-07 | End: 2023-05-07

## 2023-05-07 RX ORDER — SIMETHICONE 80 MG
80 TABLET,CHEWABLE ORAL 4 TIMES DAILY PRN
Status: DISCONTINUED | OUTPATIENT
Start: 2023-05-07 | End: 2023-05-19 | Stop reason: HOSPADM

## 2023-05-07 RX ORDER — PIPERACILLIN SODIUM, TAZOBACTAM SODIUM 3; .375 G/15ML; G/15ML
3.38 INJECTION, POWDER, LYOPHILIZED, FOR SOLUTION INTRAVENOUS EVERY 6 HOURS
Status: COMPLETED | OUTPATIENT
Start: 2023-05-07 | End: 2023-05-17

## 2023-05-07 RX ORDER — HYDROCORTISONE 20 MG/1
20 TABLET ORAL EVERY 24 HOURS
Status: DISCONTINUED | OUTPATIENT
Start: 2023-05-08 | End: 2023-05-08

## 2023-05-07 RX ORDER — HYDROCORTISONE 10 MG/1
10 TABLET ORAL EVERY 24 HOURS
Status: DISCONTINUED | OUTPATIENT
Start: 2023-05-07 | End: 2023-05-08

## 2023-05-07 RX ORDER — VANCOMYCIN HYDROCHLORIDE 1 G/200ML
1000 INJECTION, SOLUTION INTRAVENOUS EVERY 12 HOURS
Status: DISCONTINUED | OUTPATIENT
Start: 2023-05-07 | End: 2023-05-08

## 2023-05-07 RX ADMIN — SALINE NASAL SPRAY 2 SPRAY: 1.5 SOLUTION NASAL at 19:30

## 2023-05-07 RX ADMIN — SALINE NASAL SPRAY 2 SPRAY: 1.5 SOLUTION NASAL at 10:17

## 2023-05-07 RX ADMIN — VANCOMYCIN HYDROCHLORIDE 1000 MG: 1 INJECTION, SOLUTION INTRAVENOUS at 18:21

## 2023-05-07 RX ADMIN — HYDROCORTISONE 10 MG: 10 TABLET ORAL at 15:47

## 2023-05-07 RX ADMIN — SALINE NASAL SPRAY 2 SPRAY: 1.5 SOLUTION NASAL at 15:48

## 2023-05-07 RX ADMIN — INSULIN GLARGINE 22 UNITS: 100 INJECTION, SOLUTION SUBCUTANEOUS at 08:17

## 2023-05-07 RX ADMIN — Medication 5 ML: at 19:30

## 2023-05-07 RX ADMIN — SALINE NASAL SPRAY 2 SPRAY: 1.5 SOLUTION NASAL at 12:41

## 2023-05-07 RX ADMIN — PIPERACILLIN AND TAZOBACTAM 3.38 G: 3; .375 INJECTION, POWDER, LYOPHILIZED, FOR SOLUTION INTRAVENOUS at 19:30

## 2023-05-07 RX ADMIN — DEXTROSE MONOHYDRATE 25 ML: 25 INJECTION, SOLUTION INTRAVENOUS at 15:36

## 2023-05-07 RX ADMIN — IOPAMIDOL 100 ML: 755 INJECTION, SOLUTION INTRAVENOUS at 21:34

## 2023-05-07 RX ADMIN — LEVOTHYROXINE SODIUM 112 MCG: 0.11 TABLET ORAL at 08:06

## 2023-05-07 RX ADMIN — EMPAGLIFLOZIN 10 MG: 10 TABLET, FILM COATED ORAL at 08:06

## 2023-05-07 RX ADMIN — DOCUSATE SODIUM 50 MG AND SENNOSIDES 8.6 MG 2 TABLET: 8.6; 5 TABLET, FILM COATED ORAL at 09:27

## 2023-05-07 RX ADMIN — SALINE NASAL SPRAY 2 SPRAY: 1.5 SOLUTION NASAL at 18:21

## 2023-05-07 RX ADMIN — SALINE NASAL SPRAY 2 SPRAY: 1.5 SOLUTION NASAL at 08:09

## 2023-05-07 RX ADMIN — Medication 5 ML: at 03:04

## 2023-05-07 RX ADMIN — SALINE NASAL SPRAY 2 SPRAY: 1.5 SOLUTION NASAL at 05:50

## 2023-05-07 RX ADMIN — SIMETHICONE 80 MG: 80 TABLET, CHEWABLE ORAL at 10:17

## 2023-05-07 RX ADMIN — MICONAZOLE NITRATE: 20 CREAM TOPICAL at 08:09

## 2023-05-07 RX ADMIN — Medication 5 ML: at 10:02

## 2023-05-07 RX ADMIN — ASPIRIN 81 MG: 81 TABLET ORAL at 08:06

## 2023-05-07 RX ADMIN — Medication 1 TABLET: at 12:41

## 2023-05-07 ASSESSMENT — ACTIVITIES OF DAILY LIVING (ADL)
ADLS_ACUITY_SCORE: 41

## 2023-05-07 NOTE — PLAN OF CARE
BP 97/76 (BP Location: Left arm)   Pulse 89   Temp 98.8  F (37.1  C) (Axillary)   Resp 20   Wt 74.6 kg (164 lb 8 oz)   SpO2 98%   BMI 26.55 kg/m      Goal Outcome Evaluation:       Neuro: A&Ox4. Forgetful. Frequent requests. R eyelid ptosis. Sluggish pupils. Not tracking past midline. BUE 4/5. BLE 3/5; N/T present. Speech soft, mumbles.  Cardiac: Afebrile, VSS. No c/o chest pain/pressure   Respiratory: RA . O2 sats > 98%. No c/o respiratory concerns except for breathing discomfort 2/2 nasal splint.  GI/: Voiding spontaneously. No BM this shift. Last BM 5/5  Diet/appetite: Tolerating diet. Frequent snacking. Denies nausea   Activity: Up with assist of 2 / gb and walker. Not oob through night   Pain: . Denies   Skin: declined skin assessment. BLE dressings intact.  Lines: dbl lumen PICC- Heplocked  Labs: Troponin: 170: MD notified. No new orders rec'd  Plan: awaiting bed availability at [facility]. Able to make needs known. Continue to monitor. Notify MD of changes/concerns      Problem: Plan of Care - These are the overarching goals to be used throughout the patient stay.    Goal: Optimal Comfort and Wellbeing  Outcome: Not Progressing     Problem: Plan of Care - These are the overarching goals to be used throughout the patient stay.    Goal: Absence of Hospital-Acquired Illness or Injury  Intervention: Prevent Skin Injury  Recent Flowsheet Documentation  Taken 5/7/2023 0000 by Marisela Ray, RN  Body Position: refuses positioning     Problem: Plan of Care - These are the overarching goals to be used throughout the patient stay.    Goal: Readiness for Transition of Care  Outcome: Not Progressing

## 2023-05-07 NOTE — CODE/RAPID RESPONSE
05/06/23 1900   Call Information   Date of Call 05/06/23   Time of Call 1844   Name of person requesting the team Chloe Castro   Title of person requesting team RN   RRT Arrival time 1850   Time RRT ended 1905   Reason for call   Type of RRT Adult   Primary reason for call Cardiovascular   Cardiovascular SBP less than 90   Was patient transferred from the ED, ICU, or PACU within last 24 hours prior to RRT call? No   SBAR   Situation low BP   Background resection of Pituitary adenoma   Notable History/Conditions Recent surgery   Assessment A+O x3, NAD, BP low   Interventions CXR;Fluid bolus;Labs;Meds   Patient Outcome   Patient Outcome Stabilized on unit   RRT Team   Attending/Primary/Covering Physician Asher 2   Date Attending Physician notified 05/06/23   Time Attending Physician notified 1844   Physician(s) Dyllan MULLINS, PAC   Lead RN Jackelyn Corona/Gloria   RT na   Post RRT Intervention Assessment   Post RRT Assessment Stable/Improved   Date Follow Up Done 05/06/23   Time Follow Up Done 2110   Comments /76, lab just drawing bld cx and magnesium level

## 2023-05-07 NOTE — CONSULTS
General Surgery Consult  May 7, 2023    Gustavo Faria  : 1965    Date of Service: 2023 5:22 PM    Assessment and Plan:  Gustavo Faria is a 58 year old male with PMH of HFrEF (40-45%), chronic BLE wounds, recent serratia bacteremia (, as [facility]) T2DM, hypothyroidism, and functional ACTH secreting pituitary adenoma w/ cushing's disease who [facility] for [facility] to [facility] for BLE wounds, where he developed double vision and headache with enlarged pituitary adenoma prompting transfer to [facility] on 4/3, now s/p endoscopic endonasal transcavernous resection of pituitary adenoma on . Overnight  became hypotensive which was responsive to 500cc fluids, also found to have type II NSTEMI.  patient noted to have purulent drainage  from RLE, general surgery consulted for evaluation if needs intervention. He reports increased pain in RLE that started last evening. On exam, he is afebrile and hemodynamically stable, with two punctate wounds on the RLE which are expressing purulent fluid, with no obvious erythema or fluctuance around them, as well as two chronic open wounds with granulation tissue on that leg as well. Labs are significant for leukocytosis at 14 (from 15), as well as BCx positive for GNR. No imaging of leg from this hospitalization, but [facility] reportedly showed no osteomyelitis, and two spontaneously draining fluid collections, which would be consistent with current presentation. Per review of chart, patient appears to have had these spontaneously draining wounds with fluid collections in his leg since December with I&D at that time, and there is a report about the wounds starting to have purulence draining again as far back as 2023, with no interventions noted since that time. Of note, he also reportedly had a wound culture from the [facility] growing serratia. With his wounds that have been chronically draining purulent fluid with no  "improvement it seems, he may benefit from I&D to fully open the wound and allow for packing of cavity which has never fully been opened, however we would like to assess current status of fluid collections within leg with CT scan given limited demonstration of extent on physical exam. Given that he is hemodynamically stable and clinically well appearing, will hold off on possible intervention until imaging obtained.       Recommendations  - Agree with broad spectrum abx  - CT RLE with IV contrast to evaluate fluid collection in leg  - May need possible I&D, will need imaging above to determine if best course is to let continue to spontaneously drain vs. beside I&D vs OR.   - Plan for NPO at midnight for now, pending CT imaging results.     Discussed with Dr. Green, general surgery chief, and discussed with staff, Dr. Cochran.     Scott Soria MD  Surgery PGY2    History of Present Illness:    Gustavo Faria is a 58 year old male that presents with PMH of HFrEF (40-45%), chronic BLE wounds, T2DM, hypothyroidism, and functional ACTH secreting pituitary adenoma w/ cushing's disease who [facility], where he developed double vision and headache with enlarged pituitary adenoma prompting transfer to [facility] on 4/3, now s/p endoscopic endonasal transcavernous resection of pituitary adenoma on 4/27. 5/7 patient noted to have purulent drainage from RLE. General surgery consulted for evaluation for possible I&D.     He reports that he is feeling well, just with some increased pain in his right leg \"from the blood rushing to it.\" His biggest complaint is that the dressing has been changed 3 times today and that is causing him pain not controlled by tylenol. He denies fever, chills, nausea, vomiting ,chest pain, shortness of breath.    He reports that he first developed RLE wounds after hitting his leg into a car door in August 2022. [facility] [place] (usually " "lives there, has been visiting his sister here in [facility] [facility] since at least Feb 2023), where he reports the wounds never healed and he underwent an I&D and they made two puncture wounds on his right lower shin, which have also now not healed.       Per chart review, first ED visit is 11/2/22 after hitting his leg against something in the grocery store, he had some swelling and was sent home, and then on an admission for altered mental status, he is noted to have cellulitis and purulence expressible from wounds on R leg on 12/23. CT scan report 12/23/22 from [facility] to have fluid collection measuring 1.0 x 3.6 x 3.2 cm, suspicious for a fluid collection and possible abscess, and on MRI 12/24/22 noted to have an 8.0 x 4.8 x 1.1 cm subcutaneous fluid collection in the anterolateral distal leg. Underwent I&D with orthopedic surgery 12/25 where per op note an area of about a 15cm abscess thought to be communicating with two punctate wounds on anterior shin was drained via opening up the two punctate wounds by a few centimeters, and sent on 14 day course of omnicef per ID team there.   CT then on 1/2/23- demonstrates no abscess.    In February he was admitted to hospital here at [facility] 2/16 for RLE wounds with vascular evaluating and noting no arterial disease but c/f deep infection and rec for CT, and then he ultimately left AMA and admitted to [facility] in 2/21 again for RLE pain with MRI 2/22/23 demonstrating \"focal organized superficial collections anterior to the tibia and lateral to the calcaneus which could represent abscess formation\" General surgery consulted at that time with no plan for I&D given spontaneous drainage.      He also had a repeat [facility] of the R leg at the [facility] on 3/[facility] which was negative for osteomyelitis but did demonstrate 2 fluid collections which were spontaneously draining at the time, and ortho was consulted who did not recommend further intervention as the wounds were draining on their own. " He completed a course of 14 days of abx, ending with ceftazidime on 4/5 for serratia bacteremia, and of note his wound cx reportedly grew serratia at the [facility].     Denies smoking, drinking alcohol currently.        Past Medical History:  History reviewed. No pertinent past medical history.    Past Surgical History  Past Surgical History:   Procedure Laterality Date     ANESTHESIA OUT OF OR MRI N/A 4/24/2023    Procedure: Anesthesia out of OR Magnetic Resonance Imaging Brain with or without contrast @1300;  Surgeon: GENERIC ANESTHESIA PROVIDER;  Location: [facility] OR     ANESTHESIA OUT OF OR MRI N/A 4/29/2023    Procedure: Anesthesia out of OR MRI;  Surgeon: GENERIC ANESTHESIA PROVIDER;  Location: [facility] OR     OPTICAL TRACKING SYSTEM ENDOSCOPIC ENDONASAL SURGERY N/A 4/27/2023    Procedure: SURGICAL PROCEDURE, ENDONASAL, ENDOSCOPIC, USING OPTICAL TRACKING SYSTEM, for pituitary adenoma resection,;  Surgeon: Rod Jeong MD;  Location: [facility] OR     PICC DOUBLE LUMEN PLACEMENT Right 04/06/2023    40 cm total basilic       Family History:  History reviewed. No pertinent family history.    Social History:  Social History     Socioeconomic History     Marital status:      Spouse name: Not on file     Number of children: Not on file     Years of education: Not on file     Highest education level: Not on file   Occupational History     Not on file   Tobacco Use     Smoking status: Former     Types: Cigarettes     Smokeless tobacco: Not on file   Vaping Use     Vaping status: Not on file   Substance and Sexual Activity     Alcohol use: Not Currently     Drug use: Yes     Types: Marijuana     Sexual activity: Not on file   Other Topics Concern     Not on file   Social History Narrative     Not on file     Social Determinants of Health     Financial Resource Strain: Not on file   Food Insecurity: Not on file   Transportation Needs: Not on file   Physical Activity: Not on file   Stress: Not on file   Social Connections: Not  on file   Intimate Partner Violence: Not on file   Housing Stability: Not on file       Medications:  Current Outpatient Medications   Medication Sig Dispense Refill     acetaminophen (TYLENOL) 325 MG tablet Take 2 tablets (650 mg) by mouth every 4 hours as needed for other (For optimal non-opioid multimodal pain management to improve pain control.)       bisacodyl (DULCOLAX) 10 MG suppository Place 1 suppository (10 mg) rectally daily as needed for constipation (Use if Magnesium hydroxide (MILK of MAGNESIA) not effective after 24 hours. May discontinue if patient having bowel movement.)       carvedilol (COREG) 12.5 MG tablet Take 1 tablet (12.5 mg) by mouth 2 times daily (with meals)       diclofenac (VOLTAREN) 1 % topical gel Apply 2 g topically 4 times daily as needed       empagliflozin (JARDIANCE) 10 MG TABS tablet Take 1 tablet (10 mg) by mouth daily 90 tablet 1     hydrOXYzine (ATARAX) 25 MG tablet Take 1-2 tablets (25-50 mg) by mouth every 6 hours as needed for other or anxiety (adjuvant pain)       insulin aspart (NOVOLOG PEN) 100 UNIT/ML pen Dose = 1 units per 20  grams of carbohydate 15 mL      insulin aspart (NOVOLOG PEN) 100 UNIT/ML pen Inject 1-10 Units Subcutaneous 3 times daily (before meals) Correction Scale - HIGH INSULIN RESISTANCE DOSING     Do Not give Correction Insulin if Pre-Meal BG less than 140.   For Pre-Meal  - 164 give 1 unit.   For Pre-Meal  - 189 give 2 units.   For Pre-Meal  - 214 give 3 units.   For Pre-Meal  - 239 give 4 units.   For Pre-Meal  - 264 give 5 units.   For Pre-Meal  - 289 give 6 units.   For Pre-Meal  - 314 give 7 units.   For Pre-Meal  - 339 give 8 units.   For Pre-Meal  - 364 give 9 units.   For Pre-Meal BG greater than or equal to 365 give 10 units  To be given with prandial insulin, and based on pre-meal blood glucose.   Notify provider if glucose greater than or equal to 350 mg/dL after administration of  correction dose.  If given at mealtime, administer within 30 minutes of start of meal. 15 mL      insulin aspart (NOVOLOG PEN) 100 UNIT/ML pen Inject 1-7 Units Subcutaneous At Bedtime 15 mL      insulin glargine (LANTUS PEN) 100 UNIT/ML pen Inject 20 Units Subcutaneous every morning (before breakfast) 15 mL      lactulose (CHRONULAC) 10 GM/15ML solution Take 30 mLs (20 g) by mouth daily as needed for constipation       Lidocaine (LIDOCARE) 4 % Patch Place 2 patches onto the skin every 24 hours To prevent lidocaine toxicity, patient should be patch free for 12 hrs daily.       lisinopril (ZESTRIL) 20 MG tablet Take 1 tablet (20 mg) by mouth daily       magnesium hydroxide (MILK OF MAGNESIA) 400 MG/5ML suspension Take 30 mLs by mouth daily as needed for constipation (Use if preventive measures (senna-docusate, docusate, and polyethylene glycol) are not effective.)       melatonin 5 MG tablet Take 1 tablet (5 mg) by mouth At Bedtime       miconazole (MICATIN) 2 % external cream Apply topically 2 times daily       miconazole with skin protectant (TERESSA ANTIFUNGAL) 2 % CREA cream Apply topically 2 times daily       multivitamin w/minerals (THERA-VIT-M) tablet Take 1 tablet by mouth daily       naloxone (NARCAN) 0.4 MG/ML injection Inject 0.5 mLs (0.2 mg) into the vein every hour as needed for opioid reversal       ondansetron (ZOFRAN ODT) 4 MG ODT tab Take 1 tablet (4 mg) by mouth every 6 hours as needed for nausea or vomiting       ondansetron (ZOFRAN) 2 MG/ML SOLN injection Inject 2 mLs (4 mg) into the vein every 6 hours as needed for nausea or vomiting       oxyCODONE (ROXICODONE) 5 MG tablet Take 1 tablet (5 mg) by mouth every 4 hours as needed for moderate pain  0     polyethylene glycol (MIRALAX) 17 g packet Take 17 g by mouth daily       prochlorperazine (COMPAZINE) 10 MG tablet Take 1 tablet (10 mg) by mouth every 6 hours as needed for nausea or vomiting       senna-docusate (SENOKOT-S/PERICOLACE) 8.6-50 MG tablet  Take 1 tablet by mouth 2 times daily       simethicone (MYLICON) 80 MG chewable tablet Take 1 tablet (80 mg) by mouth every 6 hours as needed for cramping or flatulence       sodium chloride (OCEAN) 0.65 % nasal spray Spray 2 sprays into both nostrils every 2 hours (while awake)       spironolactone (ALDACTONE) 100 MG tablet Take 2 tablets (200 mg) by mouth daily       vitamin D2 (ERGOCALCIFEROL) 68590 units (1250 mcg) capsule Take 1 capsule (50,000 Units) by mouth every 7 days         Allergies:   No Known Allergies    Review of Symptoms:  A 10 point review of symptoms has been conducted and is negative except for that mentioned in the above HPI.    Physical Exam:    Blood pressure 126/82, pulse 60, temperature 98.6  F (37  C), temperature source Oral, resp. rate 18, weight 74.6 kg (164 lb 8 oz), SpO2 98 %.  Gen:    Lying in bed in NAD, A&OX3  HEENT: Normocephalic and atraumatic  CV:  RRR  Pulm:  Non-labored breathing  Abd:  Soft, non-tender, non-distended  Ext:  Warm and well perfused, RLE with chronic wounds with  granulation tissue visible on R lateral leg, as well as on R anterior shin, and two additional 1cm open punctate wounds on R shin, which are draining purulent fluid mixed with blood which is easily expressible on squeezing leg. Right shin tender to palpation, no erythema appreciated however, no fluctuance obviously surrounding wound. R medial ankle also with tenderness to palpation. L foot wound w/ dressing left in place. Strong palpable DP pulses bilaterally.     Labs:  CBC RESULTS:   Recent Labs   Lab Test 05/07/23 0723   WBC 14.1*   RBC 3.22*   HGB 8.5*   HCT 28.5*   MCV 89   MCH 26.4*   MCHC 29.8*   RDW 23.8*        Last Basic Metabolic Panel:  Lab Results   Component Value Date     05/07/2023      Lab Results   Component Value Date    POTASSIUM 4.3 05/07/2023    POTASSIUM 3.4 04/27/2023     Lab Results   Component Value Date    CHLORIDE 99 05/07/2023     Lab Results   Component Value  Date    KORIN 8.7 05/07/2023     Lab Results   Component Value Date    CO2 26 05/07/2023     Lab Results   Component Value Date    BUN 11.8 05/07/2023     Lab Results   Component Value Date    CR 0.79 05/07/2023     Lab Results   Component Value Date     05/07/2023       Imaging:  No imaging of RLE this admission. Previous relevant imaging results discussed in HPI.

## 2023-05-07 NOTE — PROGRESS NOTES
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 [facility], he developed new onset double vision and severe headache which prompted imaging that showed a large mass invading the cavernous sinuses and impinging on the cranial nerves. He is transferred to [facility] for this pituitary adenoma, concern for progression vs hemorrhage/apoplexy. Complicated by psychosis/metabolic encephalopathy and electrolyte abnormalities secondary to Cushing's syndrome. Underwent endoscopic endonasal transcavernous approach for resection of functional pituitary adenoma 4/27.     Updates today:  - [facility] -- no beds today  - rapid response called last night due to hypotension (SBP 80s), see RRT note  - infectious workup largely unremarkable thus far  - TTE ordered given elevated troponins and new hypotension  - hold carvedilol, lasix, lisinopril, spironolactone   - per endocrine, start hydrocortisone 20 mg QAM, 10 mg in the afternoon    #Hypotension  RRT called 5/6/23 PM for softer SBPs, generally 80s/50s. Pt noted to have normal to moderately hypertensive blood pressures for most of the hospitalization; SBPs in 80s unusual for patient. Normal and unchanged PO intake. BP response to 500 ml bolus. All blood pressure meds held. Infectious workup largely unremarkable. Neurosurg evaluated, does not think patient needs further meningitis workup at this time. Per endocrine, while has normal level cortisol, patients  with longstanding cushing's disease can sometimes have glucocorticoid withdrawal syndrome once cortisol normalizes.  - hold carvedilol, lasix, lisinopril, and spironolactone  - will need to restart spironolactone first given indication (cushing's disease)   - start hydrocortisone 20 mg QAM, 10 mg daily in the afternoon  - TTE for cardiac eval  - s/p 500 ml bolus  - encourage PO intake  - infectious workup    - follow-up blood cultures - NGTD   - UA negative   - CXR - negative   - lactate 1.8   - procal 0.28   - trend WBC (downtrending), CRP  - cortisol 17.8 5/7/23   - trop 177-->170-->172    #Type II MI   # HFmrEF, with global hypokinesis  # Hypertension  # High burden of PVCs  # Prolonged QTc   # Elevated troponin, down-trended  Coronary angiogram on 2/27 showed normal coronaries. Trop mildly elevated on admit 88; downtrended to 77 without targeted intervention. Was noted to have high burden of PVCs on tele. TTE 4/6/2023 showed EF 40-45%, severe diffuse hypokinesis, normal RV function, and mild-moderate aortic regurgitation; overall, not significantly changed from prior TTE 2/17/23. 5/6/23, pt w/ new episode of hypotension of unclear cause. Troponin checked and 177-->170-->172. Most likely 2/2 demand ischemia in s/o hypotension. Patient denied chest pain. EKG with sinus rhythm, LVH, RBBB, and with t wave inversions more evident in anterior leads as compared to prior EKG 4/23/23.   - Repeat TTE given hew hypotension, elevated troponins  - troponin plateaued, will not recheck unless clinically changes  - Q4H vital signs  - recheck EKG, trop if chest pain occurs  - Lasix 40mg daily--held since surgery on 4/27. Restarted 4/30. Held 5/6   - Coreg and lisinopril held for surgery 4/27.    - Carvedilol 12.5 mg BID restarted 4/29. Held 5/6  - Lisinopril restarted 4/30 and titrated up to prior dose 20 mg daily on 5/1. Held 5/6  - ASA - restarted 5/5/23  - Avoid QTc prolonging medications    # Pituitary adenoma resection this  admission, c/b significant scar tissue to cavernous sinus, 4/27/23  #R eye ptosis, esotropia  # ACTH secreting pituitary adenoma c/b type II DM, hypothyroidism and low testosterone s/p two partial resections in 2021     Patient noted double vision and severe headache beginning the evening of 3/25. CT imaging on 3/25 revealed a large sellar/suprasellar mass extending into the cavernous sinuses with no evidence of acute stroke. On 3/28, he underwent an MRI which confirmed the CT findings of a large mass invading the cavernous sinuses and impinging on the cranial nerves. Necrosis extending beyond left cavernous sinus. [facility]. Repeat MRI performed on 4/6/23: compared to 2021 MRI, lesions are smaller. Orbit MRI without optic nerve compression and no evidence of orbital infection.  - 4/27 neurosurgery and ENT combined to perform endoscopic endonasal transcavernous approach for resection of functional pituitary adenoma. Complicated by significant scar tissue to cavernous sinus. Good post-op recovery. Follow up MRI done 5/29. Neurosurgery okay with [facility] to [facility] once bed is [facility]. They will arrange follow up.   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  - Endocrinology managing. Current  regimen:   - Lantus 22 units daily.  - Carb coverage to 1 units/20 g CHO with meals and snacks  - High-dose sliding scale insulin for AC & HS  - Jardiance 10 mg daily  - hypoglycemia protocol  - Accu checks AC & HS    # Post-surgical Leukocytosis  # Buttocks, sacrum and bilateral groin wounds   # RLE wound from puncture injury   # L dorsal foot wound from presumed trauma   # Soft tissue infection/abscess    # Hx serratia bacteremia in December 2022   For patient's lower extremity wounds and hx of Serratia bacteremia in December, he was initially [facility]olin [facility]. His antibiotics were broadened to Vanc and Zosyn and ultimately he was transitioned to ceftazidime on 3/23 with plan to complete course on 4/4/23. BCx negative and wound cx grew serratia marcescens at the [facility]. He has been on ceftazidime since. MRI of the right tib/fib on 3/23 was negative for osteomyelitis but did show two fluid collections draining sponateneously. Ortho was consulted at the [facility] and determined no intervention was needed as wounds were draining on their own.  - trend WBC, recheck ordered for AM (5/5/23)   - completed course of ceftazidime (3/23 - 4/5)  - elevated WBC since surgery. Suspect just post-surgical stress response. No fever. CXR done to check PICC position with no infiltrate. Wound appear unchanged.     - Repeat blood cultures/UA 5/1 due to rising WBC after surgery    - blood cx 5/1 NGTD    - UA negative   - repeat WOC consult 5/2/23 to re-assess chronic lower extremity wounds - wounds stable, no evidence of active infection  - PT/OT   - Pain: tylenol 975 mg Q6H PRN, oxycodone 5 mg Q6H PRN    # Acute metabolic encephalopathy - resolved  # Pyruria, Candiduria - resolved  # Encephalopathy likely secondary to cushing's disease, resolved  Admission symptoms included disorientation, intermittently following directions. Repetitive words - perseverating on particular phrases. Sx started the 2-3 days prior to admission (which  patient was the [facility]). Vulnerable brain (multiple brain surgeries), enlarging pituitary mass. No seizure activity per EEG. Steroids removed and pt still encephalopathic. Sodium had been in normal limits and patient remained altered. Worked up for infections - did reveal candiduria but unlikely this was etiology, though he did complete an 8 day course of fluconazole. Briefly on ceftriaxone but encephalopathy also did not improve. CT abd w/ contrast (4/10): no evidence of pyelonephritis  Ultimately attributed to Cushings and known pituitary tumor. Continues to remain lucid.  - Surgical management per neurosurgery  - Delirium precautions  - Electrolyte management as above  - PRN quetiapine if agitation that is not responsive to behavioral interventions      # Hypogonadism   - hold PTA androgel (would need to bring in home medication, not accessible at this time)      # Chronic microcytic anemia   Hgb of 8.4 on [facility] at the [facility]. Peripheral smear on 3/30 showed poikilocytosis with occasional red blood cell fragments but no other evidence of hemolysis.  Haptoglobin was normal.  Ferritin was 91, transferrin saturation was low, B12 was 495 and folate was 8.7. Likely combination of iron deficiency as well as inflammation/chronic disease.   - CTM        # Concern for malnutrition   - Nutrition following    # Delusions, intermittent, resolved  Intermittent delusions regarding staff stalking patient while in the hospital. Unclear chronicity of these delusions - but has had them multiple times this hospitalization. Unclear psych history. Medical management for encephalopathy as stated above. Psychiatry was consulted during his care and agreed with paranoia and recommended Abilify. He was briefly on Abilify 5mg but patient then started to refuse nightly so this was discontinued. His paranoia and delusions have been very intermittent and appear to be associated with his anxiety.     Discharge Needs  - Follow up in 6  weeks with Dr Jeong, neurosurgery (message sent to schedulers),  - MRI pituitary in 6 weeks before discharge (message sent to schedulers)  - Upright lumbar xrays to evaluate his compression fractures (ordered by neurosurgery)   - outpatient follow-up w/ ENT -- contact ENT when transfers to the [facility] to schedule follow-up for splint removal in clinic       Diet: Regular Diet Adult  Diet  Snacks/Supplements Adult: Glucerna; Between Meals    DVT Prophylaxis: Pneumatic Compression Devices  Roland Catheter: Not present  Lines:   PICC 04/06/23 Double Lumen Right Basilic access-Site Assessment: WDL      Cardiac Monitoring: None  Code Status: Full Code      Clinically Significant Risk Factors              # Hypoalbuminemia: Lowest albumin = 2.9 g/dL at 4/11/2023  7:07 AM, will monitor as appropriate          # DMII: A1C = 9.5 % (Ref range: <5.7 %) within past 6 months    # Severe Malnutrition: based on nutrition assessment        Disposition Plan            Addendum 5/1/2023  Gustavo is very eager to leave [facility] and get to the [facility]. He attempted to leave the hospital rather than wait for bed availability to the [facility]. His sister Pilar, was willing to take him from the hospital but was planning to take him directly to the [facility] ER. However, due to his immobility, this is much more complicated than a simple discharge would require her finding a way to get him in a cab (she does not have a car) vs arranging stretcher transport to her home and then her calling 911 to have him brought to the [facility] ER.     - The [facility] is making him a high priority for transfer but wanted to stress that if he leaves this hospital AMA, that this hospitalization would not be paid for by the VA, so it is definitely in his best interest to remain in the hospital.   - I do not believe that he is fully decisional, but if his sister were in agreement to his going then, I don't think we could stop them and would need to arrange as safe of an outpatient plan as possible.              I have personally reviewed the following data over the past 24 hrs:    15.2 (H)  \   8.5 (L)   / 399     137 99 15.1 /  131 (H)   3.9 29 0.79 \       ALT: 54 (H) AST: 30 AP: 232 (H) TBILI: 0.2   ALB: 3.0 (L) TOT PROTEIN: 6.1 (L) LIPASE: N/A       Trop: 172 (HH) BNP: 1,162 (H)       Procal: 0.28 (H) CRP: 163.00 (H) Lactic Acid: 1.8           Leela Verdin MD  Date of Service (when I saw the patient): 05/07/2023      _________________________________________________________    Interval History    Overnight, RRT called for softer Bps SBP 80s. Initially concern for somnolence/lethargy, but woke up and interacting normally. See RT note for further details    Today, patient is feeling better. He wants to [facility] to the [facility]. He feels he is constipated and wants stool softeners. Does not feel lightheaded, dizzy. Denies headache. Denies fever, chest pain, shortness of breath. Endorses feeling gas pain and would like medicine for that.      Physical Exam   Vital Signs: Temp: 98.9  F (37.2  C) Temp src: Axillary BP: 112/82 Pulse: 101   Resp: 18 SpO2: 98 % O2 Device: None (Room air)    Weight: 164 lbs 8 oz    Gen: Sitting up in bed, no acute distress  ENT: MMM, R eye ptosis, nasal harness off, no active drainage from nares  Cardiac: RRR, no MRG  Resp: breathing comfortably, non-labored. CTAB  CV: RRR, ext WWP, mild bilateral lower extremity edema L>R  Skin: multiple LE wounds w/ c/d/i dressings. Venous stasis skin changes L>R lower extremity \      Data     I have personally reviewed the following data over the past 24 hrs:    15.2 (H)  \   8.5 (L)   / 399     137 99 15.1 /  131 (H)   3.9 29 0.79 \       ALT: 54 (H) AST: 30 AP: 232 (H) TBILI: 0.2   ALB: 3.0 (L) TOT PROTEIN: 6.1 (L) LIPASE: N/A       Trop: 172 (HH) BNP: 1,162 (H)       Procal: 0.28 (H) CRP: 163.00 (H) Lactic Acid: 1.8

## 2023-05-07 NOTE — PROGRESS NOTES
[facility] [place]   05/07/2023  Neurosurgery Progress Note:    Assessment:  Gustavo Faria is a 58 year old male with a PMH of DMII, HFrEF, BLE open wounds, history of serratia bacteremia in December 2022,  pituitary ACTH secreting macroadenoma s/p EEA for resection on 5/2021 and 7/2021 in [place], with baseline right CN 3 palsy, who was admitted at the [facility] on 3/23. He developed sudden headache and vision changes on 3/25, with MRI brain on 3/28, which demonstrated enlargement of known pituitary adenoma, with necrosis extending laterally beyond the left cavernous sinus, concerning for compression of cranial nerves at cavernous sinus. 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      Patient is POD-10 s/p Endoscopic endonasal transcavernous approach for resection of functional pituitary adenoma with harvesting of right-sided pedicled nasoseptal flap, which was pedicled off the posterior septal branch of the sphenopalatine artery    Plan:  - Watch for DI/CSF leak  - Follow urine output/ inputs/ outputs  - Check AM cortisol  - Consider cardiology consult  - Please inform Neurosurgery if there is any change in exam  - Recommend Ophthalmology referral for management of ptosis/esotropia    Follow up plan  - Follow up in 6 weeks with Dr Jeong (message sent to schedulers)  - MRI pituitary in 6 weeks before discharge (message sent to schedulers)  - Upright lumbar xrays to evaluate his compression fractures (ordered for you)  -----------------------------------  Kelvin Cheng MD, PhD  PGY-2 Neurosurgery  Please page NSGY on call with questions    Please contact neurosurgery resident on call with questions.    Dial * [phone], enter [phone] when prompted.      Interval History: Rapid response called for hypotension. Pt otherwise stable with no chest pain.  Troponins elevated. EKG with left axis deviation and RBBB, follow up from primary team pending. CRP elevated, cultures pending.       Objective:   Temp:  [97.9  F (36.6  C)-98.9  F (37.2  C)] 98.9  F (37.2  C)  Pulse:  [] 101  Resp:  [16-20] 18  BP: ()/(56-91) 112/82  Cuff Mean (mmHg):  [68-80] 80  SpO2:  [95 %-100 %] 98 %  I/O last 3 completed shifts:  In: 1450 [P.O.:1440; I.V.:10]  Out: 2675 [Urine:2675]    NEUROLOGIC:  --Eyes open, following commands, oriented x3  Cranial Nerves:  -- visual fields full to confrontation although with limited participation, PERRL anisocoric L>R and sluggish, left CN III paresis and CN VI palsy, restricted ROM in right EOM with no apparent asymmetry   -- face symmetrical, tongue midline  -- sensory V1-V3 intact bilaterally  -- palate elevates symmetrically, uvula midline  -- hearing grossly intact bilat     Motor:  Antigravity x4 extremities     Sensory:  intact to LT x 4 extremities      Reflexes:       Bi Tri BR Omi Pat Ach Bab     C5-6 C7-8 C6 UMN L2-4 S1 UMN   R 2+ 2+ 2+ Norm 2+ 2+ Norm   L 2+ 2+ 2+ Norm 2+ 2+ Norm      Gait: Deferred.     LABS:  Recent Labs   Lab 05/07/23  0816 05/07/23  0214 05/06/23  2206 05/06/23  1921 05/05/23  1112 05/05/23  0720 05/02/23  0733 05/02/23  0653   NA  --   --   --  137  --  140  --  143   POTASSIUM  --   --   --  3.9  --  3.8  --  4.0   CHLORIDE  --   --   --  99  --  100  --  101   CO2  --   --   --  29  --  29  --  31*   ANIONGAP  --   --   --  9  --  11  --  11   * 131* 214* 153*   < > 148*   < > 151*   BUN  --   --   --  15.1  --  12.1  --  12.8   CR  --   --   --  0.79  --  0.74  --  0.65*   KORIN  --   --   --  8.9  --  8.6  --  8.7    < > = values in this interval not displayed.       Recent Labs   Lab 05/07/23  0723   WBC 14.1*   RBC 3.22*   HGB 8.5*   HCT 28.5*   MCV 89   MCH 26.4*   MCHC 29.8*   RDW 23.8*          IMAGING:  Recent Results (from the past 24 hour(s))   XR Chest Port 1 View    Narrative     Exam: XR CHEST PORT 1 VIEW, 5/6/2023 7:50 PM    Indication: concern for sepsis    Comparison: 4/30/2023    Findings:   Right upper extremity PICC tip at the mid SVC. Stable enlarged cardiac  silhouette. The pulmonary vasculature is within normal limits. No  pleural effusion or pneumothorax. Scattered peripheral linear  opacities, greatest in the left midlung. Resolved streaky right  basilar opacities seen on 4/30/2023. Chronic anterior left second rib  fracture deformity.      Impression    Impression:   1. No convincing acute airspace disease.  2. Scattered peripheral linear opacities are most likely atelectasis  or scarring.  2. Stable enlarged cardiac silhouette.    JESSICA SALAMANCA DO         SYSTEM ID:  [id number]

## 2023-05-07 NOTE — PLAN OF CARE
Status: POD #9 pituitary adenoma resection  Vitals: VS fluctuating, during VS before 1800 coreg and BP was 81/57 after multiple checks. Rapid called, read rapid response note. Pt stabilized.   Neuros: unchanged: A&O x4, forgetful, repeated requests, anxiety, whispered speech, R ptosis, pupils sluggish/fixed, bilateral eyes not tracking past opposite midline, BUE 4/5, BLE 3/5, N/T to BLE  IV: RUE DL PICC,Red HL, Purple SL. Pt reported doing lena CHG wipes at 1300.  Labs/Electrolytes: WNL  Resp/trach: Stable on RA        Diet: Regular diet, strict I's and O's however does not comply with carb counting for carb coverage insulin with frequent snacks in room from home  Bowel status: LBM 5/5  : Voiding spontaneously with bedside urinal, strict I's and O's  Skin:BLE wounds are covered w/ mepilex changed CDI,  Buttocks wounds SUKH, patient refuses intervention for buttocks wounds. Refuses repositioning. fungal cream self applied in PM  Pain: Pt requested oxy, but oxy was held by team due to low BP. Patient declined multiple offers of tylenol  Activity: Up with assist of 1-2, GB/walker to pivot, Writer informed pt that staff will not be using lift anymore to transfer routinely, reinforced discharge of lift. Pt not OOB this shift  Social: No visitors this shift  Plan: [facility]waiting [facility] bed for [facility]  Updates this shift: VS before 1800 coreg and BP was 81/57 after multiple checks. Rapid called, read rapid response note. Pt stabilized. Patient stable after RRT intervention, bolus LR given and completed.       Pneumoboots in place:  yes     Gloria and Chloe

## 2023-05-07 NOTE — PROGRESS NOTES
Care Management Follow Up    Length of Stay (days): 34    Expected Discharge Date:  TBD pending [facility] bed availability     Concerns to be Addressed: Hospital to hospital transfer     Patient plan of care discussed at interdisciplinary rounds: Yes    Anticipated Discharge Disposition: Transitional Care      Anticipated Discharge Services: Continued inpatient care   Anticipated Discharge DME: None    Patient/family educated on Medicare website which has current facility and service quality ratings: N/A   Education Provided on the Discharge Plan:  Yes  Patient/Family in Agreement with the Plan:  Yes    Referrals Placed by CM/SW:  Followed up on previous referral for transfer to [facility]  Private pay costs discussed: Not applicable    Additional Information:  Called [facility] Patient Placement: [phone] at 0915 today to inquire re: bed availability for transfer today. No beds available this morning, but Pt Placement recommended I call back after 1400.      Called again at 1553 and spoke with Carm[facility] Pt Placement and received same answer; no inpatient beds today. Asked about potential for tomorrow and she was unable to anticipate. She made a note in the chart to call Cristina Dominguez RN CC 6A when bed is available [phone].     RNCC will continue to contact [facility] daily for information on bed availability. I updated Mr. Faria by phone on lack of progress and he verbalized understanding. Also updated bedside RN this morning and afternoon.       Galilea Clancy, RN, PHN  RN Care Coordinator (Casual) - Covering 6A and 6B [facility] on 5/7 only pager [phone]  [address] [address]   [email or web address]   Casual Employee - Weekend Coverage only  To contact the weekend RNCC  [place] (0800 - 1630) Saturday and Sunday    Units: 4A, 4C, 4E, 5A and 5B  Pager: [phone]    Units: 6A, 6B  Pager: [phone]  - Units: 6C, 6D Pager:  [phone]    Units: 7A, 7B, 7C, 7D, and 5C  Pager: [phone]     [facility] ([id number]) Saturday and Sunday    Units: [facility] ED, 5 Ortho, 5 Med/Surg, 6 Med/Surg, 8A, 10 ICU, & Pediatric Units Pager: [phone]

## 2023-05-07 NOTE — PROGRESS NOTES
Otolaryngology Progress Note  May 7, 2023    Subjective/Intvl events: Rapid response called due to hypotension which improved with a fluid bolus. He feels well now.  He denies clear drainage from his nose or salty/metallic taste. He is requesting stool softeners asap.  [facility].       O: /82 (BP Location: Left arm)   Pulse 101   Temp 98.9  F (37.2  C) (Axillary)   Resp 18   Wt 74.6 kg (164 lb 8 oz)   SpO2 98%   BMI 26.55 kg/m     General: Alert and oriented x 3, no acute distress   HEENT: Bilateral lateral gaze palsy and restricted EOM on the right, facial movement symmetric, Oropharynx clear. No nasal drainage.    Pulmonary: Breathing non-labored, no stridor, no accessory muscle use.        A/P: Gustavo Faria is a 58 year old male with a past medical history of pituitary ACTH secreting macroadenoma s/p endoscopic endonasal transcavernous approach for resection of functional pituitary adenoma w/ nasoseptal flap on 4/28. He is recovering well post-operatively without signs of CSF leak.    - Serial neuro exams  - Nasal saline QID  - Appreciate Endo following management of Cushing's and DMII, Medicine management of patient's hypotension.   - Sinus precautions: No nose blowing, sneeze with mouth open, no straining, and scheduled bowel regimen  - Follow-up scheduled in 2 weeks to remove Jean Baptiste splints  - Rest of care per primary team    -- Patient and above plan discussed with Dr. Adan Gibson MD PGY3  Otolaryngology-Head & Neck Surgery  Please contact ENT with questions by dialing [phone] and entering job [phone] when prompted.

## 2023-05-07 NOTE — CODE/RAPID RESPONSE
Rapid Response Team Note    Assessment   In assessment a rapid response was called on Gustavo Faria due to hypotension and somnolence. Systolic BP 86/56. Rechecked several times with systolic BP generally in 80s (a few in 70s) with MAPs remaining >65. Patient mentating okay, though has been more somnolent throughout the day. Cause at this point is uncertain; will initiate work up for infection and cardiac etiologies. Possible component of dehydration as well.    Plan    - 500cc LR bolus over 2 hours after discussion with primary. Caution with IVF as hypokinesis and EF 40-45% on TTE 4/3/23.  - VBG for somnolence, although patient more awake and joking pleasantly with staff prior to this writer exiting his room. Reports he did not get much sleep last night.  - Concern for possible infection: CXR, UA, CBC, procalc, lactate, blood cultures. Wounds possible alternative source (WOC following).  - Concern for possible cardiac etiology contributing to hypotension, though patient does not endorse any chest pain/pressure. EKG, troponin and BNP ordered.  - Neurosurgery present at bedside during RRT and indicated neuro status seems to be at baseline and they do not recommend Head CT at this time. Appreciate their assistance.  - Anti-hypertensives placed on hold for now. Please resume when appropriate.  - Oxycodone placed on hold for now. Please resume when appropriate.  - The [facility] primary team was able to be reached and they are in agreement with the above plan.  - Disposition: The patient will remain on the current unit. We will continue to monitor this patient closely.  - Reassessment and plan follow-up will be performed by the primary team    Dyllan Haas PA-C  [facility] RRT [facility] Job Code Contact #[phone]  [facility] Paging/Directory    Hospital Course   Brief Summary of events leading to rapid response:   Called to see patient secondary to hypotension with lowest systolic BP in 70s. Generally mid-80s with MAP >65.  Additionally patient has been somnolent today. Patient reports to me he feels cold and tired. Low grade temp 99.4F at bedside. No chest pain/pressure, shortness of breath, nausea, vomiting, abdominal pain, urinary complaints. Does have several wounds and being followed by [facility].    Admission Diagnosis:   Pituitary adenoma (H) [D35.2]    Physical Exam   Temp: 98  F (36.7  C) Temp  Min: 97.7  F (36.5  C)  Max: 98.2  F (36.8  C)  Resp: 16 Resp  Min: 16  Max: 16  SpO2: 100 % SpO2  Min: 97 %  Max: 100 %  Pulse: 81 Pulse  Min: 63  Max: 91    No data recorded  BP: (!) 86/56 Systolic (24hrs), Av , Min:81 , Max:114   Diastolic (24hrs), Av, Min:56, Max:79     I/Os: I/O last 3 completed shifts:  In: 2440 [P.O.:2440]  Out: 1840 [Urine:1840]     Exam:   GENERAL: Alert and oriented x 4. Sleepy on arrival but did awaken appropriately to voice, much more interactive once tests began (EKG, CXR, labs). Well nourished, well developed.  No acute distress.    HEENT: Normocephalic, atraumatic. Anicteric sclera. Mucous membranes moist.   CV: RRR. S1, S2. No murmurs appreciated.   RESPIRATORY: Effort normal on room air. Lungs CTAB with no wheezing, rales, or rhonchi.   GI: Abdomen soft and non distended, bowel sounds present x all 4 quadrants. No tenderness, rebound, or guarding.     Significant Results and Procedures   Lactic Acid:   Recent Labs   Lab Test 23  1939 23  1720 23  1607   LACT 2.1* 2.0 1.7     CBC:   Recent Labs   Lab Test 23  0720 23  0735 23  0653   WBC 16.3* 16.3* 16.4*   HGB 8.4* 8.7* 8.9*   HCT 28.4* 29.3* 30.1*    354 325

## 2023-05-07 NOTE — PROGRESS NOTES
Shift [id number]     Neuro:  A&Ox4. Forgetful. Frequent repeated requests.  R eyelid ptosis. Sluggish pupils. Not tracking past midline. Whispered speech.  Cardiac: Afebrile, VSS. No c/o chest pain/pressure   Respiratory: On room air. Denies SOB  GI/: Voiding spontaneously. No BM this shift. Senna given. Last BM 5/5  Diet/appetite: Regular diet- tolerating well. No complaints of nausea.   BG's ACHC plus Carb coverage.  Frequent snacking makes it difficult to keep track of   carbs.   Activity: Up with assist of 2 and lift to wheelchair. Pt was taken outside for 15 minutes.   Pain: . Bilateral LE pain when touching his wounds.   Skin:    Bilateral LE dressings changed. Buttocks and Sacrum wounds wit    barrier cream-open to air.   Lines: dbl lumen PICC- Heplocked. Dressing changed today.    Plan: awaiting bed availability at [facility]      Able to make needs known. Continue to monitor. Notify MD of changes/concerns

## 2023-05-07 NOTE — PHARMACY-VANCOMYCIN DOSING SERVICE
Pharmacy Vancomycin Initial Note  Date of Service May 7, 2023  Patient's  1965  58 year old, male    Indication: Abscess and Skin and Soft Tissue Infection    Current estimated CrCl = Estimated Creatinine Clearance: 108.9 mL/min (based on SCr of 0.79 mg/dL).    Creatinine for last 3 days  2023:  7:20 AM Creatinine 0.74 mg/dL  2023:  7:21 PM Creatinine 0.79 mg/dL  2023:  7:23 AM Creatinine 0.79 mg/dL    Recent Vancomycin Level(s) for last 3 days  No results found for requested labs within last 3 days.      Vancomycin IV Administrations (past 72 hours)      No vancomycin orders with administrations in past 72 hours.                Nephrotoxins and other renal medications (From now, onward)    Start     Dose/Rate Route Frequency Ordered Stop    23 1800  vancomycin (VANCOCIN) 1000 mg in dextrose 5% 200 mL PREMIX         1,000 mg  200 mL/hr over 1 Hours Intravenous EVERY 12 HOURS 23 1723      23 0000  empagliflozin (JARDIANCE) 10 MG TABS tablet         10 mg Oral DAILY 23 1211      23 0000  lisinopril (ZESTRIL) 20 MG tablet         20 mg Oral DAILY 23 1211      23 0800  [Held by provider]  lisinopril (ZESTRIL) tablet 20 mg        (Held by provider since Sat 2023 at 1858 by Dyllan Haas, PAAidanC.Hold Reason: Other)    20 mg Oral DAILY 23 0730      23 1230  [Held by provider]  furosemide (LASIX) tablet 40 mg        (Held by provider since Sat 2023 at 1858 by Dyllan Haas, PAAidanC.Hold Reason: Other)    40 mg Oral DAILY 23 1207      23 1330  empagliflozin (JARDIANCE) tablet 10 mg         10 mg Oral DAILY 23 1320            Contrast Orders - past 72 hours (72h ago, onward)    None          [employer] Prediction of Planned Initial Vancomycin Regimen  Loading dose: N/A  Regimen: 1000 mg IV every 12 hours.  Start time: 17:24 on 2023  Exposure target: AUC24 (range)400-600 mg/L.hr   AUC24,ss: 470 mg/L.hr  Probability of  AUC24 > 400: 67 %  Ctrough,ss: 14.3 mg/L  Probability of Ctrough,ss > 20: 23 %  Probability of nephrotoxicity (Lodise STANLEY 2009): 9 %          Plan:  1. Start vancomycin  1000 mg IV q12h.   2. Vancomycin monitoring method: AUC  3. Vancomycin therapeutic monitoring goal: 400-600 mg*h/L  4. Pharmacy will check vancomycin levels as appropriate in 1-3 Days.    5. Serum creatinine levels will be ordered daily for the first week of therapy and at least twice weekly for subsequent weeks.      Florentino Shen RPH

## 2023-05-07 NOTE — PROGRESS NOTES
Brief Endocrine Note    Received a call from primary team regarding low BP. To rule out glucocorticoid withdrawal syndrome after surgery, we will start hydrocortisone 20 mg in AM and 10mg in PM. Discussed with [facility] team and they will order it.    Attending tie-in note  I discussed about the case with my fellow Dr. Webster. Agree above note and plan to cover possible relative AI, although cortisol level is good high.       Wendy Tran MD  Staff Physician  Endocrinology and Metabolism  [facility]  License: [id number]  Pager: [phone]

## 2023-05-08 ENCOUNTER — APPOINTMENT (OUTPATIENT)
Dept: CARDIOLOGY | Facility: CLINIC | Age: 58
DRG: 614 | End: 2023-05-08
Attending: STUDENT IN AN ORGANIZED HEALTH CARE EDUCATION/TRAINING PROGRAM
Payer: COMMERCIAL

## 2023-05-08 LAB
ACTH PLAS-MCNC: 137 PG/ML
ANION GAP SERPL CALCULATED.3IONS-SCNC: 12 MMOL/L (ref 7–15)
ATRIAL RATE - MUSE: 83 BPM
BUN SERPL-MCNC: 10.7 MG/DL (ref 6–20)
CALCIUM SERPL-MCNC: 8.3 MG/DL (ref 8.6–10)
CHLORIDE SERPL-SCNC: 100 MMOL/L (ref 98–107)
CREAT SERPL-MCNC: 0.97 MG/DL (ref 0.67–1.17)
CRP SERPL-MCNC: 153 MG/L
DEPRECATED HCO3 PLAS-SCNC: 26 MMOL/L (ref 22–29)
DIASTOLIC BLOOD PRESSURE - MUSE: NORMAL MMHG
ERYTHROCYTE [DISTWIDTH] IN BLOOD BY AUTOMATED COUNT: 23.9 % (ref 10–15)
GFR SERPL CREATININE-BSD FRML MDRD: >90 ML/MIN/1.73M2
GLUCOSE BLDC GLUCOMTR-MCNC: 102 MG/DL (ref 70–99)
GLUCOSE BLDC GLUCOMTR-MCNC: 118 MG/DL (ref 70–99)
GLUCOSE BLDC GLUCOMTR-MCNC: 163 MG/DL (ref 70–99)
GLUCOSE BLDC GLUCOMTR-MCNC: 166 MG/DL (ref 70–99)
GLUCOSE BLDC GLUCOMTR-MCNC: 66 MG/DL (ref 70–99)
GLUCOSE BLDC GLUCOMTR-MCNC: 68 MG/DL (ref 70–99)
GLUCOSE BLDC GLUCOMTR-MCNC: 97 MG/DL (ref 70–99)
GLUCOSE SERPL-MCNC: 112 MG/DL (ref 70–99)
HCT VFR BLD AUTO: 26.9 % (ref 40–53)
HGB BLD-MCNC: 7.9 G/DL (ref 13.3–17.7)
INTERPRETATION ECG - MUSE: NORMAL
LVEF ECHO: NORMAL
MCH RBC QN AUTO: 26.4 PG (ref 26.5–33)
MCHC RBC AUTO-ENTMCNC: 29.4 G/DL (ref 31.5–36.5)
MCV RBC AUTO: 90 FL (ref 78–100)
P AXIS - MUSE: -1 DEGREES
PLATELET # BLD AUTO: 411 10E3/UL (ref 150–450)
POTASSIUM SERPL-SCNC: 3.9 MMOL/L (ref 3.4–5.3)
PR INTERVAL - MUSE: 146 MS
QRS DURATION - MUSE: 140 MS
QT - MUSE: 424 MS
QTC - MUSE: 498 MS
R AXIS - MUSE: -33 DEGREES
RBC # BLD AUTO: 2.99 10E6/UL (ref 4.4–5.9)
SODIUM SERPL-SCNC: 138 MMOL/L (ref 136–145)
SYSTOLIC BLOOD PRESSURE - MUSE: NORMAL MMHG
T AXIS - MUSE: 40 DEGREES
VENTRICULAR RATE- MUSE: 83 BPM
WBC # BLD AUTO: 13 10E3/UL (ref 4–11)

## 2023-05-08 PROCEDURE — 250N000013 HC RX MED GY IP 250 OP 250 PS 637

## 2023-05-08 PROCEDURE — 258N000001 HC RX 258: Performed by: STUDENT IN AN ORGANIZED HEALTH CARE EDUCATION/TRAINING PROGRAM

## 2023-05-08 PROCEDURE — 10061 I&D ABSCESS COMP/MULTIPLE: CPT | Mod: 79 | Performed by: SURGERY

## 2023-05-08 PROCEDURE — 250N000011 HC RX IP 250 OP 636

## 2023-05-08 PROCEDURE — 120N000002 HC R&B MED SURG/OB UMMC

## 2023-05-08 PROCEDURE — 87075 CULTR BACTERIA EXCEPT BLOOD: CPT | Performed by: STUDENT IN AN ORGANIZED HEALTH CARE EDUCATION/TRAINING PROGRAM

## 2023-05-08 PROCEDURE — 0Y9H0ZZ DRAINAGE OF RIGHT LOWER LEG, OPEN APPROACH: ICD-10-PCS | Performed by: SURGERY

## 2023-05-08 PROCEDURE — 999N000208 ECHOCARDIOGRAM COMPLETE

## 2023-05-08 PROCEDURE — 82533 TOTAL CORTISOL: CPT | Performed by: STUDENT IN AN ORGANIZED HEALTH CARE EDUCATION/TRAINING PROGRAM

## 2023-05-08 PROCEDURE — 250N000011 HC RX IP 250 OP 636: Performed by: INTERNAL MEDICINE

## 2023-05-08 PROCEDURE — 99233 SBSQ HOSP IP/OBS HIGH 50: CPT | Performed by: INTERNAL MEDICINE

## 2023-05-08 PROCEDURE — 99231 SBSQ HOSP IP/OBS SF/LOW 25: CPT | Mod: 24 | Performed by: SURGERY

## 2023-05-08 PROCEDURE — 99233 SBSQ HOSP IP/OBS HIGH 50: CPT | Mod: 24 | Performed by: INTERNAL MEDICINE

## 2023-05-08 PROCEDURE — 93306 TTE W/DOPPLER COMPLETE: CPT | Mod: 26 | Performed by: STUDENT IN AN ORGANIZED HEALTH CARE EDUCATION/TRAINING PROGRAM

## 2023-05-08 PROCEDURE — 255N000002 HC RX 255 OP 636: Performed by: INTERNAL MEDICINE

## 2023-05-08 PROCEDURE — 85027 COMPLETE CBC AUTOMATED: CPT

## 2023-05-08 PROCEDURE — 86140 C-REACTIVE PROTEIN: CPT

## 2023-05-08 PROCEDURE — 36592 COLLECT BLOOD FROM PICC: CPT

## 2023-05-08 PROCEDURE — 82947 ASSAY GLUCOSE BLOOD QUANT: CPT

## 2023-05-08 PROCEDURE — 250N000013 HC RX MED GY IP 250 OP 250 PS 637: Performed by: INTERNAL MEDICINE

## 2023-05-08 PROCEDURE — 87070 CULTURE OTHR SPECIMN AEROBIC: CPT | Performed by: STUDENT IN AN ORGANIZED HEALTH CARE EDUCATION/TRAINING PROGRAM

## 2023-05-08 PROCEDURE — 250N000013 HC RX MED GY IP 250 OP 250 PS 637: Performed by: STUDENT IN AN ORGANIZED HEALTH CARE EDUCATION/TRAINING PROGRAM

## 2023-05-08 PROCEDURE — 0Y9K0ZZ DRAINAGE OF RIGHT ANKLE REGION, OPEN APPROACH: ICD-10-PCS | Performed by: SURGERY

## 2023-05-08 PROCEDURE — 250N000009 HC RX 250

## 2023-05-08 RX ORDER — LORAZEPAM 2 MG/ML
1 INJECTION INTRAMUSCULAR
Status: COMPLETED | OUTPATIENT
Start: 2023-05-08 | End: 2023-05-08

## 2023-05-08 RX ORDER — CEFAZOLIN SODIUM 2 G/100ML
2 INJECTION, SOLUTION INTRAVENOUS
Status: CANCELLED | OUTPATIENT
Start: 2023-05-08

## 2023-05-08 RX ORDER — LIDOCAINE 40 MG/G
CREAM TOPICAL
Status: CANCELLED | OUTPATIENT
Start: 2023-05-08

## 2023-05-08 RX ORDER — HYDROMORPHONE HYDROCHLORIDE 1 MG/ML
0.5 INJECTION, SOLUTION INTRAMUSCULAR; INTRAVENOUS; SUBCUTANEOUS ONCE
Status: COMPLETED | OUTPATIENT
Start: 2023-05-08 | End: 2023-05-08

## 2023-05-08 RX ORDER — LIDOCAINE HYDROCHLORIDE 10 MG/ML
INJECTION, SOLUTION EPIDURAL; INFILTRATION; INTRACAUDAL; PERINEURAL
Status: COMPLETED
Start: 2023-05-08 | End: 2023-05-08

## 2023-05-08 RX ORDER — ENOXAPARIN SODIUM 100 MG/ML
40 INJECTION SUBCUTANEOUS
Status: CANCELLED | OUTPATIENT
Start: 2023-05-08

## 2023-05-08 RX ORDER — SODIUM CHLORIDE, SODIUM LACTATE, POTASSIUM CHLORIDE, CALCIUM CHLORIDE 600; 310; 30; 20 MG/100ML; MG/100ML; MG/100ML; MG/100ML
INJECTION, SOLUTION INTRAVENOUS CONTINUOUS
Status: CANCELLED | OUTPATIENT
Start: 2023-05-08

## 2023-05-08 RX ADMIN — DEXTROSE MONOHYDRATE 25 ML: 25 INJECTION, SOLUTION INTRAVENOUS at 22:15

## 2023-05-08 RX ADMIN — DEXTROSE MONOHYDRATE 50 ML: 25 INJECTION, SOLUTION INTRAVENOUS at 13:04

## 2023-05-08 RX ADMIN — HYDROCORTISONE 20 MG: 20 TABLET ORAL at 07:46

## 2023-05-08 RX ADMIN — HYDROXYZINE HYDROCHLORIDE 25 MG: 25 TABLET, FILM COATED ORAL at 11:11

## 2023-05-08 RX ADMIN — BISACODYL 10 MG RECTAL SUPPOSITORY 10 MG: at 08:01

## 2023-05-08 RX ADMIN — SALINE NASAL SPRAY 2 SPRAY: 1.5 SOLUTION NASAL at 15:56

## 2023-05-08 RX ADMIN — SALINE NASAL SPRAY 2 SPRAY: 1.5 SOLUTION NASAL at 19:41

## 2023-05-08 RX ADMIN — Medication 5 ML: at 17:30

## 2023-05-08 RX ADMIN — EMPAGLIFLOZIN 10 MG: 10 TABLET, FILM COATED ORAL at 07:46

## 2023-05-08 RX ADMIN — LIDOCAINE HYDROCHLORIDE 30 ML: 10 INJECTION, SOLUTION EPIDURAL; INFILTRATION; INTRACAUDAL; PERINEURAL at 13:13

## 2023-05-08 RX ADMIN — PIPERACILLIN AND TAZOBACTAM 3.38 G: 3; .375 INJECTION, POWDER, LYOPHILIZED, FOR SOLUTION INTRAVENOUS at 00:27

## 2023-05-08 RX ADMIN — HYDROMORPHONE HYDROCHLORIDE 0.5 MG: 1 INJECTION, SOLUTION INTRAMUSCULAR; INTRAVENOUS; SUBCUTANEOUS at 12:01

## 2023-05-08 RX ADMIN — INSULIN GLARGINE 22 UNITS: 100 INJECTION, SOLUTION SUBCUTANEOUS at 07:48

## 2023-05-08 RX ADMIN — SALINE NASAL SPRAY 2 SPRAY: 1.5 SOLUTION NASAL at 06:00

## 2023-05-08 RX ADMIN — PIPERACILLIN AND TAZOBACTAM 3.38 G: 3; .375 INJECTION, POWDER, LYOPHILIZED, FOR SOLUTION INTRAVENOUS at 19:41

## 2023-05-08 RX ADMIN — LORAZEPAM 1 MG: 2 INJECTION INTRAMUSCULAR; INTRAVENOUS at 11:41

## 2023-05-08 RX ADMIN — VANCOMYCIN HYDROCHLORIDE 1000 MG: 1 INJECTION, SOLUTION INTRAVENOUS at 06:36

## 2023-05-08 RX ADMIN — PIPERACILLIN AND TAZOBACTAM 3.38 G: 3; .375 INJECTION, POWDER, LYOPHILIZED, FOR SOLUTION INTRAVENOUS at 13:15

## 2023-05-08 RX ADMIN — ASPIRIN 81 MG: 81 TABLET ORAL at 07:46

## 2023-05-08 RX ADMIN — LEVOTHYROXINE SODIUM 112 MCG: 0.11 TABLET ORAL at 07:46

## 2023-05-08 RX ADMIN — PIPERACILLIN AND TAZOBACTAM 3.38 G: 3; .375 INJECTION, POWDER, LYOPHILIZED, FOR SOLUTION INTRAVENOUS at 06:00

## 2023-05-08 RX ADMIN — HYDROMORPHONE HYDROCHLORIDE 0.5 MG: 1 INJECTION, SOLUTION INTRAMUSCULAR; INTRAVENOUS; SUBCUTANEOUS at 11:00

## 2023-05-08 RX ADMIN — HUMAN ALBUMIN MICROSPHERES AND PERFLUTREN 6 ML: 10; .22 INJECTION, SOLUTION INTRAVENOUS at 10:59

## 2023-05-08 ASSESSMENT — ACTIVITIES OF DAILY LIVING (ADL)
ADLS_ACUITY_SCORE: 43
ADLS_ACUITY_SCORE: 41
ADLS_ACUITY_SCORE: 46
ADLS_ACUITY_SCORE: 46
ADLS_ACUITY_SCORE: 41
ADLS_ACUITY_SCORE: 46
ADLS_ACUITY_SCORE: 41

## 2023-05-08 NOTE — PROGRESS NOTES
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 He developed sudden headache and vision changes on 3/25, with MRI brain on 3/28, which demonstrated enlargement of known pituitary adenoma, with necrosis extending laterally beyond the left cavernous sinus, concerning for compression of cranial nerves at cavernous sinus. 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      Patient is POD-11 s/p Endoscopic endonasal transcavernous approach for resection of functional pituitary adenoma with harvesting of right-sided pedicled nasoseptal flap, which was pedicled off the posterior septal branch of the sphenopalatine artery    Plan:  - Watch for DI/CSF leak  - Follow urine output/ inputs/ outputs  - Please inform Neurosurgery if there is any change in exam  - Recommend Ophthalmology referral for management of ptosis/esotropia    Follow up plan  - Follow up in 6 weeks with Dr Jeong (message sent to schedulers)  - MRI pituitary in 6 weeks before discharge (message sent to schedulers)  - Upright lumbar xrays to evaluate his compression fractures (ordered for you)  -----------------------------------  Yaneth Ugalde MD  Neurosurgery PGY3    Please contact neurosurgery resident on call with questions.    Dial * * *[phone], enter [phone] when prompted.        Interval History: Possible OR today for RLE abscess/wound washout.       Objective:   Temp:  [98.4  F (36.9  C)-98.9  F (37.2  C)] 98.8  F (37.1  C)  Pulse:  [] 68  Resp:  [16-20] 16  BP:  ()/(76-82) 103/78  SpO2:  [94 %-98 %] 94 %  I/O last 3 completed shifts:  In: 1090 [P.O.:1080; I.V.:10]  Out: 2680 [Urine:2330; Stool:350]    NEUROLOGIC:  --Eyes open, following commands, oriented x3  Cranial Nerves:  -- visual fields full to confrontation although with limited participation, PERRL anisocoric L>R and sluggish, left CN III paresis and CN VI palsy, restricted ROM in right EOM with no apparent asymmetry   -- face symmetrical, tongue midline  -- sensory V1-V3 intact bilaterally  -- palate elevates symmetrically, uvula midline  -- hearing grossly intact bilat     Motor:  Antigravity x4 extremities     Sensory:  intact to LT x 4 extremities      Reflexes:       Bi Tri BR Omi Pat Ach Bab     C5-6 C7-8 C6 UMN L2-4 S1 UMN   R 2+ 2+ 2+ Norm 2+ 2+ Norm   L 2+ 2+ 2+ Norm 2+ 2+ Norm      Gait: Deferred.     LABS:  Recent Labs   Lab 05/07/23 2204 05/07/23  1555 05/07/23  1532 05/07/23  0816 05/07/23  0723 05/06/23  2206 05/06/23  1921 05/05/23  1112 05/05/23  0720   NA  --   --   --   --  138  --  137  --  140   POTASSIUM  --   --   --   --  4.3  --  3.9  --  3.8   CHLORIDE  --   --   --   --  99  --  99  --  100   CO2  --   --   --   --  26  --  29  --  29   ANIONGAP  --   --   --   --  13  --  9  --  11   GLC 92 154* 67*   < > 117*   < > 153*   < > 148*   BUN  --   --   --   --  11.8  --  15.1  --  12.1   CR  --   --   --   --  0.79  --  0.79  --  0.74   KORIN  --   --   --   --  8.7  --  8.9  --  8.6    < > = values in this interval not displayed.       Recent Labs   Lab 05/07/23 0723   WBC 14.1*   RBC 3.22*   HGB 8.5*   HCT 28.5*   MCV 89   MCH 26.4*   MCHC 29.8*   RDW 23.8*          IMAGING:  Recent Results (from the past 24 hour(s))   CT Tibia Fibula Lower Leg Right w Contrast    Narrative    EXAM: CT TIBIA FIBULA LOWER LEG RIGHT W CONTRAST  LOCATION: [facility]  DATE/TIME: 5/7/2023 10:00 PM CDT    INDICATION: eval RLE wound, c f  abscess.  COMPARISON: Right lower extremity arterial ultrasound 02/16/2023. Bilateral lower extremity venous Doppler ultrasound 02/16/2023. Right tibia and fibula radiographs 02/16/2023.  TECHNIQUE: IV contrast. Axial, sagittal and coronal thin-section reconstruction. Dose reduction techniques were used.   CONTRAST: 100 ml isovue 370     FINDINGS:     BONES:  -No bone destruction to suggest osteomyelitis. No fracture or dislocation.    SOFT TISSUES:  -There are multiple soft tissue was of the lower leg with dressing material in position. Along the medial aspect of the ankle there is a lobulated rim-enhancing collection of fluid in the superficial soft tissues concerning for abscess which measures   maximal axial dimensions of 3.2 x 1.8 cm and has craniocaudal extent of 4.9 cm (images 226-248 of axial soft tissue series 8, images  of coronal soft tissue series 4).      Impression    IMPRESSION:  1.  4.9 x 3.2 x 1.8 cm soft tissue abscess of the medial aspect of the ankle.  2.  No specific evidence for osteomyelitis.

## 2023-05-08 NOTE — PROGRESS NOTES
Care Management Follow Up    Length of Stay (days): 35    Expected Discharge Date:  TBD[facility]     Concerns to be Addressed: all concerns addressed in this encounter     Patient plan of care discussed at interdisciplinary rounds: Yes    Anticipated Discharge Disposition: Transitional Care     Anticipated Discharge Services: Transportation Services  Anticipated Discharge DME: None    Additional Information:  Writer called [facility] Patient Placement: [phone] at 0915 today to inquire re: bed availability for transfer today. No beds available.    [facility] for [facility] on [facility]. Writer attempted to update Mr. Faria by phone on lack of progress, but he did not answer.  Also updated bedside RN.    Pita Nunez, RN, BSN  RN Care Coordinator    [address] [address]  [email or web address]  Gender pronouns: she/her  Pager: [phone]

## 2023-05-08 NOTE — PROGRESS NOTES
[facility]    Medicine Progress Note - Medicine Service, MARALEXANDER TEAM 2    Date of Admission:  4/3/2023    Assessment & Plan    Gsutavo Faria is a 58 year old male with recent Serratia bacteremia, HFrEF, prolonged QT, lower extremity wounds, DMII, hypothyroidism, low testosterone and known ACTH secreting pituitary adenoma w/resulting Cushing's disease s/p 2 partial resections in 2021 [facility]. [facility], he developed new onset double vision and severe headache which prompted imaging that showed a large mass invading the cavernous sinuses and impinging on the cranial nerves. He is transferred to [facility] for this pituitary adenoma, concern for progression vs hemorrhage/apoplexy. Complicated by psychosis/metabolic encephalopathy and electrolyte abnormalities secondary to Cushing's syndrome. Underwent endoscopic endonasal transcavernous approach for resection of functional pituitary adenoma 4/27.      Updates today:  - [facility] -- no beds today  - Drainage from RLE, CT scan with abscess, gen surgery consulted  - per endocrine, stop steroids today     #Hypotension  RRT called 5/6/23 PM for softer SBPs, generally 80s/50s. Pt noted to have normal to moderately hypertensive blood pressures for most of the hospitalization; SBPs in 80s unusual for patient. Normal and unchanged PO intake. BP response to 500 ml bolus. All blood pressure meds held. Infectious workup largely unremarkable. Neurosurg evaluated, does not think patient needs further meningitis workup at this time. Per endocrine, while has normal level cortisol, patients with longstanding cushing's disease can sometimes have glucocorticoid withdrawal syndrome once cortisol normalizes.  - hold carvedilol, lasix, lisinopril, and spironolactone  - will need to restart  spironolactone first given indication (cushing's disease)   - stop hydrocortisone 20 mg QAM, 10 mg daily in the afternoon  - TTE unchanged from prior  - encourage PO intake  - infectious workup                    - wound culture with gram negative rods  - cortisol 17.8 5/7/23      #Type II MI   # HFmrEF, with global hypokinesis  # Hypertension  # High burden of PVCs  # Prolonged QTc   # Elevated troponin, down-trended  Coronary angiogram on 2/27 showed normal coronaries. Trop mildly elevated on admit 88; downtrended to 77 without targeted intervention. Was noted to have high burden of PVCs on tele. TTE 4/6/2023 showed EF 40-45%, severe diffuse hypokinesis, normal RV function, and mild-moderate aortic regurgitation; overall, not significantly changed from prior TTE 2/17/23. 5/6/23, pt w/ new episode of hypotension of unclear cause. Troponin checked and 177-->170-->172. Most likely 2/2 demand ischemia in s/o hypotension. Patient denied chest pain. EKG with sinus rhythm, LVH, RBBB, and with t wave inversions more evident in anterior leads as compared to prior EKG 4/23/23.   - Repeat TTE unchanged  - troponin plateaued, will not recheck unless clinically changes  - Q4H vital signs  - recheck EKG, trop if chest pain occurs  - Lasix 40mg daily--held since surgery on 4/27. Restarted 4/30. Held 5/6   - Coreg and lisinopril held for surgery 4/27.               - Carvedilol 12.5 mg BID restarted 4/29. Held 5/6  - Lisinopril restarted 4/30 and titrated up to prior dose 20 mg daily on 5/1. Held 5/6  - ASA - restarted 5/5/23  - Avoid QTc prolonging medications     # Pituitary adenoma resection this admission, c/b significant scar tissue to cavernous sinus, 4/27/23  #R eye ptosis, esotropia  # ACTH secreting pituitary adenoma c/b type II DM, hypothyroidism and low testosterone s/p two partial resections in 2021   Patient noted double vision and severe headache beginning the evening of 3/25. CT imaging on 3/25 revealed a large  sellar/suprasellar mass extending into the cavernous sinuses with no evidence of acute stroke. On 3/28, he underwent an MRI which confirmed the CT findings of a large mass invading the cavernous sinuses and impinging on the cranial nerves. Necrosis extending beyond left cavernous sinus. [facility]. Repeat MRI performed on 4/6/23: compared to 2021 MRI, lesions are smaller. Orbit MRI without optic nerve compression and no evidence of orbital infection.  - 4/27 neurosurgery and ENT combined to perform endoscopic endonasal transcavernous approach for resection of functional pituitary adenoma. Complicated by significant scar tissue to cavernous sinus. Good post-op recovery. Follow up MRI done 5/29. Neurosurgery okay with [facility]. They will arrange follow up.   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  - Endocrinology managing. Current regimen:   - Lantus 22 units daily.  - Carb coverage to 1 units/20 g CHO with meals and snacks  - High-dose sliding scale insulin for AC & HS  - Jardiance 10 mg daily  - hypoglycemia protocol  - Accu checks AC & HS     # Post-surgical Leukocytosis  # Buttocks, sacrum and bilateral groin wounds   # RLE wound from puncture  injury   # L dorsal foot wound from presumed trauma   # Soft tissue infection/abscess    # Hx serratia bacteremia in December 2022   For patient's lower extremity wounds and hx of Serratia bacteremia in December, he was [facility]. His antibiotics were broadened to Vanc and Zosyn and ultimately he was transitioned to ceftazidime on 3/23 with plan to complete course on 4/4/23. BCx negative and wound cx grew serratia marcescens at the [facility]. He has been on ceftazidime since. MRI of the right tib/fib on 3/23 was negative for osteomyelitis but did show two fluid collections draining sponateneously. [facility]o was [facility] at the [facility] and determined no intervention was needed as wounds were draining on their own.  - trend WBC, recheck ordered for AM (5/5/23)   - completed course of ceftazidime (3/23 - 4/5)  - elevated WBC since surgery. Suspect just post-surgical stress response. No fever. CXR done to check PICC position with no infiltrate. Wound appear unchanged.               - Repeat blood cultures/UA 5/1 due to rising WBC after surgery                          - blood cx 5/1 NGTD                          - UA negative              - repeat WOC consult 5/2/23 to re-assess chronic lower extremity wounds - wounds stable, no evidence of active infection  - PT/OT   - Pain: tylenol 975 mg Q6H PRN, oxycodone 5 mg Q6H PRN     # Acute metabolic encephalopathy - resolved  # Pyruria, Candiduria - resolved  # Encephalopathy likely secondary to cushing's disease, resolved  Admission symptoms included disorientation, intermittently following directions. Repetitive words - perseverating on particular phrases. Sx started the 2-3 days prior to admission (which patient was the [facility]). Vulnerable brain (multiple brain surgeries), enlarging pituitary mass. No seizure activity per EEG. Steroids removed and pt still encephalopathic. Sodium had been in normal limits and patient remained altered. Worked up for  infections - did reveal candiduria but unlikely this was etiology, though he did complete an 8 day course of fluconazole. Briefly on ceftriaxone but encephalopathy also did not improve. CT abd w/ contrast (4/10): no evidence of pyelonephritis  Ultimately attributed to Cushings and known pituitary tumor. Continues to remain lucid.  - Surgical management per neurosurgery  - Delirium precautions  - Electrolyte management as above  - PRN quetiapine if agitation that is not responsive to behavioral interventions      # Hypogonadism   - hold PTA androgel (would need to bring in home medication, not accessible at this time)      # Chronic microcytic anemia   Hgb of 8.4 on [facility]. Peripheral smear on 3/30 showed poikilocytosis with occasional red blood cell fragments but no other evidence of hemolysis.  Haptoglobin was normal.  Ferritin was 91, transferrin saturation was low, B12 was 495 and folate was 8.7. Likely combination of iron deficiency as well as inflammation/chronic disease.   - CTM        # Concern for malnutrition   - Nutrition following     # Delusions, intermittent, resolved  Intermittent delusions regarding staff stalking patient while in the hospital. Unclear chronicity of these delusions - but has had them multiple times this hospitalization. Unclear psych history. Medical management for encephalopathy as stated above. Psychiatry was consulted during his care and agreed with paranoia and recommended Abilify. He was briefly on Abilify 5mg but patient then started to refuse nightly so this was discontinued. His paranoia and delusions have been very intermittent and appear to be associated with his anxiety.      Discharge Needs  - Follow up in 6 weeks with Dr Jeong, neurosurgery (message sent to schedulers),  - MRI pituitary in 6 weeks before discharge (message sent to schedulers)  - Upright lumbar xrays to evaluate his compression fractures (ordered by neurosurgery)   - outpatient follow-up  w/ ENT -- contact ENT when transfers to the [facility] to schedule follow-up for splint removal in clinic       Diet: Diet  Snacks/Supplements Adult: Glucerna; Between Meals  Regular Diet Adult    DVT Prophylaxis: Pneumatic Compression Devices  Roland Catheter: Not present  Lines: PRESENT      PICC 04/06/23 Double Lumen Right Basilic access-Site Assessment: WDL      Cardiac Monitoring: None  Code Status: Full Code      Clinically Significant Risk Factors              # Hypoalbuminemia: Lowest albumin = 2.9 g/dL at 4/11/2023  7:07 AM, will monitor as appropriate          # DMII: A1C = 9.5 % (Ref range: <5.7 %) within past 6 months    # Severe Malnutrition: based on nutrition assessment        Disposition Plan    Plan to [facility]       Rusty Key MD  Medicine Service, [facility] TEAM [address] [place] [address]  Securely message with [employer] (more info)  Text page via [employer] Paging/Directory   See signed in provider for up to date coverage information  ______________________________________________________________________    Interval History   This morning patient continues to complain of RLE pain, understands plan for cleaning abscess today at bedside. No other concerns    Physical Exam   Vital Signs: Temp: 97.7  F (36.5  C) Temp src: Axillary BP: 109/85 Pulse: 95   Resp: 18 SpO2: 98 % O2 Device: None (Room air)    Weight: 164 lbs 8 oz    Gen: NAD, lying comfortably in bed  Eyes: EOMI, conjuctiva clear, R ptosis  Mouth: OP clear, no lesions  CV: RRR, no murmurs, 2+ radial pulses  RESP: CTA bilaterally, no w/r/c  Abd: soft, nontender, nondistended  Ext: no edema bilaterally, RLE with wound covered, patient asked me not to examine it    Medical Decision Making       55 MINUTES SPENT BY ME on the date of service doing chart review, history, exam, documentation & further activities per the note.      Data     I have personally reviewed the following data over the past 24  hrs:    13.0 (H)  \   7.9 (L)   / 411     138 100 10.7 /  102 (H)   3.9 26 0.97 \       Procal: N/A CRP: 153.00 (H) Lactic Acid: N/A         Imaging results reviewed over the past 24 hrs:   Recent Results (from the past 24 hour(s))   CT Tibia Fibula Lower Leg Right w Contrast    Narrative    EXAM: CT TIBIA FIBULA LOWER LEG RIGHT W CONTRAST  LOCATION: [facility]  DATE/TIME: 2023 10:00 PM CDT    INDICATION: eval RLE wound, c f abscess.  COMPARISON: Right lower extremity arterial ultrasound 2023. Bilateral lower extremity venous Doppler ultrasound 2023. Right tibia and fibula radiographs 2023.  TECHNIQUE: IV contrast. Axial, sagittal and coronal thin-section reconstruction. Dose reduction techniques were used.   CONTRAST: 100 ml isovue 370     FINDINGS:     BONES:  -No bone destruction to suggest osteomyelitis. No fracture or dislocation.    SOFT TISSUES:  -There are multiple soft tissue was of the lower leg with dressing material in position. Along the medial aspect of the ankle there is a lobulated rim-enhancing collection of fluid in the superficial soft tissues concerning for abscess which measures   maximal axial dimensions of 3.2 x 1.8 cm and has craniocaudal extent of 4.9 cm (images 226-248 of axial soft tissue series 8, images  of coronal soft tissue series 4).      Impression    IMPRESSION:  1.  4.9 x 3.2 x 1.8 cm soft tissue abscess of the medial aspect of the ankle.  2.  No specific evidence for osteomyelitis.     Echo Complete   Result Value    LVEF  40-45% (mildly reduced)    Narrative    [id number]  [id number]  [id number]  [id number]^MALLORIE^NICOLE     [facility]  Echocardiography Laboratory  [address] [place] [id number]     Name: ROEL ORONA  MRN: [id number]  : 1965  Study Date: 2023 10:14 AM  Age: 58 yrs  Gender: Male  Patient [facility]: [facility]  Reason For Study: Heart  Failure  Ordering Physician: NICOLE NOBLES  Performed By: Minor Smalls     BSA: 1.8 m2  Height: 66 in  Weight: 164 lb  HR: 88  BP: 112/82 mmHg  ______________________________________________________________________________  Procedure  Complete Portable Echo Adult. Contrast Optison. Technically difficult study.  Optison (NDC #1222-4224-16) given intravenously. Patient was given 6 ml  mixture of 3 ml Optison and 6 ml saline. 3 ml wasted.  ______________________________________________________________________________  Interpretation Summary  Left ventricular function is decreased. The ejection fraction is 40-45%  (mildly reduced). Grade I or early diastolic dysfunction.  The right ventricle is normal size. Global right ventricular function is  normal.  No significant valvular abnormalities.  IVC diameter <2.1 cm collapsing >50% with sniff suggests a normal RA pressure  of 3 mmHg.  This study was compared with the study from 4/6/23 .  There has been no change.  Mild to moderate diffuse hypokinesis is present.  ______________________________________________________________________________  Left Ventricle  Moderate to severe concentric wall thickening consistent with left ventricular  hypertrophy is present. Left ventricular function is decreased. The ejection  fraction is 40-45% (mildly reduced). Grade I or early diastolic dysfunction.  Mild to moderate diffuse hypokinesis is present.     Right Ventricle  The right ventricle is normal size. Global right ventricular function is  normal.     Atria  Both atria appear normal.     Mitral Valve  The mitral valve is normal. Trace mitral insufficiency is present.     Aortic Valve  The aortic valve is tricuspid. Mild aortic insufficiency is present.     Tricuspid Valve  The tricuspid valve is normal. Trace tricuspid insufficiency is present.  Pulmonary artery systolic pressure cannot be assessed.     Pulmonic Valve  The valve leaflets are not well visualized. Trace pulmonic  insufficiency is  present.     Vessels  The inferior vena cava is normal. The thoracic aorta cannot be assessed. There  is mild dilation at the level of the sinuses of Valsalva (4.0 cm, indexed 2.22  cm/m2). IVC diameter <2.1 cm collapsing >50% with sniff suggests a normal RA  pressure of 3 mmHg.     Pericardium  No pericardial effusion is present.     Compared to Previous Study  This study was compared with the study from 23 . There has been no change.     Attestation  I have personally viewed the imaging and agree with the interpretation and  report as documented by the fellow, Anmol Banks, and/or edited by me.  ______________________________________________________________________________  MMode/2D Measurements & Calculations  IVSd: 1.8 cm  LVIDd: 5.1 cm  LVIDs: 4.3 cm  LVPWd: 1.6 cm  FS: 16.2 %  LV mass(C)d: 399.7 grams  LV mass(C)dI: 217.4 grams/m2  LVOT diam: 2.1 cm  LVOT area: 3.4 cm2  RWT: 0.64     Doppler Measurements & Calculations  MV E max oswaldo: 37.2 cm/sec  MV A max oswaldo: 46.4 cm/sec  MV E/A: 0.80  MV dec slope: 244.1 cm/sec2  MV dec time: 0.15 sec  Ao V2 max: 110.3 cm/sec  Ao max P.9 mmHg  Ao V2 mean: 81.0 cm/sec  Ao mean PG: 3.1 mmHg  Ao V2 VTI: 19.2 cm  MARZENA(I,D): 2.4 cm2  MARZENA(V,D): 2.5 cm2  AI P1/2t: 884.0 msec  LV V1 max P.5 mmHg  LV V1 max: 78.9 cm/sec  LV V1 VTI: 13.4 cm     SV(LVOT): 46.3 ml  SI(LVOT): 25.2 ml/m2  AV Oswaldo Ratio (DI): 0.72  MARZENA Index (cm2/m2): 1.3  E/E' avg: 15.4  Lateral E/e': 16.5  Medial E/e': 14.2     ______________________________________________________________________________  Report approved by: Tylor Das 2023 12:26 PM

## 2023-05-08 NOTE — PROGRESS NOTES
Endocrine Progress Note  Patient: Gustavo Faria   MRN: [id number]  Date of Service: 05/08/2023       Assessment and plan:  Gustavo Faria is a 58 year old male with PMHx of  ACTH-secreting macroadenoma c/b central hypothyroidism, and central hypogonadism, s/p transphenoidal surgery 5/2021 and 7/2021 in [place] has baseline records 3rd nerve palsy, ongoing serratia RLE cellulitis c/b recent serratia bacteremia, HFrEF, T2DM, and HTN who was transferred from [facility] for surgical intervention of known expanding large sellar/suprasellar mass extending into cavernous sinuses and subsequent cranial nerve impingement.     #Pituitary macroadenoma:  #Cushing's disease:  #Pituitary apoplexy:  Unable to remove entire pituitary macroadenoma due to fibrous tissue. Cortisol level after surgery still elevated at 38.2 with repeat serum cortisol on 4/29 was at 27.4.  Blood pressure above target. Sodium WNL May 3, 2023 AM cortisol had improved to 15.1    Patient had episode of low Blood pressures and with the concern for possible glucocorticoid withdrawal syndrome was started on HC 20 in am and 10 in PM.   Review of chart shows that before he received his HC, his BG was at 126/82 mm Hg which is reassuring that he doesn't have AI.     We would need to quantify his cortisol levels to establish baseline and decide about options for EBRT v/s Medical treatment , as the post-op scans has shown there is residual mass.     - Stop Hydrocortisone.  - Holding spironolactone to 200 mg per primary team. Resume when able.  - Will get daily A.M Cortisol.  - Will get 24 hr UFC and creatinine to establish baseline before initiating medical treatment.  - Late night salivary cortisol x 2 ordered.  - Strict I's and O's       #Central hypothyroidism:  Prior to admission was on levothyroxine 100 mcg daily.  Free T4 on admission 1.3 and TSH not detectable (picture of central hypothyroidism).  Repeat FT4 on 5/2 is at  0.96    Recommendations:  Now on levothyroxine to 112 mcg daily.  -Repeat FT on  May 11.     #DM type II: Hemoglobin A1c on admission 9.5%.  Prior to admission was on Sitagliptin 100 mg daily/metformin 500 mg twice daily, Jardiance 12.5 mg (not available) little higher overnight.  Will increase Lantus to 22 units.    - Reduce Lantus to 18 units daily. (order changed for you)  - Continue carb coverage to 1 units/20 g CHO with meals and snacks  - Changed to Medium-dose sliding scale insulin for AC & HS  - Stopped Jardiance 10 mg daily  - Continue hypoglycemia protocol  - Accu checks AC & HS      Patient was discussed with On call Attending   Patient labs, chart and imaging reviewed      Rhiannon Arroyo  Endocrinology Fellow  [phone]      ======================================================================    Subjective:  - Pituitary macroadenoma debulking surgery/resection(4/27/23)  - Overall, still feels tired but better.   May 3, 2023 AM cortisol had improved to 15.1.    Physical Examination:  /85 (BP Location: Left arm)   Pulse 96   Temp 98.6  F (37  C) (Axillary)   Resp 16   Wt 74.6 kg (164 lb 8 oz)   SpO2 99%   BMI 26.55 kg/m      Constitutional: healthy, alert and no distress   Respiratory: lungs CTAB. No increased work of breathing  Psychiatric: mentation appears normal and affect normal/bright  Head: Normocephalic.   Neck: Neck supple. Thyroid symmetric, normal size,  ENT: Nasal bandage with in place   Abdomen: Abdomen soft, non-tender. BS normal. No masses, organomegaly  NEURO: Sensation grossly WNL.  JOINT/EXTREMITIES: +2-3 pitting bilateral lower extremity edema.    Medications:  Reviewed    Endocrine Labs:

## 2023-05-08 NOTE — PROGRESS NOTES
Surgery Progress Note    Gustavo Faria  [id number]    No acute overnight events.   AFVSN  Trop leak thought to be demand related, known prior HF. Echo pending. Currently HDS.  Complaining of pain RLE  Complains of hunger this am, asks for diet    Temp:  [98.1  F (36.7  C)-98.9  F (37.2  C)] 98.1  F (36.7  C)  Pulse:  [] 97  Resp:  [16-18] 18  BP: (101-126)/(70-82) 101/70  SpO2:  [94 %-98 %] 98 %    Intake/Output Summary (Last 24 hours) at 5/8/2023 0538  Last data filed at 5/8/2023 0129  Gross per 24 hour   Intake 480 ml   Output 2130 ml   Net -1650 ml     WBC 14    Gen:                Lying in bed in NAD, A&OX3  HEENT:           Normocephalic and atraumatic  CV:                  RRR  Pulm:               Non-labored breathing  Abd:                 Soft, non-tender, non-distended  Ext:                  Warm and well perfused, RLE with chronic wounds with  granulation tissue visible on R lateral leg, as well as on R anterior shin, and two additional 1cm open punctate wounds on R shin, which are draining purulent fluid mixed with blood which is easily expressible on squeezing leg. Right shin tender to palpation, no erythema appreciated however, no fluctuance obviously surrounding wound. R medial ankle also with tenderness to palpation.      58 year old male with RLE wound draining purulent material. CT shows [id number] rim enhancing fluid pocket at medial ankle, without clear evidence of osteo.    - vanc zosyn  - will attempt bedside InD; if patient cannot tolerate will plan for OR tomorrow  - may have regular diet    Seen with chief resident discussed w staff    Mj León MD   Surgery PGY-1

## 2023-05-08 NOTE — PLAN OF CARE
Status: s/p pituitary adenoma resection 4/27. 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 Baseline right CN 3 palsy  Vitals: VSS on RA, tachycardic into the 100s.   Neuros: A&Ox4. BUE 4-5/5, BLE 3/5. N/T to toes bilaterally. Periorbital swelling, more pronounced on R. Ptosis to R eye. Pupils sluggish bilaterally. Clear, crusty drainage to R eye  IV: DL PICC; hep locked   Labs/Electrolytes: (+) wound cultures from new RLE wound, MRSA (-)  Resp/trach: Denies SOB, RA   Diet: Regular diet; NPO at midnight for possible procedure tomorrow   Bowel status: LBM 5/5   : voiding with intermittent incontinence  Skin: right and left leg wounds with mepilex in place, weeping serosang drainage. Would to L foot with mepilex. Buttocks/sacral wounds SUKH. Wound care orders in place. Dry/scaly skin to BLE. New RLE wound found today, oozing pus/blood. Medicine assessed and placed surgical consult, surgery also assessed at bedside. Abd in place with tape, changing as needed.   Pain: no pain meds given, did not want tylenol   Activity: Up with lift or 2 GB/walker per report  Social: No visitors this shift  Plan: Continue with POC. NPO at midnight for possible I&D (bedside vs. OR vs. allowing wound to drain on own)  Updates this shift: Low BG of 66 corrected with D50 (patient choosing to not eat and therefore BG couldn't raise BG with PO). CT completed this shift for new RLE wound. Vanco + zosyn started.

## 2023-05-08 NOTE — PROVIDER NOTIFICATION
Patient is NPO after midnight, asking ice chips at this time, treatment team, Garry Lee MD, notified.

## 2023-05-08 NOTE — PROCEDURES
"Resident: Santino Mari  Attending: Mikel Arias  Procedure I&D: Right leg abscesses.  Preop diag: RLE abscess  Postop diag: RLE abscess  Anesthesia: 12cc 1% lidocaine  Specimens: None  Findings: abscesses with prurlent drainage  Findings: none  Complications: none  Implants: none    Patient was correctly identified using two unique identifiers. From there, the surgical site was identified and prepped and draped in the usual fashion. 12ccs of local anesthesia (1% lido) was administerred. After appropriate anesthesia was attained a stab incision was made over his right medial ankle and extended out 1cm. Significant purulence was hand-expressed from this wound. A swab was taken for cultures. This incision was then opened into an ellipse. After fully expressing the purulent fluid, the incision was lengthened to ~1.5 cm and ellipsed again.  Our attention was then turned to the shin abscess. A stab incision was made and it was opened into an ellipse shape. To facilitate drainage, this was opened to a 3cm eliptical incision.  When this abscess was fully drained both abscesses were evaluated for loculations usings a straight mally clamp. When no loculations were found both incisions were irrigated with sterile saline. Both incisions and his existing proximal wound were then packed open with 1/4\" packing. Pressure was held to achieve hemostasis. The sites were finally dressed. The patient tolerated the procedure well    Dr. Arias was immediately available for the entirety of the case  "

## 2023-05-08 NOTE — PLAN OF CARE
/78 (BP Location: Left arm)   Pulse 68   Temp 98.8  F (37.1  C) (Oral)   Resp 16   Wt 74.6 kg (164 lb 8 oz)   SpO2 94%   BMI 26.55 kg/m       Time: 7742-6864   Reason for admission: Pituitary adenoma/adenoma resection.   Activity: assist of two with lift transfers.   Pain: denied pain or discomfort.   Neuro: alert and oriented, UE 4/5, and LE 3/5.   Cardiac: WDL  RespL denied SOB, lung sounds clear bilaterally.   GI/: NPO after midnight, voids without difficulties, bowel sounds present x four quadrants..   Lines: R PICC, red lumen heparin locked, purple lumen TKO infusing.   Skin: LE wounds.   Labs/Imaging: BG at 0200, 118.  New changes to shift: no change.  Plan: continue with current plan of cares.

## 2023-05-08 NOTE — PROGRESS NOTES
Otolaryngology Progress Note  May 8, 2023    Subjective/Intvl events: General surgery consulted for draining wound on his RLE. CT demonstrated soft tissue infection and abscess. Patient is NPO for possible wash-out. Otherwise, he denies clear drainage from his nose or salty/metallic taste..  [facility]       O: /70 (BP Location: Left arm)   Pulse 97   Temp 98.1  F (36.7  C) (Oral)   Resp 18   Wt 74.6 kg (164 lb 8 oz)   SpO2 98%   BMI 26.55 kg/m     General: Alert and oriented x 3, no acute distress   HEENT: Bilateral lateral gaze palsy and restricted EOM on the right, facial movement symmetric, Oropharynx clear. No nasal drainage.    Pulmonary: Breathing non-labored, no stridor, no accessory muscle use.        A/P: Gustavo Faria is a 58 year old male with a past medical history of pituitary ACTH secreting macroadenoma s/p endoscopic endonasal transcavernous approach for resection of functional pituitary adenoma w/ nasoseptal flap on 4/28. He is recovering well post-operatively without signs of CSF leak.    - Serial neuro exams  - Nasal saline QID  - Appreciate Endo following management of Cushing's and DMII, Medicine management of patient's hypotension, Gen surg management of RLE wound  - Sinus precautions: No nose blowing, sneeze with mouth open, no straining, and scheduled bowel regimen  - Follow-up scheduled to remove Jean Baptiste splints  - Rest of care per primary team    -- Patient and above plan discussed with Dr. Adan Jimenez, PGY-2  Otolaryngology-Head & Neck Surgery  Please contact ENT with questions by dialing [phone] and entering job code [phone] when prompted.

## 2023-05-08 NOTE — PLAN OF CARE
Goal Outcome Evaluation:  /85 (BP Location: Left arm)   Pulse 95   Temp 97.7  F (36.5  C) (Axillary)   Resp 18   Wt 74.6 kg (164 lb 8 oz)   SpO2 98%   BMI 26.55 kg/m      Care from: 2621-0816    Reason for admission: Pituitary adenoma/adenoma resection      VS & Pain: VSS on RA    Neuro: A&Ox4, able to make needs known. BUE 4/5, BLE 3/5. Pupils sluggish bilaterally    GI/: voiding spontaneously, BM occurrence after suppository this AM.     Nutrition: Regular diet, fair appetite    Skin: LE wounds, buttocks/sacral wounds SUKH. Skin overall is dry and flaky.    Lines: R double lumen PICC    Activity: Up with assist x2 and lift. Not oob this shift    Events this shift: Bedside abscess drainage procedure completed today. Low BG of 66, D50 given to correct.    Plan: Continue with plan of care. Discharge back to [facility] when a bed becomes available.

## 2023-05-08 NOTE — PROGRESS NOTES
Pt lethargic, arousable to repeated stim, VSS on RA. Also, pt found to have blood coming from R leg after bedside abscess drainage procedure the size of a pie. Float RNs called and MDs paged. New dressing applied and is CDI. Capno monitoring placed and vitals signs taken, all levels are within the normal range. Doppler will be ordered to assess pulses, increase foot CMS checks. Will update evening RN and continue with plan of care.

## 2023-05-09 LAB
ABO/RH(D): NORMAL
ANION GAP SERPL CALCULATED.3IONS-SCNC: 10 MMOL/L (ref 7–15)
ANTIBODY SCREEN: NEGATIVE
APTT PPP: 39 SECONDS (ref 22–38)
BACTERIA ABSC ANAEROBE+AEROBE CULT: ABNORMAL
BLD PROD TYP BPU: NORMAL
BLD PROD TYP BPU: NORMAL
BLOOD COMPONENT TYPE: NORMAL
BLOOD COMPONENT TYPE: NORMAL
BUN SERPL-MCNC: 11.2 MG/DL (ref 6–20)
CALCIUM SERPL-MCNC: 7.6 MG/DL (ref 8.6–10)
CHLORIDE SERPL-SCNC: 106 MMOL/L (ref 98–107)
CODING SYSTEM: NORMAL
CODING SYSTEM: NORMAL
COLLECT DURATION TIME UR: 24 H
CORTIS SERPL-MCNC: 18.8 UG/DL
CREAT 24H UR-MRATE: 0.3 G/(24.H) (ref 0.98–2.2)
CREAT SERPL-MCNC: 0.98 MG/DL (ref 0.67–1.17)
CREAT UR-MCNC: 48.2 MG/DL
CROSSMATCH: NORMAL
CROSSMATCH: NORMAL
CRP SERPL-MCNC: 86.7 MG/L
DEPRECATED HCO3 PLAS-SCNC: 24 MMOL/L (ref 22–29)
ERYTHROCYTE [DISTWIDTH] IN BLOOD BY AUTOMATED COUNT: 20.4 % (ref 10–15)
ERYTHROCYTE [DISTWIDTH] IN BLOOD BY AUTOMATED COUNT: 23.3 % (ref 10–15)
ERYTHROCYTE [DISTWIDTH] IN BLOOD BY AUTOMATED COUNT: 23.9 % (ref 10–15)
FIBRINOGEN PPP-MCNC: 604 MG/DL (ref 170–490)
GFR SERPL CREATININE-BSD FRML MDRD: 90 ML/MIN/1.73M2
GLUCOSE BLDC GLUCOMTR-MCNC: 100 MG/DL (ref 70–99)
GLUCOSE BLDC GLUCOMTR-MCNC: 104 MG/DL (ref 70–99)
GLUCOSE BLDC GLUCOMTR-MCNC: 172 MG/DL (ref 70–99)
GLUCOSE BLDC GLUCOMTR-MCNC: 69 MG/DL (ref 70–99)
GLUCOSE BLDC GLUCOMTR-MCNC: 75 MG/DL (ref 70–99)
GLUCOSE BLDC GLUCOMTR-MCNC: 78 MG/DL (ref 70–99)
GLUCOSE BLDC GLUCOMTR-MCNC: 91 MG/DL (ref 70–99)
GLUCOSE SERPL-MCNC: 75 MG/DL (ref 70–99)
GRAM STAIN RESULT: ABNORMAL
GRAM STAIN RESULT: ABNORMAL
HCT VFR BLD AUTO: 21.4 % (ref 40–53)
HCT VFR BLD AUTO: 23 % (ref 40–53)
HCT VFR BLD AUTO: 23.6 % (ref 40–53)
HGB BLD-MCNC: 6.3 G/DL (ref 13.3–17.7)
HGB BLD-MCNC: 6.9 G/DL (ref 13.3–17.7)
HGB BLD-MCNC: 7 G/DL (ref 13.3–17.7)
HGB BLD-MCNC: 7.1 G/DL (ref 13.3–17.7)
HGB BLD-MCNC: 8.7 G/DL (ref 13.3–17.7)
INR PPP: 1.3 (ref 0.85–1.15)
ISSUE DATE AND TIME: NORMAL
ISSUE DATE AND TIME: NORMAL
MCH RBC QN AUTO: 26.6 PG (ref 26.5–33)
MCH RBC QN AUTO: 26.6 PG (ref 26.5–33)
MCH RBC QN AUTO: 28 PG (ref 26.5–33)
MCHC RBC AUTO-ENTMCNC: 29.4 G/DL (ref 31.5–36.5)
MCHC RBC AUTO-ENTMCNC: 29.7 G/DL (ref 31.5–36.5)
MCHC RBC AUTO-ENTMCNC: 30.9 G/DL (ref 31.5–36.5)
MCV RBC AUTO: 90 FL (ref 78–100)
MCV RBC AUTO: 90 FL (ref 78–100)
MCV RBC AUTO: 91 FL (ref 78–100)
PATH REPORT.COMMENTS IMP SPEC: ABNORMAL
PATH REPORT.COMMENTS IMP SPEC: ABNORMAL
PATH REPORT.COMMENTS IMP SPEC: YES
PATH REPORT.FINAL DX SPEC: ABNORMAL
PATH REPORT.GROSS SPEC: ABNORMAL
PATH REPORT.MICROSCOPIC SPEC OTHER STN: ABNORMAL
PATH REPORT.RELEVANT HX SPEC: ABNORMAL
PHOTO IMAGE: ABNORMAL
PLATELET # BLD AUTO: 321 10E3/UL (ref 150–450)
PLATELET # BLD AUTO: 333 10E3/UL (ref 150–450)
PLATELET # BLD AUTO: 389 10E3/UL (ref 150–450)
POTASSIUM SERPL-SCNC: 3.8 MMOL/L (ref 3.4–5.3)
RBC # BLD AUTO: 2.37 10E6/UL (ref 4.4–5.9)
RBC # BLD AUTO: 2.54 10E6/UL (ref 4.4–5.9)
RBC # BLD AUTO: 2.63 10E6/UL (ref 4.4–5.9)
SODIUM SERPL-SCNC: 140 MMOL/L (ref 136–145)
SPECIMEN EXPIRATION DATE: NORMAL
SPECIMEN VOL UR: 625 ML
UNIT ABO/RH: NORMAL
UNIT ABO/RH: NORMAL
UNIT NUMBER: NORMAL
UNIT NUMBER: NORMAL
UNIT STATUS: NORMAL
UNIT STATUS: NORMAL
UNIT TYPE ISBT: 5100
UNIT TYPE ISBT: 9500
WBC # BLD AUTO: 12 10E3/UL (ref 4–11)
WBC # BLD AUTO: 12.1 10E3/UL (ref 4–11)
WBC # BLD AUTO: 12.7 10E3/UL (ref 4–11)

## 2023-05-09 PROCEDURE — 258N000001 HC RX 258: Performed by: STUDENT IN AN ORGANIZED HEALTH CARE EDUCATION/TRAINING PROGRAM

## 2023-05-09 PROCEDURE — 258N000003 HC RX IP 258 OP 636

## 2023-05-09 PROCEDURE — 85027 COMPLETE CBC AUTOMATED: CPT

## 2023-05-09 PROCEDURE — 36415 COLL VENOUS BLD VENIPUNCTURE: CPT

## 2023-05-09 PROCEDURE — 82570 ASSAY OF URINE CREATININE: CPT | Performed by: INTERNAL MEDICINE

## 2023-05-09 PROCEDURE — 80048 BASIC METABOLIC PNL TOTAL CA: CPT

## 2023-05-09 PROCEDURE — 86850 RBC ANTIBODY SCREEN: CPT

## 2023-05-09 PROCEDURE — P9016 RBC LEUKOCYTES REDUCED: HCPCS

## 2023-05-09 PROCEDURE — 120N000003 HC R&B IMCU UMMC

## 2023-05-09 PROCEDURE — 85384 FIBRINOGEN ACTIVITY: CPT

## 2023-05-09 PROCEDURE — 82530 CORTISOL FREE: CPT | Performed by: STUDENT IN AN ORGANIZED HEALTH CARE EDUCATION/TRAINING PROGRAM

## 2023-05-09 PROCEDURE — 36592 COLLECT BLOOD FROM PICC: CPT

## 2023-05-09 PROCEDURE — 86140 C-REACTIVE PROTEIN: CPT

## 2023-05-09 PROCEDURE — 99232 SBSQ HOSP IP/OBS MODERATE 35: CPT | Mod: 24 | Performed by: INTERNAL MEDICINE

## 2023-05-09 PROCEDURE — 82533 TOTAL CORTISOL: CPT | Performed by: STUDENT IN AN ORGANIZED HEALTH CARE EDUCATION/TRAINING PROGRAM

## 2023-05-09 PROCEDURE — 85610 PROTHROMBIN TIME: CPT

## 2023-05-09 PROCEDURE — 85018 HEMOGLOBIN: CPT

## 2023-05-09 PROCEDURE — G0463 HOSPITAL OUTPT CLINIC VISIT: HCPCS

## 2023-05-09 PROCEDURE — 250N000011 HC RX IP 250 OP 636

## 2023-05-09 PROCEDURE — 250N000013 HC RX MED GY IP 250 OP 250 PS 637

## 2023-05-09 PROCEDURE — 85730 THROMBOPLASTIN TIME PARTIAL: CPT

## 2023-05-09 PROCEDURE — 86923 COMPATIBILITY TEST ELECTRIC: CPT

## 2023-05-09 PROCEDURE — 99233 SBSQ HOSP IP/OBS HIGH 50: CPT | Mod: GC | Performed by: INTERNAL MEDICINE

## 2023-05-09 PROCEDURE — 250N000013 HC RX MED GY IP 250 OP 250 PS 637: Performed by: INTERNAL MEDICINE

## 2023-05-09 RX ORDER — LIDOCAINE HYDROCHLORIDE AND EPINEPHRINE 10; 10 MG/ML; UG/ML
10 INJECTION, SOLUTION INFILTRATION; PERINEURAL ONCE
Status: DISCONTINUED | OUTPATIENT
Start: 2023-05-09 | End: 2023-05-10

## 2023-05-09 RX ADMIN — ACETAMINOPHEN 975 MG: 325 TABLET, FILM COATED ORAL at 12:08

## 2023-05-09 RX ADMIN — SODIUM CHLORIDE 500 ML: 9 INJECTION, SOLUTION INTRAVENOUS at 01:30

## 2023-05-09 RX ADMIN — ASPIRIN 81 MG: 81 TABLET ORAL at 08:14

## 2023-05-09 RX ADMIN — PIPERACILLIN AND TAZOBACTAM 3.38 G: 3; .375 INJECTION, POWDER, LYOPHILIZED, FOR SOLUTION INTRAVENOUS at 01:02

## 2023-05-09 RX ADMIN — MICONAZOLE NITRATE: 20 CREAM TOPICAL at 08:10

## 2023-05-09 RX ADMIN — DEXTROSE MONOHYDRATE 50 ML: 25 INJECTION, SOLUTION INTRAVENOUS at 15:55

## 2023-05-09 RX ADMIN — PIPERACILLIN AND TAZOBACTAM 3.38 G: 3; .375 INJECTION, POWDER, LYOPHILIZED, FOR SOLUTION INTRAVENOUS at 19:35

## 2023-05-09 RX ADMIN — Medication 1 TABLET: at 12:03

## 2023-05-09 RX ADMIN — LEVOTHYROXINE SODIUM 112 MCG: 0.11 TABLET ORAL at 08:07

## 2023-05-09 RX ADMIN — PIPERACILLIN AND TAZOBACTAM 3.38 G: 3; .375 INJECTION, POWDER, LYOPHILIZED, FOR SOLUTION INTRAVENOUS at 08:08

## 2023-05-09 RX ADMIN — SALINE NASAL SPRAY 2 SPRAY: 1.5 SOLUTION NASAL at 08:08

## 2023-05-09 RX ADMIN — POLYETHYLENE GLYCOL 3350 17 G: 17 POWDER, FOR SOLUTION ORAL at 12:08

## 2023-05-09 RX ADMIN — SODIUM CHLORIDE, POTASSIUM CHLORIDE, SODIUM LACTATE AND CALCIUM CHLORIDE 1000 ML: 600; 310; 30; 20 INJECTION, SOLUTION INTRAVENOUS at 15:34

## 2023-05-09 RX ADMIN — PIPERACILLIN AND TAZOBACTAM 3.38 G: 3; .375 INJECTION, POWDER, LYOPHILIZED, FOR SOLUTION INTRAVENOUS at 14:27

## 2023-05-09 RX ADMIN — SALINE NASAL SPRAY 2 SPRAY: 1.5 SOLUTION NASAL at 10:46

## 2023-05-09 ASSESSMENT — ACTIVITIES OF DAILY LIVING (ADL)
ADLS_ACUITY_SCORE: 48
ADLS_ACUITY_SCORE: 46
ADLS_ACUITY_SCORE: 48
ADLS_ACUITY_SCORE: 46
ADLS_ACUITY_SCORE: 46
ADLS_ACUITY_SCORE: 48

## 2023-05-09 NOTE — PROGRESS NOTES
Surgery Progress Note    Gustavo Faria  [id number]    No acute overnight events.   AFVSN  Bedside InD yesterday; some bleeding at site overnight.  Pt was transfused for Hb of 6.3 (drop from 8 on morning of 5/9)  Packing changed    Temp:  [97.7  F (36.5  C)-98.9  F (37.2  C)] 98.4  F (36.9  C)  Pulse:  [] 101  Resp:  [16-20] 20  BP: ()/(50-85) 106/72  Cuff Mean (mmHg):  [65-80] 80  SpO2:  [88 %-100 %] 96 %    Intake/Output Summary (Last 24 hours) at 5/8/2023 0539  Last data filed at 5/8/2023 0129  Gross per 24 hour   Intake 480 ml   Output 2130 ml   Net -1650 ml     WBC 12    Gen:                Lying in bed in NAD, A&OX3  HEENT:           Normocephalic and atraumatic  CV:                  RRR  Pulm:               Non-labored breathing  Abd:                 Soft, non-tender, non-distended  Ext:                  RLE WWP, with one anterior tibial wound, nondraining, one medial ankle wound, with dry blood, and one medial lower leg wound, with mild bleeding. Packed with kerlex, covered with abd and pressure wrap      58 year old male with RLE wound draining purulent material. CT shows [id number] rim enhancing fluid pocket at medial ankle, without clear evidence of osteo. Now status post bedside InD.    - abx per primary  - continue pressure wrap around InD site (bleeding much improved)  - may have regular diet  - remainder of cares per primary team    Seen with chief resident discussed w staff    Mj León MD   Surgery PGY-1

## 2023-05-09 NOTE — PROVIDER NOTIFICATION
"\"ABEL Faria rm 205 6A [address] 2 primary    Notified by lab of critical hemoglobin of 6.3, already notified primary team. [facility] [id number]\"        Paged surgery 1st call resident BRISEYDA Ya at [phone]      MD S. Larson called back at [phone] and stated surgery residents would assess patient at bedside.     "

## 2023-05-09 NOTE — PROGRESS NOTES
[facility]    Medicine Progress Note - Medicine Service, [facility] TEAM 2    Date of Admission:  4/3/2023    Assessment & Plan    Gustavo Faria is a 58 year old male with recent Serratia bacteremia, HFrEF, prolonged QT, lower extremity wounds, DMII, hypothyroidism, low testosterone and known ACTH secreting pituitary adenoma w/resulting Cushing's disease s/p 2 partial resections in 2021 who [facility]. [facility], he developed new onset double vision and severe headache which prompted imaging that showed a large mass invading the cavernous sinuses and impinging on the cranial nerves. He is transferred to [facility] for this pituitary adenoma, concern for progression vs hemorrhage/apoplexy. Complicated by psychosis/metabolic encephalopathy and electrolyte abnormalities secondary to Cushing's syndrome. Underwent endoscopic endonasal transcavernous approach for resection of functional pituitary adenoma 4/27.      Updates today:  - [facility] -- no beds today  - 1u pRBC overnight for hgb 6.3, 1u pRBC late afternoon for hgb 6.9  - hypotensive, lower BP during the day (SBP 70s-80s around 1500). Assessed at bedside, ordered 1L LR bolus. BP responsive  - soaked through R leg dressings (ACE wrap onto gilberto) x 2 today.   - held baby aspirin   - transferred to [facility] for closer monitoring   - continue IV zosyn for today as pt continues to be hypotensive likely 2/2 sepsis from R lower leg infection, and unclear if serratia is only source of bacterial infection on chronic lower leg wounds     #R lower leg abscess s/p I&D 5/8/2023  #Hypotension, likely multifactorial 2/2 sepsis, blood loss anemia  RRT called 5/6/23 PM for softer SBPs, generally 80s/50s. Pt noted to have normal to moderately hypertensive blood pressures for most of the hospitalization; SBPs  in 80s unusual for patient. Normal and unchanged PO intake. BP response to fluid bolus. All blood pressure meds held. Infectious workup largely unremarkable. Neurosurg evaluated, does not think patient needs further meningitis workup at this time. Careful physical exam ultimately revealed new opening of R chronic leg wounds with actively draining pus and blood. Surgery consulted. Underwent bedside I&D of R lower leg on 5/8/23. R lower leg abscess growing +serratia. Notable ongoing bleeding from I&D site resulting in blood transfusion and hypotension. Will also monitor for glucocorticoid withdrawal syndrome  - antibiotics:   - zosyn (5/7 - )    - vanc (5/7-5/8)  - infectious workup                    - wound culture with 1+ gram negative rods, 2+ serratia  - 1L LR today  - transfuse for hgb < 7  - MRSA nares negative  - hold carvedilol, lasix, lisinopril, and spironolactone  - will need to restart spironolactone first given indication (cushing's disease)   - TTE unchanged from prior  - encourage PO intake    # Post-surgical Leukocytosis  # Buttocks, sacrum and bilateral groin wounds   # RLE wound from puncture injury   # L dorsal foot wound from presumed trauma   # Soft tissue infection/abscess    # Hx serratia bacteremia in December 2022   For patient's lower extremity wounds and hx of Serratia bacteremia in December, he was [facility]olin [facility]. His antibiotics were broadened to Vanc and Zosyn and ultimately he was transitioned to ceftazidime on 3/23 with plan to complete course on 4/4/23. BCx negative and wound cx grew serratia marcescens at the [facility]. He has been on ceftazidime since. MRI of the right tib/fib on 3/23 was negative for osteomyelitis but did show two fluid collections draining sponateneously. Ortho was consulted at the [facility] and determined no intervention was needed as wounds were draining on their own.  - trend WBC, recheck ordered for AM (5/5/23)   - completed course of ceftazidime  (3/23 - 4/5)  - elevated WBC since surgery. Suspect just post-surgical stress response. No fever. CXR done to check PICC position with no infiltrate. Wound appear unchanged.               - Repeat blood cultures/UA 5/1 due to rising WBC after surgery                          - blood cx 5/1 NGTD                          - UA negative              - repeat WOC consult 5/2/23 to re-assess chronic lower extremity wounds - wounds stable, no evidence of active infection  - PT/OT   - Pain: tylenol 975 mg Q6H PRN, oxycodone 5 mg Q6H PRN       # Pituitary adenoma resection this admission, c/b significant scar tissue to cavernous sinus, 4/27/23  #R eye ptosis, esotropia  # ACTH secreting pituitary adenoma c/b type II DM, hypothyroidism and low testosterone s/p two partial resections in 2021   Patient noted double vision and severe headache beginning the evening of 3/25. CT imaging on 3/25 revealed a large sellar/suprasellar mass extending into the cavernous sinuses with no evidence of acute stroke. On 3/28, he underwent an MRI which confirmed the CT findings of a large mass invading the cavernous sinuses and impinging on the cranial nerves. Necrosis extending beyond left cavernous sinus. [facility] to [facility]. Repeat MRI performed on 4/6/23: compared to 2021 MRI, lesions are smaller. Orbit MRI without optic nerve compression and no evidence of orbital infection.  - 4/27 neurosurgery and ENT combined to perform endoscopic endonasal transcavernous approach for resection of functional pituitary adenoma. Complicated by significant scar tissue to cavernous sinus. Good post-op recovery. Follow up MRI done 5/29. Neurosurgery okay with [facility] to [facility] once [facility]. They will arrange follow up.   - Ophthalmology following, no major changes, agree with neurosurgery plans  `           - opthalmology outpatient consult placed after discharge for eval of ptosis, comprehensive eye exam   - Endocrine following  -  neurosurgery following  - ENT following     #Cushing's disease 2/2 pituitary macroadenoma  # Hypernatremia  # Hypokalemia, resolved  Hypernatremia and hypokalemia, likely secondary to Cushing's from pituitary macroadenoma that was unable to be completely removed  - Endocrine following, appreciate recs  - hold spironolactone 200 mg every day due to hypotension   - Goal Na: 135-140  - Strict I&Os  - trend intermittent BMP, Mag  - check 24h urine cortisol and creatinine  - late night salivary cortisol x 2 ordered     # Central Hypothyroidism  - Continue PTA levothyroxine     # Type II DM, exacerbated by Cushing's   A1c of 7.6% on 2/2023.  - Endocrinology managing. Current regimen:   - Lantus 22 units daily.  - Carb coverage to 1 units/20 g CHO with meals and snacks  - High-dose sliding scale insulin for AC & HS  - Jardiance 10 mg daily  - hypoglycemia protocol  - Accu checks AC & HS      #Type II MI   # HFmrEF, with global hypokinesis  # Hypertension  # High burden of PVCs  # Prolonged QTc   # Elevated troponin, down-trended  Coronary angiogram on 2/27 showed normal coronaries. Trop mildly elevated on admit 88; downtrended to 77 without targeted intervention. Was noted to have high burden of PVCs on tele. TTE 4/6/2023 showed EF 40-45%, severe diffuse hypokinesis, normal RV function, and mild-moderate aortic regurgitation; overall, not significantly changed from prior TTE 2/17/23. 5/6/23, pt w/ new episode of hypotension of unclear cause. Troponin checked and 177-->170-->172. Most likely 2/2 demand ischemia in s/o hypotension. Patient denied chest pain. EKG with sinus rhythm, LVH, RBBB, and with t wave inversions more evident in anterior leads as compared to prior EKG 4/23/23.   - Repeat TTE unchanged  - troponin plateaued, will not recheck unless clinically changes  - Q4H vital signs  - recheck EKG, trop if chest pain occurs  - Lasix 40mg daily--held since surgery on 4/27. Restarted 4/30. Held 5/6   - Coreg and  lisinopril held for surgery 4/27.               - Carvedilol 12.5 mg BID restarted 4/29. Held 5/6  - Lisinopril restarted 4/30 and titrated up to prior dose 20 mg daily on 5/1. Held 5/6  - ASA - restarted 5/5/23, held 5/9/2023 due to bleeding  - Avoid QTc prolonging medications         # Acute metabolic encephalopathy - resolved  # Pyruria, Candiduria - resolved  # Encephalopathy likely secondary to cushing's disease, resolved  Admission symptoms included disorientation, intermittently following directions. Repetitive words - perseverating on particular phrases. Sx started the 2-3 days prior to admission (which [facility] was the [facility]). Vulnerable brain (multiple brain surgeries), enlarging pituitary mass. No seizure activity per EEG. Steroids removed and pt still encephalopathic. Sodium had been in normal limits and patient remained altered. Worked up for infections - did reveal candiduria but unlikely this was etiology, though he did complete an 8 day course of fluconazole. Briefly on ceftriaxone but encephalopathy also did not improve. CT abd w/ contrast (4/10): no evidence of pyelonephritis  Ultimately attributed to Cushings and known pituitary tumor. Continues to remain lucid.  - Surgical management per neurosurgery  - Delirium precautions  - Electrolyte management as above  - PRN quetiapine if agitation that is not responsive to behavioral interventions      # Hypogonadism   - hold PTA androgel (would need to bring in home medication, not accessible at this time)      # Chronic microcytic anemia   Hgb of 8.4 [facility]. Peripheral smear on 3/30 showed poikilocytosis with occasional red blood cell fragments but no other evidence of hemolysis.  Haptoglobin was normal.  Ferritin was 91, transferrin saturation was low, B12 was 495 and folate was 8.7. Likely combination of iron deficiency as well as inflammation/chronic disease.   - CTM        # Concern for malnutrition   - Nutrition following     #  Delusions, intermittent, resolved  Intermittent delusions regarding staff stalking patient while in the hospital. Unclear chronicity of these delusions - but has had them multiple times this hospitalization. Unclear psych history. Medical management for encephalopathy as stated above. Psychiatry was consulted during his care and agreed with paranoia and recommended Abilify. He was briefly on Abilify 5mg but patient then started to refuse nightly so this was discontinued. His paranoia and delusions have been very intermittent and appear to be associated with his anxiety.      Discharge Needs  - Follow up in 6 weeks with Dr Jeong, neurosurgery (message sent to schedulers),  - MRI pituitary in 6 weeks before discharge (message sent to schedulers)  - Upright lumbar xrays to evaluate his compression fractures (ordered by neurosurgery)   - outpatient follow-up w/ ENT -- contact ENT when transfers to the [facility] to schedule follow-up for splint removal in clinic       Diet: Diet  Snacks/Supplements Adult: Glucerna; Between Meals  Regular Diet Adult    DVT Prophylaxis: Pneumatic Compression Devices  Roland Catheter: Not present  Lines: PRESENT      PICC 04/06/23 Double Lumen Right Basilic access-Site Assessment: WDL      Cardiac Monitoring: None  Code Status: Full Code      Clinically Significant Risk Factors              # Hypoalbuminemia: Lowest albumin = 2.9 g/dL at 4/11/2023  7:07 AM, will monitor as appropriate          # DMII: A1C = 9.5 % (Ref range: <5.7 %) within past 6 months    # Severe Malnutrition: based on nutrition assessment        Disposition Plan    Plan [facility]       Leela Verdin MD  Medicine Service, [facility] TEAM [address] [place] [address]  Securely message with [employer] (more info)  Text page via [employer] Paging/Directory   See signed in provider for up to date coverage  information  ______________________________________________________________________    Interval History   Rapid response around 0100 for BP 80s/60s, received 500 ml LR. Hgb checked and was 7.0. Pt was having ongoing bleeding from ankle I&D earlier in the day. Hgb rechecked later after bolus and was 6.3. 1u pRBC ordered.  See RRT note for further details    This morning, pt appears more tired than usual. He is frustrated with multiple team members who have come to see him, would like to not be bothered so much. Denies pain in the R leg unless someone touches it. He denies feeling feverish, chills, lightheadedness, dizziness, headache, chest pain, SOB, abdominal pain         Physical Exam   Vital Signs: Temp: 98  F (36.7  C) Temp src: Axillary BP: 101/66 Pulse: 101   Resp: 16 SpO2: 100 % O2 Device: None (Room air)    Weight: 164 lbs 8 oz    Gen: NAD, nontoxic appearing, tired appearing  Eyes: EOMI, conjuctiva clear, R ptosis  Mouth: OP clear, no lesions  CV: RRR, no murmurs, 2+ radial pulses  RESP: CTA bilaterally, no w/r/c  Abd: soft, nontender, nondistended  Ext: no edema bilaterally, RLE with ACE wrap,no bleed through or soiling      Data     I have personally reviewed the following data over the past 24 hrs:    12.0 (H)  \   6.3 (LL)   / 333     138 100 10.7 /  91   3.9 26 0.97 \       Procal: N/A CRP: 153.00 (H) Lactic Acid: N/A       INR:  1.30 (H) PTT:  39 (H)   D-dimer:  N/A Fibrinogen:  N/A       Imaging results reviewed over the past 24 hrs:   No results found for this or any previous visit (from the past 24 hour(s)).

## 2023-05-09 NOTE — PROVIDER NOTIFICATION
"\"R Faria rm 205 6A [address] 2    Notified of critical hemoglobin of 6.3. Blood consent is completed in chart, Type and screen still in process per Blood bank. Thanks Brayan [id number]\"        Paged mardeborah resident at [phone]      MD placed order for 1 unit of PRBCs at 0404.  "

## 2023-05-09 NOTE — PROGRESS NOTES
"Patient demanding to get out of bed and sit at nursing desk, reporting anxiety.     RN explained that patient does not appear to be hemodynamically stable enough to safely sit in chair with R leg dependent to gravity given Hemoglobin of 6.3, labile Blood pressures/heart rate, as well as constantly bleeding R leg wound. Patient continue to be irritable and demand to see \"his doctor\". Informed patient that surgery MD would be around to see patient shortly due to hemoglobin and bleeding wound.     Patient still irritable and did not appear to understand severity of condition and continue to make demands to sit in wheelchair.   "

## 2023-05-09 NOTE — PROVIDER NOTIFICATION
05/09/23 0100   Call Information   Date of Call 05/09/23   Time of Call 0104   Name of person requesting the team Brayan MONTIEL   Title of person requesting team RN   RRT Arrival time 0106   Time RRT ended 0144   Reason for call   Type of RRT Adult   Primary reason for call Cardiovascular   Cardiovascular SBP less than 90   Was patient transferred from the ED, ICU, or PACU within last 24 hours prior to RRT call? No   SBAR   Situation A+Ox4, BP 80s/60s.  Nurse reports intermittent periods of lethargy inbetween periods of irritability   Background resection of Pituitary adenoma   Notable History/Conditions Recent surgery   Assessment A+Ox4, BP 80s/60s.  Nurse reports intermittent periods of lethargy inbetween periods of irritability   Interventions Fluid bolus   Patient Outcome   Patient Outcome Stabilized on unit   RRT Team   Attending/Primary/Covering Physician Asher 2   Date Attending Physician notified 05/09/23   Time Attending Physician notified 0104   Physician(s) Wallace MUÑIZ   Lead RN Iker MONTIEL   RT n/a   Post RRT Intervention Assessment   Post RRT Assessment Stable/Improved   Date Follow Up Done 05/09/23   Time Follow Up Done 0448   Comments BP 80s-100s/60s.  Receiving PRBCs for hgb 6.3.

## 2023-05-09 NOTE — PROGRESS NOTES
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 [facility], he developed new onset double vision and severe headache which prompted imaging that showed a large mass invading the cavernous sinuses and impinging on the cranial nerves. He is transferred to [facility] for this pituitary adenoma with plans for possible surgical intervention.     Areas Assessed:      Areas visualized during today's visit: Sacrum/coccyx and Lower extremities     Wound location: Buttocks, Sacrum and Groin       5/2 5/8  Last photo: 4/18/23  Wound due to: Incontinence Associated Dermatitis (IAD)  Wound history/plan of care: incontinent of stool and urine. Has not had incontinent in the last few days. Groin IAD resolved.  Wound base:  100 % dermis     Palpation of the wound bed: normal      Drainage: scant     Description of drainage: serous     Measurements (length x width x depth, in cm): one open area 1 x 0.5 x 0.1     Tunneling: N/A     Undermining: N/A  Periwound skin: Intact      Color:   groin has areas of hypopigmentation, buttock has areas of hyperpigmenting.       Temperature: normal   Odor: none  Pain: mild, tender  Pain interventions prior to dressing change: slow and gentle cares   Treatment goal: Heal  and Protection  STATUS: evolving  Supplies ordered: at bedside and supplies stored on unit     Wound location: BLE      Left foot 5/2 5/9    Right Lateral Leg 5/2 5/9          Right anterior leg      Last photo: 23  Wound due to: Arterial Ulcer and Unknown Etiology  Wound history/plan of care: Presented to the hospital earlier this year for lower leg wound infection. Per nursing patient has been intermittently refusing dressing changes.    general surgery performed on RLE below existing wound due to purulent drainage. Having significant issues with bleeding on    Wound base: Right leg wounds 10 % granulation tissue, 90 % non-granular tissue Left foot 50 % yellow fibrin,  50 % granulation tissue     Palpation of the wound bed: normal      Drainage: moderate     Description of drainage: cloudy     Measurements (length x width x depth, in cm):Right anterior leg 2.5 x 2.5  x  0.5 cm Right Lateral 7 x 3. x 0.3 Left foot 2.2 x 2.3 x 0.3     Tunneling: Right anterior leg 2 cm @ 1 o'clock     Undermining: Right anterior le.2 from 12-3 o'clock.  Left foot 0.2 cm 2 - 6 o'clock  Periwound skin: Intact, Dry/scaly and Edematous      Color: normal and consistent with surrounding tissue      Temperature: normal   Odor: mild  Pain: moderate, sharp  Pain interventions prior to dressing change: patient tolerated well, slow and gentle cares  and distraction  Treatment goal: Heal  and Infection control/prevention  STATUS: evolving  Supplies ordered: at bedside    Treatment Plan:     Sacrum wound: Daily and PRN   Cleanse the area with Merary cleanse and protect, very gently with soft cloth.  Apply ostomy powder (#9065) on all open and denuded skin.  Apply thin layer of critic aid paste on top of it.  With repeat application, do not scrub the paste, only remove soiled paste and reapply.  If complete removal of paste is necessary use baby oil/mineral oil (#690129) and soft wash cloth.  Ensure pt has Jono-cushion (#261264) while sitting up in the chair.  Use only one Covidien pad in between mattress and pt. No brief while in bed.    DO NOT Use sacral mepilex is will cause the wound to become too wet.     Right  "anterior Leg: Change daily and PRN  Remove old dressing. Ensure packing is removed from tunnel.  Cleanse wound bed with normal saline and pat dry.  Cut a piece of PolyMem rope (#619859) and gently pack into the tunnel at 1 o'clock Use the rope to fill in the rest of the wound bed. Ensure to cover entire wound bed.  Cover with Mepilex border. May be cover with abd or exudry (#[id number]) pre surgeon order for lower leg wound in place of mepilex.    Right lateral leg and left foot wounds Change dressing every other days.  Cleanse wound bed with normal saline and pat dry.  Cut a piece of PolyMem roll (#886666) to fit inside the wound bed and place it with the lettering facing up. Ensure to cover entire wound bed.  Cover with Mepilex border. May be cover with abd or exudry (#[id number]) pre surgeon order for lower leg wound in place of mepilex.    EdemaWear stockings:     To bilateral lower legs    Remove once daily for skin inspections and cares    Cleanse and moisturize any intact or scaly skin before applying EdemaWear    Ok to apply additional compression over the EdemaWear (ie Lymph wraps)    Application:     Apply the EdemaWear from base of toes to knee, or above knee if tolerated and it is a long stocking    Create a wide 3\" cuff at the top if needed to prevent rolling down    Only trim the stocking if it is excessively long; do NOT cut in half; can often fold over excess length onto foot    When wounds are present:    Wound dressings that directly treat the wound bed can be applied under the EdemaWear    Any additional cover dressings (dry gauze, ABD pads, Kerlix, etc) should be applied ON TOP of the stockings whenever feasible     Stockings may get soiled with drainage and will need to be washed; ensure an extra clean, dry pair always available    May need two people to apply the stocking - bunch up stocking and pull against each other, lifting over wounds    Care:    When stockings are soiled, DO NOT THROW AWAY, hand " "wash with mild soap, rinse, hang dry    Replace approximately every 4 to 6 months        EdemaWear size Max circumference Stripe color PS # # stockings per pack   Small 18\"  (45cm) navy [id number]     Orders: Written    RECOMMEND PRIMARY TEAM ORDER: D/C Merary antifungal to Groin no longer needed  Education provided: plan of care, wound progress and Moisture management  Discussed plan of care with: Nurse  [employer] [employer] nurse follow-up plan: weekly  Notify [employer] if wound(s) deteriorate.  Nursing to notify the Provider(s) and re-consult the [employer] Nurse if new skin concern.    DATA:     Current support surface: Standard  Standard gel/foam mattress (IsoFlex, Atmos air, etc)  Containment of urine/stool: Incontinence Protocol, Incontinent pad in bed and Primofit external catheter  BMI: Body mass index is 26.55 kg/m .   Active diet order: Orders Placed This Encounter      Diet      Regular Diet Adult     Output: I/O last 3 completed shifts:  In: 240 [P.O.:240]  Out: 1000 [Urine:1000]     Labs:   Recent Labs   Lab 05/09/23  1137 05/09/23  0309 05/09/23  0121 05/07/23  0723 05/06/23  1921   ALBUMIN  --   --   --   --  3.0*   HGB 7.1*   < >  --    < > 8.5*   INR  --   --  1.30*  --   --    WBC 12.1*   < >  --    < > 15.2*    < > = values in this interval not displayed.     Pressure injury risk assessment:   Sensory Perception: 3-->slightly limited  Moisture: 3-->occasionally moist  Activity: 2-->chairfast  Mobility: 2-->very limited  Nutrition: 3-->adequate  Friction and Shear: 2-->potential problem  Manan Score: 15    Veronica Gunter RN CWOCN  Pager no longer is use, please contact through [employer] [facility]: [employer] Nurse  Dept. Office Number: [phone]  "

## 2023-05-09 NOTE — PROGRESS NOTES
Brief Surgery Night Call Note  5/9/2023    Patient seen once more at bedside at 03:50h after critical Hgb result 6.3 (a decrease from Hgb 7.0 two hours prior). He had received 500 mL NS bolus thus far tonight. A large area of right ankle dressing was saturated through the Kerlix and ACE wrap.    The patient did not seem appropriately concerned about the ongoing bleeding ankle and instead, he was frustrated about not currently being allowed to use a wheelchair to get out of bed. Denied lightheadedness.    Dr. Scott Soria, PGY2, manually held pressure over the right ankle for exactly 20 minutes, holding over the ACE and Kerlix bandages already in place. Next, we carefully removed the ACE wrap and the top layer of Kerlix. A new layer of Kerlix was re-wrapped, and an ACE wrap was wrapped on top of this.    - Page Surgery if ACE wrap saturates significantly again  - Continue to monitor for hypotension and tachycardia      Discussed with General Surgery Boby Resident, Dr. Scott Soria M.D.    Shana Ya M.D.  General Surgery Resident PGY1  [facility]

## 2023-05-09 NOTE — PLAN OF CARE
Status: s/p pituitary adenoma resection 4/27. 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 Baseline R CN 3 palsy  Vitals: VSS, ex soft BP's.  Neuros: AO x 4. Not participating in full exam, follows most commands. Eyelids drooping. 4/5 BUE, 3/5 BLE.   IV: DL PICC, one TKO + one SL.   Labs/Electrolytes: WNL, redraw Hgb 7.1  Resp/trach: No SOB reported, on continuous pulse ox.   Diet: Regular, poor intake. Orders food, but does not eat it.   Bowel status: Smearing BM this shift, given PRN Miralax. Pt declined suppository.   : Voiding spontaneously via urinal, 24 hour urine collection.   Skin: L foot wound, dressing changed by WOC. RLE leg wounds, dressing changed by RN and WOC RN. Sacral wound, per orders.   Pain: PRN Oxy and Tylenol given this shift.   Activity: A2 w/ lift, turn and repo Q2.   Plan: Continue to monitor and follow POC.   Updates this shift: Please paged Gen Surgery before doing next RLE dressing change.

## 2023-05-09 NOTE — PROVIDER NOTIFICATION
"\"LIDYA Faria rm 205 6A [place] 3 primary    R lower extremity dressing bled through. BPs and HR still labile. Pt still lethargic to somnolent. Thanks [facility] [id number]\"        Paged 1st call general surgery MD BRISEYDA Ya at [phone]      Addendum: MD arrived on unit shortly after page was sent and assessed patient at bedside. Requested RN page Primary team about rechecking a STAT hemoglobin while she contacted divine resident.     "

## 2023-05-09 NOTE — PROGRESS NOTES
CLINICAL NUTRITION SERVICES - REASSESSMENT NOTE     Nutrition Prescription    RECOMMENDATIONS FOR MDs/PROVIDERS TO ORDER:  None at this time.    Malnutrition Status:    Patient does not meet two of the established criteria necessary for diagnosing malnutrition but is at risk for malnutrition    Recommendations already ordered by Registered Dietitian (RD):  - Ordered updated weight  - Continue Glucerna BID    Future/Additional Recommendations:  - Monitor PO intake, supplement use, and weight trends  - Monitor wound status     EVALUATION OF THE PROGRESS TOWARD GOALS   Diet: Regular  Snacks/supplements: Glucerna (butter pecan or strawberry) at 10 am and HS, pt may order Ensure Max PRN    Intake: 0-100% per flowsheets over past week  Per [employer], pt ordering between 2-5 meals/day from room service. Ordered 3-day average of 4126 kcal and 118 g protein.      NEW FINDINGS   - Tolerating diet per RN notes, frequent snacking, denies nausea    Met with pt at bedside. He had many snacks on his bedside table. He reports eating well and eating more than his baseline intake. He has no nutrition questions or concerns at this time. Discussed importance of adequate protein intake for wound healing. Encouraged pt to order a protein source at each meal and snack. Pt reports he always orders a protein source.     Weight:   04/30/23 1132 74.6 kg (164 lb 8 oz) Bed scale   04/22/23 1429 73.4 kg (161 lb 13.4 oz) Bed scale   04/20/23 1500 66.8 kg (147 lb 4.8 oz) --   04/18/23 1029 65.1 kg (143 lb 8.3 oz) Bed scale   04/16/23 1428 65.7 kg (144 lb 13.5 oz) Bed scale   04/15/23 1736 69.6 kg (153 lb 7 oz) Bed scale   04/14/23 1821 70.9 kg (156 lb 4.9 oz) Bed scale   04/11/23 1121 66 kg (145 lb 8.1 oz) Bed scale   04/10/23 1009 69.5 kg (153 lb 3.5 oz) Bed scale   04/05/23 0740 68.6 kg (151 lb 3.8 oz) Bed scale   04/04/23 1002 68.7 kg (151 lb 7.3 oz) Bed scale     Wt Readings from Last 10 Encounters:   04/30/23 74.6 kg (164 lb 8 oz)    23 77.6 kg (171 lb 1.2 oz)   23 77.1 kg (170 lb)   No new weight since  - RD ordered updated weight  Weight up since admit - current wt below PTA admission weights from February. Limited wt history available PTA.    Labs:   Alk Phos: 232 (H)  ALT: 54 (H)  AST: 30 (WNL)  Hemoglobin A1C: 9.5 (4/5)  B-163 over 24 hours    Meds:  Lasix - has been held since   Novolog, medium intensity sliding scale, 1 unit per 20 g CHO  Lantus, 18 units  Thera-vit-m  Vitamin D2, 50,000 units/1250 mcg every 7 days  Dulcolax, PRN    GI: last BM  per I/Os    Skin:  WOCN following for buttocks, sacrum, bilateral groin, right leg  - See last WOCN note  for details  - Wounds evolving    MALNUTRITION  % Intake: No decreased intake noted  % Weight Loss: None noted  Subcutaneous Fat Loss: None observed  Muscle Loss: None observed  Fluid Accumulation/Edema: Trace - Moderate  Malnutrition Diagnosis: Patient does not meet two of the established criteria necessary for diagnosing malnutrition but is at risk for malnutrition    Previous Goals   Patient to consume % of nutritionally adequate meal trays TID, or the equivalent with supplements/snacks.  Evaluation: Met    Previous Nutrition Diagnosis  Increased kcal/protein needs related to wound healing support as evidenced by estimated protein needs of  gm protein/day   Evaluation: No change    CURRENT NUTRITION DIAGNOSIS  Increased kcal/protein needs related to wound healing support as evidenced by estimated protein needs of  gm protein/day.    INTERVENTIONS  Implementation  Medical food supplement therapy - continue Glucerna    Goals  Patient to consume % of nutritionally adequate meal trays TID, or the equivalent with supplements/snacks.    Monitoring/Evaluation  Progress toward goals will be monitored and evaluated per protocol.    Parisa Evans RD, LD  6A/7D RD pager: [phone]  Weekend/Holiday RD pager: [phone]

## 2023-05-09 NOTE — PROGRESS NOTES
Care Management Follow Up    Length of Stay (days): 36    Expected Discharge Date:       Concerns to be Addressed: all concerns addressed in this encounter     Patient plan of care discussed at interdisciplinary rounds: Yes    Anticipated Discharge Disposition: Return to [facility]     Anticipated Discharge Services: Transportation Services  Anticipated Discharge DME: None      Additional Information:  Phone call to [facility] Patient Placement #[phone] #1 at 10am this morning regarding bed availability. No bed available today,13 Patients boarding in their ED, advised to call tomorrow.        France Olivas RN   6A care coordinator #[phone]

## 2023-05-09 NOTE — PROVIDER NOTIFICATION
[phone] Paged MD- ABEL Pt bleeding through bottom of dressing and needs to be changed.     Tasha [phone]

## 2023-05-09 NOTE — PROGRESS NOTES
"Brief General Surgery Cross-Cover Note  5/8/2023       19:18, Paged regarding: \"Still bleeding. More than before from the lower leg wound. Please come and assess\"    Subjective: Patient seen at bedside with RLE wrapped. Small 3cm area of bloody drainage noted to have soaked through the dressing and ACE wrap along the medial aspect of the Rankle. Patient denied any significant worsening of pain, foot warm to palpation. Did discuss wanting to take down the dressing for further evaluation, however patient expressed frustration regarding the frequent changes and requested to wait until later. Did evaluate the wound as much as possible without removing the ACE.       Objective:  /77 (BP Location: Left arm)   Pulse 98   Temp 98.3  F (36.8  C) (Axillary)   Resp 16   Wt 74.6 kg (164 lb 8 oz)   SpO2 98%   BMI 26.55 kg/m      General: AAOx3, NAD  Pulm: NLB on RA  MSK/Skin: WWP, Moving extremities independently, Warm L-foot      Assessment/Plan:  Gustavo Faria is a 58 year old s/p bedside I&D of chronic RLE abscess. On aspirin. Having ongoing soak-through of the lower most incision site along the medial aspect of the R-ankle. Unfortunately not able to personally examine the wound due to patient wishes, however from my exam, the current amount of soak-through does not represent any instance of a massive hemorrhage. Certainly, the patient will benefit from ongoing pressure dressing changes.   - Continue changes PRN with quick clot, 4x4, kerlix, and ACE  - Keep packings in place  - Continue IV abx  - Please page general surgery with questions; Anticipate patient will need several dressing changes over the night until adequate hemostasis      Jessika Trevino MD  General Surgery, PGY-1    "

## 2023-05-09 NOTE — PROGRESS NOTES
Brief Surgery Night Coverage Note  May 9, 2023      Called by nursing about bleeding from right medial ankle site through dressing. Per nursing report, patient had been having saturation of right leg dressing since procedure.    Patient seen at bedside 5/8/23 at 23:30h. Right ankle ACE wrap and underlying Kerlix were saturated in an approximately 6 cm x 4 cm area with dark blood. Dressings were unwrapped to reveal oozing bleeding at right ankle wound, which had woven ribbon dressing packed in place from procedure. The ribbon dressing was left in the wound and Quickclot was applied, holding pressure for 3-5 minutes. Three layers of Quickclot were applied in total, followed by two layers of Kerlix. The other surgical leg wounds did not show signs of active oozing or bleeding. The patient refused an ACE wrap, although I strongly recommended it as necessary to control bleeding.    Patient was checked on again at 00:45h on 5/9/23, and the dressings and underlying chux on bed were saturated with red blood at the right medial ankle. Stat CBC obtained (Hgb 7.0 at 23:51h, from 7.9 prior to today's procedure). Blood pressure was initially 80s/60s, with heart rate 95, but when recheck showed BP 85/50 a rapid response was called and 500 mL NS bolus started for hypovolemia. The patient denied lightheadedness or palpitations, but he did seem more lethargic than earlier. Medicine primary team residents Dr. Brown and Dr. Cedillo also came to bedside.    A fresh Quickclot was applied to the right ankle wound and held with pressure for 5 minutes without cessation of bleeding. The Quickclot was then removed and Surgicel was applied with firm pressure for 5 minutes, with good hemostasis.    General Surgery Night Boby Resident, Dr. Scott Soria M.D., came to bedside and re-packed the ankle wound with one length of 1/4-inch woven ribbon dressing. Next, a fresh Surgicel was applied with pressure for about 2 minutes with hemostasis  before a Kerlix fluff was placed over it and Kerlix wrapped around the ankle to form a pressurizing dressing. An ACE wrap was applied to the right ankle and leg overlying the Kerlix.    - Will plan to leave right leg dressing in place tonight  - Please page Surgery to re-assess if dressing is saturated  - Defer remainder of cares to Medicine primary team    General Surgery will also assess the patient in the morning.    Seen with General Surgery Night Boby Resident, Dr. Scott Soria M.D.    Shana Ya M.D.  General Surgery Resident PGY1  [facility]

## 2023-05-09 NOTE — PROVIDER NOTIFICATION
"  MD Cedillo paged at 0109 re:    \"Rapid response called for low BP 85/50, decreasing alertness.\"  "

## 2023-05-09 NOTE — PLAN OF CARE
Status: s/p pituitary adenoma resection 4/27. 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 Baseline right CN 3 palsy  Vitals: VSS on RA, tachycardic into the 100s.   Neuros: A&Ox4. BUE 4-5/5, BLE 3/5. N/T to toes bilaterally. R. Ptosis to R eye. Pupils sluggish bilaterally.   IV: DL PICC; hep locked   Labs/Electrolytes:  Resp/trach: Denies SOB, RA   Diet: Regular diet;    Bowel status: Large BM 5/8   : voiding with intermittent incontinence  Skin: L leg wound covered-CDI R leg wound dressing being changed by general surgery when needed-bleeding from the bottom of dressing. Buttocks/sacral wounds SUKH. Wound care orders done this shift.   Pain: Declined pain meds   Activity: Up with lift or 2 GB/walker/pivot   Plan: Continue with POC.   Updates this shift: Low BG of 68 corrected with D50 this evening. 24 hour urine collection started.

## 2023-05-09 NOTE — PLAN OF CARE
Status: s/p pituitary adenoma resection 4/27. 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 Baseline R CN 3 palsy  Vitals: Hypotensive; stable oxygen/hr  Neuros: AO x 4. Not participating in full exam, follows most commands. Eyelids drooping. 4/5 BUE, 3/5 BLE. Difficulty to assess d/t pt being agitated and irritable.   IV: DL PICC infusing LR bolus.  Labs/Electrolytes: Recheck hgb 6.9 at 1420. 1 unit of red blood cells given. Recheck hemoglobin at 1820. Blood glucose 69. Insulin held. D50 given d/t pt being uncooperative with taking oral fluids   Resp/trach: No SOB reported, on continuous pulse ox.   Diet: Regular, poor intake. Pt did order outside food and did eat majority of it.   Bowel status: Smearing BM this shift, given PRN Miralax. Pt declined suppository.   : Voiding spontaneously via urinal, 24 hour urine collection.   Skin: L foot wound, dressing changed by WOC. RLE leg wounds, dressing changed by RN and WOC RN. Sacral wound, per orders.   Pain: No pain medication given. Pt stated he did not have pain.   Activity: A2 w/ lift, turn and repo Q2. Pt is able to pivot. Please encourage activity   Plan: Transfer to   Updates this shift: Please paged Gen Surgery before doing next RLE dressing change. Monitor BP

## 2023-05-09 NOTE — CODE/RAPID RESPONSE
Rapid Response Team Note    Assessment   In assessment a rapid response was called on Gustavo Faria due to hypotension. BP lower than baseline this evening (80s/50-60s)concerning for acute blood loss as a cause. Hgb is 7.0. Wound assessed by surgical team and dressing revised. Bleeding did not seem very concerning. No other significant symptoms suggestive of alternate causes like chest pain, SoB, diarrhea or fever.Primary is actively managing. Bolus fluid being administered.   Patient is mentating ok and oriented, complains about not being disturbed from sleep.     Plan   -  Agree with bolus fluid. Also suggest low threshold for RBC transfusion given trending down Hgb in setting of active bleeding  - Agree with frequent BP check until stable. Consider sepsis work up if BP does not respond to fluid and transfusion. Continue current antibiotic for now (Zosyn).   - Consider transfer to [facility] if continue to have soft BP for better close monitoring including Tele  -  Disposition: The patient will remain on the current unit. We will continue to monitor this patient closely.  -  Reassessment and plan follow-up will be performed by the primary team      Wallace Brooks PA-C  [facility] RRT [facility] Job Code Contact #[phone]  [facility] Paging/Directory    Hospital Course   Brief Summary of events leading to rapid response:   Per primary note, Gustavo Faria is a 58 year old s/p bedside I&D of chronic RLE abscess. On aspirin. Having ongoing soak-through of the lower most incision site along the medial aspect of the R-ankle.  RRT called for hypotension     Admission Diagnosis:   Pituitary adenoma (H) [D35.2]    Physical Exam   Temp: 98.9  F (37.2  C) Temp  Min: 97.7  F (36.5  C)  Max: 98.9  F (37.2  C)  Resp: 16 Resp  Min: 16  Max: 18  SpO2: 96 % SpO2  Min: 96 %  Max: 99 %  Pulse: 99 Pulse  Min: 57  Max: 110    No data recorded  BP: (!) 84/60 Systolic (24hrs), Av , Min:81 , Max:109   Diastolic (24hrs), Av, Min:50,  Max:85     I/Os: I/O last 3 completed shifts:  In: 240 [P.O.:240]  Out: 1250 [Urine:1250]     Exam:   Detail exam not perfomed due to patient refusal   General: in no acute distress  Mental Status: AAOx4.  Breathing and speaking normally on RA  Dressing over the wound, no visible active bleeding     Significant Results and Procedures   Lactic Acid:   Recent Labs   Lab Test 05/06/23  1921 04/27/23  1939 04/27/23  1720   LACT 1.8 2.1* 2.0     CBC:   Recent Labs   Lab Test 05/09/23  0051 05/08/23  0749 05/07/23  0723   WBC 12.7* 13.0* 14.1*   HGB 7.0* 7.9* 8.5*   HCT 23.6* 26.9* 28.5*    411 409        Sepsis Evaluation   The patient is known to have an infection.  NO EVIDENCE OF SEPSIS at this time.  Vital sign, physical exam, and lab findings are due to likely due to blood loss and post op status .

## 2023-05-09 NOTE — PLAN OF CARE
Hamilton Sterling 2 ([phone]) paged at [phone]: RLE wound dressing has saturated the dressing again. Thanks! Josselyn [phone]: Med Maroon called back, stated to page Gen Surg. Gen Surg paged.

## 2023-05-09 NOTE — PROVIDER NOTIFICATION
"\"LIDYA Faria rm [address] Asher 2    Pt lethargic, BPs still consistently 80's-90's systolic, HR 90's-100's. also bleeding through dressing (already notified surgery). Do you want to recheck hemoglobin? Thanks Brayan [phone]\"        Paged asher resident listed in treatment team at [phone]      MD BERHANE Brown called back at [phone]. Stated previous blood pressures were okay and and agreed with rechecking hemoglobin. Also wants to still recheck hemoglobin in AM per previous order.   "

## 2023-05-09 NOTE — PROGRESS NOTES
Otolaryngology Progress Note  May 8, 2023    Subjective/Intvl events: Hypotension and mild tachycardia overnight with Hg at 6.3. Noted to have bleeding from his ankle s/p bedside I&D for RLE abscess. He denies lightheadness or dizziness. Receiving pRBC transfusion. Otherwise, he denies clear drainage from his nose or salty/metallic taste.       O: /79 (BP Location: Left arm)   Pulse 97   Temp 98.7  F (37.1  C) (Oral)   Resp 18   Wt 74.6 kg (164 lb 8 oz)   SpO2 97%   BMI 26.55 kg/m     General: Alert and oriented x 3, no acute distress   HEENT: Bilateral lateral gaze palsy and restricted EOM on the right, facial movement symmetric, Oropharynx clear. No nasal drainage.    Pulmonary: Breathing non-labored, no stridor, no accessory muscle use.        A/P: Gustavo Faria is a 58 year old male with a past medical history of pituitary ACTH secreting macroadenoma s/p endoscopic endonasal transcavernous approach for resection of functional pituitary adenoma w/ nasoseptal flap on 4/28. He is recovering well post-operatively without signs of CSF leak.    - Serial neuro exams  - Nasal saline QID  - Appreciate Endo following management of Cushing's and DMII, Medicine management of patient's hypotension, Gen surg management of RLE wound  - Sinus precautions: No nose blowing, sneeze with mouth open, no straining, and scheduled bowel regimen  - Follow-up scheduled to remove Jean Baptiste splints  - Rest of care per primary team    -- Patient and above plan discussed with Dr. Adan Jimenez, PGY-2  Otolaryngology-Head & Neck Surgery  Please contact ENT with questions by dialing [phone] and entering job code [phone] when prompted.

## 2023-05-09 NOTE — PLAN OF CARE
Status: s/p pituitary adenoma resection 4/27. 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 Baseline right CN 3 palsy  Vitals: tachycardic into the 100s   Neuros: A&Ox4. BUE 4-5/5, BLE 3/5. N/T to toes bilaterally. R. Ptosis to R eye. Pupils sluggish bilaterally. Pt declined to assessment while ED needs kelp. Pt irritable throughout the shift.   IV: DL PICC; hep locked x1 lumen  Resp/trach: Denies SOB, RA   Diet: Regular diet;    Bowel status: Large BM 5/8   : voiding with intermittent incontinence  Skin: R leg with constant oozing and leaking .   Pain: Declined pain meds   Activity: Up with lift or 2 GB/walker/pivot   Plan: Continue with POC.

## 2023-05-10 ENCOUNTER — HOSPITAL ENCOUNTER (OUTPATIENT)
Facility: CLINIC | Age: 58
End: 2023-05-10
Payer: COMMERCIAL

## 2023-05-10 LAB
ANION GAP SERPL CALCULATED.3IONS-SCNC: 9 MMOL/L (ref 7–15)
BACTERIA ABSC ANAEROBE+AEROBE CULT: ABNORMAL
BUN SERPL-MCNC: 11.9 MG/DL (ref 6–20)
CALCIUM SERPL-MCNC: 7.8 MG/DL (ref 8.6–10)
CHLORIDE SERPL-SCNC: 107 MMOL/L (ref 98–107)
CORTIS SERPL-MCNC: 30.6 UG/DL
CREAT SERPL-MCNC: 1.06 MG/DL (ref 0.67–1.17)
CRP SERPL-MCNC: 57.1 MG/L
DEPRECATED HCO3 PLAS-SCNC: 24 MMOL/L (ref 22–29)
ERYTHROCYTE [DISTWIDTH] IN BLOOD BY AUTOMATED COUNT: 19.1 % (ref 10–15)
GFR SERPL CREATININE-BSD FRML MDRD: 82 ML/MIN/1.73M2
GLUCOSE BLDC GLUCOMTR-MCNC: 132 MG/DL (ref 70–99)
GLUCOSE BLDC GLUCOMTR-MCNC: 137 MG/DL (ref 70–99)
GLUCOSE BLDC GLUCOMTR-MCNC: 142 MG/DL (ref 70–99)
GLUCOSE BLDC GLUCOMTR-MCNC: 64 MG/DL (ref 70–99)
GLUCOSE BLDC GLUCOMTR-MCNC: 78 MG/DL (ref 70–99)
GLUCOSE BLDC GLUCOMTR-MCNC: 82 MG/DL (ref 70–99)
GLUCOSE BLDC GLUCOMTR-MCNC: 82 MG/DL (ref 70–99)
GLUCOSE BLDC GLUCOMTR-MCNC: 84 MG/DL (ref 70–99)
GLUCOSE BLDC GLUCOMTR-MCNC: 98 MG/DL (ref 70–99)
GLUCOSE SERPL-MCNC: 83 MG/DL (ref 70–99)
GRAM STAIN RESULT: ABNORMAL
GRAM STAIN RESULT: ABNORMAL
HCT VFR BLD AUTO: 23.4 % (ref 40–53)
HGB BLD-MCNC: 7.5 G/DL (ref 13.3–17.7)
HGB BLD-MCNC: 7.8 G/DL (ref 13.3–17.7)
HGB BLD-MCNC: 7.9 G/DL (ref 13.3–17.7)
MAGNESIUM SERPL-MCNC: 2 MG/DL (ref 1.7–2.3)
MCH RBC QN AUTO: 28.4 PG (ref 26.5–33)
MCHC RBC AUTO-ENTMCNC: 32.1 G/DL (ref 31.5–36.5)
MCV RBC AUTO: 89 FL (ref 78–100)
PHOSPHATE SERPL-MCNC: 2.5 MG/DL (ref 2.5–4.5)
PLATELET # BLD AUTO: 290 10E3/UL (ref 150–450)
POTASSIUM SERPL-SCNC: 3.7 MMOL/L (ref 3.4–5.3)
RBC # BLD AUTO: 2.64 10E6/UL (ref 4.4–5.9)
SODIUM SERPL-SCNC: 140 MMOL/L (ref 136–145)
WBC # BLD AUTO: 13.1 10E3/UL (ref 4–11)

## 2023-05-10 PROCEDURE — 250N000011 HC RX IP 250 OP 636

## 2023-05-10 PROCEDURE — 85018 HEMOGLOBIN: CPT

## 2023-05-10 PROCEDURE — 99233 SBSQ HOSP IP/OBS HIGH 50: CPT | Mod: GC | Performed by: INTERNAL MEDICINE

## 2023-05-10 PROCEDURE — 80048 BASIC METABOLIC PNL TOTAL CA: CPT

## 2023-05-10 PROCEDURE — 36592 COLLECT BLOOD FROM PICC: CPT

## 2023-05-10 PROCEDURE — 36415 COLL VENOUS BLD VENIPUNCTURE: CPT

## 2023-05-10 PROCEDURE — 250N000013 HC RX MED GY IP 250 OP 250 PS 637

## 2023-05-10 PROCEDURE — 120N000003 HC R&B IMCU UMMC

## 2023-05-10 PROCEDURE — 99232 SBSQ HOSP IP/OBS MODERATE 35: CPT | Mod: 24 | Performed by: INTERNAL MEDICINE

## 2023-05-10 PROCEDURE — 83735 ASSAY OF MAGNESIUM: CPT

## 2023-05-10 PROCEDURE — 250N000013 HC RX MED GY IP 250 OP 250 PS 637: Performed by: INTERNAL MEDICINE

## 2023-05-10 PROCEDURE — 82533 TOTAL CORTISOL: CPT | Performed by: STUDENT IN AN ORGANIZED HEALTH CARE EDUCATION/TRAINING PROGRAM

## 2023-05-10 PROCEDURE — 86140 C-REACTIVE PROTEIN: CPT

## 2023-05-10 PROCEDURE — 82533 TOTAL CORTISOL: CPT | Performed by: INTERNAL MEDICINE

## 2023-05-10 PROCEDURE — 250N000013 HC RX MED GY IP 250 OP 250 PS 637: Performed by: STUDENT IN AN ORGANIZED HEALTH CARE EDUCATION/TRAINING PROGRAM

## 2023-05-10 PROCEDURE — 84100 ASSAY OF PHOSPHORUS: CPT

## 2023-05-10 RX ORDER — HYDROMORPHONE HYDROCHLORIDE 1 MG/ML
0.3 INJECTION, SOLUTION INTRAMUSCULAR; INTRAVENOUS; SUBCUTANEOUS
Status: COMPLETED | OUTPATIENT
Start: 2023-05-10 | End: 2023-05-10

## 2023-05-10 RX ORDER — HYDROMORPHONE HYDROCHLORIDE 1 MG/ML
1 INJECTION, SOLUTION INTRAMUSCULAR; INTRAVENOUS; SUBCUTANEOUS ONCE
Status: COMPLETED | OUTPATIENT
Start: 2023-05-10 | End: 2023-05-10

## 2023-05-10 RX ADMIN — SALINE NASAL SPRAY 2 SPRAY: 1.5 SOLUTION NASAL at 16:18

## 2023-05-10 RX ADMIN — HYDROMORPHONE HYDROCHLORIDE 0.3 MG: 1 INJECTION, SOLUTION INTRAMUSCULAR; INTRAVENOUS; SUBCUTANEOUS at 06:41

## 2023-05-10 RX ADMIN — SALINE NASAL SPRAY 2 SPRAY: 1.5 SOLUTION NASAL at 18:43

## 2023-05-10 RX ADMIN — HYDROMORPHONE HYDROCHLORIDE 0.3 MG: 1 INJECTION, SOLUTION INTRAMUSCULAR; INTRAVENOUS; SUBCUTANEOUS at 23:34

## 2023-05-10 RX ADMIN — Medication 10 ML: at 18:43

## 2023-05-10 RX ADMIN — PIPERACILLIN AND TAZOBACTAM 3.38 G: 3; .375 INJECTION, POWDER, LYOPHILIZED, FOR SOLUTION INTRAVENOUS at 01:53

## 2023-05-10 RX ADMIN — PIPERACILLIN AND TAZOBACTAM 3.38 G: 3; .375 INJECTION, POWDER, LYOPHILIZED, FOR SOLUTION INTRAVENOUS at 08:36

## 2023-05-10 RX ADMIN — PIPERACILLIN AND TAZOBACTAM 3.38 G: 3; .375 INJECTION, POWDER, LYOPHILIZED, FOR SOLUTION INTRAVENOUS at 13:59

## 2023-05-10 RX ADMIN — SALINE NASAL SPRAY 2 SPRAY: 1.5 SOLUTION NASAL at 09:41

## 2023-05-10 RX ADMIN — PIPERACILLIN AND TAZOBACTAM 3.38 G: 3; .375 INJECTION, POWDER, LYOPHILIZED, FOR SOLUTION INTRAVENOUS at 20:15

## 2023-05-10 RX ADMIN — DEXTROSE 15 G: 15 GEL ORAL at 22:32

## 2023-05-10 RX ADMIN — LEVOTHYROXINE SODIUM 112 MCG: 0.11 TABLET ORAL at 08:36

## 2023-05-10 RX ADMIN — SALINE NASAL SPRAY 2 SPRAY: 1.5 SOLUTION NASAL at 20:22

## 2023-05-10 RX ADMIN — Medication 5 ML: at 05:01

## 2023-05-10 RX ADMIN — Medication 1 TABLET: at 12:27

## 2023-05-10 RX ADMIN — SALINE NASAL SPRAY 2 SPRAY: 1.5 SOLUTION NASAL at 22:23

## 2023-05-10 RX ADMIN — SALINE NASAL SPRAY 2 SPRAY: 1.5 SOLUTION NASAL at 12:30

## 2023-05-10 RX ADMIN — HYDROMORPHONE HYDROCHLORIDE 1 MG: 1 INJECTION, SOLUTION INTRAMUSCULAR; INTRAVENOUS; SUBCUTANEOUS at 16:27

## 2023-05-10 RX ADMIN — DEXTROSE 15 G: 15 GEL ORAL at 22:13

## 2023-05-10 ASSESSMENT — ACTIVITIES OF DAILY LIVING (ADL)
ADLS_ACUITY_SCORE: 48
ADLS_ACUITY_SCORE: 41
ADLS_ACUITY_SCORE: 48
ADLS_ACUITY_SCORE: 41

## 2023-05-10 NOTE — PROGRESS NOTES
Care Management Follow Up    Length of Stay (days): 37    Expected Discharge Date: TBD     Concerns to be Addressed: discharge planning, hospital transfer  Patient plan of care discussed at interdisciplinary rounds: Yes    Anticipated Discharge Disposition: Transitional Care  Anticipated Discharge Services: Transportation Services  Anticipated Discharge DME: None    Referrals Placed by CM/SW: [facility]  Private pay costs discussed: Not applicable    Additional Information:  Per chart review, [facility] and CM staff have been contacting the [facility] for a potential transfer back.     Patient is not currently medically ready for discharge from acute care. Writer contacted the [facility] Patient Placement (Phone: [phone], option 1). The [facility] does not have a [facility] available for the patient at this time, they are focusing on their ED boarders at this time. CM staff will continue to attempt to transfer the patient to the [facility].     Per discussion w/the primary team, patient could be medically ready for discharge TCU in the next few days, TCU placement has not been discussed with patient, as it is has been the plan to [facility] the [facility]. Per discussion with the primary team.     Ewelina Leone, RNCC, BSN    [facility]    [address]  [address] [place] [id number]    [email or web address]  [email or web address]    Office: [phone] Pager: [phone]

## 2023-05-10 NOTE — PROGRESS NOTES
Surgery Brief Note    PATIENT NAME: Gustavo Faria  MRN: [id number]  DATE: 05/10/2023 4:11 PM  LAST PROCEDURE: Anesthesia out of OR MRI: 4/29/2023    Paged regarding stikethrough from ankle bandage. On bedside assessment, Gustavo is hemodynamically stable, comfortable, with a quarter sized area of strikethrough with dark red blood. Recommended WOC vs routine bedside bandage changes per standing orders. Per nursing this was not possible.    Vital Signs: Most Recent  Ranges (24 hours)   Temp: 97.7  F (36.5  C)  Pulse: 115  BP: (!) 120/97  Resp: 16  SpO2: 100 % on None (Room air) Temp  Min: 97.5  F (36.4  C)  Max: 99  F (37.2  C)  Pulse  Min: 76  Max: 115  BP  Min: 97/69  Max: 120/97  Resp  Min: 16  Max: 17  SpO2  Min: 97 %  Max: 100 %     Plan:  # Heel bandage strikethrough  - continue to recommend WOC vs routine bandage changes  - surgery applied silver nitrite to wound to ensure hemostasis  - subsequent routine wound cares need not be done by surgery    Seen by general surgery chief resident and intern, bleeding completely resolved with holding pressure. Silver nitrate also applied to inner aspect of medial ankle wound. Surgery will see tomorrow am, sooner if issues.    Jurgen León MD   General Surgery Resident

## 2023-05-10 NOTE — PROGRESS NOTES
Endocrine Progress Note  Patient: Gustavo Faria   MRN: [id number]  Date of Service: 05/10/2023       Assessment and plan:  Gustavo Faria is a 58 year old male with PMHx of  ACTH-secreting macroadenoma c/b central hypothyroidism, and central hypogonadism, s/p transphenoidal surgery 5/2021 and 7/2021 in [place] has baseline records 3rd nerve palsy, ongoing serratia RLE cellulitis c/b recent serratia bacteremia, HFrEF, T2DM, and HTN who was transferred from [facility] for surgical intervention of known expanding large sellar/suprasellar mass extending into cavernous sinuses and subsequent cranial nerve impingement.     #Pituitary macroadenoma:  #Cushing's disease:  #Pituitary apoplexy:  Unable to remove entire pituitary macroadenoma due to fibrous tissue. Cortisol level after surgery still elevated at 38.2 with repeat serum cortisol on 4/29 was at 27.4.  Blood pressure above target. Sodium WNL May 3, 2023 AM cortisol had improved to 15.1    Patient had episode of low Blood pressures and with the concern for possible glucocorticoid withdrawal syndrome was started on HC 20 in am and 10 in PM.   Review of chart shows that before he received his HC, his BG was at 126/82 mm Hg which is reassuring that he doesn't have AI.     Most recent dose of hydrocortisone was in the AM on 5/8/2023. 5/9/2023 late morning cortisol 18.8 mcg/dL in the setting of low albumin. We need to quantify his current degree of cortisol excess prior to likely starting medical therapy for Cushing's.     - daily A.M Cortisol.  - AM cortisol today is at  30.6  - 24 hour urine cortisol collection (and include urine creatinine) in the morning at 8 AM on 5/10/2023.   - Obtain a late night salivary cortisol on 5/10/2023 and again on 5/11/2023.   - Strict I's and O's       #Central hypothyroidism:  Prior to admission was on levothyroxine 100 mcg daily.  Free T4 on admission 1.3 and TSH not detectable (picture of central hypothyroidism).  Repeat FT4  on 5/2 is at 0.96    Recommendations:  Now on levothyroxine to 112 mcg daily.  -Repeat FT on  May 11.     #DM type II: Hemoglobin A1c on admission 9.5%.  Prior to admission was on Sitagliptin 100 mg daily/metformin 500 mg twice daily, Jardiance 12.5 mg (not available) little higher overnight.  Will increase Lantus to 22 units.    He hasnt been eating well and his Bg have been below 110 hence discontinued long-acting Lantus.  We will continue his carb coverage and correction scale for his blood sugar management    - Discontinue Lantus.  - Continue carb coverage to 1 units/20 g CHO with meals and snacks  - Changed to Medium-dose sliding scale insulin for AC & HS  - Stopped Jardiance 10 mg daily  - Continue hypoglycemia protocol  - Accu checks AC & HS      Patient was seen & discussed with On call Attending   Patient labs, chart and imaging reviewed      Rhiannon Arroyo  Endocrinology Fellow  [phone]      ======================================================================    Subjective:  - Pituitary macroadenoma debulking surgery/resection(4/27/23)    Patient has bleeding from the wound in his leg. Hi Hb had drooped below 7 and had Blood transfused. He was found to have low BG in the night on 5/8 which later responded to fluids.        Physical Examination:  BP (!) 110/92 (BP Location: Left arm, Cuff Size: Adult Regular)   Pulse 107   Temp 97.9  F (36.6  C) (Axillary)   Resp 16   Wt 74.6 kg (164 lb 8 oz)   SpO2 100%   BMI 26.55 kg/m      Constitutional: healthy, alert and no distress. Has cushingoid features with supraclavicular fullness bilaterally  Respiratory: lungs CTAB. No increased work of breathing  Psychiatric: mentation appears normal and affect normal/bright  Head: Normocephalic.   Neck: Neck supple. Thyroid symmetric, normal size.  Abdomen: Abdomen soft, non-tender.  Dark striae noted over the abdomen with umbilical hernia.  NEURO: Sensation grossly WNL.  JOINT/EXTREMITIES: +2-3 pitting  bilateral lower extremity edema. Dressing for the right leg.    Medications:  Reviewed    Endocrine Labs:

## 2023-05-10 NOTE — PLAN OF CARE
"Neuro: A&Ox4, forgetful, able to make needs known, and uses call light appropriately. Bed alarm activated for safety concern   Cardiac: SR, Afebrile, and vitally stable at this time.   Respiratory: Lungs sound are clear, denies SOB, no cough present, Sat> 98% on  RA  GI/:Active bowel sound, passing flatus, had small formed BM 5/10, and voiding spontaneously, uses urinal at bed side. Diet/appetite: Tolerating regular diet, poor appetite. Denies nausea.  Activity: Assist x 2 with lift. Q2H turn and repo, but refused to be turned   Pain: Denies   Skin: Multiple wounds, pressure ulcer in the right gluteal, 2 open areas in the coccyx, BLE wounds. No new deficits noted.  Lines: Double lumen PICC Line to right upper arm, TKO  Drains: None  Replacements:      P: Continue to monitor and notify team with changes.    Problem: Plan of Care - These are the overarching goals to be used throughout the patient stay.    Goal: Readiness for Transition of Care  Outcome: Not Progressing  Flowsheets (Taken 5/10/2023 0317)  Transportation Anticipated: family or friend will provide  Concerns to be Addressed:    compliance issue    patient refuses services  Barriers to Discharge: Decrease mobility and non-compliant with cares  Intervention: Mutually Develop Transition Plan  Recent Flowsheet Documentation  Taken 5/10/2023 0317 by Monica Smith RN  Transportation Anticipated: family or friend will provide  Concerns to be Addressed:    compliance issue    patient refuses services     Problem: Plan of Care - These are the overarching goals to be used throughout the patient stay.    Goal: Patient-Specific Goal (Individualized)  Description: You can add care plan individualizations to a care plan. Examples of Individualization might be:  \"Parent requests to be called daily at 9am for status\", \"I have a hard time hearing out of my right ear\", or \"Do not touch me to wake me up as it startles me\".  Outcome: Progressing  Goal: Optimal " Comfort and Wellbeing  Outcome: Progressing     Problem: Fall Injury Risk  Goal: Absence of Fall and Fall-Related Injury  Outcome: Progressing  Intervention: Identify and Manage Contributors  Recent Flowsheet Documentation  Taken 5/10/2023 0004 by Monica Smith RN  Medication Review/Management: medications reviewed  Taken 5/9/2023 2100 by Monica Smith RN  Medication Review/Management: medications reviewed  Intervention: Promote Injury-Free Environment  Recent Flowsheet Documentation  Taken 5/10/2023 0004 by Monica Smith RN  Safety Promotion/Fall Prevention:    activity supervised    nonskid shoes/slippers when out of bed    safety round/check completed  Taken 5/9/2023 2100 by Monica Smith RN  Safety Promotion/Fall Prevention:    activity supervised    nonskid shoes/slippers when out of bed    safety round/check completed     Problem: Pain Acute  Goal: Optimal Pain Control and Function  Outcome: Progressing  Intervention: Prevent or Manage Pain  Recent Flowsheet Documentation  Taken 5/10/2023 0004 by Monica Smith RN  Medication Review/Management: medications reviewed  Taken 5/9/2023 2100 by Monica Smith RN  Medication Review/Management: medications reviewed   Goal Outcome Evaluation:

## 2023-05-10 NOTE — PROGRESS NOTES
[facility]    Medicine Progress Note - Medicine Service, CLAIRE TEAM 2    Date of Admission:  4/3/2023    Assessment & Plan    Gustavo Faria is a 58 year old male with recent Serratia bacteremia, HFrEF, prolonged QT, lower extremity wounds, DMII, hypothyroidism, low testosterone and known ACTH secreting pituitary adenoma w/resulting Cushing's disease s/p 2 partial resections in 2021 [facility]. [facility], he developed new onset double vision and severe headache which prompted imaging that showed a large mass invading the cavernous sinuses and impinging on the cranial nerves. He is transferred to [facility] for this pituitary adenoma, concern for progression vs hemorrhage/apoplexy. Complicated by psychosis/metabolic encephalopathy and electrolyte abnormalities secondary to Cushing's syndrome. Underwent endoscopic endonasal transcavernous approach for resection of functional pituitary adenoma 4/27.      Updates today:  - [facility] -- no beds today  - ENT removed Jean Baptiste splints and sutures today  - continues to have bleeding through of R leg bandage, contacted surgery, WOC for assistance. Surgery deferred to WOC.   - curbside ID line -- recommends continuing zosyn instead of ceftriaxone for serratia, for 7-10 course for soft tissue infection vs longer course 10-14 days if concerned for arterial insufficiency, also pending clinical course  - 2 PM hgb check stable  - if vitals stable overnight, likely transfer back to [facility] care tomorrow    #R lower leg abscess, soft tissue infection, +serratia,  s/p I&D 5/8/2023  #Hypotension, likely multifactorial 2/2 sepsis, blood loss anemia, improving  # Post-surgical Leukocytosis 2/2 inflammatory response, sepsis   Pt noted to have leukocytosis since POD1, WBC 15-17 range,  initially felt to likely  be 2/2 post-surgical inflammatory response. Infection workup initially negative w/ unremarkable CXR, blood cultures, UA, chronic wound assessment by WOC, and normal vitals. POD9, RRT called for softer SBPs, generally 80s/50s. Pt noted to have normal to moderately hypertensive blood pressures for most of the hospitalization; SBPs in 80s unusual for patient. All blood pressure meds held. Neurosurg evaluated, does not think patient needs further meningitis workup at this time. Careful physical exam ultimately revealed new opening of R chronic leg wounds with actively draining pus and blood. Surgery consulted. CT tib/fib revealed 4.9x3.2x1.8 cm soft tissue abscess of the medial aspect of the ankle with no evidence of osteomyelitis. Underwent bedside I&D of R lower leg on 5/8/23. R lower leg abscess growing +serratia. Notable ongoing bleeding from I&D site resulting in blood transfusion and hypotension. Will also monitor for possible glucocorticoid withdrawal syndrome contributing to hypotension  - antibiotics:   - zosyn (5/7 - )    - vanc (5/7-5/8)  - infectious workup                    - wound culture with 1+ gram negative rods, 3+ serratia x 2 cultures  - curbside ID line -- recommends continuing zosyn instead of ceftriaxone for serratia, for 7-10 course for soft tissue infection vs longer course 10-14 days if concerned for arterial insufficiency. Duration also pending clinical course  - transfuse for hgb < 7  - MRSA nares negative  - continue holding carvedilol, lasix, lisinopril, and spironolactone  - will need to restart spironolactone first given indication (cushing's disease)   - TTE unchanged from prior  - encourage PO intake    # chronic buttocks, sacrum and bilateral groin wounds   # RLE wound from puncture injury   # L dorsal foot wound from presumed trauma   # Hx serratia bacteremia in December 2022   For patient's lower extremity wounds and hx of Serratia bacteremia in December, he was [facility]. His antibiotics were broadened to Vanc and Zosyn and ultimately he was transitioned to ceftazidime on 3/23 with plan to complete course on 4/4/23. BCx negative and wound cx grew serratia marcescens at the [facility]. He has been on ceftazidime since. MRI of the right tib/fib on 3/23 was negative for osteomyelitis but did show two fluid collections draining sponateneously. [facility] and determined no intervention was needed as wounds were draining on their own.   - completed course of ceftazidime (3/23 - 4/5)  - PT/OT   - Pain: tylenol 975 mg Q6H PRN    # Pituitary adenoma resection this admission, c/b significant scar tissue to cavernous sinus, 4/27/23  #R eye ptosis, esotropia  # ACTH secreting pituitary adenoma c/b type II DM, hypothyroidism and low testosterone s/p two partial resections in 2021   Patient noted double vision and severe headache beginning the evening of 3/25. CT imaging on 3/25 revealed a large sellar/suprasellar mass extending into the cavernous sinuses with no evidence of acute stroke. On 3/28, he underwent an MRI which confirmed the CT findings of a large mass invading the cavernous sinuses and impinging on the cranial nerves. Necrosis extending beyond left cavernous sinus. [facility]. Repeat MRI performed on 4/6/23: compared to 2021 MRI, lesions are smaller. Orbit MRI without optic nerve compression and no evidence of orbital infection.  - 4/27 neurosurgery and ENT combined to perform endoscopic endonasal transcavernous approach for resection of functional pituitary adenoma. Complicated by significant scar tissue to cavernous sinus. Good post-op recovery. Follow up MRI done 5/29. Neurosurgery okay with [facility]. They will arrange follow up.   - Ophthalmology following, no major changes, agree with neurosurgery plans  `           - opthalmology outpatient consult placed after discharge for eval of ptosis,  comprehensive eye exam   - Endocrine following  - neurosurgery following  - ENT following     #Cushing's disease 2/2 pituitary macroadenoma  # Hypernatremia  # Hypokalemia, resolved  Hypernatremia and hypokalemia, likely secondary to Cushing's from pituitary macroadenoma that was unable to be completely removed  - Endocrine following, appreciate recs  - hold spironolactone 200 mg every day due to hypotension   - Goal Na: 135-140  - Strict I&Os  - trend intermittent BMP, Mag  - check 24h urine cortisol and creatinine  - late night salivary cortisol x 2 ordered     # Central Hypothyroidism  - Continue PTA levothyroxine     # Type II DM, exacerbated by Cushing's   A1c of 7.6% on 2/2023.  - Endocrinology managing. Current regimen:   - Lantus 22 units daily.  - Carb coverage to 1 units/20 g CHO with meals and snacks  - High-dose sliding scale insulin for AC & HS  - Jardiance 10 mg daily  - hypoglycemia protocol  - Accu checks AC & HS      #Type II MI   # HFmrEF, with global hypokinesis  # Hypertension  # High burden of PVCs  # Prolonged QTc   # Elevated troponin, down-trended  Coronary angiogram on 2/27 showed normal coronaries. Trop mildly elevated on admit 88; downtrended to 77 without targeted intervention. Was noted to have high burden of PVCs on tele. TTE 4/6/2023 showed EF 40-45%, severe diffuse hypokinesis, normal RV function, and mild-moderate aortic regurgitation; overall, not significantly changed from prior TTE 2/17/23. 5/6/23, pt w/ new episode of hypotension of unclear cause. Troponin checked and 177-->170-->172. Most likely 2/2 demand ischemia in s/o hypotension. Patient denied chest pain. EKG with sinus rhythm, LVH, RBBB, and with t wave inversions more evident in anterior leads as compared to prior EKG 4/23/23.   - Repeat TTE unchanged  - troponin plateaued, will not recheck unless clinically changes  - Q4H vital signs  - recheck EKG, trop if chest pain occurs  - Lasix 40mg daily--held since surgery on  4/27. Restarted 4/30. Held 5/6   - Coreg and lisinopril held for surgery 4/27.               - Carvedilol 12.5 mg BID restarted 4/29. Held 5/6  - Lisinopril restarted 4/30 and titrated up to prior dose 20 mg daily on 5/1. Held 5/6  - ASA - restarted 5/5/23, held 5/9/2023 due to bleeding  - Avoid QTc prolonging medications         # Acute metabolic encephalopathy - resolved  # Pyruria, Candiduria - resolved  # Encephalopathy likely secondary to cushing's disease, resolved  Admission symptoms included disorientation, intermittently following directions. Repetitive words - perseverating on particular phrases. Sx started the 2-3 days prior to admission (which [facility] was the [facility]). Vulnerable brain (multiple brain surgeries), enlarging pituitary mass. No seizure activity per EEG. Steroids removed and pt still encephalopathic. Sodium had been in normal limits and patient remained altered. Worked up for infections - did reveal candiduria but unlikely this was etiology, though he did complete an 8 day course of fluconazole. Briefly on ceftriaxone but encephalopathy also did not improve. CT abd w/ contrast (4/10): no evidence of pyelonephritis  Ultimately attributed to Cushings and known pituitary tumor. Continues to remain lucid.  - Surgical management per neurosurgery  - Delirium precautions  - Electrolyte management as above  - PRN quetiapine if agitation that is not responsive to behavioral interventions      # Hypogonadism   - hold PTA androgel (would need to bring in home medication, not accessible at this time)      # Chronic microcytic anemia   Hgb of 8.4 [facility]. Peripheral smear on 3/30 showed poikilocytosis with occasional red blood cell fragments but no other evidence of hemolysis.  Haptoglobin was normal.  Ferritin was 91, transferrin saturation was low, B12 was 495 and folate was 8.7. Likely combination of iron deficiency as well as inflammation/chronic disease.   - CTM        # Concern  for malnutrition   - Nutrition following     # Delusions, intermittent, resolved  Intermittent delusions regarding staff stalking patient while in the hospital. Unclear chronicity of these delusions - but has had them multiple times this hospitalization. Unclear psych history. Medical management for encephalopathy as stated above. Psychiatry was consulted during his care and agreed with paranoia and recommended Abilify. He was briefly on Abilify 5mg but patient then started to refuse nightly so this was discontinued. His paranoia and delusions have been very intermittent and appear to be associated with his anxiety.      Discharge Needs  - Follow up in 6 weeks with Dr Jeong, neurosurgery (message sent to schedulers),  - MRI pituitary in 6 weeks before discharge (message sent to schedulers)  - Upright lumbar xrays to evaluate his compression fractures (ordered by neurosurgery)   - outpatient follow-up w/ ENT -- contact ENT when transfers to the [facility] to schedule follow-up for splint removal in clinic       Diet: Diet  Snacks/Supplements Adult: Glucerna; Between Meals  Regular Diet Adult    DVT Prophylaxis: Pneumatic Compression Devices  Roland Catheter: Not present  Lines: PRESENT      PICC 04/06/23 Double Lumen Right Basilic access-Site Assessment: WDL      Cardiac Monitoring: None  Code Status: Full Code      Clinically Significant Risk Factors              # Hypoalbuminemia: Lowest albumin = 2.9 g/dL at 4/11/2023  7:07 AM, will monitor as appropriate          # DMII: A1C = 9.5 % (Ref range: <5.7 %) within past 6 months    # Severe Malnutrition: based on nutrition assessment        Disposition Plan    Plan [facility]       Leela Verdin MD  Medicine Service, [facility] TEAM [address] [place] [address]  Securely message with [employer] (more info)  Text page via [employer] Paging/Directory   See signed in provider for up to date coverage  "information  ______________________________________________________________________    Interval History   NAEO    This morning, pt is in better spirits. Says he feels \"much better.\" He is again thankful for the care he has received at the . He denies pain in the R leg except during bandage changes. Denies fevers, chills. Eating better this AM and alert.     Physical Exam   Vital Signs: Temp: 97.7  F (36.5  C) Temp src: Axillary BP: (!) 120/97 Pulse: 115   Resp: 16 SpO2: 100 % O2 Device: None (Room air)    Weight: 164 lbs 8 oz    Gen: NAD, nontoxic appearing, alert, no distress  Eyes: EOMI, conjuctiva clear, R ptosis  Mouth: OP clear, no lesions  CV: RRR, no murmurs, 2+ radial pulses  RESP: CTA bilaterally, no w/r/c  Abd: soft, nontender, nondistended  Ext: no edema bilaterally, RLE with ACE wrap w/ external kerlix wrap which he is bleeding through      Data     I have personally reviewed the following data over the past 24 hrs:    13.1 (H)  \   7.8 (L)   / 290     140 107 11.9 /  142 (H)   3.7 24 1.06 \       Procal: N/A CRP: 57.10 (H) Lactic Acid: N/A       INR:  N/A PTT:  N/A   D-dimer:  N/A Fibrinogen:  604 (H)       Imaging results reviewed over the past 24 hrs:   No results found for this or any previous visit (from the past 24 hour(s)).  "

## 2023-05-10 NOTE — PLAN OF CARE
BP (!) (P) 120/97 (BP Location: Left arm)   Pulse (P) 115   Temp (P) 97.7  F (36.5  C) (Axillary)   Resp (P) 16   Wt 74.6 kg (164 lb 8 oz)   SpO2 100%   BMI 26.55 kg/m      Hours of Care: 6458-4125.    Neuro: AOx4.  Vitals: VSS.   Respiratory: WDL on RA.  Cardiac: SR with BBB.    GI/: Voiding spontaneously.  24 hour urine in process. No BM today.  Skin/Wounds: R ankle and left foot wound managed by general surgery.  R ankle wound with serosanguinous drainage all shift.  Team aware and will re consult general surgery.  Lines: R DL PICC  Diet: Regular.  Activity: Up with A2 and lift.  Pain: Dilaudid given prior to dressing change, otherwise denies pain.  Labs: Trending Hgb. Received RBC overnight.      Continue to monitor and follow POC    Bennie Lopez RN on 5/10/2023 at 2:06 PM    6B-Intermediate Care

## 2023-05-10 NOTE — PROGRESS NOTES
Surgery Progress Note    Gustavo Faria  [id number]    No acute overnight events  AFVSN  Got 1u yesterday afternoon  Bleeding from bedside InD site appears resolved  HDS, am Hb 7.5 (patient's baseline is between 7 and 8)    Temp:  [97.5  F (36.4  C)-99  F (37.2  C)] 97.5  F (36.4  C)  Pulse:  [] 89  Resp:  [16-20] 16  BP: ()/(51-87) 112/84  SpO2:  [96 %-100 %] 100 %    Intake/Output Summary (Last 24 hours) at 5/8/2023 0538  Last data filed at 5/8/2023 0129  Gross per 24 hour   Intake 480 ml   Output 2130 ml   Net -1650 ml     WBC 12    Gen:                Lying in bed in NAD, A&OX3  HEENT:           Normocephalic and atraumatic  CV:                  RRR  Pulm:               Non-labored breathing  Abd:                 Soft, non-tender, non-distended  Ext:                  RLE WWP, with one anterior tibial wound, nondraining, one medial ankle wound, minimal strikethrough, and one medial lower leg wound. Packed with kerlex, covered with abd and pressure wrap      58 year old male with RLE wound draining purulent material. CT shows [id number] rim enhancing fluid pocket at medial ankle, without clear evidence of osteo. Now status post bedside InD.    - abx per primary  - dressing changes may be done daily per bedside nurse; WOC may be involved if necessary  - dressing change instructions in nursing wound care order  - surgery will sign off at this time  - remainder of cares per primary team    Seen with chief resident discussed w staff    Mj León MD   Surgery PGY-1

## 2023-05-10 NOTE — PROGRESS NOTES
[facility], [place]   05/09/2023  Neurosurgery Progress Note:    Assessment:  Gustavo Faria is a 58 year old male with a PMH of DMII, HFrEF, BLE open wounds, history of serratia bacteremia in December 2022,  pituitary ACTH secreting macroadenoma s/p EEA for resection on 5/2021 and 7/2021 in [place], with baseline right CN 3 palsy, who was admitted at the [facility] on 3/23. He developed sudden headache and vision changes on 3/25, with MRI brain on 3/28, which demonstrated enlargement of known pituitary adenoma, with necrosis extending laterally beyond the left cavernous sinus, concerning for compression of cranial nerves at cavernous sinus. 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      Patient is POD-12 s/p Endoscopic endonasal transcavernous approach for resection of functional pituitary adenoma with harvesting of right-sided pedicled nasoseptal flap, which was pedicled off the posterior septal branch of the sphenopalatine artery    Plan:  - Watch for DI/CSF leak  - Follow urine output/ inputs/ outputs  - Please inform Neurosurgery if there is any change in exam  - Recommend Ophthalmology referral for management of ptosis/esotropia    Follow up plan  - Follow up in 6 weeks with Dr Jeong (message sent to schedulers)  - MRI pituitary in 6 weeks before discharge (message sent to schedulers)  - Upright lumbar xrays to evaluate his compression fractures (ordered for you)  -----------------------------------  Yaneth Ugalde MD  Neurosurgery PGY3    Please contact neurosurgery resident on call with questions.    Dial * * [phone], enter [phone] when prompted.        Interval History: No acute events overnight. Transferred to  for closer monitoring.        Objective:   Temp:  [97.5  F (36.4  C)-99  F (37.2  C)] 97.5  F (36.4  C)  Pulse:  []  89  Resp:  [16-20] 16  BP: ()/(51-87) 112/84  SpO2:  [96 %-100 %] 100 %  I/O last 3 completed shifts:  In: 460 [P.O.:120; I.V.:40]  Out: 350 [Urine:350]    NEUROLOGIC:  --Eyes open, following commands, oriented x3  Cranial Nerves:  -- visual fields full to confrontation although with limited participation, PERRL anisocoric L>R and sluggish, left CN III paresis and CN VI palsy, restricted ROM in right EOM with no apparent asymmetry   -- face symmetrical, tongue midline  -- sensory V1-V3 intact bilaterally  -- palate elevates symmetrically, uvula midline  -- hearing grossly intact bilat     Motor:  Antigravity x4 extremities     Sensory:  intact to LT x 4 extremities      Reflexes:       Bi Tri BR Omi Pat Ach Bab     C5-6 C7-8 C6 UMN L2-4 S1 UMN   R 2+ 2+ 2+ Norm 2+ 2+ Norm   L 2+ 2+ 2+ Norm 2+ 2+ Norm      Gait: Deferred.     LABS:  Recent Labs   Lab 05/10/23  0148 05/09/23  2153 05/09/23  1742 05/09/23  1203 05/09/23  1137 05/08/23  0751 05/08/23  0749 05/07/23  0816 05/07/23  0723   NA  --   --   --   --  140  --  138  --  138   POTASSIUM  --   --   --   --  3.8  --  3.9  --  4.3   CHLORIDE  --   --   --   --  106  --  100  --  99   CO2  --   --   --   --  24  --  26  --  26   ANIONGAP  --   --   --   --  10  --  12  --  13   GLC 82 78 98   < > 75   < > 112*   < > 117*   BUN  --   --   --   --  11.2  --  10.7  --  11.8   CR  --   --   --   --  0.98  --  0.97  --  0.79   KORIN  --   --   --   --  7.6*  --  8.3*  --  8.7    < > = values in this interval not displayed.       Recent Labs   Lab 05/10/23  0039 05/09/23  1420 05/09/23  1137   WBC  --   --  12.1*   RBC  --   --  2.54*   HGB 7.9*   < > 7.1*   HCT  --   --  23.0*   MCV  --   --  91   MCH  --   --  28.0   MCHC  --   --  30.9*   RDW  --   --  20.4*   PLT  --   --  321    < > = values in this interval not displayed.       IMAGING:  No results found for this or any previous visit (from the past 24 hour(s)).

## 2023-05-10 NOTE — PROGRESS NOTES
Otolaryngology Progress Note  May 10, 2023    Subjective/Intvl events: Hgb down to 6.9 yesterday therefore transfused with 1U PRBC. Hgb increased 8.7 and this morning 7.5. He denies lightheadness or dizziness. He denies clear drainage from his nose or  salty/metallic taste.     O: /84 (BP Location: Left arm, Cuff Size: Adult Regular)   Pulse 89   Temp 97.5  F (36.4  C) (Axillary)   Resp 16   Wt 74.6 kg (164 lb 8 oz)   SpO2 100%   BMI 26.55 kg/m     General: Alert and oriented x 3, no acute distress   HEENT: Bilateral lateral gaze palsy and restricted EOM on the right, facial movement symmetric, Oropharynx clear. No nasal drainage. Septal splints sutured in place.    Pulmonary: Breathing non-labored, no stridor, no accessory muscle use.        A/P: Gustavo Faria is a 58 year old male with a past medical history of pituitary ACTH secreting macroadenoma s/p endoscopic endonasal transcavernous approach for resection of functional pituitary adenoma w/ nasoseptal flap on 4/28. He is recovering well post-operatively without signs of CSF leak.    - Serial neuro exams  - Nasal saline QID  - Appreciate Endo following management of Cushing's and DMII, Medicine management of patient's hypotension, Gen surg management of RLE wound  - Sinus precautions: No nose blowing, sneeze with mouth open, no straining, and scheduled bowel regimen  - Jean Baptiste splints will be removed during IP stay  - Rest of care per primary team    -- Patient and above plan discussed with Dr. Adan Jackson, PGY-4  Otolaryngology-Head & Neck Surgery  Please contact ENT with questions by dialing * [phone] and entering job code [phone] when prompted.    ADDENDUM:    Jean Baptiste splints and sutures were removed at bedside today.  ENT will follow peripherally, please do not hesitate to contact with questions/concerns    Shelby Jay PA-C  Otolaryngology-Head & Neck Surgery  Please contact ENT by dialing * [phone] and entering job code [phone].

## 2023-05-11 ENCOUNTER — TELEPHONE (OUTPATIENT)
Dept: OPHTHALMOLOGY | Facility: CLINIC | Age: 58
End: 2023-05-11
Payer: COMMERCIAL

## 2023-05-11 ENCOUNTER — APPOINTMENT (OUTPATIENT)
Dept: PHYSICAL THERAPY | Facility: CLINIC | Age: 58
DRG: 614 | End: 2023-05-11
Attending: STUDENT IN AN ORGANIZED HEALTH CARE EDUCATION/TRAINING PROGRAM
Payer: COMMERCIAL

## 2023-05-11 LAB
ANION GAP SERPL CALCULATED.3IONS-SCNC: 11 MMOL/L (ref 7–15)
BACTERIA BLD CULT: NO GROWTH
BACTERIA BLD CULT: NO GROWTH
BUN SERPL-MCNC: 10.9 MG/DL (ref 6–20)
CALCIUM SERPL-MCNC: 8 MG/DL (ref 8.6–10)
CHLORIDE SERPL-SCNC: 107 MMOL/L (ref 98–107)
COLLECT DURATION TIME UR: 24 H
CORTIS SERPL-MCNC: 29.5 UG/DL
CREAT 24H UR-MRATE: 0.59 G/(24.H) (ref 0.98–2.2)
CREAT SERPL-MCNC: 1.08 MG/DL (ref 0.67–1.17)
CREAT UR-MCNC: 84.9 MG/DL
DEPRECATED HCO3 PLAS-SCNC: 22 MMOL/L (ref 22–29)
ERYTHROCYTE [DISTWIDTH] IN BLOOD BY AUTOMATED COUNT: 19.5 % (ref 10–15)
GFR SERPL CREATININE-BSD FRML MDRD: 80 ML/MIN/1.73M2
GLUCOSE BLDC GLUCOMTR-MCNC: 104 MG/DL (ref 70–99)
GLUCOSE BLDC GLUCOMTR-MCNC: 106 MG/DL (ref 70–99)
GLUCOSE BLDC GLUCOMTR-MCNC: 122 MG/DL (ref 70–99)
GLUCOSE BLDC GLUCOMTR-MCNC: 141 MG/DL (ref 70–99)
GLUCOSE BLDC GLUCOMTR-MCNC: 146 MG/DL (ref 70–99)
GLUCOSE SERPL-MCNC: 108 MG/DL (ref 70–99)
HCT VFR BLD AUTO: 23.6 % (ref 40–53)
HGB BLD-MCNC: 7.4 G/DL (ref 13.3–17.7)
MCH RBC QN AUTO: 28.5 PG (ref 26.5–33)
MCHC RBC AUTO-ENTMCNC: 31.4 G/DL (ref 31.5–36.5)
MCV RBC AUTO: 91 FL (ref 78–100)
PLATELET # BLD AUTO: 335 10E3/UL (ref 150–450)
POTASSIUM SERPL-SCNC: 3.5 MMOL/L (ref 3.4–5.3)
RBC # BLD AUTO: 2.6 10E6/UL (ref 4.4–5.9)
SODIUM SERPL-SCNC: 140 MMOL/L (ref 136–145)
SPECIMEN VOL UR: 700 ML
T4 FREE SERPL-MCNC: 1.11 NG/DL (ref 0.9–1.7)
WBC # BLD AUTO: 13.2 10E3/UL (ref 4–11)

## 2023-05-11 PROCEDURE — 250N000012 HC RX MED GY IP 250 OP 636 PS 637: Performed by: STUDENT IN AN ORGANIZED HEALTH CARE EDUCATION/TRAINING PROGRAM

## 2023-05-11 PROCEDURE — 97530 THERAPEUTIC ACTIVITIES: CPT | Mod: GP

## 2023-05-11 PROCEDURE — 82533 TOTAL CORTISOL: CPT | Performed by: STUDENT IN AN ORGANIZED HEALTH CARE EDUCATION/TRAINING PROGRAM

## 2023-05-11 PROCEDURE — 82533 TOTAL CORTISOL: CPT | Performed by: INTERNAL MEDICINE

## 2023-05-11 PROCEDURE — 250N000011 HC RX IP 250 OP 636

## 2023-05-11 PROCEDURE — 250N000009 HC RX 250

## 2023-05-11 PROCEDURE — 120N000003 HC R&B IMCU UMMC

## 2023-05-11 PROCEDURE — 250N000013 HC RX MED GY IP 250 OP 250 PS 637

## 2023-05-11 PROCEDURE — 250N000013 HC RX MED GY IP 250 OP 250 PS 637: Performed by: INTERNAL MEDICINE

## 2023-05-11 PROCEDURE — 85027 COMPLETE CBC AUTOMATED: CPT

## 2023-05-11 PROCEDURE — 99232 SBSQ HOSP IP/OBS MODERATE 35: CPT | Performed by: INTERNAL MEDICINE

## 2023-05-11 PROCEDURE — 36592 COLLECT BLOOD FROM PICC: CPT

## 2023-05-11 PROCEDURE — 82570 ASSAY OF URINE CREATININE: CPT | Performed by: INTERNAL MEDICINE

## 2023-05-11 PROCEDURE — 80048 BASIC METABOLIC PNL TOTAL CA: CPT

## 2023-05-11 PROCEDURE — 84439 ASSAY OF FREE THYROXINE: CPT

## 2023-05-11 RX ORDER — SODIUM FLUORIDE 0.9 MG/ML
5 MOUTHWASH DENTAL AT BEDTIME
Status: DISCONTINUED | OUTPATIENT
Start: 2023-05-11 | End: 2023-05-12

## 2023-05-11 RX ORDER — HYDROMORPHONE HCL IN WATER/PF 6 MG/30 ML
0.2 PATIENT CONTROLLED ANALGESIA SYRINGE INTRAVENOUS ONCE
Status: COMPLETED | OUTPATIENT
Start: 2023-05-12 | End: 2023-05-12

## 2023-05-11 RX ORDER — HYDROMORPHONE HCL IN WATER/PF 6 MG/30 ML
0.2 PATIENT CONTROLLED ANALGESIA SYRINGE INTRAVENOUS ONCE
Status: COMPLETED | OUTPATIENT
Start: 2023-05-11 | End: 2023-05-11

## 2023-05-11 RX ADMIN — PIPERACILLIN AND TAZOBACTAM 3.38 G: 3; .375 INJECTION, POWDER, LYOPHILIZED, FOR SOLUTION INTRAVENOUS at 13:53

## 2023-05-11 RX ADMIN — Medication 5 ML: at 15:16

## 2023-05-11 RX ADMIN — ACETAMINOPHEN 975 MG: 325 TABLET, FILM COATED ORAL at 15:16

## 2023-05-11 RX ADMIN — Medication: at 15:15

## 2023-05-11 RX ADMIN — SALINE NASAL SPRAY 2 SPRAY: 1.5 SOLUTION NASAL at 13:53

## 2023-05-11 RX ADMIN — PIPERACILLIN AND TAZOBACTAM 3.38 G: 3; .375 INJECTION, POWDER, LYOPHILIZED, FOR SOLUTION INTRAVENOUS at 20:45

## 2023-05-11 RX ADMIN — ACETAMINOPHEN 975 MG: 325 TABLET, FILM COATED ORAL at 08:28

## 2023-05-11 RX ADMIN — LEVOTHYROXINE SODIUM 112 MCG: 0.11 TABLET ORAL at 08:30

## 2023-05-11 RX ADMIN — SALINE NASAL SPRAY 2 SPRAY: 1.5 SOLUTION NASAL at 20:45

## 2023-05-11 RX ADMIN — INSULIN ASPART 1 UNITS: 100 INJECTION, SOLUTION INTRAVENOUS; SUBCUTANEOUS at 12:31

## 2023-05-11 RX ADMIN — ERGOCALCIFEROL 50000 UNITS: 1.25 CAPSULE, LIQUID FILLED ORAL at 08:30

## 2023-05-11 RX ADMIN — PIPERACILLIN AND TAZOBACTAM 3.38 G: 3; .375 INJECTION, POWDER, LYOPHILIZED, FOR SOLUTION INTRAVENOUS at 01:51

## 2023-05-11 RX ADMIN — PIPERACILLIN AND TAZOBACTAM 3.38 G: 3; .375 INJECTION, POWDER, LYOPHILIZED, FOR SOLUTION INTRAVENOUS at 08:29

## 2023-05-11 RX ADMIN — Medication 1 TABLET: at 12:31

## 2023-05-11 RX ADMIN — Medication: at 20:44

## 2023-05-11 RX ADMIN — SALINE NASAL SPRAY 2 SPRAY: 1.5 SOLUTION NASAL at 15:20

## 2023-05-11 RX ADMIN — HYDROMORPHONE HYDROCHLORIDE 0.2 MG: 0.2 INJECTION, SOLUTION INTRAMUSCULAR; INTRAVENOUS; SUBCUTANEOUS at 06:47

## 2023-05-11 RX ADMIN — SALINE NASAL SPRAY 2 SPRAY: 1.5 SOLUTION NASAL at 12:31

## 2023-05-11 ASSESSMENT — ACTIVITIES OF DAILY LIVING (ADL)
ADLS_ACUITY_SCORE: 41
ADLS_ACUITY_SCORE: 41
ADLS_ACUITY_SCORE: 42
ADLS_ACUITY_SCORE: 41
ADLS_ACUITY_SCORE: 42
ADLS_ACUITY_SCORE: 41
ADLS_ACUITY_SCORE: 42
ADLS_ACUITY_SCORE: 41

## 2023-05-11 NOTE — PLAN OF CARE
"Neuro: A&Ox4, uses call light appropriately  Cardiac: Sinus dysrhythmia with BBB. Narrow pulse pressures this shift.  Respiratory: Sating >95% on RA.  GI/: Documented  mL, 24hr urine collection in progress, pt stated that some urine was dumped by mistake.   Diet/appetite: Tolerating reg diet.  Activity:  Bedfast, assist of two with lift   Pain: requested pain medication, \"something stronger than tylenol\" for R ankle and tooth pain. Provider paged and one time dose of dilaudid given.   Skin: all dressings remain CDI, no s/s of bleeding noted this shift  LDA's: R DL PICC  Low BG at HS of 64, 30g of glucose gel administered, BG up to 132, then 106 at 0200 check.   Plan: Continue with POC. Notify primary team with changes.      "

## 2023-05-11 NOTE — PROGRESS NOTES
We continue to follow Mr. Faria for multiple endocrinopathies.    # ACTH secreting macroadenoma - Cushing's disease, s/p 3 pituitary surgeries (most recently 4/27/2023) with persistent disease    Gustavo will be completing his 24 hour urine cortisol collection today. Last night we obtained a late night salivary cortisol (pending) and he will have a 2nd late night salivary cortisol obtained tonight.     Once we have established his new baseline degree of cortisol excess I anticipate likely starting medical therapy.    Continue to check an 8 AM cortisol daily for now.     # Central hypothyroidism    Free T4 today is normal. Continue levothyroxine 112 mcg once daily.     # Diabetes mellitus type 2    Has recently had minimal insulin needs. Today we have only ordered a correction scale TID before meals (medium insulin resistance). Glucose checks fasting, before meals, and at bedtime. Goal glucose <180 mg/dL.

## 2023-05-11 NOTE — PROGRESS NOTES
"Care Management Follow Up    Length of Stay (days): 38    Expected Discharge Date:  TBD     Concerns to be Addressed: compliance issue, patient refuses services, TCU placement  Patient plan of care discussed at interdisciplinary rounds: Yes    Anticipated Discharge Disposition: Transitional Care     Anticipated Discharge Services: Transportation Services  Anticipated Discharge DME: None    Patient/family educated on Medicare website which has current facility and service quality ratings: yes  Education Provided on the Discharge Plan: yes   Patient/Family in Agreement with the Plan:  Yes    Referrals Placed by CM/SW:  TBD  Private pay costs discussed: Not applicable    Additional Information:  Writer reached out to [facility] Patient Placement and spoke with Shahnaz. No beds available at this time.    Writer met with patient to discuss options as [facility] not [facility] in [facility] next few days and patient has been inpatient >30 days. Pt stated 'I just need to get somewhere else. I'm losing my head in this bed\". Writer discussed current level of function and assistance needed would require more care than can be provided at home. Pt currently using a lift for transfers. TCU transition discussed and pt agreeable to transfer to TCU when medically stable. Pt requested somewhere closer to home in [place].     Provider updated and waiting to hear if pt med ready for TCU transfer. [facility] with [facility] until discharge plan determined.     11:57 Addendum:  Pt close to being med ready for TCU placement per provider. Will need IV ABX for a few more days. Unsure of stop date at this time. Pt has been refusing PT/OT which will be barrier to TCU. Writer met with patient to discuss participation importance if he would like to discharge from hospital. Writer explained ongoing rehab importance for ability to transition home eventually. Pt expressed understanding and agreed to work with therapies. Care " management will continue to follow and assist as needed.       [facility] Patient Placement   (Phone: [phone], option 1).     Gabbie Hines RN

## 2023-05-11 NOTE — PLAN OF CARE
Neuro: A&Ox4. Talks on the phone a lot, had family visit and brought him outside.  Cardiac: SR with BBB, vitally stable.  Respiratory: Sating >95% on RA.  GI/: 150 output in 4 hours - on 24 hour urine collection. BM X0  Diet/appetite: Tolerating regular diet. Carb coverage/ACHS. Eating well - family brought him food.  Activity:  Lift assist  Pain: At acceptable level on current regimen - only experiences pain with wound care.  Skin: Came onto shift with right ankle wound bleeding through dressing and onto linens. Contacted [facility] who was unable to come so asked gensurg to come and take a look. Silver nitrate was used to hopefully stop/slow bleeding. Wound was repacked and redressed. They said they will come in the morning to take a look at it again. All other dressings looked good.   LDA's: R-DL-PICC saline locked - slow flush/blood return - could consider TPA administration to prevent occlusion.     Plan: Monitor bleeding of right leg - page night gen surg doc with concerns. Continue with POC. Notify primary team with changes.

## 2023-05-11 NOTE — PROGRESS NOTES
[facility]    Medicine Progress Note - Medicine Service, MAROON TEAM 2    Date of Admission:  4/3/2023    Assessment & Plan    Gustavo Faria is a 58 year old male with recent Serratia bacteremia, HFrEF, prolonged QT, lower extremity wounds, DMII, hypothyroidism, low testosterone and known ACTH secreting pituitary adenoma w/resulting Cushing's disease s/p 2 partial resections in 2021 who [facility]. [facility], he developed new onset double vision and severe headache which prompted imaging that showed a large mass invading the cavernous sinuses and impinging on the cranial nerves. He is transferred to [facility] for this pituitary adenoma, concern for progression vs hemorrhage/apoplexy. Complicated by psychosis/metabolic encephalopathy and electrolyte abnormalities secondary to Cushing's syndrome. Underwent endoscopic endonasal transcavernous approach for resection of functional pituitary adenoma 4/27.      Updates today:  -Improved bleeding of RLE - further management per wound care  -Discussed with RNCC -  [facility] the [facility] still and given nearing medical readiness for discharge will pursue TCU which patient is agreeable too  -Re-evaluation by PT and OT for discharge planning   -Dental consult for tooth pain; orajel PRN   -Transfer orders to general medicine bed at [facility] has been stable/improved       #R lower leg abscess, soft tissue infection, +serratia,  s/p I&D 5/8/2023  #Hypotension, likely multifactorial 2/2 sepsis, blood loss anemia, improving  # Post-surgical Leukocytosis 2/2 inflammatory response, sepsis   Pt noted to have leukocytosis since POD1, WBC 15-17 range,  initially felt to likely be 2/2 post-surgical inflammatory response. Infection workup initially negative w/ unremarkable CXR, blood cultures, UA, chronic wound assessment by WOC, and normal vitals. POD9, RRT called  for softer SBPs, generally 80s/50s. Pt noted to have normal to moderately hypertensive blood pressures for most of the hospitalization; SBPs in 80s unusual for patient. All blood pressure meds held. Neurosurg evaluated, does not think patient needs further meningitis workup at this time. Careful physical exam ultimately revealed new opening of R chronic leg wounds with actively draining pus and blood. Surgery consulted. CT tib/fib revealed 4.9x3.2x1.8 cm soft tissue abscess of the medial aspect of the ankle with no evidence of osteomyelitis. Underwent bedside I&D of R lower leg on 5/8/23. R lower leg abscess growing +serratia. Notable ongoing bleeding from I&D site resulting in blood transfusion and hypotension. Will also monitor for possible glucocorticoid withdrawal syndrome contributing to hypotension  - antibiotics:              - zosyn (5/7 - )               - vanc (5/7-5/8)  - infectious workup                    - wound culture with 1+ gram negative rods, 3+ serratia x 2 cultures  - curbside ID line -- recommends continuing zosyn instead of ceftriaxone for serratia, for 7-10 course for soft tissue infection vs longer course 10-14 days if concerned for arterial insufficiency. Duration also pending clinical course  - transfuse for hgb < 7  - MRSA nares negative  - continue holding carvedilol, lasix, lisinopril, and spironolactone  - will need to restart spironolactone first given indication (cushing's disease)   - TTE unchanged from prior  - encourage PO intake     # chronic buttocks, sacrum and bilateral groin wounds   # RLE wound from puncture injury   # L dorsal foot wound from presumed trauma   # Hx serratia bacteremia in December 2022   For patient's lower extremity wounds and hx of Serratia bacteremia in December, he was [facility]. His antibiotics were broadened to Vanc and Zosyn and ultimately he was transitioned to ceftazidime on 3/23 with plan to complete course on  4/4/23. BCx negative and wound cx grew serratia marcescens at the [facility]. He has been on ceftazidime since. MRI of the right tib/fib on 3/23 was negative for osteomyelitis but did show two fluid collections draining sponateneously. [facility]o was [facility] at the [facility] and determined no intervention was needed as wounds were draining on their own.   - completed course of ceftazidime (3/23 - 4/5)  - PT/OT   - Pain: tylenol 975 mg Q6H PRN     # Pituitary adenoma resection this admission, c/b significant scar tissue to cavernous sinus, 4/27/23  #R eye ptosis, esotropia  # ACTH secreting pituitary adenoma c/b type II DM, hypothyroidism and low testosterone s/p two partial resections in 2021   Patient noted double vision and severe headache beginning the evening of 3/25. CT imaging on 3/25 revealed a large sellar/suprasellar mass extending into the cavernous sinuses with no evidence of acute stroke. On 3/28, he underwent an MRI which confirmed the CT findings of a large mass invading the cavernous sinuses and impinging on the cranial nerves. Necrosis extending beyond left cavernous sinus. [facility]. Repeat MRI performed on 4/6/23: compared to 2021 MRI, lesions are smaller. Orbit MRI without optic nerve compression and no evidence of orbital infection.  - 4/27 neurosurgery and ENT combined to perform endoscopic endonasal transcavernous approach for resection of functional pituitary adenoma. Complicated by significant scar tissue to cavernous sinus. Good post-op recovery. Follow up MRI done 5/29. Neurosurgery okay with [facility] to [facility] once bed is available. They will arrange follow up.   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  - Endocrinology managing. Current regimen:   - Carb coverage to 1 units/20 g CHO with meals and snacks  - High-dose sliding scale insulin for AC & HS  - Stopped Jardiance 10 mg daily per endocrine   - hypoglycemia protocol  - Accu checks AC & HS      #Type II MI   # HFmrEF, with global hypokinesis  # Hypertension  # High burden of PVCs  # Prolonged QTc   # Elevated troponin, down-trended  Coronary angiogram on 2/27 showed normal coronaries. Trop mildly elevated on admit 88; downtrended to 77 without targeted intervention. Was noted to have high burden of PVCs on tele. TTE 4/6/2023 showed EF 40-45%, severe diffuse hypokinesis, normal RV function, and mild-moderate aortic regurgitation; overall, not significantly changed from prior TTE 2/17/23. 5/6/23, pt w/ new episode of hypotension of unclear cause. Troponin checked and 177-->170-->172. Most likely 2/2 demand ischemia in s/o hypotension. Patient denied chest pain. EKG with sinus rhythm, LVH, RBBB, and with t wave inversions more evident in anterior leads as compared to prior EKG 4/23/23.   - Repeat TTE unchanged  - troponin plateaued, will not recheck unless clinically changes  - Q4H vital signs  - recheck EKG, trop if chest pain occurs  - Lasix 40mg daily--held since surgery on 4/27. Restarted 4/30. Held 5/6   - Coreg and lisinopril held for surgery 4/27.               - Carvedilol 12.5 mg BID restarted 4/29. Held 5/6  - Lisinopril  restarted 4/30 and titrated up to prior dose 20 mg daily on 5/1. Held 5/6  - ASA - restarted 5/5/23, held 5/9/2023 due to bleeding  - Avoid QTc prolonging medications         # Acute metabolic encephalopathy - resolved  # Pyruria, Candiduria - resolved  # Encephalopathy likely secondary to cushing's disease, resolved  Admission symptoms included disorientation, intermittently following directions. Repetitive words - perseverating on particular phrases. Sx started the 2-3 days prior to admission (which [facility] was [facility]). Vulnerable brain (multiple brain surgeries), enlarging pituitary mass. No seizure activity per EEG. Steroids removed and pt still encephalopathic. Sodium had been in normal limits and patient remained altered. Worked up for infections - did reveal candiduria but unlikely this was etiology, though he did complete an 8 day course of fluconazole. Briefly on ceftriaxone but encephalopathy also did not improve. CT abd w/ contrast (4/10): no evidence of pyelonephritis  Ultimately attributed to Cushings and known pituitary tumor. Continues to remain lucid.  - Surgical management per neurosurgery  - Delirium precautions  - Electrolyte management as above  - PRN quetiapine if agitation that is not responsive to behavioral interventions      # Hypogonadism   - hold PTA androgel (would need to bring in home medication, not accessible at this time)      # Chronic microcytic anemia   Hgb of 8.4 [facility]. Peripheral smear on 3/30 showed poikilocytosis with occasional red blood cell fragments but no other evidence of hemolysis.  Haptoglobin was normal.  Ferritin was 91, transferrin saturation was low, B12 was 495 and folate was 8.7. Likely combination of iron deficiency as well as inflammation/chronic disease.   - CTM        # Concern for malnutrition   - Nutrition following     # Delusions, intermittent, resolved  Intermittent delusions regarding staff stalking patient while in the  hospital. Unclear chronicity of these delusions - but has had them multiple times this hospitalization. Unclear psych history. Medical management for encephalopathy as stated above. Psychiatry was consulted during his care and agreed with paranoia and recommended Abilify. He was briefly on Abilify 5mg but patient then started to refuse nightly so this was discontinued. His paranoia and delusions have been very intermittent and appear to be associated with his anxiety.      Discharge Needs  - Follow up in 6 weeks with Dr Jeong, neurosurgery (message sent to schedulers),  - MRI pituitary in 6 weeks before discharge (message sent to schedulers)  - Upright lumbar xrays to evaluate his compression fractures (ordered by neurosurgery)   - outpatient follow-up w/ ENT -- contact ENT when transfers to the [facility] to schedule follow-up for splint removal in clinic             Diet: Diet  Snacks/Supplements Adult: Glucerna; Between Meals  Regular Diet Adult    DVT Prophylaxis: Pneumatic Compression Devices  Roland Catheter: Not present  Lines: PRESENT      PICC 04/06/23 Double Lumen Right Basilic access-Site Assessment: WDL      Cardiac Monitoring: None  Code Status: Full Code      Clinically Significant Risk Factors              # Hypoalbuminemia: Lowest albumin = 2.9 g/dL at 4/11/2023  7:07 AM, will monitor as appropriate  # Coagulation Defect: INR = 1.30 (Ref range: 0.85 - 1.15) and/or PTT = 39 Seconds (Ref range: 22 - 38 Seconds), will monitor for bleeding          # DMII: A1C = 9.5 % (Ref range: <5.7 %) within past 6 months    # Severe Malnutrition: based on nutrition assessment         Disposition Plan           Lexus Shrestha DO  Medicine Service, [facility] TEAM [address] [place] [address]  Securely message with [employer] (more info)  Text page via [facility] Paging/Directory   See signed in provider for up to date coverage  information  ______________________________________________________________________    Interval History   No overnight events reported. Surgery team treated RLE wound with silver nitrate with improvement, minimal bleeding noted overnight.   Today, patient is reporting new Right tooth pain. Otherwise, no new complaints.     Physical Exam   Vital Signs: Temp: 98.1  F (36.7  C) Temp src: Axillary BP: (!) 122/96 Pulse: 92   Resp: 18 SpO2: 94 % O2 Device: None (Room air)    Weight: 164 lbs 8 oz    Constitutional: no apparent distress, pleasant and cooperative   Cardiovascular: trace bilateral LE edema   Respiratory:  normal work of breathing   GI: abdomen soft, non tender, non distended, bowel sounds present   Neuro: alert and oriented  Skin: RLE wound wrapped in dressing and ACE bandage which is C/d/i     Medical Decision Making       **CLEAR ALL SELECTIONS**      Data     I have personally reviewed the following data over the past 24 hrs:    13.2 (H)  \   7.4 (L)   / 335     140 107 10.9 /  146 (H)   3.5 22 1.08 \       TSH: N/A T4: 1.11 A1C: N/A       Imaging results reviewed over the past 24 hrs:   No results found for this or any previous visit (from the past 24 hour(s)).

## 2023-05-11 NOTE — PROVIDER NOTIFICATION
Time of notification: 11:40 AM  Provider notified: fredi Loya   Patient status:pt c/o of 9/10 Left side tooth pain, pt is curious if he can have a topical numbing cream? already gave Tylenol   Temp:  [97.3  F (36.3  C)-98.4  F (36.9  C)] 98.1  F (36.7  C)  Pulse:  [] 92  Resp:  [16-18] 18  BP: (110-130)/() 122/96  SpO2:  [94 %-100 %] 94 %  Orders received: orders meds

## 2023-05-11 NOTE — PROGRESS NOTES
[facility], [place]   05/09/2023  Neurosurgery Progress Note:    Assessment:  Gustavo Faria is a 58 year old male with a PMH of DMII, HFrEF, BLE open wounds, history of serratia bacteremia in December 2022,  pituitary ACTH secreting macroadenoma s/p EEA for resection on 5/2021 and 7/2021 in [place], with baseline right CN 3 palsy, who was admitted at the [facility] on 3/23. He developed sudden headache and vision changes on 3/25, with MRI brain on 3/28, which demonstrated enlargement of known pituitary adenoma, with necrosis extending laterally beyond the left cavernous sinus, concerning for compression of cranial nerves at cavernous sinus. 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      Patient is POD-13 s/p Endoscopic endonasal transcavernous approach for resection of functional pituitary adenoma with harvesting of right-sided pedicled nasoseptal flap, which was pedicled off the posterior septal branch of the sphenopalatine artery    Plan:  - Watch for DI/CSF leak  - Follow urine output/ inputs/ outputs  - Please inform Neurosurgery if there is any change in exam  - Recommend Ophthalmology referral for management of ptosis/esotropia  - Neurosurgery will follow peripherally    Follow up plan  - Follow up in 6 weeks with Dr Jeong (message sent to schedulers)  - MRI pituitary in 6 weeks before discharge (message sent to schedulers)  - Upright lumbar xrays to evaluate his compression fractures (ordered for you)  -----------------------------------  Yaneth Ugalde MD  Neurosurgery PGY3    Oliver Dobsonwan  Neurosurgery PGY2    Please contact neurosurgery resident on call with questions.    Dial * * *[phone], enter [phone] when prompted.        Interval History:  Ongoing bleeding from leg wound - dressings changed. Hemoglobin stable overnight. Pending  cortisol labs per Endo.       Objective:   Temp:  [97.3  F (36.3  C)-98.4  F (36.9  C)] 98.2  F (36.8  C)  Pulse:  [] 92  Resp:  [16-18] 18  BP: (110-130)/() 122/93  SpO2:  [94 %-100 %] 94 %  I/O last 3 completed shifts:  In: 1310 [P.O.:1280; I.V.:30]  Out: 550 [Urine:550]    NEUROLOGIC:  --Eyes open, following commands, oriented x3  Cranial Nerves:  -- visual fields full to confrontation although with limited participation, PERRL anisocoric L>R and sluggish, left CN III paresis and CN VI palsy, restricted ROM in right EOM with no apparent asymmetry   -- face symmetrical, tongue midline  -- sensory V1-V3 intact bilaterally  -- palate elevates symmetrically, uvula midline  -- hearing grossly intact bilat     Motor:  Antigravity x4 extremities     Sensory:  intact to LT x 4 extremities      Reflexes:       Bi Tri BR Omi Pat Ach Bab     C5-6 C7-8 C6 UMN L2-4 S1 UMN   R 2+ 2+ 2+ Norm 2+ 2+ Norm   L 2+ 2+ 2+ Norm 2+ 2+ Norm      Gait: Deferred.     LABS:  Recent Labs   Lab 05/11/23  0746 05/11/23  0156 05/10/23  2250 05/10/23  0845 05/10/23  0505 05/09/23  1203 05/09/23  1137 05/08/23  0751 05/08/23  0749   NA  --   --   --   --  140  --  140  --  138   POTASSIUM  --   --   --   --  3.7  --  3.8  --  3.9   CHLORIDE  --   --   --   --  107  --  106  --  100   CO2  --   --   --   --  24  --  24  --  26   ANIONGAP  --   --   --   --  9  --  10  --  12   * 106* 132*   < > 83   < > 75   < > 112*   BUN  --   --   --   --  11.9  --  11.2  --  10.7   CR  --   --   --   --  1.06  --  0.98  --  0.97   KORIN  --   --   --   --  7.8*  --  7.6*  --  8.3*    < > = values in this interval not displayed.       Recent Labs   Lab 05/11/23  0607   WBC 13.2*   RBC 2.60*   HGB 7.4*   HCT 23.6*   MCV 91   MCH 28.5   MCHC 31.4*   RDW 19.5*          IMAGING:  No results found for this or any previous visit (from the past 24 hour(s)).

## 2023-05-11 NOTE — PLAN OF CARE
Neuro: A&Ox4.   Cardiac: SR with BBB   Respiratory: Sating 96% on RA.  GI/: Adequate urine output. No bm this shift  Diet/appetite: Tolerating regular diet. Eating well.  Activity:  ax2 with lift , up to chair and in halls.  Pain: pt c/o of 9/10 L tooth pain. PRN benzocaine given.    Skin: No new deficits noted.  LDA's:  R DL PICC - SL   Plan: Continue with POC. Notify primary team with changes.

## 2023-05-11 NOTE — TELEPHONE ENCOUNTER
Called and spoke to Gustavo and his nurse     Rescheduled Bailey appointment for 5/18 @ 10 am with Dr. Carl Holden Communication Facilitator on 5/11/2023 at 2:02 PM

## 2023-05-11 NOTE — TELEPHONE ENCOUNTER
[facility] Call Center    Phone Message    May a detailed message be left on voicemail: yes     Reason for Call: Other: Patient missed his appointment today due to the hospital not knowing about it. He needs to be rescheduled for sometime next week. Please advise.  He can be put on the schedule any day per hospital staff.     Action Taken: Other: eye    Travel Screening: Not Applicable

## 2023-05-11 NOTE — PROGRESS NOTES
Surgery Progress Note    Gustavo Faria  [id number]    No acute overnight events  AFVSN  Got 1u yesterday afternoon  Bandages with no strike through, bleeding completely resolved    Temp:  [97.3  F (36.3  C)-98.4  F (36.9  C)] 97.3  F (36.3  C)  Pulse:  [] 92  Resp:  [16-18] 16  BP: (110-130)/() 129/102  SpO2:  [94 %-100 %] 94 %    Intake/Output Summary (Last 24 hours) at 5/8/2023 0538  Last data filed at 5/8/2023 0129  Gross per 24 hour   Intake 480 ml   Output 2130 ml   Net -1650 ml     WBC 12    Gen:                Lying in bed in NAD, A&OX3  HEENT:           Normocephalic and atraumatic  CV:                  RRR  Pulm:               Non-labored breathing  Abd:                 Soft, non-tender, non-distended  Ext:                  RLE WWP, with one anterior tibial wound, nondraining, one medial ankle wound, completehemostasis, and one medial lower leg wound. Packed with kerlex, covered with abd and pressure wrap      58 year old male with RLE wound draining purulent material. CT shows [id number] rim enhancing fluid pocket at medial ankle, without clear evidence of osteo. Now status post bedside InD.      Acute Blood Loss Anemia on Chronic Microcytic Anemia       - abx per primary  - dressing changes may be done daily per bedside nurse; WOC may be involved if necessary  - dressing change instructions in nursing wound care order  - surgery will sign off at this time  - remainder of cares per primary team    Seen with chief resident discussed w staff    Mj León MD   Surgery PGY-1

## 2023-05-11 NOTE — PROVIDER NOTIFICATION
Time of notification: 1:12 PM  Provider notified: tera   Patient status: pt had an in-clinic appt @ 10AM, will team see pt at bedside or do you want RN to reschedule?   Temp:  [97.3  F (36.3  C)-98.4  F (36.9  C)] 98.1  F (36.7  C)  Pulse:  [] 92  Resp:  [16-18] 18  BP: (110-130)/() 122/96  SpO2:  [94 %-100 %] 94 %  Orders received: RN rescheduling with eye clinic -may 18 10am

## 2023-05-11 NOTE — PROVIDER NOTIFICATION
Time of notification: 7:34 AM  Provider notified: fredi 2   Patient status: In pt orders, has nasal precautions & HOB 30 degrees, should i continue with these orders even though craig splints were removed?  Temp:  [97.3  F (36.3  C)-98.4  F (36.9  C)] 97.3  F (36.3  C)  Pulse:  [] 92  Resp:  [16-18] 16  BP: (110-130)/() 129/102  SpO2:  [94 %-100 %] 94 %  Orders received: saw at bedside

## 2023-05-12 ENCOUNTER — APPOINTMENT (OUTPATIENT)
Dept: OCCUPATIONAL THERAPY | Facility: CLINIC | Age: 58
DRG: 614 | End: 2023-05-12
Attending: INTERNAL MEDICINE
Payer: COMMERCIAL

## 2023-05-12 ENCOUNTER — APPOINTMENT (OUTPATIENT)
Dept: CT IMAGING | Facility: CLINIC | Age: 58
DRG: 614 | End: 2023-05-12
Attending: STUDENT IN AN ORGANIZED HEALTH CARE EDUCATION/TRAINING PROGRAM
Payer: COMMERCIAL

## 2023-05-12 LAB
ANION GAP SERPL CALCULATED.3IONS-SCNC: 11 MMOL/L (ref 7–15)
BLD PROD TYP BPU: NORMAL
BLOOD COMPONENT TYPE: NORMAL
BUN SERPL-MCNC: 10.3 MG/DL (ref 6–20)
CALCIUM SERPL-MCNC: 7.9 MG/DL (ref 8.6–10)
CHLORIDE SERPL-SCNC: 109 MMOL/L (ref 98–107)
CODING SYSTEM: NORMAL
COLLECT DURATION TIME UR: 24 H
CORTIS 24H UR-MRATE: 75 MCG/24 H (ref 3.5–45)
CORTIS SERPL-MCNC: 20.4 UG/DL
CORTISONE 24H UR-MRATE: 100 MCG/24 H (ref 17–129)
CREAT SERPL-MCNC: 1.09 MG/DL (ref 0.67–1.17)
CROSSMATCH: NORMAL
DEPRECATED HCO3 PLAS-SCNC: 21 MMOL/L (ref 22–29)
ERYTHROCYTE [DISTWIDTH] IN BLOOD BY AUTOMATED COUNT: 19.8 % (ref 10–15)
GFR SERPL CREATININE-BSD FRML MDRD: 79 ML/MIN/1.73M2
GLUCOSE BLDC GLUCOMTR-MCNC: 106 MG/DL (ref 70–99)
GLUCOSE BLDC GLUCOMTR-MCNC: 128 MG/DL (ref 70–99)
GLUCOSE BLDC GLUCOMTR-MCNC: 170 MG/DL (ref 70–99)
GLUCOSE BLDC GLUCOMTR-MCNC: 94 MG/DL (ref 70–99)
GLUCOSE SERPL-MCNC: 103 MG/DL (ref 70–99)
HCT VFR BLD AUTO: 21.9 % (ref 40–53)
HGB BLD-MCNC: 6.7 G/DL (ref 13.3–17.7)
HGB BLD-MCNC: 8.4 G/DL (ref 13.3–17.7)
ISSUE DATE AND TIME: NORMAL
MAGNESIUM SERPL-MCNC: 2.2 MG/DL (ref 1.7–2.3)
MCH RBC QN AUTO: 28 PG (ref 26.5–33)
MCHC RBC AUTO-ENTMCNC: 30.6 G/DL (ref 31.5–36.5)
MCV RBC AUTO: 92 FL (ref 78–100)
PLATELET # BLD AUTO: 317 10E3/UL (ref 150–450)
POTASSIUM SERPL-SCNC: 3.6 MMOL/L (ref 3.4–5.3)
RBC # BLD AUTO: 2.39 10E6/UL (ref 4.4–5.9)
SODIUM SERPL-SCNC: 141 MMOL/L (ref 136–145)
SPECIMEN VOL 24H UR: 625 ML
T4 FREE SERPL-MCNC: 1.19 NG/DL (ref 0.9–1.7)
UNIT ABO/RH: NORMAL
UNIT NUMBER: NORMAL
UNIT STATUS: NORMAL
UNIT TYPE ISBT: 5100
WBC # BLD AUTO: 11 10E3/UL (ref 4–11)

## 2023-05-12 PROCEDURE — 120N000002 HC R&B MED SURG/OB UMMC

## 2023-05-12 PROCEDURE — 250N000013 HC RX MED GY IP 250 OP 250 PS 637

## 2023-05-12 PROCEDURE — 70486 CT MAXILLOFACIAL W/O DYE: CPT | Mod: 26 | Performed by: RADIOLOGY

## 2023-05-12 PROCEDURE — 99231 SBSQ HOSP IP/OBS SF/LOW 25: CPT | Mod: 24 | Performed by: INTERNAL MEDICINE

## 2023-05-12 PROCEDURE — 82533 TOTAL CORTISOL: CPT | Performed by: STUDENT IN AN ORGANIZED HEALTH CARE EDUCATION/TRAINING PROGRAM

## 2023-05-12 PROCEDURE — 250N000011 HC RX IP 250 OP 636

## 2023-05-12 PROCEDURE — 85018 HEMOGLOBIN: CPT | Performed by: INTERNAL MEDICINE

## 2023-05-12 PROCEDURE — 84439 ASSAY OF FREE THYROXINE: CPT

## 2023-05-12 PROCEDURE — 97110 THERAPEUTIC EXERCISES: CPT | Mod: GO | Performed by: OCCUPATIONAL THERAPIST

## 2023-05-12 PROCEDURE — 83735 ASSAY OF MAGNESIUM: CPT | Performed by: STUDENT IN AN ORGANIZED HEALTH CARE EDUCATION/TRAINING PROGRAM

## 2023-05-12 PROCEDURE — P9016 RBC LEUKOCYTES REDUCED: HCPCS

## 2023-05-12 PROCEDURE — 70486 CT MAXILLOFACIAL W/O DYE: CPT

## 2023-05-12 PROCEDURE — 250N000013 HC RX MED GY IP 250 OP 250 PS 637: Performed by: INTERNAL MEDICINE

## 2023-05-12 PROCEDURE — 99232 SBSQ HOSP IP/OBS MODERATE 35: CPT | Mod: GC | Performed by: INTERNAL MEDICINE

## 2023-05-12 PROCEDURE — 36592 COLLECT BLOOD FROM PICC: CPT | Performed by: INTERNAL MEDICINE

## 2023-05-12 PROCEDURE — 80048 BASIC METABOLIC PNL TOTAL CA: CPT | Performed by: INTERNAL MEDICINE

## 2023-05-12 PROCEDURE — 85027 COMPLETE CBC AUTOMATED: CPT | Performed by: INTERNAL MEDICINE

## 2023-05-12 RX ORDER — SODIUM FLUORIDE 5 MG/G
GEL, DENTIFRICE DENTAL AT BEDTIME
Status: DISCONTINUED | OUTPATIENT
Start: 2023-05-12 | End: 2023-05-19 | Stop reason: HOSPADM

## 2023-05-12 RX ORDER — SPIRONOLACTONE 100 MG/1
100 TABLET, FILM COATED ORAL DAILY
Status: DISCONTINUED | OUTPATIENT
Start: 2023-05-12 | End: 2023-05-13

## 2023-05-12 RX ADMIN — SALINE NASAL SPRAY 2 SPRAY: 1.5 SOLUTION NASAL at 10:23

## 2023-05-12 RX ADMIN — Medication 5 ML: at 20:47

## 2023-05-12 RX ADMIN — PIPERACILLIN AND TAZOBACTAM 3.38 G: 3; .375 INJECTION, POWDER, LYOPHILIZED, FOR SOLUTION INTRAVENOUS at 09:27

## 2023-05-12 RX ADMIN — PIPERACILLIN AND TAZOBACTAM 3.38 G: 3; .375 INJECTION, POWDER, LYOPHILIZED, FOR SOLUTION INTRAVENOUS at 02:15

## 2023-05-12 RX ADMIN — Medication 1 TABLET: at 13:05

## 2023-05-12 RX ADMIN — HYDROMORPHONE HYDROCHLORIDE 0.2 MG: 0.2 INJECTION, SOLUTION INTRAMUSCULAR; INTRAVENOUS; SUBCUTANEOUS at 00:00

## 2023-05-12 RX ADMIN — Medication: at 04:23

## 2023-05-12 RX ADMIN — ASPIRIN 81 MG: 81 TABLET ORAL at 09:25

## 2023-05-12 RX ADMIN — PIPERACILLIN AND TAZOBACTAM 3.38 G: 3; .375 INJECTION, POWDER, LYOPHILIZED, FOR SOLUTION INTRAVENOUS at 20:40

## 2023-05-12 RX ADMIN — PIPERACILLIN AND TAZOBACTAM 3.38 G: 3; .375 INJECTION, POWDER, LYOPHILIZED, FOR SOLUTION INTRAVENOUS at 13:05

## 2023-05-12 RX ADMIN — SPIRONOLACTONE 100 MG: 50 TABLET ORAL at 10:23

## 2023-05-12 RX ADMIN — Medication 5 ML: at 22:08

## 2023-05-12 RX ADMIN — LEVOTHYROXINE SODIUM 112 MCG: 0.11 TABLET ORAL at 09:25

## 2023-05-12 RX ADMIN — SALINE NASAL SPRAY 2 SPRAY: 1.5 SOLUTION NASAL at 13:07

## 2023-05-12 ASSESSMENT — ACTIVITIES OF DAILY LIVING (ADL)
ADLS_ACUITY_SCORE: 42
ADLS_ACUITY_SCORE: 38
ADLS_ACUITY_SCORE: 42
ADLS_ACUITY_SCORE: 42
ADLS_ACUITY_SCORE: 38
ADLS_ACUITY_SCORE: 42
ADLS_ACUITY_SCORE: 38
ADLS_ACUITY_SCORE: 42
ADLS_ACUITY_SCORE: 42
ADLS_ACUITY_SCORE: 38
ADLS_ACUITY_SCORE: 40
ADLS_ACUITY_SCORE: 40

## 2023-05-12 NOTE — PROGRESS NOTES
[facility]    Medicine Progress Note - Medicine Service, [facility]AL[facility] TEAM 2    Date of Admission:  4/3/2023    Assessment & Plan    Gustavo Faria is a 58 year old male with recent Serratia bacteremia, HFrEF, prolonged QT, lower extremity wounds, DMII, hypothyroidism, low testosterone and known ACTH secreting pituitary adenoma w/resulting Cushing's disease s/p 2 partial resections in 2021 [facility]. [facility], he developed new onset double vision and severe headache which prompted imaging that showed a large mass invading the cavernous sinuses and impinging on the cranial nerves. He is transferred to [facility] for this pituitary adenoma, concern for progression vs hemorrhage/apoplexy. Complicated by psychosis/metabolic encephalopathy and electrolyte abnormalities secondary to Cushing's syndrome. Underwent endoscopic endonasal transcavernous approach for resection of functional pituitary adenoma 4/27.      Updates today:  - dental consult -- plan for multiple teeth extraction in dental clinic, likely next week    - per dental, zosyn sufficient for abx and does not need amoxicillin   - restart spironolactone at lower dose 100 mg   - no strikethrough on leg bandage when examined this AM   - hgb 6.7 this AM, received 1u pRBC. hgb 8.4 on recheck  - continue to work on discharge to TCU vs transfer to inpatient  - plan for 10-day course of zoyn      #R lower leg abscess, soft tissue infection, +serratia,  s/p I&D 5/8/2023  #Hypotension, likely multifactorial 2/2 sepsis, blood loss anemia, improving  # Post-surgical Leukocytosis 2/2 inflammatory response, sepsis   Pt noted to have leukocytosis since POD1, WBC 15-17 range,  initially felt to likely be 2/2 post-surgical inflammatory response. Infection workup initially negative w/ unremarkable CXR, blood cultures, UA, chronic wound  assessment by WOC, and normal vitals. POD9, RRT called for softer SBPs, generally 80s/50s. Pt noted to have normal to moderately hypertensive blood pressures for most of the hospitalization; SBPs in 80s unusual for patient. All blood pressure meds held. Neurosurg evaluated, does not think patient needs further meningitis workup at this time. Careful physical exam ultimately revealed new opening of R chronic leg wounds with actively draining pus and blood. Surgery consulted. CT tib/fib revealed 4.9x3.2x1.8 cm soft tissue abscess of the medial aspect of the ankle with no evidence of osteomyelitis. Underwent bedside I&D of R lower leg on 5/8/23. R lower leg abscess growing +serratia. Notable ongoing bleeding from I&D site resulting in blood transfusion and hypotension. Will also monitor for possible glucocorticoid withdrawal syndrome contributing to hypotension  - antibiotics:              - zosyn (5/7 - )               - vanc (5/7-5/8)  - infectious workup                    - wound culture with 1+ gram negative rods, 3+ serratia x 2 cultures  - curbside ID line -- recommends continuing zosyn instead of ceftriaxone for serratia, for 7-10 course for soft tissue infection vs longer course 10-14 days if concerned for arterial insufficiency. Duration also pending clinical course  - transfuse for hgb < 7  - MRSA nares negative  - continue holding carvedilol, lasix, lisinopril, and spironolactone  - restart spironolactone today at lower dose 100 mg every day (200 mg every day prior to sepsis)   - TTE unchanged from prior  - encourage PO intake     # chronic buttocks, sacrum and bilateral groin wounds   # RLE wound from puncture injury   # L dorsal foot wound from presumed trauma   # Hx serratia bacteremia in December 2022   For patient's lower extremity wounds and hx of Serratia bacteremia in December, he was [facility]. His antibiotics were broadened to Vanc and Zosyn and ultimately he was  transitioned to ceftazidime on 3/23 with plan to complete course on 4/4/23. BCx negative and wound cx grew serratia marcescens at the [facility]. He has been on ceftazidime since. MRI of the right tib/fib on 3/23 was negative for osteomyelitis but did show two fluid collections draining sponateneously. [facility] at [facility] and determined no intervention was needed as wounds were draining on their own.   - completed course of ceftazidime (3/23 - 4/5)  - PT/OT   - Pain: tylenol 975 mg Q6H PRN     # Pituitary adenoma resection this admission, c/b significant scar tissue to cavernous sinus, 4/27/23  #R eye ptosis, esotropia  # ACTH secreting pituitary adenoma c/b type II DM, hypothyroidism and low testosterone s/p two partial resections in 2021   Patient noted double vision and severe headache beginning the evening of 3/25. CT imaging on 3/25 revealed a large sellar/suprasellar mass extending into the cavernous sinuses with no evidence of acute stroke. On 3/28, he underwent an MRI which confirmed the CT findings of a large mass invading the cavernous sinuses and impinging on the cranial nerves. Necrosis extending beyond left cavernous sinus. [facility]. Repeat MRI performed on 4/6/23: compared to 2021 MRI, lesions are smaller. Orbit MRI without optic nerve compression and no evidence of orbital infection.  - 4/27 neurosurgery and ENT combined to perform endoscopic endonasal transcavernous approach for resection of functional pituitary adenoma. Complicated by significant scar tissue to cavernous sinus. Good post-op recovery. Follow up MRI done 5/29. Neurosurgery okay with [facility] once [facility]. They will arrange follow up.   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  # Hypernatremia  # Hypokalemia, resolved  Hypernatremia and hypokalemia, likely secondary to Cushing's from pituitary macroadenoma that was unable to be completely removed  - Endocrine following, appreciate recs  - restart spironolactone 5/12 at lower dose 100 mg (prior dose 200 mg every day)   - Goal Na: 135-140  - Strict I&Os  - trend intermittent BMP, Mag  - check 24h urine cortisol and creatinine  - late night salivary cortisol x 2 pending   - endocrine working on establishing cortisol baseline which will dictate next steps (medical management vs radiation treatment)   - continue daily 8 AM cortisol     # Central Hypothyroidism  - Continue levothyroxine 112 mcg once daily      # Type II DM, exacerbated by Cushing's   A1c of 7.6% on 2/2023.  - Endocrinology managing. Current regimen:   - Carb coverage to 1 units/20 g CHO with meals and snacks  - medium sliding scale insulin for AC & HS  - Stopped Jardiance 10 mg daily per endocrine   - hypoglycemia protocol  - Accu checks AC & HS      #Type II MI   # HFmrEF, with global hypokinesis  # Hypertension  # High burden of PVCs  # Prolonged QTc   # Elevated troponin, down-trended  Coronary angiogram on 2/27 showed normal coronaries. Trop mildly elevated on admit 88; downtrended to 77 without targeted intervention. Was noted to have high burden of PVCs on tele. TTE 4/6/2023 showed EF 40-45%, severe diffuse hypokinesis, normal RV function, and mild-moderate aortic regurgitation; overall, not significantly changed from prior TTE 2/17/23. 5/6/23, pt w/ new episode of hypotension of unclear cause. Troponin checked and 177-->170-->172. Most likely 2/2 demand ischemia in s/o hypotension. Patient denied chest pain. EKG with sinus rhythm, LVH, RBBB, and with t wave inversions more evident in anterior leads as compared to prior EKG 4/23/23.   - Repeat TTE unchanged  - troponin plateaued, will not recheck unless  clinically changes  - Q4H vital signs  - recheck EKG, trop if chest pain occurs  - Lasix 40mg daily--held since surgery on 4/27. Restarted 4/30. Held 5/6   - Coreg and lisinopril held for surgery 4/27.               - Carvedilol 12.5 mg BID restarted 4/29. Held 5/6  - Lisinopril restarted 4/30 and titrated up to prior dose 20 mg daily on 5/1. Held 5/6  - ASA - restarted 5/5/23, held 5/9/2023 due to bleeding, restarted 5/12  - Avoid QTc prolonging medications    #Dental caries     Pt w/ acute tooth pain 5/11/23. Dental consulted.CT dental performed and revealed multiple scattered periapical lucencies, which may be seen with small periapical abscesses, w/ no definite evidence for cellulitis or soft tissue abscess. Recommended extraction of tooth #1,2,11,14,30,32 at least. Per dental:  1. At this time, clinical and radiographic findings do indicate the need for  extraction of tooth # 1, 2, 11 14, 30, 32 at least. Below is the contact information of the [facility].  a. [facility] [address], [place] [address] [phone]    b. The care coordinator/RN of the patient will need to call the scheduling team of the [facility] and get an appointment for the extraction.  The primary team will be responsible to arrange the transportation of the patient if inpatient.  - abx as above w/ zosyn  - pain mgmt per primary team  - further eval w/ dental radiographs and clinical exam at [facility]       # Acute metabolic encephalopathy - resolved  # Pyruria, Candiduria - resolved  # Encephalopathy likely secondary to cushing's disease, resolved  Admission symptoms included disorientation, intermittently following directions. Repetitive words - perseverating on particular phrases. Sx started the 2-3 days prior to admission (which [facility] was [facility]). Vulnerable brain (multiple brain surgeries), enlarging pituitary mass. No seizure activity per  EEG. Steroids removed and pt still encephalopathic. Sodium had been in normal limits and patient remained altered. Worked up for infections - did reveal candiduria but unlikely this was etiology, though he did complete an 8 day course of fluconazole. Briefly on ceftriaxone but encephalopathy also did not improve. CT abd w/ contrast (4/10): no evidence of pyelonephritis  Ultimately attributed to Cushings and known pituitary tumor. Continues to remain lucid.  - Surgical management per neurosurgery  - Delirium precautions  - Electrolyte management as above  - PRN quetiapine if agitation that is not responsive to behavioral interventions      # Hypogonadism   - hold PTA androgel (would need to bring in home medication, not accessible at this time)      # Chronic microcytic anemia   Hgb of 8.4 [facility]. Peripheral smear on 3/30 showed poikilocytosis with occasional red blood cell fragments but no other evidence of hemolysis.  Haptoglobin was normal.  Ferritin was 91, transferrin saturation was low, B12 was 495 and folate was 8.7. Likely combination of iron deficiency as well as inflammation/chronic disease.   - CTM        # Concern for malnutrition   - Nutrition following     # Delusions, intermittent, resolved  Intermittent delusions regarding staff stalking patient while in the hospital. Unclear chronicity of these delusions - but has had them multiple times this hospitalization. Unclear psych history. Medical management for encephalopathy as stated above. Psychiatry was consulted during his care and agreed with paranoia and recommended Abilify. He was briefly on Abilify 5mg but patient then started to refuse nightly so this was discontinued. His paranoia and delusions have been very intermittent and appear to be associated with his anxiety.      Discharge Needs  - Follow up in 6 weeks with Dr Jeong, neurosurgery (message sent to schedulers),  - MRI pituitary in 6 weeks before discharge (message sent  to schedulers)  - Upright lumbar xrays to evaluate his compression fractures (ordered by neurosurgery)   - outpatient follow-up w/ ENT -- contact ENT when transfers to the [facility] to schedule follow-up for splint removal in clinic             Diet: Diet  Snacks/Supplements Adult: Glucerna; Between Meals  Regular Diet Adult    DVT Prophylaxis: Pneumatic Compression Devices  Roland Catheter: Not present  Lines: PRESENT      PICC 04/06/23 Double Lumen Right Basilic access-Site Assessment: WDL      Cardiac Monitoring: None  Code Status: Full Code      Clinically Significant Risk Factors              # Hypoalbuminemia: Lowest albumin = 2.9 g/dL at 4/11/2023  7:07 AM, will monitor as appropriate           # DMII: A1C = 9.5 % (Ref range: <5.7 %) within past 6 months    # Severe Malnutrition: based on nutrition assessment         Disposition Plan      Expected Discharge Date: 05/15/2023      Destination: home  Discharge Comments: Medically ready for [facility] back to [facility] once bed is available. Pt med ready for discharge to TCU on Mon May 15 if not yet transferred to [facility]. If here next week, needs transport arranged to dental clinic for multiple tooth extractions per IP dental consult        Leela Verdin MD  Medicine Service, [facility] TEAM [address] [place] [address]  Securely message with [employer] (more info)  Text page via [employer] Paging/Directory   See signed in provider for up to date coverage information  ______________________________________________________________________    Interval History     NAEO    This morning pt is feeling better. Denies tooth pain. R leg wrap uncomfortable last night due to being too tight, but is feeling better after top was cut by RN for comfort this AM. Really wants to discharge, would be happy to go to TCU or to the [facility].No acute concerns today. No lightheadedness, dizziness     Physical Exam   Vital Signs: Temp: 98.1  F (36.7  C) Temp src: Oral BP: (!) 132/104 Pulse: 87    Resp: 18 SpO2: 99 % O2 Device: None (Room air)    Weight: 149 lbs 4.8 oz    Constitutional: no apparent distress, calm, nontoxic appearing  Cardiovascular: RRR, no MRG  Respiratory:  normal work of breathing   GI: abdomen soft, non tender, non distended, bowel sounds present   Neuro: alert and oriented, answers questions appropriately  Skin: RLE wound wrapped in dressing and ACE bandage which is C/d/i     Medical Decision Making       **CLEAR ALL SELECTIONS**      Data     I have personally reviewed the following data over the past 24 hrs:    11.0  \   8.4 (L)   / 317     141 109 (H) 10.3 /  94   3.6 21 (L) 1.09 \       TSH: N/A T4: 1.19 A1C: N/A       Imaging results reviewed over the past 24 hrs:   Recent Results (from the past 24 hour(s))   CT Dental wo Contrast    Narrative    CT DENTAL WO CONTRAST 5/12/2023 9:09 AM    History:  Dental Pain of the left maxillary posterior site. Multiple  grossly carious lesions.    Comparison:  MR orbit and face for 4/6/2023.    TECHNIQUE: 3-D reconstruction by the technologists, with curved  multiplanar reformat of thin section imaging through the mandible and  maxilla obtained without intravenous contrast.    FINDINGS:  No significant soft tissue swelling or mass. Normal facial bone  alignment.. Carious dentition with scattered periapical lucencies.    Maxilla: There is periapical lucency surrounding the right third  residual molar root.. Apical lucency about the right second and to a  lesser degree right first molar roots. Periapical lucency surrounding  the right lateral incisor and mild periodontal disease surrounding the  right central incisor. Periapical lucency surrounding the root of the  left canine. Left canine fossa is fractured.    Mandible: Periapical lucency surrounding the roots of the first and  third right molars. The first right molar is fractured with anterior  roots and a fragment of the posterior medial root remaining.  Periapical lucency surrounding the  left canine root.    Multiple teeth are absent.    Osteitis of the right maxillary sinus with moderate mucosal thickening  . The left maxillary sinus is largely opacified with frothy and  inspissated material. The bilateral sphenoid sinuses and ethmoid air  cells are largely opacified.      Normal temporomandibular joints. The mastoid air cells are clear  bilaterally.        Impression    IMPRESSION:    1. Multiple scattered periapical lucencies as described above, which  may be seen with small periapical abscesses. No definite evidence for  cellulitis or soft tissue abscess.  2. Mucosal thickening, frothy debris and air-fluid levels within the  paranasal sinuses may represent acute on chronic sinusitis in the  proper clinical setting. Progressed since 4/6/2023.     I have personally reviewed the examination and initial interpretation  and I agree with the findings.    NAIMA YUN MD         SYSTEM ID:  [id number]

## 2023-05-12 NOTE — PROGRESS NOTES
Care Management Follow Up    Length of Stay (days): 39    Expected Discharge Date:  TBD     Concerns to be Addressed: compliance issue, patient refuses services     Patient plan of care discussed at interdisciplinary rounds: Yes    Anticipated Discharge Disposition: Transitional Care     Anticipated Discharge Services: Transportation Services  Anticipated Discharge DME: None    Patient/family educated on Medicare website which has current facility and service quality ratings: yes  Education Provided on the Discharge Plan:  Yes  Patient/Family in Agreement with the Plan: Yes         Additional Information:  [facility]. They are still trying to place boarders from ER and do not have any beds available. Pt encouraged to participate in therapies for transition to TCU when medically ready. [facility] will continue to follow and assist with discharge planning as needed.       Gabbie Hines RN

## 2023-05-12 NOTE — PROGRESS NOTES
Endocrine Progress Note  Patient: Gustavo Faria   MRN: [id number]  Date of Service: 05/12/2023       Assessment and plan:  Gustavo Faria is a 58 year old male with PMHx of  ACTH-secreting macroadenoma c/b central hypothyroidism, and central hypogonadism, s/p transphenoidal surgery 5/2021 and 7/2021 in [facility] has baseline records 3rd nerve palsy, ongoing serratia RLE cellulitis c/b recent serratia bacteremia, HFrEF, T2DM, and HTN who was transferred from [facility] for surgical intervention of known expanding large sellar/suprasellar mass extending into cavernous sinuses and subsequent cranial nerve impingement.     # ACTH secreting macroadenoma - Cushing's disease, s/p 3 pituitary surgeries (most recently 4/27/2023) with persistent disease  #Pituitary apoplexy:  Unable to remove entire pituitary macroadenoma due to fibrous tissue. Cortisol level after surgery still elevated at 38.2 with repeat serum cortisol on 4/29 was at 27.4.  Blood pressure above target. Sodium WNL May 3, 2023 AM cortisol had improved to 15.1    Gustavo completed his 24 hour urine cortisol collection(results pending).  Both late-night saliva cortisol have been collected and is still pending to be resulted.    Once we have established his new baseline degree of cortisol excess I anticipate likely starting medical therapy.    We discussed with the patient and his sister who was on phone [Rere) about his next plan of management.  We discussed about radiation treatment for his persistence questions disease and possible medical management.  We will need to reach out to radiation oncology to discuss about the logistics about the treatment for the patient.    We will also have him follow-up with one of the endocrinologist at [facility] post discharge for medical management of his persistent Cushing's disease.    Continue to check an 8 AM cortisol daily for now.     # Central hypothyroidism    Free T4 today is normal. Continue  levothyroxine 112 mcg once daily.     # Diabetes mellitus type 2   Hemoglobin A1c on admission 9.5%.    Has recently had minimal insulin needs.  He just received 1 dose of aspart on 5/11.  Is currently on medium dose correction scale insulin.  We will continue to monitor his blood sugar trends and possibly he might not need any insulin and would be able to start him on oral medication for his blood sugar control.  Glucose checks fasting, before meals, and at bedtime. Goal glucose <180 mg/dL.       Patient was seen & discussed with On call Attending   Patient labs, chart and imaging reviewed    Rhiannon Arroyo  Endocrinology Fellow  [phone]      ======================================================================    Subjective:  - Pituitary macroadenoma debulking surgery/resection(4/27/23)    Physical Examination:  BP (!) 132/104   Pulse 87   Temp 98.1  F (36.7  C) (Oral)   Resp 18   Wt 67.7 kg (149 lb 4.8 oz)   SpO2 99%   BMI 24.10 kg/m      Constitutional: healthy, alert and no distress. Has cushingoid features with supraclavicular fullness bilaterally  Respiratory: lungs CTAB. No increased work of breathing  Psychiatric: mentation appears normal and affect normal/bright  Head: Normocephalic.   Neck: Neck supple. Thyroid symmetric, normal size.  Abdomen: Abdomen soft, non-tender.  Dark striae noted over the abdomen with umbilical hernia.  NEURO: Sensation grossly WNL.  JOINT/EXTREMITIES: +2-3 pitting bilateral lower extremity edema. Dressing for the right leg.    Medications:  Reviewed    Endocrine Labs:

## 2023-05-12 NOTE — CONSULTS
Dental Consult,  General Practice Residency     Patient:   Gustavo Faria    Date of birth 1965   MRN:  [id number]   Date of Visit:   05/11/2023   Date of Admission 4/3/2023   Consult Requested by Lexus Shrestha DO                                           Assessment and Recommendations:   ASSESSMENT:  1. Gustavo Faria is a 58 year old male with a recent Serratia bacteremia, heart failure with preserved ejection fraction and prolonged QT, lower extremity wounds, diabetes mellitus type 2, hypothyroidism, low testosterone, ACTH secreting pituitary adenoma, question disease status post partial assisted resection of the adrenal gland.  [facility] finding of a large mass invading the cavernous sinus and cranial nerves in the scan.  Psychosis, metabolic encephalopathy and electrolyte abnormalities secondary to Cushing syndrome. diagnosis upon admission Pituitary adenoma (H) [D35.2].   2. New onset dental pain of the left maxillary first molar.  On dental exam multiple carious lesion in the oral cavity, gross dental caries with periapical pathology of tooth #1, 2, 11, 14, 30, 32. Most likely source of current pain is the left maxillary first molar #14.  No active  swelling present but pain on palpation present with #14 suggesting active odontogenic infection associated with #14.      RECOMMENDATION:  1. At this time, clinical and radiographic findings do indicate the need for  extraction of tooth # 1, 2, 11 14, 30, 32 at least. Below is the contact information of the [facility] dental clinic.  a. [facility] [address] [place], [place] [address] [place] [address] [phone]    b. The care coordinator/RN of the patient will need to call the scheduling team of the [facility] dental clinic and get an appointment for the extraction.  The primary team will be responsible to arrange the transportation of the patient if inpatient.  2.   Amoxicillin 500 mg 3 times daily  for 7 days  3. Pain management as per primary team.  Ibuprofen and Tylenol can be used if safe for the patient for pain management.   4.  Further evaluation with dental radiographs and clinical exam will be needed for detailed treatment plan which will be done at the [facility] adult dental clinic.      D) 139/100 Pulse: 97   Resp: 16 SpO2: 99 % O2 Device: None (Room air)      See H&P for complete ROS.     Extraoral exam:   No submandibular lymphadenopathy, no induration or erythema, no abnormal skin lesions. Inferior border of mandible is palpable bilaterally. No TMJ discomfort bilaterally, LATANYA >45mm. No clicking of TMJ on opening and lateral movements.  Limited opening of the right eye.  No induration, no contusions, and no erythema bilaterally. Patient able to open and close both eyes without obstruction. Patient exhibits no obstructions during breathing.       Intraoral Findings   Lips ? chapped and dry   Mucosal quality ? thick, sticky, frothy     Alveolar ridges  ? Ridges had no significant findings    Tongue ? Pink and Moist   Gingiva ? inflamed, flattened/blunted and fibrotic texture/consistency   Cheeks ? Elastic/pliable, clear of debris   Hard palate  ? fibrotic texture/consistency   Floor of Mouth ? No abnormal soft tissue finding   Dentition ? Partially edentulous on both arches  ? Teeth # 1, 2, 11, 14, 30, 32 has clinical caries and periapical pathology.  Root pieces #30  ? Generalised white demineralized lesion which can potentially become caries.   Pulpal status  ?  nonvital pulpal status of the teeth mentioned above with periapical pathology   Biofilm, General level ?  severe biofilm   Denture(s) ?  no existing prosthesis but patient was interested to get a prosthesis   Care provided during assessment ? Oral Assessment, Oral care and Patient education                                                              Imaging     Dental CT taken on 5/12/2023  Potential periradicular and/or periapical findings on:  #1-upper right 3rd molar, #2-upper right 2nd molar, #11--upper left canine , #14--upper left 1st molar, #30-lower right 1st molar and #32-lower right 3rd molar  Retained root tips/fractured teeth #11--upper left canine  and #30-lower right 1st molar  Condyles seated in fossa bilaterally, maxillary sinuses clear bilaterally.  No osseous pathology seen.   Please go over the radiology read for detailed report.                                   Past Medical History      History reviewed. No pertinent past medical history.      There is no immunization history on file for this patient.    Past Surgical History   Past Surgical History:   Procedure Laterality Date     ANESTHESIA OUT OF OR MRI N/A 4/24/2023    Procedure: Anesthesia out of OR Magnetic Resonance Imaging Brain with or without contrast @1300;  Surgeon: GENERIC ANESTHESIA PROVIDER;  Location: [facility] OR     ANESTHESIA OUT OF OR MRI N/A 4/29/2023    Procedure: Anesthesia out of OR MRI;  Surgeon: GENERIC ANESTHESIA PROVIDER;  Location: [facility] OR     OPTICAL TRACKING SYSTEM ENDOSCOPIC ENDONASAL SURGERY N/A 4/27/2023    Procedure: SURGICAL PROCEDURE, ENDONASAL, ENDOSCOPIC, USING OPTICAL TRACKING SYSTEM, for pituitary adenoma resection,;  Surgeon: Rod Jeong MD;  Location: [facility] OR     PICC DOUBLE LUMEN PLACEMENT Right 04/06/2023    40 cm total basilic                                           Social History      History reviewed. No pertinent family history.                                       Allergies      No Known Allergies                                     Medications        Medications Prior to Admission   Medication Sig Dispense Refill Last Dose     furosemide (LASIX) 40 MG tablet Take 40 mg by mouth daily   Unknown     levothyroxine (SYNTHROID/LEVOTHROID) 100 MCG tablet Take 1 tablet (100 mcg) by mouth daily 30 tablet 0 Unknown     [DISCONTINUED] acetaminophen (TYLENOL) 325 MG tablet Take 2 tablets (650 mg) by mouth every 6 hours as needed for mild pain  or other (and adjunct with moderate or severe pain or per patient request) (Patient taking differently: Take 650 mg by mouth every 8 hours as needed for mild pain or other (and adjunct with moderate or severe pain or per patient request)) 30 tablet 0 Unknown     [DISCONTINUED] aspirin (ASA) 81 MG EC tablet Take 1 tablet (81 mg) by mouth daily 30 tablet 1 Unknown     [DISCONTINUED] carvedilol (COREG) 25 MG tablet Take 25 mg by mouth 2 times daily (with meals)   Unknown     [DISCONTINUED] empagliflozin (JARDIANCE) 25 MG TABS tablet Take 12.5 mg by mouth daily   Unknown     [DISCONTINUED] haloperidol (HALDOL) 2 MG tablet Take 2 mg by mouth 3 times daily   Unknown     [DISCONTINUED] hydrALAZINE (APRESOLINE) 25 MG tablet Take 25 mg by mouth 3 times daily   Unknown     [DISCONTINUED] lisinopril (ZESTRIL) 10 MG tablet Take 10 mg by mouth daily   Unknown     [DISCONTINUED] metFORMIN (GLUCOPHAGE) 500 MG tablet Take 500 mg by mouth 2 times daily (with meals)   Unknown     [DISCONTINUED] oxyCODONE (ROXICODONE) 5 MG tablet Take 1 tablet (5 mg) by mouth every 6 hours as needed for pain 12 tablet 0 Unknown     [DISCONTINUED] potassium chloride ER (K-TAB) 20 MEQ CR tablet Take 20 mEq by mouth daily   Unknown     [DISCONTINUED] sildenafil (REVATIO) 20 MG tablet Take 20 mg by mouth daily as needed   Unknown     [DISCONTINUED] sitagliptin (JANUVIA) 100 MG tablet Take 100 mg by mouth daily   Unknown     [DISCONTINUED] sitagliptin (JANUVIA) 50 MG tablet Take 2 tablets (100 mg) by mouth daily for 30 days 60 tablet 0      [DISCONTINUED] spironolactone (ALDACTONE) 100 MG tablet Take 100 mg by mouth daily   Unknown     [DISCONTINUED] spironolactone (ALDACTONE) 100 MG tablet Take 1 tablet (100 mg) by mouth daily for 30 days 30 tablet 0      [DISCONTINUED] sulfamethoxazole-trimethoprim (BACTRIM DS) 800-160 MG tablet Take 1 tablet by mouth 2 times daily   Unknown     [DISCONTINUED] testosterone (ANDROGEL) 20.25 MG/1.25GM (1.62%) topical gel  Place 20.25 mg onto the skin daily   Unknown     [DISCONTINUED] vitamin D2 (ERGOCALCIFEROL) 23332 units (1250 mcg) capsule Take 50,000 Units by mouth 1 capsule by mouth on Monday and Friday   Unknown         Current Facility-Administered Medications Ordered in Epic   Medication Dose Route Frequency Last Rate Last Admin     acetaminophen (TYLENOL) tablet 975 mg  975 mg Oral Q6H PRN   975 mg at 05/11/23 1516     aspirin EC tablet 81 mg  81 mg Oral Daily   81 mg at 05/09/23 0814     benzocaine (ORAJEL MAXIMUM STRENGTH) 20 % gel   Mouth/Throat 4x Daily PRN   Given at 05/11/23 2044     bisacodyl (DULCOLAX) suppository 10 mg  10 mg Rectal Daily PRN   10 mg at 05/08/23 0801     [Held by provider] carvedilol (COREG) tablet 12.5 mg  12.5 mg Oral BID w/meals   12.5 mg at 05/06/23 0750     glucose gel 15-30 g  15-30 g Oral Q15 Min PRN   15 g at 05/10/23 2232    Or     dextrose 50 % injection 25-50 mL  25-50 mL Intravenous Q15 Min PRN   50 mL at 05/09/23 1555    Or     glucagon injection 1 mg  1 mg Subcutaneous Q15 Min PRN         diclofenac (VOLTAREN) 1 % topical gel 2 g  2 g Topical 4x Daily PRN   2 g at 05/02/23 0851     [Held by provider] furosemide (LASIX) tablet 40 mg  40 mg Oral Daily   40 mg at 05/06/23 0750     heparin lock flush 10 UNIT/ML injection 5-20 mL  5-20 mL Intracatheter Q24H   5 mL at 05/11/23 1516     heparin lock flush 10 UNIT/ML injection 5-20 mL  5-20 mL Intracatheter Q1H PRN   5 mL at 05/10/23 0501     hydrALAZINE (APRESOLINE) injection 10-20 mg  10-20 mg Intravenous Q30 Min PRN         hydrOXYzine (ATARAX) tablet 25 mg  25 mg Oral Q6H PRN   25 mg at 05/08/23 1111    Or     hydrOXYzine (ATARAX) tablet 50 mg  50 mg Oral Q6H PRN         insulin aspart (NovoLOG) injection (RAPID ACTING)  1-7 Units Subcutaneous TID AC   1 Units at 05/11/23 1231     labetalol (NORMODYNE/TRANDATE) injection 10-40 mg  10-40 mg Intravenous Q10 Min PRN   10 mg at 04/30/23 0570     lactulose (CHRONULAC) solution 20 g  20 g Oral  Daily PRN   20 g at 05/05/23 0842     levothyroxine (SYNTHROID/LEVOTHROID) tablet 112 mcg  112 mcg Oral QAM AC   112 mcg at 05/11/23 0830     Lidocaine (LIDOCARE) 4 % Patch 2 patch  2 patch Transdermal Q24H   2 patch at 04/21/23 0226     lidocaine (LMX4) cream   Topical Q1H PRN         lidocaine 1 % 0.1-1 mL  0.1-1 mL Other Q1H PRN         lidocaine patch in PLACE   Transdermal Q8H ANDI         [Held by provider] lisinopril (ZESTRIL) tablet 20 mg  20 mg Oral Daily   20 mg at 05/06/23 0750     magnesium hydroxide (MILK OF MAGNESIA) suspension 30 mL  30 mL Oral Daily PRN         melatonin tablet 5 mg  5 mg Oral At Bedtime PRN         miconazole (MICATIN) 2 % cream   Topical BID   Given at 05/09/23 0810     miconazole with skin protectant (TERESSA ANTIFUNGAL) 2 % cream   Topical BID   Given at 05/09/23 0810     multivitamin w/minerals (THERA-VIT-M) tablet 1 tablet  1 tablet Oral Daily   1 tablet at 05/11/23 1231     naloxone (NARCAN) injection 0.2 mg  0.2 mg Intravenous Q2 Min PRN        Or     naloxone (NARCAN) injection 0.4 mg  0.4 mg Intravenous Q2 Min PRN        Or     naloxone (NARCAN) injection 0.2 mg  0.2 mg Intramuscular Q2 Min PRN        Or     naloxone (NARCAN) injection 0.4 mg  0.4 mg Intramuscular Q2 Min PRN         ondansetron (ZOFRAN ODT) ODT tab 4 mg  4 mg Oral Q6H PRN        Or     ondansetron (ZOFRAN) injection 4 mg  4 mg Intravenous Q6H PRN         [Held by provider] oxyCODONE (ROXICODONE) tablet 5 mg  5 mg Oral Q6H PRN   5 mg at 05/06/23 1222     piperacillin-tazobactam (ZOSYN) 3.375 g vial to attach to  mL bag  3.375 g Intravenous Q6H 200 mL/hr at 05/10/23 1359 3.375 g at 05/11/23 2045     polyethylene glycol (MIRALAX) Packet 17 g  17 g Oral BID PRN   17 g at 05/09/23 1208     prochlorperazine (COMPAZINE) injection 10 mg  10 mg Intravenous Q6H PRN        Or     prochlorperazine (COMPAZINE) tablet 10 mg  10 mg Oral Q6H PRN         senna-docusate (SENOKOT-S/PERICOLACE) 8.6-50 MG per tablet 2 tablet   2 tablet Oral BID PRN   2 tablet at 05/07/23 0927     silver nitrate (ARZOL) Misc   Topical Once PRN         simethicone (MYLICON) chewable tablet 80 mg  80 mg Oral 4x Daily PRN         sodium chloride (OCEAN) 0.65 % nasal spray 2 spray  2 spray Both Nostrils Q1H PRN         sodium chloride (OCEAN) 0.65 % nasal spray 2 spray  2 spray Both Nostrils Q2H While awake   2 spray at 05/11/23 2045     sodium chloride (PF) 0.9% PF flush 3 mL  3 mL Intracatheter Q8H   3 mL at 05/11/23 1521     sodium chloride (PF) 0.9% PF flush 3 mL  3 mL Intracatheter q1 min prn   10 mL at 05/10/23 1403     [Held by provider] spironolactone (ALDACTONE) tablet 200 mg  200 mg Oral Daily   200 mg at 05/06/23 0750     vitamin D2 (ERGOCALCIFEROL) 47955 units (1250 mcg) capsule 50,000 Units  50,000 Units Oral Q7 Days   50,000 Units at 05/11/23 0830     Current Outpatient Medications Ordered in Epic   Medication     acetaminophen (TYLENOL) 325 MG tablet     bisacodyl (DULCOLAX) 10 MG suppository     carvedilol (COREG) 12.5 MG tablet     diclofenac (VOLTAREN) 1 % topical gel     empagliflozin (JARDIANCE) 10 MG TABS tablet     hydrOXYzine (ATARAX) 25 MG tablet     insulin aspart (NOVOLOG PEN) 100 UNIT/ML pen     insulin aspart (NOVOLOG PEN) 100 UNIT/ML pen     insulin aspart (NOVOLOG PEN) 100 UNIT/ML pen     insulin glargine (LANTUS PEN) 100 UNIT/ML pen     lactulose (CHRONULAC) 10 GM/15ML solution     Lidocaine (LIDOCARE) 4 % Patch     lisinopril (ZESTRIL) 20 MG tablet     magnesium hydroxide (MILK OF MAGNESIA) 400 MG/5ML suspension     melatonin 5 MG tablet     miconazole (MICATIN) 2 % external cream     miconazole with skin protectant (TERESSA ANTIFUNGAL) 2 % CREA cream     multivitamin w/minerals (THERA-VIT-M) tablet     naloxone (NARCAN) 0.4 MG/ML injection     ondansetron (ZOFRAN ODT) 4 MG ODT tab     ondansetron (ZOFRAN) 2 MG/ML SOLN injection     oxyCODONE (ROXICODONE) 5 MG tablet     polyethylene glycol (MIRALAX) 17 g packet      prochlorperazine (COMPAZINE) 10 MG tablet     senna-docusate (SENOKOT-S/PERICOLACE) 8.6-50 MG tablet     simethicone (MYLICON) 80 MG chewable tablet     sodium chloride (OCEAN) 0.65 % nasal spray     spironolactone (ALDACTONE) 100 MG tablet     vitamin D2 (ERGOCALCIFEROL) 73281 units (1250 mcg) capsule

## 2023-05-12 NOTE — PLAN OF CARE
ICU End of Shift Summary. See flowsheets for vital signs and detailed assessment.    Changes this shift: A&O x4. Denies pain. VSS, having more frequent PVCs and PACs on EKG updated Asher 2 team, Mg 2.6 this am, K 3.6 waiting for electrolyte orders from team. Dental CT done this am. Blood transfusion completed this am. Hb recheck due @1400. Regular diet, tolerating well. BM x1 this am. Voiding adequately via urinal.     Plan: Will continue with POC and update team with changes. Transferred to , gave report to  nurse left unit @ ~2:40pm.

## 2023-05-12 NOTE — PLAN OF CARE
Neuro: A&Ox4.   Cardiac: SR w/ BBB. VSS, narrow pulse pressures at baseline   Respiratory: Sating >95% on RA.  GI/: Adequate urine output. BM X2, loose  Diet/appetite: Tolerating regular diet. Eating well.  Activity:  Assist of 2 with lift  Pain: At acceptable level on current regimen.   Skin: No new deficits noted.  LDA's: R DL PICC  One unit of RBC for hgb of 6.7 this am  Plan: Continue with POC. Notify primary team with changes.

## 2023-05-13 LAB
ANION GAP SERPL CALCULATED.3IONS-SCNC: 12 MMOL/L (ref 7–15)
BUN SERPL-MCNC: 7.3 MG/DL (ref 6–20)
CALCIUM SERPL-MCNC: 8.2 MG/DL (ref 8.6–10)
CHLORIDE SERPL-SCNC: 108 MMOL/L (ref 98–107)
CORTIS SERPL-MCNC: 13.8 UG/DL
CREAT SERPL-MCNC: 1.04 MG/DL (ref 0.67–1.17)
DEPRECATED HCO3 PLAS-SCNC: 21 MMOL/L (ref 22–29)
ERYTHROCYTE [DISTWIDTH] IN BLOOD BY AUTOMATED COUNT: 18.8 % (ref 10–15)
GFR SERPL CREATININE-BSD FRML MDRD: 84 ML/MIN/1.73M2
GLUCOSE BLDC GLUCOMTR-MCNC: 102 MG/DL (ref 70–99)
GLUCOSE BLDC GLUCOMTR-MCNC: 123 MG/DL (ref 70–99)
GLUCOSE BLDC GLUCOMTR-MCNC: 180 MG/DL (ref 70–99)
GLUCOSE BLDC GLUCOMTR-MCNC: 84 MG/DL (ref 70–99)
GLUCOSE BLDC GLUCOMTR-MCNC: 96 MG/DL (ref 70–99)
GLUCOSE SERPL-MCNC: 102 MG/DL (ref 70–99)
HCT VFR BLD AUTO: 26.9 % (ref 40–53)
HGB BLD-MCNC: 8.5 G/DL (ref 13.3–17.7)
MCH RBC QN AUTO: 28.9 PG (ref 26.5–33)
MCHC RBC AUTO-ENTMCNC: 31.6 G/DL (ref 31.5–36.5)
MCV RBC AUTO: 92 FL (ref 78–100)
PLATELET # BLD AUTO: 340 10E3/UL (ref 150–450)
POTASSIUM SERPL-SCNC: 3.3 MMOL/L (ref 3.4–5.3)
RBC # BLD AUTO: 2.94 10E6/UL (ref 4.4–5.9)
SODIUM SERPL-SCNC: 141 MMOL/L (ref 136–145)
T4 FREE SERPL-MCNC: 1.28 NG/DL (ref 0.9–1.7)
WBC # BLD AUTO: 11.9 10E3/UL (ref 4–11)

## 2023-05-13 PROCEDURE — 250N000013 HC RX MED GY IP 250 OP 250 PS 637

## 2023-05-13 PROCEDURE — 250N000013 HC RX MED GY IP 250 OP 250 PS 637: Performed by: INTERNAL MEDICINE

## 2023-05-13 PROCEDURE — 120N000002 HC R&B MED SURG/OB UMMC

## 2023-05-13 PROCEDURE — 36592 COLLECT BLOOD FROM PICC: CPT

## 2023-05-13 PROCEDURE — 250N000011 HC RX IP 250 OP 636

## 2023-05-13 PROCEDURE — 99232 SBSQ HOSP IP/OBS MODERATE 35: CPT | Mod: GC | Performed by: INTERNAL MEDICINE

## 2023-05-13 PROCEDURE — 85027 COMPLETE CBC AUTOMATED: CPT

## 2023-05-13 PROCEDURE — 82533 TOTAL CORTISOL: CPT

## 2023-05-13 PROCEDURE — 80048 BASIC METABOLIC PNL TOTAL CA: CPT

## 2023-05-13 PROCEDURE — 250N000013 HC RX MED GY IP 250 OP 250 PS 637: Performed by: PHYSICIAN ASSISTANT

## 2023-05-13 PROCEDURE — 84439 ASSAY OF FREE THYROXINE: CPT

## 2023-05-13 RX ORDER — SPIRONOLACTONE 100 MG/1
100 TABLET, FILM COATED ORAL ONCE
Status: COMPLETED | OUTPATIENT
Start: 2023-05-13 | End: 2023-05-13

## 2023-05-13 RX ORDER — SPIRONOLACTONE 100 MG/1
200 TABLET, FILM COATED ORAL DAILY
Status: DISCONTINUED | OUTPATIENT
Start: 2023-05-14 | End: 2023-05-19 | Stop reason: HOSPADM

## 2023-05-13 RX ORDER — POTASSIUM CHLORIDE 1.5 G/1.58G
40 POWDER, FOR SOLUTION ORAL ONCE
Status: COMPLETED | OUTPATIENT
Start: 2023-05-13 | End: 2023-05-13

## 2023-05-13 RX ADMIN — SPIRONOLACTONE 100 MG: 50 TABLET ORAL at 07:39

## 2023-05-13 RX ADMIN — SPIRONOLACTONE 100 MG: 100 TABLET ORAL at 09:22

## 2023-05-13 RX ADMIN — SALINE NASAL SPRAY 2 SPRAY: 1.5 SOLUTION NASAL at 21:48

## 2023-05-13 RX ADMIN — SALINE NASAL SPRAY 2 SPRAY: 1.5 SOLUTION NASAL at 09:22

## 2023-05-13 RX ADMIN — SALINE NASAL SPRAY 2 SPRAY: 1.5 SOLUTION NASAL at 13:07

## 2023-05-13 RX ADMIN — SODIUM FLUORIDE: 1.1 GEL ORAL at 07:44

## 2023-05-13 RX ADMIN — SODIUM FLUORIDE: 1.1 GEL ORAL at 21:48

## 2023-05-13 RX ADMIN — PIPERACILLIN AND TAZOBACTAM 3.38 G: 3; .375 INJECTION, POWDER, LYOPHILIZED, FOR SOLUTION INTRAVENOUS at 13:07

## 2023-05-13 RX ADMIN — PIPERACILLIN AND TAZOBACTAM 3.38 G: 3; .375 INJECTION, POWDER, LYOPHILIZED, FOR SOLUTION INTRAVENOUS at 20:31

## 2023-05-13 RX ADMIN — SALINE NASAL SPRAY 2 SPRAY: 1.5 SOLUTION NASAL at 18:25

## 2023-05-13 RX ADMIN — Medication 5 ML: at 08:20

## 2023-05-13 RX ADMIN — Medication 5 ML: at 20:31

## 2023-05-13 RX ADMIN — LEVOTHYROXINE SODIUM 112 MCG: 0.11 TABLET ORAL at 07:39

## 2023-05-13 RX ADMIN — Medication 1 TABLET: at 13:07

## 2023-05-13 RX ADMIN — ASPIRIN 81 MG: 81 TABLET ORAL at 07:39

## 2023-05-13 RX ADMIN — INSULIN ASPART 1 UNITS: 100 INJECTION, SOLUTION INTRAVENOUS; SUBCUTANEOUS at 18:23

## 2023-05-13 RX ADMIN — PIPERACILLIN AND TAZOBACTAM 3.38 G: 3; .375 INJECTION, POWDER, LYOPHILIZED, FOR SOLUTION INTRAVENOUS at 07:40

## 2023-05-13 RX ADMIN — POTASSIUM CHLORIDE 40 MEQ: 1.5 POWDER, FOR SOLUTION ORAL at 13:31

## 2023-05-13 RX ADMIN — PIPERACILLIN AND TAZOBACTAM 3.38 G: 3; .375 INJECTION, POWDER, LYOPHILIZED, FOR SOLUTION INTRAVENOUS at 02:33

## 2023-05-13 ASSESSMENT — ACTIVITIES OF DAILY LIVING (ADL)
ADLS_ACUITY_SCORE: 46
ADLS_ACUITY_SCORE: 42
ADLS_ACUITY_SCORE: 46
ADLS_ACUITY_SCORE: 42
ADLS_ACUITY_SCORE: 46
ADLS_ACUITY_SCORE: 46
ADLS_ACUITY_SCORE: 42

## 2023-05-13 NOTE — PROGRESS NOTES
Social Work Brief Note:    Per M2 provider, pt is medically ready for transfer to the [facility] or to a TCU.    This SW called patient placement at the [facility] to facilitate a transfer (pt transfer agreement in place for pituitary adenoma). [facility] advised [facility]. SW will call back tomorrow.    SW met with pt at bedside to provide [facility] an update that [facility]. SW provided pt with encouragement to continue to work diligently with therapies. SW requested TCU choices from pt. Pt offered Medicare Care Compare list and agreed to have this SW make referrals to TCUs near [place]. SW offered the patient a health care directive form to fill out. Pt agreeable and will contact SW once it is completed so SW can arrange a notary.    Active TCU Referrals:    [facility] (Ph: [phone], Admissions: [phone], F: [phone])  5/13: Initial SNF referral made in [facility].    [facility] in [place] (Ph: [phone], F: [phone])  5/13: Initial SNF referral made in [facility].    [facility] in [place] (P: [phone])  5/13: SW reached out to liaison to review pt for admission.    [facility] (Ph: [phone]; F: [phone])  5/13: Initial SNF referral made in [facility].    [facility] (Ph: [phone], Central Admissions - [place]: [phone], F: [phone])  5/13: Initial SNF referral made in [facility].    [facility] (Ph: [phone], F: [phone])  5/13: Initial SNF referral made in [facility].    [facility] (Ph: [phone], Admissions: [phone], F: [phone])  5/13: Initial SNF referral made in [facility].    [facility] (Ph: [phone], Admissions: [phone], F: [phone])  5/13: Initial SNF referral made in [facility].    [facility] (Ph: [phone]; F: [phone])  5/13: Initial SNF referral made in [facility].    [facility] (P:  [phone]; F: [phone])  5/13: Initial SNF referral made in [facility].    [facility] (Ph: [phone], Admissions: [phone], F: [phone])  5/13: Initial SNF referral made in [facility].    [facility] (Ph: [phone], Admissions: [phone], F: [phone])  5/13: Initial SNF referral made in [facility].    [facility] (Ph: [phone], Admissions: [phone], F: [phone])  5/13: Initial SNF referral made in Epic.    Inactive/Discontinued TCU Referrals:    [facility]-Declined; pt not COVID vaccinated.  [facility]-Declined d/t insurance.  [facility] d/t [facility]op[facility] risk.  [facility]-Declined; no reason given.    Resources:    [facility] Patient Placement (Phone: [phone], option 1)  Call daily; pt has transfer agreement for pituitary adenoma    [facility] (Ph: [phone] ex. [phone])  Can help with discharge and coordination of services as needed  _____________________________     LUPILLO Simpson, LICSW  Advanced Practice Independent Clinical   [address] 5B Medicine Beds [id number] & 5C Medicine Overflow Beds [phone]  [facility]   [email or web address]  Phone [phone]   Pager: [phone]  Fax: [phone]  Message me on Arkm[employer].

## 2023-05-13 NOTE — SUMMARY OF CARE
Patient arrived with the following items:    Smartwatch  Two chargers  Cell Phone  Headphones  Clothing  Shoes  Backpack    Patient does not wish to send anythign to security.

## 2023-05-13 NOTE — PROGRESS NOTES
We continue to follow Mr. Faria for multiple endocrinopathies.     # ACTH secreting macroadenoma - Cushing's disease, s/p 3 pituitary surgeries (most recently 4/27/2023) with persistent disease     5/9/2023 -24 hour urine collection (only 0.625 L) with cortisol 75 mcg. Note he did receive 20 mg of hydrocortisone in the AM on 5/8/2023.     A second 24 hour urine collection for cortisol was done on 5/10/2023 - results pending. LNSC from 5/10/2023 and 5/11/2023 are pending.    Interestingly, his AM serum cortisol is at it's low point (post his most recent pituitary surgery) today at 13.8 mcg/dL. Continue to monitor for signs and symptoms of adrenal insufficiency and continue to check an 8 AM cortisol daily for now. Currently his BP is normal to slightly hypertensive.     We may want to give julian procedural hydrocortisone with his upcoming tooth extraction - when there is a date and anesthesia plan please let us know.     On 5/11/2023 we reviewed Mr. Faria case with multiple Endocrine colleagues in a case conference and our consensus long-term approach will be:  1. Proceed with pituitary radiation - he was seen by radiation oncology while inpatient on 4/5/2023 - we will review with them next week re timing, per their note from 4/6/2023 the tentative thought was 5 weeks of external beam therapy.   2. 2-3 months post-op repeat baseline assessment of cortisol excess.   3. Wait to start medical therapy until 2-3 months post-op, and we favor use of pasireotide.    We reviewed in detail with Mr. Faria and his sister Pilar yesterday. Ultimately, Mr. Faria will be living with Pilar in [place] and he will need to establish care with us as an outpatient.     # Central hypothyroidism     Continue levothyroxine 112 mcg once daily.      # Diabetes mellitus type 2     Has recently had minimal insulin needs in the setting of minimal intake (he is not fond of the hospital food). Continue with only a correction scale TID before  meals (medium insulin resistance). Glucose checks fasting, before meals, and at bedtime. Goal glucose <180 mg/dL.     # Osteoporosis with compression fractures

## 2023-05-13 NOTE — PLAN OF CARE
No acute events this shift. Patient alert and oriented x4, cooperative, A1-A2 with repositioning and bed mobility. Good urine output and had 1 soft BM today. Wound cares done today ( please see flow sheet for wound description). Patient complained of pain during wound cares but tolerate it well overall. Fair appetite this shift. Zosyn for Abx.

## 2023-05-13 NOTE — PLAN OF CARE
Goal Outcome Evaluation:    RN assumed cares [phone]    Pt A&Ox4, VSS on RA. R PICC purple cap infusing abx and TKO,  heparin locked. Pt denies pain and nausea. R leg wounds ace wrap in place, L foot dressing in place. Pt requesting all LE dressings be changed at same time today day shift. Pt utilizing bedpan, had loose BM x3 during shift. Sacrum/coccyx mepilexes intact, buttocks cleansed with wound cleanser and barrier cream applied. Pt ntermittently declining repositioning.  at 0200. Pt resting between care.

## 2023-05-13 NOTE — PLAN OF CARE
Reason for transfer: Status improved  Transferred from: 6B  Report received from: Sancho RN  2 RN skin assessment completed by: Billy      - Findings (add LDA if needed): no new findings but pt declined assessment under dressings of BLE's.   Bed algorithm reevaluated: Y  Was Pulsate ordered?: already present  Flu shot ordered? (October-April only): n/a  Detailed Belongings: delegated to paraprofessional      Goal Outcome Evaluation: Pt alert and oriented x4. Pt declined a few medications (miconazole, nasal spray, prevident gel). Stated he didn't need them. Ate well for dinner- ordered [employer] delivery. Dressings to BLE's C/D/I. Pt declined dressing change or assessment under dressings. Stated they would be changed tomorrow. Orders for 24hr urine collection. Pt stated it was already collected- please verify with primary team in AM if still needs collection.

## 2023-05-13 NOTE — PROGRESS NOTES
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 [facility], he developed new onset double vision and severe headache which prompted imaging that showed a large mass invading the cavernous sinuses and impinging on the cranial nerves. He is transferred to [facility] for this pituitary adenoma, concern for progression vs hemorrhage/apoplexy. Complicated by psychosis/metabolic encephalopathy and electrolyte abnormalities secondary to Cushing's syndrome. Underwent endoscopic endonasal transcavernous approach for resection of functional pituitary adenoma 4/27.      Updates today:  - potassium 40 meq   - increase spironolactone to 200 mg   - continue AM cortisol monitoring  - [facility] or [facility]      #R lower leg abscess, soft tissue infection, +serratia,  s/p I&D 5/8/2023  #Hypotension, likely multifactorial 2/2 sepsis, blood loss anemia, improving  # Post-surgical Leukocytosis 2/2 inflammatory response, sepsis   Pt noted to have leukocytosis since POD1, WBC 15-17 range,  initially felt to likely be 2/2 post-surgical inflammatory response. Infection workup initially negative w/ unremarkable CXR, blood cultures, UA, chronic wound assessment by WOC, and normal vitals. POD9, RRT called for softer SBPs, generally 80s/50s. Pt noted to have normal to moderately hypertensive blood pressures for most of the hospitalization; SBPs in 80s unusual for patient. All blood pressure meds held. Neurosurg evaluated, does not  think patient needs further meningitis workup at this time. Careful physical exam ultimately revealed new opening of R chronic leg wounds with actively draining pus and blood. Surgery consulted. CT tib/fib revealed 4.9x3.2x1.8 cm soft tissue abscess of the medial aspect of the ankle with no evidence of osteomyelitis. Underwent bedside I&D of R lower leg on 5/8/23. R lower leg abscess growing +serratia. Notable ongoing bleeding from I&D site resulting in blood transfusion and hypotension. Will also monitor for possible glucocorticoid withdrawal syndrome contributing to hypotension  - antibiotics:              - zosyn (started 5/7, for 10 day course)               - vanc (5/7-5/8)  - infectious workup                    - wound culture with 1+ gram negative rods, 3+ serratia x 2 cultures  - curbside ID line -- recommends continuing zosyn instead of ceftriaxone for serratia, for 7-10 course for soft tissue infection vs longer course 10-14 days if concerned for arterial insufficiency. Duration also pending clinical course  - transfuse for hgb < 7  - MRSA nares negative  - continue holding carvedilol, lasix, lisinopril, and spironolactone  -increase spironolactone to 200 mg daily   - TTE unchanged from prior  - encourage PO intake     # chronic buttocks, sacrum and bilateral groin wounds   # RLE wound from puncture injury   # L dorsal foot wound from presumed trauma   # Hx serratia bacteremia in December 2022   For patient's lower extremity wounds and hx of Serratia bacteremia in December, he was [facility]. His antibiotics were broadened to Vanc and Zosyn and ultimately he was transitioned to ceftazidime on 3/23 with plan to complete course on 4/4/23. BCx negative and wound cx grew serratia marcescens at the [facility]. He has been on ceftazidime since. MRI of the right tib/fib on 3/23 was negative for osteomyelitis but did show two fluid collections draining sponateneously. [facility] at  the [facility] and determined no intervention was needed as wounds were draining on their own.   - completed course of ceftazidime (3/23 - 4/5)  - PT/OT   - Pain: tylenol 975 mg Q6H PRN     # Pituitary adenoma resection this admission, c/b significant scar tissue to cavernous sinus, 4/27/23  #R eye ptosis, esotropia  # ACTH secreting pituitary adenoma c/b type II DM, hypothyroidism and low testosterone s/p two partial resections in 2021   Patient noted double vision and severe headache beginning the evening of 3/25. CT imaging on 3/25 revealed a large sellar/suprasellar mass extending into the cavernous sinuses with no evidence of acute stroke. On 3/28, he underwent an MRI which confirmed the CT findings of a large mass invading the cavernous sinuses and impinging on the cranial nerves. Necrosis extending beyond left cavernous sinus. [facility]. Repeat MRI performed on 4/6/23: compared to 2021 MRI, lesions are smaller. Orbit MRI without optic nerve compression and no evidence of orbital infection.  - 4/27 neurosurgery and ENT combined to perform endoscopic endonasal transcavernous approach for resection of functional pituitary adenoma. Complicated by significant scar tissue to cavernous sinus. Good post-op recovery. Follow up MRI done 5/29. Neurosurgery okay with [facility] once [facility]. They will arrange follow up.   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  # Hypernatremia  # Hypokalemia, resolved  Hypernatremia and hypokalemia, likely secondary to Cushing's from pituitary macroadenoma that was unable to be completely removed  - Endocrine following, appreciate recs  - restart spironolactone 5/12 at lower dose 100 mg (prior dose  200 mg every day)   - Goal Na: 135-140  - Strict I&Os  - trend intermittent BMP, Mag  - check 24h urine cortisol and creatinine  - late night salivary cortisol x 2 pending   - endocrine working on establishing cortisol baseline which will dictate next steps (medical management vs radiation treatment)   - continue daily 8 AM cortisol     Per endocrine 5/11/23 review, consensus long-term approach:   1. Proceed with pituitary radiation - he was seen by radiation oncology while inpatient on 4/5/2023 - we will review with them next week re timing, per their note from 4/6/2023 the tentative thought was 5 weeks of external beam therapy.   2. 2-3 months post-op repeat baseline assessment of cortisol excess.   3. Wait to start medical therapy until 2-3 months post-op, and we favor use of pasireotide.    # Central Hypothyroidism  - Continue levothyroxine 112 mcg once daily      # Type II DM, exacerbated by Cushing's   A1c of 7.6% on 2/2023.  - Endocrinology managing. Current regimen:   - Carb coverage to 1 units/20 g CHO with meals and snacks  - medium sliding scale insulin for AC & HS  - Stopped Jardiance 10 mg daily per endocrine   - hypoglycemia protocol  - Accu checks AC & HS      #Type II MI   # HFmrEF, with global hypokinesis  # Hypertension  # High burden of PVCs  # Prolonged QTc   # Elevated troponin, down-trended  Coronary angiogram on 2/27 showed normal coronaries. Trop mildly elevated on admit 88; downtrended to 77 without targeted intervention. Was noted to have high burden of PVCs on tele. TTE 4/6/2023 showed EF 40-45%, severe diffuse hypokinesis, normal RV function, and mild-moderate aortic regurgitation; overall, not significantly changed from prior TTE 2/17/23. 5/6/23, pt w/ new episode of hypotension of unclear cause. Troponin checked and 177-->170-->172. Most likely 2/2 demand ischemia in s/o hypotension. Patient denied chest pain. EKG with sinus rhythm, LVH, RBBB, and with t wave inversions more evident in  anterior leads as compared to prior EKG 4/23/23.   - Repeat TTE unchanged  - troponin plateaued, will not recheck unless clinically changes  - Q4H vital signs  - recheck EKG, trop if chest pain occurs  - Lasix 40mg daily--held since surgery on 4/27. Restarted 4/30. Held 5/6   - Coreg and lisinopril held for surgery 4/27.               - Carvedilol 12.5 mg BID restarted 4/29. Held 5/6  - Lisinopril restarted 4/30 and titrated up to prior dose 20 mg daily on 5/1. Held 5/6  - ASA - restarted 5/5/23, held 5/9/2023 due to bleeding, restarted 5/12  - Avoid QTc prolonging medications    #Dental caries     Pt w/ acute tooth pain 5/11/23. Dental consulted.CT dental performed and revealed multiple scattered periapical lucencies, which may be seen with small periapical abscesses, w/ no definite evidence for cellulitis or soft tissue abscess. Recommended extraction of tooth #1,2,11,14,30,32 at least. Per dental:  1. At this time, clinical and radiographic findings do indicate the need for  extraction of tooth # 1, 2, 11 14, 30, 32 at least. Below is the contact information of the [facility] [[email or web address]] Adult dental clinic.  a. [facility] [address], [place] [address] [phone]    b. The care coordinator/RN of the patient will need to call the scheduling team of the [facility] [email or web address] Adult dental clinic and get an appointment for the extraction.  The primary team will be responsible to arrange the transportation of the patient if inpatient.  - abx as above w/ zosyn  - pain mgmt per primary team  - further eval w/ dental radiographs and clinical exam at [facility] [email or web address] adult dental clinic       # Acute metabolic encephalopathy - resolved  # Pyruria, Candiduria - resolved  # Encephalopathy likely secondary to cushing's disease, resolved  Admission symptoms included disorientation, intermittently following directions. Repetitive words - perseverating on particular phrases. Sx started the 2-3 days prior to admission (which  [facility] was [facility]). Vulnerable brain (multiple brain surgeries), enlarging pituitary mass. No seizure activity per EEG. Steroids removed and pt still encephalopathic. Sodium had been in normal limits and patient remained altered. Worked up for infections - did reveal candiduria but unlikely this was etiology, though he did complete an 8 day course of fluconazole. Briefly on ceftriaxone but encephalopathy also did not improve. CT abd w/ contrast (4/10): no evidence of pyelonephritis  Ultimately attributed to Cushings and known pituitary tumor. Continues to remain lucid.  - Surgical management per neurosurgery  - Delirium precautions  - Electrolyte management as above  - PRN quetiapine if agitation that is not responsive to behavioral interventions      # Hypogonadism   - hold PTA androgel (would need to bring in home medication, not accessible at this time)      # Chronic microcytic anemia   Hgb of 8.4 [facility]. Peripheral smear on 3/30 showed poikilocytosis with occasional red blood cell fragments but no other evidence of hemolysis.  Haptoglobin was normal.  Ferritin was 91, transferrin saturation was low, B12 was 495 and folate was 8.7. Likely combination of iron deficiency as well as inflammation/chronic disease.   - CTM        # Concern for malnutrition   - Nutrition following     # Delusions, intermittent, resolved  Intermittent delusions regarding staff stalking patient while in the hospital. Unclear chronicity of these delusions - but has had them multiple times this hospitalization. Unclear psych history. Medical management for encephalopathy as stated above. Psychiatry was consulted during his care and agreed with paranoia and recommended Abilify. He was briefly on Abilify 5mg but patient then started to refuse nightly so this was discontinued. His paranoia and delusions have been very intermittent and appear to be associated with his anxiety.      Discharge Needs  - Follow up in 6  weeks with Dr Jeong, neurosurgery (message sent to schedulers),  - MRI pituitary in 6 weeks before discharge (message sent to schedulers)  - Upright lumbar xrays to evaluate his compression fractures (ordered by neurosurgery)   - outpatient follow-up w/ ENT -- contact ENT when transfers to the [facility] to schedule follow-up for splint removal in clinic             Diet: Diet  Snacks/Supplements Adult: Glucerna; Between Meals  Regular Diet Adult    DVT Prophylaxis: Pneumatic Compression Devices  Roland Catheter: Not present  Lines: PRESENT      PICC 04/06/23 Double Lumen Right Basilic access-Site Assessment: WDL      Cardiac Monitoring: None  Code Status: Full Code      Clinically Significant Risk Factors        # Hypokalemia: Lowest K = 3.3 mmol/L in last 2 days, will replace as needed       # Hypoalbuminemia: Lowest albumin = 2.9 g/dL at 4/11/2023  7:07 AM, will monitor as appropriate           # DMII: A1C = 9.5 % (Ref range: <5.7 %) within past 6 months    # Severe Malnutrition: based on nutrition assessment         Disposition Plan      Expected Discharge Date: 05/15/2023    Discharge Delays: *Medically Ready for Discharge  Placement - TCU  Complex Discharge  Destination: home  Discharge Comments: Dispo: [facility] ([facility] vs [facility]  Delays: Destination  Progress: TCU referrals made. If here next week, needs transport arranged to dental clinic for multiple tooth extractions per IP dental consult. HCD form given.        Leela Verdin MD  Medicine Service, [facility] TEAM [address] [place] [address]  Securely message with Rdio (more info)  Text page via [facility] Paging/Directory   See signed in provider for up to date coverage information  ______________________________________________________________________    Interval History     NAEO    Doing well today, though sleepy. He is pleased he has had no further bleeding of the R foot. He denies lightheadedness, dizziness, chest  pain.     Physical Exam   Vital Signs: Temp: 98  F (36.7  C) Temp src: Oral BP: 118/82 Pulse: 97   Resp: 18 SpO2: 100 % O2 Device: None (Room air)    Weight: 149 lbs 4.8 oz    Constitutional: no apparent distress, calm, nontoxic appearing  Cardiovascular: RRR, no MRG  Respiratory:  normal work of breathing   GI: abdomen soft, non tender, non distended, bowel sounds present   Neuro: alert and oriented, answers questions appropriately  Skin: RLE wound wrapped in kerlix without bleed through      Data     I have personally reviewed the following data over the past 24 hrs:    11.9 (H)  \   8.5 (L)   / 340     141 108 (H) 7.3 /  96   3.3 (L) 21 (L) 1.04 \       TSH: N/A T4: 1.28 A1C: N/A       Imaging results reviewed over the past 24 hrs:   No results found for this or any previous visit (from the past 24 hour(s)).

## 2023-05-14 LAB
ANION GAP SERPL CALCULATED.3IONS-SCNC: 11 MMOL/L (ref 7–15)
BACTERIA ABSC ANAEROBE+AEROBE CULT: NORMAL
BUN SERPL-MCNC: 5 MG/DL (ref 6–20)
C DIFF TOX B STL QL: NEGATIVE
CALCIUM SERPL-MCNC: 8.2 MG/DL (ref 8.6–10)
CHLORIDE SERPL-SCNC: 110 MMOL/L (ref 98–107)
CORTIS SAL-MCNC: 1.92 UG/DL
CORTIS SERPL-MCNC: 14.4 UG/DL
CREAT SERPL-MCNC: 0.91 MG/DL (ref 0.67–1.17)
DEPRECATED HCO3 PLAS-SCNC: 20 MMOL/L (ref 22–29)
GFR SERPL CREATININE-BSD FRML MDRD: >90 ML/MIN/1.73M2
GLUCOSE BLDC GLUCOMTR-MCNC: 117 MG/DL (ref 70–99)
GLUCOSE BLDC GLUCOMTR-MCNC: 117 MG/DL (ref 70–99)
GLUCOSE BLDC GLUCOMTR-MCNC: 121 MG/DL (ref 70–99)
GLUCOSE BLDC GLUCOMTR-MCNC: 125 MG/DL (ref 70–99)
GLUCOSE BLDC GLUCOMTR-MCNC: 131 MG/DL (ref 70–99)
GLUCOSE BLDC GLUCOMTR-MCNC: 140 MG/DL (ref 70–99)
GLUCOSE SERPL-MCNC: 107 MG/DL (ref 70–99)
POTASSIUM SERPL-SCNC: 3.9 MMOL/L (ref 3.4–5.3)
SODIUM SERPL-SCNC: 141 MMOL/L (ref 136–145)

## 2023-05-14 PROCEDURE — 87493 C DIFF AMPLIFIED PROBE: CPT

## 2023-05-14 PROCEDURE — 250N000013 HC RX MED GY IP 250 OP 250 PS 637

## 2023-05-14 PROCEDURE — 82533 TOTAL CORTISOL: CPT

## 2023-05-14 PROCEDURE — 999N000044 HC STATISTIC CVC DRESSING CHANGE

## 2023-05-14 PROCEDURE — 120N000002 HC R&B MED SURG/OB UMMC

## 2023-05-14 PROCEDURE — 250N000013 HC RX MED GY IP 250 OP 250 PS 637: Performed by: INTERNAL MEDICINE

## 2023-05-14 PROCEDURE — 80048 BASIC METABOLIC PNL TOTAL CA: CPT

## 2023-05-14 PROCEDURE — 99232 SBSQ HOSP IP/OBS MODERATE 35: CPT | Mod: GC | Performed by: INTERNAL MEDICINE

## 2023-05-14 PROCEDURE — 36592 COLLECT BLOOD FROM PICC: CPT

## 2023-05-14 PROCEDURE — 250N000009 HC RX 250

## 2023-05-14 PROCEDURE — 250N000011 HC RX IP 250 OP 636

## 2023-05-14 RX ORDER — CARVEDILOL 6.25 MG/1
6.25 TABLET ORAL 2 TIMES DAILY WITH MEALS
Status: DISCONTINUED | OUTPATIENT
Start: 2023-05-14 | End: 2023-05-19 | Stop reason: HOSPADM

## 2023-05-14 RX ORDER — LOPERAMIDE HCL 2 MG
2 CAPSULE ORAL 3 TIMES DAILY PRN
Status: DISCONTINUED | OUTPATIENT
Start: 2023-05-14 | End: 2023-05-19 | Stop reason: HOSPADM

## 2023-05-14 RX ORDER — OXYCODONE HYDROCHLORIDE 5 MG/1
5 TABLET ORAL ONCE
Status: COMPLETED | OUTPATIENT
Start: 2023-05-14 | End: 2023-05-14

## 2023-05-14 RX ADMIN — SALINE NASAL SPRAY 2 SPRAY: 1.5 SOLUTION NASAL at 10:19

## 2023-05-14 RX ADMIN — PIPERACILLIN AND TAZOBACTAM 3.38 G: 3; .375 INJECTION, POWDER, LYOPHILIZED, FOR SOLUTION INTRAVENOUS at 07:42

## 2023-05-14 RX ADMIN — SPIRONOLACTONE 200 MG: 100 TABLET ORAL at 07:42

## 2023-05-14 RX ADMIN — SALINE NASAL SPRAY 2 SPRAY: 1.5 SOLUTION NASAL at 14:37

## 2023-05-14 RX ADMIN — PIPERACILLIN AND TAZOBACTAM 3.38 G: 3; .375 INJECTION, POWDER, LYOPHILIZED, FOR SOLUTION INTRAVENOUS at 14:37

## 2023-05-14 RX ADMIN — OXYCODONE HYDROCHLORIDE 5 MG: 5 TABLET ORAL at 10:17

## 2023-05-14 RX ADMIN — LIDOCAINE: 40 CREAM TOPICAL at 11:54

## 2023-05-14 RX ADMIN — SALINE NASAL SPRAY 2 SPRAY: 1.5 SOLUTION NASAL at 11:54

## 2023-05-14 RX ADMIN — CARVEDILOL 6.25 MG: 6.25 TABLET, FILM COATED ORAL at 17:35

## 2023-05-14 RX ADMIN — LEVOTHYROXINE SODIUM 112 MCG: 0.11 TABLET ORAL at 07:42

## 2023-05-14 RX ADMIN — SALINE NASAL SPRAY 2 SPRAY: 1.5 SOLUTION NASAL at 17:05

## 2023-05-14 RX ADMIN — MICONAZOLE NITRATE: 20 CREAM TOPICAL at 07:42

## 2023-05-14 RX ADMIN — PIPERACILLIN AND TAZOBACTAM 3.38 G: 3; .375 INJECTION, POWDER, LYOPHILIZED, FOR SOLUTION INTRAVENOUS at 02:55

## 2023-05-14 RX ADMIN — LOPERAMIDE HYDROCHLORIDE 2 MG: 2 CAPSULE ORAL at 21:15

## 2023-05-14 RX ADMIN — SALINE NASAL SPRAY 2 SPRAY: 1.5 SOLUTION NASAL at 23:13

## 2023-05-14 RX ADMIN — Medication 1 TABLET: at 11:53

## 2023-05-14 RX ADMIN — PIPERACILLIN AND TAZOBACTAM 3.38 G: 3; .375 INJECTION, POWDER, LYOPHILIZED, FOR SOLUTION INTRAVENOUS at 21:03

## 2023-05-14 RX ADMIN — Medication 5 ML: at 16:47

## 2023-05-14 RX ADMIN — SODIUM FLUORIDE: 1.1 GEL ORAL at 21:03

## 2023-05-14 ASSESSMENT — ACTIVITIES OF DAILY LIVING (ADL)
ADLS_ACUITY_SCORE: 42
ADLS_ACUITY_SCORE: 46
ADLS_ACUITY_SCORE: 42
ADLS_ACUITY_SCORE: 46
ADLS_ACUITY_SCORE: 42
ADLS_ACUITY_SCORE: 46
ADLS_ACUITY_SCORE: 46

## 2023-05-14 NOTE — PROGRESS NOTES
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 [facility], he developed new onset double vision and severe headache which prompted imaging that showed a large mass invading the cavernous sinuses and impinging on the cranial nerves. He is transferred to [facility] for this pituitary adenoma, concern for progression vs hemorrhage/apoplexy. Complicated by psychosis/metabolic encephalopathy and electrolyte abnormalities secondary to Cushing's syndrome. Underwent endoscopic endonasal transcavernous approach for resection of functional pituitary adenoma 4/27.      Updates today:  - medically stable and awaiting discharge to TCU or transfer to [facility]   -  Restart carvedilol at reduced dose 6.25 mg BID  - c.diff ordered overnight for loose stool x 2, negative result  - imodium prn ordered  - check iron studies     #R lower leg abscess, soft tissue infection, +serratia,  s/p I&D 5/8/2023  #Hypotension, likely multifactorial 2/2 sepsis, blood loss anemia, resolveed  # Post-surgical Leukocytosis 2/2 inflammatory response, sepsis   Pt noted to have leukocytosis since POD1, WBC 15-17 range,  initially felt to likely be 2/2 post-surgical inflammatory response. Infection workup initially negative w/ unremarkable CXR, blood cultures, UA, chronic wound assessment by WO, and normal vitals. POD9, RRT called for softer SBPs, generally 80s/50s. Pt noted to have normal to moderately hypertensive blood pressures for most of the hospitalization; SBPs in 80s  unusual for patient. All blood pressure meds held. Neurosurg evaluated, does not think patient needs further meningitis workup at this time. Careful physical exam ultimately revealed new opening of R chronic leg wounds with actively draining pus and blood. Surgery consulted. CT tib/fib revealed 4.9x3.2x1.8 cm soft tissue abscess of the medial aspect of the ankle with no evidence of osteomyelitis. Underwent bedside I&D of R lower leg on 5/8/23. R lower leg abscess growing +serratia. Notable ongoing bleeding from I&D site resulting in blood transfusion and hypotension. Will also monitor for possible glucocorticoid withdrawal syndrome contributing to hypotension  - antibiotics:              - zosyn (started 5/7, for 10 day course)               - vanc (5/7-5/8)  - infectious workup                    - wound culture with 1+ gram negative rods, 3+ serratia x 2 cultures  - curbside ID line -- recommends continuing zosyn instead of ceftriaxone for serratia, for 7-10 course for soft tissue infection vs longer course 10-14 days if concerned for arterial insufficiency. Duration also pending clinical course  - transfuse for hgb < 7  - MRSA nares negative  - continue holding  lasix, lisinopril  - reduce PTA carvedilol to 6.25 mg BID from 12.5 mg BID, started 5/14/23  - TTE unchanged from prior  - encourage PO intake     #Acute blood loss anemia on chronic microcytic anemia, resolved  Significant bleeding following I&D of R leg abscess, ultimately requiring 3u pRBC over subsequent 4 days. Blood loss anemia contributed to hypotension as above.   - trend hemoglobin  - monitor surgical site for bleeding  - check iron studies  - folate, B12 wnl during 4/14/23    # chronic buttocks, sacrum and bilateral groin wounds   # RLE wound from puncture injury   # L dorsal foot wound from presumed trauma   # Hx serratia bacteremia in December 2022   For patient's lower extremity wounds and hx of Serratia bacteremia in December, he was  initially [facility]olin [facility] the [facility]. His antibiotics were broadened to Vanc and Zosyn and ultimately he was transitioned to ceftazidime on 3/23 with plan to complete course on 4/4/23. BCx negative and wound cx grew serratia marcescens at the [facility]. He has been on ceftazidime since. MRI of the right tib/fib on 3/23 was negative for osteomyelitis but did show two fluid collections draining sponateneously. Ortho was consulted at the [facility] and determined no intervention was needed as wounds were draining on their own.   - completed course of ceftazidime (3/23 - 4/5)  - PT/OT   - Pain: tylenol 975 mg Q6H PRN     # Pituitary adenoma resection this admission, c/b significant scar tissue to cavernous sinus, 4/27/23  #R eye ptosis, esotropia  # ACTH secreting pituitary adenoma c/b type II DM, hypothyroidism and low testosterone s/p two partial resections in 2021   Patient noted double vision and severe headache beginning the evening of 3/25. CT imaging on 3/25 revealed a large sellar/suprasellar mass extending into the cavernous sinuses with no evidence of acute stroke. On 3/28, he underwent an MRI which confirmed the CT findings of a large mass invading the cavernous sinuses and impinging on the cranial nerves. Necrosis extending beyond left cavernous sinus. [facility]. Repeat MRI performed on 4/6/23: compared to 2021 MRI, lesions are smaller. Orbit MRI without optic nerve compression and no evidence of orbital infection.  - 4/27 neurosurgery and ENT combined to perform endoscopic endonasal transcavernous approach for resection of functional pituitary adenoma. Complicated by significant scar tissue to cavernous sinus. Good post-op recovery. Follow up MRI done 5/29. Neurosurgery okay with transfer back to [facility] once bed is available. They will arrange follow up.   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  # Hypernatremia  # Hypokalemia, resolved  Hypernatremia and hypokalemia, likely secondary to Cushing's from pituitary macroadenoma that was unable to be completely removed  - Endocrine following, appreciate recs  - restart spironolactone 5/12 at lower dose 100 mg (prior dose 200 mg every day)   - Goal Na: 135-140  - Strict I&Os  - trend intermittent BMP, Mag  - check 24h urine cortisol and creatinine  - late night salivary cortisol x 2 pending   - endocrine working on establishing cortisol baseline which will dictate next steps (medical management vs radiation treatment)   - continue daily 8 AM cortisol     Per endocrine 5/11/23 review, consensus long-term approach:   1. Proceed with pituitary radiation - he was seen by radiation oncology while inpatient on 4/5/2023 - we will review with them next week re timing, per their note from 4/6/2023 the tentative thought was 5 weeks of external beam therapy.   2. 2-3 months post-op repeat baseline assessment of cortisol excess.   3. Wait to start medical therapy until 2-3 months post-op, and we favor use of pasireotide.    # Central Hypothyroidism  - Continue levothyroxine 112 mcg once daily      # Type II DM, exacerbated by Cushing's   A1c of 7.6% on 2/2023.  Endocrinology managing. Current regimen:   - medium sliding scale insulin for AC & HS  - Stopped Jardiance 10 mg daily per endocrine   - stop carb coverage insulin  - stopped insulin glargine  - hypoglycemia protocol  - Accu checks AC & HS      #Type II MI   # HFmrEF, with global hypokinesis  # Hypertension  # High burden of PVCs  # Prolonged QTc   # Elevated troponin, down-trended  Coronary angiogram on 2/27 showed normal coronaries. Trop mildly elevated on admit 88; downtrended to 77 without targeted intervention. Was noted to have high burden of PVCs on tele.  TTE 4/6/2023 showed EF 40-45%, severe diffuse hypokinesis, normal RV function, and mild-moderate aortic regurgitation; overall, not significantly changed from prior TTE 2/17/23. 5/6/23, pt w/ new episode of hypotension of unclear cause. Troponin checked and 177-->170-->172. Most likely 2/2 demand ischemia in s/o hypotension. Patient denied chest pain. EKG with sinus rhythm, LVH, RBBB, and with t wave inversions more evident in anterior leads as compared to prior EKG 4/23/23.   - Repeat TTE unchanged  - troponin plateaued, will not recheck unless clinically changes  - Q4H vital signs  - recheck EKG, trop if chest pain occurs  - Lasix 40mg daily--held since surgery on 4/27. Restarted 4/30. Held 5/6   - Carvedilol 12.5 mg BID restarted 5/14 at lower dose 6.25 mg BID  - hold PTA lisinopril 20 mg daily  - ASA - restarted 5/5/23, held 5/9/2023 due to bleeding, restarted 5/12  - Avoid QTc prolonging medications    #Dental caries     Pt w/ acute tooth pain 5/11/23. Dental consulted.CT dental performed and revealed multiple scattered periapical lucencies, which may be seen with small periapical abscesses, w/ no definite evidence for cellulitis or soft tissue abscess. Recommended extraction of tooth #1,2,11,14,30,32 at least. Per dental:  1. At this time, clinical and radiographic findings do indicate the need for  extraction of tooth # 1, 2, 11 14, 30, 32 at least. Below is the contact information of the [facility].  a. [facility] [address], [place] [address] [phone]    b. The care coordinator/RN of the patient will need to call the scheduling team of the [facility] dental clinic and get an appointment for the extraction.  The primary team will be responsible to arrange the transportation of the patient if inpatient.  - abx as above w/ zosyn  - pain mgmt per primary team  - further eval w/ dental radiographs and clinical exam at [facility]       #  Acute metabolic encephalopathy - resolved  # Pyruria, Candiduria - resolved  # Encephalopathy likely secondary to cushing's disease, resolved  Admission symptoms included disorientation, intermittently following directions. Repetitive words - perseverating on particular phrases. Sx started the 2-3 days prior to admission (which [facility] was [facility]). Vulnerable brain (multiple brain surgeries), enlarging pituitary mass. No seizure activity per EEG. Steroids removed and pt still encephalopathic. Sodium had been in normal limits and patient remained altered. Worked up for infections - did reveal candiduria but unlikely this was etiology, though he did complete an 8 day course of fluconazole. Briefly on ceftriaxone but encephalopathy also did not improve. CT abd w/ contrast (4/10): no evidence of pyelonephritis  Ultimately attributed to Cushings and known pituitary tumor. Continues to remain lucid.  - Surgical management per neurosurgery  - Delirium precautions  - Electrolyte management as above  - PRN quetiapine if agitation that is not responsive to behavioral interventions      # Hypogonadism   - hold PTA androgel (would need to bring in home medication, not accessible at this time)      # Chronic microcytic anemia   Hgb of 8.4 on [facility]. Peripheral smear on 3/30 showed poikilocytosis with occasional red blood cell fragments but no other evidence of hemolysis.  Haptoglobin was normal.  Ferritin was 91, transferrin saturation was low, B12 was 495 and folate was 8.7. Likely combination of iron deficiency as well as inflammation/chronic disease.   - CTM        # Concern for malnutrition   - Nutrition following     # Delusions, intermittent, resolved  Intermittent delusions regarding staff stalking patient while in the hospital. Unclear chronicity of these delusions - but has had them multiple times this hospitalization. Unclear psych history. Medical management for encephalopathy as stated above.  Psychiatry was consulted during his care and agreed with paranoia and recommended Abilify. He was briefly on Abilify 5mg but patient then started to refuse nightly so this was discontinued. His paranoia and delusions have been very intermittent and appear to be associated with his anxiety.      Discharge Needs  - Follow up in 6 weeks with Dr Jeong, neurosurgery (message sent to schedulers),  - MRI pituitary in 6 weeks before discharge (message sent to schedulers)  - Upright lumbar xrays to evaluate his compression fractures (ordered by neurosurgery)   - reach out to ENT to see if follow-up is needed in outpatient clinic (Jean Baptiste splints removed while inpatient)        Diet: Diet  Snacks/Supplements Adult: Glucerna; Between Meals  Regular Diet Adult    DVT Prophylaxis: Pneumatic Compression Devices  Roland Catheter: Not present  Lines: PRESENT      PICC 04/06/23 Double Lumen Right Basilic access-Site Assessment: WDL      Cardiac Monitoring: None  Code Status: Full Code      Clinically Significant Risk Factors        # Hypokalemia: Lowest K = 3.3 mmol/L in last 2 days, will replace as needed       # Hypoalbuminemia: Lowest albumin = 2.9 g/dL at 4/11/2023  7:07 AM, will monitor as appropriate           # DMII: A1C = 9.5 % (Ref range: <5.7 %) within past 6 months    # Severe Malnutrition: based on nutrition assessment         Disposition Plan      Expected Discharge Date: 05/16/2023    Discharge Delays: *Medically Ready for Discharge  Placement - TCU  Complex Discharge  Destination: home  Discharge Comments: Dispo: [facility] ([facility]) vs [facility]CU  Delays: Destination  Progress: TCU referrals made. If here next week, needs appointment scheduled & transport arranged to dental clinic for multiple tooth extractions per IP dental consult. HCD form given.        Leela Verdin MD  Medicine Service, [facility] TEAM 2  [facility]  Securely message with [employer]  info)  Text page via [facility] Paging/Directory   See signed in provider for up to date coverage information  ______________________________________________________________________    Interval History     MATTIEO    Doing well today. In good spirits. Eating an early lunch of rice and gravy with extra butter and pepper. He looks forward to discharging from the hospital. No new concerns or questions today    Physical Exam   Vital Signs: Temp: 98  F (36.7  C) Temp src: Oral BP: (!) 139/94 Pulse: 105   Resp: 18 SpO2: 100 % O2 Device: None (Room air)    Weight: 148 lbs 3.2 oz    Constitutional: no apparent distress, calm, nontoxic appearing  Respiratory:  Comfortable conversational breathing on room air  GI: abdomen non distended  Neuro: alert and oriented, answers questions appropriately      Data     I have personally reviewed the following data over the past 24 hrs:    N/A  \   N/A   / N/A     141 110 (H) 5.0 (L) /  117 (H)   3.9 20 (L) 0.91 \       Imaging results reviewed over the past 24 hrs:   No results found for this or any previous visit (from the past 24 hour(s)).

## 2023-05-14 NOTE — PROVIDER NOTIFICATION
Provider: Igor Cedillo MD    Message: Maroon 2 Pt RF rm [id number] bed 1 on 5B  pt having frequent loose stools since 5/11. Pt denies nausea and pain. Could he get Imodium or something prescribed for diarrhea? Thanks.  JING Mayfield #[id number]    Response: C. Diff test ordered, stool sample collected and sent to lab

## 2023-05-14 NOTE — PLAN OF CARE
Goal Outcome Evaluation:    RN assumed cares [id number]     Pt A&Ox4, VSS on RA. R PICC  infusing abx and TKO, purple cap saline locked. Pt reporting pain in RLE, refusing tylenol. R leg wounds ace wrap in place, L foot dressing in place. Pt reporting numbness in R foot, requesting ace wrap be redone without wrapping around heel. RLE rewrapped, pt reporting relief. Pt utilizing bedpan, had loose BM x2 during shift. MD paged about loose stools since 5/11. C. Diff test ordered. Stool sample collected and sent to lab. Sacrum/coccyx mepilexes intact, buttocks cleansed with wound cleanser and barrier cream applied. Pt intermittently declining repositioning.  at 0200. Pt resting between care.

## 2023-05-14 NOTE — PROGRESS NOTES
Social Work Brief Note:     Per M2 provider, pt is medically ready for transfer to the [facility] or to a TCU.     This SW called patient placement at the [facility] to facilitate a transfer (pt transfer agreement in place for pituitary adenoma). They advised there are no [facility] beds available today. SW will call back tomorrow.     Active TCU Referrals:     [facility] (Ph: [phone], Admissions: [phone], F: [phone])  5/13: Initial SNF referral made in [facility].     [facility] in [place] (Ph: [phone], F: [phone])  5/13: Initial SNF referral made in [facility].     [facility] in [place] (P: [phone])  5/13: SW reached out to liaison to review pt for admission.     [facility] (Ph: [phone]; F: [phone])  5/13: Initial SNF referral made in [facility].     [facility] (Ph: [phone], Central Admissions - [place]: [phone], F: [phone])  5/13: Initial SNF referral made in [facility].     [facility] (Ph: [phone], F: [phone])  5/13: Initial SNF referral made in [facility].     [facility] (Ph: [phone], Admissions: [phone], F: [phone])  5/13: Initial SNF referral made in [facility].     [facility] (Ph: [phone], Admissions: [phone], F: [phone])  5/13: Initial SNF referral made in [facility].     [facility] (Ph: [phone]; F: [phone])  5/13: Initial SNF referral made in [facility].     [facility] (P: [phone]; F: [phone])  5/13: Initial SNF referral made in [facility].     [facility] (Ph: [phone], Admissions: [phone], F: [phone])  5/13: Initial SNF referral made in [facility].     [facility] (Ph: [phone], Admissions: [phone], F: [phone])  5/13: Initial SNF referral made in [facility].     [facility] (Ph: [phone], Admissions: [phone], F: [phone])  5/13:  Initial SNF referral made in Epic.     Inactive/Discontinued TCU Referrals:     [facility]-Declined; pt not COVID vaccinated.  [facility]-Declined d/t insurance.  [facility]-Declined d/t elopement risk.  [facility]-Declined; no reason given.     Resources:     [facility] Patient Placement (Phone: [phone], option 1)  Call daily; pt has transfer agreement for pituitary adenoma     [facility] (Ph: [phone] ex. [phone])  Can help with discharge and coordination of services as needed  _____________________________     LUPILLO Simpson, LICSW  Advanced Practice Independent Clinical   [address] 5B Medicine Beds [id number] & 5C Medicine Overflow Beds [phone]  [facility]   [email or web address]  Phone [phone]   Pager: [phone]  Fax: [phone]  Message me on [employer] ambreen.

## 2023-05-14 NOTE — PROVIDER NOTIFICATION
Provider: Igor Cedillo MD     Message: Maroon 2 Pt RF rm [id number] bed 1 on 5B  Pt requesting oxycodone for RLE pain. Can that be ordered? Thanks.  JING Mayfield #[id number]    Response: one time dose of oxycodone ordered

## 2023-05-14 NOTE — PLAN OF CARE
Goal Outcome Evaluation: Pt alert and oriented x4. C/o some pain in RLE but declined intervention. Dressings to BLE's C/D/I. Sacrum and buttock dressings changed. Loose BM x2 this evening. Pt frustrated with loose stools. Voiding spontaneously in urinal. Refused repositioning x3. Refuses to turn far enough to side to offload pressure on buttocks/sacrum. Education provided. Pt continued to refuse. Pt also adamant about wearing incontinence brief in bed despite education. Refuses to elevate LLE heel off bed. Allows only RLE heel to be elevated. Education provided but pt continues to refuse. Refused nasal spray a few times. Awaiting TCU placement.

## 2023-05-14 NOTE — PLAN OF CARE
Patient condition slightly improving. Patient appeared calm and comfortable in  bed with no /o pain or discomfort, A1-A2 with bed mobility and abl to use call light appropriately. Fair appetite this shift and ate 50%-60% of meals. Oxycodone administered prior to wound cares today. Refused to turn, on pulsate mattress. Wheeled down by NA x1 to lobby per request via Wheelchair. Came back in  Table condition and rested in bed. Multiple calls for various request noted.

## 2023-05-15 ENCOUNTER — APPOINTMENT (OUTPATIENT)
Dept: PHYSICAL THERAPY | Facility: CLINIC | Age: 58
DRG: 614 | End: 2023-05-15
Attending: STUDENT IN AN ORGANIZED HEALTH CARE EDUCATION/TRAINING PROGRAM
Payer: COMMERCIAL

## 2023-05-15 LAB
BACTERIA ABSC ANAEROBE+AEROBE CULT: NORMAL
CORTIS SERPL-MCNC: 12.7 UG/DL
ERYTHROCYTE [DISTWIDTH] IN BLOOD BY AUTOMATED COUNT: 18.9 % (ref 10–15)
GLUCOSE BLDC GLUCOMTR-MCNC: 116 MG/DL (ref 70–99)
GLUCOSE BLDC GLUCOMTR-MCNC: 133 MG/DL (ref 70–99)
GLUCOSE BLDC GLUCOMTR-MCNC: 133 MG/DL (ref 70–99)
GLUCOSE BLDC GLUCOMTR-MCNC: 142 MG/DL (ref 70–99)
GLUCOSE BLDC GLUCOMTR-MCNC: 190 MG/DL (ref 70–99)
HCT VFR BLD AUTO: 28.9 % (ref 40–53)
HGB BLD-MCNC: 8.9 G/DL (ref 13.3–17.7)
HOLD SPECIMEN: NORMAL
MCH RBC QN AUTO: 28.4 PG (ref 26.5–33)
MCHC RBC AUTO-ENTMCNC: 30.8 G/DL (ref 31.5–36.5)
MCV RBC AUTO: 92 FL (ref 78–100)
PLATELET # BLD AUTO: 423 10E3/UL (ref 150–450)
RBC # BLD AUTO: 3.13 10E6/UL (ref 4.4–5.9)
WBC # BLD AUTO: 9.2 10E3/UL (ref 4–11)

## 2023-05-15 PROCEDURE — 250N000013 HC RX MED GY IP 250 OP 250 PS 637

## 2023-05-15 PROCEDURE — 250N000013 HC RX MED GY IP 250 OP 250 PS 637: Performed by: INTERNAL MEDICINE

## 2023-05-15 PROCEDURE — 97530 THERAPEUTIC ACTIVITIES: CPT | Mod: GP

## 2023-05-15 PROCEDURE — 85027 COMPLETE CBC AUTOMATED: CPT

## 2023-05-15 PROCEDURE — 250N000011 HC RX IP 250 OP 636

## 2023-05-15 PROCEDURE — 120N000002 HC R&B MED SURG/OB UMMC

## 2023-05-15 PROCEDURE — 82533 TOTAL CORTISOL: CPT

## 2023-05-15 PROCEDURE — 99232 SBSQ HOSP IP/OBS MODERATE 35: CPT | Mod: GC | Performed by: INTERNAL MEDICINE

## 2023-05-15 PROCEDURE — 36592 COLLECT BLOOD FROM PICC: CPT

## 2023-05-15 RX ORDER — LISINOPRIL 2.5 MG/1
5 TABLET ORAL DAILY
Status: DISCONTINUED | OUTPATIENT
Start: 2023-05-16 | End: 2023-05-19 | Stop reason: HOSPADM

## 2023-05-15 RX ORDER — FLUTICASONE PROPIONATE 50 MCG
1 SPRAY, SUSPENSION (ML) NASAL DAILY
Status: DISCONTINUED | OUTPATIENT
Start: 2023-05-15 | End: 2023-05-16

## 2023-05-15 RX ORDER — OXYCODONE HYDROCHLORIDE 5 MG/1
5 TABLET ORAL ONCE
Status: COMPLETED | OUTPATIENT
Start: 2023-05-15 | End: 2023-05-15

## 2023-05-15 RX ADMIN — MICONAZOLE NITRATE: 20 CREAM TOPICAL at 12:25

## 2023-05-15 RX ADMIN — FLUTICASONE PROPIONATE 1 SPRAY: 50 SPRAY, METERED NASAL at 09:25

## 2023-05-15 RX ADMIN — SALINE NASAL SPRAY 2 SPRAY: 1.5 SOLUTION NASAL at 21:42

## 2023-05-15 RX ADMIN — INSULIN ASPART 2 UNITS: 100 INJECTION, SOLUTION INTRAVENOUS; SUBCUTANEOUS at 11:36

## 2023-05-15 RX ADMIN — SALINE NASAL SPRAY 2 SPRAY: 1.5 SOLUTION NASAL at 19:49

## 2023-05-15 RX ADMIN — Medication 1 TABLET: at 12:22

## 2023-05-15 RX ADMIN — SPIRONOLACTONE 200 MG: 100 TABLET ORAL at 09:22

## 2023-05-15 RX ADMIN — SALINE NASAL SPRAY 2 SPRAY: 1.5 SOLUTION NASAL at 17:37

## 2023-05-15 RX ADMIN — LEVOTHYROXINE SODIUM 112 MCG: 0.11 TABLET ORAL at 09:22

## 2023-05-15 RX ADMIN — SODIUM FLUORIDE: 1.1 GEL ORAL at 21:43

## 2023-05-15 RX ADMIN — CARVEDILOL 6.25 MG: 6.25 TABLET, FILM COATED ORAL at 17:37

## 2023-05-15 RX ADMIN — OXYCODONE HYDROCHLORIDE 5 MG: 5 TABLET ORAL at 14:32

## 2023-05-15 RX ADMIN — CARVEDILOL 6.25 MG: 6.25 TABLET, FILM COATED ORAL at 09:22

## 2023-05-15 RX ADMIN — PIPERACILLIN AND TAZOBACTAM 3.38 G: 3; .375 INJECTION, POWDER, LYOPHILIZED, FOR SOLUTION INTRAVENOUS at 23:01

## 2023-05-15 RX ADMIN — PIPERACILLIN AND TAZOBACTAM 3.38 G: 3; .375 INJECTION, POWDER, LYOPHILIZED, FOR SOLUTION INTRAVENOUS at 03:16

## 2023-05-15 RX ADMIN — ASPIRIN 81 MG: 81 TABLET ORAL at 09:22

## 2023-05-15 RX ADMIN — ACETAMINOPHEN 975 MG: 325 TABLET, FILM COATED ORAL at 19:49

## 2023-05-15 RX ADMIN — SALINE NASAL SPRAY 2 SPRAY: 1.5 SOLUTION NASAL at 09:24

## 2023-05-15 RX ADMIN — PIPERACILLIN AND TAZOBACTAM 3.38 G: 3; .375 INJECTION, POWDER, LYOPHILIZED, FOR SOLUTION INTRAVENOUS at 17:38

## 2023-05-15 RX ADMIN — SALINE NASAL SPRAY 2 SPRAY: 1.5 SOLUTION NASAL at 12:22

## 2023-05-15 RX ADMIN — Medication 5 ML: at 17:37

## 2023-05-15 RX ADMIN — PIPERACILLIN AND TAZOBACTAM 3.38 G: 3; .375 INJECTION, POWDER, LYOPHILIZED, FOR SOLUTION INTRAVENOUS at 12:22

## 2023-05-15 ASSESSMENT — ACTIVITIES OF DAILY LIVING (ADL)
ADLS_ACUITY_SCORE: 42
ADLS_ACUITY_SCORE: 42
ADLS_ACUITY_SCORE: 46
ADLS_ACUITY_SCORE: 46
ADLS_ACUITY_SCORE: 42
ADLS_ACUITY_SCORE: 46

## 2023-05-15 NOTE — PLAN OF CARE
Care continued. Patient requested for spiritual services this afternoon for feelings of depression and need for emotional support. Was seen by on call  thereafter. Resting in bed as of this time. Patient medically [facility] to [facility] pending [facility].

## 2023-05-15 NOTE — PROVIDER NOTIFICATION
Provider notified (name): Garry Lee MD  Reason for notification: Pt is needing dressing changes today and is asking to be premediated with oxy before hand. Can you order? Thanks.    Response from provider: oxy 5mg ordered

## 2023-05-15 NOTE — PROGRESS NOTES
"SPIRITUAL HEALTH SERVICES Progress Note  [facility] 5B  On-call Visit    Saw pt Gustavo \"Louis\" SHENG Faria per ASAP consult order request. Pt has been seen by multiple chaplains during his lengthy hospital stay.     Patient/Family Understanding of Illness and Goals of Care - Louis shared in detail about his \"very complicated\" health history , including surgeries and very compromised functioning - his understanding is that the goal is for him \"to get to a rehab unit to get stronger...\"     Distress and Loss - pt spoke of his health condition  and many losses (including recent divorce?) that presented a situation of deep despair (\"but I'm not suicidal; I don't believe in that...\") and loss of hope for the future.    Strengths, Coping, and Resources  - pt said he feels quite isolated here in the hospital, but he did say he has support from siblings.     Meaning, Beliefs, and Spirituality - \"I've gone through many different religions during my life\" - described his tamiko as \"Confucianism\" . Pt wanted to talk about a number of very difficult spiritual issues that would necessitate very concentrated and in-depth spiritual counseling; with my prompting to try to get pt to focus on more immediate needs, he consented to my praying for continued strength and for guidance for his caregivers.     Plan of Care -  support will continue to be available on-call.     Donny Foster) Mini Nieves M.Div., [facility]  Staff   Pager [phone]    * [facility] remains available 24/7 for emergent requests/referrals, either by having the switchboard page the on-call  or by entering an ASAP/STAT consult in Epic (this will also page the on-call ). Routine Epic consults receive an initial response within 24 hours.*    "

## 2023-05-15 NOTE — PROVIDER NOTIFICATION
Provider: Igor Cedillo MD    Message: Mardeborah 2 Pt RF rm [id number] bed 1 on 5B  Pt reporting nasal congestion and requesting Flonase. Could that be ordered? Thanks.  JING Mayfield #[id number]

## 2023-05-15 NOTE — PLAN OF CARE
Goal Outcome Evaluation:    RN assumed cares [id number]     Pt A&Ox4, VSS on RA. R PICC purple cap infusing abx and TKO,  saline locked. Pt denies pain and nausea. R leg wounds ace wrap in place, L foot dressing in place. PRN imodum given for loose stools, pt reporting relief. Pt reporting nasal congestion. MD page and daily flonase ordered.  Pt intermittently declining repositioning. . Pt resting between cares.

## 2023-05-15 NOTE — PROGRESS NOTES
Brief endocrine progress note:    We continue to follow Mr. Faria for multiple endocrinopathies.     # ACTH secreting macroadenoma - Cushing's disease, s/p 3 pituitary surgeries (most recently 4/27/2023) with persistent disease     5/9/2023 -24 hour urine collection (only 0.625 L) with cortisol 75 mcg. Note he did receive 20 mg of hydrocortisone in the AM on 5/8/2023.     A second 24 hour urine collection for cortisol was done on 5/10/2023 - results pending. LNSC from 5/10/2023 and 5/11/2023 are pending.    Interestingly, his AM serum cortisol is at it's low point (post his most recent pituitary surgery) today at 12.7 mcg/dL. Continue to monitor for signs and symptoms of adrenal insufficiency and continue to check an 8 AM cortisol daily for now. Currently his BP is normal to slightly hypertensive.     We may want to give julian procedural hydrocortisone with his upcoming tooth extraction - when there is a date and anesthesia plan please let us know.     On 5/11/2023 we reviewed Mr. Faria case with multiple Endocrine colleagues in a case conference and our consensus long-term approach will be:  1. Proceed with pituitary radiation - he was seen by radiation oncology while inpatient on 4/5/2023 - we will review with them next week re timing, per their note from 4/6/2023 the tentative thought was 5 weeks of external beam therapy.   2. 2-3 months post-op repeat baseline assessment of cortisol excess.   3. Wait to start medical therapy until 2-3 months post-op, and we favor use of pasireotide.    Did talk to radiation oncology today morning to know about the logistics and timing for his radiation treatment.  Will await further recommendations.    # Central hypothyroidism     Continue levothyroxine 112 mcg once daily.      # Diabetes mellitus type 2     Has recently had minimal insulin needs in the setting of minimal intake (he is not fond of the hospital food). Continue with only a correction scale TID before meals  (medium insulin resistance). Glucose checks fasting, before meals, and at bedtime. Goal glucose <180 mg/dL.     # Osteoporosis with compression fractures      Patient was discussed with On call Attending   Patient labs, chart and imaging reviewed      Rhiannon Arroyo  Endocrinology Fellow  [phone]

## 2023-05-15 NOTE — PROGRESS NOTES
Care Management Follow Up    Length of Stay (days): 42    Expected Discharge Date: 05/16/2023     Concerns to be Addressed: compliance issue, patient refuses services     Patient plan of care discussed at interdisciplinary rounds: Yes    Anticipated Discharge Disposition: Transitional Care     Anticipated Discharge Services: Transportation Services  Anticipated Discharge DME: None    Patient/family educated on Medicare website which has current facility and service quality ratings: yes  Education Provided on the Discharge Plan:  yes  Patient/Family in Agreement with the Plan:  yes    Referrals Placed by CM/SW:    Active TCU Referrals:     [facility] (Ph: [phone], Admissions: [phone], F: [phone])  5/13: Initial SNF referral made in Epic.  5/15: SW called admissions, however admissions had left for the day and staff requested SW follow up tomorrow.     [facility] in [place] (P: [phone])  5/13: SW reached out to liaison to review pt for admission.  5/15: SW reached out to liaison and was told if patient wants to use his VA coverage, he cannot come to [facility] and would need to go to a [facility] contracted facility. If pt wants to use his [employer], then [facility] can review him. Due to limited participation in therapies at this time, would not accept pt unless he increases participation/willingness to work with therapies as directed.     [facility] (Ph: [phone], Admissions: [phone] F: [phone])  5/13: Initial SNF referral made in Epic.  5/15: SW left a VM with Xavi in admissions requesting a call back.      [facility] (Ph: [phone], Admissions: [phone], F: [phone])  5/13: Initial SNF referral made in Epic.  5/15: SW left a VM with admissions requesting a call back.      [facility] (Ph: [phone], Admissions: [phone], F: [phone])  5/13: Initial SNF referral made in Epic.  5/15: SW called  admissions and left VM requesting a call back.      [facility] (Ph: [phone], Admissions: [phone], F: [phone])  5/13: Initial SNF referral made in Epic.   5/15: SW spoke with Shana in admissions, whose Epic is down. Shana will call back once she is able to review.      Inactive/Discontinued TCU Referrals:     [facility]; pt not COVID vaccinated, no beds  [facility]-Declined d/t insurance.  [facility]-Declined d/t elopement risk.  [facility]-Declined; no reason given.  [facility]- Declined; no beds available until 5/23  [facility]- Declined; insurance not accepted  [facility] - Declined; due to pt behaviors and marijuana use  [facility] [place]- Declined; no available beds  [facility]- Declined; no beds available. Could not meet needs with complexity of current patients.  [facility]- Declined; no beds available until Thursday (1 bed).  [facility] - [facility]; no beds in [facility] or [facility]     Resources:     [facility] Patient Placement (Phone: [phone], option 1)  Call daily; pt has transfer agreement for pituitary adenoma     [facility] (Ph: [phone] ex. [phone])  Can help with discharge and coordination of services as needed    Private pay costs discussed: Not applicable    Additional Information:    @10:35am-  called [facility] regarding patient transfer agreement and was told there are no medicine beds available today.   reviewed chart for patient, learning that social work/care management has been making daily calls to the [facility] for bed availability since 5/1/23 and the case was escalated to SWAT.   consulted with Nurse Manager, Shana Moraes, who will reescalate the case to Dr. Adams.      had patient's Health Care Directive notarized with patient by  MATTHEW, Gage Smart.   emailed a copy of HCD to Kay Zoltan and returned the original document to the patient.     followed up on TCU referrals. See above notes.      will continue to follow for discharge planning, psychosocial support, advocacy, and community resources.     LUPILLO Gu, [facility]  [address]   [email or web address]  Office: [phone]   Pager: [phone]

## 2023-05-15 NOTE — PLAN OF CARE
RN assumed cares at [phone]     Vitals: VSS on room air.  Pain: denied pain. Did administer 5mg of oxy before dressing changes today to help with pain.   Neuro: A&Ox4; calm and cooperative.   Cardiac: WDL  Peripheral neurovascular: trace dependent edema on BLE.  Respiratory: Lung sounds clear/diminished and equal bilaterally. Stable on room air. Pt denies SOB/BERNAL. No respiratory distress noted overnight.  GI/: Continent of bowel & bladder. Voiding spontaneously. One BM today.   IV/Drains: DL PICC - red is saline locked; purple is heparin locked.  Skin: Skin check completed without any new concerns. BLE dressing changes completed - coccyx dressings needing to be changed still - passed onto next RN  Activity: Assist x1-2 in bed. Lift to wheelchair. Pt intermittently declining repositioning - continue to encourage them.  Nutrition: Regular diet.      Events/plan: Pt went outside a couple of times for fresh air today. All dressings on wounds on the lower extremities were changed today per plan of care orders. The coccyx dressing changes are still needing to be completed - pt wanted to wait a little longer before getting them done - passed onto next RN.    Pt is medically ready to discharge pending TCU placement or transfer to the [facility] once bed is ready.     No acute events. Q1hr rounding completed. Call light within reach and is able to make needs known.

## 2023-05-16 ENCOUNTER — APPOINTMENT (OUTPATIENT)
Dept: OCCUPATIONAL THERAPY | Facility: CLINIC | Age: 58
DRG: 614 | End: 2023-05-16
Attending: STUDENT IN AN ORGANIZED HEALTH CARE EDUCATION/TRAINING PROGRAM
Payer: COMMERCIAL

## 2023-05-16 LAB
CORTIS SAL-MCNC: 0.89 UG/DL
CORTIS SERPL-MCNC: 13.5 UG/DL
GLUCOSE BLDC GLUCOMTR-MCNC: 116 MG/DL (ref 70–99)
GLUCOSE BLDC GLUCOMTR-MCNC: 119 MG/DL (ref 70–99)
GLUCOSE BLDC GLUCOMTR-MCNC: 134 MG/DL (ref 70–99)
GLUCOSE BLDC GLUCOMTR-MCNC: 92 MG/DL (ref 70–99)
GLUCOSE BLDC GLUCOMTR-MCNC: 99 MG/DL (ref 70–99)
HOLD SPECIMEN: NORMAL

## 2023-05-16 PROCEDURE — 250N000013 HC RX MED GY IP 250 OP 250 PS 637

## 2023-05-16 PROCEDURE — 97110 THERAPEUTIC EXERCISES: CPT | Mod: GO | Performed by: OCCUPATIONAL THERAPIST

## 2023-05-16 PROCEDURE — 99232 SBSQ HOSP IP/OBS MODERATE 35: CPT | Mod: 24 | Performed by: INTERNAL MEDICINE

## 2023-05-16 PROCEDURE — 250N000011 HC RX IP 250 OP 636

## 2023-05-16 PROCEDURE — 120N000002 HC R&B MED SURG/OB UMMC

## 2023-05-16 PROCEDURE — G0463 HOSPITAL OUTPT CLINIC VISIT: HCPCS

## 2023-05-16 PROCEDURE — 36592 COLLECT BLOOD FROM PICC: CPT

## 2023-05-16 PROCEDURE — 82533 TOTAL CORTISOL: CPT

## 2023-05-16 PROCEDURE — 99233 SBSQ HOSP IP/OBS HIGH 50: CPT | Mod: GC | Performed by: STUDENT IN AN ORGANIZED HEALTH CARE EDUCATION/TRAINING PROGRAM

## 2023-05-16 PROCEDURE — 250N000013 HC RX MED GY IP 250 OP 250 PS 637: Performed by: INTERNAL MEDICINE

## 2023-05-16 RX ORDER — AZELASTINE 1 MG/ML
2 SPRAY, METERED NASAL 2 TIMES DAILY PRN
Status: DISCONTINUED | OUTPATIENT
Start: 2023-05-16 | End: 2023-05-19 | Stop reason: HOSPADM

## 2023-05-16 RX ORDER — AZELASTINE 1 MG/ML
2 SPRAY, METERED NASAL ONCE
Status: COMPLETED | OUTPATIENT
Start: 2023-05-16 | End: 2023-05-16

## 2023-05-16 RX ORDER — MICONAZOLE NITRATE 20 MG/G
CREAM TOPICAL 2 TIMES DAILY PRN
Status: DISCONTINUED | OUTPATIENT
Start: 2023-05-16 | End: 2023-05-19 | Stop reason: HOSPADM

## 2023-05-16 RX ADMIN — AZELASTINE HYDROCHLORIDE 2 SPRAY: 137 SPRAY, METERED NASAL at 17:03

## 2023-05-16 RX ADMIN — AZELASTINE HYDROCHLORIDE 2 SPRAY: 137 SPRAY, METERED NASAL at 22:48

## 2023-05-16 RX ADMIN — ASPIRIN 81 MG: 81 TABLET ORAL at 08:30

## 2023-05-16 RX ADMIN — PIPERACILLIN AND TAZOBACTAM 3.38 G: 3; .375 INJECTION, POWDER, LYOPHILIZED, FOR SOLUTION INTRAVENOUS at 22:48

## 2023-05-16 RX ADMIN — PIPERACILLIN AND TAZOBACTAM 3.38 G: 3; .375 INJECTION, POWDER, LYOPHILIZED, FOR SOLUTION INTRAVENOUS at 05:14

## 2023-05-16 RX ADMIN — SODIUM FLUORIDE: 1.1 GEL ORAL at 22:48

## 2023-05-16 RX ADMIN — FLUTICASONE PROPIONATE 1 SPRAY: 50 SPRAY, METERED NASAL at 08:29

## 2023-05-16 RX ADMIN — Medication 5 ML: at 07:57

## 2023-05-16 RX ADMIN — SPIRONOLACTONE 200 MG: 100 TABLET ORAL at 08:30

## 2023-05-16 RX ADMIN — Medication 1 TABLET: at 11:24

## 2023-05-16 RX ADMIN — PIPERACILLIN AND TAZOBACTAM 3.38 G: 3; .375 INJECTION, POWDER, LYOPHILIZED, FOR SOLUTION INTRAVENOUS at 11:24

## 2023-05-16 RX ADMIN — SALINE NASAL SPRAY 2 SPRAY: 1.5 SOLUTION NASAL at 08:30

## 2023-05-16 RX ADMIN — CARVEDILOL 6.25 MG: 6.25 TABLET, FILM COATED ORAL at 08:30

## 2023-05-16 RX ADMIN — SALINE NASAL SPRAY 2 SPRAY: 1.5 SOLUTION NASAL at 05:23

## 2023-05-16 RX ADMIN — Medication 5 ML: at 17:03

## 2023-05-16 RX ADMIN — LEVOTHYROXINE SODIUM 112 MCG: 0.11 TABLET ORAL at 08:30

## 2023-05-16 RX ADMIN — PIPERACILLIN AND TAZOBACTAM 3.38 G: 3; .375 INJECTION, POWDER, LYOPHILIZED, FOR SOLUTION INTRAVENOUS at 17:04

## 2023-05-16 RX ADMIN — LISINOPRIL 5 MG: 2.5 TABLET ORAL at 08:30

## 2023-05-16 ASSESSMENT — ACTIVITIES OF DAILY LIVING (ADL)
ADLS_ACUITY_SCORE: 46
ADLS_ACUITY_SCORE: 42
ADLS_ACUITY_SCORE: 46
ADLS_ACUITY_SCORE: 42

## 2023-05-16 NOTE — PLAN OF CARE
Goal Outcome Evaluation:    Assumed cares from [id number]. Pt A/O x4 and is able to make needs known. AM VS were requested after 0900 however, PM VS were stable on room air. Pt reported R. foot pain which was rated as 7/10, gave PRN Tylenol x1.  Completed sacrum wound dressing change, all other wound dressings were CDI. Pt voided spontaniously and had one incontinent BM occurance throughout the shift. Pt slept on and off throughout the night. Continue to admin abx and provide POC. Bed alarm is on and call light is within reach.

## 2023-05-16 NOTE — PROGRESS NOTES
[facility]  WO Nurse Inpatient Assessment     Consulted for: Buttocks, sacrum, bilateral groin, Right Leg    Patient History (according to provider note(s):      Gustavo Faria is a 58 year old male with recent Serratia bacteremia, HFrEF, prolonged QT, lower extremity wounds, DMII, hypothyroidism, low testosterone and known ACTH secreting pituitary adenoma w/resulting Cushing's disease s/p 2 partial resections in 2021 who [facility]. [facility], he developed new onset double vision and severe headache which prompted imaging that showed a large mass invading the cavernous sinuses and impinging on the cranial nerves. He is transferred to [facility] for this pituitary adenoma with plans for possible surgical intervention.     Areas Assessed:      Areas visualized during today's visit: Sacrum/coccyx and Lower extremities     Wound location: Buttocks, Sacrum and Groin       5/8 5/16  Last photo: 4/18/23  Wound due to: Incontinence Associated Dermatitis (IAD)  Wound history/plan of care: incontinent of stool and urine. Has not had incontinent in the last few days. Groin IAD resolved.  Wound base:  100 % dermis     Palpation of the wound bed: normal      Drainage: scant     Description of drainage: serous     Measurements (length x width x depth, in cm): one open area 0.5 x 0.5 x 0.1     Tunneling: N/A     Undermining: N/A  Periwound skin: Intact      Color:   groin has areas of hypopigmentation, buttock has areas of hyperpigmenting.       Temperature: normal   Odor: none  Pain: mild, tender  Pain interventions prior to dressing change: slow and gentle cares   Treatment goal: Heal  and Protection  STATUS: healing  Supplies ordered: at bedside and supplies stored on unit     Wound location: BLE      Left foot 5/9 5/16    Right Lateral Leg 5/9 5/16      Right anterior leg    Last photo: 23  Wound due to: Arterial Ulcer and Unknown Etiology  Wound history/plan of care: Presented to the hospital earlier this year for lower leg wound infection. Per nursing patient has been intermittently refusing dressing changes.    general surgery performed on RLE below existing wound due to purulent drainage. Having significant issues with bleeding on  Resolved. 23 wounds stalled with minimal improvement. Attempting different dressing. If no significant improvement will attempt toby.   Wound base: Right leg wounds 10 % granulation tissue, 90 % non-granular tissue Left foot 50 % yellow fibrin,  50 % granulation tissue     Palpation of the wound bed: normal      Drainage: moderate     Description of drainage: cloudy     Measurements (length x width x depth, in cm):Right anterior leg 2.5 x 2.5  x  0.5 cm Right Lateral 7 x 3. x 0.3 Left foot 2.2 x 2.3 x 0.3     Tunneling: Right anterior leg 2 cm @ 1 o'clock     Undermining: Right anterior le.2 from 12-3 o'clock.  Left foot 0.2 cm 2 - 6 o'clock  Periwound skin: Intact, Dry/scaly and Edematous      Color: normal and consistent with surrounding tissue      Temperature: normal   Odor: mild  Pain: moderate, sharp  Pain interventions prior to dressing change: patient tolerated well, slow and gentle cares  and distraction  Treatment goal: Heal  and Infection control/prevention  STATUS: evolving  Supplies ordered: at bedside    Treatment Plan:     Sacrum wound: Daily and PRN   Cleanse the area with Merary cleanse and protect, very gently with soft cloth.  Apply ostomy powder (#0600) on all open and denuded skin.  Apply thin layer of critic aid paste on top of it.  With repeat application, do not scrub the paste, only remove soiled paste and reapply.  If complete removal of paste is necessary use baby oil/mineral oil (#229060) and soft wash cloth.  Ensure pt has Jono-cushion (#550768) while sitting up in the chair.  Use only one  "Covidien pad in between mattress and pt. No brief while in bed.    DO NOT Use sacral mepilex is will cause the wound to become too wet.     All lower leg wounds: Change dressings every other days.  With dressing removal moisten purple foam with NS to soften prior to removal.  Cleanse wound with MicroKlenz and pat dry.  Paint julian-wound skin with No Sting barrier film  Cut a piece of hydrofera blue foam (659057) slightly smaller than wound bed and soften with sterile NS solution.  Wring out excess saline and place foam on wound bed, cover with Mepilex flex 4x4 (716217).    EdemaWear stockings:     To bilateral lower legs    Remove once daily for skin inspections and cares    Cleanse and moisturize any intact or scaly skin before applying EdemaWear    Ok to apply additional compression over the EdemaWear (ie Lymph wraps)    Application:     Apply the EdemaWear from base of toes to knee, or above knee if tolerated and it is a long stocking    Create a wide 3\" cuff at the top if needed to prevent rolling down    Only trim the stocking if it is excessively long; do NOT cut in half; can often fold over excess length onto foot    When wounds are present:    Wound dressings that directly treat the wound bed can be applied under the EdemaWear    Any additional cover dressings (dry gauze, ABD pads, Kerlix, etc) should be applied ON TOP of the stockings whenever feasible     Stockings may get soiled with drainage and will need to be washed; ensure an extra clean, dry pair always available    May need two people to apply the stocking - bunch up stocking and pull against each other, lifting over wounds    Care:    When stockings are soiled, DO NOT THROW AWAY, hand wash with mild soap, rinse, hang dry    Replace approximately every 4 to 6 months        EdemaWear size Max circumference Stripe color PS # # stockings per pack   Small 18\"  (45cm) navy [id number]     Orders: Written    RECOMMEND PRIMARY TEAM ORDER: GALINDO Reagan antifungal " to Groin no longer needed  Education provided: plan of care, wound progress and Moisture management  Discussed plan of care with: Nurse  [facility] nurse follow-up plan: weekly  Notify [facility] if wound(s) deteriorate.  Nursing to notify the Provider(s) and re-consult the [facility] Nurse if new skin concern.    DATA:     Current support surface: Standard  Standard gel/foam mattress (IsoFlex, Atmos air, etc)  Containment of urine/stool: Incontinence Protocol, Incontinent pad in bed and Primofit external catheter  BMI: Body mass index is 24.86 kg/m .   Active diet order: Orders Placed This Encounter      Diet      Regular Diet Adult     Output: I/O last 3 completed shifts:  In: -   Out: 500 [Urine:500]     Labs:   Recent Labs   Lab 05/15/23  0751   HGB 8.9*   WBC 9.2     Pressure injury risk assessment:   Sensory Perception: 3-->slightly limited  Moisture: 3-->occasionally moist  Activity: 2-->chairfast  Mobility: 2-->very limited  Nutrition: 3-->adequate  Friction and Shear: 1-->problem  Manan Score: 14    Veronica Gunter RN CWOCN  Pager no longer is use, please contact through [facility] group: [facility] Nurse  Dept. Office Number: [phone]

## 2023-05-16 NOTE — PROGRESS NOTES
CLINICAL NUTRITION SERVICES - REASSESSMENT NOTE     Nutrition Prescription    RECOMMENDATIONS FOR MDs/PROVIDERS TO ORDER:  None at this time     Malnutrition Status:    Patient does not meet two of the established criteria necessary for diagnosing malnutrition    Recommendations already ordered by Registered Dietitian (RD):  Patient has large stock of Glucerna in room - ONS order updated to PRN  Trial of Ensure Clear for ONS variety      Future/Additional Recommendations:  - Monitor PO intake, supplement use, and weight trends  - Monitor wound status      EVALUATION OF THE PROGRESS TOWARD GOALS   Diet: Regular  PO Intake: Consuming 50-75% of meals, some food brought from home in addition to RS orders TID      NEW FINDINGS     Pt reporting that he is growing tired of the milky ONS, dicussed options, pt would like to try Ensure Clear     GI:    0-4 unmeasured BMs per day over past week, per I/O.     Diarrhea noted today     Weights:    Stable     Labs:   Electrolytes  Potassium (mmol/L)   Date Value   05/14/2023 3.9   05/13/2023 3.3 (L)   05/12/2023 3.6     Potassium POCT (mmol/L)   Date Value   04/27/2023 3.4 (L)   04/27/2023 3.4 (L)   04/27/2023 2.7 (LL)     Phosphorus (mg/dL)   Date Value   05/10/2023 2.5   05/07/2023 2.7   05/05/2023 2.8   05/02/2023 3.1   05/01/2023 2.5    Blood Glucose  GLUCOSE BY METER POCT (mg/dL)   Date Value   05/16/2023 116 (H)   05/16/2023 99   05/16/2023 119 (H)   05/15/2023 133 (H)   05/15/2023 116 (H)     Hemoglobin A1C (%)   Date Value   04/05/2023 9.5 (H)   02/16/2023 7.6 (H)    Inflammatory Markers  WBC Count (10e3/uL)   Date Value   05/15/2023 9.2   05/13/2023 11.9 (H)   05/12/2023 11.0     Albumin (g/dL)   Date Value   05/06/2023 3.0 (L)   04/23/2023 3.5   04/20/2023 3.3 (L)      Magnesium (mg/dL)   Date Value   05/12/2023 2.2   05/10/2023 2.0   05/07/2023 2.0     Sodium (mmol/L)   Date Value   05/14/2023 141   05/13/2023 141   05/12/2023 141    Renal  Urea Nitrogen (mg/dL)   Date  Value   05/14/2023 5.0 (L)   05/13/2023 7.3   05/12/2023 10.3     Creatinine (mg/dL)   Date Value   05/14/2023 0.91   05/13/2023 1.04   05/12/2023 1.09     Additional  Ketones Urine (mg/dL)   Date Value   05/06/2023 Negative        Medications:    Lasix, sliding scale insulin, MVI-M, aldactone, Vit D2     Skin:    WOC following for significant wounds     MALNUTRITION  % Intake: No decreased intake noted  % Weight Loss: None noted  Subcutaneous Fat Loss: None observed  Muscle Loss: None observed  Fluid Accumulation/Edema: Mild - moderate   Malnutrition Diagnosis: Patient does not meet two of the established criteria necessary for diagnosing malnutrition    Previous Goals   Increased kcal/protein needs related to wound healing support as evidenced by estimated protein needs of  gm protein/day.  Evaluation: Not met    Previous Nutrition Diagnosis  Patient to consume % of nutritionally adequate meal trays TID, or the equivalent with supplements/snacks.  Evaluation: No change    CURRENT NUTRITION DIAGNOSIS  Increased kcal/protein needs related to wound healing support as evidenced by estimated protein needs of  gm protein/day.    INTERVENTIONS  Implementation  Medical food supplement therapy - Glucerna PRN & trial of Ensure Clear   Multivitamin/mineral supplement therapy - Continue daily MVI-M     Goals  Patient to consume % of nutritionally adequate meal trays TID, or the equivalent with supplements/snacks.    Monitoring/Evaluation  Progress toward goals will be monitored and evaluated per protocol.    Jil Hansen, MPH, RDN, LD  5A [phone] RD pager: [phone]  Weekend/Holiday RD pager: [phone]

## 2023-05-16 NOTE — PROGRESS NOTES
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 [facility], he developed new onset double vision and severe headache which prompted imaging that showed a large mass invading the cavernous sinuses and impinging on the cranial nerves. He is transferred to [facility] for this pituitary adenoma, concern for progression vs hemorrhage/apoplexy. Complicated by psychosis/metabolic encephalopathy and electrolyte abnormalities secondary to Cushing's syndrome. Underwent endoscopic endonasal transcavernous approach for resection of functional pituitary adenoma 4/27.      Updates today:  - medically stable and awaiting discharge to TCU or [facility]   - zosyn dose schedule adjusted to minimize disruptions overnight  - restart reduced dose of lisinopril tomorrow AM    #R lower leg abscess, soft tissue infection, +serratia,  s/p I&D 5/8/2023  #Hypotension, likely multifactorial 2/2 sepsis, blood loss anemia, resolveed  # Post-surgical Leukocytosis 2/2 inflammatory response, sepsis   Pt noted to have leukocytosis since POD1, WBC 15-17 range,  initially felt to likely be 2/2 post-surgical inflammatory response. Infection workup initially negative w/ unremarkable CXR, blood cultures, UA, chronic wound assessment by WOC, and normal vitals. POD9, RRT called for softer SBPs, generally 80s/50s. Pt noted to have normal to moderately hypertensive blood pressures for most of the hospitalization; SBPs in 80s unusual for patient. All blood pressure meds  held. Neurosurg evaluated, does not think patient needs further meningitis workup at this time. Careful physical exam ultimately revealed new opening of R chronic leg wounds with actively draining pus and blood. Surgery consulted. CT tib/fib revealed 4.9x3.2x1.8 cm soft tissue abscess of the medial aspect of the ankle with no evidence of osteomyelitis. Underwent bedside I&D of R lower leg on 5/8/23. R lower leg abscess growing +serratia. Notable ongoing bleeding from I&D site resulting in blood transfusion and hypotension. Will also monitor for possible glucocorticoid withdrawal syndrome contributing to hypotension  - antibiotics:              - zosyn (started 5/7, for 10 day course)               - vanc (5/7-5/8)  - infectious workup                    - wound culture with 1+ gram negative rods, 3+ serratia x 2 cultures  - curbside ID line -- recommends continuing zosyn instead of ceftriaxone for serratia, for 7-10 course for soft tissue infection vs longer course 10-14 days if concerned for arterial insufficiency. Duration also pending clinical course  - transfuse for hgb < 7  - MRSA nares negative  - continue holding  lasix, lisinopril  - reduce PTA carvedilol to 6.25 mg BID from 12.5 mg BID, started 5/14/23  - TTE unchanged from prior  - encourage PO intake     #Acute blood loss anemia on chronic microcytic anemia, resolved  Significant bleeding following I&D of R leg abscess, ultimately requiring 3u pRBC over subsequent 4 days. Blood loss anemia contributed to hypotension as above.   - trend hemoglobin  - monitor surgical site for bleeding  - check iron studies  - folate, B12 wnl during 4/14/23    # chronic buttocks, sacrum and bilateral groin wounds   # RLE wound from puncture injury   # L dorsal foot wound from presumed trauma   # Hx serratia bacteremia in December 2022   For patient's lower extremity wounds and hx of Serratia bacteremia in December, he was [facility]. His  antibiotics were broadened to Vanc and Zosyn and ultimately he was transitioned to ceftazidime on 3/23 with plan to complete course on 4/4/23. BCx negative and wound cx grew serratia marcescens at the [facility]. He has been on ceftazidime since. MRI of the right tib/fib on 3/23 was negative for osteomyelitis but did show two fluid collections draining sponateneously. [facility]o [facility] at the [facility] and determined no intervention was needed as wounds were draining on their own.   - completed course of ceftazidime (3/23 - 4/5)  - PT/OT   - Pain: tylenol 975 mg Q6H PRN     # Pituitary adenoma resection this admission, c/b significant scar tissue to cavernous sinus, 4/27/23  #R eye ptosis, esotropia  # ACTH secreting pituitary adenoma c/b type II DM, hypothyroidism and low testosterone s/p two partial resections in 2021   Patient noted double vision and severe headache beginning the evening of 3/25. CT imaging on 3/25 revealed a large sellar/suprasellar mass extending into the cavernous sinuses with no evidence of acute stroke. On 3/28, he underwent an MRI which confirmed the CT findings of a large mass invading the cavernous sinuses and impinging on the cranial nerves. Necrosis extending beyond left cavernous sinus. [facility]. Repeat MRI performed on 4/6/23: compared to 2021 MRI, lesions are smaller. Orbit MRI without optic nerve compression and no evidence of orbital infection.  - 4/27 neurosurgery and ENT combined to perform endoscopic endonasal transcavernous approach for resection of functional pituitary adenoma. Complicated by significant scar tissue to cavernous sinus. Good post-op recovery. Follow up MRI done 5/29. Neurosurgery okay with [facility] once [facility]. They will arrange follow up.   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  # Hypernatremia  # Hypokalemia, resolved  Hypernatremia and hypokalemia, likely secondary to Cushing's from pituitary macroadenoma that was unable to be completely removed  - Endocrine following, appreciate recs  - restart spironolactone 5/12 at lower dose 100 mg (prior dose 200 mg every day)   - Goal Na: 135-140  - Strict I&Os  - trend intermittent BMP, Mag  - check 24h urine cortisol and creatinine  - late night salivary cortisol x 2 pending   - endocrine working on establishing cortisol baseline which will dictate next steps (medical management vs radiation treatment)   - continue daily 8 AM cortisol     Per endocrine 5/11/23 review, consensus long-term approach:   1. Proceed with pituitary radiation - he was seen by radiation oncology while inpatient on 4/5/2023 - we will review with them next week re timing, per their note from 4/6/2023 the tentative thought was 5 weeks of external beam therapy.   2. 2-3 months post-op repeat baseline assessment of cortisol excess.   3. Wait to start medical therapy until 2-3 months post-op, and we favor use of pasireotide.    # Central Hypothyroidism  - Continue levothyroxine 112 mcg once daily      # Type II DM, exacerbated by Cushing's   A1c of 7.6% on 2/2023.  Endocrinology managing. Current regimen:   - medium sliding scale insulin for AC & HS  - Stopped Jardiance 10 mg daily per endocrine   - stopped carb coverage insulin  - stopped insulin glargine  - hypoglycemia protocol  - Accu checks AC & HS      #Type II MI   # HFmrEF, with global hypokinesis  # Hypertension  # High burden of PVCs  # Prolonged QTc   # Elevated troponin, down-trended  Coronary angiogram on 2/27 showed normal coronaries. Trop mildly elevated on admit 88; downtrended to 77 without targeted intervention. Was noted to have high burden of PVCs on tele. TTE 4/6/2023 showed EF 40-45%, severe  diffuse hypokinesis, normal RV function, and mild-moderate aortic regurgitation; overall, not significantly changed from prior TTE 2/17/23. 5/6/23, pt w/ new episode of hypotension of unclear cause. Troponin checked and 177-->170-->172. Most likely 2/2 demand ischemia in s/o hypotension. Patient denied chest pain. EKG with sinus rhythm, LVH, RBBB, and with t wave inversions more evident in anterior leads as compared to prior EKG 4/23/23.   - Repeat TTE unchanged  - troponin plateaued, will not recheck unless clinically changes  - Q4H vital signs  - recheck EKG, trop if chest pain occurs  - Lasix 40mg daily--held since surgery on 4/27. Restarted 4/30. Held 5/6    - Carvedilol 6.25 mg BID restarted 5/14  (PTA dose 25 mg BID)   -- lisinopril 5 mg daily restarted 5/15 at reduced dose (PTA 20 mg daily)   - ASA - restarted 5/5/23, held 5/9/2023 due to bleeding, restarted 5/12  - Avoid QTc prolonging medications    #Dental caries     Pt w/ acute tooth pain 5/11/23. Dental consulted.CT dental performed and revealed multiple scattered periapical lucencies, which may be seen with small periapical abscesses, w/ no definite evidence for cellulitis or soft tissue abscess. Recommended extraction of tooth #1,2,11,14,30,32 at least. Per dental:  1. At this time, clinical and radiographic findings do indicate the need for  extraction of tooth # 1, 2, 11 14, 30, 32 at least. Below is the contact information of the [facility].  a. [facility] [address] [place] [address] [phone]    b. The care coordinator/RN of the patient will need to call the scheduling team of the [facility] dental [facility] and get an appointment for the extraction.  The primary team will be responsible to arrange the transportation of the patient if inpatient.  - abx as above w/ zosyn  - pain mgmt per primary team  - further eval w/ dental radiographs and clinical exam at [facility] dental  clinic       # Acute metabolic encephalopathy - resolved  # Pyruria, Candiduria - resolved  # Encephalopathy likely secondary to cushing's disease, resolved  Admission symptoms included disorientation, intermittently following directions. Repetitive words - perseverating on particular phrases. Sx started the 2-3 days prior to admission (which [facility] was [facility]). Vulnerable brain (multiple brain surgeries), enlarging pituitary mass. No seizure activity per EEG. Steroids removed and pt still encephalopathic. Sodium had been in normal limits and patient remained altered. Worked up for infections - did reveal candiduria but unlikely this was etiology, though he did complete an 8 day course of fluconazole. Briefly on ceftriaxone but encephalopathy also did not improve. CT abd w/ contrast (4/10): no evidence of pyelonephritis  Ultimately attributed to Cushings and known pituitary tumor. Continues to remain lucid.  - Surgical management per neurosurgery  - Delirium precautions  - Electrolyte management as above  - PRN quetiapine if agitation that is not responsive to behavioral interventions      # Hypogonadism   - hold PTA androgel (would need to bring in home medication, not accessible at this time)      # Chronic microcytic anemia   Hgb of 8.4 [facility]. Peripheral smear on 3/30 showed poikilocytosis with occasional red blood cell fragments but no other evidence of hemolysis.  Haptoglobin was normal.  Ferritin was 91, transferrin saturation was low, B12 was 495 and folate was 8.7. Likely combination of iron deficiency as well as inflammation/chronic disease.   - CTM        # Concern for malnutrition   - Nutrition following     # Delusions, intermittent, resolved  Intermittent delusions regarding staff stalking patient while in the hospital. Unclear chronicity of these delusions - but has had them multiple times this hospitalization. Unclear psych history. Medical management for encephalopathy as  stated above. Psychiatry was consulted during his care and agreed with paranoia and recommended Abilify. He was briefly on Abilify 5mg but patient then started to refuse nightly so this was discontinued. His paranoia and delusions have been very intermittent and appear to be associated with his anxiety.      Discharge Needs  - Follow up in 6 weeks with Dr Jeong, neurosurgery (message sent to schedulers),  - MRI pituitary in 6 weeks before discharge (message sent to schedulers)  - Upright lumbar xrays to evaluate his compression fractures (ordered by neurosurgery)   - reach out to ENT to see if follow-up is needed in outpatient clinic (Jean Baptiste splints removed while inpatient)        Diet: Diet  Snacks/Supplements Adult: Glucerna; Between Meals  Regular Diet Adult    DVT Prophylaxis: Pneumatic Compression Devices  Roland Catheter: Not present  Lines: PRESENT      PICC 04/06/23 Double Lumen Right Basilic access-Site Assessment: WDL      Cardiac Monitoring: None  Code Status: Full Code      Clinically Significant Risk Factors              # Hypoalbuminemia: Lowest albumin = 2.9 g/dL at 4/11/2023  7:07 AM, will monitor as appropriate           # DMII: A1C = 9.5 % (Ref range: <5.7 %) within past 6 months    # Severe Malnutrition: based on nutrition assessment         Disposition Plan      Expected Discharge Date: 05/16/2023    Discharge Delays: *Medically Ready for Discharge  Placement - TCU  Complex Discharge  Destination: home  Discharge Comments: [facility]po: [facility] vs [facility]  Delays: Destination  Progress: TCU referrals made. If here next week, needs appointment scheduled & transport arranged to dental clinic for multiple tooth extractions per IP dental consult. HCD form given.        Leela Verdin MD  Medicine Service, [facility] TEAM [address] [place] [address]  Securely message with [employer] (more info)  Text page via [facility] Paging/Directory   See signed in provider  "for up to date coverage information  ______________________________________________________________________    Interval History     NAEO    Pt doing well this morning. No new concerns or questions other than [facility] or to [facility]. \"I have been here way too long,\" he says. Provider agrees     Physical Exam   Vital Signs: Temp: 97.7  F (36.5  C) Temp src: Oral BP: (!) 135/100 Pulse: 98   Resp: 19 SpO2: 100 % O2 Device: None (Room air)    Weight: 154 lbs 0 oz    Constitutional: no apparent distress, calm, nontoxic appearing  HEENT: NCAT. R eye ptosis  Cardiovascular: warm and well perfused extremities   Respiratory:  Comfortable conversational breathing on room air  GI: abdomen non distended  Extremities: R lower extremity wrapped in kerlix bandage, c/d/i without bleed through  Neuro: alert and oriented, answers questions appropriately      Data     I have personally reviewed the following data over the past 24 hrs:    9.2  \   8.9 (L)   / 423     N/A N/A N/A /  116 (H)   N/A N/A N/A \       Imaging results reviewed over the past 24 hrs:   No results found for this or any previous visit (from the past 24 hour(s)).  "

## 2023-05-16 NOTE — PROGRESS NOTES
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 [facility], he developed new onset double vision and severe headache which prompted imaging that showed a large mass invading the cavernous sinuses and impinging on the cranial nerves. He is transferred to [facility] for this pituitary adenoma, concern for progression vs hemorrhage/apoplexy. Complicated by psychosis/metabolic encephalopathy and electrolyte abnormalities secondary to Cushing's syndrome. Underwent endoscopic endonasal transcavernous approach for resection of functional pituitary adenoma 4/27.      Updates today:  - medically stable and awaiting discharge to TCU or transfer to [facility]   - health psychology consult for support  - azelastine nasal spray for allergies, stopped flonase  - stopped miconazole powder  - [facility] with [facility]     #R lower leg abscess, soft tissue infection, +serratia,  s/p I&D 5/8/2023  #Hypotension, likely multifactorial 2/2 sepsis, blood loss anemia, resolveed  # Post-surgical Leukocytosis 2/2 inflammatory response, sepsis   Pt noted to have leukocytosis since POD1, WBC 15-17 range,  initially felt to likely be 2/2 post-surgical inflammatory response. Infection workup initially negative w/ unremarkable CXR, blood cultures, UA, chronic wound assessment by WOC, and normal vitals. POD9, RRT called for softer SBPs, generally 80s/50s. Pt noted to have normal to moderately hypertensive blood pressures for most of the hospitalization; SBPs in 80s unusual  for patient. All blood pressure meds held. Neurosurg evaluated, does not think patient needs further meningitis workup at this time. Careful physical exam ultimately revealed new opening of R chronic leg wounds with actively draining pus and blood. Surgery consulted. CT tib/fib revealed 4.9x3.2x1.8 cm soft tissue abscess of the medial aspect of the ankle with no evidence of osteomyelitis. Underwent bedside I&D of R lower leg on 5/8/23. R lower leg abscess growing +serratia. Notable ongoing bleeding from I&D site resulting in blood transfusion and hypotension. Will also monitor for possible glucocorticoid withdrawal syndrome contributing to hypotension  - antibiotics:              - zosyn (started 5/7, for 10 day course)               - vanc (5/7-5/8)  - infectious workup                    - wound culture with 1+ gram negative rods, 3+ serratia x 2 cultures  - curbside ID line -- recommends continuing zosyn instead of ceftriaxone for serratia, for 7-10 course for soft tissue infection vs longer course 10-14 days if concerned for arterial insufficiency. Duration also pending clinical course  - transfuse for hgb < 7  - MRSA nares negative  - continue holding  lasix, lisinopril  - reduce PTA carvedilol to 6.25 mg BID from 12.5 mg BID, started 5/14/23  - TTE unchanged from prior  - encourage PO intake     #Acute blood loss anemia on chronic microcytic anemia, resolved  Significant bleeding following I&D of R leg abscess, ultimately requiring 3u pRBC over subsequent 4 days. Blood loss anemia contributed to hypotension as above.   - trend hemoglobin  - monitor surgical site for bleeding  - check iron studies  - folate, B12 wnl during 4/14/23    # chronic buttocks, sacrum and bilateral groin wounds   # RLE wound from puncture injury   # L dorsal foot wound from presumed trauma   # Hx serratia bacteremia in December 2022   For patient's lower extremity wounds and hx of Serratia bacteremia in December, he was initially  [facility]. His antibiotics were broadened to Vanc and Zosyn and ultimately he was transitioned to ceftazidime on 3/23 with plan to complete course on 4/4/23. BCx negative and wound cx grew serratia marcescens at the [facility]. He has been on ceftazidime since. MRI of the right tib/fib on 3/23 was negative for osteomyelitis but did show two fluid collections draining sponateneously. [facility]o [facility] at the [facility] and determined no intervention was needed as wounds were draining on their own.   - completed course of ceftazidime (3/23 - 4/5)  - PT/OT   - Pain: tylenol 975 mg Q6H PRN     # Pituitary adenoma resection this admission, c/b significant scar tissue to cavernous sinus, 4/27/23  #R eye ptosis, esotropia  # ACTH secreting pituitary adenoma c/b type II DM, hypothyroidism and low testosterone s/p two partial resections in 2021   Patient noted double vision and severe headache beginning the evening of 3/25. CT imaging on 3/25 revealed a large sellar/suprasellar mass extending into the cavernous sinuses with no evidence of acute stroke. On 3/28, he underwent an MRI which confirmed the CT findings of a large mass invading the cavernous sinuses and impinging on the cranial nerves. Necrosis extending beyond left cavernous sinus. [facility]. Repeat MRI performed on 4/6/23: compared to 2021 MRI, lesions are smaller. Orbit MRI without optic nerve compression and no evidence of orbital infection.  - 4/27 neurosurgery and ENT combined to perform endoscopic endonasal transcavernous approach for resection of functional pituitary adenoma. Complicated by significant scar tissue to cavernous sinus. Good post-op recovery. Follow up MRI done 5/29. Neurosurgery okay with [facility] to [facility] once [facility]. They will arrange follow up.   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  # Hypernatremia  # Hypokalemia, resolved  Hypernatremia and hypokalemia, likely secondary to Cushing's from pituitary macroadenoma that was unable to be completely removed  - Endocrine following, appreciate recs  - restart spironolactone 5/12 at lower dose 100 mg (prior dose 200 mg every day)   - Goal Na: 135-140  - Strict I&Os  - trend intermittent BMP, Mag  - check 24h urine cortisol and creatinine  - late night salivary cortisol x 2 pending   - endocrine working on establishing cortisol baseline which will dictate next steps (medical management vs radiation treatment)   - continue daily 8 AM cortisol     Per endocrine 5/11/23 review, consensus long-term approach:   1. Proceed with pituitary radiation - he was seen by radiation oncology while inpatient on 4/5/2023 - we will review with them next week re timing, per their note from 4/6/2023 the tentative thought was 5 weeks of external beam therapy.   2. 2-3 months post-op repeat baseline assessment of cortisol excess.   3. Wait to start medical therapy until 2-3 months post-op, and we favor use of pasireotide.    # Central Hypothyroidism  - Continue levothyroxine 112 mcg once daily      # Type II DM, exacerbated by Cushing's   A1c of 7.6% on 2/2023.  Endocrinology managing. Current regimen:   - medium sliding scale insulin for AC & HS  - Stopped Jardiance 10 mg daily per endocrine   - stopped carb coverage insulin  - stopped insulin glargine  - hypoglycemia protocol  - Accu checks AC & HS      #Type II MI   # HFmrEF, with global hypokinesis  # Hypertension  # High burden of PVCs  # Prolonged QTc   # Elevated troponin, down-trended  Coronary angiogram on 2/27 showed normal coronaries. Trop mildly elevated on admit 88; downtrended to 77 without targeted intervention. Was noted to have high burden of PVCs on tele. TTE  4/6/2023 showed EF 40-45%, severe diffuse hypokinesis, normal RV function, and mild-moderate aortic regurgitation; overall, not significantly changed from prior TTE 2/17/23. 5/6/23, pt w/ new episode of hypotension of unclear cause. Troponin checked and 177-->170-->172. Most likely 2/2 demand ischemia in s/o hypotension. Patient denied chest pain. EKG with sinus rhythm, LVH, RBBB, and with t wave inversions more evident in anterior leads as compared to prior EKG 4/23/23.   - Repeat TTE unchanged  - troponin plateaued, will not recheck unless clinically changes  - Q4H vital signs  - recheck EKG, trop if chest pain occurs  - Lasix 40mg daily--held since surgery on 4/27. Restarted 4/30. Held 5/6    - Carvedilol 6.25 mg BID restarted 5/14  (PTA dose 25 mg BID)   -- lisinopril 5 mg daily restarted 5/15 at reduced dose (PTA 20 mg daily)   - ASA - restarted 5/5/23, held 5/9/2023 due to bleeding, restarted 5/12  - Avoid QTc prolonging medications    #Dental caries     Pt w/ acute tooth pain 5/11/23. Dental consulted.CT dental performed and revealed multiple scattered periapical lucencies, which may be seen with small periapical abscesses, w/ no definite evidence for cellulitis or soft tissue abscess. Recommended extraction of tooth #1,2,11,14,30,32 at least. Per dental:  1. At this time, clinical and radiographic findings do indicate the need for  extraction of tooth # 1, 2, 11 14, 30, 32 at least. Below is the contact information of the [facility] clinic.  a. [facility] [address], [place] [address] [phone]    b. The care coordinator/RN of the patient will need to call the scheduling team of the [facility] dental clinic and get an appointment for the extraction.  The primary team will be responsible to arrange the transportation of the patient if inpatient.  - abx as above w/ zosyn  - pain mgmt per primary team  - further eval w/ dental radiographs and clinical exam  at [facility] dental clinic       # Acute metabolic encephalopathy - resolved  # Pyruria, Candiduria - resolved  # Encephalopathy likely secondary to cushing's disease, resolved  Admission symptoms included disorientation, intermittently following directions. Repetitive words - perseverating on particular phrases. Sx started the 2-3 days prior to admission (which [facility] was the [facility]). Vulnerable brain (multiple brain surgeries), enlarging pituitary mass. No seizure activity per EEG. Steroids removed and pt still encephalopathic. Sodium had been in normal limits and patient remained altered. Worked up for infections - did reveal candiduria but unlikely this was etiology, though he did complete an 8 day course of fluconazole. Briefly on ceftriaxone but encephalopathy also did not improve. CT abd w/ contrast (4/10): no evidence of pyelonephritis  Ultimately attributed to Cushings and known pituitary tumor. Continues to remain lucid.  - Surgical management per neurosurgery  - Delirium precautions  - Electrolyte management as above  - PRN quetiapine if agitation that is not responsive to behavioral interventions      # Hypogonadism   - hold PTA androgel (would need to bring in home medication, not accessible at this time)      # Chronic microcytic anemia   Hgb of 8.4 on [facility]. Peripheral smear on 3/30 showed poikilocytosis with occasional red blood cell fragments but no other evidence of hemolysis.  Haptoglobin was normal.  Ferritin was 91, transferrin saturation was low, B12 was 495 and folate was 8.7. Likely combination of iron deficiency as well as inflammation/chronic disease.   - CTM        # Concern for malnutrition   - Nutrition following     # Delusions, intermittent, resolved  Intermittent delusions regarding staff stalking patient while in the hospital. Unclear chronicity of these delusions - but has had them multiple times this hospitalization. Unclear psych history. Medical management for  encephalopathy as stated above. Psychiatry was consulted during his care and agreed with paranoia and recommended Abilify. He was briefly on Abilify 5mg but patient then started to refuse nightly so this was discontinued. His paranoia and delusions have been very intermittent and appear to be associated with his anxiety.      Discharge Needs  - Follow up in 6 weeks with Dr Jeong, neurosurgery (message sent to schedulers),  - MRI pituitary in 6 weeks before discharge (message sent to schedulers)  - Upright lumbar xrays to evaluate his compression fractures (ordered by neurosurgery)   - reach out to ENT to see if follow-up is needed in outpatient clinic (Jean Baptiste splints removed while inpatient)        Diet: Diet  Regular Diet Adult  Snacks/Supplements Adult: Other; Glucerna PRN; Between Meals  Snacks/Supplements Adult: Ensure Clear; With Meals    DVT Prophylaxis: Pneumatic Compression Devices  Roland Catheter: Not present  Lines: PRESENT      PICC 04/06/23 Double Lumen Right Basilic access-Site Assessment: WDL      Cardiac Monitoring: None  Code Status: Full Code      Clinically Significant Risk Factors              # Hypoalbuminemia: Lowest albumin = 2.9 g/dL at 4/11/2023  7:07 AM, will monitor as appropriate           # DMII: A1C = 9.5 % (Ref range: <5.7 %) within past 6 months    # Severe Malnutrition: based on nutrition assessment         Disposition Plan      Expected Discharge Date: 05/17/2023    Discharge Delays: *Medically Ready for Discharge  Placement - TCU  Complex Discharge  Destination: home  Discharge Comments: [facility] ([facility]  Delays:Destination  Progress: TCU referrals made. Needs appt scheduled & transport arranged to dental clinic for multiple tooth extractions per IP dental consult this week unless dc imminent. HCD form given.        Leela Verdin MD  Medicine Service, [facility] TEAM 2  [facility]  Securely message with  Rafael (more info)  Text page via [facility] Paging/Directory   See signed in provider for up to date coverage information  ______________________________________________________________________    Interval History     NAEO    Pt doing well this morning, in no distress. Seems tired, and expresses some sadness about still being in the hospital and not at [facility] or [facility].      Checked in again in the afternoon, pt chatting on the phone and in better spirits. Many snacks and doritos at bedside.     Physical Exam   Vital Signs: Temp: 97.6  F (36.4  C) Temp src: Axillary BP: (!) 122/92 Pulse: 98   Resp: 16 SpO2: 100 % O2 Device: None (Room air)    Weight: 154 lbs 0 oz    Constitutional: no apparent distress, nontoxic appearing  HEENT: NCAT. R eye ptosis  Cardiovascular: warm and well perfused extremities   Respiratory:  Comfortable conversational breathing on room air  GI: abdomen non distended  Extremities: R lower extremity wrapped in kerlix bandage, c/d/i without bleed through  Neuro: alert and oriented, answers questions appropriately      Data         Imaging results reviewed over the past 24 hrs:   No results found for this or any previous visit (from the past 24 hour(s)).

## 2023-05-16 NOTE — PROGRESS NOTES
Endocrine progress note:    We continue to follow Mr. Faria for multiple endocrinopathies.    Assessment/plan:     # ACTH secreting macroadenoma - Cushing's disease, s/p 3 pituitary surgeries (most recently 4/27/2023) with persistent disease     5/9/2023 -24 hour urine collection (only 0.625 L) with cortisol 75 mcg. Note he did receive 20 mg of hydrocortisone in the AM on 5/8/2023.     A second 24 hour urine collection for cortisol was done on 5/10/2023 - results pending. LNSC from 5/10/2023 is at 1.918 micrograms/dl (<0.112 ug/dl) and 5/11/2023 is at 0.887 micrograms/dl both high which reflects that he still has some residual disease.    His a.m. cortisol from today 5/16 is at 13.5.    We may want to give julian procedural hydrocortisone with his upcoming tooth extraction - when there is a date and anesthesia plan please let us know.     On 5/11/2023 we reviewed Mr. Faria case with multiple Endocrine colleagues in a case conference and our consensus long-term approach will be:  1. Proceed with pituitary radiation - he was seen by radiation oncology while inpatient on 4/5/2023 - we will review with them next week re timing, per their note from 4/6/2023 the tentative thought was 5 weeks of external beam therapy.   2. 2-3 months post-op repeat baseline assessment of cortisol excess.   3. Wait to start medical therapy until 2-3 months post-op, and we favor use of pasireotide.    Did talk to radiation oncology today morning to know about the logistics and timing for his radiation treatment.  Will await further recommendations.    # Central hypothyroidism     Continue levothyroxine 112 mcg once daily.      # Diabetes mellitus type 2     Has recently had minimal insulin needs in the setting of minimal intake (he is not fond of the hospital food). Continue with only a correction scale TID before meals (medium insulin resistance). Glucose checks fasting, before meals, and at bedtime. Goal glucose <180 mg/dL.     #  Osteoporosis with compression fractures    ==========================================================    Interval History:  Overall patient mention.  He is doing fine.  He denies of any nausea or vomiting or increased tiredness.  He has been eating little better compared to previously.  His blood sugars continue to be well controlled with just sliding scale insulin.    EXAM:    BP 98/75 (BP Location: Left arm)   Pulse 96   Temp 97.5  F (36.4  C) (Oral)   Resp 18   Wt 65.6 kg (144 lb 11.2 oz)   SpO2 100%   BMI 23.36 kg/m      Constitutional: healthy, alert and no distress   Respiratory: Breathing normally on room air  Psychiatric: mentation appears normal and affect normal/bright  Head: negative  NEURO: Alert and oriented    Data:   Latest Reference Range & Units 05/14/23 07:25 05/15/23 07:51 05/16/23 08:04   Cortisol Serum ug/dL 14.4 12.7 13.5       Patient was discussed with On call Attending   Patient labs, chart and imaging reviewed      Rhiannon Arroyo  Endocrinology Fellow  [phone]

## 2023-05-16 NOTE — PLAN OF CARE
RN assumed cares at [phone]     Vitals: VSS on room air.  Pain: denied pain  Neuro: A&Ox4; calm and cooperative.   Cardiac: WDL  Peripheral neurovascular: trace dependent edema on BLE. Edema wraps are on pt currently - attempt to keep them on as much as the patient allows.  Respiratory: Lung sounds clear/diminished and equal bilaterally. Stable on room air. Pt denies SOB/BERNAL. No respiratory distress noted.  GI/: Continent of bowel & bladder. Voiding spontaneously. x2 BM today.   IV/Drains: DL PICC - purple is saline locked; red is heparin locked.   Skin: Skin check completed without any new concerns. BLE and coccyx wounds dressing changes completed via WOC - dressing changes due daily now - see active patient wound care orders.  Activity: Assist x1-2 in bed. Lift to wheelchair.  Pt intermittently declining repositioning - continue to encourage them.  Nutrition: Regular diet.      Events/plan: Pt is medically ready to discharge pending TCU placement or [facility] once [facility].     No acute events. Q1hr rounding completed. Call light within reach and is able to make needs known.     Goal Outcome Evaluation:

## 2023-05-16 NOTE — PROGRESS NOTES
Brief Social Work Note:     communicated with [place] [address] Provider, Dr. Maguire, who reported she has reached out to the [facility] regarding the hospital transfer and the case has been escalated at the [facility].       spoke with Yumiko Oliver, Lead Nurse, Radiation Oncology Clinic   Phone: [phone], stating that the radiation oncology team is reaching out to the [facility] for [facility], as patient may require a course of radiation, but also may be appropriate for the [facility], given PT/OT TCU recs.     spoke with nurse manager, Shana Moraes, who reported that Dr. Bates has called the [facility] today and had the patient escalated for hospital transfer.    LUPILLO Gu, [facility]  [address]   [email or web address]  Office: [phone]   Pager: [phone]

## 2023-05-17 ENCOUNTER — APPOINTMENT (OUTPATIENT)
Dept: OCCUPATIONAL THERAPY | Facility: CLINIC | Age: 58
DRG: 614 | End: 2023-05-17
Attending: STUDENT IN AN ORGANIZED HEALTH CARE EDUCATION/TRAINING PROGRAM
Payer: COMMERCIAL

## 2023-05-17 ENCOUNTER — APPOINTMENT (OUTPATIENT)
Dept: PHYSICAL THERAPY | Facility: CLINIC | Age: 58
DRG: 614 | End: 2023-05-17
Attending: STUDENT IN AN ORGANIZED HEALTH CARE EDUCATION/TRAINING PROGRAM
Payer: COMMERCIAL

## 2023-05-17 LAB
GLUCOSE BLDC GLUCOMTR-MCNC: 108 MG/DL (ref 70–99)
GLUCOSE BLDC GLUCOMTR-MCNC: 111 MG/DL (ref 70–99)
GLUCOSE BLDC GLUCOMTR-MCNC: 135 MG/DL (ref 70–99)
GLUCOSE BLDC GLUCOMTR-MCNC: 84 MG/DL (ref 70–99)
GLUCOSE BLDC GLUCOMTR-MCNC: 86 MG/DL (ref 70–99)

## 2023-05-17 PROCEDURE — 97530 THERAPEUTIC ACTIVITIES: CPT | Mod: GP

## 2023-05-17 PROCEDURE — 250N000011 HC RX IP 250 OP 636

## 2023-05-17 PROCEDURE — 97535 SELF CARE MNGMENT TRAINING: CPT | Mod: GO | Performed by: OCCUPATIONAL THERAPIST

## 2023-05-17 PROCEDURE — 250N000013 HC RX MED GY IP 250 OP 250 PS 637

## 2023-05-17 PROCEDURE — 250N000013 HC RX MED GY IP 250 OP 250 PS 637: Performed by: INTERNAL MEDICINE

## 2023-05-17 PROCEDURE — 97530 THERAPEUTIC ACTIVITIES: CPT | Mod: GO | Performed by: OCCUPATIONAL THERAPIST

## 2023-05-17 PROCEDURE — 999N000007 HC SITE CHECK

## 2023-05-17 PROCEDURE — 99232 SBSQ HOSP IP/OBS MODERATE 35: CPT | Mod: GC | Performed by: STUDENT IN AN ORGANIZED HEALTH CARE EDUCATION/TRAINING PROGRAM

## 2023-05-17 PROCEDURE — 120N000002 HC R&B MED SURG/OB UMMC

## 2023-05-17 RX ADMIN — SALINE NASAL SPRAY 2 SPRAY: 1.5 SOLUTION NASAL at 17:51

## 2023-05-17 RX ADMIN — Medication 5 ML: at 16:12

## 2023-05-17 RX ADMIN — CARVEDILOL 6.25 MG: 6.25 TABLET, FILM COATED ORAL at 07:59

## 2023-05-17 RX ADMIN — SPIRONOLACTONE 200 MG: 100 TABLET ORAL at 07:59

## 2023-05-17 RX ADMIN — ASPIRIN 81 MG: 81 TABLET ORAL at 07:59

## 2023-05-17 RX ADMIN — PIPERACILLIN AND TAZOBACTAM 3.38 G: 3; .375 INJECTION, POWDER, LYOPHILIZED, FOR SOLUTION INTRAVENOUS at 05:56

## 2023-05-17 RX ADMIN — Medication 1 TABLET: at 11:23

## 2023-05-17 RX ADMIN — LISINOPRIL 5 MG: 2.5 TABLET ORAL at 07:59

## 2023-05-17 RX ADMIN — SALINE NASAL SPRAY 2 SPRAY: 1.5 SOLUTION NASAL at 16:15

## 2023-05-17 RX ADMIN — PIPERACILLIN AND TAZOBACTAM 3.38 G: 3; .375 INJECTION, POWDER, LYOPHILIZED, FOR SOLUTION INTRAVENOUS at 11:23

## 2023-05-17 RX ADMIN — Medication 5 ML: at 16:13

## 2023-05-17 RX ADMIN — LEVOTHYROXINE SODIUM 112 MCG: 0.11 TABLET ORAL at 07:59

## 2023-05-17 RX ADMIN — CARVEDILOL 6.25 MG: 6.25 TABLET, FILM COATED ORAL at 17:51

## 2023-05-17 ASSESSMENT — ACTIVITIES OF DAILY LIVING (ADL)
ADLS_ACUITY_SCORE: 46

## 2023-05-17 NOTE — PROGRESS NOTES
Care Management Follow Up    Length of Stay (days): 44  Expected Discharge Date: 05/17/2023  Concerns to be Addressed: Discharge planning   Patient plan of care discussed at interdisciplinary rounds: Yes  Anticipated Discharge Disposition: Transitional Care  Anticipated Discharge Services: None  Anticipated Discharge DME: None  Patient/family educated on Medicare website which has current facility and service quality ratings: yes  Education Provided on the Discharge Plan: Yes  Patient/Family in Agreement with the Plan: yes  Referrals Placed by CM/SW: External Care Coordination, Communication hand-offs to next level of Care Providers, Senior Linkage Line, Post Acute Facilities  Private pay costs discussed: Not applicable     [facility] (Ph: [phone], Admissions: [phone], F: [phone])  5/13: Initial SNF referral made in Epic.  5/15: SW called admissions, however admissions had left for the day and staff requested SW follow up tomorrow.     [facility] in [place] (P: [phone])  5/13: SW reached out to liaison to review pt for admission.  5/15: SW reached out to liaison and was told if patient wants to use his VA coverage, he cannot come to [facility] and would need to go to a [facility] contracted facility. If pt wants to use his [employer], then [facility] can review him. Due to limited participation in therapies at this time, would not accept pt unless he increases participation/willingness to work with therapies as directed.      [facility] (Ph: [phone], Admissions: [phone] F: [phone])  5/13: Initial SNF referral made in Epic.  5/15: SW left a VM with Xavi in admissions requesting a call back.      [facility] (Ph: [phone], Admissions: [phone], F: [phone])  5/13: Initial SNF referral made in Epic.  5/15: SW left a VM with admissions requesting a call back.      [facility] (Ph: [phone], Admissions:  [phone], F: [phone])  5/13: Initial SNF referral made in Epic.  5/15: SW called admissions and left VM requesting a call back.      [facility] (Ph: [phone], Admissions: [phone], F: [phone])  5/13: Initial SNF referral made in Epic.   5/15: SW spoke with Shana in admissions, whose [facility] is down. Shana will call back once she is able to review.      Inactive/Discontinued TCU Referrals:     [facility]-Declined; pt not COVID vaccinated, no beds  [facility]-Declined d/t insurance.  [facility]-Declined d/t elopement risk.  [facility]-Declined; no reason given.  [facility]- Declined; no beds available until 5/23  [facility]- Declined; insurance not accepted  [facility] - Declined; due to pt behaviors and marijuana use  [facility] [place]- Declined; no available beds  [facility]- Declined; no beds available. Could not meet needs with complexity of current patients.  [facility]- Declined; no beds available until Thursday (1 bed).  [facility] - Declined; no beds in TCU or LTC     Resources:     [facility] Patient Placement (Phone: [phone], option 1)  Call daily around 1pm (not earlier); pt has transfer agreement for pituitary adenoma.     [facility] (Ph: [phone] ex. [phone])  Can help with discharge and coordination of services as needed     Additional Info: DARLING met with pt at bedside for supportive visit, per pt request. SW listened to the patient's concerns about being hospitalized for an extended period and not having the [facility]. SW provided active listening, reassurance, and empathy and discussed efforts made to get [facility] the [facility] on a [facility] as well as efforts to admit pt to a TCU. Pt requested that the TCU search be expanded to facilities in [place].    DARLING called &  spoke with the [facility] and learned that there are no available med surg beds today as they continue to have boarders in their ED. SW advised that the patient is medically ready to return and there is a hospital return agreement in-place and requested that this matter be escalated.     SW delegated task of TCU referral follow-up and making additional referrals to TCUs in [place] to  floor Community Health Worker Antwon.  __________________________     LUPILLO Simpson, LEV  /Care Coordinator  [facility]   [address] 5B Medicine Beds 9225-6924 & 5C Medicine Overflow Beds 1429-0557  Phone: [phone]  Pager: [phone]  Fax: [phone]  [email or web address]  Message me on [employer] ambreen.    Weekend 5A & 5B  Pager: [phone]   Weekend 5A & 5B RNCC Pager: [phone]  Weekdays after hours ([phone] 0000), Saturday & Sunday after hours ([phone]), & [facility] Recognized Holidays Coverage  Pager: [phone]

## 2023-05-17 NOTE — PROGRESS NOTES
Care Management Follow Up    [facility] (Ph: [phone], Admissions: [phone], F: [phone])  5/13: Initial SNF referral made in Epic.  5/15: SW called admissions, however admissions had left for the day and staff requested SW follow up tomorrow.   5/17:  CHW spoke with Horacio in admissions and they do have openings requested we resend over referral which CHW did.    [facility] in [place] (P: [phone])  5/13: SW reached out to liaison to review pt for admission.  5/15: SW reached out to liaison and was told if patient wants to use his VA coverage, he cannot come to [facility] and would need to go to a [facility] contracted facility. If pt wants to use his [employer], then [facility] can review him. Due to limited participation in therapies at this time, would not accept pt unless he increases participation/willingness to work with therapies as directed.      [facility] (Ph: [phone], Admissions: [phone] F: [phone])  5/13: Initial SNF referral made in Epic.  5/15: SW left a VM with Xavi in admissions requesting a call back.    5/17: CHW resent fax after failed attempt via [facility].     [facility] (Ph: [phone], Admissions: [phone], F: [phone])  5/13: Initial SNF referral made in Epic.   5/15: SW spoke with Shana in admissions, whose Epic is down. Shana will call back once she is able to review.   5/17: CHW LVM for admissions to f/u on referral; requested they call SW back.       CHW placed the following referrals on the pt behalf:     [facility]  [address] [place] [address] [phone]  5/17: CHW sent initial referral via [facility].   5/17: Declined d/tHistory of violence and/or drug or alcohol abuse     [facility]  [address] [place] [address] [phone]  5/17: CHW sent initial referral via Epic.   5/17: Declined d/t Out of Insurance Network     [facility]  [address] [place] [address]  [phone]  5/17: CHW sent initial referral via [employer].     [place] [address] [place] [address]  [phone]  5/17: CHW sent initial referral via Epic.       Inactive/Discontinued TCU Referrals:     [facility]-Declined; pt not COVID vaccinated, no beds  [facility]-Declined d/t insurance.  [facility]-Declined d/t elopement risk.  [facility]-Declined; no reason given.  [facility]- Declined; no beds available until 5/23  [facility]- Declined; insurance not accepted  [facility] - Declined; due to pt behaviors and marijuana use  [facility] [place]- Declined; no available beds  [facility]- Declined; no beds available. Could not meet needs with complexity of current patients.  [facility]- Declined; no beds available until Thursday (1 bed).  [facility] - Declined; no beds in TCU or LTC  [facility] - Declined d/t no beds available.  [facility] - Declined d/t payor source.    Gage Smart   Inpatient Community Health Worker  [facility] 5A & 5B

## 2023-05-17 NOTE — PROGRESS NOTES
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 [facility], he developed new onset double vision and severe headache which prompted imaging that showed a large mass invading the cavernous sinuses and impinging on the cranial nerves. He is transferred to [facility] for this pituitary adenoma, concern for progression vs hemorrhage/apoplexy. Complicated by psychosis/metabolic encephalopathy and electrolyte abnormalities secondary to Cushing's syndrome. Underwent endoscopic endonasal transcavernous approach for resection of functional pituitary adenoma 4/27.      Updates today:  - medically stable and awaiting discharge to TCU or transfer to [facility]   - SW discussed transfer with [facility] again    #R lower leg abscess, soft tissue infection, +serratia,  s/p I&D 5/8/2023  #Hypotension, likely multifactorial 2/2 sepsis, blood loss anemia, resolveed  # Post-surgical Leukocytosis 2/2 inflammatory response, sepsis   Pt noted to have leukocytosis since POD1, WBC 15-17 range,  initially felt to likely be 2/2 post-surgical inflammatory response. Infection workup initially negative w/ unremarkable CXR, blood cultures, UA, chronic wound assessment by WOC, and normal vitals. POD9, RRT called for softer SBPs, generally 80s/50s. Pt noted to have normal to moderately hypertensive blood pressures for most of the hospitalization; SBPs in 80s unusual for patient. All blood pressure meds held. Neurosurg evaluated, does not think patient needs further meningitis workup  at this time. Careful physical exam ultimately revealed new opening of R chronic leg wounds with actively draining pus and blood. Surgery consulted. CT tib/fib revealed 4.9x3.2x1.8 cm soft tissue abscess of the medial aspect of the ankle with no evidence of osteomyelitis. Underwent bedside I&D of R lower leg on 5/8/23. R lower leg abscess growing +serratia. Notable ongoing bleeding from I&D site resulting in blood transfusion and hypotension. Will also monitor for possible glucocorticoid withdrawal syndrome contributing to hypotension  - antibiotics:              - zosyn (started 5/7, for 10 day course)               - vanc (5/7-5/8)  - infectious workup                    - wound culture with 1+ gram negative rods, 3+ serratia x 2 cultures  - curbside ID line -- recommends continuing zosyn instead of ceftriaxone for serratia, for 7-10 course for soft tissue infection vs longer course 10-14 days if concerned for arterial insufficiency. Duration also pending clinical course  - transfuse for hgb < 7  - MRSA nares negative  - continue holding  lasix, lisinopril  - reduce PTA carvedilol to 6.25 mg BID from 12.5 mg BID, started 5/14/23  - TTE unchanged from prior  - encourage PO intake     #Acute blood loss anemia on chronic microcytic anemia, resolved  Significant bleeding following I&D of R leg abscess, ultimately requiring 3u pRBC over subsequent 4 days. Blood loss anemia contributed to hypotension as above.   - trend hemoglobin  - monitor surgical site for bleeding  - check iron studies  - folate, B12 wnl during 4/14/23    # chronic buttocks, sacrum and bilateral groin wounds   # RLE wound from puncture injury   # L dorsal foot wound from presumed trauma   # Hx serratia bacteremia in December 2022   For patient's lower extremity wounds and hx of Serratia bacteremia in December, he was initially [facility]olin [facility]. His antibiotics were broadened to Vanc and Zosyn and ultimately he was transitioned to  ceftazidime on 3/23 with plan to complete course on 4/4/23. BCx negative and wound cx grew serratia marcescens at the [facility]. He has been on ceftazidime since. MRI of the right tib/fib on 3/23 was negative for osteomyelitis but did show two fluid collections draining sponateneously. Ortho was consulted at the [facility] and determined no intervention was needed as wounds were draining on their own.   - completed course of ceftazidime (3/23 - 4/5)  - PT/OT   - Pain: tylenol 975 mg Q6H PRN     # Pituitary adenoma resection this admission, c/b significant scar tissue to cavernous sinus, 4/27/23  #R eye ptosis, esotropia  # ACTH secreting pituitary adenoma c/b type II DM, hypothyroidism and low testosterone s/p two partial resections in 2021   Patient noted double vision and severe headache beginning the evening of 3/25. CT imaging on 3/25 revealed a large sellar/suprasellar mass extending into the cavernous sinuses with no evidence of acute stroke. On 3/28, he underwent an MRI which confirmed the CT findings of a large mass invading the cavernous sinuses and impinging on the cranial nerves. Necrosis extending beyond left cavernous sinus. [facility]. Repeat MRI performed on 4/6/23: compared to 2021 MRI, lesions are smaller. Orbit MRI without optic nerve compression and no evidence of orbital infection.  - 4/27 neurosurgery and ENT combined to perform endoscopic endonasal transcavernous approach for resection of functional pituitary adenoma. Complicated by significant scar tissue to cavernous sinus. Good post-op recovery. Follow up MRI done 5/29. Neurosurgery okay with [facility] to [facility] once bed is available. They will arrange follow up.   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  # Hypernatremia  # Hypokalemia, resolved  Hypernatremia and hypokalemia, likely secondary to Cushing's from pituitary macroadenoma that was unable to be completely removed  - Endocrine following, appreciate recs  - restart spironolactone 5/12 at lower dose 100 mg (prior dose 200 mg every day)   - Goal Na: 135-140  - Strict I&Os  - trend intermittent BMP, Mag  - check 24h urine cortisol and creatinine  - late night salivary cortisol x 2 pending   - endocrine working on establishing cortisol baseline which will dictate next steps (medical management vs radiation treatment)   - continue daily 8 AM cortisol     Per endocrine 5/11/23 review, consensus long-term approach:   1. Proceed with pituitary radiation - he was seen by radiation oncology while inpatient on 4/5/2023 - we will review with them next week re timing, per their note from 4/6/2023 the tentative thought was 5 weeks of external beam therapy.   2. 2-3 months post-op repeat baseline assessment of cortisol excess.   3. Wait to start medical therapy until 2-3 months post-op, and we favor use of pasireotide.    # Central Hypothyroidism  - Continue levothyroxine 112 mcg once daily      # Type II DM, exacerbated by Cushing's   A1c of 7.6% on 2/2023.  Endocrinology managing. Current regimen:   - medium sliding scale insulin for AC & HS  - Stopped Jardiance 10 mg daily per endocrine   - stopped carb coverage insulin  - stopped insulin glargine  - hypoglycemia protocol  - Accu checks AC & HS      #Type II MI   # HFmrEF, with global hypokinesis  # Hypertension  # High burden of PVCs  # Prolonged QTc   # Elevated troponin, down-trended  Coronary angiogram on 2/27 showed normal coronaries. Trop mildly elevated on admit 88; downtrended to 77 without targeted intervention. Was noted to have high burden of PVCs on tele. TTE 4/6/2023 showed EF 40-45%, severe diffuse hypokinesis, normal RV function, and mild-moderate aortic regurgitation; overall,  not significantly changed from prior TTE 2/17/23. 5/6/23, pt w/ new episode of hypotension of unclear cause. Troponin checked and 177-->170-->172. Most likely 2/2 demand ischemia in s/o hypotension. Patient denied chest pain. EKG with sinus rhythm, LVH, RBBB, and with t wave inversions more evident in anterior leads as compared to prior EKG 4/23/23.   - Repeat TTE unchanged  - troponin plateaued, will not recheck unless clinically changes  - Q4H vital signs  - recheck EKG, trop if chest pain occurs  - Lasix 40mg daily--held since surgery on 4/27. Restarted 4/30. Held 5/6    - Carvedilol 6.25 mg BID restarted 5/14  (PTA dose 25 mg BID)   -- lisinopril 5 mg daily restarted 5/15 at reduced dose (PTA 20 mg daily)   - ASA - restarted 5/5/23, held 5/9/2023 due to bleeding, restarted 5/12  - Avoid QTc prolonging medications    #Dental caries     Pt w/ acute tooth pain 5/11/23. Dental consulted.CT dental performed and revealed multiple scattered periapical lucencies, which may be seen with small periapical abscesses, w/ no definite evidence for cellulitis or soft tissue abscess. Recommended extraction of tooth #1,2,11,14,30,32 at least. Per dental:  1. At this time, clinical and radiographic findings do indicate the need for  extraction of tooth # 1, 2, 11 14, 30, 32 at least. Below is the contact information of the [facility].  a. [facility] [address], [place] [address] [phone]    b. The care coordinator/RN of the patient will need to call the scheduling team of the [facility] and get an appointment for the extraction.  The primary team will be responsible to arrange the transportation of the patient if inpatient.  - abx as above w/ zosyn  - pain mgmt per primary team  - further eval w/ dental radiographs and clinical exam at [facility] dental [facility]       # Acute metabolic encephalopathy - resolved  # Pyruria, Candiduria - resolved  #  Encephalopathy likely secondary to cushing's disease, resolved  Admission symptoms included disorientation, intermittently following directions. Repetitive words - perseverating on particular phrases. Sx started the 2-3 days prior to admission (which [facility] was [facility]). Vulnerable brain (multiple brain surgeries), enlarging pituitary mass. No seizure activity per EEG. Steroids removed and pt still encephalopathic. Sodium had been in normal limits and patient remained altered. Worked up for infections - did reveal candiduria but unlikely this was etiology, though he did complete an 8 day course of fluconazole. Briefly on ceftriaxone but encephalopathy also did not improve. CT abd w/ contrast (4/10): no evidence of pyelonephritis  Ultimately attributed to Cushings and known pituitary tumor. Continues to remain lucid.  - Surgical management per neurosurgery  - Delirium precautions  - Electrolyte management as above  - PRN quetiapine if agitation that is not responsive to behavioral interventions      # Hypogonadism   - hold PTA androgel (would need to bring in home medication, not accessible at this time)      # Chronic microcytic anemia   Hgb of 8.4 [facility]. Peripheral smear on 3/30 showed poikilocytosis with occasional red blood cell fragments but no other evidence of hemolysis.  Haptoglobin was normal.  Ferritin was 91, transferrin saturation was low, B12 was 495 and folate was 8.7. Likely combination of iron deficiency as well as inflammation/chronic disease.   - CTM        # Concern for malnutrition   - Nutrition following     # Delusions, intermittent, resolved  Intermittent delusions regarding staff stalking patient while in the hospital. Unclear chronicity of these delusions - but has had them multiple times this hospitalization. Unclear psych history. Medical management for encephalopathy as stated above. Psychiatry was consulted during his care and agreed with paranoia and recommended  Abilify. He was briefly on Abilify 5mg but patient then started to refuse nightly so this was discontinued. His paranoia and delusions have been very intermittent and appear to be associated with his anxiety.      Discharge Needs  - Follow up in 6 weeks with Dr Jeong, neurosurgery (message sent to schedulers),  - MRI pituitary in 6 weeks before discharge (message sent to schedulers)  - Upright lumbar xrays to evaluate his compression fractures (ordered by neurosurgery)   - reach out to ENT to see if follow-up is needed in outpatient clinic (Jean Baptiste splints removed while inpatient)        Diet: Diet  Regular Diet Adult  Snacks/Supplements Adult: Other; Glucerna PRN; Between Meals  Snacks/Supplements Adult: Ensure Clear; With Meals    DVT Prophylaxis: Pneumatic Compression Devices  Roland Catheter: Not present  Lines:   PICC 04/06/23 Double Lumen Right Basilic access-Site Assessment: WDL      Cardiac Monitoring: None  Code Status: Full Code      Clinically Significant Risk Factors              # Hypoalbuminemia: Lowest albumin = 2.9 g/dL at 4/11/2023  7:07 AM, will monitor as appropriate           # DMII: A1C = 9.5 % (Ref range: <5.7 %) within past 6 months    # Severe Malnutrition: based on nutrition assessment         Disposition Plan      Expected Discharge Date: 05/19/2023    Discharge Delays: *Medically Ready for Discharge  Placement - TCU  Complex Discharge  Destination: other (comment) ([facility] transfer vs. [facility]CU)  Discharge Comments: Dispo:[facility] to [facility] ([facility] agreement) vs TCU  Delays:Destination  Progress: TCU referrals made. Needs appt scheduled & transport arranged to dental clinic for multiple tooth extractions per IP dental consult this week unless dc imminent. HCD form given.         PT care was discussed with Dr Andrez Martinez MD  Medicine Service, MAROON TEAM 2  [facility]  Securely message with [employer] (more info)  Text page via [facility]  Paging/Directory   See signed in provider for up to date coverage information  ______________________________________________________________________    Interval History     NAEO    Pt doing well this morning, in no distress. Seems tired, and expresses some sadness about still being in the hospital and not at [facility] or [facility].      Checked in again in the afternoon, pt chatting on the phone and in better spirits. Many snacks and doritos at bedside.     Physical Exam   Vital Signs: Temp: 98.6  F (37  C) Temp src: Axillary BP: 112/82 Pulse: 88   Resp: 18 SpO2: 100 % O2 Device: None (Room air)    Weight: 144 lbs 11.2 oz    Constitutional: no apparent distress, nontoxic appearing  HEENT: NCAT. R eye ptosis  Cardiovascular: warm and well perfused extremities   Respiratory:  Comfortable conversational breathing on room air  GI: abdomen non distended  Extremities: R lower extremity wrapped in kerlix bandage, c/d/i without bleed through  Neuro: alert and oriented, answers questions appropriately      Data       Imaging results reviewed over the past 24 hrs:   No results found for this or any previous visit (from the past 24 hour(s)).

## 2023-05-17 NOTE — PROGRESS NOTES
Brief Radiation Oncology Progress Note    Dx: ACTH secreting pituitary adenoma    We previously saw Mr. Faria for formal consultation prior to debulking by Dr. Jeong on 4/27/23. We have discussed his case with Dr. Jeong, and given gross residual disease, will require adjuvant radiation therapy for approximately 5.5 weeks. The patient has significant claustrophobia and is concerned about his ability to tolerate treatment with mask daily. If he does require anesthesia for treatment, he will be unable to [facility], which would require the patient to have treatment here at the [facility], in which case he would require daily transportation from his TCU to and from his outpatient radiation therapy appointments. I have attempted to contact Social Work Anh Rudolph twice regarding discharge management. [facility] from [facility] therapy appointments, so he will require discharge to this specific TCU.     We are planning on bringing the patient down for a trial simulation and mask making to see if he will be able to tolerate this without anesthesia. If he is able to tolerate the mask, then [facility]. We have talked to Radiation Oncology staff at the [facility] and they are amenable to proceeding with adjuvant radiation therapy without anesthesia if patient is able to tolerate.     Please contact directly with questions/concerns.    Isabella Lang MD PGY4  Department of Radiation Oncology  [phone] [facility]  [phone] Pager      I   discussed the case with the resident and agree with the findings and plan   of care as documented in the resident's note.    Aminah Cole   [phone]

## 2023-05-17 NOTE — PLAN OF CARE
Goal Outcome Evaluation:    RN assumed cares [id number]     Pt A&Ox4, VSS on RA. R PICC purple cap infusing abx and TKO,  saline locked. Pt denies pain and nausea. R leg wounds ace wrap in place, L foot dressing in place. Edema stockings on BLE. Pt intermittently declining repositioning but overall cooperative.  and 108. Pt resting between cares. Pt waiting for placement.

## 2023-05-17 NOTE — PLAN OF CARE
[facility] cares 4468-4103     /82 (BP Location: Left arm)   Pulse 88   Temp 98.6  F (37  C) (Axillary)   Resp 18   Wt 65.6 kg (144 lb 11.2 oz)   SpO2 100%   BMI 23.36 kg/m       Pain: Patient had some oral pain. Oral gel given to patient.   Neuro: A&Ox4.    Respiratory: Lungs clear and equal, diminished in lower lobes. On RA.   Cardiac/Neurovascular: HR and pulse WDL. Baseline numbness/tingling in LE. Bilateral edema wraps on.   GI/: Adequate urine output. No BM today.   Nutrition: Regular diet.   Activity: Up with 2 can pivot or use ceiling lift.   Skin: Lower extremity wounds. All changed yesterday per WOC note. Every other day changes. No Mepi on sacrum per WOC note.   Lines: PICC R arm infusing TKO.   Events this shift: No new changes today. Patient wheeled himself upstairs and downstairs today. Ordered outside food in.      Plan: Waiting for [facility] bed availability.

## 2023-05-18 ENCOUNTER — OFFICE VISIT (OUTPATIENT)
Dept: RADIATION ONCOLOGY | Facility: CLINIC | Age: 58
End: 2023-05-18
Attending: RADIOLOGY
Payer: COMMERCIAL

## 2023-05-18 ENCOUNTER — APPOINTMENT (OUTPATIENT)
Dept: PHYSICAL THERAPY | Facility: CLINIC | Age: 58
DRG: 614 | End: 2023-05-18
Attending: STUDENT IN AN ORGANIZED HEALTH CARE EDUCATION/TRAINING PROGRAM
Payer: COMMERCIAL

## 2023-05-18 DIAGNOSIS — D35.2 PITUITARY ADENOMA (H): Primary | ICD-10-CM

## 2023-05-18 PROBLEM — E11.9 DIABETES MELLITUS, TYPE 2 (H): Status: ACTIVE | Noted: 2023-05-18

## 2023-05-18 LAB
GLUCOSE BLDC GLUCOMTR-MCNC: 103 MG/DL (ref 70–99)
GLUCOSE BLDC GLUCOMTR-MCNC: 107 MG/DL (ref 70–99)
GLUCOSE BLDC GLUCOMTR-MCNC: 149 MG/DL (ref 70–99)
GLUCOSE BLDC GLUCOMTR-MCNC: 87 MG/DL (ref 70–99)
GLUCOSE BLDC GLUCOMTR-MCNC: 96 MG/DL (ref 70–99)

## 2023-05-18 PROCEDURE — 99232 SBSQ HOSP IP/OBS MODERATE 35: CPT | Mod: GC | Performed by: STUDENT IN AN ORGANIZED HEALTH CARE EDUCATION/TRAINING PROGRAM

## 2023-05-18 PROCEDURE — 250N000013 HC RX MED GY IP 250 OP 250 PS 637

## 2023-05-18 PROCEDURE — 77334 RADIATION TREATMENT AID(S): CPT | Performed by: RADIOLOGY

## 2023-05-18 PROCEDURE — 97530 THERAPEUTIC ACTIVITIES: CPT | Mod: GP

## 2023-05-18 PROCEDURE — 120N000002 HC R&B MED SURG/OB UMMC

## 2023-05-18 PROCEDURE — 250N000011 HC RX IP 250 OP 636

## 2023-05-18 PROCEDURE — 77334 RADIATION TREATMENT AID(S): CPT | Mod: 26 | Performed by: RADIOLOGY

## 2023-05-18 PROCEDURE — 250N000013 HC RX MED GY IP 250 OP 250 PS 637: Performed by: INTERNAL MEDICINE

## 2023-05-18 PROCEDURE — 250N000009 HC RX 250

## 2023-05-18 RX ORDER — LORAZEPAM 1 MG/1
1 TABLET ORAL
Status: DISCONTINUED | OUTPATIENT
Start: 2023-05-18 | End: 2023-05-19 | Stop reason: HOSPADM

## 2023-05-18 RX ORDER — LORAZEPAM 2 MG/ML
1 INJECTION INTRAMUSCULAR
Status: DISCONTINUED | OUTPATIENT
Start: 2023-05-18 | End: 2023-05-19 | Stop reason: HOSPADM

## 2023-05-18 RX ADMIN — SODIUM FLUORIDE: 1.1 GEL ORAL at 19:00

## 2023-05-18 RX ADMIN — Medication 10 ML: at 16:32

## 2023-05-18 RX ADMIN — CARVEDILOL 6.25 MG: 6.25 TABLET, FILM COATED ORAL at 17:55

## 2023-05-18 RX ADMIN — Medication 1 TABLET: at 11:04

## 2023-05-18 RX ADMIN — ERGOCALCIFEROL 50000 UNITS: 1.25 CAPSULE, LIQUID FILLED ORAL at 08:29

## 2023-05-18 RX ADMIN — LEVOTHYROXINE SODIUM 112 MCG: 0.11 TABLET ORAL at 08:25

## 2023-05-18 RX ADMIN — ASPIRIN 81 MG: 81 TABLET ORAL at 08:25

## 2023-05-18 RX ADMIN — SPIRONOLACTONE 200 MG: 100 TABLET ORAL at 08:26

## 2023-05-18 RX ADMIN — Medication: at 14:59

## 2023-05-18 RX ADMIN — LISINOPRIL 5 MG: 2.5 TABLET ORAL at 08:25

## 2023-05-18 RX ADMIN — CARVEDILOL 6.25 MG: 6.25 TABLET, FILM COATED ORAL at 08:25

## 2023-05-18 ASSESSMENT — ACTIVITIES OF DAILY LIVING (ADL)
ADLS_ACUITY_SCORE: 46

## 2023-05-18 NOTE — LETTER
5/18/2023         RE: Gustavo Faria  [address]  [place] [address]        Dear Colleague,    Thank you for referring your patient, Gustavo Faria, to the [facility] RADIATION ONCOLOGY. Please see a copy of my visit note below.    Simulation Note    Date: 5/18/2023     Patient: Gustavo Faria    Diagnosis: D35.2 Pituitary adenoma    Type of Simulation:  Cure/ Definitive     Patient Position: Supine    Patient Immobilization: Custom:  Aquaplast Mask    Simulation Aid(s): None    Image Acquisition: Conventional CT simulation without 4D    Total Dose Planned: 5200 cGy      Dose/fraction:200 cGy    Energy of machine:  6 MV           Type of Radiotherapy Technique: IMRT(Intensity Modulated Radiotherapy)      Continuing Physcis/Dosimetry  and Dose calculations are ordered.  Simple simulations will be done prior to new start and changes in fields.  Weekly on treat visit.      FOREIGN Park M.D.  Department of Radiation Oncology  [facility]        Again, thank you for allowing me to participate in the care of your patient.        Sincerely,        Gio Park MD

## 2023-05-18 NOTE — PLAN OF CARE
Goal Outcome Evaluation:    RN assumed cares [id number]     Pt A&Ox4, VSS on RA. R PICC saline locked. Pt denies pain and nausea. BLE wound dressings in place. Pt intermittently declining repositioning but overall cooperative.  and 149. Pt resting between cares. Pt waiting for placement.

## 2023-05-18 NOTE — PROGRESS NOTES
Care Management Follow Up     [facility] (Ph: [phone], Admissions: [phone], F: [phone])  5/13: Initial SNF referral made in Epic.  5/15: SW called admissions, however admissions had left for the day and staff requested SW follow up tomorrow.   5/17:  CHW spoke with Horacio in admissions and they do have openings requested we resend over referral which CHW did.     [facility] in [place] (P: [phone])  5/13: SW reached out to liaison to review pt for admission.  5/15: SW reached out to liaison and was told if patient wants to use his VA coverage, he cannot come to [facility] and would need to go to a VA contracted facility. If pt wants to use his [employer], then [facility] can review him. Due to limited participation in therapies at this time, would not accept pt unless he increases participation/willingness to work with therapies as directed.      [facility] (Ph: [phone], Admissions: [phone] F: [phone])  5/13: Initial SNF referral made in Epic.  5/15: SW left a VM with Xavi in admissions requesting a call back.    5/17: CHW resent fax after failed attempt via [facility].   5/18: CHW LVM for admissions to f/u on referral; requested they call SW back.       [facility]  [address], [place] [id number] [phone]  5/17: CHW sent initial referral via [facility].   5/18: CHW LVM for admissions to f/u on referral; requested they call SW back.      CHW placed the following referrals on the pt behalf:     [facility]  [address] [place] [address] [phone]  5/18: CHW sent initial referral via [facility].     [facility]  [address] [address] [phone]  5/18: CHW sent initial referral via [facility].     [address]  [address]  P: [phone]  F: [phone]  5/18: CHW sent initial referral via [facility].     [facility] (Yee; P: [phone]; F: [phone])  5/18: CHW sent initial referral via Epic.     Inactive/Discontinued TCU Referrals:     [facility]-Declined; pt not COVID vaccinated, no beds  [facility]-Declined d/t insurance.  [facility]-Declined d/t elopement risk.  [facility]-Declined; no reason given.  [facility] [place] [facility]- Declined; no beds available until 5/23  [facility]- Declined; insurance not accepted  [facility] - Declined; due to pt behaviors and marijuana use  [facility] [place]- Declined; no available beds  [facility]- Declined; no beds available. Could not meet needs with complexity of current patients.  [facility]- Declined; no beds available until Thursday (1 bed).  [facility] - Declined; no beds in TCU or LTC  [facility] - Declined d/t no beds available.  [facility] - Declined d/t payor source.   [facility]: Acuity too high  [facility]:  Declined d/t Out of Insurance Network   [facility]-Declined d/tHistory of violence and/or drug or alcohol abuse   [facility]: Acuity too high    Gage Smart   Inpatient Community Health Worker  [facility] 5A & 5B

## 2023-05-18 NOTE — PLAN OF CARE
"Cares 0700 to 1500    /79 (BP Location: Left arm)   Pulse 90   Temp 97.5  F (36.4  C) (Axillary)   Resp 17   Wt 61.5 kg (135 lb 9.6 oz)   SpO2 99%   BMI 21.89 kg/m      VSS on room air. Alert & oriented x4. Denies pain. No SOB reported. Benzocaine gel given for tooth discomfort 1x. Pt frustrated this morning about prolonged stay in hospital. Pt anxious [facility], pt kept stating, \"I'm going to lose my bed.\" Pt went for a \"dry\" radiation trial this morning, then pivoted to wheelchair w/ heavy 2 assist. Pt wheeled himself to cafeteria & then resource center. Pt talked to Patient Relations to voice his dissatisfaction about not discharging to the [facility]. Pt refused skin assessment & wound cares stating that the wound cares were done yesterday. Pt also refused PT session this afternoon stating that he had gone around the unit several times in the morning. Waiting on discharge plan. Continue to encourage mobility & cooperation with wound cares.     Eye clinic appointment scheduled for 0830 tomorrow.   "

## 2023-05-18 NOTE — PROGRESS NOTES
Simulation Note    Date: 5/18/2023     Patient: Gustavo Faria    Diagnosis: D35.2 Pituitary adenoma    Type of Simulation:  Cure/ Definitive     Patient Position: Supine    Patient Immobilization: Custom:  Aquaplast Mask    Simulation Aid(s): None    Image Acquisition: Conventional CT simulation without 4D    Total Dose Planned: 5200 cGy      Dose/fraction:200 cGy    Energy of machine:  6 MV           Type of Radiotherapy Technique: IMRT(Intensity Modulated Radiotherapy)      Continuing Physcis/Dosimetry  and Dose calculations are ordered.  Simple simulations will be done prior to new start and changes in fields.  Weekly on treat visit.      FOREIGN Park M.D.  Department of Radiation Oncology  [facility]

## 2023-05-18 NOTE — PROGRESS NOTES
Brief Radiation Oncology Note    We brought Mr. Faria to our department this morning to see if he would be able to tolerate creation and wearing of an aquaplast mask without anesthesia, which would be required for his daily radiation treatments. He was able to tolerate treatment without anesthesia, and he stated that he would be able to tolerate wearing the mask for up to 30 minutes daily for 5.5 weeks.     I have contacted [facility] [place] [facility] Mr. Faria, and they are amenable to treating the patient should a bed become available at the [facility].     If the patient is unable to transfer to the [facility], please note that in order for him to have treatment here at [facility], he will require daily transportation to and from treatment ([facility] provides this).     Please contact with questions.    Isabella Lang MD PGY4  Department of Radiation Oncology  [phone] [facility]  [phone] Pager

## 2023-05-18 NOTE — PLAN OF CARE
No acute events this shift. Patient alert and oriented x4, cooperative, A1-A2 with repositioning and bed mobility. Good urine output  Wound cares done today by WOCN     ( please see flow sheet for wound description). Fair appetite this shift.  P: Medically stable to discharge. Awaiting for a VA bed.

## 2023-05-18 NOTE — PROGRESS NOTES
Care Management Follow Up    Length of Stay (days): 45  Expected Discharge Date:TBD; pending safe discharge plan  Concerns to be Addressed: Discharge planning   Patient plan of care discussed at interdisciplinary rounds: Yes  Anticipated Discharge Disposition: Transitional Care  Anticipated Discharge Services: None  Anticipated Discharge DME: None  Patient/family educated on Medicare website which has current facility and service quality ratings: yes  Education Provided on the Discharge Plan: Yes  Patient/Family in Agreement with the Plan: yes  Referrals Placed by CM/SW: External Care Coordination, Communication hand-offs to next level of Care Providers, Senior Linkage Line, Post Acute Facilities  Private pay costs discussed: Not applicable      [facility] (Ph: [phone], Admissions: [phone], F: [phone])  5/13: Initial SNF referral made in Epic.  5/15: SW called admissions, however admissions had left for the day and staff requested SW follow up tomorrow.  5/17:  CHW spoke with Edward in admissions and they do have openings requested we resend over referral which CHW did.     [facility] in [place] (P: [phone])  5/13: SW reached out to liaison to review pt for admission.  5/15: SW reached out to liaison and was told if patient wants to use his VA coverage, he cannot come to [facility] and would need to go to a VA contracted facility. If pt wants to use his [employer], then [facility] can review him. Due to limited participation in therapies at this time, would not accept pt unless he increases participation/willingness to work with therapies as directed.   5/18: SW requested that TCU re-review pt, use his Cign[employer] insurance, and continues to encourage the pt to participate in therapies.    [facility] (Ph: [phone], Admissions: [phone], F: [phone])  5/17: CHW sent initial referral via [employer].   5/18: CHW LVM for admissions to f/u on  referral; requested they call SW back.       CHW placed the following new referrals on the pt behalf:     [facility] (Ph: [phone], Admissions: [phone], F: [phone])   5/18: CHW sent initial referral via Epic.     [facility] (Ph: [phone], Admissions: [phone], F: [phone])  5/18: CHW sent initial referral via Epic.     [facility] (Ph: [phone], Admissions: [phone], F: [phone])  5/18: CHW sent initial referral via Epic.      [facility] (Yee; P: [phone]; F: [phone])  5/18: CHW sent initial referral via Epic.      Inactive/Discontinued TCU Referrals:     [facility]-Declined; pt not COVID vaccinated, no beds  [facility]-Declined d/t insurance.  [facility]-Declined d/t elopement risk.  [facility]-Declined; no reason given.  [facility]- Declined; no beds available until 5/23  [facility]- Declined; insurance not accepted  [facility] - Declined; due to pt behaviors and marijuana use  [facility]- Declined; no available beds  [facility]- Declined; no beds available. Could not meet needs with complexity of current patients.  [facility]- Declined; no beds available until Thursday (1 bed).  [facility] - Declined; no beds in TCU or LTC  [facility] - Declined d/t no beds available.  [facility] - Declined d/t payor source.   [facility]: Acuity too high  [facility]:  Declined d/t Out of Insurance Network   [facility]-Declined d/tHistory of violence and/or drug or alcohol abuse   [facility]: Acuity too high  [facility]-Declined; they are full.     Resources:     [facility] Patient Placement (Phone: [phone], option 1)  Call daily around 1pm (not earlier); pt has transfer agreement for  pituitary adenoma.     [facility] (Ph: [phone] ex. [phone])  Can help with discharge and coordination of services as needed     Additional Info: DARLING called [facility] Patient Placement, who are unable to accept the patient for a transfer today.    DARLING called & LVM for pt's sister Pilar to provide updates, per patient's request.    DARLING delegated task of TCU referral follow-up and making additional referrals to TCUs in [place] to 5th floor Community [facility] Worker Antwon.    DARLING met with pt & his sister Pilar (by phone) at bedside along with M2 provider. SW reviewed efforts made to formulate and execute a safe discharge plan for the patient thus far. Because the plan is for the patient to begin 5 weeks of radiation, as of this afternoon, the only two dispo options are [place] [facility] (as they can take patients on radiation) & a [facility] to the [facility]. SW will continue to pursue these dispo options. Pt discussed desire to go home. SW & M2 provider discussed the limits of home care, private duty home care costs, and pt needing 24/7 assist. Pt & sister Pilar will discuss options.   __________________________     LUPILLO Simpson, LEV  /Care Coordinator  [facility]   [address] 5B Medicine Beds 2506-7933 & 5C Medicine Overflow Beds 8119-3385  Phone: [phone]  Pager: [phone]  Fax: [phone]  [email or web address]  Message me on [employer] ambreen.     Weekend 5A & 5B  Pager: [phone]   Weekend 5A & 5B RNCC Pager: [phone]  Weekdays after hours (1630 - 0000), Saturday & Dangelo after hours (2221-5397), & [facility] Recognized Holidays Coverage  Pager: [phone]

## 2023-05-19 ENCOUNTER — APPOINTMENT (OUTPATIENT)
Dept: PHYSICAL THERAPY | Facility: CLINIC | Age: 58
DRG: 614 | End: 2023-05-19
Attending: STUDENT IN AN ORGANIZED HEALTH CARE EDUCATION/TRAINING PROGRAM
Payer: COMMERCIAL

## 2023-05-19 ENCOUNTER — OFFICE VISIT (OUTPATIENT)
Dept: OPHTHALMOLOGY | Facility: CLINIC | Age: 58
DRG: 614 | End: 2023-05-19
Attending: OPHTHALMOLOGY
Payer: COMMERCIAL

## 2023-05-19 VITALS
RESPIRATION RATE: 18 BRPM | WEIGHT: 145.3 LBS | DIASTOLIC BLOOD PRESSURE: 80 MMHG | HEART RATE: 90 BPM | SYSTOLIC BLOOD PRESSURE: 108 MMHG | BODY MASS INDEX: 23.45 KG/M2 | OXYGEN SATURATION: 100 % | TEMPERATURE: 98.4 F

## 2023-05-19 DIAGNOSIS — D35.2 PITUITARY ADENOMA (H): ICD-10-CM

## 2023-05-19 DIAGNOSIS — H59.89: ICD-10-CM

## 2023-05-19 DIAGNOSIS — D35.2 PITUITARY ADENOMA (H): Primary | ICD-10-CM

## 2023-05-19 DIAGNOSIS — H35.81 COTTON WOOL SPOTS: ICD-10-CM

## 2023-05-19 DIAGNOSIS — H50.21 HYPOTROPIA OF RIGHT EYE: ICD-10-CM

## 2023-05-19 DIAGNOSIS — H50.05 ALTERNATING ESOTROPIA: Primary | ICD-10-CM

## 2023-05-19 DIAGNOSIS — H02.401: ICD-10-CM

## 2023-05-19 LAB
GLUCOSE BLDC GLUCOMTR-MCNC: 112 MG/DL (ref 70–99)
GLUCOSE BLDC GLUCOMTR-MCNC: 122 MG/DL (ref 70–99)
GLUCOSE BLDC GLUCOMTR-MCNC: 94 MG/DL (ref 70–99)
SARS-COV-2 RNA RESP QL NAA+PROBE: NEGATIVE

## 2023-05-19 PROCEDURE — 250N000013 HC RX MED GY IP 250 OP 250 PS 637

## 2023-05-19 PROCEDURE — 250N000011 HC RX IP 250 OP 636

## 2023-05-19 PROCEDURE — G0463 HOSPITAL OUTPT CLINIC VISIT: HCPCS | Mod: 25 | Performed by: STUDENT IN AN ORGANIZED HEALTH CARE EDUCATION/TRAINING PROGRAM

## 2023-05-19 PROCEDURE — 92134 CPTRZ OPH DX IMG PST SGM RTA: CPT | Mod: 26 | Performed by: OPHTHALMOLOGY

## 2023-05-19 PROCEDURE — 92134 CPTRZ OPH DX IMG PST SGM RTA: CPT | Performed by: STUDENT IN AN ORGANIZED HEALTH CARE EDUCATION/TRAINING PROGRAM

## 2023-05-19 PROCEDURE — 92060 SENSORIMOTOR EXAMINATION: CPT | Mod: 26 | Performed by: OPHTHALMOLOGY

## 2023-05-19 PROCEDURE — 250N000013 HC RX MED GY IP 250 OP 250 PS 637: Performed by: INTERNAL MEDICINE

## 2023-05-19 PROCEDURE — 92133 CPTRZD OPH DX IMG PST SGM ON: CPT | Performed by: STUDENT IN AN ORGANIZED HEALTH CARE EDUCATION/TRAINING PROGRAM

## 2023-05-19 PROCEDURE — 99203 OFFICE O/P NEW LOW 30 MIN: CPT | Mod: GC | Performed by: OPHTHALMOLOGY

## 2023-05-19 PROCEDURE — 97530 THERAPEUTIC ACTIVITIES: CPT | Mod: GP

## 2023-05-19 PROCEDURE — 87635 SARS-COV-2 COVID-19 AMP PRB: CPT

## 2023-05-19 PROCEDURE — 92060 SENSORIMOTOR EXAMINATION: CPT | Performed by: STUDENT IN AN ORGANIZED HEALTH CARE EDUCATION/TRAINING PROGRAM

## 2023-05-19 PROCEDURE — 99239 HOSP IP/OBS DSCHRG MGMT >30: CPT | Performed by: STUDENT IN AN ORGANIZED HEALTH CARE EDUCATION/TRAINING PROGRAM

## 2023-05-19 RX ORDER — CARVEDILOL 6.25 MG/1
6.25 TABLET ORAL 2 TIMES DAILY WITH MEALS
DISCHARGE
Start: 2023-05-19

## 2023-05-19 RX ORDER — INSULIN LISPRO 100 [IU]/ML
1-7 INJECTION, SOLUTION INTRAVENOUS; SUBCUTANEOUS
DISCHARGE
Start: 2023-05-19

## 2023-05-19 RX ORDER — AZELASTINE 1 MG/ML
2 SPRAY, METERED NASAL 2 TIMES DAILY PRN
DISCHARGE
Start: 2023-05-19

## 2023-05-19 RX ORDER — POLYETHYLENE GLYCOL 3350 17 G/17G
17 POWDER, FOR SOLUTION ORAL 2 TIMES DAILY PRN
DISCHARGE
Start: 2023-05-19

## 2023-05-19 RX ORDER — SIMETHICONE 80 MG
80 TABLET,CHEWABLE ORAL 4 TIMES DAILY PRN
DISCHARGE
Start: 2023-05-19

## 2023-05-19 RX ORDER — SPIRONOLACTONE 100 MG/1
200 TABLET, FILM COATED ORAL DAILY
DISCHARGE
Start: 2023-05-20

## 2023-05-19 RX ORDER — LEVOTHYROXINE SODIUM 112 UG/1
112 TABLET ORAL
DISCHARGE
Start: 2023-05-20

## 2023-05-19 RX ORDER — AMOXICILLIN 250 MG
2 CAPSULE ORAL 2 TIMES DAILY PRN
DISCHARGE
Start: 2023-05-19

## 2023-05-19 RX ORDER — LISINOPRIL 5 MG/1
5 TABLET ORAL DAILY
DISCHARGE
Start: 2023-05-20

## 2023-05-19 RX ORDER — SODIUM FLUORIDE 5 MG/G
GEL, DENTIFRICE DENTAL AT BEDTIME
DISCHARGE
Start: 2023-05-19

## 2023-05-19 RX ORDER — ACETAMINOPHEN 325 MG/1
975 TABLET ORAL EVERY 6 HOURS PRN
DISCHARGE
Start: 2023-05-19

## 2023-05-19 RX ADMIN — ACETAMINOPHEN 975 MG: 325 TABLET, FILM COATED ORAL at 18:54

## 2023-05-19 RX ADMIN — LEVOTHYROXINE SODIUM 112 MCG: 0.11 TABLET ORAL at 11:56

## 2023-05-19 RX ADMIN — Medication 1 TABLET: at 11:56

## 2023-05-19 RX ADMIN — SPIRONOLACTONE 200 MG: 100 TABLET ORAL at 11:59

## 2023-05-19 RX ADMIN — CARVEDILOL 6.25 MG: 6.25 TABLET, FILM COATED ORAL at 17:56

## 2023-05-19 RX ADMIN — CARVEDILOL 6.25 MG: 6.25 TABLET, FILM COATED ORAL at 11:56

## 2023-05-19 RX ADMIN — Medication: at 18:55

## 2023-05-19 RX ADMIN — Medication 5 ML: at 17:55

## 2023-05-19 RX ADMIN — Medication: at 11:55

## 2023-05-19 RX ADMIN — LISINOPRIL 5 MG: 2.5 TABLET ORAL at 11:56

## 2023-05-19 ASSESSMENT — ACTIVITIES OF DAILY LIVING (ADL)
ADLS_ACUITY_SCORE: 46
ADLS_ACUITY_SCORE: 44
ADLS_ACUITY_SCORE: 46
ADLS_ACUITY_SCORE: 44
ADLS_ACUITY_SCORE: 46
ADLS_ACUITY_SCORE: 44
ADLS_ACUITY_SCORE: 46

## 2023-05-19 ASSESSMENT — CONF VISUAL FIELD
COMMENTS: VF TODAY
OD_SUPERIOR_NASAL_RESTRICTION: 0
OS_NORMAL: 1
OD_INFERIOR_TEMPORAL_RESTRICTION: 0
OS_SUPERIOR_NASAL_RESTRICTION: 0
OD_NORMAL: 1
OD_SUPERIOR_TEMPORAL_RESTRICTION: 0
OS_INFERIOR_TEMPORAL_RESTRICTION: 0
OS_SUPERIOR_TEMPORAL_RESTRICTION: 0
OD_INFERIOR_NASAL_RESTRICTION: 0
OS_INFERIOR_NASAL_RESTRICTION: 0

## 2023-05-19 ASSESSMENT — VISUAL ACUITY
METHOD: SNELLEN - LINEAR
OD_SC: 20/25
OS_SC+: -1
OS_SC: 20/25
OD_SC+: -2

## 2023-05-19 ASSESSMENT — CUP TO DISC RATIO
OD_RATIO: 0.7
OS_RATIO: 0.7

## 2023-05-19 ASSESSMENT — TONOMETRY
IOP_METHOD: TONOPEN
OD_IOP_MMHG: 12
OS_IOP_MMHG: 14

## 2023-05-19 NOTE — PROGRESS NOTES
Care Management Follow Up    Length of Stay (days): 46  Expected Discharge Date:TBD; pending safe discharge plan  Concerns to be Addressed: Discharge planning   Patient plan of care discussed at interdisciplinary rounds: Yes  Anticipated Discharge Disposition: Transitional Care vs. Return to [facility]  Anticipated Discharge Services: None  Anticipated Discharge DME: None  Patient/family educated on Medicare website which has current facility and service quality ratings: yes  Education Provided on the Discharge Plan: Yes  Patient/Family in Agreement with the Plan: yes  Referrals Placed by CM/SW: External Care Coordination, Communication hand-offs to next level of Care Providers, Senior Linkage Line, Post Acute Facilities  Private pay costs discussed: Not applicable   Active TCU Referrals:     [facility] [place] (P: [phone])  5/13: DARLING reached out to liaison to review pt for admission.  5/15: SW reached out to liaison and was told if patient wants to use his VA coverage, he cannot come to [facility] and would need to go to a VA contracted facility. If pt wants to use his [employer], then [facility] can review him. Due to limited participation in therapies at this time, would not accept pt unless he increases participation/willingness to work with therapies as directed.   5/18: SW requested that TCU re-review pt, use his Cign[employer] insurance, and continues to encourage the pt to participate in therapies.  5/19: DARLING consulted with TCU. Pt clinically approved for TCU. After rehab is no longer needed, would need to go home with sister to complete outpatient radiation course. Prior insurance auth submitted. No beds available for a few days; pt waitlisted.      Inactive/Discontinued TCU Referrals:     [facility]-Declined; pt not COVID vaccinated, no beds  [facility]-Declined d/t insurance.  [facility]-Declined d/t elopement risk.  [facility]-Declined; no  reason given.  [facility]- Declined; no beds available until 5/23  [facility]- Declined; insurance not accepted  [facility] - Declined; due to pt behaviors and marijuana use  [facility] [place]- Declined; no available beds  [facility]- Declined; no beds available. Could not meet needs with complexity of current patients.  [facility]- Declined; no beds available until Thursday (1 bed).  [facility] - Declined; no beds in TCU or LTC  [facility] [place] [facility] - Declined d/t no beds available.  [facility] - Declined d/t payor source.   [facility]: Acuity too high  [facility]:  Declined d/t Out of Insurance Network   [facility]-Declined d/tHistory of violence and/or drug or alcohol abuse   [facility]: Acuity too high  [facility]-Declined; they are full.     Resources:     [facility] Patient Placement (Phone: [phone], option 1 or Eliud: [phone])  Call daily around 1pm (not earlier); pt has transfer agreement for pituitary adenoma.     [facility] (Ph: [phone] ex. [phone])  Can help with discharge and coordination of services as needed     Additional Info: Because the patient is pursuing radiation treatment for 5 weeks, his dispo options are currently [place] [facility] (not yet clinically accepted) or return transfer to the [facility].    DARLING followed-up with [facility] liaison to determine if pt could be considered for admission. Pt is clinically approved for TCU, insurance prior auth to be submitted. No anticipated beds for several days; pt now on their wait list for TCU.    DARLING called the [facility] patient placement to determine if they have an available transfer bed for this patient. They are still admiting boarders from the ED prior to taking hospital transfers.    DARLING spoke with Eliud with [facility] Patient placement several  times in the day (see contact info listed above) and fiercely advocated for pt's transfer back to the VA. At 1530, VA indicated they could take pt today pending negative COVID test results. Provider ordered STAT COVID test. Provider & bedside nurse advised once COVID results are in to contact evening/on call SW. SW completed handoff to evening/on call SW to follow for discharge this evening.  __________________________     LUPILLO Simpson, SASHASW  /Care Coordinator  [facility]   [address] 5B Medicine Beds 8995-6514 & 5C Medicine Overflow Beds 6326-9958  Phone: [phone]  Pager: [phone]  Fax: [phone]  [email or web address]  Message me on [employer] ambreen.     Weekend 5A & 5B  Pager: [phone]   Weekend 5A & 5B RNCC Pager: [phone]  Weekdays after hours (1630 - 0000), Saturday & Sunday after hours ([phone]), & [facility] Recognized Holidays Coverage  Pager: [phone]

## 2023-05-19 NOTE — PLAN OF CARE
Patient appeared sad and has flat affect at times. Per report discharge to [facility] might be delayed due to bed availability and other options are considered as of this time. This he refused most nursing cares this AM including wound cares.He denies pain and no respiratory issues noted. Refused some evening medications. Also refused skin check and turning/repositioning. MRI of brain and with contrast ordered as well as lorazepam po prior to procedure. After informing the patient, he requested to be sedated as his past experience. Md ordered IV lorazepam and MRI checklist completed thereafter. Patient then again refused to go to MRI after he learned that he will only get lorazepam for sedation. MD informed. MRI deferred this evening and possibly be done tomorrow. Resting in bed as of this time.     P:Continue to monitor patient. For MRI under sedation tomorrow.

## 2023-05-19 NOTE — NURSING NOTE
No chief complaint on file.    Chief Complaint(s) and History of Present Illness(es)    Pt states he has had floaters for eyes  No flashes floaters, eye pain ortearing    Mela Cordon COT 8:54 AM May 19, 2023

## 2023-05-19 NOTE — DISCHARGE SUMMARY
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  # Central Hypothyroidism  # Hypogonadism  # Type II DM, exacerbated by Cushing's   # R eye ptosis, esotropia  # HFmrEF, with global hypokinesis  # Hypertension  # R lower leg abscess, soft tissue infection, +serratia, s/p I&D 5/8/2023  # Chronic buttocks, sacrum and bilateral groin wounds   # Dental caries     See additional secondary diagnoses below    Clinically Significant Risk Factors     # DMII: A1C = 9.5 % (Ref range: <5.7 %) within past 6 months  # Severe Malnutrition: based on nutrition assessment      Follow-ups Needed After Discharge   Follow-up Appointments     Follow Up ([facility])      Weekly cortisol checks until follow up with endocrinology.         Follow Up and recommended labs and tests      [facility] Consider   consulting endocrinology as well. Neurosurgery follow up in 6 weeks with   Dr Jeong (message sent to schedulers)  - MRI pituitary in 6 weeks before discharge (message sent to schedulers)               Discharge Disposition   Transferred to [facility]  Condition at discharge: Stable    Hospital Course    Gustavo Faria is a 58 year old male with recent Serratia bacteremia, HFrEF, prolonged QT, lower extremity wounds, DMII, hypothyroidism, low testosterone and known ACTH secreting pituitary adenoma w/resulting Cushing's disease s/p 2 partial resections in 2021 who [facility] for lower extremity wounds. [facility], he developed new onset double  vision and severe headache which prompted imaging that showed a large mass invading the cavernous sinuses and impinging on the cranial nerves. He is transferred to [facility] for this pituitary adenoma, concern for progression vs hemorrhage/apoplexy. Complicated by psychosis/metabolic encephalopathy and electrolyte abnormalities secondary to Cushing's syndrome. Underwent endoscopic endonasal transcavernous approach for resection of functional pituitary adenoma 4/27. Medically ready for transfer back to [facility] to initiate radiation therapy.     # Pituitary adenoma resection this admission, c/b significant scar tissue to cavernous sinus, 4/27/23  #R eye ptosis, esotropia  # ACTH secreting pituitary adenoma c/b type II DM, hypothyroidism and low testosterone s/p two partial resections in 2021   Patient noted double vision and severe headache beginning the evening of 3/25. CT imaging on 3/25 revealed a large sellar/suprasellar mass extending into the cavernous sinuses with no evidence of acute stroke. On 3/28, he underwent an MRI which confirmed the CT findings of a large mass invading the cavernous sinuses and impinging on the cranial nerves. Necrosis extending beyond left cavernous sinus. [facility]. Repeat MRI performed on 4/6/23: compared to 2021 MRI, lesions are smaller. Orbit MRI without optic nerve compression and no evidence of orbital infection. On 4/27 neurosurgery and ENT combined to perform endoscopic endonasal transcavernous approach for resection of functional pituitary adenoma. Complicated by significant scar tissue to cavernous sinus. Good post-op recovery. Follow up MRI done 5/29. Neurosurgery okay with [facility] to [facility] once [facility] is [facility] and they will arrange outpatient follow up. Radiation oncology was consulted by neurosurgery and endocrinology due to gross residual disease and they are recommending adjuvant radiation therapy for ~5 weeks. Rad Onc discussed [facility] with [facility] who  were amenable to proceeding with adjuvant radiation therapy while at [facility]. Had trial with mask while at [facility] to ensure pt would not require general anesthesia for these treatments.  - Radiation oncology consult once at [facility] to arrange for initiation of radiation therapy.  - Will likely need MRI for planning prior to initiation (per [facility] Rad Onc team, but would [facility] with [facility] team). Of note, pt states that he would need general anesthesia to have this done.  - Consider endocrinology consult at [facility] to follow weekly cortisol levels  - Opthalmology outpatient consult placed after discharge for eval of ptosis comprehensive eye exam   - Neurosurgery to arrange for outpatient follow up.    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 Proceed with pituitary radiation - he was seen by radiation oncology while inpatient on 4/5/2023 - we will review with them next week re timing, per their note from 4/6/2023 the tentative thought was 5 weeks of external beam therapy.   2. 2-3 months post-op repeat baseline assessment of cortisol excess.   3. Wait to start medical therapy until 2-3 months post-op, and we favor use of pasireotide.    # Central Hypothyroidism  - Continue levothyroxine 112 mcg once daily     # Hypogonadism   - hold PTA androgel (would need to bring in home medication, not accessible at this time)     ----------------------------------------------------------------    #R lower leg abscess, soft tissue infection, +serratia s/p I&D 5/8/2023  #Hypotension, likely  multifactorial 2/2 sepsis, blood loss anemia, resolved  # Post-surgical Leukocytosis - resolved  Pt noted to have leukocytosis since POD1, WBC 15-17 range,  initially felt to likely be 2/2 post-surgical inflammatory response. Infection workup initially negative w/ unremarkable CXR, blood cultures, UA, chronic wound assessment by WOC, and normal vitals. POD9, RRT called for softer SBPs, generally 80s/50s. Pt noted to have normal to moderately hypertensive blood pressures for most of the hospitalization; SBPs in 80s unusual for patient. All blood pressure meds held. Neurosurg evaluated, does not think patient needs further meningitis workup at this time. Careful physical exam ultimately revealed new opening of R chronic leg wounds with actively draining pus and blood. Surgery consulted. CT tib/fib revealed 4.9x3.2x1.8 cm soft tissue abscess of the medial aspect of the ankle with no evidence of osteomyelitis. Underwent bedside I&D of R lower leg on 5/8/23. R lower leg abscess growing +serratia  - Completed course of zosyn from 5/7-18    # Acute blood loss anemia on chronic microcytic anemia, resolved  Significant bleeding following I&D of R leg abscess, ultimately requiring 3u pRBC over subsequent 4 days. Blood loss anemia contributed to hypotension as above. Folate, B12 wnl during 4/14/23    # chronic buttocks, sacrum and bilateral groin wounds   # RLE wound from puncture injury   # L dorsal foot wound from presumed trauma   # Hx serratia bacteremia in December 2022   For patient's lower extremity wounds and hx of Serratia bacteremia in December, he was initially [facility]olin [facility]. His antibiotics were broadened to Vanc and Zosyn and ultimately he was transitioned to ceftazidime on 3/23 with plan to complete course on 4/4/23. BCx negative and wound cx grew serratia marcescens at the [facility]. He has been on ceftazidime since. MRI of the right tib/fib on 3/23 was negative for osteomyelitis but did show two fluid  collections draining sponateneously. Ortho was consulted at the [facility] and determined no intervention was needed as wounds were draining on their own.   - Recommend wound consult on transfer. Wound orders in discharge orders.     # Type II DM, exacerbated by Cushing's   A1c of 7.6% on 2/2023.  Endocrinology managing. Current regimen:   - medium sliding scale insulin for AC & HS  - Previously on Jardiance, glargine and carb coverage, no longer requiring.  - Accu checks AC & HS      #Type II MI   # HFmrEF, with global hypokinesis  # Hypertension  # High burden of PVCs  # Prolonged QTc   # Elevated troponin, down-trended  Coronary angiogram on 2/27 showed normal coronaries. Trop mildly elevated on admit 88; downtrended to 77 without targeted intervention. Was noted to have high burden of PVCs on tele. TTE 4/6/2023 showed EF 40-45%, severe diffuse hypokinesis, normal RV function, and mild-moderate aortic regurgitation; overall, not significantly changed from prior TTE 2/17/23. 5/6/23, pt w/ new episode of hypotension of unclear cause. Troponin checked and 177-->170-->172. Most likely 2/2 demand ischemia in s/o hypotension. Patient denied chest pain. EKG with sinus rhythm, LVH, RBBB, and with t wave inversions more evident in anterior leads as compared to prior EKG 4/23/23. Repeat TTE unchanged.   - Carvedilol 6.25 mg BID  - Lisinopril 5 mg daily   - ASA 81mg daily    # Dental caries   Pt w/ acute tooth pain 5/11/23. Dental consulted.CT dental performed and revealed multiple scattered periapical lucencies, which may be seen with small periapical abscesses, w/ no definite evidence for cellulitis or soft tissue abscess. Recommended extraction of tooth #1,2,11,14,30,32 at least. Per dental:  - At this time, clinical and radiographic findings do indicate the need for  extraction of tooth # 1, 2, 11 14, 30, 32 at least. Below is the contact information of the [facility] dental clinic.  a. [facility]  [facility] [address] [place] [address] [phone]    - Can follow up with dental at the [facility] or at [facility]    # Acute metabolic encephalopathy - resolved  # Pyruria, Candiduria - resolved  # Encephalopathy likely secondary to cushing's disease, resolved  Admission symptoms included disorientation, intermittently following directions. Repetitive words - perseverating on particular phrases. Sx started the 2-3 days prior to admission (which [facility] was the [facility]). Vulnerable brain (multiple brain surgeries), enlarging pituitary mass. No seizure activity per EEG. Steroids removed and pt still encephalopathic. Sodium had been in normal limits and patient remained altered. Worked up for infections - did reveal candiduria but unlikely this was etiology, though he did complete an 8 day course of fluconazole. Briefly on ceftriaxone but encephalopathy also did not improve. CT abd w/ contrast (4/10): no evidence of pyelonephritis. Ultimately attributed to Cushings and known pituitary tumor. Continues to remain lucid.     # Chronic microcytic anemia   Hgb of 8.4 on [facility] at the [facility]. Peripheral smear on 3/30 showed poikilocytosis with occasional red blood cell fragments but no other evidence of hemolysis.  Haptoglobin was normal.  Ferritin was 91, transferrin saturation was low, B12 was 495 and folate was 8.7. Likely combination of iron deficiency as well as inflammation/chronic disease.   - CTM        # Severe malnutrition  - Nutrition consult     # Delusions, intermittent, resolved  Intermittent delusions regarding staff stalking patient while in the hospital. Unclear chronicity of these delusions - but has had them multiple times this hospitalization. Unclear psych history. Medical management for encephalopathy as stated above. Psychiatry was consulted during his care and agreed with paranoia and recommended Abilify. He was briefly on Abilify 5mg but patient then started to refuse nightly so this was  discontinued. His paranoia and delusions have been very intermittent and appear to be associated with his anxiety.      Discharge Needs  - Follow up in 6 weeks with Dr Jeong, neurosurgery (message sent to schedulers).  - MRI pituitary on 6/8  - Upright lumbar xrays to evaluate his compression fractures (ordered by neurosurgery)   - reach out to ENT to see if follow-up is needed in outpatient clinic (Jean Baptiste splints removed while inpatient)       Consultations This Hospital Stay   PHYSICAL THERAPY ADULT IP CONSULT  OCCUPATIONAL THERAPY ADULT IP CONSULT  NEUROSURGERY ADULT IP CONSULT  OPHTHALMOLOGY IP CONSULT  WOUND OSTOMY CONTINENCE NURSE  IP CONSULT  NUTRITION SERVICES ADULT IP CONSULT  MEDICATION HISTORY IP PHARMACY CONSULT  PHARMACY TO DOSE VANCO  NEUROLOGY GENERAL ADULT IP CONSULT  CARE MANAGEMENT / SOCIAL WORK IP CONSULT  WOUND OSTOMY CONTINENCE NURSE  IP CONSULT  NEUROSURGERY ADULT IP CONSULT  ENDOCRINE NON-DIABETES ADULT IP CONSULT  RADIATION ONCOLOGY IP CONSULT  NURSING TO CONSULT FOR VASCULAR ACCESS CARE IP CONSULT  NURSING TO CONSULT FOR VASCULAR ACCESS CARE IP CONSULT  WOUND OSTOMY CONTINENCE NURSE  IP CONSULT  NURSING TO CONSULT FOR VASCULAR ACCESS CARE IP CONSULT  VASCULAR ACCESS FOR PICC PLACEMENT ADULT IP CONSULT  NURSING TO CONSULT FOR VASCULAR ACCESS CARE IP CONSULT  CARE MANAGEMENT / SOCIAL WORK IP CONSULT  SOCIAL WORK IP CONSULT  SOCIAL WORK IP CONSULT  CARE MANAGEMENT / SOCIAL WORK IP CONSULT  PSYCHIATRY IP CONSULT  SPIRITUAL HEALTH SERVICES IP CONSULT  PSYCHIATRY IP CONSULT  NURSING TO CONSULT FOR VASCULAR ACCESS CARE IP CONSULT  PHYSICAL THERAPY ADULT IP CONSULT  OCCUPATIONAL THERAPY ADULT IP CONSULT  WOUND OSTOMY CONTINENCE NURSE  IP CONSULT  SPIRITUAL HEALTH SERVICES IP CONSULT  SURGERY GENERAL ADULT IP CONSULT  SURGERY GENERAL ADULT IP CONSULT  PHARMACY TO DOSE VANCO  PHYSICAL THERAPY ADULT IP CONSULT  OCCUPATIONAL THERAPY ADULT IP CONSULT  DENTISTRY ADULT IP CONSULT  SPIRITUAL HEALTH  SERVICES IP CONSULT  PSYCHOLOGY ADULT IP CONSULT    Code Status   Full Code    Time Spent on this Encounter   I, Mora Maguire MD, personally saw the patient today and spent greater than 30 minutes discharging this patient.       Mora Maguire MD  [facility] [address] [address]  Phone: [phone]  ______________________________________________________________________    Physical Exam   Vital Signs: Temp: 98.4  F (36.9  C) Temp src: Axillary BP: (!) 129/91 Pulse: 89   Resp: 18 SpO2: 100 % O2 Device: None (Room air)    Weight: 146 lbs 8 oz    Constitutional: no apparent distress, nontoxic appearing  HEENT: NCAT. R eye ptosis  Cardiovascular: warm and well perfused extremities   Respiratory:  Comfortable conversational breathing on room air  GI: abdomen non distended  Neuro: alert and oriented, answers questions appropriately       Primary Care Physician   Physician No Ref-Primary    Discharge Orders      Wound care and ostomy supplies    Right anterior Leg (shin) wound and left foot wounds: Change dressing every 3 days.  Cleanse wound with wound cleanser and pat dry.  Paint julian-wound skin with No sting barrier film (518009)  Apply Iodosorb Gel (121845) to open wound bed 3mm thick.  Do not apply Iodasorb to intact skin.  Cover with Mepilex border.  Change every 3 days or as needed when soiled/ saturated.  Only apply Iodasorb every 3 days or when gel turns from brown to white.    If complete removal of paste is necessary use baby oil/mineral oil (#835995) and soft wash cloth.  Ensure pt has Jono-cushion (#415222) while sitting up in the chair.  Use only one Covidien pad in between mattress and pt. No brief while in bed.  DO NOT Use sacral mepilex is will cause the wound to become too wet.     Adult Eye  Referral      Follow Up and recommended labs and tests    [facility]. Consider consulting endocrinology as  well. Neurosurgery follow up in 6 weeks with Dr Jeong (message sent to schedulers)  - MRI pituitary in 6 weeks before discharge (message sent to schedulers)     Reason for your hospital stay    You were transferred to our hospital for further management of your pituitary tumor and difficult to manage pituitary dysfunction. You underwent tumor resection (removal) on 4/27.     Glucose monitor nursing POCT    Before meals and at bedtime     Intake and output    Every shift     Daily weights    Call Provider for weight gain of more than 2 pounds per day or 5 pounds per week.     Activity - Up with nursing assistance     Wound care (specify)    Sacrum wound: Daily and PRN  Cleanse the area with Merary cleanse and protect, very gently with soft cloth.  Apply ostomy powder (#1130) on all open and denuded skin.  Apply thin layer of critic aid paste on top of it.  With repeat application, do not scrub the paste, only remove soiled paste and reapply.  If complete removal of paste is necessary use baby oil/mineral oil (#564215) and soft wash cloth.  Ensure pt has Jono-cushion (#956758) while sitting up in the chair.  Use only one Covidien pad in between mattress and pt. No brief while in bed.  DO NOT Use sacral mepilex is will cause the wound to become too wet.     Wound care    All lower leg wounds: Change dressings every other days.  With dressing removal moisten purple foam with NS to soften prior to removal.  Cleanse wound with MicroKlenz and pat dry.  Paint julian-wound skin with No Sting barrier film  Cut a piece of hydrofera blue foam (110846) slightly smaller than wound bed and soften with sterile NS solution.  Wring out excess saline and place foam on wound bed, cover with Mepilex flex 4x4 (224285).     Follow Up ([facility])    Weekly cortisol checks until follow up with endocrinology.     Full Code     Fall precautions     Diet    Follow this diet upon discharge: Orders Placed This Encounter      Calorie Counts       Calorie Counts      Snacks/Supplements Adult: Other; Please send ensure max at 10:00  and HS; Between Meals      Regular Diet Adult       Significant Results and Procedures   Most Recent 3 CBC's:Recent Labs   Lab Test 05/15/23  0751 05/13/23  0824 05/12/23  1325 05/12/23  0456   WBC 9.2 11.9*  --  11.0   HGB 8.9* 8.5* 8.4* 6.7*   MCV 92 92  --  92    340  --  317     Most Recent 3 BMP's:Recent Labs   Lab Test 05/19/23  1207 05/19/23  0213 05/18/23  2138 05/14/23  0804 05/14/23  0725 05/13/23  1244 05/13/23  0824 05/12/23  0927 05/12/23  0456   NA  --   --   --   --  141  --  141  --  141   POTASSIUM  --   --   --   --  3.9  --  3.3*  --  3.6   CHLORIDE  --   --   --   --  110*  --  108*  --  109*   CO2  --   --   --   --  20*  --  21*  --  21*   BUN  --   --   --   --  5.0*  --  7.3  --  10.3   CR  --   --   --   --  0.91  --  1.04  --  1.09   ANIONGAP  --   --   --   --  11  --  12  --  11   KORIN  --   --   --   --  8.2*  --  8.2*  --  7.9*   GLC 94 112* 103*   < > 107*   < > 102*   < > 103*    < > = values in this interval not displayed.     Most Recent 2 LFT's:Recent Labs   Lab Test 05/06/23  1921 04/23/23  0752   AST 30 31   ALT 54* 101*   ALKPHOS 232* 285*   BILITOTAL 0.2 0.4   ,   Results for orders placed or performed during the hospital encounter of 04/03/23   XR Chest Port 1 View    Narrative    Portable chest    INDICATION: Infectious workup    COMPARISON: 2/16/2023    FINDINGS: Heart is mildly enlarged for portable technique. Lungs and  pulmonary vasculature show mild central bronchial wall thickening. No  interstitial edema or costophrenic angle blunting.      Impression    IMPRESSION: Mild bronchial wall thickening centrally which could  indicate acute airway inflammation or other reactive airways process.  Lungs do not appear significantly hyperinflated.    MANJU BEY MD         SYSTEM ID:  [id number]   MR Orbit w/o & w Contrast    Narrative    Brain/ Pituitary MRI without and with  contrast  MRI of the Orbits   MRI Brain COW    History: pituitary mass.  ICD-10: Pituitary mass    Comparison:  Outside brain MR 3/28/2023 and 5/25/2021.     Technique:   1. Brain MRI: Axial diffusion and FLAIR images of the whole brain  obtained without intravenous contrast. Sagittal T1 and T2-weighted,  coronal T2-weighted, coronal T1-weighted images with focus on the  sella were obtained without intravenous contrast. Post intravenous  contrast using gadolinium coronal and sagittal T1-weighted images were  obtained focused on the sella. Dynamic postcontrast coronal  T1-weighted images were also obtained.    2. MRI of the Orbits focused on the orbits/visual pathways:  Axial  T2-weighted with inversion recovery and coronal T1-weighted images  were obtained without intravenous contrast. Axial and coronal  T1-weighted images with fat saturation were obtained after intravenous  gadolinium administration.    3. MRI Brain COW: Sagittal T1-weighted, axial T2-weighted with fat  saturation, turboFLAIR and diffusion-weighted with ADC map and coronal  T1-weighted and T2-weighted images of the brain were obtained without  intravenous contrast.      Contrast: 7.5mL Gadavist    Findings:    Brain MRI:  Enhancing 1.4 x 1.0 cm lesion with associated susceptibility artifact  in the right frontal lobe (series 16, image 17), stable since  5/25/2021. A similar punctate lesion in the left putamen also  unchanged.m    4.3 x 1.9 x 1.3 cm (right to left, AP, craniocaudal) heterogeneously  enhancing pituitary mass with encasement and peripheral displacement  of the cavernous portions of both internal carotid arteries (series  13, image 28 and series 14, image 11). The lesion infiltrates the  clivus. Compared with MRI from 2021 the craniocaudal dimension of the  mass is decreased. The lesion is now more heterogeneous, previously  more homogeneous. Previous optic chiasm compression is resolved. No  abnormal enhancement of the optic nerves  within the intraorbital  portion. The pituitary stalk is midline.    The lesion infiltrates both cavernous sinuses. The traversing cranial  nerves within the cavernous sinus cannot be differentiated from the  underlying mass.    FLAIR images through the whole brain shows nonspecific posterior  periventricular white matter hyperintensities. Otherwise no mass  effect, midline shift, or significant enlargement of the ventricles.  Scattered mucosal thickening of the paranasal sinuses. Mastoid air  cells are clear.    MRA Head: No aneurysm or stenosis of the major intracranial arteries  at the base of the brain.      Impression    Impression:   1.  4.3 cm heterogeneously enhancing pituitary mass with infiltration  of bilateral cavernous sinuses and the clivus. Findings are similar  when compared compared to prior exam 3/28/2023 suggestive for a  macroadenoma. Compared with an older MRI from 2021, the lesion appears  more heterogeneous and smaller. Previous compression of the optic  chiasm is no longer present.  2.  Patchy T2 hyperintense enhancing lesion with scattered  susceptibility artifacts in the right frontal subcortical white  matter. A similar tiny smaller lesion is also present in the left  putamen. Retrospectively this lesion was present back in 2021.  Constellation of findings are suggestive of a benign lesion such as  cavernoma or telangiectasia.  3.   MRA demonstrates no aneurysm or stenosis of the major  intracranial arteries. Bilateral cavernous internal carotid arteries  are patent despite encasement by the above-mentioned lesion.  4.  No abnormal optic nerve enhancement or optic nerve atrophy.    I have personally reviewed the examination and initial interpretation  and I agree with the findings.    EVA DAMON MD         SYSTEM ID:  [id number]   MR Brain w/o & w Contrast    Narrative    Brain/ Pituitary MRI without and with contrast  MRI of the Orbits   MRI Brain COW    History: pituitary mass.  ICD-10:  Pituitary mass    Comparison:  Outside brain MR 3/28/2023 and 5/25/2021.     Technique:   1. Brain MRI: Axial diffusion and FLAIR images of the whole brain  obtained without intravenous contrast. Sagittal T1 and T2-weighted,  coronal T2-weighted, coronal T1-weighted images with focus on the  sella were obtained without intravenous contrast. Post intravenous  contrast using gadolinium coronal and sagittal T1-weighted images were  obtained focused on the sella. Dynamic postcontrast coronal  T1-weighted images were also obtained.    2. MRI of the Orbits focused on the orbits/visual pathways:  Axial  T2-weighted with inversion recovery and coronal T1-weighted images  were obtained without intravenous contrast. Axial and coronal  T1-weighted images with fat saturation were obtained after intravenous  gadolinium administration.    3. MRI Brain COW: Sagittal T1-weighted, axial T2-weighted with fat  saturation, turboFLAIR and diffusion-weighted with ADC map and coronal  T1-weighted and T2-weighted images of the brain were obtained without  intravenous contrast.      Contrast: 7.5mL Gadavist    Findings:    Brain MRI:  Enhancing 1.4 x 1.0 cm lesion with associated susceptibility artifact  in the right frontal lobe (series 16, image 17), stable since  5/25/2021. A similar punctate lesion in the left putamen also  unchanged.m    4.3 x 1.9 x 1.3 cm (right to left, AP, craniocaudal) heterogeneously  enhancing pituitary mass with encasement and peripheral displacement  of the cavernous portions of both internal carotid arteries (series  13, image 28 and series 14, image 11). The lesion infiltrates the  clivus. Compared with MRI from 2021 the craniocaudal dimension of the  mass is decreased. The lesion is now more heterogeneous, previously  more homogeneous. Previous optic chiasm compression is resolved. No  abnormal enhancement of the optic nerves within the intraorbital  portion. The pituitary stalk is midline.    The lesion  infiltrates both cavernous sinuses. The traversing cranial  nerves within the cavernous sinus cannot be differentiated from the  underlying mass.    FLAIR images through the whole brain shows nonspecific posterior  periventricular white matter hyperintensities. Otherwise no mass  effect, midline shift, or significant enlargement of the ventricles.  Scattered mucosal thickening of the paranasal sinuses. Mastoid air  cells are clear.    MRA Head: No aneurysm or stenosis of the major intracranial arteries  at the base of the brain.      Impression    Impression:   1.  4.3 cm heterogeneously enhancing pituitary mass with infiltration  of bilateral cavernous sinuses and the clivus. Findings are similar  when compared compared to prior exam 3/28/2023 suggestive for a  macroadenoma. Compared with an older MRI from 2021, the lesion appears  more heterogeneous and smaller. Previous compression of the optic  chiasm is no longer present.  2.  Patchy T2 hyperintense enhancing lesion with scattered  susceptibility artifacts in the right frontal subcortical white  matter. A similar tiny smaller lesion is also present in the left  putamen. Retrospectively this lesion was present back in 2021.  Constellation of findings are suggestive of a benign lesion such as  cavernoma or telangiectasia.  3.   MRA demonstrates no aneurysm or stenosis of the major  intracranial arteries. Bilateral cavernous internal carotid arteries  are patent despite encasement by the above-mentioned lesion.  4.  No abnormal optic nerve enhancement or optic nerve atrophy.    I have personally reviewed the examination and initial interpretation  and I agree with the findings.    EVA DAMON MD         SYSTEM ID:  [id number]   MRA Brain ([place] [facility] [place]) wo Contrast    Narrative    Brain/ Pituitary MRI without and with contrast  MRI of the Orbits   MRI Brain COW    History: pituitary mass.  ICD-10: Pituitary mass    Comparison:  Outside brain MR 3/28/2023 and  5/25/2021.     Technique:   1. Brain MRI: Axial diffusion and FLAIR images of the whole brain  obtained without intravenous contrast. Sagittal T1 and T2-weighted,  coronal T2-weighted, coronal T1-weighted images with focus on the  sella were obtained without intravenous contrast. Post intravenous  contrast using gadolinium coronal and sagittal T1-weighted images were  obtained focused on the sella. Dynamic postcontrast coronal  T1-weighted images were also obtained.    2. MRI of the Orbits focused on the orbits/visual pathways:  Axial  T2-weighted with inversion recovery and coronal T1-weighted images  were obtained without intravenous contrast. Axial and coronal  T1-weighted images with fat saturation were obtained after intravenous  gadolinium administration.    3. MRI Brain COW: Sagittal T1-weighted, axial T2-weighted with fat  saturation, turboFLAIR and diffusion-weighted with ADC map and coronal  T1-weighted and T2-weighted images of the brain were obtained without  intravenous contrast.      Contrast: 7.5mL Gadavist    Findings:    Brain MRI:  Enhancing 1.4 x 1.0 cm lesion with associated susceptibility artifact  in the right frontal lobe (series 16, image 17), stable since  5/25/2021. A similar punctate lesion in the left putamen also  unchanged.m    4.3 x 1.9 x 1.3 cm (right to left, AP, craniocaudal) heterogeneously  enhancing pituitary mass with encasement and peripheral displacement  of the cavernous portions of both internal carotid arteries (series  13, image 28 and series 14, image 11). The lesion infiltrates the  clivus. Compared with MRI from 2021 the craniocaudal dimension of the  mass is decreased. The lesion is now more heterogeneous, previously  more homogeneous. Previous optic chiasm compression is resolved. No  abnormal enhancement of the optic nerves within the intraorbital  portion. The pituitary stalk is midline.    The lesion infiltrates both cavernous sinuses. The traversing  cranial  nerves within the cavernous sinus cannot be differentiated from the  underlying mass.    FLAIR images through the whole brain shows nonspecific posterior  periventricular white matter hyperintensities. Otherwise no mass  effect, midline shift, or significant enlargement of the ventricles.  Scattered mucosal thickening of the paranasal sinuses. Mastoid air  cells are clear.    MRA Head: No aneurysm or stenosis of the major intracranial arteries  at the base of the brain.      Impression    Impression:   1.  4.3 cm heterogeneously enhancing pituitary mass with infiltration  of bilateral cavernous sinuses and the clivus. Findings are similar  when compared compared to prior exam 3/28/2023 suggestive for a  macroadenoma. Compared with an older MRI from 2021, the lesion appears  more heterogeneous and smaller. Previous compression of the optic  chiasm is no longer present.  2.  Patchy T2 hyperintense enhancing lesion with scattered  susceptibility artifacts in the right frontal subcortical white  matter. A similar tiny smaller lesion is also present in the left  putamen. Retrospectively this lesion was present back in 2021.  Constellation of findings are suggestive of a benign lesion such as  cavernoma or telangiectasia.  3.   MRA demonstrates no aneurysm or stenosis of the major  intracranial arteries. Bilateral cavernous internal carotid arteries  are patent despite encasement by the above-mentioned lesion.  4.  No abnormal optic nerve enhancement or optic nerve atrophy.    I have personally reviewed the examination and initial interpretation  and I agree with the findings.    EVA DAMON MD         SYSTEM ID:  [id number]   XR Chest Port 1 View    Narrative    EXAM: XR CHEST PORT 1 VIEW  4/8/2023 11:57 AM     HISTORY:  new metabolic encephelopathy looking for infectious etiology        COMPARISON:  4/4/2023    FINDINGS:   Cardiac silhouette is enlarged. Right upper extremity PICC terminates  over the low right  atrium. Low lung volumes. No pneumothorax. Trace  right pleural effusion. Mild pulmonary vascular congestion. Chronic  left second rib fracture.      Impression    IMPRESSION:   1. No focal pneumonia.  2. Cardiomegaly with mild pulmonary vascular congestion.  3. Trace right pleural effusion.    I have personally reviewed the examination and initial interpretation  and I agree with the findings.    THERESA ESCOBEDO MD         SYSTEM ID:  [id number]   CT Abdomen Pelvis w Contrast    Narrative    Examination: CT ABDOMEN PELVIS W CONTRAST, 4/10/2023 3:38 PM    Technique:  Helical CT images from the lung bases through the  symphysis pubis were obtained with contrast.  Coronal reformatted  images were generated at a workstation for further assessment.    Contrast:  85 mL Isovue-370 IV    Comparison: None.    History: assess for pyelonephritis    Findings:    Abdomen and pelvis:   Liver: Diffuse hypoattenuation of the liver relative to the spleen  consistent with hepatic steatosis. Indeterminant hypoattenuating  lesion in the anterior right hepatic lobe measuring 9 x 11 mm (series  4, image 101).  Gallbladder: Partially decompressed. No gallstones. No evidence of  acute cholecystitis. No intrahepatic or extra hepatic bowel duct  dilation.  Spleen: Diminutive in size.  Pancreas: No suspicious pancreatic lesions. The pancreatic duct is not  dilated.  Adrenal glands: No adrenal nodules.  Urinary system: Mild nonspecific perinephric stranding. Normal and  symmetric nephrographic enhancement. Simple cyst in the inferior pole  of the left kidney and additional subcentimeter hypodensities in both  kidneys, likely representing benign renal cysts. No hydronephrosis,  hydroureter, or obstructing renal stones. Urinary bladder is  unremarkable.  Reproductive organs: Unremarkable.  Bowel: No abnormally dilated loops of large or small bowel. No  abnormal bowel wall thickening or enhancement. The appendix is  unremarkable.  Lymph nodes:  No retroperitoneal, mesenteric, or pelvic  lymphadenopathy.  Fluid: No free fluid within the abdomen. Mild diffuse mesenteric  haziness.  Vessels: Incidental dissection flap in the dilated celiac trunk  measuring up to 16 mm in diameter. Focal dissection flap ion the  superior mesenteric artery (series 4, image 191-199) and extending  into a jejunal branch with small pseudoaneurysm measuring 7 mm in  diameter.     17 mm pseudoaneurysm involving the left gastric artery (series 4,  image 13) and additional pseudoaneurysm involving the GDA measuring up  to 22 mm at the level of the pancreatic head and additional  pseudoaneurysm involving a branch of the GDA measuring 13 mm.  Pseudoaneurysm at the level of the uncinate process likely arising  from the posterior pancreaticoduodenal artery measuring 15 mm.     Ectatic dilation of the left common iliac artery measuring 18 mm in  diameter.     Lung bases: Small left and trace right pleural effusions. Smooth  interlobular septal thickening with mosaic attenuation and groundglass  opacities at the lung bases likely representing pulmonary edema.    Bones and soft tissues: No suspicious osseous lesions. Age  indeterminate likely chronic appearing superior end plate compression  fracture deformities at T9 and T11-L2. Chronic anterior left-sided rib  fractures.    No subcutaneous abscess. Anasarca. Small fat-containing right inguinal  hernia and umbilical hernia.      Impression    Impression:   1.  No CT evidence for pyelonephritis. No perirenal or intrarenal  abscess.  2.  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 No evidence for bowel ischemia.  3.  Evidence for volume overload with mild pulmonary edema, small  left, and trace right pleural effusions, mesenteric edema, and  anasarca.  4. Indeterminant lesion in the right hepatic lobe measuring 11 mm.  This is a typical location for focal fatty infiltration, but  other  etiologies for focal mass are in the differential. Correlation with  any outside abdominal imaging if available would be helpful, otherwise  consider further evaluation with nonemergent liver protocol MRI.   5. Ectatic dilation of the left common iliac artery measuring 18 mm.  6. Age indeterminate superior endplate compression fractures at T9 and  T11-L2.    I have personally reviewed the examination and initial interpretation  and I agree with the findings.    CASH MICHAUD MD         SYSTEM ID:  [id number]   MR Brain w/o & w Contrast    Narrative    EXAM: MR BRAIN W/O & W CONTRAST  4/24/2023 2:46 PM     HISTORY:  Pituitary adenoma - update imaging for upcoming surgery       COMPARISON:  4/6/2023 MRI    TECHNIQUE: Axial diffusion and FLAIR images of the whole brain  obtained without intravenous contrast. Sagittal T1 and T2-weighted,  coronal T2-weighted, coronal T1-weighted images with focus on the  sella were obtained without intravenous contrast. Post intravenous  contrast using gadolinium coronal and sagittal T1-weighted images were  obtained focused on the sella. Dynamic postcontrast coronal  T1-weighted images were also obtained.    CONTRAST: 7.5mL Gadavist.    FINDINGS:  Unchanged size of the heterogeneously enhancing large pituitary mass  measuring 4.2 x 3.1 cm, previously 4.3 x 3.1 cm. Invasion of bilateral  right greater than left cavernous sinuses with encasement of bilateral  internal carotid artery cavernous segments. Asymmetric smaller size of  the right cavernous segment internal carotid artery which is similar  to prior. The mass invades and erodes the superoposterior aspect of  the clivus. The mass also extends into and effaces the prepontine  cistern. The mass abuts the basilar artery. The mass grows anteriorly  on the left towards the orbital apex. Mass grows superiorly on both  the right and left aspects of the pituitary stalk although more so on  the right similar to prior. The stalk is  relatively midline. The optic  chiasm is nondisplaced. Central necrosis of the right aspect of the  mass similar to prior.    Unchanged ill-defined enhancement in the lateral right frontal lobe  and a small focus in the left putamen with associated susceptibility  effect most suggestive of capillary telangiectasia.    Ventricles are proportionate to the cerebral sulci. Moderate confluent  and scattered T2/FLAIR hyperintensity in the cortical and deep  cerebral white matter suggestive of chronic small vessel ischemic  disease similar to prior. No suspicious restricted diffusion.    Mild mucosal thickening of the maxillary sinuses mostly inferiorly.  Trace mucosal thickening in the ethmoid air cells and frontal sinuses.  Mild right mastoid air cell effusion.      Impression    IMPRESSION:  1. Unchanged large partially necrotic enhancing sellar mass with local  invasion.  2. Benign appearing vascular lesions in the right frontal lobe and  left basal ganglia most suggestive of capillary telangiectasia.  3. White matter changes suggestive of chronic small vessel ischemic  disease.    I have personally reviewed the examination and initial interpretation  and I agree with the findings.    JOSI GUERRERO MD         SYSTEM ID:  [id number]   XR Thoracic Lumbar Spine 2 Views    Narrative    EXAM: XR THORACIC LUMBAR SPINE 2 VIEWS  LOCATION: [facility]  DATE/TIME: 4/22/2023 2:29 PM CDT    INDICATION: back pain with known compression fractures  COMPARISON: 04/10/2023 abdomen pelvis CT      Impression    IMPRESSION: L2 upper lower endplate compression deformities with up to 60% height loss redemonstrated. L1 upper endplate compression deformity with about 25% height loss has progressed slightly previously with about 10% height loss. Mild T11 upper   endplate compression deformity with about 20% height loss unchanged. T9 upper endplate compression has progressed with about 25-30% height  loss. Mild upper lumbar levoconvex curvature centered at L1. Slight kyphosis centered at the L2 fracture level.   CT Head w/o Contrast    Narrative    Stealth CT imaging for purposes of stereotactic evaluation    Provided History: planning for OR  ICD-10:  Comparison: Brain MRI 4/24/2023, 4/6/2023    Technique: CT imaging performed with axial, sagittal, and coronal  reconstructed images obtained without intravenous contrast.    Contrast: iopamidol (ISOVUE-370) solution 75 mL    Findings: Limited imaging for stereotactic localization.  Heterogeneously attenuating pituitary mass with invasion of the  cavernous sinuses or superior posterior aspect of the clivus partially  effacing the suprasellar and prepontine cisterns, similar to  comparison MRI. No acute intracranial hemorrhage. No acute loss of  gray-white differentiation. The basal cisterns are patent. The  ventricles are proportional to the cerebral sulci. Patchy  hypoattenuation of the periventricular, subcortical, and deep white  matter most likely reflecting chronic small vessel ischemic disease.      Impression    Impression: Large pituitary mass, similar to previous MRI. Limited  imaging performed primarily for the purposes of stereotactic  localization.    I have personally reviewed the examination and initial interpretation  and I agree with the findings.    NAIMA YUN MD         SYSTEM ID:  [id number]   CTA Head Neck with Contrast    Narrative    CTA HEAD NECK W CONTRAST 4/27/2023 5:41 AM    CT angiogram of the neck   CT angiogram of the base of the brain with contrast  Reconstruction by the Radiologist on the 3D workstation    Provided History:  planning for OR    Comparison:  4/24/2023 brain MR, 4/6/2023 brain MR.      Technique:  HEAD and NECK CTA: During rapid bolus intravenous injection of  nonionic contrast material, axial images were obtained using thin  collimation multidetector helical technique from the base of the upper  aortic arch  through the [place] [facility] [place]. This CT angiogram data was  reconstructed at thin intervals with mild overlap. Images were sent to  the [employer] workstation, and 3D reconstructions were obtained. The  axial source images, multiplanar reformations, 3D reconstructions in  both maximum intensity projection display and volume rendered models  were reviewed, with reconstructions performed by the technologist and  the radiologist.    Contrast: iopamidol (ISOVUE-370) solution 75 mL    Findings:    Head CTA demonstrates approximately 25-50% stenosis of the right  internal carotid artery within the petrous segment (series 5, image  121 and series 13, image 55). There is mild mass left to right effect  on the para-clinoid right internal carotid artery (series 13, images  51 and 52). No aneurysm or other stenosis of the major intracranial  arteries.    The origins of the great vessels from the aortic arch are patent. The  normal distal right internal carotid artery measures 4.3 mm. The  normal distal left internal carotid artery measures 4.6 mm.     The vertebral arteries are patent from their origin to termination   without focal stenosis.     No mass within the visualized portions of the cervical soft tissues or  lung apices.     Post surgical changes of transsphenoidal endoscopic resection of a  pituitary mass.      Impression    Impression:    1. Head CTA demonstrates no aneurysm of the major intracranial  arteries.   2. Head CTA demonstrates  approximately 25-50% stenosis of the  cavernous and supraclinoid right internal carotid artery. There is  mild mass effect upon the paraclinoid right internal carotid artery  from the known sellar mass. No luminal stenosis from this mass is  visualized.  3. Neck CTA demonstrates no stenosis of the major cervical arteries.    I have personally reviewed the examination and initial interpretation  and I agree with the findings.    TANIKA FELIPE MD         SYSTEM ID:  [id number]   MR Brain  w/o & w Contrast    Narrative    EXAM: MR BRAIN W/O & W CONTRAST  4/29/2023 12:15 PM     HISTORY: for post resection evaluation       COMPARISON: MRI 4/24/2023, 4/6/2023     TECHNIQUE: Axial diffusion and FLAIR images of the whole brain  obtained without intravenous contrast. Sagittal T1 and T2-weighted,  coronal T2-weighted, coronal T1-weighted images with focus on the  sella were obtained without intravenous contrast. Sagittal T2 space  with coronal and axial reconstructions were also performed. Post  intravenous contrast using gadolinium coronal and sagittal T1-weighted  images were obtained focused on the sella. Dynamic postcontrast  coronal T1-weighted images were also obtained.    CONTRAST: 7.3 cc Gadavist.    FINDINGS:  Endoscopic maxillary antrostomy, total ethmoidectomy and wide  sphenoidectomy, and transcavernous pituitary resection changes.  Layering debris in the sinonasal cavity, nasopharynx and right  maxillary sinus. Residual suprasellar heterogeneous enhancing tumor  measures 35 x 21 x 21 mm. The lesion continues to laterally displace,  encase the cavernous carotid arteries and expands the cavernous sinus.  The pituitary stalk is midline. The optic chiasm, prechiasmatic optic  nerves are not substantially displaced.    On the T2 space coronal series 103 there is T2 hyperintense fluid  filling the sinonasal resection cavity, part of which demonstrates CSF  signal. Additionally there appears to be a focal defect of the left  cribriform plate (coronal series 103, image 148; sagittal series 102,  image 98).    There is no mass effect, midline shift, or acute intracranial  hemorrhage. The ventricles are proportionate to the cerebral sulci.  Diffusion weighted images are negative for acute infarct. 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 Stable scattered punctate foci of subcortical,  periventricular white matter T2 hyperintensities. No  substantial  generalized parenchymal volume loss. Normal major intracranial  vascular flow voids.    Right greater than left mastoid air cell fluid. The orbits are normal.      Impression    IMPRESSION:  1. Immediate postoperative changes status post subtotal sellar mass  resection. Residual enhancing lesion as detailed.  2. Question defect of the left cribriform plate favored to be be  artifactual given the anatomic location is not in close approximation  to the surgical path or surgical site, and therefore depends on the  level of clinical concern for CSF leak.  3. No acute intracranial pathology.    I have personally reviewed the examination and initial interpretation  and I agree with the findings.    EVA DAMON MD         SYSTEM ID:  [id number]   XR Thoracic Lumbar Standing 2 Views    Narrative    EXAM: XR THORACIC LUMBAR STANDING 2 VIEWS  LOCATION: [facility]  DATE/TIME: 4/30/2023 12:22 PM CDT    INDICATION: follow compression fx  COMPARISON: 04/20/2023      Impression    IMPRESSION: Trace dextrocurvature. AP alignment grossly preserved. Known T9, T11, L1, and L2 compression fractures are not well visualized secondary to technique in addition to overlying stool and bowel gas and pulmonary consolidation. No obvious   significant changes identified. No new fractures are identified. Trace thoracolumbar levocurvature. Multilevel disc height loss.   XR Chest Port 1 View    Narrative    Exam: XR CHEST PORT 1 VIEW, 4/30/2023 8:14 PM    Comparison: 4/8/2023    History: Confirm picc placement    Findings:  Single AP portable upright view of the chest. Right upper extremity  PICC with tip in the mid SVC.    Trachea is midline. Unchanged cardiomegaly. No acute airspace disease.  There is no pneumothorax or pleural effusion. The upper abdomen is  unremarkable.      Impression    Impression:   1. New Right upper extremity PICC with tip in the mid SVC.  2. Unchanged  cardiomegaly.    I have personally reviewed the examination and initial interpretation  and I agree with the findings.    FARIHA NATARAJAN MD         SYSTEM ID:  [id number]   XR Chest Port 1 View    Narrative    Exam: XR CHEST PORT 1 VIEW, 5/6/2023 7:50 PM    Indication: concern for sepsis    Comparison: 4/30/2023    Findings:   Right upper extremity PICC tip at the mid SVC. Stable enlarged cardiac  silhouette. The pulmonary vasculature is within normal limits. No  pleural effusion or pneumothorax. Scattered peripheral linear  opacities, greatest in the left midlung. Resolved streaky right  basilar opacities seen on 4/30/2023. Chronic anterior left second rib  fracture deformity.      Impression    Impression:   1. No convincing acute airspace disease.  2. Scattered peripheral linear opacities are most likely atelectasis  or scarring.  2. Stable enlarged cardiac silhouette.    JESSICA SALAMANCA DO         SYSTEM ID:  [id number]   CT Tibia Fibula Lower Leg Right w Contrast    Narrative    EXAM: CT TIBIA FIBULA LOWER LEG RIGHT W CONTRAST  LOCATION: [facility]  DATE/TIME: 5/7/2023 10:00 PM CDT    INDICATION: eval RLE wound, c f abscess.  COMPARISON: Right lower extremity arterial ultrasound 02/16/2023. Bilateral lower extremity venous Doppler ultrasound 02/16/2023. Right tibia and fibula radiographs 02/16/2023.  TECHNIQUE: IV contrast. Axial, sagittal and coronal thin-section reconstruction. Dose reduction techniques were used.   CONTRAST: 100 ml isovue 370     FINDINGS:     BONES:  -No bone destruction to suggest osteomyelitis. No fracture or dislocation.    SOFT TISSUES:  -There are multiple soft tissue was of the lower leg with dressing material in position. Along the medial aspect of the ankle there is a lobulated rim-enhancing collection of fluid in the superficial soft tissues concerning for abscess which measures   maximal axial dimensions of 3.2 x 1.8 cm and has  craniocaudal extent of 4.9 cm (images 226-248 of axial soft tissue series 8, images  of coronal soft tissue series 4).      Impression    IMPRESSION:  1.  4.9 x 3.2 x 1.8 cm soft tissue abscess of the medial aspect of the ankle.  2.  No specific evidence for osteomyelitis.     CT Dental wo Contrast    Narrative    CT DENTAL WO CONTRAST 5/12/2023 9:09 AM    History:  Dental Pain of the left maxillary posterior site. Multiple  grossly carious lesions.    Comparison:  MR orbit and face for 4/6/2023.    TECHNIQUE: 3-D reconstruction by the technologists, with curved  multiplanar reformat of thin section imaging through the mandible and  maxilla obtained without intravenous contrast.    FINDINGS:  No significant soft tissue swelling or mass. Normal facial bone  alignment.. Carious dentition with scattered periapical lucencies.    Maxilla: There is periapical lucency surrounding the right third  residual molar root.. Apical lucency about the right second and to a  lesser degree right first molar roots. Periapical lucency surrounding  the right lateral incisor and mild periodontal disease surrounding the  right central incisor. Periapical lucency surrounding the root of the  left canine. Left canine fossa is fractured.    Mandible: Periapical lucency surrounding the roots of the first and  third right molars. The first right molar is fractured with anterior  roots and a fragment of the posterior medial root remaining.  Periapical lucency surrounding the left canine root.    Multiple teeth are absent.    Osteitis of the right maxillary sinus with moderate mucosal thickening  . The left maxillary sinus is largely opacified with frothy and  inspissated material. The bilateral sphenoid sinuses and ethmoid air  cells are largely opacified.      Normal temporomandibular joints. The mastoid air cells are clear  bilaterally.        Impression    IMPRESSION:    1. Multiple scattered periapical lucencies as described above,  which  may be seen with small periapical abscesses. No definite evidence for  cellulitis or soft tissue abscess.  2. Mucosal thickening, frothy debris and air-fluid levels within the  paranasal sinuses may represent acute on chronic sinusitis in the  proper clinical setting. Progressed since 2023.     I have personally reviewed the examination and initial interpretation  and I agree with the findings.    NAIMA YUN MD         SYSTEM ID:  [id number]   Echo Complete     Value    LVEF  40-45% (mildly reduced)    [facility]    [id number]  [id number]  [id number]  [id number]^GUZMAN^FORD     [facility],[place]  Echocardiography Laboratory  [address] [place] [address]     Name: ROEL ORONA  MRN: [id number]  : 1965  Study Date: 2023 09:13 AM  Age: 58 yrs  Gender: Male  Patient Location: [id number]  Reason For Study: Heart Failure, Unspecified  Ordering Physician: FORD HINDS  Performed By: Monica Marcelo     BSA: 1.8 m2  Height: 66 in  Weight: 151 lb  HR: 66  BP: 130/89 mmHg  ______________________________________________________________________________  Procedure  Complete Portable Echo Adult.  ______________________________________________________________________________  Interpretation Summary  Left ventricular function is decreased. The ejection fraction is 40-45%  (mildly reduced). Severe diffuse hypokinesis is present.  Global right ventricular function is normal. The right ventricle is normal  size.  There is mild-moderate aortic regurgitation.  There is mild dilation at the level of the sinuses of Valsalva (4.2 cm,  indexed 2.33 cm/m2).  This study was compared with the study from 2023. No significant changes  noted.  ______________________________________________________________________________  Left Ventricle  There is mild LV dilation but the requisite images are off axis (LVESD 4.8  cm). Mild to moderate concentric wall thickening consistent with  left  ventricular hypertrophy is present. Left ventricular function is decreased.  The ejection fraction is 40-45% (mildly reduced). Left ventricular diastolic  function is abnormal. Severe diffuse hypokinesis is present.     Right Ventricle  Global right ventricular function is normal. The right ventricle is normal  size.     Atria  The right atria appears normal. Moderate left atrial enlargement is present.     Mitral Valve  Mild mitral insufficiency is present.     Aortic Valve  The aortic valve is tricuspid. Moderate aortic insufficiency is present. The  VCW is 0.3 cm.     Tricuspid Valve  Mild tricuspid insufficiency is present. Pulmonary artery systolic pressure  cannot be assessed.     Pulmonic Valve  The valve leaflets are not well visualized. Trace pulmonic insufficiency is  present.     Vessels  There is mild dilation at the level of the sinuses of Valsalva (4.2 cm,  indexed 2.33 cm/m2). IVC diameter <2.1 cm collapsing >50% with sniff suggests  a normal RA pressure of 3 mmHg.     Pericardium  No pericardial effusion is present.     Compared to Previous Study  This study was compared with the study from 2023 . No significant  changes noted.  ______________________________________________________________________________  MMode/2D Measurements & Calculations     IVSd: 1.6 cm  LVIDd: 5.1 cm  LVPWd: 1.2 cm  LV mass(C)d: 308.0 grams  LV mass(C)dI: 173.5 grams/m2  RWT: 0.49     Doppler Measurements & Calculations  PA acc time: 0.10 sec     ______________________________________________________________________________  Report approved by: Tylor Das 2023 10:37 AM         Echo Complete     Value    LVEF  40-45% (mildly reduced)    [facility]    [id number]  [facility]  [id number]  [id number]^MALLORIE^NICOLE     [facility],[place]  Echocardiography Laboratory  [address] [place] [id number]     Name: ROEL ORONA  MRN: [id number]  : 1965  Study Date: 2023  10:14 AM  Age: 58 yrs  Gender: Male  Patient Location: [facility]  Reason For Study: Heart Failure  Ordering Physician: NICOLE NOBLES  Performed By: Minor Smalls     BSA: 1.8 m2  Height: 66 in  Weight: 164 lb  HR: 88  BP: 112/82 mmHg  ______________________________________________________________________________  Procedure  Complete Portable Echo Adult. Contrast Optison. Technically difficult study.  Optison (NDC #4698-4285-86) given intravenously. Patient was given 6 ml  mixture of 3 ml Optison and 6 ml saline. 3 ml wasted.  ______________________________________________________________________________  Interpretation Summary  Left ventricular function is decreased. The ejection fraction is 40-45%  (mildly reduced). Grade I or early diastolic dysfunction.  The right ventricle is normal size. Global right ventricular function is  normal.  No significant valvular abnormalities.  IVC diameter <2.1 cm collapsing >50% with sniff suggests a normal RA pressure  of 3 mmHg.  This study was compared with the study from 4/6/23 .  There has been no change.  Mild to moderate diffuse hypokinesis is present.  ______________________________________________________________________________  Left Ventricle  Moderate to severe concentric wall thickening consistent with left ventricular  hypertrophy is present. Left ventricular function is decreased. The ejection  fraction is 40-45% (mildly reduced). Grade I or early diastolic dysfunction.  Mild to moderate diffuse hypokinesis is present.     Right Ventricle  The right ventricle is normal size. Global right ventricular function is  normal.     Atria  Both atria appear normal.     Mitral Valve  The mitral valve is normal. Trace mitral insufficiency is present.     Aortic Valve  The aortic valve is tricuspid. Mild aortic insufficiency is present.     Tricuspid Valve  The tricuspid valve is normal. Trace tricuspid insufficiency is present.  Pulmonary artery systolic pressure cannot be  assessed.     Pulmonic Valve  The valve leaflets are not well visualized. Trace pulmonic insufficiency is  present.     Vessels  The inferior vena cava is normal. The thoracic aorta cannot be assessed. There  is mild dilation at the level of the sinuses of Valsalva (4.0 cm, indexed 2.22  cm/m2). IVC diameter <2.1 cm collapsing >50% with sniff suggests a normal RA  pressure of 3 mmHg.     Pericardium  No pericardial effusion is present.     Compared to Previous Study  This study was compared with the study from 23 . There has been no change.     Attestation  I have personally viewed the imaging and agree with the interpretation and  report as documented by the fellow, Anmol Banks, and/or edited by me.  ______________________________________________________________________________  MMode/2D Measurements & Calculations  IVSd: 1.8 cm  LVIDd: 5.1 cm  LVIDs: 4.3 cm  LVPWd: 1.6 cm  FS: 16.2 %  LV mass(C)d: 399.7 grams  LV mass(C)dI: 217.4 grams/m2  LVOT diam: 2.1 cm  LVOT area: 3.4 cm2  RWT: 0.64     Doppler Measurements & Calculations  MV E max oswaldo: 37.2 cm/sec  MV A max oswaldo: 46.4 cm/sec  MV E/A: 0.80  MV dec slope: 244.1 cm/sec2  MV dec time: 0.15 sec  Ao V2 max: 110.3 cm/sec  Ao max P.9 mmHg  Ao V2 mean: 81.0 cm/sec  Ao mean PG: 3.1 mmHg  Ao V2 VTI: 19.2 cm  MARZENA(I,D): 2.4 cm2  MARZENA(V,D): 2.5 cm2  AI P1/2t: 884.0 msec  LV V1 max P.5 mmHg  LV V1 max: 78.9 cm/sec  LV V1 VTI: 13.4 cm     SV(LVOT): 46.3 ml  SI(LVOT): 25.2 ml/m2  AV Oswaldo Ratio (DI): 0.72  MARZENA Index (cm2/m2): 1.3  E/E' avg: 15.4  Lateral E/e': 16.5  Medial E/e': 14.2     ______________________________________________________________________________  Report approved by: Tylor Das 2023 12:26 PM               Discharge Medications   Current Discharge Medication List      START taking these medications    Details   azelastine (ASTELIN) 0.1 % nasal spray Spray 2 sprays into both nostrils 2 times daily as needed (allergies)    Associated  Diagnoses: Pituitary adenoma (H)      benzocaine (ORAJEL MAXIMUM STRENGTH) 20 % GEL gel Take by mouth 4 times daily as needed for mouth sores    Associated Diagnoses: Pituitary adenoma (H)      bisacodyl (DULCOLAX) 10 MG suppository Place 1 suppository (10 mg) rectally daily as needed for constipation (Use if Magnesium hydroxide (MILK of MAGNESIA) not effective after 24 hours. May discontinue if patient having bowel movement.)    Associated Diagnoses: Pituitary adenoma (H)      diclofenac (VOLTAREN) 1 % topical gel Apply 2 g topically 4 times daily as needed    Associated Diagnoses: Pituitary adenoma (H)      hydrOXYzine (ATARAX) 25 MG tablet Take 1-2 tablets (25-50 mg) by mouth every 6 hours as needed for other or anxiety (adjuvant pain)    Associated Diagnoses: Pituitary adenoma (H)      insulin lispro (HUMALOG) 100 UNIT/ML Cartridge Inject 1-7 Units Subcutaneous 4 times daily (with meals and nightly) Correction Scale - MEDIUM INSULIN RESISTANCE DOSING     Do Not give Correction Insulin if Pre-Meal BG less than 140.   For Pre-Meal  - 189 give 1 unit.   For Pre-Meal  - 239 give 2 units.   For Pre-Meal  - 289 give 3 units.   For Pre-Meal  - 339 give 4 units.   For Pre-Meal - 399 give 5 units.   For Pre-Meal -449 give 6 units  For Pre-Meal BG greater than or equal to 450 give 7 units.   To be given with prandial insulin, and based on pre-meal blood glucose.    Notify provider if glucose greater than or equal to 350 mg/dL after administration of correction dose.  If given at mealtime, administer within 30 minutes of start of meal.    Associated Diagnoses: Type 2 diabetes mellitus with other specified complication, with long-term current use of insulin (H)      lactulose (CHRONULAC) 10 GM/15ML solution Take 30 mLs (20 g) by mouth daily as needed for constipation    Associated Diagnoses: Pituitary adenoma (H)      !! levothyroxine (SYNTHROID/LEVOTHROID) 112 MCG tablet Take 1 tablet  (112 mcg) by mouth every morning (before breakfast)    Associated Diagnoses: Pituitary adenoma (H)      magnesium hydroxide (MILK OF MAGNESIA) 400 MG/5ML suspension Take 30 mLs by mouth daily as needed for constipation (Use if preventive measures (senna-docusate, docusate, and polyethylene glycol) are not effective.)    Associated Diagnoses: Pituitary adenoma (H)      melatonin 5 MG tablet Take 1 tablet (5 mg) by mouth nightly as needed for sleep    Associated Diagnoses: Insomnia, unspecified type      multivitamin w/minerals (THERA-VIT-M) tablet Take 1 tablet by mouth daily    Associated Diagnoses: Pituitary adenoma (H)      naloxone (NARCAN) 0.4 MG/ML injection Inject 0.5 mLs (0.2 mg) into the vein every hour as needed for opioid reversal    Associated Diagnoses: Pituitary adenoma (H)      ondansetron (ZOFRAN ODT) 4 MG ODT tab Take 1 tablet (4 mg) by mouth every 6 hours as needed for nausea or vomiting    Associated Diagnoses: Pituitary adenoma (H)      ondansetron (ZOFRAN) 2 MG/ML SOLN injection Inject 2 mLs (4 mg) into the vein every 6 hours as needed for nausea or vomiting    Associated Diagnoses: Pituitary adenoma (H)      polyethylene glycol (MIRALAX) 17 g packet Take 17 g by mouth 2 times daily as needed for constipation    Associated Diagnoses: Constipation, unspecified constipation type      prochlorperazine (COMPAZINE) 10 MG tablet Take 1 tablet (10 mg) by mouth every 6 hours as needed for nausea or vomiting    Associated Diagnoses: Pituitary adenoma (H)      senna-docusate (SENOKOT-S/PERICOLACE) 8.6-50 MG tablet Take 2 tablets by mouth 2 times daily as needed for constipation    Associated Diagnoses: Constipation, unspecified constipation type      simethicone (MYLICON) 80 MG chewable tablet Take 1 tablet (80 mg) by mouth 4 times daily as needed for cramping (gas)    Associated Diagnoses: Constipation, unspecified constipation type      sodium chloride (OCEAN) 0.65 % nasal spray Spray 2 sprays into both  nostrils every 2 hours (while awake)    Associated Diagnoses: Pituitary adenoma (H)      sodium fluoride dental gel (PREVIDENT) 1.1 % GEL topical gel Apply to affected area At Bedtime    Associated Diagnoses: Dental caries       !! - Potential duplicate medications found. Please discuss with provider.      CONTINUE these medications which have CHANGED    Details   acetaminophen (TYLENOL) 325 MG tablet Take 3 tablets (975 mg) by mouth every 6 hours as needed for other (mild-moderate pain. please use first)    Associated Diagnoses: Pituitary adenoma (H)      carvedilol (COREG) 6.25 MG tablet Take 1 tablet (6.25 mg) by mouth 2 times daily (with meals)    Associated Diagnoses: Benign essential hypertension      lisinopril (ZESTRIL) 5 MG tablet Take 1 tablet (5 mg) by mouth daily    Associated Diagnoses: Benign essential hypertension      spironolactone (ALDACTONE) 100 MG tablet Take 2 tablets (200 mg) by mouth daily    Associated Diagnoses: Benign essential hypertension      vitamin D2 (ERGOCALCIFEROL) 98664 units (1250 mcg) capsule Take 1 capsule (50,000 Units) by mouth every 7 days    Associated Diagnoses: Pituitary adenoma (H)         CONTINUE these medications which have NOT CHANGED    Details   furosemide (LASIX) 40 MG tablet Take 40 mg by mouth daily      !! levothyroxine (SYNTHROID/LEVOTHROID) 100 MCG tablet Take 1 tablet (100 mcg) by mouth daily  Qty: 30 tablet, Refills: 0       !! - Potential duplicate medications found. Please discuss with provider.      STOP taking these medications       aspirin (ASA) 81 MG EC tablet Comments:   Reason for Stopping:         empagliflozin (JARDIANCE) 25 MG TABS tablet Comments:   Reason for Stopping:         haloperidol (HALDOL) 2 MG tablet Comments:   Reason for Stopping:         hydrALAZINE (APRESOLINE) 25 MG tablet Comments:   Reason for Stopping:         metFORMIN (GLUCOPHAGE) 500 MG tablet Comments:   Reason for Stopping:         oxyCODONE (ROXICODONE) 5 MG tablet  Comments:   Reason for Stopping:         potassium chloride ER (K-TAB) 20 MEQ CR tablet Comments:   Reason for Stopping:         sildenafil (REVATIO) 20 MG tablet Comments:   Reason for Stopping:         sitagliptin (JANUVIA) 100 MG tablet Comments:   Reason for Stopping:         sitagliptin (JANUVIA) 50 MG tablet Comments:   Reason for Stopping:         sulfamethoxazole-trimethoprim (BACTRIM DS) 800-160 MG tablet Comments:   Reason for Stopping:         testosterone (ANDROGEL) 20.25 MG/1.25GM (1.62%) topical gel Comments:   Reason for Stopping:             Allergies   No Known Allergies

## 2023-05-19 NOTE — PLAN OF CARE
Goal Outcome Evaluation:    RN assumed cares [id number]     Pt A&Ox4, VSS on RA. R PICC saline locked. Pt denies pain and nausea. BLE wound dressings in place, pt declined dressing changes today, wishes to wait until tomorrow.  Pt intermittently declining repositioning but overall cooperative. . Pt resting between cares. Plan for eye appt 5/19 at 0830. Possible MRI today, not completed yesterday d/t pt desire for sedation. Pt waiting for placement.

## 2023-05-19 NOTE — PROGRESS NOTES
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 [facility], he developed new onset double vision and severe headache which prompted imaging that showed a large mass invading the cavernous sinuses and impinging on the cranial nerves. He is transferred to [facility] for this pituitary adenoma, concern for progression vs hemorrhage/apoplexy. Complicated by psychosis/metabolic encephalopathy and electrolyte abnormalities secondary to Cushing's syndrome. Underwent endoscopic endonasal transcavernous approach for resection of functional pituitary adenoma 4/27.      Updates today:  - medically stable and awaiting discharge to TCU or [facility] to [facility]   - met with patient at bedside with care coordinator for 30 minutes, pt expressed frustrations at difficulty with placement. Encouraged pt to work with PT  - dry run for radiation treatment successful, will be able to tolerate without general anesthesia  - MRI brain perfusion ordered by rad-onc, pt refused due to feeling he needs general anesthesia for scan. Did not accept IV ativan. Will address in AM   - finished course of zosyn today    #R lower leg abscess, soft tissue infection, +serratia,  s/p I&D 5/8/2023  #Hypotension, likely multifactorial 2/2 sepsis, blood loss anemia, resolveed  # Post-surgical Leukocytosis 2/2 inflammatory response, sepsis   Pt noted to have leukocytosis since POD1, WBC 15-17 range,  initially felt to likely be 2/2 post-surgical inflammatory response. Infection workup  initially negative w/ unremarkable CXR, blood cultures, UA, chronic wound assessment by WOC, and normal vitals. POD9, RRT called for softer SBPs, generally 80s/50s. Pt noted to have normal to moderately hypertensive blood pressures for most of the hospitalization; SBPs in 80s unusual for patient. All blood pressure meds held. Neurosurg evaluated, does not think patient needs further meningitis workup at this time. Careful physical exam ultimately revealed new opening of R chronic leg wounds with actively draining pus and blood. Surgery consulted. CT tib/fib revealed 4.9x3.2x1.8 cm soft tissue abscess of the medial aspect of the ankle with no evidence of osteomyelitis. Underwent bedside I&D of R lower leg on 5/8/23. R lower leg abscess growing +serratia. Notable ongoing bleeding from I&D site resulting in blood transfusion and hypotension. Will also monitor for possible glucocorticoid withdrawal syndrome contributing to hypotension  - antibiotics:              - zosyn (5/7-5/18) Finished zosyn course today              - vanc (5/7-5/8)  - infectious workup                    - wound culture with 1+ gram negative rods, 3+ serratia x 2 cultures  - curbside ID line -- recommends continuing zosyn instead of ceftriaxone for serratia, for 7-10 course for soft tissue infection vs longer course 10-14 days if concerned for arterial insufficiency. Duration also pending clinical course  - transfuse for hgb < 7  - MRSA nares negative  - continue holding lasix, lisinopril  - reduce PTA carvedilol to 6.25 mg BID from 12.5 mg BID, started 5/14/23  - TTE unchanged from prior  - encourage PO intake     #Acute blood loss anemia on chronic microcytic anemia, resolved  Significant bleeding following I&D of R leg abscess, ultimately requiring 3u pRBC over subsequent 4 days. Blood loss anemia contributed to hypotension as above.   - trend hemoglobin  - monitor surgical site for bleeding  - check iron studies  - folate, B12 wnl during  4/14/23    # chronic buttocks, sacrum and bilateral groin wounds   # RLE wound from puncture injury   # L dorsal foot wound from presumed trauma   # Hx serratia bacteremia in December 2022   For patient's lower extremity wounds and hx of Serratia bacteremia in December, he was [facility]. His antibiotics were broadened to Vanc and Zosyn and ultimately he was transitioned to ceftazidime on 3/23 with plan to complete course on 4/4/23. BCx negative and wound cx grew serratia marcescens at the [facility]. He has been on ceftazidime since. MRI of the right tib/fib on 3/23 was negative for osteomyelitis but did show two fluid collections draining sponateneously. Ortho was consulted at the [facility] and determined no intervention was needed as wounds were draining on their own.   - completed course of ceftazidime (3/23 - 4/5)  - PT/OT   - Pain: tylenol 975 mg Q6H PRN     # Pituitary adenoma resection this admission, c/b significant scar tissue to cavernous sinus, 4/27/23  #R eye ptosis, esotropia  # ACTH secreting pituitary adenoma c/b type II DM, hypothyroidism and low testosterone s/p two partial resections in 2021   Patient noted double vision and severe headache beginning the evening of 3/25. CT imaging on 3/25 revealed a large sellar/suprasellar mass extending into the cavernous sinuses with no evidence of acute stroke. On 3/28, he underwent an MRI which confirmed the CT findings of a large mass invading the cavernous sinuses and impinging on the cranial nerves. Necrosis extending beyond left cavernous sinus. [facility]. Repeat MRI performed on 4/6/23: compared to 2021 MRI, lesions are smaller. Orbit MRI without optic nerve compression and no evidence of orbital infection.  - 4/27 neurosurgery and ENT combined to perform endoscopic endonasal transcavernous approach for resection of functional pituitary adenoma. Complicated by significant scar tissue to cavernous sinus. Good post-op  recovery. Follow up MRI done 5/29. Neurosurgery okay with [facility] to [facility] once [facility] is [facility]. They will arrange follow up.   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  # Hypernatremia  # Hypokalemia, resolved  Hypernatremia and hypokalemia, likely secondary to Cushing's from pituitary macroadenoma that was unable to be completely removed  - Endocrine following, appreciate recs  - rad-onc following -- planning on 5 week course of external beam radiation therapy as an outpatient either at [place] or through the [facility]  - restart spironolactone 5/12 at lower dose 100 mg (prior dose 200 mg every day)   - Goal Na: 135-140  - Strict I&Os  - trend intermittent BMP, Mag  - check 24h urine cortisol and creatinine  - late night salivary cortisol x 2 pending   - endocrine working on establishing cortisol baseline which will dictate next steps (medical management vs radiation treatment)   - continue daily 8 AM cortisol     Per endocrine 5/11/23 review, consensus long-term approach:   1. Proceed with pituitary radiation - he was seen by radiation oncology while inpatient on 4/5/2023 - we will review with them next week re timing, per their note from 4/6/2023 the tentative thought was 5 weeks of external beam therapy.   2. 2-3 months post-op repeat baseline assessment of cortisol excess.   3. Wait to start medical therapy until 2-3 months post-op, and we favor use of pasireotide.    # Central Hypothyroidism  - Continue levothyroxine 112 mcg once daily      # Type II DM, exacerbated by Cushing's   A1c of 7.6% on 2/2023.  Endocrinology managing. Current regimen:   - medium sliding scale insulin for AC & HS  - Stopped Jardiance 10 mg daily per endocrine   - stopped carb  coverage insulin  - stopped insulin glargine  - hypoglycemia protocol  - Accu checks AC & HS      #Type II MI   # HFmrEF, with global hypokinesis  # Hypertension  # High burden of PVCs  # Prolonged QTc   # Elevated troponin, down-trended  Coronary angiogram on 2/27 showed normal coronaries. Trop mildly elevated on admit 88; downtrended to 77 without targeted intervention. Was noted to have high burden of PVCs on tele. TTE 4/6/2023 showed EF 40-45%, severe diffuse hypokinesis, normal RV function, and mild-moderate aortic regurgitation; overall, not significantly changed from prior TTE 2/17/23. 5/6/23, pt w/ new episode of hypotension of unclear cause. Troponin checked and 177-->170-->172. Most likely 2/2 demand ischemia in s/o hypotension. Patient denied chest pain. EKG with sinus rhythm, LVH, RBBB, and with t wave inversions more evident in anterior leads as compared to prior EKG 4/23/23.   - Repeat TTE unchanged  - troponin plateaued, will not recheck unless clinically changes  - Q4H vital signs  - recheck EKG, trop if chest pain occurs  - Lasix 40mg daily--held since surgery on 4/27. Restarted 4/30. Held 5/6    - Carvedilol 6.25 mg BID restarted 5/14  (PTA dose 25 mg BID)   -- lisinopril 5 mg daily restarted 5/15 at reduced dose (PTA 20 mg daily)   - ASA - restarted 5/5/23, held 5/9/2023 due to bleeding, restarted 5/12  - Avoid QTc prolonging medications    #Dental caries     Pt w/ acute tooth pain 5/11/23. Dental consulted.CT dental performed and revealed multiple scattered periapical lucencies, which may be seen with small periapical abscesses, w/ no definite evidence for cellulitis or soft tissue abscess. Recommended extraction of tooth #1,2,11,14,30,32 at least. Per dental:  1. At this time, clinical and radiographic findings do indicate the need for  extraction of tooth # 1, 2, 11 14, 30, 32 at least. Below is the contact information of the [facility] dental clinic.  a. [facility] [address] [place] [address] [phone]    b. The care coordinator/RN of the patient will need to call the scheduling team of the [facility] dental clinic and get an appointment for the extraction.  The primary team will be responsible to arrange the transportation of the patient if inpatient.  - abx as above w/ zosyn  - pain mgmt per primary team  - further eval w/ dental radiographs and clinical exam at [facility] adult dental clinic       # Acute metabolic encephalopathy - resolved  # Pyruria, Candiduria - resolved  # Encephalopathy likely secondary to cushing's disease, resolved  Admission symptoms included disorientation, intermittently following directions. Repetitive words - perseverating on particular phrases. Sx started the 2-3 days prior to admission (which [facility] was [facility]). Vulnerable brain (multiple brain surgeries), enlarging pituitary mass. No seizure activity per EEG. Steroids removed and pt still encephalopathic. Sodium had been in normal limits and patient remained altered. Worked up for infections - did reveal candiduria but unlikely this was etiology, though he did complete an 8 day course of fluconazole. Briefly on ceftriaxone but encephalopathy also did not improve. CT abd w/ contrast (4/10): no evidence of pyelonephritis  Ultimately attributed to Cushings and known pituitary tumor. Continues to remain lucid.  - Surgical management per neurosurgery  - Delirium precautions  - Electrolyte management as above  - PRN quetiapine if agitation that is not responsive to behavioral interventions      # Hypogonadism   - hold PTA androgel (would need to bring in home medication, not accessible at this time)      # Chronic microcytic anemia   Hgb of 8.4 on [facility]. Peripheral smear on 3/30 showed poikilocytosis with occasional red blood cell fragments but no other evidence of hemolysis.  Haptoglobin was normal.  Ferritin was 91, transferrin saturation was low, B12  was 495 and folate was 8.7. Likely combination of iron deficiency as well as inflammation/chronic disease.   - CTM        # Concern for malnutrition   - Nutrition following     # Delusions, intermittent, resolved  Intermittent delusions regarding staff stalking patient while in the hospital. Unclear chronicity of these delusions - but has had them multiple times this hospitalization. Unclear psych history. Medical management for encephalopathy as stated above. Psychiatry was consulted during his care and agreed with paranoia and recommended Abilify. He was briefly on Abilify 5mg but patient then started to refuse nightly so this was discontinued. His paranoia and delusions have been very intermittent and appear to be associated with his anxiety.      Discharge Needs  - Follow up in 6 weeks with Dr Jeong, neurosurgery (message sent to schedulers),  - MRI pituitary in 6 weeks before discharge (message sent to schedulers)  - Upright lumbar xrays to evaluate his compression fractures (ordered by neurosurgery)   - reach out to ENT to see if follow-up is needed in outpatient clinic (Jean Baptiste splints removed while inpatient)        Diet: Diet  Regular Diet Adult  Snacks/Supplements Adult: Other; Glucerna PRN; Between Meals  Snacks/Supplements Adult: Ensure Clear; With Meals    DVT Prophylaxis: Pneumatic Compression Devices  Roland Catheter: Not present  Lines:   PICC 04/06/23 Double Lumen Right Basilic access-Site Assessment: WDL      Cardiac Monitoring: None  Code Status: Full Code      Clinically Significant Risk Factors              # Hypoalbuminemia: Lowest albumin = 2.9 g/dL at 4/11/2023  7:07 AM, will monitor as appropriate           # DMII: A1C = 9.5 % (Ref range: <5.7 %) within past 6 months    # Severe Malnutrition: based on nutrition assessment         Disposition Plan      Expected Discharge Date: 05/20/2023    Discharge Delays: *Medically Ready for Discharge  Placement - TCU  Complex Discharge  Destination:  other (comment) ([facility] transfer vs. [facility]CU)  Discharge Comments: Dispo: [facility] to [facility] (transfer agreement) vs TCU  Delays: Destination, eye clinic appt 8:30pm  Progress: Daily dispo follow-up. Unlikely TCU plcmnt if getting active Radiation.         PT care was discussed with Dr Andrez Verdin MD  Medicine Service, [facility] TEAM [address] [place] [address]  Securely message with [employer] (more info)  Text page via [employer] Paging/Directory   See signed in provider for up to date coverage information  ______________________________________________________________________    Interval History     NAEO    Pt very frustrated this morning that he continues to be hospitalized and that he has not been [facility] to the [facility] or gone to TCU. Is concerned that people are not being truthful to him.    Visited him again in the afternoon with care coordinator and with his sister on the phone. Discussed discharge options with patient. Patient would like to leave the hospital as soon as possible even if it means going home (something his sister does not think is possible)     Physical Exam   Vital Signs: Temp: 97.7  F (36.5  C) Temp src: Axillary BP: 103/72 Pulse: 81   Resp: 18 SpO2: 98 % O2 Device: None (Room air)    Weight: 146 lbs 8 oz    Constitutional: no apparent distress, nontoxic appearing  HEENT: NCAT. R eye ptosis  Cardiovascular: warm and well perfused extremities   Respiratory:  Comfortable conversational breathing on room air  GI: abdomen non distended  Neuro: alert and oriented, answers questions appropriately      Data       Imaging results reviewed over the past 24 hrs:   No results found for this or any previous visit (from the past 24 hour(s)).

## 2023-05-19 NOTE — PROGRESS NOTES
HPI    Pt states he has had floaters for eyes  No flashes floaters, eye pain ortearing    Mela Cordon COT 8:54 AM May 19, 2023           Last edited by Mela Cordon on 5/19/2023  8:54 AM.          Ophthalmology Acute Clinic       HPI:   Gustavo Faria is a 58 year old male with type 2 diabetes, lower extremity wounds, and known ACTH secreting pituitary adenoma with Cushing's disease s/p partial resection 2021, and baseline right CN3 palsy. Who [facility] Originally admitted for wound care but then developed encephalopathy with headache, and inability to move his eyes. Repeat MRI showing enlarging soft tissue mass invading bilateral cavernous sinuses; from possible recurrent adenoma. He underwent resection with Dr. Jeong on 4/27/23.    His last exam was one month ago, he now presents to acute clinic for one month follow up          PMH:   History reviewed. No pertinent past medical history.          Review of systems for the eyes was negative other than the pertinent positives/negatives listed in the HPI.      Imaging:   OCT mac:   -right eye: unremarkable   -left eye: unremarkable     OCT RNFL:   - trace non-specific elevation inferiorly   - unremarkable    MRI BRAIN W AND W/O CONTRAST 4/29/23  IMPRESSION:  1. Immediate postoperative changes status post subtotal sellar massresection. Residual enhancing lesion as detailed.  2. Question defect of the left cribriform plate favored to be be artifactual given the anatomic location is not in close approximation  to the surgical path or surgical site, and therefore depends on the level of clinical concern for CSF leak.  3. No acute intracranial pathology.    Assessment & Plan      Gustavo Faria is a 58 year old male with the following diagnoses:     # Pituitary adenoma, recurrence with necrosis  # Compression of cavernous sinus  # CN 6 palsy, left eye  # CN 4 palsy, left eye  # Complete ptosis, left eye  # CN 3 palsy, right  eye    Patient presents to clinic for one month follow up. In the interim, the patient has undergone resection with neurosurgery.  [facility] stable. Patient was unable to tolerate GTOP, but confrontational fields are full. OCT mac and RNFL unremarkable.      PLAN:  - Lack of APD, no disc edema. Left disc pallor related to prior optic atrophy.  - EOM limitations correlate with cavernous sinus infiltration of macroadenoma.    - [place] 30 PD placed with plano glasses, patient reported improved diplopia    Follow up:  Dr. Antony 7/27/23      Patient seen with Dr. Kate.     Thank you for entrusting us with your care    Bijal Henry M.D.  PGY-2 Resident Physician  Department of Ophthalmology  05/19/23 9:51 AM

## 2023-05-19 NOTE — PROVIDER NOTIFICATION
Provider notified (name): Kumar Martinez MD  Reason for notification: Will pt discharge with PICC line?    Response from provider: Pt will discharge with PICC line.

## 2023-05-19 NOTE — PROGRESS NOTES
Care Management Discharge Note    Discharge Date: 05/19/2023     [facility]    Discharge Services: None ([facility])    Discharge DME:  ([facility])    Discharge Transportation: [id number] - [phone] stretcher p/u via [facility] (P: [phone])    Private pay costs discussed: transportation costs    Does the patient's insurance plan have a 3 day qualifying hospital stay waiver?  No    PAS Confirmation Code:    Patient/family educated on Medicare website which has current facility and service quality ratings: other (see comments) (N/A)    Education Provided on the Discharge Plan: Yes  Persons Notified of Discharge Plans: RN; Charge RN; MD; [facility]  Patient/Family in Agreement with the Plan: yes    Handoff Referral Completed: No    Additional Information:  Writer received after hours page. Pt is appropriate to discharge. Writer spoke with [facility] Patient Placement (Phone: [phone]) and confirmed discharge. Writer arranged for MD to give MD-to-MD report. Case reviewed with bedside RN. Pt is requesting stretcher. Per chart, pt has sacral wounds.     Writer scheduled stretcher transportation to p/u pt. PCS form on chart.     Writer updated [facility] pt placement for discharge time. Pt's bedside RN will need to call [phone] when pt is picked up to give report.  Writer updated beside RN.     Writer updated bedside RN, Charge RN for discharge plan.  ________________    LUPILLO Portillo, [facility]  ED/Observation   [facility]  Phone: [phone]  Pager: [phone]  Fax: [phone]    On-call pager, [phone], 4:00pm to midnight

## 2023-05-20 NOTE — PLAN OF CARE
Physical Therapy Discharge Summary    Reason for therapy discharge:    [facility]    Progress towards therapy goal(s). See goals on Care Plan in [facility] electronic health record for goal details.  Goals not met.  Barriers to achieving goals:   limited tolerance for therapy and discharge from facility.    Therapy recommendation(s):    Continued therapy is recommended.  Rationale/Recommendations:  to improve functional mobility status and safety with IND mobility.

## 2023-05-20 NOTE — PLAN OF CARE
Cares 0700 to 1945    /80 (BP Location: Left arm, Patient Position: Semi-Mayer's, Cuff Size: Adult Regular)   Pulse 90   Temp 98.4  F (36.9  C) (Axillary)   Resp 18   Wt 65.9 kg (145 lb 4.8 oz)   SpO2 100%   BMI 23.45 kg/m      VSS on room air. Alert & oriented x4. C/o mild headache, tylenol given 1x. Tooth sensitivity, benzocaine gel given 2x. Leg wound cares done. R PICC hep locked. Heavy assist of 2 for standing & pivoting to wheelchair which pt did 1x today. Eye appointment completed this morning. Pt informed family that he was [facility] to [facility] this evening.     [facility]  Transportation: Via stretcher with EMS.   Time: 1945  Prescriptions: NA  Belongings: Packed by pt's sister (Betty) and sent with EMS.   PIV/Access: R PICC intact, not to be removed per provider.   Care Plan and Education discontinued: Yes  Paperwork: Discharge packet sent with EMS. AVS discussed, signed and in medical chart.

## 2023-05-20 NOTE — PLAN OF CARE
Occupational Therapy Discharge Summary    Reason for therapy discharge:    [facility]    Progress towards therapy goal(s). See goals on Care Plan in [facility] electronic health record for goal details.  Goals partially met.  Barriers to achieving goals:   discharge from facility.    Therapy recommendation(s):    Continued therapy is recommended.  Rationale/Recommendations:  Pt would benefit from additional therapy to progress functional independence with all mobility and ADLs.

## 2023-05-21 ENCOUNTER — HEALTH MAINTENANCE LETTER (OUTPATIENT)
Age: 58
End: 2023-05-21

## 2023-05-22 ENCOUNTER — DOCUMENTATION ONLY (OUTPATIENT)
Dept: OTHER | Facility: CLINIC | Age: 58
End: 2023-05-22
Payer: COMMERCIAL

## 2023-05-23 ENCOUNTER — TELEPHONE (OUTPATIENT)
Dept: OPHTHALMOLOGY | Facility: CLINIC | Age: 58
End: 2023-05-23
Payer: COMMERCIAL

## 2023-08-13 ENCOUNTER — HEALTH MAINTENANCE LETTER (OUTPATIENT)
Age: 58
End: 2023-08-13

## 2023-12-31 ENCOUNTER — HEALTH MAINTENANCE LETTER (OUTPATIENT)
Age: 58
End: 2023-12-31

## 2024-05-19 ENCOUNTER — HEALTH MAINTENANCE LETTER (OUTPATIENT)
Age: 59
End: 2024-05-19

## 2024-07-28 ENCOUNTER — HEALTH MAINTENANCE LETTER (OUTPATIENT)
Age: 59
End: 2024-07-28

## 2024-08-10 NOTE — PROGRESS NOTES
[facility]    Infectious Disease Progress Note    Date of Service : 02/17/2023     Assessment:  58 YM with diabetes and chronic RLE wounds who has been hospitalized with wound infection with copious purulent drainage. Prior wound infection / bacteremia related to serratia marcescens in Dec.  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 Consider MRI W/WO of right lower leg to assess for deep infection  2. Follow cx sensitivities  3. Wound care  4. Surgical evaluation for debridement -copious purulence was noted from lateral wound on initial exam  5. Continue Meropenem for now, suspect GNB will be serratia as on prior cxs. Final antibiotic plan based on sensitivity data.    Cathy Keith MD    Interval History   Resting, leg feels much better, had wound care done at bedside, pain controlled, tolerating antibiotics without side effects    Physical Exam   Temp: 98.2  F (36.8  C) Temp src: Oral BP: (!) 129/94 Pulse: 70   Resp: 16 SpO2: 92 % O2 Device: None (Room air)    Vitals:    02/16/23 0836   Weight: 77.1 kg (170 lb)     Vital Signs with Ranges  Temp:  [97.7  F (36.5  C)-98.4  F (36.9  C)] 98.2  F (36.8  C)  Pulse:  [] 70  Resp:  [11-20] 16  BP: (129-146)/() 129/94  SpO2:  [92 %-100 %] 92 %    Constitutional: Awake, alert, cooperative, no apparent distress  Lungs: Clear to auscultation bilaterally, no crackles or wheezing  Cardiovascular: Regular rate and rhythm, normal S1 and S2, and no murmur noted  Abdomen: Normal bowel sounds, soft, non-distended, non-tender  Skin: No  rash          Other:    Medications       aspirin  81 mg Oral Daily     carvedilol  12.5 mg Oral BID w/meals     enoxaparin ANTICOAGULANT  40 mg Subcutaneous Q24H     furosemide  20 mg Intravenous BID     haloperidol  2 mg Oral BID     hydrALAZINE  25 mg Oral TID     insulin aspart   Subcutaneous Daily with breakfast     insulin aspart  1-7 Units Subcutaneous TID AC     insulin aspart  1-5 Units Subcutaneous At Bedtime     [START ON 2/18/2023] insulin glargine  15 Units Subcutaneous QAM AC     levothyroxine  100 mcg Oral Daily     lisinopril  10 mg Oral Daily     meropenem  1 g Intravenous Q8H     potassium chloride  20 mEq Oral Daily     sodium chloride (PF)  3 mL Intracatheter Q8H     spironolactone  100 mg Oral Daily       Data   All microbiology laboratory data reviewed.  Recent Labs   Lab Test 02/17/23  1251 02/16/23  0854 02/11/23  0816   WBC 17.4* 15.3* 14.7*   HGB 9.2* 9.2* 9.2*   HCT 30.1* 30.1* 29.4*   MCV 97 98 96    449 479*     Recent Labs   Lab Test 02/17/23  1251 02/16/23  0854 02/11/23  0816   CR 0.84 0.87 0.81     Recent Labs   Lab Test 02/16/23  0854   SED 33*             0 (no pain/absence of nonverbal indicators of pain)

## 2024-10-06 ENCOUNTER — HEALTH MAINTENANCE LETTER (OUTPATIENT)
Age: 59
End: 2024-10-06

## 2025-01-19 ENCOUNTER — HEALTH MAINTENANCE LETTER (OUTPATIENT)
Age: 60
End: 2025-01-19

## 2025-04-27 ENCOUNTER — HEALTH MAINTENANCE LETTER (OUTPATIENT)
Age: 60
End: 2025-04-27

## 2025-08-10 ENCOUNTER — HEALTH MAINTENANCE LETTER (OUTPATIENT)
Age: 60
End: 2025-08-10

## (undated) DEVICE — DRSG GAUZE 4X4" TRAY 6939

## (undated) DEVICE — BUR STRK ROUND DIAMOND 4.0MM COARSE 8450-012-040DC

## (undated) DEVICE — DRAPE EYE SHEET 8441

## (undated) DEVICE — ESU GROUND PAD ADULT W/CORD E7507

## (undated) DEVICE — DRAPE STOCKINETTE IMPERVIOUS 10" 21048

## (undated) DEVICE — DRSG NASOPORE FRAG FIRM 8CM 5400-020-008

## (undated) DEVICE — PACK NEURO MINOR UMMC SNE32MNMU4

## (undated) DEVICE — PREP CHLORAPREP 26ML TINTED HI-LITE ORANGE 930815

## (undated) DEVICE — BLADE FEATHER MIZUHO K-6400

## (undated) DEVICE — DRAPE SHEET REV FOLD 3/4 9349

## (undated) DEVICE — GUIDE MED SPIWAY SRG ENSL

## (undated) DEVICE — DRAPE MAYO STAND 23X54 8337

## (undated) DEVICE — TRACKER ENT OTS INSTRUMENT FUSION 9733533

## (undated) DEVICE — SYR 10ML FINGER CONTROL W/O NDL 309695

## (undated) DEVICE — SYR 30ML LL W/O NDL 302832

## (undated) DEVICE — SPONGE SURGIFOAM 100 1974

## (undated) DEVICE — TUBING IRRIGATOR STRAIGHTSHOT XPS 1895522

## (undated) DEVICE — PREP CHLORAPREP CLEAR 3ML 930400

## (undated) DEVICE — SOL WATER IRRIG 1000ML BOTTLE 2F7114

## (undated) DEVICE — SPONGE SURGIFOAM 01GM POWDER 1978

## (undated) DEVICE — PACK GOWN 3/PK DISP XL SBA32GPFCB

## (undated) DEVICE — DRAPE POUCH INSTRUMENT 1018

## (undated) DEVICE — BUR 30K TAPER CHOANAL ATRESIA 1884015RTD

## (undated) DEVICE — PROBE DOPPLER MICRO MIZUHO

## (undated) DEVICE — ESU HOLSTER PLASTIC DISP E2400

## (undated) DEVICE — SPONGE COTTONOID NEURO 1/2"X1/2" 30-054

## (undated) DEVICE — TUBING STRYKER IRRIGATION CASSETTE 5400-050-001

## (undated) DEVICE — CATH TRAY FOLEY SURESTEP 16FR W/TMP PRB STLK LATEX A319416AM

## (undated) DEVICE — SUCTION MANIFOLD NEPTUNE 2 SYS 4 PORT 0702-020-000

## (undated) DEVICE — COVER CAMERA VIDEO LASER 9X96" 04-CC227

## (undated) DEVICE — BLADE SHAVER SERRATED 4MM ROTATE 1884002HRE

## (undated) DEVICE — SLEEVE IRRIGATION MIS 13.0CM 5/PKG 5407-010-950

## (undated) DEVICE — DRSG TELFA 3X8" 1238

## (undated) DEVICE — TUBING SUCTION MEDI-VAC SOFT 3/16"X20' N520A

## (undated) DEVICE — DECANTER VIAL 2006S

## (undated) DEVICE — ENDO SHEATH STORZ SHARPSITE ENDOSCRUB 0DEG 4MM 1912000

## (undated) DEVICE — SOL RINGERS LACTATED 1000ML BAG 07953-09

## (undated) DEVICE — ENDO SHEATH STORZ SHARPSITE ENDOSCRUB 30DEG 4MM 1912010

## (undated) DEVICE — ENDO INSERT ESU BIPOLAR FCP STORZ VERTICAL CLOSING 28164FGL

## (undated) DEVICE — DRAPE CORETEMP FLUID WARM SYSTEM 62X56IN CTD200

## (undated) DEVICE — IOM SUPPLIES/CASE FEE

## (undated) DEVICE — EYE FLUORESCEIN OPHTHALMIC STRIP FLO-GLO 1272111

## (undated) DEVICE — STRAP UNIVERSAL POSITIONING 2-PIECE 4X47X76" 91-287

## (undated) DEVICE — PROTECTOR ARM ONE-STEP TRENDELENBURG 40418

## (undated) DEVICE — LINEN TOWEL PACK X30 5481

## (undated) DEVICE — APPLICATOR EXTENDED TIP DURASEAL 8CM 205108

## (undated) DEVICE — SURGICEL HEMOSTAT 4X8" 1952

## (undated) DEVICE — LINEN TOWEL PACK X6 WHITE 5487

## (undated) DEVICE — TRACKER PATIENT NON-INVASIVE AXIEM 9734887

## (undated) DEVICE — SPLINT NASAL DOYLE BREATHEASY 20-10500

## (undated) DEVICE — CUP AND LID 2PK 2OZ STERILE  SSK9006A

## (undated) DEVICE — GLOVE BIOGEL PI MICRO SZ 7.0 48570

## (undated) DEVICE — EYE PREP BETADINE 5% SOLUTION 30ML 0065-0411-30

## (undated) DEVICE — SU ETHILON 3-0 PS-1 18" 1663H

## (undated) DEVICE — DECANTER BAG 2002S

## (undated) RX ORDER — CEFAZOLIN SODIUM/WATER 2 G/20 ML
SYRINGE (ML) INTRAVENOUS
Status: DISPENSED
Start: 2023-04-27

## (undated) RX ORDER — PROPOFOL 10 MG/ML
INJECTION, EMULSION INTRAVENOUS
Status: DISPENSED
Start: 2023-04-27

## (undated) RX ORDER — FENTANYL CITRATE 50 UG/ML
INJECTION, SOLUTION INTRAMUSCULAR; INTRAVENOUS
Status: DISPENSED
Start: 2023-04-27

## (undated) RX ORDER — ONDANSETRON 2 MG/ML
INJECTION INTRAMUSCULAR; INTRAVENOUS
Status: DISPENSED
Start: 2023-04-27

## (undated) RX ORDER — LIDOCAINE HYDROCHLORIDE AND EPINEPHRINE 10; 10 MG/ML; UG/ML
INJECTION, SOLUTION INFILTRATION; PERINEURAL
Status: DISPENSED
Start: 2023-04-27

## (undated) RX ORDER — PROPOFOL 10 MG/ML
INJECTION, EMULSION INTRAVENOUS
Status: DISPENSED
Start: 2023-04-24

## (undated) RX ORDER — FENTANYL CITRATE-0.9 % NACL/PF 10 MCG/ML
PLASTIC BAG, INJECTION (ML) INTRAVENOUS
Status: DISPENSED
Start: 2023-04-27

## (undated) RX ORDER — HYDROMORPHONE HYDROCHLORIDE 1 MG/ML
INJECTION, SOLUTION INTRAMUSCULAR; INTRAVENOUS; SUBCUTANEOUS
Status: DISPENSED
Start: 2023-04-27

## (undated) RX ORDER — SODIUM CHLORIDE 9 MG/ML
INJECTION, SOLUTION INTRAVENOUS
Status: DISPENSED
Start: 2023-04-27

## (undated) RX ORDER — FENTANYL CITRATE 50 UG/ML
INJECTION, SOLUTION INTRAMUSCULAR; INTRAVENOUS
Status: DISPENSED
Start: 2023-04-29

## (undated) RX ORDER — FENTANYL CITRATE 50 UG/ML
INJECTION, SOLUTION INTRAMUSCULAR; INTRAVENOUS
Status: DISPENSED
Start: 2023-04-24

## (undated) RX ORDER — EPINEPHRINE NASAL SOLUTION 1 MG/ML
SOLUTION NASAL
Status: DISPENSED
Start: 2023-04-27